# Patient Record
Sex: MALE | Race: WHITE | NOT HISPANIC OR LATINO
[De-identification: names, ages, dates, MRNs, and addresses within clinical notes are randomized per-mention and may not be internally consistent; named-entity substitution may affect disease eponyms.]

---

## 2017-05-17 ENCOUNTER — APPOINTMENT (OUTPATIENT)
Dept: TRANSPLANT | Facility: CLINIC | Age: 67
End: 2017-05-17

## 2017-06-15 ENCOUNTER — APPOINTMENT (OUTPATIENT)
Dept: TRANSPLANT | Facility: CLINIC | Age: 67
End: 2017-06-15

## 2017-06-15 VITALS
HEIGHT: 73 IN | SYSTOLIC BLOOD PRESSURE: 111 MMHG | RESPIRATION RATE: 15 BRPM | TEMPERATURE: 99.5 F | HEART RATE: 97 BPM | DIASTOLIC BLOOD PRESSURE: 47 MMHG

## 2017-06-15 RX ORDER — ALBUTEROL SULFATE 2.5 MG/3ML
(2.5 MG/3ML) SOLUTION RESPIRATORY (INHALATION)
Refills: 0 | Status: ACTIVE | COMMUNITY
Start: 2017-06-15

## 2017-06-15 RX ORDER — METOPROLOL SUCCINATE 25 MG/1
25 TABLET, EXTENDED RELEASE ORAL DAILY
Refills: 0 | Status: DISCONTINUED | COMMUNITY
Start: 2017-06-15 | End: 2017-06-15

## 2017-06-16 RX ORDER — PREDNISONE 1 MG/1
1 TABLET ORAL TWICE DAILY
Qty: 360 | Refills: 1 | Status: COMPLETED | COMMUNITY
Start: 2017-06-16

## 2017-06-19 ENCOUNTER — RX RENEWAL (OUTPATIENT)
Age: 67
End: 2017-06-19

## 2017-07-18 ENCOUNTER — NON-APPOINTMENT (OUTPATIENT)
Age: 67
End: 2017-07-18

## 2017-07-18 ENCOUNTER — APPOINTMENT (OUTPATIENT)
Dept: CARDIOLOGY | Facility: CLINIC | Age: 67
End: 2017-07-18

## 2017-07-18 VITALS
OXYGEN SATURATION: 90 % | SYSTOLIC BLOOD PRESSURE: 136 MMHG | DIASTOLIC BLOOD PRESSURE: 63 MMHG | WEIGHT: 250 LBS | BODY MASS INDEX: 32.98 KG/M2 | HEART RATE: 74 BPM

## 2017-07-18 DIAGNOSIS — Z85.51 PERSONAL HISTORY OF MALIGNANT NEOPLASM OF BLADDER: ICD-10-CM

## 2017-07-18 DIAGNOSIS — Z87.898 PERSONAL HISTORY OF OTHER SPECIFIED CONDITIONS: ICD-10-CM

## 2017-07-19 ENCOUNTER — TRANSCRIPTION ENCOUNTER (OUTPATIENT)
Age: 67
End: 2017-07-19

## 2017-07-25 ENCOUNTER — APPOINTMENT (OUTPATIENT)
Dept: TRANSPLANT | Facility: CLINIC | Age: 67
End: 2017-07-25

## 2017-07-25 VITALS
DIASTOLIC BLOOD PRESSURE: 64 MMHG | RESPIRATION RATE: 17 BRPM | TEMPERATURE: 98.8 F | HEART RATE: 102 BPM | SYSTOLIC BLOOD PRESSURE: 116 MMHG

## 2017-09-20 ENCOUNTER — RX RENEWAL (OUTPATIENT)
Age: 67
End: 2017-09-20

## 2017-10-24 ENCOUNTER — APPOINTMENT (OUTPATIENT)
Dept: TRANSPLANT | Facility: CLINIC | Age: 67
End: 2017-10-24

## 2017-10-26 ENCOUNTER — APPOINTMENT (OUTPATIENT)
Dept: TRANSPLANT | Facility: CLINIC | Age: 67
End: 2017-10-26
Payer: MEDICARE

## 2017-10-26 VITALS
HEIGHT: 73 IN | BODY MASS INDEX: 32.47 KG/M2 | WEIGHT: 245 LBS | DIASTOLIC BLOOD PRESSURE: 76 MMHG | HEART RATE: 89 BPM | TEMPERATURE: 98.2 F | RESPIRATION RATE: 26 BRPM | SYSTOLIC BLOOD PRESSURE: 140 MMHG

## 2017-10-26 PROCEDURE — 99215 OFFICE O/P EST HI 40 MIN: CPT

## 2017-10-26 RX ORDER — TAMSULOSIN HYDROCHLORIDE 0.4 MG/1
0.4 CAPSULE ORAL DAILY
Qty: 30 | Refills: 0 | Status: ACTIVE | COMMUNITY
Start: 2017-06-15

## 2017-10-26 RX ORDER — CALCIUM CARBONATE/VITAMIN D3 500-10/5ML
400 LIQUID (ML) ORAL
Refills: 0 | Status: ACTIVE | COMMUNITY
Start: 2017-06-15

## 2017-10-26 RX ORDER — BUPRENORPHINE HYDROCHLORIDE 8 MG/1
8 TABLET SUBLINGUAL
Refills: 0 | Status: ACTIVE | COMMUNITY
Start: 2017-06-15

## 2017-10-26 RX ORDER — INSULIN GLARGINE 100 [IU]/ML
100 INJECTION, SOLUTION SUBCUTANEOUS
Refills: 0 | Status: ACTIVE | COMMUNITY
Start: 2017-06-15

## 2017-10-26 RX ORDER — UMECLIDINIUM 62.5 UG/1
62.5 AEROSOL, POWDER ORAL DAILY
Refills: 0 | Status: ACTIVE | COMMUNITY
Start: 2017-06-15

## 2017-12-05 ENCOUNTER — OTHER (OUTPATIENT)
Age: 67
End: 2017-12-05

## 2017-12-05 ENCOUNTER — RX RENEWAL (OUTPATIENT)
Age: 67
End: 2017-12-05

## 2017-12-07 ENCOUNTER — LABORATORY RESULT (OUTPATIENT)
Age: 67
End: 2017-12-07

## 2017-12-07 ENCOUNTER — APPOINTMENT (OUTPATIENT)
Dept: TRANSPLANT | Facility: CLINIC | Age: 67
End: 2017-12-07
Payer: MEDICARE

## 2017-12-07 ENCOUNTER — APPOINTMENT (OUTPATIENT)
Age: 67
End: 2017-12-07
Payer: MEDICARE

## 2017-12-07 VITALS
HEIGHT: 73 IN | OXYGEN SATURATION: 83 % | SYSTOLIC BLOOD PRESSURE: 145 MMHG | DIASTOLIC BLOOD PRESSURE: 66 MMHG | HEART RATE: 87 BPM | WEIGHT: 250 LBS | RESPIRATION RATE: 20 BRPM | BODY MASS INDEX: 33.13 KG/M2 | TEMPERATURE: 98.6 F

## 2017-12-07 VITALS — HEART RATE: 89 BPM | DIASTOLIC BLOOD PRESSURE: 81 MMHG | SYSTOLIC BLOOD PRESSURE: 138 MMHG

## 2017-12-07 PROCEDURE — 99215 OFFICE O/P EST HI 40 MIN: CPT

## 2017-12-07 PROCEDURE — 91200 LIVER ELASTOGRAPHY: CPT

## 2017-12-07 RX ORDER — METOPROLOL SUCCINATE 25 MG/1
25 TABLET, EXTENDED RELEASE ORAL
Refills: 0 | Status: COMPLETED | COMMUNITY

## 2017-12-07 RX ORDER — ASPIRIN ENTERIC COATED TABLETS 81 MG 81 MG/1
81 TABLET, DELAYED RELEASE ORAL
Refills: 0 | Status: COMPLETED | COMMUNITY

## 2017-12-07 RX ORDER — CHLORHEXIDINE GLUCONATE 4 %
400 (240 MG) LIQUID (ML) TOPICAL
Refills: 0 | Status: COMPLETED | COMMUNITY

## 2017-12-07 RX ORDER — PREDNISONE 1 MG/1
1 TABLET ORAL
Refills: 0 | Status: COMPLETED | COMMUNITY

## 2017-12-07 RX ORDER — INSULIN GLARGINE 100 [IU]/ML
100 INJECTION, SOLUTION SUBCUTANEOUS
Refills: 0 | Status: COMPLETED | COMMUNITY

## 2017-12-07 RX ORDER — INSULIN LISPRO 100 [IU]/ML
(75-25) 100 INJECTION, SUSPENSION SUBCUTANEOUS
Refills: 0 | Status: COMPLETED | COMMUNITY

## 2017-12-07 RX ORDER — SULFAMETHOXAZOLE AND TRIMETHOPRIM 400; 80 MG/1; MG/1
400-80 TABLET ORAL
Refills: 0 | Status: COMPLETED | COMMUNITY

## 2017-12-07 RX ORDER — ALBUTEROL SULFATE 90 UG/1
108 (90 BASE) AEROSOL, METERED RESPIRATORY (INHALATION)
Refills: 0 | Status: COMPLETED | COMMUNITY

## 2017-12-07 RX ORDER — MYCOPHENOLATE MOFETIL 500 MG/1
500 TABLET ORAL
Refills: 0 | Status: COMPLETED | COMMUNITY

## 2017-12-07 RX ORDER — SERTRALINE HYDROCHLORIDE 25 MG/1
25 TABLET, FILM COATED ORAL
Refills: 0 | Status: COMPLETED | COMMUNITY

## 2017-12-07 RX ORDER — CHOLECALCIFEROL (VITAMIN D3) 50 MCG
2000 CAPSULE ORAL
Refills: 0 | Status: COMPLETED | COMMUNITY

## 2017-12-07 RX ORDER — OMEGA-3/DHA/EPA/FISH OIL 35-113.5MG
1000 TABLET,CHEWABLE ORAL
Refills: 0 | Status: COMPLETED | COMMUNITY

## 2017-12-07 RX ORDER — DOCUSATE SODIUM 100 MG/1
100 CAPSULE ORAL
Refills: 0 | Status: COMPLETED | COMMUNITY

## 2017-12-07 RX ORDER — BUPRENORPHINE HYDROCHLORIDE 8 MG/1
8 TABLET SUBLINGUAL
Refills: 0 | Status: COMPLETED | COMMUNITY

## 2017-12-07 RX ORDER — GLUCOSAMINE/CHONDR SU A SOD 750-600 MG
1000 TABLET ORAL
Refills: 0 | Status: COMPLETED | COMMUNITY

## 2017-12-07 RX ORDER — SERTRALINE HYDROCHLORIDE 50 MG/1
50 TABLET, FILM COATED ORAL
Refills: 0 | Status: COMPLETED | COMMUNITY

## 2017-12-07 RX ORDER — PHYTONADIONE (VIT K1) 100 MCG
100 TABLET ORAL
Refills: 0 | Status: COMPLETED | COMMUNITY

## 2017-12-07 RX ORDER — PANTOPRAZOLE SODIUM 40 MG/10ML
40 INJECTION, POWDER, FOR SOLUTION INTRAVENOUS
Refills: 0 | Status: COMPLETED | COMMUNITY

## 2017-12-07 RX ORDER — UMECLIDINIUM 62.5 UG/1
62.5 AEROSOL, POWDER ORAL
Refills: 0 | Status: COMPLETED | COMMUNITY

## 2017-12-07 RX ORDER — SPIRONOLACTONE 25 MG/1
25 TABLET ORAL
Refills: 0 | Status: COMPLETED | COMMUNITY

## 2017-12-07 RX ORDER — TACROLIMUS 0.5 MG/1
0.5 CAPSULE, GELATIN COATED ORAL
Refills: 0 | Status: COMPLETED | COMMUNITY

## 2017-12-07 RX ORDER — VITAMIN A 2400 MCG
10000 CAPSULE ORAL
Refills: 0 | Status: COMPLETED | COMMUNITY

## 2017-12-07 RX ORDER — FLUTICASONE FUROATE AND VILANTEROL TRIFENATATE 100; 25 UG/1; UG/1
100-25 POWDER RESPIRATORY (INHALATION)
Refills: 0 | Status: COMPLETED | COMMUNITY

## 2017-12-07 RX ORDER — CALCIUM CARBONATE/VITAMIN D3 500MG-5MCG
500-200 TABLET ORAL
Refills: 0 | Status: COMPLETED | COMMUNITY

## 2017-12-07 RX ORDER — FUROSEMIDE 40 MG/1
40 TABLET ORAL
Refills: 0 | Status: COMPLETED | COMMUNITY

## 2017-12-11 LAB
ALBUMIN SERPL ELPH-MCNC: 4.6 G/DL
ALP BLD-CCNC: 95 U/L
ALT SERPL-CCNC: 22 U/L
ANION GAP SERPL CALC-SCNC: 16 MMOL/L
AST SERPL-CCNC: 28 U/L
BASOPHILS # BLD AUTO: 0 K/UL
BASOPHILS NFR BLD AUTO: 0 %
BILIRUB DIRECT SERPL-MCNC: 0.2 MG/DL
BILIRUB INDIRECT SERPL-MCNC: 0.6 MG/DL
BILIRUB SERPL-MCNC: 0.8 MG/DL
BUN SERPL-MCNC: 32 MG/DL
CALCIUM SERPL-MCNC: 9.6 MG/DL
CHLORIDE SERPL-SCNC: 94 MMOL/L
CO2 SERPL-SCNC: 31 MMOL/L
CREAT SERPL-MCNC: 1.53 MG/DL
EOSINOPHIL # BLD AUTO: 0.07 K/UL
EOSINOPHIL NFR BLD AUTO: 1.2 %
GLUCOSE SERPL-MCNC: 135 MG/DL
HCT VFR BLD CALC: 36 %
HGB BLD-MCNC: 11 G/DL
IMM GRANULOCYTES NFR BLD AUTO: 0.5 %
LYMPHOCYTES # BLD AUTO: 1.12 K/UL
LYMPHOCYTES NFR BLD AUTO: 18.8 %
MAN DIFF?: NORMAL
MCHC RBC-ENTMCNC: 28.1 PG
MCHC RBC-ENTMCNC: 30.6 GM/DL
MCV RBC AUTO: 92.1 FL
MONOCYTES # BLD AUTO: 0.58 K/UL
MONOCYTES NFR BLD AUTO: 9.7 %
NEUTROPHILS # BLD AUTO: 4.16 K/UL
NEUTROPHILS NFR BLD AUTO: 69.8 %
PLATELET # BLD AUTO: 78 K/UL
POTASSIUM SERPL-SCNC: 4.2 MMOL/L
PROT SERPL-MCNC: 8.4 G/DL
RBC # BLD: 3.91 M/UL
RBC # FLD: 16.6 %
SODIUM SERPL-SCNC: 141 MMOL/L
TACROLIMUS SERPL-MCNC: <2 NG/ML
WBC # FLD AUTO: 5.96 K/UL

## 2018-02-06 ENCOUNTER — TRANSCRIPTION ENCOUNTER (OUTPATIENT)
Age: 68
End: 2018-02-06

## 2018-02-12 ENCOUNTER — OTHER (OUTPATIENT)
Age: 68
End: 2018-02-12

## 2018-02-13 ENCOUNTER — APPOINTMENT (OUTPATIENT)
Dept: TRANSPLANT | Facility: CLINIC | Age: 68
End: 2018-02-13
Payer: MEDICARE

## 2018-02-13 VITALS
BODY MASS INDEX: 32.6 KG/M2 | DIASTOLIC BLOOD PRESSURE: 99 MMHG | HEART RATE: 94 BPM | RESPIRATION RATE: 20 BRPM | HEIGHT: 73 IN | OXYGEN SATURATION: 83 % | TEMPERATURE: 98.1 F | SYSTOLIC BLOOD PRESSURE: 146 MMHG | WEIGHT: 246 LBS

## 2018-02-13 PROCEDURE — 99214 OFFICE O/P EST MOD 30 MIN: CPT

## 2018-02-13 RX ORDER — PANTOPRAZOLE 40 MG/1
40 TABLET, DELAYED RELEASE ORAL
Qty: 90 | Refills: 3 | Status: DISCONTINUED | COMMUNITY
Start: 2017-09-20 | End: 2018-02-13

## 2018-02-13 RX ORDER — FLUTICASONE FUROATE AND VILANTEROL TRIFENATATE 100; 25 UG/1; UG/1
100-25 POWDER RESPIRATORY (INHALATION) DAILY
Refills: 0 | Status: DISCONTINUED | COMMUNITY
Start: 2017-06-15 | End: 2018-02-13

## 2018-03-12 ENCOUNTER — RX RENEWAL (OUTPATIENT)
Age: 68
End: 2018-03-12

## 2018-03-15 ENCOUNTER — APPOINTMENT (OUTPATIENT)
Dept: TRANSPLANT | Facility: CLINIC | Age: 68
End: 2018-03-15
Payer: MEDICARE

## 2018-03-15 ENCOUNTER — LABORATORY RESULT (OUTPATIENT)
Age: 68
End: 2018-03-15

## 2018-03-15 VITALS
DIASTOLIC BLOOD PRESSURE: 66 MMHG | HEART RATE: 69 BPM | RESPIRATION RATE: 16 BRPM | HEIGHT: 73 IN | BODY MASS INDEX: 32.2 KG/M2 | WEIGHT: 243 LBS | TEMPERATURE: 97.8 F | OXYGEN SATURATION: 90 % | SYSTOLIC BLOOD PRESSURE: 128 MMHG

## 2018-03-15 PROCEDURE — 99215 OFFICE O/P EST HI 40 MIN: CPT

## 2018-03-16 LAB
ALBUMIN SERPL ELPH-MCNC: 4.1 G/DL
ALP BLD-CCNC: 84 U/L
ALT SERPL-CCNC: 16 U/L
ANION GAP SERPL CALC-SCNC: 13 MMOL/L
AST SERPL-CCNC: 31 U/L
BASOPHILS # BLD AUTO: 0.01 K/UL
BASOPHILS NFR BLD AUTO: 0.2 %
BILIRUB DIRECT SERPL-MCNC: 0.1 MG/DL
BILIRUB INDIRECT SERPL-MCNC: 0.4 MG/DL
BILIRUB SERPL-MCNC: 0.5 MG/DL
BUN SERPL-MCNC: 35 MG/DL
CALCIUM SERPL-MCNC: 9.8 MG/DL
CHLORIDE SERPL-SCNC: 97 MMOL/L
CHOLEST SERPL-MCNC: 209 MG/DL
CHOLEST/HDLC SERPL: 5.5 RATIO
CO2 SERPL-SCNC: 27 MMOL/L
CREAT SERPL-MCNC: 1.52 MG/DL
EOSINOPHIL # BLD AUTO: 0.06 K/UL
EOSINOPHIL NFR BLD AUTO: 1.2 %
GLUCOSE SERPL-MCNC: 141 MG/DL
HBA1C MFR BLD HPLC: 7 %
HCT VFR BLD CALC: 36.2 %
HDLC SERPL-MCNC: 38 MG/DL
HGB BLD-MCNC: 11.4 G/DL
IMM GRANULOCYTES NFR BLD AUTO: 0.4 %
LDLC SERPL CALC-MCNC: 141 MG/DL
LYMPHOCYTES # BLD AUTO: 0.98 K/UL
LYMPHOCYTES NFR BLD AUTO: 20.3 %
MAN DIFF?: NORMAL
MCHC RBC-ENTMCNC: 29.8 PG
MCHC RBC-ENTMCNC: 31.5 GM/DL
MCV RBC AUTO: 94.8 FL
MONOCYTES # BLD AUTO: 0.48 K/UL
MONOCYTES NFR BLD AUTO: 9.9 %
NEUTROPHILS # BLD AUTO: 3.28 K/UL
NEUTROPHILS NFR BLD AUTO: 68 %
PLATELET # BLD AUTO: 62 K/UL
POTASSIUM SERPL-SCNC: 4.3 MMOL/L
PROT SERPL-MCNC: 7.8 G/DL
RBC # BLD: 3.82 M/UL
RBC # FLD: 16.3 %
SODIUM SERPL-SCNC: 137 MMOL/L
TACROLIMUS SERPL-MCNC: <2 NG/ML
TRIGL SERPL-MCNC: 149 MG/DL
WBC # FLD AUTO: 4.83 K/UL

## 2018-05-14 ENCOUNTER — OTHER (OUTPATIENT)
Age: 68
End: 2018-05-14

## 2018-05-15 ENCOUNTER — APPOINTMENT (OUTPATIENT)
Dept: TRANSPLANT | Facility: CLINIC | Age: 68
End: 2018-05-15
Payer: MEDICARE

## 2018-05-15 VITALS
OXYGEN SATURATION: 87 % | RESPIRATION RATE: 16 BRPM | HEART RATE: 87 BPM | TEMPERATURE: 97.6 F | WEIGHT: 250 LBS | HEIGHT: 73 IN | DIASTOLIC BLOOD PRESSURE: 75 MMHG | SYSTOLIC BLOOD PRESSURE: 138 MMHG | BODY MASS INDEX: 33.13 KG/M2

## 2018-05-15 DIAGNOSIS — L08.9 LOCAL INFECTION OF THE SKIN AND SUBCUTANEOUS TISSUE, UNSPECIFIED: ICD-10-CM

## 2018-05-15 DIAGNOSIS — E10.9 TYPE 1 DIABETES MELLITUS W/OUT COMPLICATIONS: ICD-10-CM

## 2018-05-15 PROCEDURE — 99214 OFFICE O/P EST MOD 30 MIN: CPT

## 2018-05-15 RX ORDER — CEPHALEXIN 500 MG/1
500 CAPSULE ORAL
Qty: 26 | Refills: 0 | Status: DISCONTINUED | COMMUNITY
Start: 2018-05-15 | End: 2018-05-15

## 2018-05-15 RX ORDER — CEPHALEXIN 500 MG/1
500 TABLET ORAL
Qty: 14 | Refills: 0 | Status: ACTIVE | COMMUNITY
Start: 2018-05-15 | End: 1900-01-01

## 2018-06-20 ENCOUNTER — OTHER (OUTPATIENT)
Age: 68
End: 2018-06-20

## 2018-06-20 RX ORDER — PREDNISONE 1 MG/1
1 TABLET ORAL EVERY OTHER DAY
Qty: 135 | Refills: 1 | Status: COMPLETED | COMMUNITY
Start: 2017-06-19 | End: 2018-06-20

## 2018-09-18 ENCOUNTER — OTHER (OUTPATIENT)
Age: 68
End: 2018-09-18

## 2018-10-23 ENCOUNTER — APPOINTMENT (OUTPATIENT)
Dept: TRANSPLANT | Facility: CLINIC | Age: 68
End: 2018-10-23

## 2018-10-25 ENCOUNTER — APPOINTMENT (OUTPATIENT)
Dept: TRANSPLANT | Facility: CLINIC | Age: 68
End: 2018-10-25
Payer: MEDICARE

## 2018-10-25 ENCOUNTER — OTHER (OUTPATIENT)
Age: 68
End: 2018-10-25

## 2018-10-25 ENCOUNTER — LABORATORY RESULT (OUTPATIENT)
Age: 68
End: 2018-10-25

## 2018-10-25 VITALS
OXYGEN SATURATION: 87 % | HEIGHT: 72 IN | BODY MASS INDEX: 33.32 KG/M2 | WEIGHT: 246 LBS | RESPIRATION RATE: 12 BRPM | SYSTOLIC BLOOD PRESSURE: 166 MMHG | HEART RATE: 83 BPM | DIASTOLIC BLOOD PRESSURE: 75 MMHG | TEMPERATURE: 98.4 F

## 2018-10-25 VITALS — SYSTOLIC BLOOD PRESSURE: 136 MMHG | DIASTOLIC BLOOD PRESSURE: 81 MMHG

## 2018-10-25 PROCEDURE — 99214 OFFICE O/P EST MOD 30 MIN: CPT

## 2018-10-26 LAB
BASOPHILS # BLD AUTO: 0.01 K/UL
BASOPHILS NFR BLD AUTO: 0.2 %
EOSINOPHIL # BLD AUTO: 0.05 K/UL
EOSINOPHIL NFR BLD AUTO: 1.1 %
HCT VFR BLD CALC: 37.1 %
HGB BLD-MCNC: 11.9 G/DL
IMM GRANULOCYTES NFR BLD AUTO: 0.6 %
LYMPHOCYTES # BLD AUTO: 0.86 K/UL
LYMPHOCYTES NFR BLD AUTO: 18.4 %
MAN DIFF?: NORMAL
MCHC RBC-ENTMCNC: 32.1 GM/DL
MCHC RBC-ENTMCNC: 32.8 PG
MCV RBC AUTO: 102.2 FL
MONOCYTES # BLD AUTO: 0.4 K/UL
MONOCYTES NFR BLD AUTO: 8.5 %
NEUTROPHILS # BLD AUTO: 3.33 K/UL
NEUTROPHILS NFR BLD AUTO: 71.2 %
PLATELET # BLD AUTO: 62 K/UL
RBC # BLD: 3.63 M/UL
RBC # FLD: 14.7 %
WBC # FLD AUTO: 4.68 K/UL

## 2018-10-29 ENCOUNTER — OTHER (OUTPATIENT)
Age: 68
End: 2018-10-29

## 2018-10-29 LAB
ALBUMIN SERPL ELPH-MCNC: 4.2 G/DL
ALP BLD-CCNC: 88 U/L
ALT SERPL-CCNC: 20 U/L
ANION GAP SERPL CALC-SCNC: 11 MMOL/L
AST SERPL-CCNC: 26 U/L
BILIRUB SERPL-MCNC: 0.6 MG/DL
BUN SERPL-MCNC: 33 MG/DL
CALCIUM SERPL-MCNC: 9.5 MG/DL
CHLORIDE SERPL-SCNC: 96 MMOL/L
CHOLEST SERPL-MCNC: 202 MG/DL
CHOLEST/HDLC SERPL: 4.6 RATIO
CO2 SERPL-SCNC: 33 MMOL/L
CREAT SERPL-MCNC: 1.38 MG/DL
GLUCOSE SERPL-MCNC: 246 MG/DL
HBA1C MFR BLD HPLC: 6.4 %
HDLC SERPL-MCNC: 44 MG/DL
LDLC SERPL CALC-MCNC: 127 MG/DL
POTASSIUM SERPL-SCNC: 4.4 MMOL/L
PROT SERPL-MCNC: 7.3 G/DL
SODIUM SERPL-SCNC: 140 MMOL/L
TRIGL SERPL-MCNC: 154 MG/DL

## 2018-10-30 LAB — TACROLIMUS SERPL-MCNC: <2 NG/ML

## 2018-12-12 ENCOUNTER — INPATIENT (INPATIENT)
Facility: HOSPITAL | Age: 68
LOS: 2 days | Discharge: HOME | DRG: 603 | End: 2018-12-15
Attending: INTERNAL MEDICINE | Admitting: INTERNAL MEDICINE
Payer: MEDICARE

## 2018-12-12 VITALS
HEART RATE: 72 BPM | SYSTOLIC BLOOD PRESSURE: 145 MMHG | TEMPERATURE: 98 F | OXYGEN SATURATION: 90 % | RESPIRATION RATE: 18 BRPM | DIASTOLIC BLOOD PRESSURE: 71 MMHG

## 2018-12-12 DIAGNOSIS — L03.90 CELLULITIS, UNSPECIFIED: ICD-10-CM

## 2018-12-12 LAB
ALBUMIN SERPL ELPH-MCNC: 4.5 G/DL — SIGNIFICANT CHANGE UP (ref 3.3–5)
ALP SERPL-CCNC: 95 U/L — SIGNIFICANT CHANGE UP (ref 40–120)
ALT FLD-CCNC: 21 U/L — SIGNIFICANT CHANGE UP (ref 10–45)
ANION GAP SERPL CALC-SCNC: 13 MMOL/L — SIGNIFICANT CHANGE UP (ref 5–17)
ANISOCYTOSIS BLD QL: SLIGHT — SIGNIFICANT CHANGE UP
APTT BLD: 30.6 SEC — SIGNIFICANT CHANGE UP (ref 27.5–36.3)
AST SERPL-CCNC: 33 U/L — SIGNIFICANT CHANGE UP (ref 10–40)
BASE EXCESS BLDV CALC-SCNC: 11.2 MMOL/L — HIGH (ref -2–2)
BASOPHILS # BLD AUTO: 0 K/UL — SIGNIFICANT CHANGE UP (ref 0–0.2)
BASOPHILS NFR BLD AUTO: 0.3 % — SIGNIFICANT CHANGE UP (ref 0–2)
BILIRUB SERPL-MCNC: 0.7 MG/DL — SIGNIFICANT CHANGE UP (ref 0.2–1.2)
BUN SERPL-MCNC: 34 MG/DL — HIGH (ref 7–23)
CA-I SERPL-SCNC: 1.17 MMOL/L — SIGNIFICANT CHANGE UP (ref 1.12–1.3)
CALCIUM SERPL-MCNC: 9.7 MG/DL — SIGNIFICANT CHANGE UP (ref 8.4–10.5)
CHLORIDE BLDV-SCNC: 92 MMOL/L — LOW (ref 96–108)
CHLORIDE SERPL-SCNC: 90 MMOL/L — LOW (ref 96–108)
CO2 BLDV-SCNC: 41 MMOL/L — HIGH (ref 22–30)
CO2 SERPL-SCNC: 34 MMOL/L — HIGH (ref 22–31)
CREAT SERPL-MCNC: 1.31 MG/DL — HIGH (ref 0.5–1.3)
DACRYOCYTES BLD QL SMEAR: SLIGHT — SIGNIFICANT CHANGE UP
ELLIPTOCYTES BLD QL SMEAR: SLIGHT — SIGNIFICANT CHANGE UP
EOSINOPHIL # BLD AUTO: 0.2 K/UL — SIGNIFICANT CHANGE UP (ref 0–0.5)
EOSINOPHIL NFR BLD AUTO: 3 % — SIGNIFICANT CHANGE UP (ref 0–6)
GAS PNL BLDV: 136 MMOL/L — SIGNIFICANT CHANGE UP (ref 136–145)
GAS PNL BLDV: SIGNIFICANT CHANGE UP
GAS PNL BLDV: SIGNIFICANT CHANGE UP
GLUCOSE BLDV-MCNC: 170 MG/DL — HIGH (ref 70–99)
GLUCOSE SERPL-MCNC: 189 MG/DL — HIGH (ref 70–99)
HCO3 BLDV-SCNC: 39 MMOL/L — HIGH (ref 21–29)
HCT VFR BLD CALC: 35.4 % — LOW (ref 39–50)
HCT VFR BLDA CALC: 36 % — LOW (ref 39–50)
HGB BLD CALC-MCNC: 11.8 G/DL — LOW (ref 13–17)
HGB BLD-MCNC: 11.8 G/DL — LOW (ref 13–17)
INR BLD: 1 RATIO — SIGNIFICANT CHANGE UP (ref 0.88–1.16)
LACTATE BLDV-MCNC: 2 MMOL/L — SIGNIFICANT CHANGE UP (ref 0.7–2)
LYMPHOCYTES # BLD AUTO: 0.8 K/UL — LOW (ref 1–3.3)
LYMPHOCYTES # BLD AUTO: 14.5 % — SIGNIFICANT CHANGE UP (ref 13–44)
MCHC RBC-ENTMCNC: 32.5 PG — SIGNIFICANT CHANGE UP (ref 27–34)
MCHC RBC-ENTMCNC: 33.3 GM/DL — SIGNIFICANT CHANGE UP (ref 32–36)
MCV RBC AUTO: 97.5 FL — SIGNIFICANT CHANGE UP (ref 80–100)
MICROCYTES BLD QL: SLIGHT — SIGNIFICANT CHANGE UP
MONOCYTES # BLD AUTO: 0.5 K/UL — SIGNIFICANT CHANGE UP (ref 0–0.9)
MONOCYTES NFR BLD AUTO: 9.2 % — SIGNIFICANT CHANGE UP (ref 2–14)
NEUTROPHILS # BLD AUTO: 4 K/UL — SIGNIFICANT CHANGE UP (ref 1.8–7.4)
NEUTROPHILS NFR BLD AUTO: 73.1 % — SIGNIFICANT CHANGE UP (ref 43–77)
NT-PROBNP SERPL-SCNC: 297 PG/ML — SIGNIFICANT CHANGE UP (ref 0–300)
PCO2 BLDV: 68 MMHG — HIGH (ref 35–50)
PH BLDV: 7.37 — SIGNIFICANT CHANGE UP (ref 7.35–7.45)
PLAT MORPH BLD: NORMAL — SIGNIFICANT CHANGE UP
PLATELET # BLD AUTO: 75 K/UL — LOW (ref 150–400)
PO2 BLDV: 22 MMHG — LOW (ref 25–45)
POIKILOCYTOSIS BLD QL AUTO: SLIGHT — SIGNIFICANT CHANGE UP
POLYCHROMASIA BLD QL SMEAR: SLIGHT — SIGNIFICANT CHANGE UP
POTASSIUM BLDV-SCNC: 4 MMOL/L — SIGNIFICANT CHANGE UP (ref 3.5–5.3)
POTASSIUM SERPL-MCNC: 4.4 MMOL/L — SIGNIFICANT CHANGE UP (ref 3.5–5.3)
POTASSIUM SERPL-SCNC: 4.4 MMOL/L — SIGNIFICANT CHANGE UP (ref 3.5–5.3)
PROT SERPL-MCNC: 7.7 G/DL — SIGNIFICANT CHANGE UP (ref 6–8.3)
PROTHROM AB SERPL-ACNC: 11.5 SEC — SIGNIFICANT CHANGE UP (ref 10–12.9)
RBC # BLD: 3.63 M/UL — LOW (ref 4.2–5.8)
RBC # FLD: 14 % — SIGNIFICANT CHANGE UP (ref 10.3–14.5)
RBC BLD AUTO: ABNORMAL
SAO2 % BLDV: 27 % — LOW (ref 67–88)
SODIUM SERPL-SCNC: 137 MMOL/L — SIGNIFICANT CHANGE UP (ref 135–145)
STOMATOCYTES BLD QL SMEAR: PRESENT — SIGNIFICANT CHANGE UP
TACROLIMUS SERPL-MCNC: <2 NG/ML — SIGNIFICANT CHANGE UP
TROPONIN T, HIGH SENSITIVITY RESULT: 42 NG/L — SIGNIFICANT CHANGE UP (ref 0–51)
WBC # BLD: 5.5 K/UL — SIGNIFICANT CHANGE UP (ref 3.8–10.5)
WBC # FLD AUTO: 5.5 K/UL — SIGNIFICANT CHANGE UP (ref 3.8–10.5)

## 2018-12-12 PROCEDURE — 73590 X-RAY EXAM OF LOWER LEG: CPT | Mod: 26,LT

## 2018-12-12 PROCEDURE — 73502 X-RAY EXAM HIP UNI 2-3 VIEWS: CPT | Mod: 26,LT

## 2018-12-12 PROCEDURE — 73610 X-RAY EXAM OF ANKLE: CPT | Mod: 26,LT

## 2018-12-12 PROCEDURE — 71045 X-RAY EXAM CHEST 1 VIEW: CPT | Mod: 26

## 2018-12-12 PROCEDURE — 93971 EXTREMITY STUDY: CPT | Mod: 26

## 2018-12-12 PROCEDURE — 99222 1ST HOSP IP/OBS MODERATE 55: CPT | Mod: GC

## 2018-12-12 PROCEDURE — 99285 EMERGENCY DEPT VISIT HI MDM: CPT

## 2018-12-12 RX ORDER — ASPIRIN/CALCIUM CARB/MAGNESIUM 324 MG
81 TABLET ORAL DAILY
Qty: 0 | Refills: 0 | Status: DISCONTINUED | OUTPATIENT
Start: 2018-12-12 | End: 2018-12-15

## 2018-12-12 RX ORDER — HEPARIN SODIUM 5000 [USP'U]/ML
5000 INJECTION INTRAVENOUS; SUBCUTANEOUS EVERY 12 HOURS
Qty: 0 | Refills: 0 | Status: DISCONTINUED | OUTPATIENT
Start: 2018-12-12 | End: 2018-12-12

## 2018-12-12 RX ORDER — CALCIUM CARBONATE 500(1250)
1 TABLET ORAL DAILY
Qty: 0 | Refills: 0 | Status: DISCONTINUED | OUTPATIENT
Start: 2018-12-12 | End: 2018-12-15

## 2018-12-12 RX ORDER — DOCUSATE SODIUM 100 MG
100 CAPSULE ORAL THREE TIMES A DAY
Qty: 0 | Refills: 0 | Status: DISCONTINUED | OUTPATIENT
Start: 2018-12-12 | End: 2018-12-15

## 2018-12-12 RX ORDER — SERTRALINE 25 MG/1
100 TABLET, FILM COATED ORAL DAILY
Qty: 0 | Refills: 0 | Status: DISCONTINUED | OUTPATIENT
Start: 2018-12-12 | End: 2018-12-15

## 2018-12-12 RX ORDER — TAMSULOSIN HYDROCHLORIDE 0.4 MG/1
0.4 CAPSULE ORAL AT BEDTIME
Qty: 0 | Refills: 0 | Status: DISCONTINUED | OUTPATIENT
Start: 2018-12-12 | End: 2018-12-15

## 2018-12-12 RX ORDER — OXYCODONE HYDROCHLORIDE 5 MG/1
5 TABLET ORAL EVERY 6 HOURS
Qty: 0 | Refills: 0 | Status: DISCONTINUED | OUTPATIENT
Start: 2018-12-12 | End: 2018-12-15

## 2018-12-12 RX ORDER — VANCOMYCIN HCL 1 G
1000 VIAL (EA) INTRAVENOUS ONCE
Qty: 0 | Refills: 0 | Status: COMPLETED | OUTPATIENT
Start: 2018-12-12 | End: 2018-12-12

## 2018-12-12 RX ORDER — FUROSEMIDE 40 MG
80 TABLET ORAL
Qty: 0 | Refills: 0 | Status: DISCONTINUED | OUTPATIENT
Start: 2018-12-12 | End: 2018-12-15

## 2018-12-12 RX ORDER — CEFAZOLIN SODIUM 1 G
1000 VIAL (EA) INJECTION EVERY 8 HOURS
Qty: 0 | Refills: 0 | Status: DISCONTINUED | OUTPATIENT
Start: 2018-12-12 | End: 2018-12-15

## 2018-12-12 RX ORDER — DEXTROSE 50 % IN WATER 50 %
25 SYRINGE (ML) INTRAVENOUS ONCE
Qty: 0 | Refills: 0 | Status: DISCONTINUED | OUTPATIENT
Start: 2018-12-12 | End: 2018-12-15

## 2018-12-12 RX ORDER — PIPERACILLIN AND TAZOBACTAM 4; .5 G/20ML; G/20ML
3.38 INJECTION, POWDER, LYOPHILIZED, FOR SOLUTION INTRAVENOUS ONCE
Qty: 0 | Refills: 0 | Status: COMPLETED | OUTPATIENT
Start: 2018-12-12 | End: 2018-12-12

## 2018-12-12 RX ORDER — DEXTROSE 50 % IN WATER 50 %
12.5 SYRINGE (ML) INTRAVENOUS ONCE
Qty: 0 | Refills: 0 | Status: DISCONTINUED | OUTPATIENT
Start: 2018-12-12 | End: 2018-12-15

## 2018-12-12 RX ORDER — MYCOPHENOLATE MOFETIL 250 MG/1
500 CAPSULE ORAL DAILY
Qty: 0 | Refills: 0 | Status: DISCONTINUED | OUTPATIENT
Start: 2018-12-12 | End: 2018-12-15

## 2018-12-12 RX ORDER — SODIUM CHLORIDE 9 MG/ML
1000 INJECTION, SOLUTION INTRAVENOUS
Qty: 0 | Refills: 0 | Status: DISCONTINUED | OUTPATIENT
Start: 2018-12-12 | End: 2018-12-15

## 2018-12-12 RX ORDER — VANCOMYCIN HCL 1 G
VIAL (EA) INTRAVENOUS
Qty: 0 | Refills: 0 | Status: DISCONTINUED | OUTPATIENT
Start: 2018-12-12 | End: 2018-12-12

## 2018-12-12 RX ORDER — DEXTROSE 50 % IN WATER 50 %
15 SYRINGE (ML) INTRAVENOUS ONCE
Qty: 0 | Refills: 0 | Status: DISCONTINUED | OUTPATIENT
Start: 2018-12-12 | End: 2018-12-15

## 2018-12-12 RX ORDER — TACROLIMUS 5 MG/1
0.5 CAPSULE ORAL
Qty: 0 | Refills: 0 | Status: DISCONTINUED | OUTPATIENT
Start: 2018-12-12 | End: 2018-12-15

## 2018-12-12 RX ORDER — INSULIN GLARGINE 100 [IU]/ML
40 INJECTION, SOLUTION SUBCUTANEOUS AT BEDTIME
Qty: 0 | Refills: 0 | Status: DISCONTINUED | OUTPATIENT
Start: 2018-12-12 | End: 2018-12-15

## 2018-12-12 RX ORDER — MAGNESIUM OXIDE 400 MG ORAL TABLET 241.3 MG
400 TABLET ORAL DAILY
Qty: 0 | Refills: 0 | Status: DISCONTINUED | OUTPATIENT
Start: 2018-12-12 | End: 2018-12-15

## 2018-12-12 RX ORDER — BUPRENORPHINE 10 UG/H
2 PATCH, EXTENDED RELEASE TRANSDERMAL
Qty: 0 | Refills: 0 | Status: DISCONTINUED | OUTPATIENT
Start: 2018-12-12 | End: 2018-12-15

## 2018-12-12 RX ORDER — SPIRONOLACTONE 25 MG/1
25 TABLET, FILM COATED ORAL DAILY
Qty: 0 | Refills: 0 | Status: DISCONTINUED | OUTPATIENT
Start: 2018-12-12 | End: 2018-12-15

## 2018-12-12 RX ORDER — INSULIN GLARGINE 100 [IU]/ML
20 INJECTION, SOLUTION SUBCUTANEOUS EVERY MORNING
Qty: 0 | Refills: 0 | Status: DISCONTINUED | OUTPATIENT
Start: 2018-12-12 | End: 2018-12-15

## 2018-12-12 RX ORDER — METOPROLOL TARTRATE 50 MG
25 TABLET ORAL DAILY
Qty: 0 | Refills: 0 | Status: DISCONTINUED | OUTPATIENT
Start: 2018-12-12 | End: 2018-12-15

## 2018-12-12 RX ORDER — GLUCAGON INJECTION, SOLUTION 0.5 MG/.1ML
1 INJECTION, SOLUTION SUBCUTANEOUS ONCE
Qty: 0 | Refills: 0 | Status: DISCONTINUED | OUTPATIENT
Start: 2018-12-12 | End: 2018-12-15

## 2018-12-12 RX ORDER — SODIUM CHLORIDE 9 MG/ML
1000 INJECTION INTRAMUSCULAR; INTRAVENOUS; SUBCUTANEOUS
Qty: 0 | Refills: 0 | Status: DISCONTINUED | OUTPATIENT
Start: 2018-12-12 | End: 2018-12-15

## 2018-12-12 RX ORDER — INSULIN LISPRO 100/ML
VIAL (ML) SUBCUTANEOUS
Qty: 0 | Refills: 0 | Status: DISCONTINUED | OUTPATIENT
Start: 2018-12-12 | End: 2018-12-15

## 2018-12-12 RX ADMIN — SODIUM CHLORIDE 50 MILLILITER(S): 9 INJECTION INTRAMUSCULAR; INTRAVENOUS; SUBCUTANEOUS at 23:16

## 2018-12-12 RX ADMIN — Medication 250 MILLIGRAM(S): at 16:57

## 2018-12-12 RX ADMIN — INSULIN GLARGINE 40 UNIT(S): 100 INJECTION, SOLUTION SUBCUTANEOUS at 22:47

## 2018-12-12 RX ADMIN — Medication 2: at 19:20

## 2018-12-12 RX ADMIN — Medication 100 MILLIGRAM(S): at 23:16

## 2018-12-12 RX ADMIN — Medication 100 MILLIGRAM(S): at 22:47

## 2018-12-12 RX ADMIN — PIPERACILLIN AND TAZOBACTAM 3.38 GRAM(S): 4; .5 INJECTION, POWDER, LYOPHILIZED, FOR SOLUTION INTRAVENOUS at 16:30

## 2018-12-12 RX ADMIN — PIPERACILLIN AND TAZOBACTAM 200 GRAM(S): 4; .5 INJECTION, POWDER, LYOPHILIZED, FOR SOLUTION INTRAVENOUS at 16:30

## 2018-12-12 NOTE — CONSULT NOTE ADULT - ASSESSMENT
68M liver transplant (2/2 hep C) 7 years ago, htn, dm, hld, copd on home O2 presents post-fall. LLE is more swollen and erythematous, concern for cellulitis. Possible fracture on xray of left leg.    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 LLE is more swollen and erythematous, concern for cellulitis. Possible fracture on xray of left leg.  Probable strep infection.  No purulence to suggest staph aureus    - d/c vanco and zosyn  - start ancef 1 g IV q8h  - follow up on BCx  - ortho consult pending

## 2018-12-12 NOTE — ED PROVIDER NOTE - PHYSICAL EXAMINATION
CONSTITUTIONAL: Patient is awake, alert and oriented x 3. Patient is well appearing and in no acute distress.  HEAD: NCAT,   EYES: PERRL b/l, EOMI,   NECK: supple, FROM  LUNGS: No respiratory distress or increased work of breathing. Overall poor airflow B/L, no rhonchi, rales or wheezing   HEART: RRR.+S1S2 no murmurs,   ABDOMEN: Soft nd/nt+bs no rebound or guarding. Healing ecchymosis to left mid abdomen without ttp   EXTREMITY: significant swelling to lower extremities b/l left > right with warmth and ttp to left lower extremity. , FROM upper and lower ext b/l. The pelvis is stable and hips w/ot ttp b/l.   SKIN: Chronic skin changes to lower extremities b/l from below knee to toes with darkened pigmentation and mottling    NEURO: No focal deficits

## 2018-12-12 NOTE — H&P ADULT - HISTORY OF PRESENT ILLNESS
68y o male w/ hx  liver transplant (2/2 hep C) 7 years ago, htn, dm, hld, copd on home O2 presents post-fall.   Pt states he tripped over his nasal cannula and fell about 4-5 days ago.   He wasn't able to get to ED until today  . States since fall his left leg has been swollen, painful, red.   No recent abx.    Denies fevers, chills, cough, rhinorrhea, sore throat, cp, abd pain, n/v, diarrhea, dysuria. Denies hx skin infections. States no scratches.

## 2018-12-12 NOTE — ED PROVIDER NOTE - OBJECTIVE STATEMENT
68 year old male w/ PMHx, HTN, DM, HLD, COPD on 2-3L nasal canula at home (sats 88-90% at home), liver transplant presents to ED c/o left sided lower extremity swelling and sob. Per patient had a fall where he tripped over his o2 machine at home approx 1 week ago and since has noticed increased swelling to the left lower extremity from his baseline chronic  b/l lower extremity swelling. Swelling associated with increased sob from baseline most noticed with exertion. He states he has been ambulating with some difficulty. Denies recurrent falls, head injury, loc, fevers, chills, chest pain, sob, abdominal pain, n/v/d, urinary symptoms

## 2018-12-12 NOTE — CONSULT NOTE ADULT - SUBJECTIVE AND OBJECTIVE BOX
HPI: 68M liver transplant 7 years ago, htn, dm, hld, copd on home O2 presents post-fall. Pt states he tripped over his nasal cannula and fell about 4-5 days ago. He wasn't able to get to ED until today. States since fall his left leg has been swollen, painful, red. No recent abx. Didn't go to see a physician prior to ED today.     Denies fevers, chills, cough, rhinorrhea, sore throat, cp, abd pain, n/v, diarrhea, dysuria. Denies hx skin infections. States no skin scratches.       PAST MEDICAL & SURGICAL HISTORY:      Allergies  clindamycin (Unknown)        ANTIMICROBIALS:  vancomycin  IVPB        OTHER MEDS:      SOCIAL HISTORY: [ ] etoh [ ] tobacco [ ] former smoker [ ] IVDU    FAMILY HISTORY:      REVIEW OF SYSTEMS  [  ] ROS unobtainable because:    [  ] All other systems negative except as noted below:	    Constitutional:  [ ] fever [ ] chills  [ ] weight loss  [ ] weakness  Skin:  [ ] rash [ ] phlebitis	  Eyes: [ ] icterus [ ] pain  [ ] discharge	  ENMT: [ ] sore throat  [ ] thrush [ ] ulcers [ ] exudates  Respiratory: [ ] dyspnea [ ] hemoptysis [ ] cough [ ] sputum	  Cardiovascular:  [ ] chest pain [ ] palpitations [ ] edema	  Gastrointestinal:  [ ] nausea [ ] vomiting [ ] diarrhea [ ] constipation [ ] pain	  Genitourinary:  [ ] dysuria [ ] frequency [ ] hematuria [ ] discharge [ ] flank pain  [ ] incontinence  Musculoskeletal:  [ ] myalgias [ ] arthralgias [ ] arthritis  [ ] back pain  Neurological:  [ ] headache [ ] seizures  [ ] confusion/altered mental status  Psychiatric:  [ ] anxiety [ ] depression	  Hematology/Lymphatics:  [ ] lymphadenopathy  Endocrine:  [ ] adrenal [ ] thyroid  Allergic/Immunologic:	 [ ] transplant [ ] seasonal    Vital Signs Last 24 Hrs  T(F): 98.7 (12-12-18 @ 12:30), Max: 98.7 (12-12-18 @ 12:30)    Vital Signs Last 24 Hrs  HR: 75 (12-12-18 @ 12:30) (72 - 75)  BP: 115/64 (12-12-18 @ 12:30) (115/64 - 145/71)  RR: 18 (12-12-18 @ 12:30)  SpO2: 94% (12-12-18 @ 12:30) (90% - 94%)  Wt(kg): --    PHYSICAL EXAM:  General: [ ] non-toxic  HEAD/EYES: [ ] PERRL [ ] white sclera [ ] icterus  ENT:  [ ] normal [ ] supple [ ] thrush [ ] pharyngeal exudate  Cardiovascular:   [ ] murmur [ ] normal [ ] PPM/AICD  Respiratory:  [ ] clear to ausculation bilaterally  GI:  [ ] soft, non-tender, normal bowel sounds  :  [ ] palacios [ ] no CVA tenderness   Musculoskeletal:  [ ] no synovitis  Neurologic:  [ ] non-focal exam   Skin:  [ ] no rash  Lymph: [ ] no lymphadenopathy  Psychiatric:  [ ] appropriate affect [ ] alert & oriented  Lines:  [ ] no phlebitis [ ] central line                                11.8   5.5   )-----------( 75       ( 12 Dec 2018 12:18 )             35.4       12-12    137  |  90<L>  |  34<H>  ----------------------------<  189<H>  4.4   |  34<H>  |  1.31<H>    Ca    9.7      12 Dec 2018 12:18    TPro  7.7  /  Alb  4.5  /  TBili  0.7  /  DBili  x   /  AST  33  /  ALT  21  /  AlkPhos  95  12-12          MICROBIOLOGY:          v            RADIOLOGY: HPI: 68M liver transplant (2/2 hep C) 7 years ago, htn, dm, hld, copd on home O2 presents post-fall. Pt states he tripped over his nasal cannula and fell about 4-5 days ago. He wasn't able to get to ED until today. States since fall his left leg has been swollen, painful, red. No recent abx. Didn't go to see a physician prior to ED today.     Denies fevers, chills, cough, rhinorrhea, sore throat, cp, abd pain, n/v, diarrhea, dysuria. Denies hx skin infections. States no skin scratches.       PAST MEDICAL & SURGICAL HISTORY:      Allergies  clindamycin (Unknown)        ANTIMICROBIALS:  vancomycin  IVPB        OTHER MEDS:      SOCIAL HISTORY: [ ] etoh [ ] tobacco [ ] former smoker [ ] IVDU    FAMILY HISTORY:      REVIEW OF SYSTEMS    [ x ] All other systems negative except as noted below:	    Constitutional:  [ ] fever [ ] chills  [ ] weight loss  [ ] weakness  Skin:  redness of lle	  Eyes: [ ] icterus [ ] pain  [ ] discharge	  ENMT: [ ] sore throat  [ ] thrush [ ] ulcers [ ] exudates  Respiratory: [ ] dyspnea [ ] hemoptysis [ ] cough [ ] sputum	  Cardiovascular:  [ ] chest pain [ ] palpitations [ ] edema	  Gastrointestinal:  [ ] nausea [ ] vomiting [ ] diarrhea [ ] constipation [ ] pain	  Genitourinary:  [ ] dysuria [ ] frequency [ ] hematuria [ ] discharge [ ] flank pain  [ ] incontinence  Musculoskeletal:  [ ] myalgias [ ] arthralgias [ ] arthritis  [ ] back pain  Neurological:  [ ] headache [ ] seizures  [ ] confusion/altered mental status  Psychiatric:  [ ] anxiety [ ] depression	  Hematology/Lymphatics:  [ ] lymphadenopathy  Endocrine:  [ ] adrenal [ ] thyroid  Allergic/Immunologic:	 [ ] transplant [ ] seasonal    Vital Signs Last 24 Hrs  T(F): 98.7 (12-12-18 @ 12:30), Max: 98.7 (12-12-18 @ 12:30)    Vital Signs Last 24 Hrs  HR: 75 (12-12-18 @ 12:30) (72 - 75)  BP: 115/64 (12-12-18 @ 12:30) (115/64 - 145/71)  RR: 18 (12-12-18 @ 12:30)  SpO2: 94% (12-12-18 @ 12:30) (90% - 94%)  Wt(kg): --    PHYSICAL EXAM:  General: [x ] non-toxic  HEAD/EYES: [ x] PERRL [x ] white sclera [ ] icterus  ENT:  [ ] normal [x ] supple [ ] thrush [ ] pharyngeal exudate  Cardiovascular:   [ ] murmur [x ] normal [ ] PPM/AICD  Respiratory:  [x ] clear to ausculation bilaterally  GI:  [ x] soft, non-tender, normal bowel sounds  :  [ ] palacios [ ] no CVA tenderness   Musculoskeletal:  [ ] no synovitis  Neurologic:  [ x] non-focal exam   Skin: TTP LLE, LLE w erythema and swelling  Lymph: [ ] no lymphadenopathy  Psychiatric:  [x ] appropriate affect [x ] alert & oriented  Lines:  [x ] no phlebitis [ ] central line                                11.8   5.5   )-----------( 75       ( 12 Dec 2018 12:18 )             35.4       12-12    137  |  90<L>  |  34<H>  ----------------------------<  189<H>  4.4   |  34<H>  |  1.31<H>    Ca    9.7      12 Dec 2018 12:18    TPro  7.7  /  Alb  4.5  /  TBili  0.7  /  DBili  x   /  AST  33  /  ALT  21  /  AlkPhos  95  12-12          MICROBIOLOGY:    BCx x 2 ordered by primary team      RADIOLOGY:    LLE u/s- No evidence of left lower extremity deep venous thrombosis.    XR ankle/ tib/fib- Minimal step-off at the lateral cortex of the distal fibula on the   mortise view; it is unclear whether there is an acute fracture. Correlate   for point tenderness in this location.      XR Hip- No acute fracture or dislocation.    CXR- 1.  Bibasilar platelike like atelectasis. 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 Pt states he tripped over his nasal cannula and fell about 4-5 days ago. He wasn't able to get to ED until today. States since fall his left leg has been swollen, painful, red. No recent abx. Didn't go to see a physician prior to ED today.     Denies fevers, chills, cough, rhinorrhea, sore throat, cp, abd pain, n/v, diarrhea, dysuria. Denies hx skin infections. States no skin scratches.     PAST MEDICAL & SURGICAL HISTORY:    Allergies  clindamycin (Unknown)    ANTIMICROBIALS:    vancomycin  IVPB    zosyn x1    OTHER MEDS:      SOCIAL HISTORY: lives alone    FAMILY HISTORY:    REVIEW OF SYSTEMS    [ x ] All other systems negative except as noted below:	    Constitutional:  [ ] fever [ ] chills  [ ] weight loss  [ ] weakness  Skin:  redness of lle	  Eyes: [ ] icterus [ ] pain  [ ] discharge	  ENMT: [ ] sore throat  [ ] thrush [ ] ulcers [ ] exudates  Respiratory: [ ] dyspnea [ ] hemoptysis [ ] cough [ ] sputum	  Cardiovascular:  [ ] chest pain [ ] palpitations [ ] edema	  Gastrointestinal:  [ ] nausea [ ] vomiting [ ] diarrhea [ ] constipation [ ] pain	  Genitourinary:  [ ] dysuria [ ] frequency [ ] hematuria [ ] discharge [ ] flank pain  [ ] incontinence  Musculoskeletal:  [ ] myalgias [ ] arthralgias [ ] arthritis  [ ] back pain  Neurological:  [ ] headache [ ] seizures  [ ] confusion/altered mental status  Psychiatric:  [ ] anxiety [ ] depression	  Hematology/Lymphatics:  [ ] lymphadenopathy  Endocrine:  [ ] adrenal [ ] thyroid  Allergic/Immunologic:	 [ ] transplant [ ] seasonal    Vital Signs Last 24 Hrs  T(F): 98.7 (12-12-18 @ 12:30), Max: 98.7 (12-12-18 @ 12:30)    Vital Signs Last 24 Hrs  HR: 75 (12-12-18 @ 12:30) (72 - 75)  BP: 115/64 (12-12-18 @ 12:30) (115/64 - 145/71)  RR: 18 (12-12-18 @ 12:30)  SpO2: 94% (12-12-18 @ 12:30) (90% - 94%)  Wt(kg): --    PHYSICAL EXAM:  General: [x ] non-toxic  HEAD/EYES: [ x] PERRL [x ] white sclera [ ] icterus  ENT:  [ ] normal [x ] supple [ ] thrush [ ] pharyngeal exudate  Cardiovascular:   [ ] murmur [x ] normal [ ] PPM/AICD  Respiratory:  [x ] clear to ausculation bilaterally  GI:  [ x] soft, non-tender, normal bowel sounds  :  [ ] palacios [ ] no CVA tenderness   Musculoskeletal:  [ ] no synovitis  Neurologic:  [ x] non-focal exam   Skin: TTP LLE, LLE w erythema and swelling  Lymph: [ ] no lymphadenopathy  Psychiatric:  [x ] appropriate affect [x ] alert & oriented  Lines:  [x ] no phlebitis [ ] central line                        11.8   5.5   )-----------( 75       ( 12 Dec 2018 12:18 )             35.4     137  |  90<L>  |  34<H>  ----------------------------<  189<H>  4.4   |  34<H>  |  1.31<H>    Ca    9.7      12 Dec 2018 12:18    TPro  7.7  /  Alb  4.5  /  TBili  0.7  /  DBili  x   /  AST  33  /  ALT  21  /  AlkPhos  95  12-12    MICROBIOLOGY:  BCx x 2 ordered by primary team    RADIOLOGY:  LLE u/s- No evidence of left lower extremity deep venous thrombosis.    XR ankle/ tib/fib- Minimal step-off at the lateral cortex of the distal fibula on the mortise view; it is unclear whether there is an acute fracture. Correlate for point tenderness in this location.    XR Hip- No acute fracture or dislocation.    CXR- 1.  Bibasilar platelike like atelectasis.

## 2018-12-12 NOTE — H&P ADULT - ASSESSMENT
pt w/ above med hx presents with fall / llext cellulitis   iv abs as per id  cont outpt meds  id eval  heme eval for dec plts but likely liver related  ortho eval noted

## 2018-12-12 NOTE — ED PROVIDER NOTE - ATTENDING CONTRIBUTION TO CARE
liver transplant pt, on doxy (50 mg qday then 100) for nearly month for eye prob, w worse LLE redness / pain  warmth and erthema extend beyond the chronic purple skin changes.  xr, dvt study, basic labs, will admit for iv abx as failed doxy,

## 2018-12-13 ENCOUNTER — TRANSCRIPTION ENCOUNTER (OUTPATIENT)
Age: 68
End: 2018-12-13

## 2018-12-13 LAB
ALBUMIN SERPL ELPH-MCNC: 3.8 G/DL — SIGNIFICANT CHANGE UP (ref 3.3–5)
ALP SERPL-CCNC: 82 U/L — SIGNIFICANT CHANGE UP (ref 40–120)
ALT FLD-CCNC: 19 U/L — SIGNIFICANT CHANGE UP (ref 10–45)
ANION GAP SERPL CALC-SCNC: 8 MMOL/L — SIGNIFICANT CHANGE UP (ref 5–17)
AST SERPL-CCNC: 25 U/L — SIGNIFICANT CHANGE UP (ref 10–40)
BILIRUB SERPL-MCNC: 0.5 MG/DL — SIGNIFICANT CHANGE UP (ref 0.2–1.2)
BUN SERPL-MCNC: 29 MG/DL — HIGH (ref 7–23)
CALCIUM SERPL-MCNC: 8.9 MG/DL — SIGNIFICANT CHANGE UP (ref 8.4–10.5)
CHLORIDE SERPL-SCNC: 97 MMOL/L — SIGNIFICANT CHANGE UP (ref 96–108)
CO2 SERPL-SCNC: 33 MMOL/L — HIGH (ref 22–31)
CREAT SERPL-MCNC: 1.39 MG/DL — HIGH (ref 0.5–1.3)
GLUCOSE SERPL-MCNC: 181 MG/DL — HIGH (ref 70–99)
HBA1C BLD-MCNC: 6.3 % — HIGH (ref 4–5.6)
HCT VFR BLD CALC: 31.4 % — LOW (ref 39–50)
HGB BLD-MCNC: 9.8 G/DL — LOW (ref 13–17)
MCHC RBC-ENTMCNC: 31.2 GM/DL — LOW (ref 32–36)
MCHC RBC-ENTMCNC: 31.2 PG — SIGNIFICANT CHANGE UP (ref 27–34)
MCV RBC AUTO: 100 FL — SIGNIFICANT CHANGE UP (ref 80–100)
PLATELET # BLD AUTO: 62 K/UL — LOW (ref 150–400)
POTASSIUM SERPL-MCNC: 4.2 MMOL/L — SIGNIFICANT CHANGE UP (ref 3.5–5.3)
POTASSIUM SERPL-SCNC: 4.2 MMOL/L — SIGNIFICANT CHANGE UP (ref 3.5–5.3)
PROT SERPL-MCNC: 6.9 G/DL — SIGNIFICANT CHANGE UP (ref 6–8.3)
RBC # BLD: 3.14 M/UL — LOW (ref 4.2–5.8)
RBC # FLD: 14.8 % — HIGH (ref 10.3–14.5)
SODIUM SERPL-SCNC: 138 MMOL/L — SIGNIFICANT CHANGE UP (ref 135–145)
TACROLIMUS SERPL-MCNC: <2 NG/ML — SIGNIFICANT CHANGE UP
WBC # BLD: 3.33 K/UL — LOW (ref 3.8–10.5)
WBC # FLD AUTO: 3.33 K/UL — LOW (ref 3.8–10.5)

## 2018-12-13 PROCEDURE — 99232 SBSQ HOSP IP/OBS MODERATE 35: CPT

## 2018-12-13 RX ORDER — DIPHENHYDRAMINE HCL/ZINC ACET 2 %-0.1 %
1 CREAM (GRAM) TOPICAL
Qty: 0 | Refills: 0 | Status: DISCONTINUED | OUTPATIENT
Start: 2018-12-13 | End: 2018-12-15

## 2018-12-13 RX ADMIN — TAMSULOSIN HYDROCHLORIDE 0.4 MILLIGRAM(S): 0.4 CAPSULE ORAL at 08:35

## 2018-12-13 RX ADMIN — MYCOPHENOLATE MOFETIL 500 MILLIGRAM(S): 250 CAPSULE ORAL at 12:00

## 2018-12-13 RX ADMIN — Medication 100 MILLIGRAM(S): at 13:18

## 2018-12-13 RX ADMIN — Medication 2: at 12:50

## 2018-12-13 RX ADMIN — Medication 4: at 17:07

## 2018-12-13 RX ADMIN — BUPRENORPHINE 2 MILLIGRAM(S): 10 PATCH, EXTENDED RELEASE TRANSDERMAL at 13:18

## 2018-12-13 RX ADMIN — INSULIN GLARGINE 20 UNIT(S): 100 INJECTION, SOLUTION SUBCUTANEOUS at 09:10

## 2018-12-13 RX ADMIN — Medication 100 MILLIGRAM(S): at 21:48

## 2018-12-13 RX ADMIN — Medication 80 MILLIGRAM(S): at 08:35

## 2018-12-13 RX ADMIN — INSULIN GLARGINE 40 UNIT(S): 100 INJECTION, SOLUTION SUBCUTANEOUS at 21:48

## 2018-12-13 RX ADMIN — SPIRONOLACTONE 25 MILLIGRAM(S): 25 TABLET, FILM COATED ORAL at 09:07

## 2018-12-13 RX ADMIN — Medication 100 MILLIGRAM(S): at 13:19

## 2018-12-13 RX ADMIN — BUPRENORPHINE 2 MILLIGRAM(S): 10 PATCH, EXTENDED RELEASE TRANSDERMAL at 08:28

## 2018-12-13 RX ADMIN — SERTRALINE 100 MILLIGRAM(S): 25 TABLET, FILM COATED ORAL at 12:00

## 2018-12-13 RX ADMIN — Medication 1 APPLICATION(S): at 23:54

## 2018-12-13 RX ADMIN — Medication 100 MILLIGRAM(S): at 21:51

## 2018-12-13 RX ADMIN — Medication 81 MILLIGRAM(S): at 11:59

## 2018-12-13 RX ADMIN — MAGNESIUM OXIDE 400 MG ORAL TABLET 400 MILLIGRAM(S): 241.3 TABLET ORAL at 12:00

## 2018-12-13 RX ADMIN — TACROLIMUS 0.5 MILLIGRAM(S): 5 CAPSULE ORAL at 08:28

## 2018-12-13 RX ADMIN — SODIUM CHLORIDE 50 MILLILITER(S): 9 INJECTION INTRAMUSCULAR; INTRAVENOUS; SUBCUTANEOUS at 18:22

## 2018-12-13 RX ADMIN — Medication 100 MILLIGRAM(S): at 06:52

## 2018-12-13 RX ADMIN — BUPRENORPHINE 2 MILLIGRAM(S): 10 PATCH, EXTENDED RELEASE TRANSDERMAL at 20:16

## 2018-12-13 RX ADMIN — Medication 25 MILLIGRAM(S): at 08:28

## 2018-12-13 RX ADMIN — BUPRENORPHINE 2 MILLIGRAM(S): 10 PATCH, EXTENDED RELEASE TRANSDERMAL at 21:00

## 2018-12-13 RX ADMIN — Medication 100 MILLIGRAM(S): at 08:28

## 2018-12-13 RX ADMIN — Medication 2: at 08:11

## 2018-12-13 RX ADMIN — TACROLIMUS 0.5 MILLIGRAM(S): 5 CAPSULE ORAL at 20:10

## 2018-12-13 NOTE — DISCHARGE NOTE ADULT - ADDITIONAL INSTRUCTIONS
Follow-up with your primary care physician within 1 week. Call for appointment. Follow-up with your primary care physician within 1 week. Call for appointment.    Follow up with your transplant surgeon, Dr. Askew, upon discharge.    Follow up with your endocrinologist, Dr. Quezada, upon discharge - please call to make an appointment.    Follow up with your ophthalmologist regarding when to discontinue your doxycycline.

## 2018-12-13 NOTE — CONSULT NOTE ADULT - ASSESSMENT
68y o male w/ hx  liver transplant (2/2 hep C) 7 years ago, htn, dm, hld, copd on home O2 presents status post-fall.   Noted to have anemia and thrombocytopenia    # thrombocytopenia - reportedly baseline of around 50. Now between 60-75. no bleeding reported.  - likely secondary to liver disease / transplant / possible splenomegaly as well as immunosuppressant drugs  - monitor for now    # Anemia - due to chronic diseases, acute illness  - will check iron studies, B12, folate in AM    # Cellulitis - ID consulted  - on ancef  - LLE dopplers negative for DVT

## 2018-12-13 NOTE — PROGRESS NOTE ADULT - ASSESSMENT
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 LLE is more swollen and erythematous, concern for cellulitis. Possible fracture on xray of left leg.  Probable strep infection.  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 LLE is more swollen and erythematous, concern for cellulitis. Possible fracture on xray of left leg though per ortho, there is no fracture.  Probable strep infection.  No purulence to suggest staph aureus, however, area of fluctuance.    Suggest:  - c/w ancef 1 g IV q8h  - follow up on BCx  - follow leg u/s (rule out abscess)    above d/w NP on 4D

## 2018-12-13 NOTE — DISCHARGE NOTE ADULT - PROVIDER TOKENS
OBIE:'[id number]' TOKIHSAN:'[id number]',TOKEN:'[id number]' TOKEN:'[id number]'

## 2018-12-13 NOTE — DISCHARGE NOTE ADULT - SECONDARY DIAGNOSIS.
Thrombocytopenia Liver transplant status COPD (chronic obstructive pulmonary disease) HTN (hypertension) Type 2 diabetes mellitus Hyperlipidemia

## 2018-12-13 NOTE — DISCHARGE NOTE ADULT - HOSPITAL COURSE
67 yo male with PMH of  liver transplant (2/2 hep C) 7 years ago, HTN, T2DM, HLD, COPD on home O2 who presented  with swollen, painful, red, left leg several days  post mechanical fall . Pt seen by Orthopedic. Imaging: No acute fractures of left lower extremity. No DVT.  No interventions by Ortho. Pt received Iv Ancef per ID. Pt with induration over LLE for which US LLE was performed. US showed no drainable collection or abscess. Pt evaluated by PT and recommends :    Hospital course complicated by thrombocytopenia , seen by hematology. Pt  reportedly baseline of around 50. Now between 60-75. no bleeding reported. Thrombocytopenia likely secondary to liver disease / transplant / possible splenomegaly as well as immunosuppressant drugs. Anemia - due to chronic diseases, acute illness, no overt bleeding,Iron studies stable; B12/folate normal. Leukopenia- likely secondary to acute infection, on baseline immunosuppressant medications.      Pt evaluated by PT and recommends : 69 yo male with PMH of  liver transplant (2/2 hep C) 7 years ago, HTN, T2DM, HLD, COPD on home O2 who presented  with swollen, painful, red, left leg several days  post mechanical fall . Pt seen by Orthopedic. Imaging: No acute fractures of left lower extremity. No DVT.  No interventions by Ortho. Pt received Iv Ancef per ID. Pt with induration over LLE for which US LLE was performed. US showed no drainable collection or abscess. Pt evaluated by PT and recommends :    Hospital course complicated by thrombocytopenia , seen by hematology. Pt  reportedly baseline of around 50. Now between 60-75. no bleeding reported. Thrombocytopenia likely secondary to liver disease / transplant / possible splenomegaly as well as immunosuppressant drugs. Anemia - due to chronic diseases, acute illness, no overt bleeding,Iron studies stable; B12/folate normal. Leukopenia- likely secondary to acute infection, on baseline immunosuppressant medications.    Stable for discharge, on po antibiotics, with PMD, surgery, and ophthalmology followup.

## 2018-12-13 NOTE — DISCHARGE NOTE ADULT - PLAN OF CARE
Resolution of infection Take all of your antibiotics as ordered.  Call your Health Care Provider within two days of arriving home to make a follow up appointment within one week.  If the affected cellulitic area increases in redness, warmth, pain or swelling call your Health Care Provider.  If you develop fever, chills, and/or malaise, call your Health Care Provider. Avoid trauma, bleeding.  Follow-up with your primary care physician within 1 week. Call for appointment. Continue with medications as prescribed by your doctor.  Follow-up with your primary care physician within 1 week. Call for appointment. Call your Health Care provider upon arrival home to make a follow up appointment within one week.  Take all inhalers as prescribed by your Health Care Provider.  Take steroids as prescribed by your Health Care Provider.  If your cough increases infrequency and severity and/or you have shortness of breath or increased shortness of breath call your Health Care Provider.  If you develop fever, chills, night sweats, malaise, and/or change in mental status call your Health care Provider.  Nutrition is very important.  Eat small frequent meals.  Increase your activity as tolerated.  Do not stay in bed all day Low salt diet  Activity as tolerated.  Take all medication as prescribed.  Follow up with your medical doctor for routine blood pressure monitoring at your next visit.  Notify your doctor if you have any of the following symptoms:   Dizziness, Lightheadedness, Blurry vision, Headache, Chest pain, Shortness of breath HgA1C this admission 6.3  Make sure you get your HgA1c checked every three months.  If you take oral diabetes medications, check your blood glucose two times a day.  If you take insulin, check your blood glucose before meals and at bedtime.  It's important not to skip any meals.  Keep a log of your blood glucose results and always take it with you to your doctor appointments.  Keep a list of your current medications including injectables and over the counter medications and bring this medication list with you to all your doctor appointments.  If you have not seen your ophthalmologist this year call for appointment.  Check your feet daily for redness, sores, or openings. Do not self treat. If no improvement in two days call your primary care physician for an appointment.  Low blood sugar (hypoglycemia) is a blood sugar below 70mg/dl. Check your blood sugar if you feel signs/symptoms of hypoglycemia. If your blood sugar is below 70 take 15 grams of carbohydrates (ex 4 oz of apple juice, 3-4 glucose tablets, or 4-6 oz of regular soda) wait 15 minutes and repeat blood sugar to make sure it comes up above 70.  If your blood sugar is above 70 and you are due for a meal, have a meal.  If you are not due for a meal have a snack.  This snack helps keeps your blood sugar at a safe range. Continue with your cholesterol medications. Eat a heart healthy diet that is low in saturated fats and salt, and includes whole grains, fruits, vegetables and lean protein; exercise regularly (consult with your physician or cardiologist first); maintain a heart healthy weight; if you smoke - quit (A resource to help you stop smoking is the [facility] – phone number [phone].). Continue to follow with your primary physician or cardiologist. You will take your antibiotic for another week and then discontinue.  Call your Health Care Provider within two days of arriving home to make a follow up appointment within one week.  If the affected cellulitic area increases in redness, warmth, pain or swelling call your Health Care Provider.  If you develop fever, chills, and/or malaise, call your Health Care Provider. You must check your sugar before meals and at bedtime, and adjust your insulin accordingly.  Follow up with your endocrinologist, Dr. Quezada, upon discharge - please call to make an appointment.  HgA1C this admission 6.3  Make sure you get your HgA1c checked every three months.  If you take oral diabetes medications, check your blood glucose two times a day.  If you take insulin, check your blood glucose before meals and at bedtime.  It's important not to skip any meals.  Keep a log of your blood glucose results and always take it with you to your doctor appointments.  Keep a list of your current medications including injectables and over the counter medications and bring this medication list with you to all your doctor appointments.  If you have not seen your ophthalmologist this year call for appointment.  Check your feet daily for redness, sores, or openings. Do not self treat. If no improvement in two days call your primary care physician for an appointment.  Low blood sugar (hypoglycemia) is a blood sugar below 70mg/dl. Check your blood sugar if you feel signs/symptoms of hypoglycemia. If your blood sugar is below 70 take 15 grams of carbohydrates (ex 4 oz of apple juice, 3-4 glucose tablets, or 4-6 oz of regular soda) wait 15 minutes and repeat blood sugar to make sure it comes up above 70.  If your blood sugar is above 70 and you are due for a meal, have a meal.  If you are not due for a meal have a snack.  This snack helps keeps your blood sugar at a safe range.

## 2018-12-13 NOTE — DISCHARGE NOTE ADULT - PATIENT PORTAL LINK FT
You can access the [facility] Patient Portal, offered by [facility], by registering with the following website: [email or web address]

## 2018-12-13 NOTE — DISCHARGE NOTE ADULT - CARE PROVIDERS DIRECT ADDRESSES
[email or web address] [email or web address],[email or web address],DirectAddress_Unknown,[email or web address]

## 2018-12-13 NOTE — PROGRESS NOTE ADULT - SUBJECTIVE AND OBJECTIVE BOX
CHIEF COMPLAINT:Patient is a 68y old  Male who presents with a chief complaint of LLE cellulitis  	  Allergies:  clindamycin (Unknown)      PAST MEDICAL & SURGICAL HISTORY:      FAMILY HISTORY:  No pertinent family history in first degree relatives      REVIEW OF SYSTEMS:  CONSTITUTIONAL: No fever, weight loss, or fatigue  EYES: No eye pain, visual disturbances, or discharge  NECK: No pain or stiffness  RESPIRATORY: No cough or wheezing, no shortness of breath  CARDIOVASCULAR: No chest pain, palpitations, dizziness, or leg swelling  GASTROINTESTINAL: No abdominal or epigastric pain. No nausea, vomiting, diarrhea or constipation  GENITOURINARY: No dysuria, urinary frequency or urgency, no hematuria  NEUROLOGICAL: No headaches, memory loss, loss of strength, numbness, or tremors  SKIN: No itching, burning, rashes, or lesions   MUSCULOSKELETAL: LLE pain, swelling and redness    Medications:  MEDICATIONS  (STANDING):  aspirin enteric coated 81 milliGRAM(s) Oral daily  buprenorphine 2 milliGRAM(s) SubLingual <User Schedule>  calcium carbonate    500 mG (Tums) Chewable 1 Tablet(s) Chew daily  ceFAZolin   IVPB 1000 milliGRAM(s) IV Intermittent every 8 hours  dextrose 5%. 1000 milliLiter(s) (50 mL/Hr) IV Continuous <Continuous>  dextrose 50% Injectable 12.5 Gram(s) IV Push once  dextrose 50% Injectable 25 Gram(s) IV Push once  dextrose 50% Injectable 25 Gram(s) IV Push once  docusate sodium 100 milliGRAM(s) Oral three times a day  furosemide    Tablet 80 milliGRAM(s) Oral two times a day  insulin glargine Injectable (LANTUS) 40 Unit(s) SubCutaneous at bedtime  insulin glargine Injectable (LANTUS) 20 Unit(s) SubCutaneous every morning  insulin lispro (HumaLOG) corrective regimen sliding scale   SubCutaneous three times a day before meals  magnesium oxide 400 milliGRAM(s) Oral daily  metoprolol succinate ER 25 milliGRAM(s) Oral daily  mycophenolate mofetil 500 milliGRAM(s) Oral daily  predniSONE   Tablet 1 milliGRAM(s) Oral every other day  predniSONE   Tablet 2 milliGRAM(s) Oral every other day  sertraline 100 milliGRAM(s) Oral daily  sodium chloride 0.9%. 1000 milliLiter(s) (50 mL/Hr) IV Continuous <Continuous>  spironolactone 25 milliGRAM(s) Oral daily  tacrolimus 0.5 milliGRAM(s) Oral two times a day  tamsulosin 0.4 milliGRAM(s) Oral at bedtime    MEDICATIONS  (PRN):  dextrose 40% Gel 15 Gram(s) Oral once PRN Blood Glucose LESS THAN 70 milliGRAM(s)/deciliter  glucagon  Injectable 1 milliGRAM(s) IntraMuscular once PRN Glucose LESS THAN 70 milligrams/deciliter  oxyCODONE    IR 5 milliGRAM(s) Oral every 6 hours PRN Severe Pain (7 - 10)    	    PHYSICAL EXAM:  T(C): 36.5 (12-13-18 @ 11:48), Max: 36.9 (12-12-18 @ 16:59)  HR: 64 (12-13-18 @ 11:48) (63 - 83)  BP: 156/74 (12-13-18 @ 11:48) (131/72 - 156/74)  RR: 18 (12-13-18 @ 11:48) (17 - 18)  SpO2: 97% (12-13-18 @ 11:48) (94% - 98%)  Wt(kg): --  I&O's Summary    12 Dec 2018 07:01  -  13 Dec 2018 07:00  --------------------------------------------------------  IN: 890 mL / OUT: 0 mL / NET: 890 mL    13 Dec 2018 07:01  -  13 Dec 2018 13:22  --------------------------------------------------------  IN: 240 mL / OUT: 225 mL / NET: 15 mL        Appearance: Normal	  HEENT:   NCAT, PERRL, EOMI	  Lymphatic: No lymphadenopathy  Cardiovascular: Normal S1 S2, RRR  Respiratory: Lungs clear to auscultation BL  Psychiatry: A & O x 3, Mood & affect appropriate  Gastrointestinal:  Soft, Non-tender, + BS  Skin: No rashes, No ecchymoses, No cyanosis	  Neurologic: Non-focal  Extremities: LLE swelling and erythema    	  LABS:	 	    CARDIAC MARKERS:                                9.8    3.33  )-----------( 62       ( 13 Dec 2018 07:30 )             31.4     12-13    138  |  97  |  29<H>  ----------------------------<  181<H>  4.2   |  33<H>  |  1.39<H>    Ca    8.9      13 Dec 2018 06:43    TPro  6.9  /  Alb  3.8  /  TBili  0.5  /  DBili  x   /  AST  25  /  ALT  19  /  AlkPhos  82  12-13    proBNP:   Lipid Profile:   HgA1c: Hemoglobin A1C, Whole Blood: 6.3 % (12-13 @ 07:30)    TSH:

## 2018-12-13 NOTE — PHARMACOTHERAPY INTERVENTION NOTE - COMMENTS
Changed furosemide 80mg by mouth twice daily timing to 06:00 and 16:00.    Jose Richards, PharmD  [phone]

## 2018-12-13 NOTE — DISCHARGE NOTE ADULT - CARE PROVIDER_API CALL
Matty Askew (MD), Surgery  [address] [place] [address]  Phone: [phone]  Fax: [phone] Matty Askew), Surgery  [address] [place] [address]  Phone: [phone]  Fax: [phone]    Dimitrios Jasmine), Cardiovascular Disease; Critical Care Medicine; Internal Medicine; Nuclear Cardiology  [address]  [place] [address]  Phone: [phone]  Fax: [phone] Matty Askew), Surgery  [address] [place] [address]  Phone: [phone]  Fax: [phone]    Dimitrios Jasmine), Cardiovascular Disease; Critical Care Medicine; Internal Medicine; Nuclear Cardiology  [address]  [place] [address]  Phone: [phone]  Fax: [phone]    Marck Quezada), EndocrinologyMetabDiabetes; Internal Medicine  [address] [facility] [address]  [place], [place] [id number]  Phone: [phone]  Fax: [phone]

## 2018-12-13 NOTE — PROGRESS NOTE ADULT - ASSESSMENT
pt w/ above med hx presents with fall / llext cellulitis   iv abs as per id  cont outpt meds  id eval noted  heme eval for dec plts but likely liver related  ortho eval noted  no acute fx, no intervantion  US LLE to eval for collection  PT  DVT prophylaxis

## 2018-12-13 NOTE — CONSULT NOTE ADULT - SUBJECTIVE AND OBJECTIVE BOX
Chief Complaint:    HPI:  68y o male w/ hx  liver transplant (2/2 hep C) 7 years ago, htn, dm, hld, copd on home O2 presents post-fall. Pt states he tripped over his nasal cannula and fell about 4-5 days ago.   He wasn't able to get to ED until today. States since fall his left leg has been swollen, painful, red. No recent abx.  Denies fevers, chills, cough, rhinorrhea, sore throat, cp, abd pain, n/v, diarrhea, dysuria. Denies hx skin infections. States no scratches. (12 Dec 2018 18:49)     Today, patient reports feeling well overall. He has no new complaints. He was started on IV abx for cellulitis. He reports that he had liver transplant for cirrhosis 7 years ago and is followed by transplant team at [facility]. he has chronic thrombocytopenia usually in the 40s. he is also on immunosuppressants. he denies any abnormal bleeding.    ROS: denies F/C, wt loss, night sweats  Denies cough, SOB, chest pain, palpitations  Denies N/V/D/C, melena, hematochezia  Denies dysuria, hematuria    PAST MEDICAL & SURGICAL HISTORY:      SOCIAL HISTORY:  Smoking - Non smoker   Alcohol - Social  Drugs - No drug use  lives alone    FAMILY HISTORY:  No pertinent family history in first degree relatives      MEDICATIONS  (STANDING):  aspirin enteric coated 81 milliGRAM(s) Oral daily  buprenorphine 2 milliGRAM(s) SubLingual <User Schedule>  calcium carbonate    500 mG (Tums) Chewable 1 Tablet(s) Chew daily  ceFAZolin   IVPB 1000 milliGRAM(s) IV Intermittent every 8 hours  dextrose 5%. 1000 milliLiter(s) (50 mL/Hr) IV Continuous <Continuous>  dextrose 50% Injectable 12.5 Gram(s) IV Push once  dextrose 50% Injectable 25 Gram(s) IV Push once  dextrose 50% Injectable 25 Gram(s) IV Push once  docusate sodium 100 milliGRAM(s) Oral three times a day  furosemide    Tablet 80 milliGRAM(s) Oral two times a day  insulin glargine Injectable (LANTUS) 40 Unit(s) SubCutaneous at bedtime  insulin glargine Injectable (LANTUS) 20 Unit(s) SubCutaneous every morning  insulin lispro (HumaLOG) corrective regimen sliding scale   SubCutaneous three times a day before meals  magnesium oxide 400 milliGRAM(s) Oral daily  metoprolol succinate ER 25 milliGRAM(s) Oral daily  mycophenolate mofetil 500 milliGRAM(s) Oral daily  predniSONE   Tablet 1 milliGRAM(s) Oral every other day  predniSONE   Tablet 2 milliGRAM(s) Oral every other day  sertraline 100 milliGRAM(s) Oral daily  sodium chloride 0.9%. 1000 milliLiter(s) (50 mL/Hr) IV Continuous <Continuous>  spironolactone 25 milliGRAM(s) Oral daily  tacrolimus 0.5 milliGRAM(s) Oral two times a day  tamsulosin 0.4 milliGRAM(s) Oral at bedtime    MEDICATIONS  (PRN):  dextrose 40% Gel 15 Gram(s) Oral once PRN Blood Glucose LESS THAN 70 milliGRAM(s)/deciliter  glucagon  Injectable 1 milliGRAM(s) IntraMuscular once PRN Glucose LESS THAN 70 milligrams/deciliter  oxyCODONE    IR 5 milliGRAM(s) Oral every 6 hours PRN Severe Pain (7 - 10)      Allergies    clindamycin (Unknown)    Intolerances        Vital Signs Last 24 Hrs  T(C): 36.5 (13 Dec 2018 11:48), Max: 36.9 (12 Dec 2018 16:59)  T(F): 97.7 (13 Dec 2018 11:48), Max: 98.4 (12 Dec 2018 16:59)  HR: 64 (13 Dec 2018 11:48) (63 - 83)  BP: 156/74 (13 Dec 2018 11:48) (131/72 - 156/74)  BP(mean): --  RR: 18 (13 Dec 2018 11:48) (17 - 18)  SpO2: 97% (13 Dec 2018 11:48) (94% - 98%)    PHYSICAL EXAM  General: NAD, obese  HEENT: clear oropharynx, anicteric sclera, pink conjunctiva  Neck: supple  CV: normal S1/S2 with no murmur rubs or gallops  Lungs: clear to auscultation, no wheezes, no rales  Abdomen: soft non-tender non-distended, positive bowel sounds, ecchyomses  Ext: LE edema, left leg erythema, venous stasis skin changes  Lymph Nodes: No LAD in neck  Neuro: alert and oriented X 3, no focal deficits    LABS:                          9.8    3.33  )-----------( 62       ( 13 Dec 2018 07:30 )             31.4         Mean Cell Volume : 100.0 fl  Mean Cell Hemoglobin : 31.2 pg  Mean Cell Hemoglobin Concentration : 31.2 gm/dL  Auto Neutrophil # : x  Auto Lymphocyte # : x  Auto Monocyte # : x  Auto Eosinophil # : x  Auto Basophil # : x  Auto Neutrophil % : x  Auto Lymphocyte % : x  Auto Monocyte % : x  Auto Eosinophil % : x  Auto Basophil % : x      Serial CBC's  12-13 @ 07:30  Hct-31.4 / Hgb-9.8 / Plat-62 / RBC-3.14 / WBC-3.33  Serial CBC's  12-12 @ 12:18  Hct-35.4 / Hgb-11.8 / Plat-75 / RBC-3.63 / WBC-5.5      12-13    138  |  97  |  29<H>  ----------------------------<  181<H>  4.2   |  33<H>  |  1.39<H>    Ca    8.9      13 Dec 2018 06:43    TPro  6.9  /  Alb  3.8  /  TBili  0.5  /  DBili  x   /  AST  25  /  ALT  19  /  AlkPhos  82  12-13      PT/INR - ( 12 Dec 2018 13:27 )   PT: 11.5 sec;   INR: 1.00 ratio         PTT - ( 12 Dec 2018 13:27 )  PTT:30.6 sec

## 2018-12-13 NOTE — DISCHARGE NOTE ADULT - MEDICATION SUMMARY - MEDICATIONS TO TAKE
I will START or STAY ON the medications listed below when I get home from the hospital:    predniSONE 1 mg oral tablet  -- 1 tab(s) by mouth in the morning and 2 tablets in the evening, every other day (M, W, F, Sat)  -- Indication: For Liver transplant recipient    spironolactone 25 mg oral tablet  -- 1 tab(s) by mouth 2 times a day  -- Indication: For Diuretic    aspirin 81 mg oral delayed release tablet  -- 1 tab(s) by mouth once a day (in the morning)  -- Indication: For Need for preventative measure    buprenorphine 2 mg sublingual tablet  -- 1 tab(s) under tongue 3 times a day  -- Indication: For CNS agent    calcium (as carbonate) 500 mg oral tablet  -- 1 tab(s) by mouth once a day (in the morning)  -- Indication: For Supplement    tamsulosin 0.4 mg oral capsule  -- 1 cap(s) by mouth once a day (in the morning)  -- Indication: For BPH    sertraline 100 mg oral tablet  -- 1 tab(s) by mouth once a day  -- Indication: For Depression    HumaLOG KwikPen 100 units/mL injectable solution  -- 18 unit(s) injectable 3 times a day  -- Indication: For Type 2 diabetes mellitus    Lantus Solostar Pen 100 units/mL subcutaneous solution  -- 20 unit(s) subcutaneous in the morning and 42 units at bedtime  -- Indication: For Type 2 diabetes mellitus    doxycycline hyclate 50 mg oral tablet  -- 1 tab(s) by mouth 2 times a day for 1 week then 1 tablet once a day (duration as per your ophthalmologist)  -- Indication: For Need for prophylactic measure    metoprolol succinate 25 mg oral tablet, extended release  -- 1 tab(s) by mouth once a day (in the morning)  -- Indication: For HTN (hypertension)    cephalexin 500 mg oral capsule  -- 1 cap(s) by mouth 3 times a day  -- Indication: For Cellulitis    furosemide 40 mg oral tablet  -- 2 tab(s) by mouth 2 times a day  -- Indication: For Diuretic    mycophenolate mofetil 500 mg oral tablet  -- 1 tab(s) by mouth once a day  -- Indication: For Liver transplant recipient    tacrolimus 0.5 mg oral capsule  -- 1 cap(s) by mouth 2 times a day  -- Indication: For Liver transplant recipient    Colace 100 mg oral capsule  -- 1 cap(s) by mouth 3 times a day  -- Indication: For COnstipation    Mag-Ox 400 oral tablet  -- 1 tab(s) by mouth once a day (in the morning)  -- Indication: For Supplement    ABDEK  -- 1 tab(s) by mouth once a day  -- Indication: For Supplement

## 2018-12-13 NOTE — PROGRESS NOTE ADULT - SUBJECTIVE AND OBJECTIVE BOX
Follow Up:  cellulitis LLE    Inverval History/ROS: No fevers overnight. Overall pt feels LLE improving w less pain.    Allergies  clindamycin (Unknown)        ANTIMICROBIALS:  ceFAZolin   IVPB 1000 every 8 hours      OTHER MEDS:  aspirin enteric coated 81 milliGRAM(s) Oral daily  buprenorphine 2 milliGRAM(s) SubLingual <User Schedule>  calcium carbonate    500 mG (Tums) Chewable 1 Tablet(s) Chew daily  dextrose 40% Gel 15 Gram(s) Oral once PRN  dextrose 5%. 1000 milliLiter(s) IV Continuous <Continuous>  dextrose 50% Injectable 12.5 Gram(s) IV Push once  dextrose 50% Injectable 25 Gram(s) IV Push once  dextrose 50% Injectable 25 Gram(s) IV Push once  docusate sodium 100 milliGRAM(s) Oral three times a day  furosemide    Tablet 80 milliGRAM(s) Oral two times a day  glucagon  Injectable 1 milliGRAM(s) IntraMuscular once PRN  insulin glargine Injectable (LANTUS) 40 Unit(s) SubCutaneous at bedtime  insulin glargine Injectable (LANTUS) 20 Unit(s) SubCutaneous every morning  insulin lispro (HumaLOG) corrective regimen sliding scale   SubCutaneous three times a day before meals  magnesium oxide 400 milliGRAM(s) Oral daily  metoprolol succinate ER 25 milliGRAM(s) Oral daily  mycophenolate mofetil 500 milliGRAM(s) Oral daily  oxyCODONE    IR 5 milliGRAM(s) Oral every 6 hours PRN  predniSONE   Tablet 1 milliGRAM(s) Oral every other day  predniSONE   Tablet 2 milliGRAM(s) Oral every other day  sertraline 100 milliGRAM(s) Oral daily  sodium chloride 0.9%. 1000 milliLiter(s) IV Continuous <Continuous>  spironolactone 25 milliGRAM(s) Oral daily  tacrolimus 0.5 milliGRAM(s) Oral two times a day  tamsulosin 0.4 milliGRAM(s) Oral at bedtime      Vital Signs Last 24 Hrs  T(C): 36.5 (13 Dec 2018 11:48), Max: 37.1 (12 Dec 2018 12:30)  T(F): 97.7 (13 Dec 2018 11:48), Max: 98.7 (12 Dec 2018 12:30)  HR: 64 (13 Dec 2018 11:48) (63 - 83)  BP: 156/74 (13 Dec 2018 11:48) (115/64 - 156/74)  BP(mean): --  RR: 18 (13 Dec 2018 11:48) (17 - 18)  SpO2: 97% (13 Dec 2018 11:48) (94% - 98%)    PHYSICAL EXAM:  General: [x ] non-toxic  HEAD/EYES: [x ] PERRL [x ] white sclera [ ] icterus  ENT:  [x ] normal [x ] supple [ ] thrush [ ] pharyngeal exudate  Cardiovascular:   [ ] murmur [ x] normal [ ] PPM/AICD  Respiratory:  [x ] clear to ausculation bilaterally  GI:  [ x] soft, non-tender, normal bowel sounds  :  [ ] palacios [ ] no CVA tenderness   Musculoskeletal:  [x ] no synovitis  Neurologic:  [ x] non-focal exam   Skin: LLE still w some swelling n erythema  Lymph: [ ] no lymphadenopathy  Psychiatric:  [x ] appropriate affect [x ] alert & oriented  Lines:  [ ] no phlebitis [ ] central line                                9.8    3.33  )-----------( 62       ( 13 Dec 2018 07:30 )             31.4       12-13    138  |  97  |  29<H>  ----------------------------<  181<H>  4.2   |  33<H>  |  1.39<H>    Ca    8.9      13 Dec 2018 06:43    TPro  6.9  /  Alb  3.8  /  TBili  0.5  /  DBili  x   /  AST  25  /  ALT  19  /  AlkPhos  82  12-13          MICROBIOLOGY:  Bcx sent and pending          RADIOLOGY:  LLE ultrasound pending Follow Up:  cellulitis LLE    Inverval History/ROS: No fevers overnight. still with pain over one particular spot, but overall pt feels LLE improving w less pain.  no n/v/d.  no rash.  Rest of ROS otherwise negative.    Allergies  clindamycin (Unknown)    ANTIMICROBIALS:    ceFAZolin   IVPB 1000 every 8 hours (12/12-)    MEDICATIONS  (STANDING):  aspirin enteric coated 81 daily  buprenorphine 2 <User Schedule>  calcium carbonate    500 mG (Tums) Chewable 1 daily  docusate sodium 100 three times a day  furosemide    Tablet 80 two times a day  insulin glargine Injectable (LANTUS) 40 at bedtime  insulin glargine Injectable (LANTUS) 20 every morning  insulin lispro (HumaLOG) corrective regimen sliding scale  three times a day before meals  metoprolol succinate ER 25 daily  mycophenolate mofetil 500 daily  predniSONE   Tablet 1 every other day  predniSONE   Tablet 2 every other day  sertraline 100 daily  spironolactone 25 daily  tacrolimus 0.5 two times a day  tamsulosin 0.4 at bedtime    Vital Signs Last 24 Hrs  T(F): 97.7 (12-13-18 @ 11:48), Max: 98.4 (12-12-18 @ 16:59)  HR: 64 (12-13-18 @ 11:48)  BP: 156/74 (12-13-18 @ 11:48)  RR: 18 (12-13-18 @ 11:48)  SpO2: 97% (12-13-18 @ 11:48) (94% - 98%)    PHYSICAL EXAM:  General: non-toxic  HEAD/EYES: PERRL, no icterus  ENT:  No thrush, supple  Cardiovascular:  Nl S1, S2  Respiratory:  clear to ausculation bilaterally  GI:  soft, non-tender, normal bowel sounds  :  no  palacios   Musculoskeletal: no synovitis  Neurologic:  non-focal exam   Skin: LLE still w some swelling and erythema though erythema somewhat receeded; small area below knee and laterally with fluctuance  Lymph: no lymphadenopathy  Psychiatric:  appropriate affect, alert & oriented  Lines:  no phlebitis                        9.8    3.33  )-----------( 62       ( 13 Dec 2018 07:30 )             31.4 12-13    138  |  97  |  29  ----------------------------<  181  4.2   |  33  |  1.39  Ca    8.9      13 Dec 2018 06:43  TPro  6.9  /  Alb  3.8  /  TBili  0.5  /  DBili  x   /  AST  25  /  ALT  19  /  AlkPhos  82  12-13    MICROBIOLOGY:  BC pending    RADIOLOGY:  LLE ultrasound pending    LLE u/s- No evidence of left lower extremity deep venous thrombosis.    XR ankle/ tib/fib- Minimal step-off at the lateral cortex of the distal fibula on the mortise view; it is unclear whether there is an acute fracture. Correlate for point tenderness in this location.    XR Hip- No acute fracture or dislocation.    CXR- 1.  Bibasilar platelike like atelectasis.

## 2018-12-13 NOTE — DISCHARGE NOTE ADULT - CARE PLAN
Principal Discharge DX:	Cellulitis  Goal:	Resolution of infection  Assessment and plan of treatment:	Take all of your antibiotics as ordered.  Call your Health Care Provider within two days of arriving home to make a follow up appointment within one week.  If the affected cellulitic area increases in redness, warmth, pain or swelling call your Health Care Provider.  If you develop fever, chills, and/or malaise, call your Health Care Provider.  Secondary Diagnosis:	Thrombocytopenia  Assessment and plan of treatment:	Avoid trauma, bleeding.  Follow-up with your primary care physician within 1 week. Call for appointment.  Secondary Diagnosis:	Liver transplant status  Assessment and plan of treatment:	Continue with medications as prescribed by your doctor.  Follow-up with your primary care physician within 1 week. Call for appointment.  Secondary Diagnosis:	COPD (chronic obstructive pulmonary disease)  Assessment and plan of treatment:	Call your Health Care provider upon arrival home to make a follow up appointment within one week.  Take all inhalers as prescribed by your Health Care Provider.  Take steroids as prescribed by your Health Care Provider.  If your cough increases infrequency and severity and/or you have shortness of breath or increased shortness of breath call your Health Care Provider.  If you develop fever, chills, night sweats, malaise, and/or change in mental status call your Health care Provider.  Nutrition is very important.  Eat small frequent meals.  Increase your activity as tolerated.  Do not stay in bed all day  Secondary Diagnosis:	HTN (hypertension)  Assessment and plan of treatment:	Low salt diet  Activity as tolerated.  Take all medication as prescribed.  Follow up with your medical doctor for routine blood pressure monitoring at your next visit.  Notify your doctor if you have any of the following symptoms:   Dizziness, Lightheadedness, Blurry vision, Headache, Chest pain, Shortness of breath  Secondary Diagnosis:	Type 2 diabetes mellitus  Assessment and plan of treatment:	HgA1C this admission 6.3  Make sure you get your HgA1c checked every three months.  If you take oral diabetes medications, check your blood glucose two times a day.  If you take insulin, check your blood glucose before meals and at bedtime.  It's important not to skip any meals.  Keep a log of your blood glucose results and always take it with you to your doctor appointments.  Keep a list of your current medications including injectables and over the counter medications and bring this medication list with you to all your doctor appointments.  If you have not seen your ophthalmologist this year call for appointment.  Check your feet daily for redness, sores, or openings. Do not self treat. If no improvement in two days call your primary care physician for an appointment.  Low blood sugar (hypoglycemia) is a blood sugar below 70mg/dl. Check your blood sugar if you feel signs/symptoms of hypoglycemia. If your blood sugar is below 70 take 15 grams of carbohydrates (ex 4 oz of apple juice, 3-4 glucose tablets, or 4-6 oz of regular soda) wait 15 minutes and repeat blood sugar to make sure it comes up above 70.  If your blood sugar is above 70 and you are due for a meal, have a meal.  If you are not due for a meal have a snack.  This snack helps keeps your blood sugar at a safe range.  Secondary Diagnosis:	Hyperlipidemia  Assessment and plan of treatment:	Continue with your cholesterol medications. Eat a heart healthy diet that is low in saturated fats and salt, and includes whole grains, fruits, vegetables and lean protein; exercise regularly (consult with your physician or cardiologist first); maintain a heart healthy weight; if you smoke - quit (A resource to help you stop smoking is the [facility] – phone number [phone].). Continue to follow with your primary physician or cardiologist. Principal Discharge DX:	Cellulitis  Goal:	Resolution of infection  Assessment and plan of treatment:	You will take your antibiotic for another week and then discontinue.  Call your Health Care Provider within two days of arriving home to make a follow up appointment within one week.  If the affected cellulitic area increases in redness, warmth, pain or swelling call your Health Care Provider.  If you develop fever, chills, and/or malaise, call your Health Care Provider.  Secondary Diagnosis:	Thrombocytopenia  Assessment and plan of treatment:	Avoid trauma, bleeding.  Follow-up with your primary care physician within 1 week. Call for appointment.  Secondary Diagnosis:	Liver transplant status  Assessment and plan of treatment:	Continue with medications as prescribed by your doctor.  Follow-up with your primary care physician within 1 week. Call for appointment.  Secondary Diagnosis:	COPD (chronic obstructive pulmonary disease)  Assessment and plan of treatment:	Call your Health Care provider upon arrival home to make a follow up appointment within one week.  Take all inhalers as prescribed by your Health Care Provider.  Take steroids as prescribed by your Health Care Provider.  If your cough increases infrequency and severity and/or you have shortness of breath or increased shortness of breath call your Health Care Provider.  If you develop fever, chills, night sweats, malaise, and/or change in mental status call your Health care Provider.  Nutrition is very important.  Eat small frequent meals.  Increase your activity as tolerated.  Do not stay in bed all day  Secondary Diagnosis:	HTN (hypertension)  Assessment and plan of treatment:	Low salt diet  Activity as tolerated.  Take all medication as prescribed.  Follow up with your medical doctor for routine blood pressure monitoring at your next visit.  Notify your doctor if you have any of the following symptoms:   Dizziness, Lightheadedness, Blurry vision, Headache, Chest pain, Shortness of breath  Secondary Diagnosis:	Type 2 diabetes mellitus  Assessment and plan of treatment:	You must check your sugar before meals and at bedtime, and adjust your insulin accordingly.  Follow up with your endocrinologist, Dr. Quezada, upon discharge - please call to make an appointment.  HgA1C this admission 6.3  Make sure you get your HgA1c checked every three months.  If you take oral diabetes medications, check your blood glucose two times a day.  If you take insulin, check your blood glucose before meals and at bedtime.  It's important not to skip any meals.  Keep a log of your blood glucose results and always take it with you to your doctor appointments.  Keep a list of your current medications including injectables and over the counter medications and bring this medication list with you to all your doctor appointments.  If you have not seen your ophthalmologist this year call for appointment.  Check your feet daily for redness, sores, or openings. Do not self treat. If no improvement in two days call your primary care physician for an appointment.  Low blood sugar (hypoglycemia) is a blood sugar below 70mg/dl. Check your blood sugar if you feel signs/symptoms of hypoglycemia. If your blood sugar is below 70 take 15 grams of carbohydrates (ex 4 oz of apple juice, 3-4 glucose tablets, or 4-6 oz of regular soda) wait 15 minutes and repeat blood sugar to make sure it comes up above 70.  If your blood sugar is above 70 and you are due for a meal, have a meal.  If you are not due for a meal have a snack.  This snack helps keeps your blood sugar at a safe range.  Secondary Diagnosis:	Hyperlipidemia  Assessment and plan of treatment:	Continue with your cholesterol medications. Eat a heart healthy diet that is low in saturated fats and salt, and includes whole grains, fruits, vegetables and lean protein; exercise regularly (consult with your physician or cardiologist first); maintain a heart healthy weight; if you smoke - quit (A resource to help you stop smoking is the [facility] – phone number [phone].). Continue to follow with your primary physician or cardiologist.

## 2018-12-14 DIAGNOSIS — Z94.4 LIVER TRANSPLANT STATUS: ICD-10-CM

## 2018-12-14 LAB
ANION GAP SERPL CALC-SCNC: 12 MMOL/L — SIGNIFICANT CHANGE UP (ref 5–17)
BUN SERPL-MCNC: 22 MG/DL — SIGNIFICANT CHANGE UP (ref 7–23)
CALCIUM SERPL-MCNC: 9.1 MG/DL — SIGNIFICANT CHANGE UP (ref 8.4–10.5)
CHLORIDE SERPL-SCNC: 96 MMOL/L — SIGNIFICANT CHANGE UP (ref 96–108)
CO2 SERPL-SCNC: 31 MMOL/L — SIGNIFICANT CHANGE UP (ref 22–31)
CREAT SERPL-MCNC: 1.19 MG/DL — SIGNIFICANT CHANGE UP (ref 0.5–1.3)
FERRITIN SERPL-MCNC: 86 NG/ML — SIGNIFICANT CHANGE UP (ref 30–400)
FOLATE SERPL-MCNC: >20 NG/ML — SIGNIFICANT CHANGE UP
GLUCOSE SERPL-MCNC: 171 MG/DL — HIGH (ref 70–99)
HCT VFR BLD CALC: 37.4 % — LOW (ref 39–50)
HGB BLD-MCNC: 11.4 G/DL — LOW (ref 13–17)
IRON SATN MFR SERPL: 17 % — SIGNIFICANT CHANGE UP (ref 16–55)
IRON SATN MFR SERPL: 76 UG/DL — SIGNIFICANT CHANGE UP (ref 45–165)
MAGNESIUM SERPL-MCNC: 2.4 MG/DL — SIGNIFICANT CHANGE UP (ref 1.6–2.6)
MCHC RBC-ENTMCNC: 30.5 GM/DL — LOW (ref 32–36)
MCHC RBC-ENTMCNC: 31.5 PG — SIGNIFICANT CHANGE UP (ref 27–34)
MCV RBC AUTO: 103.3 FL — HIGH (ref 80–100)
PLATELET # BLD AUTO: 77 K/UL — LOW (ref 150–400)
POTASSIUM SERPL-MCNC: 4.8 MMOL/L — SIGNIFICANT CHANGE UP (ref 3.5–5.3)
POTASSIUM SERPL-SCNC: 4.8 MMOL/L — SIGNIFICANT CHANGE UP (ref 3.5–5.3)
RBC # BLD: 3.62 M/UL — LOW (ref 4.2–5.8)
RBC # FLD: 14.8 % — HIGH (ref 10.3–14.5)
SODIUM SERPL-SCNC: 139 MMOL/L — SIGNIFICANT CHANGE UP (ref 135–145)
TACROLIMUS SERPL-MCNC: <2 NG/ML — SIGNIFICANT CHANGE UP
TIBC SERPL-MCNC: 437 UG/DL — HIGH (ref 220–430)
UIBC SERPL-MCNC: 361 UG/DL — SIGNIFICANT CHANGE UP (ref 110–370)
VIT B12 SERPL-MCNC: 798 PG/ML — SIGNIFICANT CHANGE UP (ref 232–1245)
WBC # BLD: 5.08 K/UL — SIGNIFICANT CHANGE UP (ref 3.8–10.5)
WBC # FLD AUTO: 5.08 K/UL — SIGNIFICANT CHANGE UP (ref 3.8–10.5)

## 2018-12-14 PROCEDURE — 99232 SBSQ HOSP IP/OBS MODERATE 35: CPT

## 2018-12-14 PROCEDURE — 76882 US LMTD JT/FCL EVL NVASC XTR: CPT | Mod: 26,LT

## 2018-12-14 RX ADMIN — TACROLIMUS 0.5 MILLIGRAM(S): 5 CAPSULE ORAL at 20:25

## 2018-12-14 RX ADMIN — Medication 100 MILLIGRAM(S): at 13:59

## 2018-12-14 RX ADMIN — Medication 100 MILLIGRAM(S): at 21:59

## 2018-12-14 RX ADMIN — TACROLIMUS 0.5 MILLIGRAM(S): 5 CAPSULE ORAL at 08:27

## 2018-12-14 RX ADMIN — BUPRENORPHINE 2 MILLIGRAM(S): 10 PATCH, EXTENDED RELEASE TRANSDERMAL at 08:27

## 2018-12-14 RX ADMIN — TAMSULOSIN HYDROCHLORIDE 0.4 MILLIGRAM(S): 0.4 CAPSULE ORAL at 08:27

## 2018-12-14 RX ADMIN — INSULIN GLARGINE 40 UNIT(S): 100 INJECTION, SOLUTION SUBCUTANEOUS at 22:00

## 2018-12-14 RX ADMIN — Medication 100 MILLIGRAM(S): at 13:58

## 2018-12-14 RX ADMIN — BUPRENORPHINE 2 MILLIGRAM(S): 10 PATCH, EXTENDED RELEASE TRANSDERMAL at 21:38

## 2018-12-14 RX ADMIN — Medication 1 APPLICATION(S): at 14:41

## 2018-12-14 RX ADMIN — Medication 100 MILLIGRAM(S): at 06:14

## 2018-12-14 RX ADMIN — Medication 2: at 12:07

## 2018-12-14 RX ADMIN — BUPRENORPHINE 2 MILLIGRAM(S): 10 PATCH, EXTENDED RELEASE TRANSDERMAL at 20:27

## 2018-12-14 RX ADMIN — Medication 100 MILLIGRAM(S): at 08:27

## 2018-12-14 RX ADMIN — SODIUM CHLORIDE 50 MILLILITER(S): 9 INJECTION INTRAMUSCULAR; INTRAVENOUS; SUBCUTANEOUS at 14:42

## 2018-12-14 RX ADMIN — Medication 2 MILLIGRAM(S): at 17:01

## 2018-12-14 RX ADMIN — BUPRENORPHINE 2 MILLIGRAM(S): 10 PATCH, EXTENDED RELEASE TRANSDERMAL at 13:59

## 2018-12-14 RX ADMIN — MYCOPHENOLATE MOFETIL 500 MILLIGRAM(S): 250 CAPSULE ORAL at 12:04

## 2018-12-14 RX ADMIN — Medication 1 APPLICATION(S): at 06:21

## 2018-12-14 RX ADMIN — INSULIN GLARGINE 20 UNIT(S): 100 INJECTION, SOLUTION SUBCUTANEOUS at 07:55

## 2018-12-14 RX ADMIN — Medication 80 MILLIGRAM(S): at 06:16

## 2018-12-14 RX ADMIN — Medication 1 MILLIGRAM(S): at 06:13

## 2018-12-14 RX ADMIN — Medication 25 MILLIGRAM(S): at 08:27

## 2018-12-14 RX ADMIN — Medication 2: at 17:00

## 2018-12-14 RX ADMIN — MAGNESIUM OXIDE 400 MG ORAL TABLET 400 MILLIGRAM(S): 241.3 TABLET ORAL at 12:05

## 2018-12-14 RX ADMIN — SERTRALINE 100 MILLIGRAM(S): 25 TABLET, FILM COATED ORAL at 12:05

## 2018-12-14 RX ADMIN — SPIRONOLACTONE 25 MILLIGRAM(S): 25 TABLET, FILM COATED ORAL at 06:16

## 2018-12-14 RX ADMIN — Medication 2: at 07:54

## 2018-12-14 RX ADMIN — Medication 81 MILLIGRAM(S): at 12:04

## 2018-12-14 NOTE — PROGRESS NOTE ADULT - ASSESSMENT
68y o male w/ hx  liver transplant (2/2 hep C) 7 years ago, htn, dm, hld, copd on home O2 presents status post-fall, with cellulitis  Noted to have anemia and thrombocytopenia    # thrombocytopenia - reportedly baseline of around 50. Now between 60-75. no bleeding reported.  - likely secondary to liver disease / transplant / possible splenomegaly as well as immunosuppressant drugs  - monitor for now    # Anemia - due to chronic diseases, acute illness  - no overt bleeding  - iron studies stable; B12/folate normal    #leukopenia- likely secondary to acute infection, on baseline immunosuppressant medications  - monitor CBC with differential, ordered for AM    # Cellulitis -   - on ancef, ID following  - LLE dopplers negative for DVT

## 2018-12-14 NOTE — PROGRESS NOTE ADULT - ASSESSMENT
pt w/ above med hx presents with fall / llext cellulitis   iv abs as per id  cont outpt meds  id eval noted  heme eval for dec plts but likely liver related  ortho eval noted  no acute fx, no intervantion  US LLE to eval for collection  PT  DVT prophylaxis  GI eval for tacrolimus lvl/dose

## 2018-12-14 NOTE — PHYSICAL THERAPY INITIAL EVALUATION ADULT - CRITERIA FOR SKILLED THERAPEUTIC INTERVENTIONS
therapy frequency/anticipated equipment needs at discharge/impairments found/predicted duration of therapy intervention/functional limitations in following categories/risk reduction/prevention/rehab potential/anticipated discharge recommendation

## 2018-12-14 NOTE — PHYSICAL THERAPY INITIAL EVALUATION ADULT - ASR WT BEARING STATUS EVAL
12/12 XR tib/fib: Minimal step-off at the lateral cortex of the distal fibula on the mortise view; it is unclear whether there is an acute fracture. Correlate for point tenderness in this location.

## 2018-12-14 NOTE — PHYSICAL THERAPY INITIAL EVALUATION ADULT - PERTINENT HX OF CURRENT PROBLEM, REHAB EVAL
68M liver transplant 7 years ago, htn, dm, hld, copd on home O2 presents post-fall. Pt states he tripped over his nasal cannula and fell about 4-5 days ago. He wasn't able to get to ED until today. States since fall his left leg has been swollen, painful, red. No recent abx. Didn't go to see a physician prior to this admission 68M liver transplant 7 years ago, htn, dm, hld, copd on home O2 presents post-fall. Pt states he tripped over his nasal cannula and fell about 4-5 days ago. He wasn't able to get to ED unit now. States since fall his left leg has been swollen, painful, red. No recent abx. Didn't go to see a physician prior to this admission. Dx: Cellulitis

## 2018-12-14 NOTE — PROGRESS NOTE ADULT - SUBJECTIVE AND OBJECTIVE BOX
Follow Up:  cellulitis LLE    [place] History/ROS: feeling much better but still with localized pain of the left lower leg.  awaiting ultrasound.  no fever.  no rash.  no n/v/d.  Rest of ROS otherwise negative.    Allergies  clindamycin (Unknown)    ANTIMICROBIALS:    ceFAZolin   IVPB 1000 every 8 hours (12/12-)    ceFAZolin   IVPB 1000 every 8 hours  mycophenolate mofetil 500 daily  tacrolimus 0.5 two times a day      MEDICATIONS  (STANDING):  aspirin enteric coated 81 daily  buprenorphine 2 <User Schedule>  calcium carbonate    500 mG (Tums) Chewable 1 daily  docusate sodium 100 three times a day  furosemide    Tablet 80 two times a day  insulin glargine Injectable (LANTUS) 40 at bedtime  insulin glargine Injectable (LANTUS) 20 every morning  insulin lispro (HumaLOG) corrective regimen sliding scale  three times a day before meals  metoprolol succinate ER 25 daily  mycophenolate mofetil 500 daily  predniSONE   Tablet 1 every other day  predniSONE   Tablet 2 every other day  sertraline 100 daily  spironolactone 25 daily  tacrolimus 0.5 two times a day  tamsulosin 0.4 at bedtime    Vital Signs Last 24 Hrs  T(F): 98.1 (12-14-18 @ 11:43), Max: 98.1 (12-14-18 @ 04:22)  HR: 60 (12-14-18 @ 11:43)  BP: 149/78 (12-14-18 @ 11:43)  RR: 18 (12-14-18 @ 11:43)  SpO2: 98% (12-14-18 @ 11:43) (98% - 100%)    PHYSICAL EXAM:  General: non-toxic  HEAD/EYES: PERRL, no icterus  ENT:  No thrush, supple  Cardiovascular:  Nl S1, S2  Respiratory:  clear to ausculation bilaterally  GI:  soft, non-tender, normal bowel sounds  :  no  palacios   Musculoskeletal: no synovitis  Neurologic:  non-focal exam   Skin: LLE with resolved erythema, however, with point tenderness in area of fluctuance  Lymph: no lymphadenopathy  Psychiatric:  appropriate affect, alert & oriented  Lines:  no phlebitis                             9.8    3.33  )-----------( 62       ( 13 Dec 2018 07:30 )             31.4 12-14    139  |  96  |  22  ----------------------------<  171  4.8   |  31  |  1.19  Ca    9.1      14 Dec 2018 09:00Mg     2.4     12-14  TPro  6.9  /  Alb  3.8  /  TBili  0.5  /  DBili  x   /  AST  25  /  ALT  19  /  AlkPhos  82  12-13    MICROBIOLOGY:  Culture - Blood (12.13.18 @ 02:22)    Specimen Source: .Blood Blood-Venous    Culture Results:   No growth to date.    Culture - Blood (12.13.18 @ 02:21)    Specimen Source: .Blood Blood-Peripheral    Culture Results:   No growth to date.    RADIOLOGY:  LLE ultrasound pending    LLE u/s- No evidence of left lower extremity deep venous thrombosis.    XR ankle/ tib/fib- Minimal step-off at the lateral cortex of the distal fibula on the mortise view; it is unclear whether there is an acute fracture. Correlate for point tenderness in this location.    XR Hip- No acute fracture or dislocation.    CXR- 1.  Bibasilar platelike like atelectasis.

## 2018-12-14 NOTE — PHYSICAL THERAPY INITIAL EVALUATION ADULT - PRECAUTIONS/LIMITATIONS, REHAB EVAL
fall precautions/12/12 Doppler neg for DVT, Seen by Ortho, reviewed images, neg for fx, no ortho intervention

## 2018-12-14 NOTE — CONSULT NOTE ADULT - SUBJECTIVE AND OBJECTIVE BOX
68 year old gentleman s/p liver transplant (2/2 hep C) 7 years ago followed by  who presented post-fall with worsening LLE  swelling and erythema concerning for cellulitis.  Transplant surgery consulted for assistance with immunosuppression. Case was discussed and patient was seen by Dr. Varela.     MEDICATIONS  (STANDING):  aspirin enteric coated 81 milliGRAM(s) Oral daily  buprenorphine 2 milliGRAM(s) SubLingual <User Schedule>  calcium carbonate    500 mG (Tums) Chewable 1 Tablet(s) Chew daily  ceFAZolin   IVPB 1000 milliGRAM(s) IV Intermittent every 8 hours  dextrose 5%. 1000 milliLiter(s) (50 mL/Hr) IV Continuous <Continuous>  dextrose 50% Injectable 12.5 Gram(s) IV Push once  dextrose 50% Injectable 25 Gram(s) IV Push once  dextrose 50% Injectable 25 Gram(s) IV Push once  docusate sodium 100 milliGRAM(s) Oral three times a day  furosemide    Tablet 80 milliGRAM(s) Oral two times a day  insulin glargine Injectable (LANTUS) 40 Unit(s) SubCutaneous at bedtime  insulin glargine Injectable (LANTUS) 20 Unit(s) SubCutaneous every morning  insulin lispro (HumaLOG) corrective regimen sliding scale   SubCutaneous three times a day before meals  magnesium oxide 400 milliGRAM(s) Oral daily  metoprolol succinate ER 25 milliGRAM(s) Oral daily  mycophenolate mofetil 500 milliGRAM(s) Oral daily  predniSONE   Tablet 1 milliGRAM(s) Oral every other day  predniSONE   Tablet 2 milliGRAM(s) Oral every other day  sertraline 100 milliGRAM(s) Oral daily  sodium chloride 0.9%. 1000 milliLiter(s) (50 mL/Hr) IV Continuous <Continuous>  spironolactone 25 milliGRAM(s) Oral daily  tacrolimus 0.5 milliGRAM(s) Oral two times a day  tamsulosin 0.4 milliGRAM(s) Oral at bedtime    MEDICATIONS  (PRN):  dextrose 40% Gel 15 Gram(s) Oral once PRN Blood Glucose LESS THAN 70 milliGRAM(s)/deciliter  diphenhydrAMINE 2%/zinc acetate 0.1% Cream 1 Application(s) Topical two times a day PRN Itching  glucagon  Injectable 1 milliGRAM(s) IntraMuscular once PRN Glucose LESS THAN 70 milligrams/deciliter  oxyCODONE    IR 5 milliGRAM(s) Oral every 6 hours PRN Severe Pain (7 - 10)      Vital Signs Last 24 Hrs  T(C): 36.7 (14 Dec 2018 11:43), Max: 36.7 (14 Dec 2018 04:22)  T(F): 98.1 (14 Dec 2018 11:43), Max: 98.1 (14 Dec 2018 04:22)  HR: 60 (14 Dec 2018 11:43) (59 - 66)  BP: 149/78 (14 Dec 2018 11:43) (149/78 - 157/76)  BP(mean): --  RR: 18 (14 Dec 2018 11:43) (18 - 18)  SpO2: 98% (14 Dec 2018 11:43) (98% - 100%)    I&O's Summary    13 Dec 2018 07:01  -  14 Dec 2018 07:00  --------------------------------------------------------  IN: 2040 mL / OUT: 1725 mL / NET: 315 mL    14 Dec 2018 07:01  -  14 Dec 2018 19:34  --------------------------------------------------------  IN: 1830 mL / OUT: 350 mL / NET: 1480 mL                              11.4   5.08  )-----------( 77       ( 14 Dec 2018 10:56 )             37.4     12-14    139  |  96  |  22  ----------------------------<  171<H>  4.8   |  31  |  1.19    Ca    9.1      14 Dec 2018 09:00  Mg     2.4     12-14    TPro  6.9  /  Alb  3.8  /  TBili  0.5  /  DBili  x   /  AST  25  /  ALT  19  /  AlkPhos  82  12-13    Tacrolimus (), Serum: <2.0 ng/mL (12-14 @ 10:56)      PHYSICAL EXAM:  Constitutional: Well developed / well nourished  Neurological: A&O x3  Skin: TTP LLE, LLE w erythema and swelling  Musculoskeletal: Moving all extremities  Psychiatric: Responsive

## 2018-12-14 NOTE — PROGRESS NOTE ADULT - SUBJECTIVE AND OBJECTIVE BOX
Chief Complaint: fu    History of Present Illness: feels ok; some pain in leg; no f/c, no cp/dyspnea, no n/v/abd pain, +ambulating, no bleeding      MEDICATIONS  (STANDING):  aspirin enteric coated 81 milliGRAM(s) Oral daily  buprenorphine 2 milliGRAM(s) SubLingual <User Schedule>  calcium carbonate    500 mG (Tums) Chewable 1 Tablet(s) Chew daily  ceFAZolin   IVPB 1000 milliGRAM(s) IV Intermittent every 8 hours  dextrose 5%. 1000 milliLiter(s) (50 mL/Hr) IV Continuous <Continuous>  dextrose 50% Injectable 12.5 Gram(s) IV Push once  dextrose 50% Injectable 25 Gram(s) IV Push once  dextrose 50% Injectable 25 Gram(s) IV Push once  docusate sodium 100 milliGRAM(s) Oral three times a day  furosemide    Tablet 80 milliGRAM(s) Oral two times a day  insulin glargine Injectable (LANTUS) 40 Unit(s) SubCutaneous at bedtime  insulin glargine Injectable (LANTUS) 20 Unit(s) SubCutaneous every morning  insulin lispro (HumaLOG) corrective regimen sliding scale   SubCutaneous three times a day before meals  magnesium oxide 400 milliGRAM(s) Oral daily  metoprolol succinate ER 25 milliGRAM(s) Oral daily  mycophenolate mofetil 500 milliGRAM(s) Oral daily  predniSONE   Tablet 1 milliGRAM(s) Oral every other day  predniSONE   Tablet 2 milliGRAM(s) Oral every other day  sertraline 100 milliGRAM(s) Oral daily  sodium chloride 0.9%. 1000 milliLiter(s) (50 mL/Hr) IV Continuous <Continuous>  spironolactone 25 milliGRAM(s) Oral daily  tacrolimus 0.5 milliGRAM(s) Oral two times a day  tamsulosin 0.4 milliGRAM(s) Oral at bedtime    MEDICATIONS  (PRN):  dextrose 40% Gel 15 Gram(s) Oral once PRN Blood Glucose LESS THAN 70 milliGRAM(s)/deciliter  diphenhydrAMINE 2%/zinc acetate 0.1% Cream 1 Application(s) Topical two times a day PRN Itching  glucagon  Injectable 1 milliGRAM(s) IntraMuscular once PRN Glucose LESS THAN 70 milligrams/deciliter  oxyCODONE    IR 5 milliGRAM(s) Oral every 6 hours PRN Severe Pain (7 - 10)      Allergies    clindamycin (Unknown)    Intolerances        Vital Signs Last 24 Hrs  T(C): 36.7 (14 Dec 2018 11:43), Max: 36.7 (14 Dec 2018 04:22)  T(F): 98.1 (14 Dec 2018 11:43), Max: 98.1 (14 Dec 2018 04:22)  HR: 60 (14 Dec 2018 11:43) (59 - 66)  BP: 149/78 (14 Dec 2018 11:43) (149/78 - 157/76)  BP(mean): --  RR: 18 (14 Dec 2018 11:43) (18 - 18)  SpO2: 98% (14 Dec 2018 11:43) (98% - 100%)    PHYSICAL EXAM  General: adult in NAD  HEENT: clear oropharynx, anicteric sclera, pink conjunctiva  Neck: supple  CV: normal S1/S2  Lungs: clear to auscultation, no wheezes, no rales  Abdomen: soft non-tender non-distended, positive bowel sounds  Ext: +LE edema, chronic skin changes  Neuro: alert and oriented X 3, no focal deficits    LABS:                          9.8    3.33  )-----------( 62       ( 13 Dec 2018 07:30 )             31.4         Mean Cell Volume : 100.0 fl  Mean Cell Hemoglobin : 31.2 pg  Mean Cell Hemoglobin Concentration : 31.2 gm/dL  Auto Neutrophil # : x  Auto Lymphocyte # : x  Auto Monocyte # : x  Auto Eosinophil # : x  Auto Basophil # : x  Auto Neutrophil % : x  Auto Lymphocyte % : x  Auto Monocyte % : x  Auto Eosinophil % : x  Auto Basophil % : x      Serial CBC's  12-13 @ 07:30  Hct-31.4 / Hgb-9.8 / Plat-62 / RBC-3.14 / WBC-3.33  Serial CBC's  12-12 @ 12:18  Hct-35.4 / Hgb-11.8 / Plat-75 / RBC-3.63 / WBC-5.5      12-14    139  |  96  |  22  ----------------------------<  171<H>  4.8   |  31  |  1.19    Ca    9.1      14 Dec 2018 09:00  Mg     2.4     12-14    TPro  6.9  /  Alb  3.8  /  TBili  0.5  /  DBili  x   /  AST  25  /  ALT  19  /  AlkPhos  82  12-13      PT/INR - ( 12 Dec 2018 13:27 )   PT: 11.5 sec;   INR: 1.00 ratio         PTT - ( 12 Dec 2018 13:27 )  PTT:30.6 sec    Vitamin B12, Serum: 798 pg/mL (12-14 @ 10:56)  Ferritin, Serum: 86 ng/mL (12-14 @ 10:56)  Iron - Total Binding Capacity.: 437 ug/dL (12-14 @ 10:56)  Folate, Serum: >20.0 ng/mL (12-14 @ 10:56)              Radiology:

## 2018-12-14 NOTE — CONSULT NOTE ADULT - PROBLEM SELECTOR RECOMMENDATION 9
Continue current immunosuppression  Tacrolimus 0.5mg BID, Prednisone 3mg QOD, Cellcept 500mg daily  No need to check Tacrolimus levels  Patient may follow-up with Dr. Hart as outpatient  Please call Transplant surgery for any further question  Remainder of care as per primary team

## 2018-12-14 NOTE — PROGRESS NOTE ADULT - SUBJECTIVE AND OBJECTIVE BOX
CHIEF COMPLAINT:Patient is a 68y old  Male who presents with a chief complaint of LLE cellulitis  	  Allergies:  clindamycin (Unknown)      PAST MEDICAL & SURGICAL HISTORY:      FAMILY HISTORY:  No pertinent family history in first degree relatives      REVIEW OF SYSTEMS:  CONSTITUTIONAL: No fever, weight loss, or fatigue  EYES: No eye pain, visual disturbances, or discharge  NECK: No pain or stiffness  RESPIRATORY: No cough or wheezing, no shortness of breath  CARDIOVASCULAR: No chest pain, palpitations, dizziness, or leg swelling  GASTROINTESTINAL: No abdominal or epigastric pain. No nausea, vomiting, diarrhea or constipation  GENITOURINARY: No dysuria, urinary frequency or urgency, no hematuria  NEUROLOGICAL: No headaches, memory loss, loss of strength, numbness, or tremors  SKIN: No itching, burning, rashes, or lesions   MUSCULOSKELETAL: LLE pain, swelling and redness      Medications:  MEDICATIONS  (STANDING):  aspirin enteric coated 81 milliGRAM(s) Oral daily  buprenorphine 2 milliGRAM(s) SubLingual <User Schedule>  calcium carbonate    500 mG (Tums) Chewable 1 Tablet(s) Chew daily  ceFAZolin   IVPB 1000 milliGRAM(s) IV Intermittent every 8 hours  dextrose 5%. 1000 milliLiter(s) (50 mL/Hr) IV Continuous <Continuous>  dextrose 50% Injectable 12.5 Gram(s) IV Push once  dextrose 50% Injectable 25 Gram(s) IV Push once  dextrose 50% Injectable 25 Gram(s) IV Push once  docusate sodium 100 milliGRAM(s) Oral three times a day  furosemide    Tablet 80 milliGRAM(s) Oral two times a day  insulin glargine Injectable (LANTUS) 40 Unit(s) SubCutaneous at bedtime  insulin glargine Injectable (LANTUS) 20 Unit(s) SubCutaneous every morning  insulin lispro (HumaLOG) corrective regimen sliding scale   SubCutaneous three times a day before meals  magnesium oxide 400 milliGRAM(s) Oral daily  metoprolol succinate ER 25 milliGRAM(s) Oral daily  mycophenolate mofetil 500 milliGRAM(s) Oral daily  predniSONE   Tablet 1 milliGRAM(s) Oral every other day  predniSONE   Tablet 2 milliGRAM(s) Oral every other day  sertraline 100 milliGRAM(s) Oral daily  sodium chloride 0.9%. 1000 milliLiter(s) (50 mL/Hr) IV Continuous <Continuous>  spironolactone 25 milliGRAM(s) Oral daily  tacrolimus 0.5 milliGRAM(s) Oral two times a day  tamsulosin 0.4 milliGRAM(s) Oral at bedtime    MEDICATIONS  (PRN):  dextrose 40% Gel 15 Gram(s) Oral once PRN Blood Glucose LESS THAN 70 milliGRAM(s)/deciliter  diphenhydrAMINE 2%/zinc acetate 0.1% Cream 1 Application(s) Topical two times a day PRN Itching  glucagon  Injectable 1 milliGRAM(s) IntraMuscular once PRN Glucose LESS THAN 70 milligrams/deciliter  oxyCODONE    IR 5 milliGRAM(s) Oral every 6 hours PRN Severe Pain (7 - 10)    	    PHYSICAL EXAM:  T(C): 36.7 (12-14-18 @ 11:43), Max: 36.7 (12-14-18 @ 04:22)  HR: 60 (12-14-18 @ 11:43) (59 - 66)  BP: 149/78 (12-14-18 @ 11:43) (149/78 - 157/76)  RR: 18 (12-14-18 @ 11:43) (18 - 18)  SpO2: 98% (12-14-18 @ 11:43) (98% - 100%)  Wt(kg): --  I&O's Summary    13 Dec 2018 07:01  -  14 Dec 2018 07:00  --------------------------------------------------------  IN: 2040 mL / OUT: 1725 mL / NET: 315 mL    14 Dec 2018 07:01  -  14 Dec 2018 14:28  --------------------------------------------------------  IN: 480 mL / OUT: 350 mL / NET: 130 mL      Appearance: Normal	  HEENT:   NCAT, PERRL, EOMI	  Lymphatic: No lymphadenopathy  Cardiovascular: Normal S1 S2, RRR  Respiratory: Lungs clear to auscultation BL  Psychiatry: A & O x 3, Mood & affect appropriate  Gastrointestinal:  Soft, Non-tender, + BS  Skin: No rashes, No ecchymoses, No cyanosis	  Neurologic: Non-focal  Extremities: LLE swelling and erythema    LABS:	 	    CARDIAC MARKERS:                                9.8    3.33  )-----------( 62       ( 13 Dec 2018 07:30 )             31.4     12-14    139  |  96  |  22  ----------------------------<  171<H>  4.8   |  31  |  1.19    Ca    9.1      14 Dec 2018 09:00  Mg     2.4     12-14    TPro  6.9  /  Alb  3.8  /  TBili  0.5  /  DBili  x   /  AST  25  /  ALT  19  /  AlkPhos  82  12-13    proBNP:   Lipid Profile:   HgA1c:   TSH:

## 2018-12-14 NOTE — PHYSICAL THERAPY INITIAL EVALUATION ADULT - GAIT PATTERN USED, PT EVAL
3-point gait/steady; pt insisted to amb on roomair and desatted to 85% but recovered in seated rest and O2 applied

## 2018-12-14 NOTE — PHYSICAL THERAPY INITIAL EVALUATION ADULT - ADDITIONAL COMMENTS
as per pt: PTA pt was living in an apartment with elevator access and was independent in all functional mobility and ADL's. no AD for gait. as per pt: PTA pt was living in an apartment with elevator access and was independent in all functional mobility and ADL's. cane for gait at times

## 2018-12-14 NOTE — CONSULT NOTE ADULT - ASSESSMENT
68 year old gentleman s/p liver transplant (2/2 hep C) 7 years ago followed by  who presented post-fall with worsening LLE  swelling and erythema concerning for cellulitis.

## 2018-12-14 NOTE — PROGRESS NOTE ADULT - ASSESSMENT
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 LLE is more swollen and erythematous, concern for cellulitis. Possible fracture on xray of left leg though per ortho, there is no fracture.  Probable strep infection.  No purulence to suggest staph aureus, however, area of fluctuance.    Suggest:  - c/w ancef 1 g IV q8h  - follow up on BCx  - follow leg u/s (rule out abscess) - if negative, okay from ID standpoint to change to po keflex 500mg tid for a week    above d/w NP on 4D

## 2018-12-15 VITALS
DIASTOLIC BLOOD PRESSURE: 74 MMHG | RESPIRATION RATE: 18 BRPM | SYSTOLIC BLOOD PRESSURE: 157 MMHG | HEART RATE: 78 BPM | OXYGEN SATURATION: 93 % | TEMPERATURE: 99 F

## 2018-12-15 PROCEDURE — 84295 ASSAY OF SERUM SODIUM: CPT

## 2018-12-15 PROCEDURE — 83540 ASSAY OF IRON: CPT

## 2018-12-15 PROCEDURE — 73502 X-RAY EXAM HIP UNI 2-3 VIEWS: CPT

## 2018-12-15 PROCEDURE — 93971 EXTREMITY STUDY: CPT

## 2018-12-15 PROCEDURE — 82746 ASSAY OF FOLIC ACID SERUM: CPT

## 2018-12-15 PROCEDURE — 83880 ASSAY OF NATRIURETIC PEPTIDE: CPT

## 2018-12-15 PROCEDURE — 73590 X-RAY EXAM OF LOWER LEG: CPT

## 2018-12-15 PROCEDURE — 97161 PT EVAL LOW COMPLEX 20 MIN: CPT

## 2018-12-15 PROCEDURE — 76882 US LMTD JT/FCL EVL NVASC XTR: CPT

## 2018-12-15 PROCEDURE — 80048 BASIC METABOLIC PNL TOTAL CA: CPT

## 2018-12-15 PROCEDURE — 73610 X-RAY EXAM OF ANKLE: CPT

## 2018-12-15 PROCEDURE — 85730 THROMBOPLASTIN TIME PARTIAL: CPT

## 2018-12-15 PROCEDURE — 82728 ASSAY OF FERRITIN: CPT

## 2018-12-15 PROCEDURE — 85014 HEMATOCRIT: CPT

## 2018-12-15 PROCEDURE — 84484 ASSAY OF TROPONIN QUANT: CPT

## 2018-12-15 PROCEDURE — 85027 COMPLETE CBC AUTOMATED: CPT

## 2018-12-15 PROCEDURE — 82435 ASSAY OF BLOOD CHLORIDE: CPT

## 2018-12-15 PROCEDURE — 82962 GLUCOSE BLOOD TEST: CPT

## 2018-12-15 PROCEDURE — 80197 ASSAY OF TACROLIMUS: CPT

## 2018-12-15 PROCEDURE — 99285 EMERGENCY DEPT VISIT HI MDM: CPT | Mod: 25

## 2018-12-15 PROCEDURE — 82803 BLOOD GASES ANY COMBINATION: CPT

## 2018-12-15 PROCEDURE — 99232 SBSQ HOSP IP/OBS MODERATE 35: CPT

## 2018-12-15 PROCEDURE — 85610 PROTHROMBIN TIME: CPT

## 2018-12-15 PROCEDURE — 83735 ASSAY OF MAGNESIUM: CPT

## 2018-12-15 PROCEDURE — 83550 IRON BINDING TEST: CPT

## 2018-12-15 PROCEDURE — 83036 HEMOGLOBIN GLYCOSYLATED A1C: CPT

## 2018-12-15 PROCEDURE — 96374 THER/PROPH/DIAG INJ IV PUSH: CPT

## 2018-12-15 PROCEDURE — 93005 ELECTROCARDIOGRAM TRACING: CPT

## 2018-12-15 PROCEDURE — 82330 ASSAY OF CALCIUM: CPT

## 2018-12-15 PROCEDURE — 82607 VITAMIN B-12: CPT

## 2018-12-15 PROCEDURE — 87040 BLOOD CULTURE FOR BACTERIA: CPT

## 2018-12-15 PROCEDURE — 82947 ASSAY GLUCOSE BLOOD QUANT: CPT

## 2018-12-15 PROCEDURE — 83605 ASSAY OF LACTIC ACID: CPT

## 2018-12-15 PROCEDURE — 80053 COMPREHEN METABOLIC PANEL: CPT

## 2018-12-15 PROCEDURE — 84132 ASSAY OF SERUM POTASSIUM: CPT

## 2018-12-15 PROCEDURE — 71045 X-RAY EXAM CHEST 1 VIEW: CPT

## 2018-12-15 RX ORDER — CEPHALEXIN 500 MG
500 CAPSULE ORAL THREE TIMES A DAY
Qty: 0 | Refills: 0 | Status: DISCONTINUED | OUTPATIENT
Start: 2018-12-15 | End: 2018-12-15

## 2018-12-15 RX ORDER — CEPHALEXIN 500 MG
1 CAPSULE ORAL
Qty: 21 | Refills: 0
Start: 2018-12-15 | End: 2018-12-21

## 2018-12-15 RX ADMIN — Medication 80 MILLIGRAM(S): at 05:57

## 2018-12-15 RX ADMIN — SPIRONOLACTONE 25 MILLIGRAM(S): 25 TABLET, FILM COATED ORAL at 05:56

## 2018-12-15 RX ADMIN — Medication 4: at 12:47

## 2018-12-15 RX ADMIN — Medication 500 MILLIGRAM(S): at 12:48

## 2018-12-15 RX ADMIN — Medication 1 TABLET(S): at 12:48

## 2018-12-15 RX ADMIN — BUPRENORPHINE 2 MILLIGRAM(S): 10 PATCH, EXTENDED RELEASE TRANSDERMAL at 12:48

## 2018-12-15 RX ADMIN — MAGNESIUM OXIDE 400 MG ORAL TABLET 400 MILLIGRAM(S): 241.3 TABLET ORAL at 12:48

## 2018-12-15 RX ADMIN — MYCOPHENOLATE MOFETIL 500 MILLIGRAM(S): 250 CAPSULE ORAL at 12:48

## 2018-12-15 RX ADMIN — Medication 81 MILLIGRAM(S): at 12:48

## 2018-12-15 RX ADMIN — Medication 25 MILLIGRAM(S): at 08:32

## 2018-12-15 RX ADMIN — BUPRENORPHINE 2 MILLIGRAM(S): 10 PATCH, EXTENDED RELEASE TRANSDERMAL at 08:27

## 2018-12-15 RX ADMIN — TACROLIMUS 0.5 MILLIGRAM(S): 5 CAPSULE ORAL at 08:32

## 2018-12-15 RX ADMIN — INSULIN GLARGINE 20 UNIT(S): 100 INJECTION, SOLUTION SUBCUTANEOUS at 08:32

## 2018-12-15 RX ADMIN — BUPRENORPHINE 2 MILLIGRAM(S): 10 PATCH, EXTENDED RELEASE TRANSDERMAL at 08:26

## 2018-12-15 RX ADMIN — Medication 100 MILLIGRAM(S): at 05:57

## 2018-12-15 RX ADMIN — BUPRENORPHINE 2 MILLIGRAM(S): 10 PATCH, EXTENDED RELEASE TRANSDERMAL at 12:49

## 2018-12-15 RX ADMIN — SERTRALINE 100 MILLIGRAM(S): 25 TABLET, FILM COATED ORAL at 12:48

## 2018-12-15 NOTE — PROGRESS NOTE ADULT - ASSESSMENT
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 LLE is more swollen and erythematous, concern for cellulitis. Possible fracture on xray of left leg though per ortho, there is no fracture.  Probable strep infection.  No purulence to suggest staph aureus, however, area of fluctuance.    Suggest:  - okay from ID standpoint to change to po keflex for a week  - warm compresses several times daily      above d/w dr. base

## 2018-12-15 NOTE — PROGRESS NOTE ADULT - SUBJECTIVE AND OBJECTIVE BOX
CHIEF COMPLAINT:Patient is a 68y old  Male who presents with a chief complaint of cellulitis (15 Dec 2018 08:43)    	        PAST MEDICAL & SURGICAL HISTORY:          REVIEW OF SYSTEMS:  CONSTITUTIONAL: No fever, weight loss, or fatigue  EYES: No eye pain, visual disturbances, or discharge  NECK: No pain or stiffness  RESPIRATORY: No cough, wheezing, chills or hemoptysis; No Shortness of Breath  CARDIOVASCULAR: No chest pain, palpitations, passing out, dizziness, or leg swelling  GASTROINTESTINAL: No abdominal or epigastric pain. No nausea, vomiting, or hematemesis; No diarrhea or constipation. No melena or hematochezia.  GENITOURINARY: No dysuria, frequency, hematuria, or incontinence  NEUROLOGICAL: No headaches, memory loss, loss of strength, numbness, or tremors  MUSCULOSKELETAL: lleg feels better    Medications:  MEDICATIONS  (STANDING):  aspirin enteric coated 81 milliGRAM(s) Oral daily  buprenorphine 2 milliGRAM(s) SubLingual <User Schedule>  calcium carbonate    500 mG (Tums) Chewable 1 Tablet(s) Chew daily  ceFAZolin   IVPB 1000 milliGRAM(s) IV Intermittent every 8 hours  dextrose 5%. 1000 milliLiter(s) (50 mL/Hr) IV Continuous <Continuous>  dextrose 50% Injectable 12.5 Gram(s) IV Push once  dextrose 50% Injectable 25 Gram(s) IV Push once  dextrose 50% Injectable 25 Gram(s) IV Push once  docusate sodium 100 milliGRAM(s) Oral three times a day  furosemide    Tablet 80 milliGRAM(s) Oral two times a day  insulin glargine Injectable (LANTUS) 40 Unit(s) SubCutaneous at bedtime  insulin glargine Injectable (LANTUS) 20 Unit(s) SubCutaneous every morning  insulin lispro (HumaLOG) corrective regimen sliding scale   SubCutaneous three times a day before meals  magnesium oxide 400 milliGRAM(s) Oral daily  metoprolol succinate ER 25 milliGRAM(s) Oral daily  mycophenolate mofetil 500 milliGRAM(s) Oral daily  predniSONE   Tablet 1 milliGRAM(s) Oral every other day  predniSONE   Tablet 2 milliGRAM(s) Oral every other day  sertraline 100 milliGRAM(s) Oral daily  sodium chloride 0.9%. 1000 milliLiter(s) (50 mL/Hr) IV Continuous <Continuous>  spironolactone 25 milliGRAM(s) Oral daily  tacrolimus 0.5 milliGRAM(s) Oral two times a day  tamsulosin 0.4 milliGRAM(s) Oral at bedtime    MEDICATIONS  (PRN):  dextrose 40% Gel 15 Gram(s) Oral once PRN Blood Glucose LESS THAN 70 milliGRAM(s)/deciliter  diphenhydrAMINE 2%/zinc acetate 0.1% Cream 1 Application(s) Topical two times a day PRN Itching  glucagon  Injectable 1 milliGRAM(s) IntraMuscular once PRN Glucose LESS THAN 70 milligrams/deciliter  oxyCODONE    IR 5 milliGRAM(s) Oral every 6 hours PRN Severe Pain (7 - 10)    	    PHYSICAL EXAM:  T(C): 36.8 (12-15-18 @ 03:24), Max: 36.8 (12-15-18 @ 03:24)  HR: 60 (12-15-18 @ 05:55) (59 - 60)  BP: 152/70 (12-15-18 @ 05:55) (149/78 - 152/74)  RR: 16 (12-15-18 @ 05:55) (16 - 18)  SpO2: 96% (12-15-18 @ 03:24) (96% - 98%)  Wt(kg): --  I&O's Summary    14 Dec 2018 07:01  -  15 Dec 2018 07:00  --------------------------------------------------------  IN: 2530 mL / OUT: 350 mL / NET: 2180 mL        Appearance: Normal	  HEENT:   Normal oral mucosa, PERRL, EOMI	  Lymphatic: No lymphadenopathy  Cardiovascular: Normal S1 S2, No JVD, No murmurs, No edema  Respiratory: Lungs clear to auscultation	  Psychiatry: A & O x 3, Mood & affect appropriate  Gastrointestinal:  Soft, Non-tender, + BS	  Skin: No rashes, No ecchymoses, No cyanosis	  Neurologic: Non-focal  Extremities: Normal range of motion, dec swelling redness llext   chronic pvd   Vascular: Peripheral pulses palpable 2+ bilaterally    TELEMETRY: 	    ECG:  	  RADIOLOGY:  OTHER: 	  	  LABS:	 	    CARDIAC MARKERS:                                11.4   5.08  )-----------( 77       ( 14 Dec 2018 10:56 )             37.4     12-14    139  |  96  |  22  ----------------------------<  171<H>  4.8   |  31  |  1.19    Ca    9.1      14 Dec 2018 09:00  Mg     2.4     12-14      proBNP:   Lipid Profile:   HgA1c:   TSH:

## 2018-12-15 NOTE — PROGRESS NOTE ADULT - SUBJECTIVE AND OBJECTIVE BOX
Follow Up:  cellulitis LLE    Inverval History/ROS: no further pain of the left leg.  transplant evaluation noted.  Rest of ROS otherwise negative.    Allergies  clindamycin (Unknown)    ANTIMICROBIALS:    ceFAZolin   IVPB 1000 every 8 hours  mycophenolate mofetil 500 daily  tacrolimus 0.5 two times a day    MEDICATIONS  (STANDING):  aspirin enteric coated 81 daily  buprenorphine 2 <User Schedule>  calcium carbonate    500 mG (Tums) Chewable 1 daily  docusate sodium 100 three times a day  furosemide    Tablet 80 two times a day  insulin glargine Injectable (LANTUS) 40 at bedtime  insulin glargine Injectable (LANTUS) 20 every morning  insulin lispro (HumaLOG) corrective regimen sliding scale  three times a day before meals  metoprolol succinate ER 25 daily  mycophenolate mofetil 500 daily  predniSONE   Tablet 1 every other day  predniSONE   Tablet 2 every other day  sertraline 100 daily  spironolactone 25 daily  tacrolimus 0.5 two times a day  tamsulosin 0.4 at bedtime    Vital Signs Last 24 Hrs  T(F): 98.3 (12-15-18 @ 03:24), Max: 98.3 (12-15-18 @ 03:24)  HR: 60 (12-15-18 @ 05:55)  BP: 152/70 (12-15-18 @ 05:55)  RR: 16 (12-15-18 @ 05:55)  SpO2: 96% (12-15-18 @ 03:24) (96% - 98%)    PHYSICAL EXAM:  General: non-toxic  HEAD/EYES: PERRL, no icterus  ENT:  No thrush, supple  Cardiovascular:  Nl S1, S2  Respiratory:  clear to ausculation bilaterally  GI:  soft, non-tender, normal bowel sounds  :  no  palacios   Musculoskeletal: no synovitis  Neurologic:  non-focal exam   Skin: LLE with resolved erythema, no further tenderness; decreased fluctuance  Lymph: no lymphadenopathy  Psychiatric:  appropriate affect, alert & oriented  Lines:  no phlebitis                                   11.4   5.08  )-----------( 77       ( 14 Dec 2018 10:56 )             37.4 12-14    139  |  96  |  22  ----------------------------<  171  4.8   |  31  |  1.19  Ca    9.1      14 Dec 2018 09:00Mg     2.4     12-14    MICROBIOLOGY:  Culture - Blood (12.13.18 @ 02:22)    Specimen Source: .Blood Blood-Venous    Culture Results:   No growth to date.    Culture - Blood (12.13.18 @ 02:21)    Specimen Source: .Blood Blood-Peripheral    Culture Results:   No growth to date.    RADIOLOGY:  US Extremity Nonvasc Limited, Left (12.14.18 @ 14:29) >  IMPRESSION:  Left lower extremity edema below the knee, compatible with provided history of cellulitis. No drainable fluid collection or abscess.    LLE u/s- No evidence of left lower extremity deep venous thrombosis.    XR ankle/ tib/fib- Minimal step-off at the lateral cortex of the distal fibula on the mortise view; it is unclear whether there is an acute fracture. Correlate for point tenderness in this location.    XR Hip- No acute fracture or dislocation.    CXR- 1.  Bibasilar platelike like atelectasis.

## 2018-12-15 NOTE — PROGRESS NOTE ADULT - ASSESSMENT
pt w/ above med hx presents with fall / llext cellulitis   change to po abs   cont outpt meds  id f/u noted  ortho eval noted  no acute fx, no intervantion  US LLE neg  PT  DVT prophylaxis  d/c planning today

## 2018-12-15 NOTE — CHART NOTE - NSCHARTNOTEFT_GEN_A_CORE
Request from Dr. Ayala to facilitate patient discharge.  Medication reconciliation reviewed, revised, and resolved with Dr. Ayala, who has medically cleared patient for discharge with follow-up as advised.  Please refer to discharge note for detailed hospital course.

## 2018-12-18 LAB
CULTURE RESULTS: SIGNIFICANT CHANGE UP
CULTURE RESULTS: SIGNIFICANT CHANGE UP
SPECIMEN SOURCE: SIGNIFICANT CHANGE UP
SPECIMEN SOURCE: SIGNIFICANT CHANGE UP

## 2019-01-22 ENCOUNTER — APPOINTMENT (OUTPATIENT)
Dept: TRANSPLANT | Facility: CLINIC | Age: 69
End: 2019-01-22
Payer: MEDICARE

## 2019-01-22 ENCOUNTER — RX RENEWAL (OUTPATIENT)
Age: 69
End: 2019-01-22

## 2019-01-22 VITALS
SYSTOLIC BLOOD PRESSURE: 149 MMHG | RESPIRATION RATE: 16 BRPM | TEMPERATURE: 98.1 F | BODY MASS INDEX: 34 KG/M2 | HEART RATE: 110 BPM | WEIGHT: 251 LBS | HEIGHT: 72 IN | OXYGEN SATURATION: 84 % | DIASTOLIC BLOOD PRESSURE: 68 MMHG

## 2019-01-22 PROCEDURE — 99214 OFFICE O/P EST MOD 30 MIN: CPT

## 2019-01-22 RX ORDER — INSULIN LISPRO 100 [IU]/ML
(75-25) 100 INJECTION, SUSPENSION SUBCUTANEOUS
Refills: 0 | Status: ACTIVE | COMMUNITY
Start: 2017-06-15

## 2019-01-22 RX ORDER — SULFAMETHOXAZOLE AND TRIMETHOPRIM 400; 80 MG/1; MG/1
400-80 TABLET ORAL
Qty: 45 | Refills: 3 | Status: ACTIVE | COMMUNITY
Start: 2017-06-15

## 2019-01-22 NOTE — REASON FOR VISIT
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 cellulitis left leg\par Continues on anti-depressant but not seeing therapist, needs to schedule appointment with psychologist. Needs to lose weight. Still craving bread and potatoes.\par \par \par IM meds:\par FK 0.5 mg twice a day\par Pred 3 mg every other day\par  mg once a day.\par Lasix 80 mg twice a day\par Last Fibroscan 1 yr ago f4 and s3\par Lab blood test: today

## 2019-01-22 NOTE — ASSESSMENT
[FreeTextEntry1] : 67 year old with chronic lymphedema and now  injury to right knee resolved with mild umbness. Saw Dr. Del Real no further testing ordered. 4/24 cr 1.56, ast 33 alt21, wbc 4.9, plt 49. New labs pending\par \par Right leg edema improved.\par \par Wants admission for rehab for strength and breathing exercises and help. Unable to ambulate without cane.\par Past bladder ca for f/up.Dr. Thai Tomlin.\par \par Obesity. Needs significant weight reduction, breathing and hepar both injured by weight and fat, glucose elevated. Will see a nutritionist next week. \par \par Appt. for Leandro Santos pulmonary

## 2019-01-22 NOTE — PHYSICAL EXAM
[Apical Impulse] : the apical impulse was normal [Bowel Sounds] : normal bowel sounds [Abdomen Soft] : soft [Abdomen Tenderness] : non-tender [Pitting Edema] : pitting edema present [___ +] : bilateral pretibial [unfilled]U+ pitting edema [Oriented To Time, Place, And Person] : oriented to person, place, and time [General Appearance - Alert] : alert [PERRL With Normal Accommodation] : pupils were equal in size, round, and reactive to light [FreeTextEntry1] : no shake

## 2019-01-23 LAB
ALBUMIN SERPL ELPH-MCNC: 4.4 G/DL
ALP BLD-CCNC: 88 U/L
ALT SERPL-CCNC: 23 U/L
ANION GAP SERPL CALC-SCNC: 11 MMOL/L
AST SERPL-CCNC: 36 U/L
BASOPHILS # BLD AUTO: 0.01 K/UL
BASOPHILS NFR BLD AUTO: 0.2 %
BILIRUB DIRECT SERPL-MCNC: 0.2 MG/DL
BILIRUB INDIRECT SERPL-MCNC: 0.6 MG/DL
BILIRUB SERPL-MCNC: 0.8 MG/DL
BUN SERPL-MCNC: 26 MG/DL
CALCIUM SERPL-MCNC: 9.9 MG/DL
CHLORIDE SERPL-SCNC: 99 MMOL/L
CO2 SERPL-SCNC: 30 MMOL/L
CREAT SERPL-MCNC: 1.31 MG/DL
EOSINOPHIL # BLD AUTO: 0.07 K/UL
EOSINOPHIL NFR BLD AUTO: 1.1 %
GLUCOSE SERPL-MCNC: 132 MG/DL
HCT VFR BLD CALC: 39.5 %
HGB BLD-MCNC: 12.4 G/DL
IMM GRANULOCYTES NFR BLD AUTO: 0.6 %
LYMPHOCYTES # BLD AUTO: 1.03 K/UL
LYMPHOCYTES NFR BLD AUTO: 16.6 %
MAN DIFF?: NORMAL
MCHC RBC-ENTMCNC: 31 PG
MCHC RBC-ENTMCNC: 31.4 GM/DL
MCV RBC AUTO: 98.8 FL
MONOCYTES # BLD AUTO: 0.6 K/UL
MONOCYTES NFR BLD AUTO: 9.7 %
NEUTROPHILS # BLD AUTO: 4.45 K/UL
NEUTROPHILS NFR BLD AUTO: 71.8 %
PLATELET # BLD AUTO: 77 K/UL
POTASSIUM SERPL-SCNC: 4.7 MMOL/L
PROT SERPL-MCNC: 7.7 G/DL
RBC # BLD: 4 M/UL
RBC # FLD: 14.8 %
SODIUM SERPL-SCNC: 140 MMOL/L
TACROLIMUS SERPL-MCNC: <2 NG/ML
WBC # FLD AUTO: 6.2 K/UL

## 2019-03-06 ENCOUNTER — TRANSCRIPTION ENCOUNTER (OUTPATIENT)
Age: 69
End: 2019-03-06

## 2019-03-18 ENCOUNTER — APPOINTMENT (OUTPATIENT)
Dept: PULMONOLOGY | Facility: CLINIC | Age: 69
End: 2019-03-18
Payer: MEDICARE

## 2019-03-18 ENCOUNTER — MEDICATION RENEWAL (OUTPATIENT)
Age: 69
End: 2019-03-18

## 2019-03-18 VITALS
OXYGEN SATURATION: 88 % | RESPIRATION RATE: 17 BRPM | HEIGHT: 72 IN | WEIGHT: 251 LBS | HEART RATE: 99 BPM | DIASTOLIC BLOOD PRESSURE: 63 MMHG | SYSTOLIC BLOOD PRESSURE: 133 MMHG | BODY MASS INDEX: 34 KG/M2

## 2019-03-18 DIAGNOSIS — Z87.891 PERSONAL HISTORY OF NICOTINE DEPENDENCE: ICD-10-CM

## 2019-03-18 DIAGNOSIS — F17.290 NICOTINE DEPENDENCE, OTHER TOBACCO PRODUCT, UNCOMPLICATED: ICD-10-CM

## 2019-03-18 PROCEDURE — 71046 X-RAY EXAM CHEST 2 VIEWS: CPT

## 2019-03-18 PROCEDURE — ZZZZZ: CPT

## 2019-03-18 PROCEDURE — G0296 VISIT TO DETERM LDCT ELIG: CPT

## 2019-03-18 PROCEDURE — 94060 EVALUATION OF WHEEZING: CPT | Mod: 59

## 2019-03-18 PROCEDURE — 94729 DIFFUSING CAPACITY: CPT

## 2019-03-18 PROCEDURE — 99205 OFFICE O/P NEW HI 60 MIN: CPT | Mod: 25

## 2019-03-18 RX ORDER — UMECLIDINIUM BROMIDE AND VILANTEROL TRIFENATATE 62.5; 25 UG/1; UG/1
62.5-25 POWDER RESPIRATORY (INHALATION) DAILY
Qty: 3 | Refills: 1 | Status: ACTIVE | COMMUNITY
Start: 2019-03-18 | End: 1900-01-01

## 2019-03-18 RX ORDER — ALBUTEROL SULFATE 2.5 MG/3ML
(2.5 MG/3ML) SOLUTION RESPIRATORY (INHALATION)
Qty: 360 | Refills: 1 | Status: ACTIVE | COMMUNITY
Start: 2019-03-18 | End: 1900-01-01

## 2019-03-18 RX ORDER — OLOPATADINE HYDROCHLORIDE 665 UG/1
0.6 SPRAY, METERED NASAL
Qty: 3 | Refills: 1 | Status: ACTIVE | COMMUNITY
Start: 2019-03-18 | End: 1900-01-01

## 2019-04-04 NOTE — ADDENDUM
[FreeTextEntry1] : *** Amended by Jelani De Luna on 04/04/2019 ***\par \par  **amended by Yoselin Mendez on 4/2/2019**\par \par Documented by Jelani De Luna acting as a scribe for Dr. Leandro Santos on 03/18/2019 \par \par All medical record entries made by the Scribe were at my, Dr. Leandro Santos's, direction and personally dictated by me on 03/18/2019  . I have reviewed the chart and agree that the record accurately reflects my personal performance of the history, physical exam, procedure, assessment and plan. I have also personally directed, reviewed, and agree with the discharge instructions. \par \par

## 2019-04-04 NOTE — PHYSICAL EXAM
[General Appearance - Well Developed] : well developed [Normal Appearance] : normal appearance [Well Groomed] : well groomed [General Appearance - Well Nourished] : well nourished [No Deformities] : no deformities [General Appearance - In No Acute Distress] : no acute distress [Normal Conjunctiva] : the conjunctiva exhibited no abnormalities [Eyelids - No Xanthelasma] : the eyelids demonstrated no xanthelasmas [Normal Oropharynx] : normal oropharynx [Neck Appearance] : the appearance of the neck was normal [Neck Cervical Mass (___cm)] : no neck mass was observed [Jugular Venous Distention Increased] : there was no jugular-venous distention [Thyroid Diffuse Enlargement] : the thyroid was not enlarged [Thyroid Nodule] : there were no palpable thyroid nodules [Heart Rate And Rhythm] : heart rate and rhythm were normal [Heart Sounds] : normal S1 and S2 [Murmurs] : no murmurs present [Respiration, Rhythm And Depth] : normal respiratory rhythm and effort [Exaggerated Use Of Accessory Muscles For Inspiration] : no accessory muscle use [Auscultation Breath Sounds / Voice Sounds] : lungs were clear to auscultation bilaterally [Abdomen Soft] : soft [Abdomen Tenderness] : non-tender [Abdomen Mass (___ Cm)] : no abdominal mass palpated [Abnormal Walk] : normal gait [Gait - Sufficient For Exercise Testing] : the gait was sufficient for exercise testing [Nail Clubbing] : no clubbing of the fingernails [Cyanosis, Localized] : no localized cyanosis [Petechial Hemorrhages (___cm)] : no petechial hemorrhages [Skin Color & Pigmentation] : normal skin color and pigmentation [Skin Turgor] : normal skin turgor [] : no rash [Deep Tendon Reflexes (DTR)] : deep tendon reflexes were 2+ and symmetric [Sensation] : the sensory exam was normal to light touch and pinprick [No Focal Deficits] : no focal deficits [Oriented To Time, Place, And Person] : oriented to person, place, and time [Impaired Insight] : insight and judgment were intact [Affect] : the affect was normal [III] : III [FreeTextEntry1] : I:E ratio 1:3; clear  [FreeTextEntry2] : 1+-2+ LE edema b/l

## 2019-04-04 NOTE — PROCEDURE
[FreeTextEntry1] : CXR reveals a normal sized heart; atelectasis at right base, calcified aortic knob,  no evidence of infiltrate or effusion\par  \par The patient attempted an initial 6-minute walk/exercise study without oxygen in which the lowest Pulse Ox was 82%; there was no evidence of dyspnea. The pt then completed a second 6-minute walk/exercise study with 3L of oxygen, and the Lowest Pulse Ox was 86%. There was evidence of very severe dyspnea; the pt walked 5 laps or 81.5 meters. \par \par PFT- spi reveals moderate restrictive and severe obstructive dysfunction; FEV1 was 1.72L which is 48% of predicted- there was a 10% improvement following bronchodilator at mid ot low lung volumes; low normal diffusion at 21.0, which is 76% of predicted; normal flow volume loop \par \par He had a 2D transthoracic echo performed on 5/24/2016 that revealed that there was no right atrial dilatation. The right ventricle is normal in size. The right ventricle has normal wall motion. The right atrial pressure is normal. There is no PAH. There is no left atrial dilatation. There is septal knuckle but no left ventricular outflow obstruction. The left ventricle has normal end-diastolic diameter. Global LV wall motion is normal. LV ejection fracture is normal. The aortic valve is calcified. There is minimal aortic stenosis or aortic regurgitation. There is mitral annular calcification. There is no mitral regurgitation. There is no pericardial effusion. \par \par \par

## 2019-04-04 NOTE — HISTORY OF PRESENT ILLNESS
[FreeTextEntry1] : Mr. Nguyen is a 68 year old male presenting to the office today for an initial pulmonary evaluation. His chief complaint is his SOB\par -he reports he a liver transplant in July 2011\par -he notes he has been feeling SOB for at least 6 months now\par -he states he had PNA about 12 months ago\par -he reports he does wheeze throughout the day and night for 6 months as well\par -he notes he was never previously SOB\par -he states supplemental oxygen provides relief for his SOB\par -he reports he has been gaining weight and has gained 20 pounds in the last year\par -he notes his LE edema has worsened over the years \par -he states he has lower back and leg pain\par -he reports he does not know if he snores\par -he notes he could fall asleep in a car or while watching television\par -he states he has rhinorrhea\par -he reports he has no sense of smell\par -he notes his memory and concentration is good \par -he states his vision is stable\par -he states his kidney function is stable\par -he denies any chest pain, chest pressure, diarrhea, constipation, dysphagia, dizziness, sour taste in the mouth, heartburn, reflux

## 2019-04-04 NOTE — ASSESSMENT
[FreeTextEntry1] : Mr. WALTON is a 68 year old male with a history of hepatitis-C induced liver failure, s/p liver transplant, HTN, COPD, diabetes, venous insufficiency, overweight, chronic respiratory failure who comes into today for pulmonary evaluation \par \par His shortness of breath is multifactorial due to:\par - pulmonary disease (COPD and ?PAH)\par -poor breathing mechanics (due to pain and balance)\par -overweight/out of shape\par -?CAD \par \par problem 1: COPD\par -add Albuterol nebulizer QAM and Q6H (gargle and spit after use) \par -add Anoro one inhalation QD\par \par -Inhaler technique reviewed as well as oral hygiene techniques reviewed with patient. Avoidance of cold air, extremes of temperature, rescue inhaler should be used before exercise. Order of medication reviewed with patient. Recommended use of a cool mist humidifier in the bedroom. \par -COPD is a progressive disease and although it can’t be cured , appropriate management can slow its progression, reduce frequency and severity of exacerbations, and improve symptoms and the patient quality of life. Hospitalizations are the greatest contributor to the total COPD costs and account for up to 87% of total COPD related costs. Exacerbations are the main cause of admissions and subsequently account for the 40-75% of COPD costs. Inhaled maintenance therapy reduces the incidence of exacerbations in patients with stable COPD. Incorrect inhaler use and nonadherence are major obstacles to achieving COPD treatment goals. Many COPD patients have challenges (impaired inhalation, limited dexterity, reduced cognition: that limit their ability to correctly use their COPD treatment devices resulting in reduced symptom control. Of most importance is smoking cessation and early intervention with respiratory illnesses and contemplation for pulmonary rehab to enhance quality of life.\par \par problem 2: ?PAH\par -complete echocardiogram to r/o PAH\par \par Disorder of the pulmonary arteries, important to distinguish the difference between pulmonary arterial hypertension which is idiopathic versus secondary pulmonary hypertension which is related to heart disease being diastolic dysfunction or congestive heart failure or other forms of pulmonary hypertension. Diagnostics include an initial echocardiogram evaluating the pulmonary artery pressures, if this is abnormal, to proceed with a VQ scan as well as a CTPA and an eventual right heart catheterization to absolutely confirm the echocardiogram findings. (No medication can be prescribed until the right heart catheterization). If present, the evaluation will include rheumatological blood testing, HIV testing, and potential evaluation for cirrhosis. Drug classes include PED5 (Revatio, Adcirca) ETRA (Tracleer, Macitentan, Letairis), Soluble guanylate cyclase (Adempas) Prostacyclins (Uptravi, Tyavso, Ventavis, Remodulin, or Orenitram derivatives). \par \par problem 3: allergies / sinus\par -add Olopatadine 0.6%, 1 sniff each nostril BID \par -recommended Navage sinus rinse\par -complete blood work to include: IgE level, eosinophil level, vitamin D level, food IgE level, free and total testostrone levels, and asthma profile \par \par Environmental measures for allergies were encouraged including mattress and pillow cover, air purifier, and environmental controls.\par \par problem 4: chronic respiratory failure\par -patient is a candidate for supplemental oxygen \par - recommend Inogen Air Compressor G3 (contact Ms. Esparza @ [phone])\par \par problem 5: lung cancer screening chest CT\par -complete chest CT\par \par Lung cancer screening is recommended for people between the ages of 55 and 80 with prior 30+ pack year smoking histories. There is irrefutable evidence for realization of lung cancer screening based on two large randomized control trials demonstrating relative reduction in lung cancer mortality for patients undergoing low-dose CT scanning. Risks and benefits reviewed with the patient \par \par problem 6: r/o CLAUDE\par -complete home sleep study based on elevated MP class, fatigue \par  \par problem 7: poor balance\par - given prescription for gait training and balance therapy \par \par problem 8 : cardiac\par -referred patient to Dr. Carlton Pereira\par -complete echocardiogram to r/o PAH\par \par problem 9: overweight/out of shape\par Weight loss, exercise, and diet control were discussed and are highly encouraged. Treatment options were given such as, aqua therapy, and contacting a nutritionist. Recommended to use the elliptical, stationary bike, less use of treadmill. Mindful eating was explained to the patient Obesity is associated with worsening asthma, shortness of breath, and potential for cardiac disease, diabetes, and other underlying medical conditions. \par \par problem 10 : poor breathing mechanics\par -Proper breathing techniques were reviewed with an emphasis of exhalation. Patient instructed to breath in for 1 second and out for four seconds. Patient was encouraged to not talk while walking. \par \par Problem 11 : health maintenance\par -recommended strep pneumonia vaccines: Prevnar-13 vaccine, followed by Pneumo vaccine 23 one year following\par -recommended early intervention for URIs\par -recommended regular osteoporosis evaluations\par -recommended early dermatological evaluations\par -recommended after the age of 50 to the age of 70, colonoscopy every 5 years \par \par F/U in 6-8 weeks\par Patient is encouraged to call with any changes, concerns, or questions. \par \par ** ADDENDUM: Patient requires 3-4L NC O2 : patient has variable pule Ox at resting room air - 88% at intake, on walk dropped down to 82%, with O2 3L recovery went up to 95% **

## 2019-04-04 NOTE — REVIEW OF SYSTEMS
[Negative] : Sleep Disorder [Recent Wt Gain (___ Lbs)] : recent [unfilled] ~Ulb weight gain [Dyspnea] : dyspnea [Edema] : ~T edema was present [As Noted in HPI] : as noted in HPI [Back Pain] : ~T back pain [FreeTextEntry6] : leg pain

## 2019-04-08 ENCOUNTER — OTHER (OUTPATIENT)
Age: 69
End: 2019-04-08

## 2019-04-08 RX ORDER — TACROLIMUS 0.5 MG/1
0.5 CAPSULE ORAL
Qty: 60 | Refills: 11 | Status: ACTIVE | COMMUNITY
Start: 2019-04-08 | End: 1900-01-01

## 2019-04-09 ENCOUNTER — RX RENEWAL (OUTPATIENT)
Age: 69
End: 2019-04-09

## 2019-04-09 ENCOUNTER — OTHER (OUTPATIENT)
Age: 69
End: 2019-04-09

## 2019-04-09 RX ORDER — FUROSEMIDE 40 MG/1
40 TABLET ORAL TWICE DAILY
Qty: 240 | Refills: 0 | Status: ACTIVE | COMMUNITY
Start: 2018-06-20 | End: 1900-01-01

## 2019-04-24 ENCOUNTER — APPOINTMENT (OUTPATIENT)
Dept: PULMONOLOGY | Facility: CLINIC | Age: 69
End: 2019-04-24
Payer: MEDICARE

## 2019-04-24 VITALS
HEART RATE: 118 BPM | BODY MASS INDEX: 34.54 KG/M2 | RESPIRATION RATE: 19 BRPM | SYSTOLIC BLOOD PRESSURE: 150 MMHG | HEIGHT: 72 IN | OXYGEN SATURATION: 87 % | WEIGHT: 255 LBS | DIASTOLIC BLOOD PRESSURE: 70 MMHG

## 2019-04-24 PROCEDURE — 99214 OFFICE O/P EST MOD 30 MIN: CPT | Mod: 25

## 2019-04-24 PROCEDURE — 94010 BREATHING CAPACITY TEST: CPT

## 2019-04-24 RX ORDER — TIOTROPIUM BROMIDE 18 UG/1
18 CAPSULE ORAL; RESPIRATORY (INHALATION)
Qty: 3 | Refills: 1 | Status: ACTIVE | COMMUNITY
Start: 2019-04-24 | End: 1900-01-01

## 2019-04-24 NOTE — HISTORY OF PRESENT ILLNESS
[FreeTextEntry1] : Mr. Nguyen is a 68 year old male with a history of chronic respiratory failure with hypoxemia, chronic sinusitis, COPD, GOMEZ, obesity, poor balance, poor sleep, and SOB presenting to the office today for a follow up visit. His chief complaint is progressive SOB. \par - He comes in stating that he has been poor as he has been unable to leave his house due to SOB. \par - He state that he needs to lose weight\par - He has been progressively  SOB  \par - He has request to be placed in [facility] rehab because he feels that he needs to be under supervision and on a controlled diet.  \par - He firmly believes that he would be able to improve his condition by going to [facility]. He feels that by being admitted to rehab, he will be able to gain motivation he needs\par - He appears to be very agitated regarding his progressive SOB. \par - He states that it is difficult for him to go shopping \par - He does not like to order food via the computer. \par - His energy level has been poor \par - He states that his sleep has been terrible as he falls asleep around 4AM and wakes up at 6AM\par - He states that he gets at most 3 hours of sleep\par - He reports EDS \par - He is unsure if he snores as he does not sleep with anyone \par - He denies any headaches, nausea, vomiting, fever, chills, sweats, chest pain, chest pressure, palpitations, wheezing, diarrhea, constipation, dysphagia, dizziness, leg swelling, leg pain, itchy eyes, itchy ears, heartburn, reflux, or sour taste in the mouth.

## 2019-04-24 NOTE — ASSESSMENT
[FreeTextEntry1] : Mr. WALTON is a 68 year old male with a history of hepatitis-C induced liver failure, s/p liver transplant, HTN, COPD, diabetes, venous insufficiency, overweight, chronic respiratory failure who comes into today for a pulmonary re-evaluation. He currently feels that he is failing, and he has been noncompliant with his treatment regiment. \par \par His shortness of breath is multifactorial due to:\par - pulmonary disease (COPD and ?PAH)\par -poor breathing mechanics (due to pain and balance)\par -overweight/out of shape\par -?CAD \par \par problem 1: COPD\par - Add Albuterol nebulizer QAM and Q6H (gargle and spit after use) \par - Add Breo Ellipta 200 1 inhalation QD\par - Add Spiriva 1 inhalation QS\par \par -Inhaler technique reviewed as well as oral hygiene techniques reviewed with patient. Avoidance of cold air, extremes of temperature, rescue inhaler should be used before exercise. Order of medication reviewed with patient. Recommended use of a cool mist humidifier in the bedroom. \par -COPD is a progressive disease and although it can’t be cured , appropriate management can slow its progression, reduce frequency and severity of exacerbations, and improve symptoms and the patient quality of life. Hospitalizations are the greatest contributor to the total COPD costs and account for up to 87% of total COPD related costs. Exacerbations are the main cause of admissions and subsequently account for the 40-75% of COPD costs. Inhaled maintenance therapy reduces the incidence of exacerbations in patients with stable COPD. Incorrect inhaler use and nonadherence are major obstacles to achieving COPD treatment goals. Many COPD patients have challenges (impaired inhalation, limited dexterity, reduced cognition: that limit their ability to correctly use their COPD treatment devices resulting in reduced symptom control. Of most importance is smoking cessation and early intervention with respiratory illnesses and contemplation for pulmonary rehab to enhance quality of life.\par \par problem 2: ?PAH\par -complete echocardiogram to r/o PAH\par \par Disorder of the pulmonary arteries, important to distinguish the difference between pulmonary arterial hypertension which is idiopathic versus secondary pulmonary hypertension which is related to heart disease being diastolic dysfunction or congestive heart failure or other forms of pulmonary hypertension. Diagnostics include an initial echocardiogram evaluating the pulmonary artery pressures, if this is abnormal, to proceed with a VQ scan as well as a CTPA and an eventual right heart catheterization to absolutely confirm the echocardiogram findings. (No medication can be prescribed until the right heart catheterization). If present, the evaluation will include rheumatological blood testing, HIV testing, and potential evaluation for cirrhosis. Drug classes include PED5 (Revatio, Adcirca) ETRA (Tracleer, Macitentan, Letairis), Soluble guanylate cyclase (Adempas) Prostacyclins (Uptravi, Tyavso, Ventavis, Remodulin, or Orenitram derivatives). \par \par problem 3: allergies / sinus\par - Continue Olopatadine 0.6%, 1 sniff each nostril BID \par -recommended Navage sinus rinse\par -complete blood work to include: IgE level, eosinophil level, vitamin D level, food IgE level, free and total testostrone levels, and asthma profile \par \par Environmental measures for allergies were encouraged including mattress and pillow cover, air purifier, and environmental controls.\par \par problem 4: chronic respiratory failure\par - Patient is a candidate for supplemental oxygen \par - Continue supplemental oxygen therapy at 2L with Inogen Air Compressor G3 (contact Ms. Esparza @ [phone])\par \par problem 5: lung cancer screening chest CT\par -complete chest CT (NONCOMPLIANT)\par \par Lung cancer screening is recommended for people between the ages of 55 and 80 with prior 30+ pack year smoking histories. There is irrefutable evidence for realization of lung cancer screening based on two large randomized control trials demonstrating relative reduction in lung cancer mortality for patients undergoing low-dose CT scanning. Risks and benefits reviewed with the patient \par \par problem 6: r/o CLAUDE\par -complete home sleep study based on elevated MP class, fatigue \par  \par problem 7: poor balance\par - given prescription for gait training and balance therapy \par \par problem 8 : cardiac\par -referred patient to Dr. Carlton Pereira\par -complete echocardiogram to r/o PAH\par \par problem 9: overweight/out of shape\par Weight loss, exercise, and diet control were discussed and are highly encouraged. Treatment options were given such as, aqua therapy, and contacting a nutritionist. Recommended to use the elliptical, stationary bike, less use of treadmill. Mindful eating was explained to the patient Obesity is associated with worsening asthma, shortness of breath, and potential for cardiac disease, diabetes, and other underlying medical conditions. \par \par problem 10 : poor breathing mechanics\par -Proper breathing techniques were reviewed with an emphasis of exhalation. Patient instructed to breath in for 1 second and out for four seconds. Patient was encouraged to not talk while walking. \par \par Problem 11 : health maintenance\par -recommended strep pneumonia vaccines: Prevnar-13 vaccine, followed by Pneumo vaccine 23 one year following\par -recommended early intervention for URIs\par -recommended regular osteoporosis evaluations\par -recommended early dermatological evaluations\par -recommended after the age of 50 to the age of 70, colonoscopy every 5 years \par \par F/U in 6-8 weeks (PT will be admitting himself to [facility] and has stated that he wants to go into rehab) \par Patient is encouraged to call with any changes, concerns, or questions. \par \par ** ADDENDUM: Patient requires 3-4L NC O2 : patient has variable pule Ox at resting room air - 88% at intake, on walk dropped down to 82%, with O2 3L recovery went up to 95% **

## 2019-04-24 NOTE — PHYSICAL EXAM
[General Appearance - Well Developed] : well developed [Normal Appearance] : normal appearance [Well Groomed] : well groomed [General Appearance - Well Nourished] : well nourished [No Deformities] : no deformities [General Appearance - In No Acute Distress] : no acute distress [Normal Conjunctiva] : the conjunctiva exhibited no abnormalities [Eyelids - No Xanthelasma] : the eyelids demonstrated no xanthelasmas [Normal Oropharynx] : normal oropharynx [III] : III [Neck Appearance] : the appearance of the neck was normal [Neck Cervical Mass (___cm)] : no neck mass was observed [Thyroid Diffuse Enlargement] : the thyroid was not enlarged [Jugular Venous Distention Increased] : there was no jugular-venous distention [Heart Rate And Rhythm] : heart rate and rhythm were normal [Thyroid Nodule] : there were no palpable thyroid nodules [Heart Sounds] : normal S1 and S2 [Murmurs] : no murmurs present [Respiration, Rhythm And Depth] : normal respiratory rhythm and effort [Exaggerated Use Of Accessory Muscles For Inspiration] : no accessory muscle use [Auscultation Breath Sounds / Voice Sounds] : lungs were clear to auscultation bilaterally [Abdomen Soft] : soft [Abdomen Tenderness] : non-tender [Abdomen Mass (___ Cm)] : no abdominal mass palpated [Abnormal Walk] : normal gait [Gait - Sufficient For Exercise Testing] : the gait was sufficient for exercise testing [Petechial Hemorrhages (___cm)] : no petechial hemorrhages [Nail Clubbing] : no clubbing of the fingernails [Cyanosis, Localized] : no localized cyanosis [Deep Tendon Reflexes (DTR)] : deep tendon reflexes were 2+ and symmetric [Sensation] : the sensory exam was normal to light touch and pinprick [Oriented To Time, Place, And Person] : oriented to person, place, and time [No Focal Deficits] : no focal deficits [Impaired Insight] : insight and judgment were intact [Affect] : the affect was normal [Skin Color & Pigmentation] : normal skin color and pigmentation [Skin Turgor] : normal skin turgor [] : no rash [FreeTextEntry1] : I:E ratio 1:3; distant breath sounds, mild forced expiratrpy wheeze [FreeTextEntry2] : 2+ edema to the mid calf

## 2019-04-24 NOTE — PROCEDURE
[FreeTextEntry1] : PFT- spi reveals moderate restrictive and severe obstructive dysfunction; FEV1 was 1.55L which is 41% of predicted; normal flow volume loop

## 2019-04-24 NOTE — ADDENDUM
[FreeTextEntry1] : Documented by Danyel Fonseca acting as a scribe for Dr. Leandro Santos on 4/24/2019\par \par All medical record entries made by the Scribe were at my, Dr. Leandro Santos's, direction and personally dictated by me on 4/24/2019. I have reviewed the chart and agree that the record accurately reflects my personal performance of the history, physical exam, assessment and plan. I have also personally directed, reviewed, and agree with the discharge instructions. \par \par \par \par \par

## 2019-04-24 NOTE — REASON FOR VISIT
[Follow-Up] : a follow-up visit [FreeTextEntry1] : Chronic respiratory failure with hypoxemia, chronic sinusitis, COPD, GOMEZ, obesity, poor balanceC, poor sleep, and SOB

## 2019-05-07 ENCOUNTER — RX RENEWAL (OUTPATIENT)
Age: 69
End: 2019-05-07

## 2019-05-21 ENCOUNTER — APPOINTMENT (OUTPATIENT)
Dept: TRANSPLANT | Facility: CLINIC | Age: 69
End: 2019-05-21

## 2019-05-23 ENCOUNTER — APPOINTMENT (OUTPATIENT)
Dept: TRANSPLANT | Facility: CLINIC | Age: 69
End: 2019-05-23
Payer: MEDICARE

## 2019-05-23 DIAGNOSIS — R07.9 CHEST PAIN, UNSPECIFIED: ICD-10-CM

## 2019-05-23 PROCEDURE — 99214 OFFICE O/P EST MOD 30 MIN: CPT

## 2019-05-23 NOTE — PHYSICAL EXAM
[General Appearance - Alert] : alert [PERRL With Normal Accommodation] : pupils were equal in size, round, and reactive to light [Apical Impulse] : the apical impulse was normal [Bowel Sounds] : normal bowel sounds [Abdomen Soft] : soft [Abdomen Tenderness] : non-tender [Pitting Edema] : pitting edema present [___ +] : bilateral pretibial [unfilled]U+ pitting edema [Oriented To Time, Place, And Person] : oriented to person, place, and time [FreeTextEntry1] : depressed

## 2019-05-23 NOTE — REASON FOR VISIT
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 pulm HTN, allergies\par ordered echo but has not been done\par reports he cannot leave home, states he stays in bed\par right leg swelling, cellulitis\par past  hospitalization at [facility] through ER post fall, with bilateral leg edema, ? cellulitis left leg\par Taking  anti-depressant  seeing therapist,  Needs to lose weight. Dr Santos feels weight contrbutes to SOB\par \par \par FK 0.5 mg twice a day\par Pred 3 mg every other day\par  mg once a day.\par Breo inhalerand albuterol\par Lasix 80 mg twice a day\par Last Fibroscan 1 yr ago f4 and s3\par On 02 and new right leg injury superficial abrasion 2cm and posterior fluid collection 15x8 cm at knee.\par

## 2019-05-23 NOTE — ASSESSMENT
[FreeTextEntry1] : 67 year old with chronic lymphedema and now  injury to right knee resolved with mild umbness. Saw Dr. Del Real no further testing ordered. 4/24 cr 1.56, ast 33 alt21, wbc 4.9, plt 49. New labs pending\par \par Right leg edema and fluid collection..\par \par \par Obesity. Needs significant weight reduction, breathing and hepar both injured by weight and fat, glucose elevated. Will see a nutritionist next week. \par \par Followed by  Leandro salter, will need ER admission

## 2019-05-23 NOTE — ADDENDUM
[FreeTextEntry1] : Last ast 36 cr 1.31 bun 26 glucose 132 gfr 56, needs fluid s off and continued inhalers ? pulmonary BP needs Echo.

## 2019-05-24 LAB
ALBUMIN SERPL ELPH-MCNC: 4.4 G/DL
ALP BLD-CCNC: 94 U/L
ALT SERPL-CCNC: 21 U/L
ANION GAP SERPL CALC-SCNC: 12 MMOL/L
AST SERPL-CCNC: 29 U/L
BASOPHILS # BLD AUTO: 0 K/UL
BASOPHILS NFR BLD AUTO: 0 %
BILIRUB DIRECT SERPL-MCNC: 0.2 MG/DL
BILIRUB INDIRECT SERPL-MCNC: 0.5 MG/DL
BILIRUB SERPL-MCNC: 0.7 MG/DL
BUN SERPL-MCNC: 21 MG/DL
CALCIUM SERPL-MCNC: 9.9 MG/DL
CHLORIDE SERPL-SCNC: 99 MMOL/L
CO2 SERPL-SCNC: 31 MMOL/L
CREAT SERPL-MCNC: 1.23 MG/DL
EOSINOPHIL # BLD AUTO: 0.04 K/UL
EOSINOPHIL NFR BLD AUTO: 0.9 %
GLUCOSE SERPL-MCNC: 104 MG/DL
HCT VFR BLD CALC: 35.7 %
HGB BLD-MCNC: 10.8 G/DL
IMM GRANULOCYTES NFR BLD AUTO: 0.9 %
LYMPHOCYTES # BLD AUTO: 0.77 K/UL
LYMPHOCYTES NFR BLD AUTO: 16.5 %
MAN DIFF?: NORMAL
MCHC RBC-ENTMCNC: 29.8 PG
MCHC RBC-ENTMCNC: 30.3 GM/DL
MCV RBC AUTO: 98.3 FL
MONOCYTES # BLD AUTO: 0.48 K/UL
MONOCYTES NFR BLD AUTO: 10.3 %
NEUTROPHILS # BLD AUTO: 3.33 K/UL
NEUTROPHILS NFR BLD AUTO: 71.4 %
PLATELET # BLD AUTO: 69 K/UL
POTASSIUM SERPL-SCNC: 4.5 MMOL/L
PROT SERPL-MCNC: 7.3 G/DL
RBC # BLD: 3.63 M/UL
RBC # FLD: 14.3 %
SODIUM SERPL-SCNC: 142 MMOL/L
TACROLIMUS SERPL-MCNC: <2 NG/ML
WBC # FLD AUTO: 4.66 K/UL

## 2019-07-29 ENCOUNTER — APPOINTMENT (OUTPATIENT)
Dept: PULMONOLOGY | Facility: CLINIC | Age: 69
End: 2019-07-29

## 2019-08-08 ENCOUNTER — APPOINTMENT (OUTPATIENT)
Dept: PULMONOLOGY | Facility: CLINIC | Age: 69
End: 2019-08-08
Payer: MEDICARE

## 2019-08-08 ENCOUNTER — NON-APPOINTMENT (OUTPATIENT)
Age: 69
End: 2019-08-08

## 2019-08-08 VITALS
RESPIRATION RATE: 19 BRPM | HEIGHT: 72 IN | SYSTOLIC BLOOD PRESSURE: 136 MMHG | DIASTOLIC BLOOD PRESSURE: 62 MMHG | WEIGHT: 250 LBS | HEART RATE: 65 BPM | OXYGEN SATURATION: 92 % | BODY MASS INDEX: 33.86 KG/M2

## 2019-08-08 PROCEDURE — 94010 BREATHING CAPACITY TEST: CPT

## 2019-08-08 PROCEDURE — 99214 OFFICE O/P EST MOD 30 MIN: CPT | Mod: 25

## 2019-08-08 RX ORDER — FORMOTEROL FUMARATE DIHYDRATE 20 UG/2ML
20 SOLUTION RESPIRATORY (INHALATION)
Qty: 180 | Refills: 1 | Status: ACTIVE | COMMUNITY
Start: 2019-08-08 | End: 1900-01-01

## 2019-08-08 RX ORDER — BUDESONIDE 0.5 MG/2ML
0.5 INHALANT ORAL
Qty: 180 | Refills: 1 | Status: ACTIVE | COMMUNITY
Start: 2019-08-08 | End: 1900-01-01

## 2019-08-08 NOTE — REVIEW OF SYSTEMS
[Negative] : Sleep Disorder [Recent Wt Loss (___ Lbs)] : recent [unfilled] ~Ulb weight loss [Sinus Problems] : sinus problems [Dyspnea] : dyspnea [Edema] : ~T edema was present [Nasal Discharge] : nasal discharge [As Noted in HPI] : as noted in HPI [Depression] : depression [Wheezing] : no wheezing [Cough] : no cough [Dysphagia] : no dysphagia [Chest Discomfort] : no chest discomfort [Constipation] : no constipation [Diarrhea] : no diarrhea

## 2019-08-08 NOTE — PROCEDURE
[FreeTextEntry1] : PFT revealed moderate restrictive and moderate obstructive dysfunction, with a FEV1 of 1.92L, which is 53% of predicted, with a normal flow volume loop

## 2019-08-08 NOTE — ADDENDUM
[FreeTextEntry1] : Documented by Jordan Asher acting as a scribe for Dr. Leandro Santos on 08/08/2019.\par \par All medical record entries made by the Scribe were at my, Dr. Leandro Santos's, direction and personally dictated by me on 08/08/2019. I have reviewed the chart and agree that the record accurately reflects my personal performance of the history, physical exam, assessment and plan. I have also personally directed, reviewed, and agree with the discharge instructions. \par \par \par

## 2019-08-08 NOTE — HISTORY OF PRESENT ILLNESS
[FreeTextEntry1] : Mr. Nguyen is a 68 year old male with a history of chronic respiratory failure with hypoxemia, chronic sinusitis, COPD, GOMEZ, obesity, poor balance, poor sleep, and SOB presenting to the office today for a follow up visit. His chief complaint is his reclusiveness.\par -he reports he isnt feeling well, and believes it is partially psychological\par -he reports he doesn’t go out of his house very often\par -he reports getting tired often and his legs ache when he walks\par -he notes he is not exercising\par -he reports having lost 8 pounds by cutting out bread from his diet\par -he reports feeling SOB when he walks, and doesn’t usually walk up stairs or inclines \par -he notes having occasional leg and ankle swelling\par -he reports having rhinorrhea and sinus congestion, and is using a nasal spray\par -he reports going to bed between 3-4 AM and sleeps more during the day\par -he denies taking any new medications, vitamins, or supplements. \par -he reports he is only using his nebulizer\par -he notes his Breo didn't work for him, and he is reluctant to get another inhaler due to its expense\par -he reports he is depressed\par -he denies any SOB while supine or at night, coughing, wheezing, palpitations, chest pain, chest pressure, diarrhea, constipation, dysphagia, dizziness, sour taste in the mouth, itchy eyes, itchy ears, heartburn, reflux

## 2019-08-08 NOTE — PHYSICAL EXAM
[Normal Appearance] : normal appearance [General Appearance - Well Developed] : well developed [General Appearance - Well Nourished] : well nourished [Well Groomed] : well groomed [General Appearance - In No Acute Distress] : no acute distress [No Deformities] : no deformities [Eyelids - No Xanthelasma] : the eyelids demonstrated no xanthelasmas [Normal Conjunctiva] : the conjunctiva exhibited no abnormalities [III] : III [Normal Oropharynx] : normal oropharynx [Neck Appearance] : the appearance of the neck was normal [Neck Cervical Mass (___cm)] : no neck mass was observed [Thyroid Diffuse Enlargement] : the thyroid was not enlarged [Jugular Venous Distention Increased] : there was no jugular-venous distention [Thyroid Nodule] : there were no palpable thyroid nodules [Heart Rate And Rhythm] : heart rate and rhythm were normal [Heart Sounds] : normal S1 and S2 [Murmurs] : no murmurs present [Respiration, Rhythm And Depth] : normal respiratory rhythm and effort [Exaggerated Use Of Accessory Muscles For Inspiration] : no accessory muscle use [Abdomen Soft] : soft [Abdomen Tenderness] : non-tender [Abnormal Walk] : normal gait [Abdomen Mass (___ Cm)] : no abdominal mass palpated [Gait - Sufficient For Exercise Testing] : the gait was sufficient for exercise testing [Nail Clubbing] : no clubbing of the fingernails [Cyanosis, Localized] : no localized cyanosis [Petechial Hemorrhages (___cm)] : no petechial hemorrhages [Deep Tendon Reflexes (DTR)] : deep tendon reflexes were 2+ and symmetric [Sensation] : the sensory exam was normal to light touch and pinprick [No Focal Deficits] : no focal deficits [Oriented To Time, Place, And Person] : oriented to person, place, and time [Impaired Insight] : insight and judgment were intact [Affect] : the affect was normal [Skin Turgor] : normal skin turgor [Skin Color & Pigmentation] : normal skin color and pigmentation [] : no rash [No Venous Stasis] : no venous stasis [Skin Lesions] : no skin lesions [No Skin Ulcers] : no skin ulcer [No Xanthoma] : no  xanthoma was observed [FreeTextEntry1] : I:E ratio 1:3; mild expiratory wheeze [FreeTextEntry2] : 1+ LE edema

## 2019-08-08 NOTE — ASSESSMENT
[FreeTextEntry1] : Mr. WALTON is a 68 year old male with a history of hepatitis-C induced liver failure, s/p liver transplant, HTN, COPD, diabetes, venous insufficiency, overweight, chronic respiratory failure who comes into today for a pulmonary re-evaluation. He currently feels that he is failing, and he has been noncompliant with his treatment regiment (still).\par \par His shortness of breath is multifactorial due to:\par - pulmonary disease (COPD and ?PAH)\par -poor breathing mechanics (due to pain and balance)\par -overweight/out of shape\par -?CAD \par \par problem 1: COPD (noncompliant)\par - continue Albuterol nebulizer QAM and Q6H (gargle and spit after use) \par -add Perforomist BID by nebulizer, PRN  (gargle and rinse after use)\par -add Pulmicort (Budesonide) (0.5) BID by nebulizer, PRN  (gargle and rinse after use)\par - continue Breo Ellipta 200 1 inhalation QD\par - continue Spiriva 1 inhalation QS\par \par -Inhaler technique reviewed as well as oral hygiene techniques reviewed with patient. Avoidance of cold air, extremes of temperature, rescue inhaler should be used before exercise. Order of medication reviewed with patient. Recommended use of a cool mist humidifier in the bedroom. \par -COPD is a progressive disease and although it can’t be cured , appropriate management can slow its progression, reduce frequency and severity of exacerbations, and improve symptoms and the patient quality of life. Hospitalizations are the greatest contributor to the total COPD costs and account for up to 87% of total COPD related costs. Exacerbations are the main cause of admissions and subsequently account for the 40-75% of COPD costs. Inhaled maintenance therapy reduces the incidence of exacerbations in patients with stable COPD. Incorrect inhaler use and nonadherence are major obstacles to achieving COPD treatment goals. Many COPD patients have challenges (impaired inhalation, limited dexterity, reduced cognition: that limit their ability to correctly use their COPD treatment devices resulting in reduced symptom control. Of most importance is smoking cessation and early intervention with respiratory illnesses and contemplation for pulmonary rehab to enhance quality of life.\par \par problem 2: ?PAH\par -complete echocardiogram to r/o PAH\par \par Disorder of the pulmonary arteries, important to distinguish the difference between pulmonary arterial hypertension which is idiopathic versus secondary pulmonary hypertension which is related to heart disease being diastolic dysfunction or congestive heart failure or other forms of pulmonary hypertension. Diagnostics include an initial echocardiogram evaluating the pulmonary artery pressures, if this is abnormal, to proceed with a VQ scan as well as a CTPA and an eventual right heart catheterization to absolutely confirm the echocardiogram findings. (No medication can be prescribed until the right heart catheterization). If present, the evaluation will include rheumatological blood testing, HIV testing, and potential evaluation for cirrhosis. Drug classes include PED5 (Revatio, Adcirca) ETRA (Tracleer, Macitentan, Letairis), Soluble guanylate cyclase (Adempas) Prostacyclins (Uptravi, Tyavso, Ventavis, Remodulin, or Orenitram derivatives). \par \par problem 3: allergies / sinus\par - Continue Olopatadine 0.6%, 1 sniff each nostril BID \par -recommended Navage sinus rinse\par -(noncompliant) complete blood work to include: IgE level, eosinophil level, vitamin D level, food IgE level, free and total testosterone levels, and asthma profile \par \par Environmental measures for allergies were encouraged including mattress and pillow cover, air purifier, and environmental controls.\par \par problem 4: chronic respiratory failure\par - Patient is a candidate for supplemental oxygen \par - Continue supplemental oxygen therapy at 2L with Inogen Air Compressor G3 (contact Ms. Esparza @ [phone])\par \par problem 5: lung cancer screening chest CT\par -complete chest CT (NONCOMPLIANT)\par \par Lung cancer screening is recommended for people between the ages of 55 and 80 with prior 30+ pack year smoking histories. There is irrefutable evidence for realization of lung cancer screening based on two large randomized control trials demonstrating relative reduction in lung cancer mortality for patients undergoing low-dose CT scanning. Risks and benefits reviewed with the patient \par \par problem 6: r/o CLAUDE\par -complete home sleep study based on elevated MP class, fatigue \par  \par problem 7: poor balance\par - given prescription for gait training and balance therapy \par \par problem 8 : cardiac\par -referred patient to Dr. Carlton Barlow\par -complete echocardiogram to r/o PAH\par \par problem 9: overweight/out of shape\par Weight loss, exercise, and diet control were discussed and are highly encouraged. Treatment options were given such as, aqua therapy, and contacting a nutritionist. Recommended to use the elliptical, stationary bike, less use of treadmill. Mindful eating was explained to the patient Obesity is associated with worsening asthma, shortness of breath, and potential for cardiac disease, diabetes, and other underlying medical conditions. \par \par problem 10 : poor breathing mechanics\par -Proper breathing techniques were reviewed with an emphasis of exhalation. Patient instructed to breath in for 1 second and out for four seconds. Patient was encouraged to not talk while walking. \par \par Problem 11 : health maintenance\par -recommended strep pneumonia vaccines: Prevnar-13 vaccine, followed by Pneumo vaccine 23 one year following\par -recommended early intervention for URIs\par -recommended regular osteoporosis evaluations\par -recommended early dermatological evaluations\par -recommended after the age of 50 to the age of 70, colonoscopy every 5 years \par \par F/U in 6-8 weeks (PT will be admitting himself to [facility] and has stated that he wants to go into rehab) \par Patient is encouraged to call with any changes, concerns, or questions. \par \par ** ADDENDUM: Patient requires 3-4L NC O2 : patient has variable pule Ox at resting room air - 88% at intake, on walk dropped down to 82%, with O2 3L recovery went up to 95% **

## 2019-08-12 ENCOUNTER — MEDICATION RENEWAL (OUTPATIENT)
Age: 69
End: 2019-08-12

## 2019-09-24 ENCOUNTER — RX RENEWAL (OUTPATIENT)
Age: 69
End: 2019-09-24

## 2019-09-26 ENCOUNTER — APPOINTMENT (OUTPATIENT)
Dept: TRANSPLANT | Facility: CLINIC | Age: 69
End: 2019-09-26
Payer: MEDICARE

## 2019-09-26 VITALS
RESPIRATION RATE: 19 BRPM | DIASTOLIC BLOOD PRESSURE: 70 MMHG | OXYGEN SATURATION: 93 % | HEART RATE: 65 BPM | HEIGHT: 72 IN | TEMPERATURE: 98.4 F | SYSTOLIC BLOOD PRESSURE: 161 MMHG

## 2019-09-26 DIAGNOSIS — I87.8 OTHER SPECIFIED DISORDERS OF VEINS: ICD-10-CM

## 2019-09-26 DIAGNOSIS — I10 ESSENTIAL (PRIMARY) HYPERTENSION: ICD-10-CM

## 2019-09-26 DIAGNOSIS — Z85.51 PERSONAL HISTORY OF MALIGNANT NEOPLASM OF BLADDER: ICD-10-CM

## 2019-09-26 LAB
BASOPHILS # BLD AUTO: 0.02 K/UL
BASOPHILS NFR BLD AUTO: 0.3 %
EOSINOPHIL # BLD AUTO: 0.05 K/UL
EOSINOPHIL NFR BLD AUTO: 0.9 %
HCT VFR BLD CALC: 36.6 %
HGB BLD-MCNC: 11.1 G/DL
IMM GRANULOCYTES NFR BLD AUTO: 0.9 %
LYMPHOCYTES # BLD AUTO: 0.73 K/UL
LYMPHOCYTES NFR BLD AUTO: 12.8 %
MAN DIFF?: NORMAL
MCHC RBC-ENTMCNC: 29.6 PG
MCHC RBC-ENTMCNC: 30.3 GM/DL
MCV RBC AUTO: 97.6 FL
MONOCYTES # BLD AUTO: 0.6 K/UL
MONOCYTES NFR BLD AUTO: 10.5 %
NEUTROPHILS # BLD AUTO: 4.27 K/UL
NEUTROPHILS NFR BLD AUTO: 74.6 %
PLATELET # BLD AUTO: 72 K/UL
RBC # BLD: 3.75 M/UL
RBC # FLD: 15.5 %
TACROLIMUS SERPL-MCNC: <2 NG/ML
WBC # FLD AUTO: 5.72 K/UL

## 2019-09-26 PROCEDURE — 99214 OFFICE O/P EST MOD 30 MIN: CPT

## 2019-09-26 RX ORDER — ALBUTEROL SULFATE 2.5 MG/3ML
(2.5 MG/3ML) SOLUTION RESPIRATORY (INHALATION)
Qty: 120 | Refills: 3 | Status: DISCONTINUED | COMMUNITY
Start: 2019-03-18 | End: 2019-09-26

## 2019-09-26 RX ORDER — DOCUSATE SODIUM 100 MG/1
100 CAPSULE ORAL 3 TIMES DAILY
Qty: 30 | Refills: 0 | Status: ACTIVE | COMMUNITY
Start: 2017-06-15

## 2019-09-26 RX ORDER — FUROSEMIDE 40 MG/1
40 TABLET ORAL TWICE DAILY
Qty: 120 | Refills: 4 | Status: ACTIVE | COMMUNITY
Start: 2019-04-09 | End: 1900-01-01

## 2019-09-27 ENCOUNTER — RX RENEWAL (OUTPATIENT)
Age: 69
End: 2019-09-27

## 2019-09-27 RX ORDER — PREDNISONE 1 MG/1
1 TABLET ORAL
Qty: 135 | Refills: 3 | Status: ACTIVE | COMMUNITY
Start: 2018-06-20 | End: 1900-01-01

## 2019-09-27 RX ORDER — FUROSEMIDE 40 MG/1
40 TABLET ORAL
Qty: 450 | Refills: 3 | Status: ACTIVE | COMMUNITY
Start: 2018-02-13 | End: 1900-01-01

## 2019-09-27 NOTE — REASON FOR VISIT
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 pulm HTN, allergies\par ordered echo but has not been done\par reports he cannot leave home, states he stays in bed\par right leg swelling, no cellulitis\par walks with cane  1 block, gets sob weight 252 lbs not on O2 at visit

## 2019-09-27 NOTE — PLAN
[FreeTextEntry1] : 68 yr old 8 yr OLTX. Legs swollen. Will try total of 200mg lasix per day currently on 160mg.

## 2019-09-27 NOTE — PHYSICAL EXAM
[General Appearance - Alert] : alert [PERRL With Normal Accommodation] : pupils were equal in size, round, and reactive to light [Apical Impulse] : the apical impulse was normal [Bowel Sounds] : normal bowel sounds [Abdomen Soft] : soft [Abdomen Tenderness] : non-tender [Pitting Edema] : pitting edema present [___ +] : bilateral pretibial [unfilled]U+ pitting edema [Oriented To Time, Place, And Person] : oriented to person, place, and time [Alert] : alert [No Thyromegaly] : no thyromegaly [Clear to Auscultation] : lungs were clear to auscultation bilaterally [Normal Rate] : normal rate [Caput Medusae] : caput medusae [Splenomegaly] : splenomegaly [Normal Bowel Sounds] : normal bowel sounds [Previous Abdominal Surgery] : previous abdominal surgery [] : left femoral palpable [Palmar Erythema] : palmar erythema [Cranial Nerves] : cranial nerves 2-12 were intact [Scleral Icterus] : no scleral icterus [Hepatojugular Reflux] : no hepatojugular reflux [Abdominal Bruit] : no abdominal bruit [Ascites Tense] : no ascites tense [Ascites Fluid Wave] : no ascites fluid wave [Asterixis] : no asterixis [Hepatic Encephalopathy] : no hepatic encephalopathy [Depression] : no depression [de-identified] : distant bs moving air poorly [de-identified] : depressed [de-identified] : tense abomen but not fluid [TextBox_40] : not palpable [FreeTextEntry1] : depressed

## 2019-09-29 ENCOUNTER — OTHER (OUTPATIENT)
Age: 69
End: 2019-09-29

## 2019-09-29 LAB
ALBUMIN SERPL ELPH-MCNC: 4.5 G/DL
ALP BLD-CCNC: 95 U/L
ALT SERPL-CCNC: 19 U/L
ANION GAP SERPL CALC-SCNC: 18 MMOL/L
AST SERPL-CCNC: 28 U/L
BILIRUB DIRECT SERPL-MCNC: 0.2 MG/DL
BILIRUB INDIRECT SERPL-MCNC: 0.4 MG/DL
BILIRUB SERPL-MCNC: 0.6 MG/DL
BUN SERPL-MCNC: 35 MG/DL
CALCIUM SERPL-MCNC: 9.6 MG/DL
CHLORIDE SERPL-SCNC: 92 MMOL/L
CO2 SERPL-SCNC: 28 MMOL/L
CREAT SERPL-MCNC: 1.6 MG/DL
GLUCOSE SERPL-MCNC: 402 MG/DL
POTASSIUM SERPL-SCNC: 4.1 MMOL/L
PROT SERPL-MCNC: 7 G/DL
SODIUM SERPL-SCNC: 138 MMOL/L

## 2019-11-27 ENCOUNTER — APPOINTMENT (OUTPATIENT)
Dept: PULMONOLOGY | Facility: CLINIC | Age: 69
End: 2019-11-27

## 2020-01-01 ENCOUNTER — LABORATORY RESULT (OUTPATIENT)
Age: 70
End: 2020-01-01

## 2020-01-01 ENCOUNTER — APPOINTMENT (OUTPATIENT)
Dept: PULMONOLOGY | Facility: CLINIC | Age: 70
End: 2020-01-01
Payer: MEDICARE

## 2020-01-01 ENCOUNTER — RESULT REVIEW (OUTPATIENT)
Age: 70
End: 2020-01-01

## 2020-01-01 ENCOUNTER — APPOINTMENT (OUTPATIENT)
Dept: HEART FAILURE | Facility: CLINIC | Age: 70
End: 2020-01-01

## 2020-01-01 ENCOUNTER — TRANSCRIPTION ENCOUNTER (OUTPATIENT)
Age: 70
End: 2020-01-01

## 2020-01-01 ENCOUNTER — APPOINTMENT (OUTPATIENT)
Dept: PULMONOLOGY | Facility: CLINIC | Age: 70
End: 2020-01-01

## 2020-01-01 ENCOUNTER — APPOINTMENT (OUTPATIENT)
Dept: TRANSPLANT | Facility: CLINIC | Age: 70
End: 2020-01-01
Payer: MEDICARE

## 2020-01-01 ENCOUNTER — APPOINTMENT (OUTPATIENT)
Dept: CARDIOLOGY | Facility: CLINIC | Age: 70
End: 2020-01-01

## 2020-01-01 ENCOUNTER — APPOINTMENT (OUTPATIENT)
Dept: TRANSPLANT | Facility: CLINIC | Age: 70
End: 2020-01-01

## 2020-01-01 ENCOUNTER — NON-APPOINTMENT (OUTPATIENT)
Age: 70
End: 2020-01-01

## 2020-01-01 ENCOUNTER — INPATIENT (INPATIENT)
Facility: HOSPITAL | Age: 70
LOS: 5 days | Discharge: HOME | DRG: 292 | End: 2020-12-13
Attending: HOSPITALIST | Admitting: HOSPITALIST
Payer: MEDICARE

## 2020-01-01 ENCOUNTER — INPATIENT (INPATIENT)
Facility: HOSPITAL | Age: 70
LOS: 14 days | Discharge: HOME | DRG: 377 | End: 2020-05-28
Attending: INTERNAL MEDICINE | Admitting: HOSPITALIST
Payer: MEDICARE

## 2020-01-01 VITALS
HEART RATE: 74 BPM | OXYGEN SATURATION: 97 % | SYSTOLIC BLOOD PRESSURE: 109 MMHG | DIASTOLIC BLOOD PRESSURE: 67 MMHG | TEMPERATURE: 98 F | RESPIRATION RATE: 18 BRPM

## 2020-01-01 VITALS
SYSTOLIC BLOOD PRESSURE: 165 MMHG | WEIGHT: 235.01 LBS | HEIGHT: 73 IN | OXYGEN SATURATION: 98 % | RESPIRATION RATE: 22 BRPM | TEMPERATURE: 98 F | DIASTOLIC BLOOD PRESSURE: 70 MMHG | HEART RATE: 89 BPM

## 2020-01-01 VITALS
DIASTOLIC BLOOD PRESSURE: 61 MMHG | HEART RATE: 69 BPM | OXYGEN SATURATION: 98 % | TEMPERATURE: 99 F | SYSTOLIC BLOOD PRESSURE: 138 MMHG | RESPIRATION RATE: 18 BRPM

## 2020-01-01 VITALS
HEIGHT: 73 IN | OXYGEN SATURATION: 100 % | TEMPERATURE: 98 F | DIASTOLIC BLOOD PRESSURE: 80 MMHG | HEART RATE: 100 BPM | WEIGHT: 259.93 LBS | SYSTOLIC BLOOD PRESSURE: 134 MMHG | RESPIRATION RATE: 16 BRPM

## 2020-01-01 DIAGNOSIS — Z94.4 LIVER TRANSPLANT STATUS: ICD-10-CM

## 2020-01-01 DIAGNOSIS — I50.32 CHRONIC DIASTOLIC (CONGESTIVE) HEART FAILURE: ICD-10-CM

## 2020-01-01 DIAGNOSIS — F11.20 OPIOID DEPENDENCE, UNCOMPLICATED: ICD-10-CM

## 2020-01-01 DIAGNOSIS — R26.89 OTHER ABNORMALITIES OF GAIT AND MOBILITY: ICD-10-CM

## 2020-01-01 DIAGNOSIS — E11.65 TYPE 2 DIABETES MELLITUS WITH HYPERGLYCEMIA: ICD-10-CM

## 2020-01-01 DIAGNOSIS — R06.02 SHORTNESS OF BREATH: ICD-10-CM

## 2020-01-01 DIAGNOSIS — D61.818 OTHER PANCYTOPENIA: ICD-10-CM

## 2020-01-01 DIAGNOSIS — I10 ESSENTIAL (PRIMARY) HYPERTENSION: ICD-10-CM

## 2020-01-01 DIAGNOSIS — E66.9 OBESITY, UNSPECIFIED: ICD-10-CM

## 2020-01-01 DIAGNOSIS — D50.9 IRON DEFICIENCY ANEMIA, UNSPECIFIED: ICD-10-CM

## 2020-01-01 DIAGNOSIS — E78.5 HYPERLIPIDEMIA, UNSPECIFIED: ICD-10-CM

## 2020-01-01 DIAGNOSIS — R53.81 OTHER MALAISE: ICD-10-CM

## 2020-01-01 DIAGNOSIS — D50.0 IRON DEFICIENCY ANEMIA SECONDARY TO BLOOD LOSS (CHRONIC): ICD-10-CM

## 2020-01-01 DIAGNOSIS — Z29.9 ENCOUNTER FOR PROPHYLACTIC MEASURES, UNSPECIFIED: ICD-10-CM

## 2020-01-01 DIAGNOSIS — Z53.20 PROCEDURE AND TREATMENT NOT CARRIED OUT BECAUSE OF PATIENT'S DECISION FOR UNSPECIFIED REASONS: ICD-10-CM

## 2020-01-01 DIAGNOSIS — J32.9 CHRONIC SINUSITIS, UNSPECIFIED: ICD-10-CM

## 2020-01-01 DIAGNOSIS — Z02.9 ENCOUNTER FOR ADMINISTRATIVE EXAMINATIONS, UNSPECIFIED: ICD-10-CM

## 2020-01-01 DIAGNOSIS — I51.9 HEART DISEASE, UNSPECIFIED: ICD-10-CM

## 2020-01-01 DIAGNOSIS — D64.9 ANEMIA, UNSPECIFIED: ICD-10-CM

## 2020-01-01 DIAGNOSIS — N18.30 CHRONIC KIDNEY DISEASE, STAGE 3 UNSPECIFIED: ICD-10-CM

## 2020-01-01 DIAGNOSIS — I50.33 ACUTE ON CHRONIC DIASTOLIC (CONGESTIVE) HEART FAILURE: ICD-10-CM

## 2020-01-01 DIAGNOSIS — I50.813 ACUTE ON CHRONIC RIGHT HEART FAILURE: ICD-10-CM

## 2020-01-01 DIAGNOSIS — E11.9 TYPE 2 DIABETES MELLITUS WITHOUT COMPLICATIONS: ICD-10-CM

## 2020-01-01 DIAGNOSIS — Z72.820 SLEEP DEPRIVATION: ICD-10-CM

## 2020-01-01 DIAGNOSIS — B18.2 CHRONIC VIRAL HEPATITIS C: ICD-10-CM

## 2020-01-01 DIAGNOSIS — J96.11 CHRONIC RESPIRATORY FAILURE WITH HYPOXIA: ICD-10-CM

## 2020-01-01 DIAGNOSIS — D62 ACUTE POSTHEMORRHAGIC ANEMIA: ICD-10-CM

## 2020-01-01 DIAGNOSIS — J44.9 CHRONIC OBSTRUCTIVE PULMONARY DISEASE, UNSPECIFIED: ICD-10-CM

## 2020-01-01 DIAGNOSIS — F32.9 MAJOR DEPRESSIVE DISORDER, SINGLE EPISODE, UNSPECIFIED: ICD-10-CM

## 2020-01-01 DIAGNOSIS — Z94.4 LIVER TRANSPLANT STATUS: Chronic | ICD-10-CM

## 2020-01-01 DIAGNOSIS — D69.6 THROMBOCYTOPENIA, UNSPECIFIED: ICD-10-CM

## 2020-01-01 DIAGNOSIS — K92.2 GASTROINTESTINAL HEMORRHAGE, UNSPECIFIED: ICD-10-CM

## 2020-01-01 DIAGNOSIS — L03.115 CELLULITIS OF RIGHT LOWER LIMB: ICD-10-CM

## 2020-01-01 DIAGNOSIS — Z01.812 ENCOUNTER FOR PREPROCEDURAL LABORATORY EXAMINATION: ICD-10-CM

## 2020-01-01 DIAGNOSIS — D84.9 IMMUNODEFICIENCY, UNSPECIFIED: ICD-10-CM

## 2020-01-01 DIAGNOSIS — R60.0 LOCALIZED EDEMA: ICD-10-CM

## 2020-01-01 LAB
A1C WITH ESTIMATED AVERAGE GLUCOSE RESULT: 6.3 % — HIGH (ref 4–5.6)
A1C WITH ESTIMATED AVERAGE GLUCOSE RESULT: 6.6 % — HIGH (ref 4–5.6)
ALBUMIN SERPL ELPH-MCNC: 2.9 G/DL — LOW (ref 3.3–5)
ALBUMIN SERPL ELPH-MCNC: 3.2 G/DL — LOW (ref 3.3–5)
ALBUMIN SERPL ELPH-MCNC: 3.2 G/DL — LOW (ref 3.3–5)
ALBUMIN SERPL ELPH-MCNC: 3.3 G/DL — SIGNIFICANT CHANGE UP (ref 3.3–5)
ALBUMIN SERPL ELPH-MCNC: 3.4 G/DL — SIGNIFICANT CHANGE UP (ref 3.3–5)
ALBUMIN SERPL ELPH-MCNC: 3.5 G/DL — SIGNIFICANT CHANGE UP (ref 3.3–5)
ALBUMIN SERPL ELPH-MCNC: 4.1 G/DL — SIGNIFICANT CHANGE UP (ref 3.3–5)
ALBUMIN SERPL ELPH-MCNC: 4.1 G/DL — SIGNIFICANT CHANGE UP (ref 3.3–5)
ALBUMIN SERPL ELPH-MCNC: 4.2 G/DL — SIGNIFICANT CHANGE UP (ref 3.3–5)
ALBUMIN SERPL ELPH-MCNC: 4.4 G/DL
ALBUMIN SERPL ELPH-MCNC: 4.4 G/DL — SIGNIFICANT CHANGE UP (ref 3.3–5)
ALP BLD-CCNC: 107 U/L
ALP SERPL-CCNC: 104 U/L — SIGNIFICANT CHANGE UP (ref 40–120)
ALP SERPL-CCNC: 106 U/L — SIGNIFICANT CHANGE UP (ref 40–120)
ALP SERPL-CCNC: 107 U/L — SIGNIFICANT CHANGE UP (ref 40–120)
ALP SERPL-CCNC: 111 U/L — SIGNIFICANT CHANGE UP (ref 40–120)
ALP SERPL-CCNC: 112 U/L — SIGNIFICANT CHANGE UP (ref 40–120)
ALP SERPL-CCNC: 128 U/L — HIGH (ref 40–120)
ALP SERPL-CCNC: 130 U/L — HIGH (ref 40–120)
ALP SERPL-CCNC: 149 U/L — HIGH (ref 40–120)
ALP SERPL-CCNC: 151 U/L — HIGH (ref 40–120)
ALP SERPL-CCNC: 76 U/L — SIGNIFICANT CHANGE UP (ref 40–120)
ALP SERPL-CCNC: 79 U/L — SIGNIFICANT CHANGE UP (ref 40–120)
ALP SERPL-CCNC: 83 U/L — SIGNIFICANT CHANGE UP (ref 40–120)
ALP SERPL-CCNC: 89 U/L — SIGNIFICANT CHANGE UP (ref 40–120)
ALT FLD-CCNC: 10 U/L — SIGNIFICANT CHANGE UP (ref 10–45)
ALT FLD-CCNC: 14 U/L — SIGNIFICANT CHANGE UP (ref 10–45)
ALT FLD-CCNC: 14 U/L — SIGNIFICANT CHANGE UP (ref 10–45)
ALT FLD-CCNC: 15 U/L — SIGNIFICANT CHANGE UP (ref 10–45)
ALT FLD-CCNC: 17 U/L — SIGNIFICANT CHANGE UP (ref 10–45)
ALT FLD-CCNC: 21 U/L — SIGNIFICANT CHANGE UP (ref 10–45)
ALT FLD-CCNC: 21 U/L — SIGNIFICANT CHANGE UP (ref 10–45)
ALT FLD-CCNC: 5 U/L — LOW (ref 10–45)
ALT FLD-CCNC: 6 U/L — LOW (ref 10–45)
ALT FLD-CCNC: 8 U/L — LOW (ref 10–45)
ALT FLD-CCNC: <5 U/L — LOW (ref 10–45)
ALT SERPL-CCNC: 11 U/L
ANION GAP SERPL CALC-SCNC: 10 MMOL/L — SIGNIFICANT CHANGE UP (ref 5–17)
ANION GAP SERPL CALC-SCNC: 11 MMOL/L — SIGNIFICANT CHANGE UP (ref 5–17)
ANION GAP SERPL CALC-SCNC: 12 MMOL/L — SIGNIFICANT CHANGE UP (ref 5–17)
ANION GAP SERPL CALC-SCNC: 12 MMOL/L — SIGNIFICANT CHANGE UP (ref 5–17)
ANION GAP SERPL CALC-SCNC: 13 MMOL/L
ANION GAP SERPL CALC-SCNC: 13 MMOL/L — SIGNIFICANT CHANGE UP (ref 5–17)
ANION GAP SERPL CALC-SCNC: 15 MMOL/L — SIGNIFICANT CHANGE UP (ref 5–17)
ANION GAP SERPL CALC-SCNC: 7 MMOL/L — SIGNIFICANT CHANGE UP (ref 5–17)
ANION GAP SERPL CALC-SCNC: 9 MMOL/L — SIGNIFICANT CHANGE UP (ref 5–17)
ANION GAP SERPL CALC-SCNC: 9 MMOL/L — SIGNIFICANT CHANGE UP (ref 5–17)
ANISOCYTOSIS BLD QL: SIGNIFICANT CHANGE UP
APPEARANCE UR: CLEAR — SIGNIFICANT CHANGE UP
APTT BLD: 21.1 SEC — LOW (ref 27.5–35.5)
APTT BLD: 25.3 SEC — LOW (ref 27.5–36.3)
APTT BLD: 28.6 SEC — SIGNIFICANT CHANGE UP (ref 27.5–36.3)
APTT BLD: 31.7 SEC — SIGNIFICANT CHANGE UP (ref 27.5–36.3)
AST SERPL-CCNC: 20 U/L — SIGNIFICANT CHANGE UP (ref 10–40)
AST SERPL-CCNC: 21 U/L — SIGNIFICANT CHANGE UP (ref 10–40)
AST SERPL-CCNC: 22 U/L — SIGNIFICANT CHANGE UP (ref 10–40)
AST SERPL-CCNC: 23 U/L
AST SERPL-CCNC: 25 U/L — SIGNIFICANT CHANGE UP (ref 10–40)
AST SERPL-CCNC: 25 U/L — SIGNIFICANT CHANGE UP (ref 10–40)
AST SERPL-CCNC: 26 U/L — SIGNIFICANT CHANGE UP (ref 10–40)
AST SERPL-CCNC: 29 U/L — SIGNIFICANT CHANGE UP (ref 10–40)
AST SERPL-CCNC: 29 U/L — SIGNIFICANT CHANGE UP (ref 10–40)
AST SERPL-CCNC: 35 U/L — SIGNIFICANT CHANGE UP (ref 10–40)
AST SERPL-CCNC: 43 U/L — HIGH (ref 10–40)
AST SERPL-CCNC: 47 U/L — HIGH (ref 10–40)
BASE EXCESS BLDV CALC-SCNC: 10.3 MMOL/L — HIGH (ref -2–2)
BASE EXCESS BLDV CALC-SCNC: 11.9 MMOL/L — HIGH (ref -2–2)
BASE EXCESS BLDV CALC-SCNC: 12.7 MMOL/L — HIGH (ref -2–2)
BASE EXCESS BLDV CALC-SCNC: 16 MMOL/L — HIGH (ref -2–2)
BASE EXCESS BLDV CALC-SCNC: 8 MMOL/L — HIGH (ref -2–2)
BASOPHILS # BLD AUTO: 0 K/UL — SIGNIFICANT CHANGE UP (ref 0–0.2)
BASOPHILS # BLD AUTO: 0 K/UL — SIGNIFICANT CHANGE UP (ref 0–0.2)
BASOPHILS # BLD AUTO: 0.01 K/UL — SIGNIFICANT CHANGE UP (ref 0–0.2)
BASOPHILS # BLD AUTO: 0.01 K/UL — SIGNIFICANT CHANGE UP (ref 0–0.2)
BASOPHILS # BLD AUTO: 0.02 K/UL
BASOPHILS # BLD AUTO: 0.02 K/UL
BASOPHILS # BLD AUTO: 0.02 K/UL — SIGNIFICANT CHANGE UP (ref 0–0.2)
BASOPHILS NFR BLD AUTO: 0 % — SIGNIFICANT CHANGE UP (ref 0–2)
BASOPHILS NFR BLD AUTO: 0 % — SIGNIFICANT CHANGE UP (ref 0–2)
BASOPHILS NFR BLD AUTO: 0.3 % — SIGNIFICANT CHANGE UP (ref 0–2)
BASOPHILS NFR BLD AUTO: 0.3 % — SIGNIFICANT CHANGE UP (ref 0–2)
BASOPHILS NFR BLD AUTO: 0.5 % — SIGNIFICANT CHANGE UP (ref 0–2)
BASOPHILS NFR BLD AUTO: 0.6 %
BASOPHILS NFR BLD AUTO: 0.6 %
BILIRUB SERPL-MCNC: 0.4 MG/DL — SIGNIFICANT CHANGE UP (ref 0.2–1.2)
BILIRUB SERPL-MCNC: 0.4 MG/DL — SIGNIFICANT CHANGE UP (ref 0.2–1.2)
BILIRUB SERPL-MCNC: 0.5 MG/DL
BILIRUB SERPL-MCNC: 0.5 MG/DL — SIGNIFICANT CHANGE UP (ref 0.2–1.2)
BILIRUB SERPL-MCNC: 0.6 MG/DL — SIGNIFICANT CHANGE UP (ref 0.2–1.2)
BILIRUB SERPL-MCNC: 1.2 MG/DL — SIGNIFICANT CHANGE UP (ref 0.2–1.2)
BILIRUB SERPL-MCNC: 1.5 MG/DL — HIGH (ref 0.2–1.2)
BILIRUB UR-MCNC: NEGATIVE — SIGNIFICANT CHANGE UP
BLD GP AB SCN SERPL QL: NEGATIVE — SIGNIFICANT CHANGE UP
BLUEBERRY IGG RAST: (no result)
BROCCOLI IGG RAST: SIGNIFICANT CHANGE UP
BUN SERPL-MCNC: 27 MG/DL — HIGH (ref 7–23)
BUN SERPL-MCNC: 29 MG/DL — HIGH (ref 7–23)
BUN SERPL-MCNC: 30 MG/DL
BUN SERPL-MCNC: 30 MG/DL — HIGH (ref 7–23)
BUN SERPL-MCNC: 30 MG/DL — HIGH (ref 7–23)
BUN SERPL-MCNC: 31 MG/DL — HIGH (ref 7–23)
BUN SERPL-MCNC: 32 MG/DL — HIGH (ref 7–23)
BUN SERPL-MCNC: 33 MG/DL — HIGH (ref 7–23)
BUN SERPL-MCNC: 33 MG/DL — HIGH (ref 7–23)
BUN SERPL-MCNC: 34 MG/DL — HIGH (ref 7–23)
BUN SERPL-MCNC: 36 MG/DL — HIGH (ref 7–23)
BUN SERPL-MCNC: 36 MG/DL — HIGH (ref 7–23)
BUN SERPL-MCNC: 39 MG/DL — HIGH (ref 7–23)
BUN SERPL-MCNC: 39 MG/DL — HIGH (ref 7–23)
BUN SERPL-MCNC: 40 MG/DL — HIGH (ref 7–23)
BUN SERPL-MCNC: 40 MG/DL — HIGH (ref 7–23)
BUN SERPL-MCNC: 42 MG/DL — HIGH (ref 7–23)
C PEPTIDE SERPL-MCNC: 5 NG/ML — HIGH (ref 1.1–4.4)
CA-I SERPL-SCNC: 1.14 MMOL/L — SIGNIFICANT CHANGE UP (ref 1.12–1.3)
CA-I SERPL-SCNC: 1.17 MMOL/L — SIGNIFICANT CHANGE UP (ref 1.12–1.3)
CA-I SERPL-SCNC: 1.23 MMOL/L — SIGNIFICANT CHANGE UP (ref 1.12–1.3)
CA-I SERPL-SCNC: 1.23 MMOL/L — SIGNIFICANT CHANGE UP (ref 1.12–1.3)
CA-I SERPL-SCNC: 1.25 MMOL/L — SIGNIFICANT CHANGE UP (ref 1.12–1.3)
CABBAGE IGG RAST: SIGNIFICANT CHANGE UP
CALCIUM SERPL-MCNC: 10 MG/DL
CALCIUM SERPL-MCNC: 8.3 MG/DL — LOW (ref 8.4–10.5)
CALCIUM SERPL-MCNC: 8.5 MG/DL — SIGNIFICANT CHANGE UP (ref 8.4–10.5)
CALCIUM SERPL-MCNC: 8.6 MG/DL — SIGNIFICANT CHANGE UP (ref 8.4–10.5)
CALCIUM SERPL-MCNC: 8.6 MG/DL — SIGNIFICANT CHANGE UP (ref 8.4–10.5)
CALCIUM SERPL-MCNC: 8.8 MG/DL — SIGNIFICANT CHANGE UP (ref 8.4–10.5)
CALCIUM SERPL-MCNC: 8.9 MG/DL — SIGNIFICANT CHANGE UP (ref 8.4–10.5)
CALCIUM SERPL-MCNC: 9 MG/DL — SIGNIFICANT CHANGE UP (ref 8.4–10.5)
CALCIUM SERPL-MCNC: 9.1 MG/DL — SIGNIFICANT CHANGE UP (ref 8.4–10.5)
CALCIUM SERPL-MCNC: 9.1 MG/DL — SIGNIFICANT CHANGE UP (ref 8.4–10.5)
CALCIUM SERPL-MCNC: 9.3 MG/DL — SIGNIFICANT CHANGE UP (ref 8.4–10.5)
CALCIUM SERPL-MCNC: 9.3 MG/DL — SIGNIFICANT CHANGE UP (ref 8.4–10.5)
CALCIUM SERPL-MCNC: 9.4 MG/DL — SIGNIFICANT CHANGE UP (ref 8.4–10.5)
CALCIUM SERPL-MCNC: 9.5 MG/DL — SIGNIFICANT CHANGE UP (ref 8.4–10.5)
CALCIUM SERPL-MCNC: 9.6 MG/DL — SIGNIFICANT CHANGE UP (ref 8.4–10.5)
CARDAMON IGE RAST: SIGNIFICANT CHANGE UP
CARP IGE RAST: SIGNIFICANT CHANGE UP
CARROT IGG RAST: SIGNIFICANT CHANGE UP
CASEIN IGG RAST: SIGNIFICANT CHANGE UP
CAULIFLOWER IGG RAST: SIGNIFICANT CHANGE UP
CHLORIDE BLDV-SCNC: 89 MMOL/L — LOW (ref 96–108)
CHLORIDE BLDV-SCNC: 91 MMOL/L — LOW (ref 96–108)
CHLORIDE BLDV-SCNC: 92 MMOL/L — LOW (ref 96–108)
CHLORIDE BLDV-SCNC: 95 MMOL/L — LOW (ref 96–108)
CHLORIDE BLDV-SCNC: 95 MMOL/L — LOW (ref 96–108)
CHLORIDE SERPL-SCNC: 90 MMOL/L — LOW (ref 96–108)
CHLORIDE SERPL-SCNC: 91 MMOL/L — LOW (ref 96–108)
CHLORIDE SERPL-SCNC: 92 MMOL/L — LOW (ref 96–108)
CHLORIDE SERPL-SCNC: 93 MMOL/L — LOW (ref 96–108)
CHLORIDE SERPL-SCNC: 93 MMOL/L — LOW (ref 96–108)
CHLORIDE SERPL-SCNC: 94 MMOL/L
CHLORIDE SERPL-SCNC: 94 MMOL/L — LOW (ref 96–108)
CHLORIDE SERPL-SCNC: 94 MMOL/L — LOW (ref 96–108)
CHLORIDE SERPL-SCNC: 95 MMOL/L — LOW (ref 96–108)
CHLORIDE SERPL-SCNC: 96 MMOL/L — SIGNIFICANT CHANGE UP (ref 96–108)
CHLORIDE SERPL-SCNC: 96 MMOL/L — SIGNIFICANT CHANGE UP (ref 96–108)
CHLORIDE SERPL-SCNC: 97 MMOL/L — SIGNIFICANT CHANGE UP (ref 96–108)
CHLORIDE SERPL-SCNC: 97 MMOL/L — SIGNIFICANT CHANGE UP (ref 96–108)
CHOLEST SERPL-MCNC: 176 MG/DL
CHOLEST/HDLC SERPL: 5.1 RATIO
CHROM ANALY INTERPHASE BLD FISH-IMP: SIGNIFICANT CHANGE UP
CHROM ANALY OVERALL INTERP SPEC-IMP: SIGNIFICANT CHANGE UP
CK MB BLD-MCNC: 1.1 % — SIGNIFICANT CHANGE UP (ref 0–3.5)
CK MB CFR SERPL CALC: 3.9 NG/ML — SIGNIFICANT CHANGE UP (ref 0–6.7)
CK SERPL-CCNC: 356 U/L — HIGH (ref 30–200)
CO2 BLDV-SCNC: 36 MMOL/L — HIGH (ref 22–30)
CO2 BLDV-SCNC: 38 MMOL/L — HIGH (ref 22–30)
CO2 BLDV-SCNC: 41 MMOL/L — HIGH (ref 22–30)
CO2 BLDV-SCNC: 44 MMOL/L — HIGH (ref 22–30)
CO2 BLDV-SCNC: 48 MMOL/L — HIGH (ref 22–30)
CO2 SERPL-SCNC: 28 MMOL/L — SIGNIFICANT CHANGE UP (ref 22–31)
CO2 SERPL-SCNC: 30 MMOL/L — SIGNIFICANT CHANGE UP (ref 22–31)
CO2 SERPL-SCNC: 30 MMOL/L — SIGNIFICANT CHANGE UP (ref 22–31)
CO2 SERPL-SCNC: 31 MMOL/L — SIGNIFICANT CHANGE UP (ref 22–31)
CO2 SERPL-SCNC: 33 MMOL/L
CO2 SERPL-SCNC: 34 MMOL/L — HIGH (ref 22–31)
CO2 SERPL-SCNC: 35 MMOL/L — HIGH (ref 22–31)
CO2 SERPL-SCNC: 35 MMOL/L — HIGH (ref 22–31)
CO2 SERPL-SCNC: 37 MMOL/L — HIGH (ref 22–31)
CO2 SERPL-SCNC: 37 MMOL/L — HIGH (ref 22–31)
CO2 SERPL-SCNC: 38 MMOL/L — HIGH (ref 22–31)
CO2 SERPL-SCNC: 39 MMOL/L — HIGH (ref 22–31)
CO2 SERPL-SCNC: 40 MMOL/L — HIGH (ref 22–31)
CO2 SERPL-SCNC: 41 MMOL/L — HIGH (ref 22–31)
COLOR SPEC: SIGNIFICANT CHANGE UP
CREAT ?TM UR-MCNC: 20 MG/DL — SIGNIFICANT CHANGE UP
CREAT SERPL-MCNC: 1.27 MG/DL — SIGNIFICANT CHANGE UP (ref 0.5–1.3)
CREAT SERPL-MCNC: 1.28 MG/DL — SIGNIFICANT CHANGE UP (ref 0.5–1.3)
CREAT SERPL-MCNC: 1.33 MG/DL
CREAT SERPL-MCNC: 1.33 MG/DL — HIGH (ref 0.5–1.3)
CREAT SERPL-MCNC: 1.33 MG/DL — HIGH (ref 0.5–1.3)
CREAT SERPL-MCNC: 1.38 MG/DL — HIGH (ref 0.5–1.3)
CREAT SERPL-MCNC: 1.42 MG/DL — HIGH (ref 0.5–1.3)
CREAT SERPL-MCNC: 1.45 MG/DL — HIGH (ref 0.5–1.3)
CREAT SERPL-MCNC: 1.46 MG/DL — HIGH (ref 0.5–1.3)
CREAT SERPL-MCNC: 1.46 MG/DL — HIGH (ref 0.5–1.3)
CREAT SERPL-MCNC: 1.49 MG/DL — HIGH (ref 0.5–1.3)
CREAT SERPL-MCNC: 1.52 MG/DL — HIGH (ref 0.5–1.3)
CREAT SERPL-MCNC: 1.52 MG/DL — HIGH (ref 0.5–1.3)
CREAT SERPL-MCNC: 1.56 MG/DL — HIGH (ref 0.5–1.3)
CREAT SERPL-MCNC: 1.59 MG/DL — HIGH (ref 0.5–1.3)
CREAT SERPL-MCNC: 1.6 MG/DL — HIGH (ref 0.5–1.3)
CREAT SERPL-MCNC: 1.63 MG/DL — HIGH (ref 0.5–1.3)
CREAT SERPL-MCNC: 1.64 MG/DL — HIGH (ref 0.5–1.3)
CREAT SERPL-MCNC: 1.68 MG/DL — HIGH (ref 0.5–1.3)
CRP SERPL-MCNC: 31.36 MG/DL — HIGH (ref 0–0.4)
CULTURE RESULTS: SIGNIFICANT CHANGE UP
CULTURE RESULTS: SIGNIFICANT CHANGE UP
DIFF PNL FLD: NEGATIVE — SIGNIFICANT CHANGE UP
ELLIPTOCYTES BLD QL SMEAR: SLIGHT — SIGNIFICANT CHANGE UP
EOSINOPHIL # BLD AUTO: 0 K/UL — SIGNIFICANT CHANGE UP (ref 0–0.5)
EOSINOPHIL # BLD AUTO: 0 K/UL — SIGNIFICANT CHANGE UP (ref 0–0.5)
EOSINOPHIL # BLD AUTO: 0.05 K/UL — SIGNIFICANT CHANGE UP (ref 0–0.5)
EOSINOPHIL # BLD AUTO: 0.06 K/UL
EOSINOPHIL # BLD AUTO: 0.06 K/UL — SIGNIFICANT CHANGE UP (ref 0–0.5)
EOSINOPHIL # BLD AUTO: 0.07 K/UL
EOSINOPHIL # BLD AUTO: 0.08 K/UL — SIGNIFICANT CHANGE UP (ref 0–0.5)
EOSINOPHIL NFR BLD AUTO: 0 % — SIGNIFICANT CHANGE UP (ref 0–6)
EOSINOPHIL NFR BLD AUTO: 0 % — SIGNIFICANT CHANGE UP (ref 0–6)
EOSINOPHIL NFR BLD AUTO: 1.6 % — SIGNIFICANT CHANGE UP (ref 0–6)
EOSINOPHIL NFR BLD AUTO: 1.6 % — SIGNIFICANT CHANGE UP (ref 0–6)
EOSINOPHIL NFR BLD AUTO: 1.7 %
EOSINOPHIL NFR BLD AUTO: 2 %
EOSINOPHIL NFR BLD AUTO: 2.2 % — SIGNIFICANT CHANGE UP (ref 0–6)
ERYTHROCYTE [SEDIMENTATION RATE] IN BLOOD: 106 MM/HR — HIGH (ref 0–20)
ESTIMATED AVERAGE GLUCOSE: 134 MG/DL — HIGH (ref 68–114)
ESTIMATED AVERAGE GLUCOSE: 143 MG/DL
ESTIMATED AVERAGE GLUCOSE: 143 MG/DL — HIGH (ref 68–114)
FERRITIN SERPL-MCNC: 119 NG/ML — SIGNIFICANT CHANGE UP (ref 30–400)
FERRITIN SERPL-MCNC: 147 NG/ML — SIGNIFICANT CHANGE UP (ref 30–400)
FOLATE SERPL-MCNC: 16.7 NG/ML — SIGNIFICANT CHANGE UP
FOLATE SERPL-MCNC: 16.7 NG/ML — SIGNIFICANT CHANGE UP
GAD65 AB SER-MCNC: 0 NMOL/L — SIGNIFICANT CHANGE UP
GAS PNL BLDV: 136 MMOL/L — SIGNIFICANT CHANGE UP (ref 135–145)
GAS PNL BLDV: 137 MMOL/L — SIGNIFICANT CHANGE UP (ref 135–145)
GAS PNL BLDV: 139 MMOL/L — SIGNIFICANT CHANGE UP (ref 135–145)
GAS PNL BLDV: 139 MMOL/L — SIGNIFICANT CHANGE UP (ref 135–145)
GAS PNL BLDV: 141 MMOL/L — SIGNIFICANT CHANGE UP (ref 135–145)
GAS PNL BLDV: SIGNIFICANT CHANGE UP
GLUCOSE BLDC GLUCOMTR-MCNC: 148 MG/DL — HIGH (ref 70–99)
GLUCOSE BLDC GLUCOMTR-MCNC: 154 MG/DL — HIGH (ref 70–99)
GLUCOSE BLDC GLUCOMTR-MCNC: 170 MG/DL — HIGH (ref 70–99)
GLUCOSE BLDC GLUCOMTR-MCNC: 171 MG/DL — HIGH (ref 70–99)
GLUCOSE BLDC GLUCOMTR-MCNC: 171 MG/DL — HIGH (ref 70–99)
GLUCOSE BLDC GLUCOMTR-MCNC: 177 MG/DL — HIGH (ref 70–99)
GLUCOSE BLDC GLUCOMTR-MCNC: 180 MG/DL — HIGH (ref 70–99)
GLUCOSE BLDC GLUCOMTR-MCNC: 183 MG/DL — HIGH (ref 70–99)
GLUCOSE BLDC GLUCOMTR-MCNC: 183 MG/DL — HIGH (ref 70–99)
GLUCOSE BLDC GLUCOMTR-MCNC: 191 MG/DL — HIGH (ref 70–99)
GLUCOSE BLDC GLUCOMTR-MCNC: 194 MG/DL — HIGH (ref 70–99)
GLUCOSE BLDC GLUCOMTR-MCNC: 196 MG/DL — HIGH (ref 70–99)
GLUCOSE BLDC GLUCOMTR-MCNC: 199 MG/DL — HIGH (ref 70–99)
GLUCOSE BLDC GLUCOMTR-MCNC: 199 MG/DL — HIGH (ref 70–99)
GLUCOSE BLDC GLUCOMTR-MCNC: 205 MG/DL — HIGH (ref 70–99)
GLUCOSE BLDC GLUCOMTR-MCNC: 208 MG/DL — HIGH (ref 70–99)
GLUCOSE BLDC GLUCOMTR-MCNC: 212 MG/DL — HIGH (ref 70–99)
GLUCOSE BLDC GLUCOMTR-MCNC: 217 MG/DL — HIGH (ref 70–99)
GLUCOSE BLDC GLUCOMTR-MCNC: 219 MG/DL — HIGH (ref 70–99)
GLUCOSE BLDC GLUCOMTR-MCNC: 220 MG/DL — HIGH (ref 70–99)
GLUCOSE BLDC GLUCOMTR-MCNC: 226 MG/DL — HIGH (ref 70–99)
GLUCOSE BLDC GLUCOMTR-MCNC: 228 MG/DL — HIGH (ref 70–99)
GLUCOSE BLDC GLUCOMTR-MCNC: 229 MG/DL — HIGH (ref 70–99)
GLUCOSE BLDC GLUCOMTR-MCNC: 229 MG/DL — HIGH (ref 70–99)
GLUCOSE BLDC GLUCOMTR-MCNC: 231 MG/DL — HIGH (ref 70–99)
GLUCOSE BLDC GLUCOMTR-MCNC: 234 MG/DL — HIGH (ref 70–99)
GLUCOSE BLDC GLUCOMTR-MCNC: 235 MG/DL — HIGH (ref 70–99)
GLUCOSE BLDC GLUCOMTR-MCNC: 237 MG/DL — HIGH (ref 70–99)
GLUCOSE BLDC GLUCOMTR-MCNC: 237 MG/DL — HIGH (ref 70–99)
GLUCOSE BLDC GLUCOMTR-MCNC: 244 MG/DL — HIGH (ref 70–99)
GLUCOSE BLDC GLUCOMTR-MCNC: 251 MG/DL — HIGH (ref 70–99)
GLUCOSE BLDC GLUCOMTR-MCNC: 254 MG/DL — HIGH (ref 70–99)
GLUCOSE BLDC GLUCOMTR-MCNC: 254 MG/DL — HIGH (ref 70–99)
GLUCOSE BLDC GLUCOMTR-MCNC: 255 MG/DL — HIGH (ref 70–99)
GLUCOSE BLDC GLUCOMTR-MCNC: 263 MG/DL — HIGH (ref 70–99)
GLUCOSE BLDC GLUCOMTR-MCNC: 263 MG/DL — HIGH (ref 70–99)
GLUCOSE BLDC GLUCOMTR-MCNC: 265 MG/DL — HIGH (ref 70–99)
GLUCOSE BLDC GLUCOMTR-MCNC: 266 MG/DL — HIGH (ref 70–99)
GLUCOSE BLDC GLUCOMTR-MCNC: 275 MG/DL — HIGH (ref 70–99)
GLUCOSE BLDC GLUCOMTR-MCNC: 275 MG/DL — HIGH (ref 70–99)
GLUCOSE BLDC GLUCOMTR-MCNC: 277 MG/DL — HIGH (ref 70–99)
GLUCOSE BLDC GLUCOMTR-MCNC: 278 MG/DL — HIGH (ref 70–99)
GLUCOSE BLDC GLUCOMTR-MCNC: 283 MG/DL — HIGH (ref 70–99)
GLUCOSE BLDC GLUCOMTR-MCNC: 285 MG/DL — HIGH (ref 70–99)
GLUCOSE BLDC GLUCOMTR-MCNC: 289 MG/DL — HIGH (ref 70–99)
GLUCOSE BLDC GLUCOMTR-MCNC: 289 MG/DL — HIGH (ref 70–99)
GLUCOSE BLDC GLUCOMTR-MCNC: 305 MG/DL — HIGH (ref 70–99)
GLUCOSE BLDC GLUCOMTR-MCNC: 307 MG/DL — HIGH (ref 70–99)
GLUCOSE BLDC GLUCOMTR-MCNC: 310 MG/DL — HIGH (ref 70–99)
GLUCOSE BLDV-MCNC: 135 MG/DL — HIGH (ref 70–99)
GLUCOSE BLDV-MCNC: 137 MG/DL — HIGH (ref 70–99)
GLUCOSE BLDV-MCNC: 181 MG/DL — HIGH (ref 70–99)
GLUCOSE BLDV-MCNC: 191 MG/DL — HIGH (ref 70–99)
GLUCOSE BLDV-MCNC: 221 MG/DL — HIGH (ref 70–99)
GLUCOSE SERPL-MCNC: 121 MG/DL — HIGH (ref 70–99)
GLUCOSE SERPL-MCNC: 125 MG/DL — HIGH (ref 70–99)
GLUCOSE SERPL-MCNC: 133 MG/DL — HIGH (ref 70–99)
GLUCOSE SERPL-MCNC: 140 MG/DL — HIGH (ref 70–99)
GLUCOSE SERPL-MCNC: 142 MG/DL — HIGH (ref 70–99)
GLUCOSE SERPL-MCNC: 146 MG/DL — HIGH (ref 70–99)
GLUCOSE SERPL-MCNC: 153 MG/DL — HIGH (ref 70–99)
GLUCOSE SERPL-MCNC: 166 MG/DL — HIGH (ref 70–99)
GLUCOSE SERPL-MCNC: 169 MG/DL — HIGH (ref 70–99)
GLUCOSE SERPL-MCNC: 179 MG/DL — HIGH (ref 70–99)
GLUCOSE SERPL-MCNC: 185 MG/DL — HIGH (ref 70–99)
GLUCOSE SERPL-MCNC: 192 MG/DL — HIGH (ref 70–99)
GLUCOSE SERPL-MCNC: 197 MG/DL — HIGH (ref 70–99)
GLUCOSE SERPL-MCNC: 201 MG/DL — HIGH (ref 70–99)
GLUCOSE SERPL-MCNC: 217 MG/DL — HIGH (ref 70–99)
GLUCOSE SERPL-MCNC: 224 MG/DL — HIGH (ref 70–99)
GLUCOSE SERPL-MCNC: 228 MG/DL — HIGH (ref 70–99)
GLUCOSE SERPL-MCNC: 280 MG/DL — HIGH (ref 70–99)
GLUCOSE SERPL-MCNC: 92 MG/DL
GLUCOSE UR QL: NEGATIVE — SIGNIFICANT CHANGE UP
HAPTOGLOB SERPL-MCNC: 120 MG/DL — SIGNIFICANT CHANGE UP (ref 34–200)
HBA1C MFR BLD HPLC: 6.6 %
HCO3 BLDV-SCNC: 34 MMOL/L — HIGH (ref 21–29)
HCO3 BLDV-SCNC: 36 MMOL/L — HIGH (ref 21–29)
HCO3 BLDV-SCNC: 39 MMOL/L — HIGH (ref 21–29)
HCO3 BLDV-SCNC: 41 MMOL/L — HIGH (ref 21–29)
HCO3 BLDV-SCNC: 45 MMOL/L — HIGH (ref 21–29)
HCT VFR BLD CALC: 24.7 % — LOW (ref 39–50)
HCT VFR BLD CALC: 26.2 % — LOW (ref 39–50)
HCT VFR BLD CALC: 26.3 % — LOW (ref 39–50)
HCT VFR BLD CALC: 26.5 % — LOW (ref 39–50)
HCT VFR BLD CALC: 26.8 % — LOW (ref 39–50)
HCT VFR BLD CALC: 27.1 % — LOW (ref 39–50)
HCT VFR BLD CALC: 27.1 % — LOW (ref 39–50)
HCT VFR BLD CALC: 27.4 % — LOW (ref 39–50)
HCT VFR BLD CALC: 28.1 % — LOW (ref 39–50)
HCT VFR BLD CALC: 28.2 % — LOW (ref 39–50)
HCT VFR BLD CALC: 28.4 % — LOW (ref 39–50)
HCT VFR BLD CALC: 28.9 % — LOW (ref 39–50)
HCT VFR BLD CALC: 29.3 % — LOW (ref 39–50)
HCT VFR BLD CALC: 29.5 % — LOW (ref 39–50)
HCT VFR BLD CALC: 29.6 % — LOW (ref 39–50)
HCT VFR BLD CALC: 29.6 % — LOW (ref 39–50)
HCT VFR BLD CALC: 30 % — LOW (ref 39–50)
HCT VFR BLD CALC: 31.4 %
HCT VFR BLD CALC: 33.3 %
HCT VFR BLD CALC: SIGNIFICANT CHANGE UP (ref 39–50)
HCT VFR BLDA CALC: 22 % — CRITICAL LOW (ref 39–50)
HCT VFR BLDA CALC: 25 % — LOW (ref 39–50)
HCT VFR BLDA CALC: 27 % — LOW (ref 39–50)
HCV AB S/CO SERPL IA: 13.02 S/CO — HIGH (ref 0–0.99)
HCV AB SERPL-IMP: REACTIVE
HDLC SERPL-MCNC: 35 MG/DL
HGB BLD CALC-MCNC: 6.9 G/DL — CRITICAL LOW (ref 13–17)
HGB BLD CALC-MCNC: 8.1 G/DL — LOW (ref 13–17)
HGB BLD CALC-MCNC: 8.6 G/DL — LOW (ref 13–17)
HGB BLD-MCNC: 6.4 G/DL — CRITICAL LOW (ref 13–17)
HGB BLD-MCNC: 6.8 G/DL — CRITICAL LOW (ref 13–17)
HGB BLD-MCNC: 7 G/DL — CRITICAL LOW (ref 13–17)
HGB BLD-MCNC: 7 G/DL — CRITICAL LOW (ref 13–17)
HGB BLD-MCNC: 7.2 G/DL — LOW (ref 13–17)
HGB BLD-MCNC: 7.2 G/DL — LOW (ref 13–17)
HGB BLD-MCNC: 7.3 G/DL — LOW (ref 13–17)
HGB BLD-MCNC: 7.4 G/DL — LOW (ref 13–17)
HGB BLD-MCNC: 7.4 G/DL — LOW (ref 13–17)
HGB BLD-MCNC: 7.5 G/DL — LOW (ref 13–17)
HGB BLD-MCNC: 7.5 G/DL — LOW (ref 13–17)
HGB BLD-MCNC: 7.7 G/DL — LOW (ref 13–17)
HGB BLD-MCNC: 7.7 G/DL — LOW (ref 13–17)
HGB BLD-MCNC: 7.8 G/DL — LOW (ref 13–17)
HGB BLD-MCNC: 7.8 G/DL — LOW (ref 13–17)
HGB BLD-MCNC: 7.9 G/DL — LOW (ref 13–17)
HGB BLD-MCNC: 8 G/DL — LOW (ref 13–17)
HGB BLD-MCNC: 8.1 G/DL — LOW (ref 13–17)
HGB BLD-MCNC: 8.2 G/DL — LOW (ref 13–17)
HGB BLD-MCNC: 8.7 G/DL
HGB BLD-MCNC: 9 G/DL
HOWELL-JOLLY BOD BLD QL SMEAR: PRESENT — SIGNIFICANT CHANGE UP
HYPOCHROMIA BLD QL: SIGNIFICANT CHANGE UP
HYPOCHROMIA BLD QL: SLIGHT — SIGNIFICANT CHANGE UP
IMM GRANULOCYTES NFR BLD AUTO: 0.6 %
IMM GRANULOCYTES NFR BLD AUTO: 0.6 %
IMM GRANULOCYTES NFR BLD AUTO: 0.8 % — SIGNIFICANT CHANGE UP (ref 0–1.5)
IMM GRANULOCYTES NFR BLD AUTO: 1.6 % — HIGH (ref 0–1.5)
IMM GRANULOCYTES NFR BLD AUTO: 6.7 % — HIGH (ref 0–1.5)
INR BLD: 1.03 RATIO — SIGNIFICANT CHANGE UP (ref 0.88–1.16)
INR BLD: 1.16 RATIO — SIGNIFICANT CHANGE UP (ref 0.88–1.16)
INR BLD: 1.51 RATIO — HIGH (ref 0.88–1.16)
INR BLD: 1.61 RATIO — HIGH (ref 0.88–1.16)
INSULIN SERPL-MCNC: 8.8 UU/ML — SIGNIFICANT CHANGE UP (ref 2.6–24.9)
IRON SATN MFR SERPL: 15 UG/DL — LOW (ref 45–165)
IRON SATN MFR SERPL: 26 UG/DL — LOW (ref 45–165)
IRON SATN MFR SERPL: 5 % — LOW (ref 16–55)
IRON SATN MFR SERPL: 6 % — LOW (ref 16–55)
KETONES UR-MCNC: NEGATIVE — SIGNIFICANT CHANGE UP
LACTATE BLDV-MCNC: 1.1 MMOL/L — SIGNIFICANT CHANGE UP (ref 0.7–2)
LACTATE BLDV-MCNC: 1.1 MMOL/L — SIGNIFICANT CHANGE UP (ref 0.7–2)
LACTATE BLDV-MCNC: 1.5 MMOL/L — SIGNIFICANT CHANGE UP (ref 0.7–2)
LACTATE BLDV-MCNC: 1.9 MMOL/L — SIGNIFICANT CHANGE UP (ref 0.7–2)
LACTATE BLDV-MCNC: 2.5 MMOL/L — HIGH (ref 0.7–2)
LDH SERPL L TO P-CCNC: 230 U/L — SIGNIFICANT CHANGE UP (ref 50–242)
LDLC SERPL CALC-MCNC: 121 MG/DL
LEUKOCYTE ESTERASE UR-ACNC: NEGATIVE — SIGNIFICANT CHANGE UP
LYMPHOCYTES # BLD AUTO: 0.73 K/UL — LOW (ref 1–3.3)
LYMPHOCYTES # BLD AUTO: 0.75 K/UL — LOW (ref 1–3.3)
LYMPHOCYTES # BLD AUTO: 0.77 K/UL — LOW (ref 1–3.3)
LYMPHOCYTES # BLD AUTO: 0.77 K/UL — LOW (ref 1–3.3)
LYMPHOCYTES # BLD AUTO: 0.93 K/UL — LOW (ref 1–3.3)
LYMPHOCYTES # BLD AUTO: 1.04 K/UL
LYMPHOCYTES # BLD AUTO: 1.09 K/UL
LYMPHOCYTES # BLD AUTO: 19.6 % — SIGNIFICANT CHANGE UP (ref 13–44)
LYMPHOCYTES # BLD AUTO: 20.4 % — SIGNIFICANT CHANGE UP (ref 13–44)
LYMPHOCYTES # BLD AUTO: 24.3 % — SIGNIFICANT CHANGE UP (ref 13–44)
LYMPHOCYTES # BLD AUTO: 24.8 % — SIGNIFICANT CHANGE UP (ref 13–44)
LYMPHOCYTES # BLD AUTO: 7 % — LOW (ref 13–44)
LYMPHOCYTES NFR BLD AUTO: 29.7 %
LYMPHOCYTES NFR BLD AUTO: 31 %
MAGNESIUM SERPL-MCNC: 2.1 MG/DL — SIGNIFICANT CHANGE UP (ref 1.6–2.6)
MAGNESIUM SERPL-MCNC: 2.2 MG/DL — SIGNIFICANT CHANGE UP (ref 1.6–2.6)
MAGNESIUM SERPL-MCNC: 2.3 MG/DL — SIGNIFICANT CHANGE UP (ref 1.6–2.6)
MAGNESIUM SERPL-MCNC: 2.4 MG/DL — SIGNIFICANT CHANGE UP (ref 1.6–2.6)
MAGNESIUM SERPL-MCNC: 2.4 MG/DL — SIGNIFICANT CHANGE UP (ref 1.6–2.6)
MAGNESIUM SERPL-MCNC: 2.5 MG/DL — SIGNIFICANT CHANGE UP (ref 1.6–2.6)
MAGNESIUM SERPL-MCNC: 2.6 MG/DL — SIGNIFICANT CHANGE UP (ref 1.6–2.6)
MAN DIFF?: NORMAL
MAN DIFF?: NORMAL
MANUAL SMEAR VERIFICATION: SIGNIFICANT CHANGE UP
MANUAL SMEAR VERIFICATION: SIGNIFICANT CHANGE UP
MCHC RBC-ENTMCNC: 21.8 PG — LOW (ref 27–34)
MCHC RBC-ENTMCNC: 22.2 PG — LOW (ref 27–34)
MCHC RBC-ENTMCNC: 22.5 PG — LOW (ref 27–34)
MCHC RBC-ENTMCNC: 22.6 PG — LOW (ref 27–34)
MCHC RBC-ENTMCNC: 22.7 PG — LOW (ref 27–34)
MCHC RBC-ENTMCNC: 23.2 PG — LOW (ref 27–34)
MCHC RBC-ENTMCNC: 24.1 PG — LOW (ref 27–34)
MCHC RBC-ENTMCNC: 24.3 PG — LOW (ref 27–34)
MCHC RBC-ENTMCNC: 24.5 PG — LOW (ref 27–34)
MCHC RBC-ENTMCNC: 24.8 PG — LOW (ref 27–34)
MCHC RBC-ENTMCNC: 24.8 PG — LOW (ref 27–34)
MCHC RBC-ENTMCNC: 25 PG — LOW (ref 27–34)
MCHC RBC-ENTMCNC: 25 PG — LOW (ref 27–34)
MCHC RBC-ENTMCNC: 25.1 PG
MCHC RBC-ENTMCNC: 25.1 PG — LOW (ref 27–34)
MCHC RBC-ENTMCNC: 25.2 PG — LOW (ref 27–34)
MCHC RBC-ENTMCNC: 25.2 PG — LOW (ref 27–34)
MCHC RBC-ENTMCNC: 25.4 PG — LOW (ref 27–34)
MCHC RBC-ENTMCNC: 25.9 GM/DL — LOW (ref 32–36)
MCHC RBC-ENTMCNC: 26.4 GM/DL — LOW (ref 32–36)
MCHC RBC-ENTMCNC: 26.6 GM/DL — LOW (ref 32–36)
MCHC RBC-ENTMCNC: 26.6 GM/DL — LOW (ref 32–36)
MCHC RBC-ENTMCNC: 26.7 GM/DL — LOW (ref 32–36)
MCHC RBC-ENTMCNC: 26.7 GM/DL — LOW (ref 32–36)
MCHC RBC-ENTMCNC: 27 GM/DL
MCHC RBC-ENTMCNC: 27.3 GM/DL — LOW (ref 32–36)
MCHC RBC-ENTMCNC: 27.4 GM/DL — LOW (ref 32–36)
MCHC RBC-ENTMCNC: 27.5 PG
MCHC RBC-ENTMCNC: 27.7 GM/DL
MCHC RBC-ENTMCNC: 28 GM/DL — LOW (ref 32–36)
MCHC RBC-ENTMCNC: 28.1 GM/DL — LOW (ref 32–36)
MCHC RBC-ENTMCNC: 28.2 GM/DL — LOW (ref 32–36)
MCHC RBC-ENTMCNC: SIGNIFICANT CHANGE UP (ref 27–34)
MCHC RBC-ENTMCNC: SIGNIFICANT CHANGE UP (ref 32–36)
MCV RBC AUTO: 101.8 FL
MCV RBC AUTO: 82.4 FL — SIGNIFICANT CHANGE UP (ref 80–100)
MCV RBC AUTO: 83.1 FL — SIGNIFICANT CHANGE UP (ref 80–100)
MCV RBC AUTO: 83.6 FL — SIGNIFICANT CHANGE UP (ref 80–100)
MCV RBC AUTO: 84.3 FL — SIGNIFICANT CHANGE UP (ref 80–100)
MCV RBC AUTO: 85.2 FL — SIGNIFICANT CHANGE UP (ref 80–100)
MCV RBC AUTO: 86.8 FL — SIGNIFICANT CHANGE UP (ref 80–100)
MCV RBC AUTO: 89.8 FL — SIGNIFICANT CHANGE UP (ref 80–100)
MCV RBC AUTO: 89.9 FL — SIGNIFICANT CHANGE UP (ref 80–100)
MCV RBC AUTO: 90 FL — SIGNIFICANT CHANGE UP (ref 80–100)
MCV RBC AUTO: 90.8 FL
MCV RBC AUTO: 90.9 FL — SIGNIFICANT CHANGE UP (ref 80–100)
MCV RBC AUTO: 91.3 FL — SIGNIFICANT CHANGE UP (ref 80–100)
MCV RBC AUTO: 91.6 FL — SIGNIFICANT CHANGE UP (ref 80–100)
MCV RBC AUTO: 91.6 FL — SIGNIFICANT CHANGE UP (ref 80–100)
MCV RBC AUTO: 91.7 FL — SIGNIFICANT CHANGE UP (ref 80–100)
MCV RBC AUTO: 91.9 FL — SIGNIFICANT CHANGE UP (ref 80–100)
MCV RBC AUTO: 91.9 FL — SIGNIFICANT CHANGE UP (ref 80–100)
MCV RBC AUTO: 93.1 FL — SIGNIFICANT CHANGE UP (ref 80–100)
MCV RBC AUTO: SIGNIFICANT CHANGE UP (ref 80–100)
MICROCYTES BLD QL: SLIGHT — SIGNIFICANT CHANGE UP
MONOCYTES # BLD AUTO: 0.13 K/UL — SIGNIFICANT CHANGE UP (ref 0–0.9)
MONOCYTES # BLD AUTO: 0.22 K/UL — SIGNIFICANT CHANGE UP (ref 0–0.9)
MONOCYTES # BLD AUTO: 0.36 K/UL
MONOCYTES # BLD AUTO: 0.39 K/UL
MONOCYTES # BLD AUTO: 0.39 K/UL — SIGNIFICANT CHANGE UP (ref 0–0.9)
MONOCYTES # BLD AUTO: 0.41 K/UL — SIGNIFICANT CHANGE UP (ref 0–0.9)
MONOCYTES # BLD AUTO: 0.45 K/UL — SIGNIFICANT CHANGE UP (ref 0–0.9)
MONOCYTES NFR BLD AUTO: 10.2 %
MONOCYTES NFR BLD AUTO: 10.6 % — SIGNIFICANT CHANGE UP (ref 2–14)
MONOCYTES NFR BLD AUTO: 11 % — SIGNIFICANT CHANGE UP (ref 2–14)
MONOCYTES NFR BLD AUTO: 11.1 %
MONOCYTES NFR BLD AUTO: 14.5 % — HIGH (ref 2–14)
MONOCYTES NFR BLD AUTO: 2 % — SIGNIFICANT CHANGE UP (ref 2–14)
MONOCYTES NFR BLD AUTO: 3.5 % — SIGNIFICANT CHANGE UP (ref 2–14)
MYCOPHENOLATE SERPL-MCNC: ABNORMAL
MYE REFERENCES 2: SIGNIFICANT CHANGE UP
MYELOCYTES NFR BLD: 0.9 % — HIGH (ref 0–0)
NEUTROPHILS # BLD AUTO: 1.77 K/UL — LOW (ref 1.8–7.4)
NEUTROPHILS # BLD AUTO: 1.96 K/UL
NEUTROPHILS # BLD AUTO: 1.97 K/UL
NEUTROPHILS # BLD AUTO: 10.02 K/UL — HIGH (ref 1.8–7.4)
NEUTROPHILS # BLD AUTO: 2.23 K/UL — SIGNIFICANT CHANGE UP (ref 1.8–7.4)
NEUTROPHILS # BLD AUTO: 2.43 K/UL — SIGNIFICANT CHANGE UP (ref 1.8–7.4)
NEUTROPHILS # BLD AUTO: 2.72 K/UL — SIGNIFICANT CHANGE UP (ref 1.8–7.4)
NEUTROPHILS NFR BLD AUTO: 55.6 %
NEUTROPHILS NFR BLD AUTO: 56.3 %
NEUTROPHILS NFR BLD AUTO: 57.2 % — SIGNIFICANT CHANGE UP (ref 43–77)
NEUTROPHILS NFR BLD AUTO: 60 % — SIGNIFICANT CHANGE UP (ref 43–77)
NEUTROPHILS NFR BLD AUTO: 66.3 % — SIGNIFICANT CHANGE UP (ref 43–77)
NEUTROPHILS NFR BLD AUTO: 71.3 % — SIGNIFICANT CHANGE UP (ref 43–77)
NEUTROPHILS NFR BLD AUTO: 83 % — HIGH (ref 43–77)
NEUTS BAND # BLD: 8 % — SIGNIFICANT CHANGE UP (ref 0–8)
NITRITE UR-MCNC: NEGATIVE — SIGNIFICANT CHANGE UP
NRBC # BLD: 0 /100 WBCS — SIGNIFICANT CHANGE UP (ref 0–0)
NRBC # BLD: 0 /100 — SIGNIFICANT CHANGE UP (ref 0–0)
NRBC # BLD: 1 /100 — HIGH (ref 0–0)
NT-PROBNP SERPL-SCNC: 2674 PG/ML — HIGH (ref 0–300)
NT-PROBNP SERPL-SCNC: 505 PG/ML — HIGH (ref 0–300)
NT-PROBNP SERPL-SCNC: 849 PG/ML — HIGH (ref 0–300)
OB PNL STL: NEGATIVE — SIGNIFICANT CHANGE UP
OB PNL STL: POSITIVE
ONKOSIGHT MYELOID SEQUENCE: (no result)
OSMOLALITY UR: 297 MOSM/KG — SIGNIFICANT CHANGE UP (ref 50–1200)
OTHER CELLS CSF MANUAL: 11 ML/DL — LOW (ref 18–22)
OTHER CELLS CSF MANUAL: 3 ML/DL — LOW (ref 18–22)
OTHER CELLS CSF MANUAL: 3 ML/DL — LOW (ref 18–22)
OTHER CELLS CSF MANUAL: 7 ML/DL — LOW (ref 18–22)
PCO2 BLDV: 60 MMHG — HIGH (ref 35–50)
PCO2 BLDV: 63 MMHG — HIGH (ref 35–50)
PCO2 BLDV: 74 MMHG — HIGH (ref 35–50)
PCO2 BLDV: 90 MMHG — HIGH (ref 35–50)
PCO2 BLDV: 93 MMHG — HIGH (ref 35–50)
PH BLDV: 7.28 — LOW (ref 7.35–7.45)
PH BLDV: 7.3 — LOW (ref 7.35–7.45)
PH BLDV: 7.34 — LOW (ref 7.35–7.45)
PH BLDV: 7.37 — SIGNIFICANT CHANGE UP (ref 7.35–7.45)
PH BLDV: 7.38 — SIGNIFICANT CHANGE UP (ref 7.35–7.45)
PH UR: 6 — SIGNIFICANT CHANGE UP (ref 5–8)
PHOSPHATE SERPL-MCNC: 2.5 MG/DL — SIGNIFICANT CHANGE UP (ref 2.5–4.5)
PHOSPHATE SERPL-MCNC: 2.7 MG/DL — SIGNIFICANT CHANGE UP (ref 2.5–4.5)
PHOSPHATE SERPL-MCNC: 2.8 MG/DL — SIGNIFICANT CHANGE UP (ref 2.5–4.5)
PHOSPHATE SERPL-MCNC: 2.9 MG/DL — SIGNIFICANT CHANGE UP (ref 2.5–4.5)
PHOSPHATE SERPL-MCNC: 3.1 MG/DL — SIGNIFICANT CHANGE UP (ref 2.5–4.5)
PHOSPHATE SERPL-MCNC: 3.1 MG/DL — SIGNIFICANT CHANGE UP (ref 2.5–4.5)
PHOSPHATE SERPL-MCNC: 3.2 MG/DL — SIGNIFICANT CHANGE UP (ref 2.5–4.5)
PHOSPHATE SERPL-MCNC: 3.2 MG/DL — SIGNIFICANT CHANGE UP (ref 2.5–4.5)
PHOSPHATE SERPL-MCNC: 3.6 MG/DL — SIGNIFICANT CHANGE UP (ref 2.5–4.5)
PHOSPHATE SERPL-MCNC: 3.7 MG/DL — SIGNIFICANT CHANGE UP (ref 2.5–4.5)
PHOSPHATE SERPL-MCNC: 3.8 MG/DL — SIGNIFICANT CHANGE UP (ref 2.5–4.5)
PHOSPHATE SERPL-MCNC: 4 MG/DL — SIGNIFICANT CHANGE UP (ref 2.5–4.5)
PHOSPHATE SERPL-MCNC: 4.2 MG/DL — SIGNIFICANT CHANGE UP (ref 2.5–4.5)
PHOSPHATE SERPL-MCNC: 4.2 MG/DL — SIGNIFICANT CHANGE UP (ref 2.5–4.5)
PLAT MORPH BLD: NORMAL — SIGNIFICANT CHANGE UP
PLAT MORPH BLD: NORMAL — SIGNIFICANT CHANGE UP
PLATELET # BLD AUTO: 103 K/UL — LOW (ref 150–400)
PLATELET # BLD AUTO: 105 K/UL — LOW (ref 150–400)
PLATELET # BLD AUTO: 106 K/UL — LOW (ref 150–400)
PLATELET # BLD AUTO: 108 K/UL — LOW (ref 150–400)
PLATELET # BLD AUTO: 110 K/UL — LOW (ref 150–400)
PLATELET # BLD AUTO: 112 K/UL — LOW (ref 150–400)
PLATELET # BLD AUTO: 114 K/UL — LOW (ref 150–400)
PLATELET # BLD AUTO: 120 K/UL — LOW (ref 150–400)
PLATELET # BLD AUTO: 76 K/UL — LOW (ref 150–400)
PLATELET # BLD AUTO: 78 K/UL — LOW (ref 150–400)
PLATELET # BLD AUTO: 78 K/UL — LOW (ref 150–400)
PLATELET # BLD AUTO: 79 K/UL — LOW (ref 150–400)
PLATELET # BLD AUTO: 83 K/UL — LOW (ref 150–400)
PLATELET # BLD AUTO: 84 K/UL — LOW (ref 150–400)
PLATELET # BLD AUTO: 84 K/UL — LOW (ref 150–400)
PLATELET # BLD AUTO: 86 K/UL — LOW (ref 150–400)
PLATELET # BLD AUTO: 89 K/UL — LOW (ref 150–400)
PLATELET # BLD AUTO: 91 K/UL — LOW (ref 150–400)
PLATELET # BLD AUTO: 92 K/UL
PLATELET # BLD AUTO: 92 K/UL — LOW (ref 150–400)
PLATELET # BLD AUTO: 97 K/UL
PLATELET # BLD AUTO: 97 K/UL — LOW (ref 150–400)
PLATELET # BLD AUTO: 98 K/UL — LOW (ref 150–400)
PLATELET # BLD AUTO: 99 K/UL — LOW (ref 150–400)
PO2 BLDV: 21 MMHG — LOW (ref 25–45)
PO2 BLDV: 23 MMHG — LOW (ref 25–45)
PO2 BLDV: 30 MMHG — SIGNIFICANT CHANGE UP (ref 25–45)
PO2 BLDV: 35 MMHG — SIGNIFICANT CHANGE UP (ref 25–45)
PO2 BLDV: 84 MMHG — HIGH (ref 25–45)
POIKILOCYTOSIS BLD QL AUTO: SLIGHT — SIGNIFICANT CHANGE UP
POLYCHROMASIA BLD QL SMEAR: SLIGHT — SIGNIFICANT CHANGE UP
POTASSIUM BLDV-SCNC: 4.2 MMOL/L — SIGNIFICANT CHANGE UP (ref 3.5–5.3)
POTASSIUM BLDV-SCNC: 4.3 MMOL/L — SIGNIFICANT CHANGE UP (ref 3.5–5.3)
POTASSIUM BLDV-SCNC: 4.4 MMOL/L — SIGNIFICANT CHANGE UP (ref 3.5–5.3)
POTASSIUM BLDV-SCNC: 4.6 MMOL/L — SIGNIFICANT CHANGE UP (ref 3.5–5.3)
POTASSIUM BLDV-SCNC: 4.8 MMOL/L — SIGNIFICANT CHANGE UP (ref 3.5–5.3)
POTASSIUM SERPL-MCNC: 4.1 MMOL/L — SIGNIFICANT CHANGE UP (ref 3.5–5.3)
POTASSIUM SERPL-MCNC: 4.1 MMOL/L — SIGNIFICANT CHANGE UP (ref 3.5–5.3)
POTASSIUM SERPL-MCNC: 4.2 MMOL/L — SIGNIFICANT CHANGE UP (ref 3.5–5.3)
POTASSIUM SERPL-MCNC: 4.3 MMOL/L — SIGNIFICANT CHANGE UP (ref 3.5–5.3)
POTASSIUM SERPL-MCNC: 4.3 MMOL/L — SIGNIFICANT CHANGE UP (ref 3.5–5.3)
POTASSIUM SERPL-MCNC: 4.4 MMOL/L — SIGNIFICANT CHANGE UP (ref 3.5–5.3)
POTASSIUM SERPL-MCNC: 4.5 MMOL/L — SIGNIFICANT CHANGE UP (ref 3.5–5.3)
POTASSIUM SERPL-MCNC: 4.6 MMOL/L — SIGNIFICANT CHANGE UP (ref 3.5–5.3)
POTASSIUM SERPL-MCNC: 4.7 MMOL/L — SIGNIFICANT CHANGE UP (ref 3.5–5.3)
POTASSIUM SERPL-MCNC: 4.7 MMOL/L — SIGNIFICANT CHANGE UP (ref 3.5–5.3)
POTASSIUM SERPL-MCNC: 4.8 MMOL/L — SIGNIFICANT CHANGE UP (ref 3.5–5.3)
POTASSIUM SERPL-SCNC: 4.1 MMOL/L — SIGNIFICANT CHANGE UP (ref 3.5–5.3)
POTASSIUM SERPL-SCNC: 4.1 MMOL/L — SIGNIFICANT CHANGE UP (ref 3.5–5.3)
POTASSIUM SERPL-SCNC: 4.2 MMOL/L
POTASSIUM SERPL-SCNC: 4.2 MMOL/L — SIGNIFICANT CHANGE UP (ref 3.5–5.3)
POTASSIUM SERPL-SCNC: 4.3 MMOL/L — SIGNIFICANT CHANGE UP (ref 3.5–5.3)
POTASSIUM SERPL-SCNC: 4.3 MMOL/L — SIGNIFICANT CHANGE UP (ref 3.5–5.3)
POTASSIUM SERPL-SCNC: 4.4 MMOL/L — SIGNIFICANT CHANGE UP (ref 3.5–5.3)
POTASSIUM SERPL-SCNC: 4.5 MMOL/L — SIGNIFICANT CHANGE UP (ref 3.5–5.3)
POTASSIUM SERPL-SCNC: 4.6 MMOL/L — SIGNIFICANT CHANGE UP (ref 3.5–5.3)
POTASSIUM SERPL-SCNC: 4.7 MMOL/L — SIGNIFICANT CHANGE UP (ref 3.5–5.3)
POTASSIUM SERPL-SCNC: 4.7 MMOL/L — SIGNIFICANT CHANGE UP (ref 3.5–5.3)
POTASSIUM SERPL-SCNC: 4.8 MMOL/L — SIGNIFICANT CHANGE UP (ref 3.5–5.3)
POTASSIUM UR-SCNC: 15 MMOL/L — SIGNIFICANT CHANGE UP
PROCALCITONIN SERPL-MCNC: 3.24 NG/ML — HIGH (ref 0.02–0.1)
PROT SERPL-MCNC: 6.7 G/DL — SIGNIFICANT CHANGE UP (ref 6–8.3)
PROT SERPL-MCNC: 7 G/DL — SIGNIFICANT CHANGE UP (ref 6–8.3)
PROT SERPL-MCNC: 7 G/DL — SIGNIFICANT CHANGE UP (ref 6–8.3)
PROT SERPL-MCNC: 7.1 G/DL — SIGNIFICANT CHANGE UP (ref 6–8.3)
PROT SERPL-MCNC: 7.2 G/DL — SIGNIFICANT CHANGE UP (ref 6–8.3)
PROT SERPL-MCNC: 7.3 G/DL — SIGNIFICANT CHANGE UP (ref 6–8.3)
PROT SERPL-MCNC: 7.3 G/DL — SIGNIFICANT CHANGE UP (ref 6–8.3)
PROT SERPL-MCNC: 7.4 G/DL
PROT SERPL-MCNC: 7.4 G/DL — SIGNIFICANT CHANGE UP (ref 6–8.3)
PROT SERPL-MCNC: 7.6 G/DL — SIGNIFICANT CHANGE UP (ref 6–8.3)
PROT SERPL-MCNC: 7.7 G/DL — SIGNIFICANT CHANGE UP (ref 6–8.3)
PROT SERPL-MCNC: 7.7 G/DL — SIGNIFICANT CHANGE UP (ref 6–8.3)
PROT UR-MCNC: NEGATIVE — SIGNIFICANT CHANGE UP
PROTHROM AB SERPL-ACNC: 12.3 SEC — SIGNIFICANT CHANGE UP (ref 10.6–13.6)
PROTHROM AB SERPL-ACNC: 13.4 SEC — HIGH (ref 10–13.1)
PROTHROM AB SERPL-ACNC: 17.4 SEC — HIGH (ref 10–13.1)
PROTHROM AB SERPL-ACNC: 18.6 SEC — HIGH (ref 10–12.9)
RBC # BLD: 2.76 M/UL — LOW (ref 4.2–5.8)
RBC # BLD: 2.84 M/UL — LOW (ref 4.2–5.8)
RBC # BLD: 2.91 M/UL — LOW (ref 4.2–5.8)
RBC # BLD: 2.93 M/UL — LOW (ref 4.2–5.8)
RBC # BLD: 2.98 M/UL — LOW (ref 4.2–5.8)
RBC # BLD: 2.98 M/UL — LOW (ref 4.2–5.8)
RBC # BLD: 3.08 M/UL — LOW (ref 4.2–5.8)
RBC # BLD: 3.11 M/UL — LOW (ref 4.2–5.8)
RBC # BLD: 3.11 M/UL — LOW (ref 4.2–5.8)
RBC # BLD: 3.13 M/UL — LOW (ref 4.2–5.8)
RBC # BLD: 3.15 M/UL — LOW (ref 4.2–5.8)
RBC # BLD: 3.15 M/UL — LOW (ref 4.2–5.8)
RBC # BLD: 3.18 M/UL — LOW (ref 4.2–5.8)
RBC # BLD: 3.19 M/UL — LOW (ref 4.2–5.8)
RBC # BLD: 3.2 M/UL — LOW (ref 4.2–5.8)
RBC # BLD: 3.21 M/UL — LOW (ref 4.2–5.8)
RBC # BLD: 3.24 M/UL — LOW (ref 4.2–5.8)
RBC # BLD: 3.26 M/UL — LOW (ref 4.2–5.8)
RBC # BLD: 3.27 M/UL
RBC # BLD: 3.29 M/UL — LOW (ref 4.2–5.8)
RBC # BLD: 3.3 M/UL — LOW (ref 4.2–5.8)
RBC # BLD: 3.41 M/UL — LOW (ref 4.2–5.8)
RBC # BLD: 3.46 M/UL
RBC # FLD: 16.6 %
RBC # FLD: 16.8 % — HIGH (ref 10.3–14.5)
RBC # FLD: 17.1 % — HIGH (ref 10.3–14.5)
RBC # FLD: 17.2 % — HIGH (ref 10.3–14.5)
RBC # FLD: 17.3 % — HIGH (ref 10.3–14.5)
RBC # FLD: 17.4 % — HIGH (ref 10.3–14.5)
RBC # FLD: 17.5 % — HIGH (ref 10.3–14.5)
RBC # FLD: 17.5 % — HIGH (ref 10.3–14.5)
RBC # FLD: 17.7 % — HIGH (ref 10.3–14.5)
RBC # FLD: 17.8 % — HIGH (ref 10.3–14.5)
RBC # FLD: 17.9 % — HIGH (ref 10.3–14.5)
RBC # FLD: 18 % — HIGH (ref 10.3–14.5)
RBC # FLD: 18.2 % — HIGH (ref 10.3–14.5)
RBC # FLD: 18.2 % — HIGH (ref 10.3–14.5)
RBC # FLD: 18.6 % — HIGH (ref 10.3–14.5)
RBC # FLD: 25.2 %
RBC # FLD: SIGNIFICANT CHANGE UP (ref 10.3–14.5)
RBC BLD AUTO: ABNORMAL
RBC BLD AUTO: SIGNIFICANT CHANGE UP
RETICS #: 128.4 K/UL — HIGH (ref 25–125)
RETICS/RBC NFR: 4 % — HIGH (ref 0.5–2.5)
RH IG SCN BLD-IMP: POSITIVE — SIGNIFICANT CHANGE UP
SAO2 % BLDV: 22 % — LOW (ref 67–88)
SAO2 % BLDV: 27 % — LOW (ref 67–88)
SAO2 % BLDV: 44 % — LOW (ref 67–88)
SAO2 % BLDV: 61 % — LOW (ref 67–88)
SAO2 % BLDV: 96 % — HIGH (ref 67–88)
SARS-COV-2 RNA SPEC QL NAA+PROBE: SIGNIFICANT CHANGE UP
SODIUM SERPL-SCNC: 134 MMOL/L — LOW (ref 135–145)
SODIUM SERPL-SCNC: 134 MMOL/L — LOW (ref 135–145)
SODIUM SERPL-SCNC: 135 MMOL/L — SIGNIFICANT CHANGE UP (ref 135–145)
SODIUM SERPL-SCNC: 136 MMOL/L — SIGNIFICANT CHANGE UP (ref 135–145)
SODIUM SERPL-SCNC: 137 MMOL/L — SIGNIFICANT CHANGE UP (ref 135–145)
SODIUM SERPL-SCNC: 138 MMOL/L — SIGNIFICANT CHANGE UP (ref 135–145)
SODIUM SERPL-SCNC: 139 MMOL/L — SIGNIFICANT CHANGE UP (ref 135–145)
SODIUM SERPL-SCNC: 140 MMOL/L — SIGNIFICANT CHANGE UP (ref 135–145)
SODIUM SERPL-SCNC: 141 MMOL/L
SODIUM SERPL-SCNC: 141 MMOL/L — SIGNIFICANT CHANGE UP (ref 135–145)
SODIUM UR-SCNC: 90 MMOL/L — SIGNIFICANT CHANGE UP
SP GR SPEC: 1.01 — LOW (ref 1.01–1.02)
SPECIMEN SOURCE: SIGNIFICANT CHANGE UP
SPECIMEN SOURCE: SIGNIFICANT CHANGE UP
TACROLIMUS SERPL-MCNC: <2 NG/ML
TACROLIMUS SERPL-MCNC: <2 NG/ML — SIGNIFICANT CHANGE UP
TARGETS BLD QL SMEAR: SLIGHT — SIGNIFICANT CHANGE UP
TIBC SERPL-MCNC: 305 UG/DL — SIGNIFICANT CHANGE UP (ref 220–430)
TIBC SERPL-MCNC: 450 UG/DL — HIGH (ref 220–430)
TRIGL SERPL-MCNC: 100 MG/DL
TROPONIN T, HIGH SENSITIVITY RESULT: 43 NG/L — SIGNIFICANT CHANGE UP (ref 0–51)
TROPONIN T, HIGH SENSITIVITY RESULT: 47 NG/L — SIGNIFICANT CHANGE UP (ref 0–51)
UIBC SERPL-MCNC: 290 UG/DL — SIGNIFICANT CHANGE UP (ref 110–370)
UIBC SERPL-MCNC: 423 UG/DL — HIGH (ref 110–370)
UROBILINOGEN FLD QL: SIGNIFICANT CHANGE UP
UUN UR-MCNC: 221 MG/DL — SIGNIFICANT CHANGE UP
VIT B12 SERPL-MCNC: 785 PG/ML — SIGNIFICANT CHANGE UP (ref 232–1245)
VIT B12 SERPL-MCNC: 845 PG/ML — SIGNIFICANT CHANGE UP (ref 232–1245)
WBC # BLD: 11.01 K/UL — HIGH (ref 3.8–10.5)
WBC # BLD: 2.88 K/UL — LOW (ref 3.8–10.5)
WBC # BLD: 2.88 K/UL — LOW (ref 3.8–10.5)
WBC # BLD: 2.9 K/UL — LOW (ref 3.8–10.5)
WBC # BLD: 3.1 K/UL — LOW (ref 3.8–10.5)
WBC # BLD: 3.1 K/UL — LOW (ref 3.8–10.5)
WBC # BLD: 3.18 K/UL — LOW (ref 3.8–10.5)
WBC # BLD: 3.23 K/UL — LOW (ref 3.8–10.5)
WBC # BLD: 3.27 K/UL — LOW (ref 3.8–10.5)
WBC # BLD: 3.27 K/UL — LOW (ref 3.8–10.5)
WBC # BLD: 3.34 K/UL — LOW (ref 3.8–10.5)
WBC # BLD: 3.46 K/UL — LOW (ref 3.8–10.5)
WBC # BLD: 3.67 K/UL — LOW (ref 3.8–10.5)
WBC # BLD: 3.72 K/UL — LOW (ref 3.8–10.5)
WBC # BLD: 3.81 K/UL — SIGNIFICANT CHANGE UP (ref 3.8–10.5)
WBC # BLD: 3.89 K/UL — SIGNIFICANT CHANGE UP (ref 3.8–10.5)
WBC # BLD: 3.99 K/UL — SIGNIFICANT CHANGE UP (ref 3.8–10.5)
WBC # BLD: 4.18 K/UL — SIGNIFICANT CHANGE UP (ref 3.8–10.5)
WBC # BLD: 4.78 K/UL — SIGNIFICANT CHANGE UP (ref 3.8–10.5)
WBC # BLD: 5.32 K/UL — SIGNIFICANT CHANGE UP (ref 3.8–10.5)
WBC # BLD: 6.73 K/UL — SIGNIFICANT CHANGE UP (ref 3.8–10.5)
WBC # BLD: 9.28 K/UL — SIGNIFICANT CHANGE UP (ref 3.8–10.5)
WBC # FLD AUTO: 11.01 K/UL — HIGH (ref 3.8–10.5)
WBC # FLD AUTO: 2.88 K/UL — LOW (ref 3.8–10.5)
WBC # FLD AUTO: 2.88 K/UL — LOW (ref 3.8–10.5)
WBC # FLD AUTO: 2.9 K/UL — LOW (ref 3.8–10.5)
WBC # FLD AUTO: 3.1 K/UL — LOW (ref 3.8–10.5)
WBC # FLD AUTO: 3.1 K/UL — LOW (ref 3.8–10.5)
WBC # FLD AUTO: 3.18 K/UL — LOW (ref 3.8–10.5)
WBC # FLD AUTO: 3.23 K/UL — LOW (ref 3.8–10.5)
WBC # FLD AUTO: 3.27 K/UL — LOW (ref 3.8–10.5)
WBC # FLD AUTO: 3.27 K/UL — LOW (ref 3.8–10.5)
WBC # FLD AUTO: 3.34 K/UL — LOW (ref 3.8–10.5)
WBC # FLD AUTO: 3.46 K/UL — LOW (ref 3.8–10.5)
WBC # FLD AUTO: 3.5 K/UL
WBC # FLD AUTO: 3.52 K/UL
WBC # FLD AUTO: 3.67 K/UL — LOW (ref 3.8–10.5)
WBC # FLD AUTO: 3.72 K/UL — LOW (ref 3.8–10.5)
WBC # FLD AUTO: 3.81 K/UL — SIGNIFICANT CHANGE UP (ref 3.8–10.5)
WBC # FLD AUTO: 3.89 K/UL — SIGNIFICANT CHANGE UP (ref 3.8–10.5)
WBC # FLD AUTO: 3.99 K/UL — SIGNIFICANT CHANGE UP (ref 3.8–10.5)
WBC # FLD AUTO: 4.18 K/UL — SIGNIFICANT CHANGE UP (ref 3.8–10.5)
WBC # FLD AUTO: 4.78 K/UL — SIGNIFICANT CHANGE UP (ref 3.8–10.5)
WBC # FLD AUTO: 5.32 K/UL — SIGNIFICANT CHANGE UP (ref 3.8–10.5)
WBC # FLD AUTO: 6.73 K/UL — SIGNIFICANT CHANGE UP (ref 3.8–10.5)
WBC # FLD AUTO: 9.28 K/UL — SIGNIFICANT CHANGE UP (ref 3.8–10.5)

## 2020-01-01 PROCEDURE — 84132 ASSAY OF SERUM POTASSIUM: CPT

## 2020-01-01 PROCEDURE — 86923 COMPATIBILITY TEST ELECTRIC: CPT

## 2020-01-01 PROCEDURE — 82550 ASSAY OF CK (CPK): CPT

## 2020-01-01 PROCEDURE — 86900 BLOOD TYPING SEROLOGIC ABO: CPT

## 2020-01-01 PROCEDURE — 99232 SBSQ HOSP IP/OBS MODERATE 35: CPT

## 2020-01-01 PROCEDURE — 74177 CT ABD & PELVIS W/CONTRAST: CPT | Mod: 26

## 2020-01-01 PROCEDURE — 99233 SBSQ HOSP IP/OBS HIGH 50: CPT | Mod: GC

## 2020-01-01 PROCEDURE — 99214 OFFICE O/P EST MOD 30 MIN: CPT | Mod: 95

## 2020-01-01 PROCEDURE — 85730 THROMBOPLASTIN TIME PARTIAL: CPT

## 2020-01-01 PROCEDURE — 88184 FLOWCYTOMETRY/ TC 1 MARKER: CPT

## 2020-01-01 PROCEDURE — 82728 ASSAY OF FERRITIN: CPT

## 2020-01-01 PROCEDURE — 99233 SBSQ HOSP IP/OBS HIGH 50: CPT

## 2020-01-01 PROCEDURE — 82746 ASSAY OF FOLIC ACID SERUM: CPT

## 2020-01-01 PROCEDURE — 71250 CT THORAX DX C-: CPT | Mod: 26

## 2020-01-01 PROCEDURE — 82435 ASSAY OF BLOOD CHLORIDE: CPT

## 2020-01-01 PROCEDURE — 83880 ASSAY OF NATRIURETIC PEPTIDE: CPT

## 2020-01-01 PROCEDURE — 99285 EMERGENCY DEPT VISIT HI MDM: CPT

## 2020-01-01 PROCEDURE — 88313 SPECIAL STAINS GROUP 2: CPT

## 2020-01-01 PROCEDURE — 93971 EXTREMITY STUDY: CPT

## 2020-01-01 PROCEDURE — 88189 FLOWCYTOMETRY/READ 16 & >: CPT

## 2020-01-01 PROCEDURE — 99239 HOSP IP/OBS DSCHRG MGMT >30: CPT

## 2020-01-01 PROCEDURE — 93306 TTE W/DOPPLER COMPLETE: CPT | Mod: 26

## 2020-01-01 PROCEDURE — 88365 INSITU HYBRIDIZATION (FISH): CPT

## 2020-01-01 PROCEDURE — 86803 HEPATITIS C AB TEST: CPT

## 2020-01-01 PROCEDURE — 76705 ECHO EXAM OF ABDOMEN: CPT | Mod: 26,59,RT

## 2020-01-01 PROCEDURE — 97161 PT EVAL LOW COMPLEX 20 MIN: CPT

## 2020-01-01 PROCEDURE — 38221 DX BONE MARROW BIOPSIES: CPT | Mod: GC

## 2020-01-01 PROCEDURE — 36430 TRANSFUSION BLD/BLD COMPNT: CPT

## 2020-01-01 PROCEDURE — 99285 EMERGENCY DEPT VISIT HI MDM: CPT | Mod: 25

## 2020-01-01 PROCEDURE — 83935 ASSAY OF URINE OSMOLALITY: CPT

## 2020-01-01 PROCEDURE — 80053 COMPREHEN METABOLIC PANEL: CPT

## 2020-01-01 PROCEDURE — 83036 HEMOGLOBIN GLYCOSYLATED A1C: CPT

## 2020-01-01 PROCEDURE — 81450 HL NEO GSAP 5-50DNA/DNA&RNA: CPT

## 2020-01-01 PROCEDURE — 71045 X-RAY EXAM CHEST 1 VIEW: CPT | Mod: 26

## 2020-01-01 PROCEDURE — 86341 ISLET CELL ANTIBODY: CPT

## 2020-01-01 PROCEDURE — 83615 LACTATE (LD) (LDH) ENZYME: CPT

## 2020-01-01 PROCEDURE — 99232 SBSQ HOSP IP/OBS MODERATE 35: CPT | Mod: GC

## 2020-01-01 PROCEDURE — 83525 ASSAY OF INSULIN: CPT

## 2020-01-01 PROCEDURE — 93005 ELECTROCARDIOGRAM TRACING: CPT

## 2020-01-01 PROCEDURE — 93971 EXTREMITY STUDY: CPT | Mod: 26,RT

## 2020-01-01 PROCEDURE — 93010 ELECTROCARDIOGRAM REPORT: CPT

## 2020-01-01 PROCEDURE — 85014 HEMATOCRIT: CPT

## 2020-01-01 PROCEDURE — 83735 ASSAY OF MAGNESIUM: CPT

## 2020-01-01 PROCEDURE — 97530 THERAPEUTIC ACTIVITIES: CPT

## 2020-01-01 PROCEDURE — 76705 ECHO EXAM OF ABDOMEN: CPT

## 2020-01-01 PROCEDURE — 83550 IRON BINDING TEST: CPT

## 2020-01-01 PROCEDURE — 88341 IMHCHEM/IMCYTCHM EA ADD ANTB: CPT

## 2020-01-01 PROCEDURE — 82272 OCCULT BLD FECES 1-3 TESTS: CPT

## 2020-01-01 PROCEDURE — U0003: CPT

## 2020-01-01 PROCEDURE — 74177 CT ABD & PELVIS W/CONTRAST: CPT

## 2020-01-01 PROCEDURE — 82553 CREATINE MB FRACTION: CPT

## 2020-01-01 PROCEDURE — 84295 ASSAY OF SERUM SODIUM: CPT

## 2020-01-01 PROCEDURE — 80197 ASSAY OF TACROLIMUS: CPT

## 2020-01-01 PROCEDURE — P9047: CPT

## 2020-01-01 PROCEDURE — 88342 IMHCHEM/IMCYTCHM 1ST ANTB: CPT | Mod: 26,59

## 2020-01-01 PROCEDURE — 88280 CHROMOSOME KARYOTYPE STUDY: CPT

## 2020-01-01 PROCEDURE — 80048 BASIC METABOLIC PNL TOTAL CA: CPT

## 2020-01-01 PROCEDURE — 85027 COMPLETE CBC AUTOMATED: CPT

## 2020-01-01 PROCEDURE — 88360 TUMOR IMMUNOHISTOCHEM/MANUAL: CPT

## 2020-01-01 PROCEDURE — 82570 ASSAY OF URINE CREATININE: CPT

## 2020-01-01 PROCEDURE — 82803 BLOOD GASES ANY COMBINATION: CPT

## 2020-01-01 PROCEDURE — 88341 IMHCHEM/IMCYTCHM EA ADD ANTB: CPT | Mod: 26,59

## 2020-01-01 PROCEDURE — 88305 TISSUE EXAM BY PATHOLOGIST: CPT | Mod: 26

## 2020-01-01 PROCEDURE — 94640 AIRWAY INHALATION TREATMENT: CPT

## 2020-01-01 PROCEDURE — 76981 USE PARENCHYMA: CPT | Mod: 26

## 2020-01-01 PROCEDURE — 87521 HEPATITIS C PROBE&RVRS TRNSC: CPT

## 2020-01-01 PROCEDURE — 71045 X-RAY EXAM CHEST 1 VIEW: CPT

## 2020-01-01 PROCEDURE — 99213 OFFICE O/P EST LOW 20 MIN: CPT | Mod: 95

## 2020-01-01 PROCEDURE — 83605 ASSAY OF LACTIC ACID: CPT

## 2020-01-01 PROCEDURE — 88364 INSITU HYBRIDIZATION (FISH): CPT

## 2020-01-01 PROCEDURE — 88305 TISSUE EXAM BY PATHOLOGIST: CPT

## 2020-01-01 PROCEDURE — 82247 BILIRUBIN TOTAL: CPT

## 2020-01-01 PROCEDURE — 85045 AUTOMATED RETICULOCYTE COUNT: CPT

## 2020-01-01 PROCEDURE — 84484 ASSAY OF TROPONIN QUANT: CPT

## 2020-01-01 PROCEDURE — 88237 TISSUE CULTURE BONE MARROW: CPT

## 2020-01-01 PROCEDURE — 80180 DRUG SCRN QUAN MYCOPHENOLATE: CPT

## 2020-01-01 PROCEDURE — 99223 1ST HOSP IP/OBS HIGH 75: CPT | Mod: GC

## 2020-01-01 PROCEDURE — 76981 USE PARENCHYMA: CPT

## 2020-01-01 PROCEDURE — 93975 VASCULAR STUDY: CPT

## 2020-01-01 PROCEDURE — 84145 PROCALCITONIN (PCT): CPT

## 2020-01-01 PROCEDURE — 82330 ASSAY OF CALCIUM: CPT

## 2020-01-01 PROCEDURE — 87205 SMEAR GRAM STAIN: CPT

## 2020-01-01 PROCEDURE — 99223 1ST HOSP IP/OBS HIGH 75: CPT

## 2020-01-01 PROCEDURE — 88365 INSITU HYBRIDIZATION (FISH): CPT | Mod: 26,59

## 2020-01-01 PROCEDURE — 88271 CYTOGENETICS DNA PROBE: CPT

## 2020-01-01 PROCEDURE — 85652 RBC SED RATE AUTOMATED: CPT

## 2020-01-01 PROCEDURE — 85610 PROTHROMBIN TIME: CPT

## 2020-01-01 PROCEDURE — C8929: CPT

## 2020-01-01 PROCEDURE — 93975 VASCULAR STUDY: CPT | Mod: 26

## 2020-01-01 PROCEDURE — P9016: CPT

## 2020-01-01 PROCEDURE — 85025 COMPLETE CBC W/AUTO DIFF WBC: CPT

## 2020-01-01 PROCEDURE — 94660 CPAP INITIATION&MGMT: CPT

## 2020-01-01 PROCEDURE — 82962 GLUCOSE BLOOD TEST: CPT

## 2020-01-01 PROCEDURE — 99222 1ST HOSP IP/OBS MODERATE 55: CPT

## 2020-01-01 PROCEDURE — 99291 CRITICAL CARE FIRST HOUR: CPT | Mod: 25

## 2020-01-01 PROCEDURE — 88360 TUMOR IMMUNOHISTOCHEM/MANUAL: CPT | Mod: 26

## 2020-01-01 PROCEDURE — 84300 ASSAY OF URINE SODIUM: CPT

## 2020-01-01 PROCEDURE — 84540 ASSAY OF URINE/UREA-N: CPT

## 2020-01-01 PROCEDURE — 83540 ASSAY OF IRON: CPT

## 2020-01-01 PROCEDURE — 82607 VITAMIN B-12: CPT

## 2020-01-01 PROCEDURE — 83010 ASSAY OF HAPTOGLOBIN QUANT: CPT

## 2020-01-01 PROCEDURE — 97110 THERAPEUTIC EXERCISES: CPT

## 2020-01-01 PROCEDURE — 88264 CHROMOSOME ANALYSIS 20-25: CPT

## 2020-01-01 PROCEDURE — 86901 BLOOD TYPING SEROLOGIC RH(D): CPT

## 2020-01-01 PROCEDURE — 87040 BLOOD CULTURE FOR BACTERIA: CPT

## 2020-01-01 PROCEDURE — 84100 ASSAY OF PHOSPHORUS: CPT

## 2020-01-01 PROCEDURE — 86850 RBC ANTIBODY SCREEN: CPT

## 2020-01-01 PROCEDURE — 93306 TTE W/DOPPLER COMPLETE: CPT

## 2020-01-01 PROCEDURE — 88313 SPECIAL STAINS GROUP 2: CPT | Mod: 26

## 2020-01-01 PROCEDURE — 86140 C-REACTIVE PROTEIN: CPT

## 2020-01-01 PROCEDURE — 82947 ASSAY GLUCOSE BLOOD QUANT: CPT

## 2020-01-01 PROCEDURE — 81003 URINALYSIS AUTO W/O SCOPE: CPT

## 2020-01-01 PROCEDURE — 76705 ECHO EXAM OF ABDOMEN: CPT | Mod: 26

## 2020-01-01 PROCEDURE — 88275 CYTOGENETICS 100-300: CPT

## 2020-01-01 PROCEDURE — 99291 CRITICAL CARE FIRST HOUR: CPT | Mod: CS,GC

## 2020-01-01 PROCEDURE — 88185 FLOWCYTOMETRY/TC ADD-ON: CPT

## 2020-01-01 PROCEDURE — 84681 ASSAY OF C-PEPTIDE: CPT

## 2020-01-01 PROCEDURE — 71250 CT THORAX DX C-: CPT

## 2020-01-01 PROCEDURE — 84133 ASSAY OF URINE POTASSIUM: CPT

## 2020-01-01 PROCEDURE — 82565 ASSAY OF CREATININE: CPT

## 2020-01-01 PROCEDURE — 97116 GAIT TRAINING THERAPY: CPT

## 2020-01-01 PROCEDURE — 88364 INSITU HYBRIDIZATION (FISH): CPT | Mod: 26

## 2020-01-01 PROCEDURE — 96374 THER/PROPH/DIAG INJ IV PUSH: CPT

## 2020-01-01 PROCEDURE — 96375 TX/PRO/DX INJ NEW DRUG ADDON: CPT

## 2020-01-01 RX ORDER — INSULIN LISPRO 100/ML
VIAL (ML) SUBCUTANEOUS AT BEDTIME
Refills: 0 | Status: DISCONTINUED | OUTPATIENT
Start: 2020-01-01 | End: 2020-01-01

## 2020-01-01 RX ORDER — IRON SUCROSE 20 MG/ML
200 INJECTION, SOLUTION INTRAVENOUS EVERY 24 HOURS
Refills: 0 | Status: COMPLETED | OUTPATIENT
Start: 2020-01-01 | End: 2020-01-01

## 2020-01-01 RX ORDER — SODIUM CHLORIDE 9 MG/ML
1000 INJECTION, SOLUTION INTRAVENOUS
Refills: 0 | Status: DISCONTINUED | OUTPATIENT
Start: 2020-01-01 | End: 2020-01-01

## 2020-01-01 RX ORDER — PANTOPRAZOLE SODIUM 20 MG/1
80 TABLET, DELAYED RELEASE ORAL ONCE
Refills: 0 | Status: COMPLETED | OUTPATIENT
Start: 2020-01-01 | End: 2020-01-01

## 2020-01-01 RX ORDER — SODIUM CHLORIDE 0.65 %
1 AEROSOL, SPRAY (ML) NASAL DAILY
Refills: 0 | Status: DISCONTINUED | OUTPATIENT
Start: 2020-01-01 | End: 2020-01-01

## 2020-01-01 RX ORDER — INSULIN GLARGINE 100 [IU]/ML
34 INJECTION, SOLUTION SUBCUTANEOUS AT BEDTIME
Refills: 0 | Status: DISCONTINUED | OUTPATIENT
Start: 2020-01-01 | End: 2020-01-01

## 2020-01-01 RX ORDER — FUROSEMIDE 40 MG
80 TABLET ORAL DAILY
Refills: 0 | Status: DISCONTINUED | OUTPATIENT
Start: 2020-01-01 | End: 2020-01-01

## 2020-01-01 RX ORDER — INSULIN GLARGINE 100 [IU]/ML
41 INJECTION, SOLUTION SUBCUTANEOUS AT BEDTIME
Refills: 0 | Status: DISCONTINUED | OUTPATIENT
Start: 2020-01-01 | End: 2020-01-01

## 2020-01-01 RX ORDER — SPIRONOLACTONE 25 MG/1
1 TABLET, FILM COATED ORAL
Qty: 0 | Refills: 0 | DISCHARGE

## 2020-01-01 RX ORDER — HEPARIN SODIUM 5000 [USP'U]/ML
5000 INJECTION INTRAVENOUS; SUBCUTANEOUS EVERY 8 HOURS
Refills: 0 | Status: DISCONTINUED | OUTPATIENT
Start: 2020-01-01 | End: 2020-01-01

## 2020-01-01 RX ORDER — DEXTROSE 50 % IN WATER 50 %
15 SYRINGE (ML) INTRAVENOUS ONCE
Refills: 0 | Status: DISCONTINUED | OUTPATIENT
Start: 2020-01-01 | End: 2020-01-01

## 2020-01-01 RX ORDER — TIOTROPIUM BROMIDE 18 UG/1
1 CAPSULE ORAL; RESPIRATORY (INHALATION) DAILY
Refills: 0 | Status: DISCONTINUED | OUTPATIENT
Start: 2020-01-01 | End: 2020-01-01

## 2020-01-01 RX ORDER — MULTIVIT-MIN/FERROUS GLUCONATE 9 MG/15 ML
1 LIQUID (ML) ORAL DAILY
Refills: 0 | Status: DISCONTINUED | OUTPATIENT
Start: 2020-01-01 | End: 2020-01-01

## 2020-01-01 RX ORDER — NYSTATIN CREAM 100000 [USP'U]/G
1 CREAM TOPICAL
Refills: 0 | Status: DISCONTINUED | OUTPATIENT
Start: 2020-01-01 | End: 2020-01-01

## 2020-01-01 RX ORDER — INSULIN LISPRO 100/ML
VIAL (ML) SUBCUTANEOUS
Refills: 0 | Status: DISCONTINUED | OUTPATIENT
Start: 2020-01-01 | End: 2020-01-01

## 2020-01-01 RX ORDER — FUROSEMIDE 40 MG
1 TABLET ORAL
Qty: 0 | Refills: 0 | DISCHARGE

## 2020-01-01 RX ORDER — INSULIN GLARGINE 100 [IU]/ML
45 INJECTION, SOLUTION SUBCUTANEOUS
Qty: 0 | Refills: 0 | DISCHARGE
Start: 2020-01-01

## 2020-01-01 RX ORDER — SERTRALINE 25 MG/1
100 TABLET, FILM COATED ORAL DAILY
Refills: 0 | Status: DISCONTINUED | OUTPATIENT
Start: 2020-01-01 | End: 2020-01-01

## 2020-01-01 RX ORDER — TACROLIMUS 5 MG/1
1 CAPSULE ORAL
Qty: 0 | Refills: 0 | DISCHARGE
Start: 2020-01-01

## 2020-01-01 RX ORDER — INSULIN GLARGINE 100 [IU]/ML
16 INJECTION, SOLUTION SUBCUTANEOUS EVERY MORNING
Refills: 0 | Status: DISCONTINUED | OUTPATIENT
Start: 2020-01-01 | End: 2020-01-01

## 2020-01-01 RX ORDER — ASPIRIN/CALCIUM CARB/MAGNESIUM 324 MG
81 TABLET ORAL DAILY
Refills: 0 | Status: DISCONTINUED | OUTPATIENT
Start: 2020-01-01 | End: 2020-01-01

## 2020-01-01 RX ORDER — INSULIN GLARGINE 100 [IU]/ML
26 INJECTION, SOLUTION SUBCUTANEOUS
Qty: 0 | Refills: 0 | DISCHARGE
Start: 2020-01-01

## 2020-01-01 RX ORDER — HEPARIN SODIUM 5000 [USP'U]/ML
5000 INJECTION INTRAVENOUS; SUBCUTANEOUS ONCE
Refills: 0 | Status: COMPLETED | OUTPATIENT
Start: 2020-01-01 | End: 2020-01-01

## 2020-01-01 RX ORDER — FUROSEMIDE 40 MG
80 TABLET ORAL ONCE
Refills: 0 | Status: COMPLETED | OUTPATIENT
Start: 2020-01-01 | End: 2020-01-01

## 2020-01-01 RX ORDER — TACROLIMUS 5 MG/1
1 CAPSULE ORAL
Qty: 0 | Refills: 0 | DISCHARGE

## 2020-01-01 RX ORDER — DEXTROSE 50 % IN WATER 50 %
25 SYRINGE (ML) INTRAVENOUS ONCE
Refills: 0 | Status: DISCONTINUED | OUTPATIENT
Start: 2020-01-01 | End: 2020-01-01

## 2020-01-01 RX ORDER — FERROUS SULFATE 325(65) MG
325 TABLET ORAL
Refills: 0 | Status: DISCONTINUED | OUTPATIENT
Start: 2020-01-01 | End: 2020-01-01

## 2020-01-01 RX ORDER — FUROSEMIDE 40 MG
1 TABLET ORAL
Qty: 0 | Refills: 0 | DISCHARGE
Start: 2020-01-01

## 2020-01-01 RX ORDER — DEXTROSE 50 % IN WATER 50 %
12.5 SYRINGE (ML) INTRAVENOUS ONCE
Refills: 0 | Status: DISCONTINUED | OUTPATIENT
Start: 2020-01-01 | End: 2020-01-01

## 2020-01-01 RX ORDER — FUROSEMIDE 40 MG
80 TABLET ORAL EVERY 12 HOURS
Refills: 0 | Status: DISCONTINUED | OUTPATIENT
Start: 2020-01-01 | End: 2020-01-01

## 2020-01-01 RX ORDER — GLUCAGON INJECTION, SOLUTION 0.5 MG/.1ML
1 INJECTION, SOLUTION SUBCUTANEOUS ONCE
Refills: 0 | Status: DISCONTINUED | OUTPATIENT
Start: 2020-01-01 | End: 2020-01-01

## 2020-01-01 RX ORDER — ENOXAPARIN SODIUM 100 MG/ML
20 INJECTION SUBCUTANEOUS
Qty: 0 | Refills: 0 | DISCHARGE

## 2020-01-01 RX ORDER — IPRATROPIUM/ALBUTEROL SULFATE 18-103MCG
3 AEROSOL WITH ADAPTER (GRAM) INHALATION
Qty: 0 | Refills: 0 | DISCHARGE
Start: 2020-01-01

## 2020-01-01 RX ORDER — INSULIN GLARGINE 100 [IU]/ML
22 INJECTION, SOLUTION SUBCUTANEOUS EVERY MORNING
Refills: 0 | Status: DISCONTINUED | OUTPATIENT
Start: 2020-01-01 | End: 2020-01-01

## 2020-01-01 RX ORDER — SERTRALINE 25 MG/1
1 TABLET, FILM COATED ORAL
Qty: 0 | Refills: 0 | DISCHARGE
Start: 2020-01-01

## 2020-01-01 RX ORDER — TAMSULOSIN HYDROCHLORIDE 0.4 MG/1
1 CAPSULE ORAL
Qty: 0 | Refills: 0 | DISCHARGE

## 2020-01-01 RX ORDER — NYSTATIN CREAM 100000 [USP'U]/G
1 CREAM TOPICAL
Qty: 0 | Refills: 0 | DISCHARGE
Start: 2020-01-01

## 2020-01-01 RX ORDER — ALBUTEROL 90 UG/1
3 AEROSOL, METERED ORAL
Qty: 0 | Refills: 0 | DISCHARGE

## 2020-01-01 RX ORDER — SPIRONOLACTONE 25 MG/1
25 TABLET, FILM COATED ORAL
Refills: 0 | Status: DISCONTINUED | OUTPATIENT
Start: 2020-01-01 | End: 2020-01-01

## 2020-01-01 RX ORDER — INSULIN GLARGINE 100 [IU]/ML
26 INJECTION, SOLUTION SUBCUTANEOUS EVERY MORNING
Refills: 0 | Status: DISCONTINUED | OUTPATIENT
Start: 2020-01-01 | End: 2020-01-01

## 2020-01-01 RX ORDER — PANTOPRAZOLE SODIUM 20 MG/1
40 TABLET, DELAYED RELEASE ORAL
Refills: 0 | Status: DISCONTINUED | OUTPATIENT
Start: 2020-01-01 | End: 2020-01-01

## 2020-01-01 RX ORDER — DOCUSATE SODIUM 100 MG
1 CAPSULE ORAL
Qty: 0 | Refills: 0 | DISCHARGE

## 2020-01-01 RX ORDER — TAMSULOSIN HYDROCHLORIDE 0.4 MG/1
0.4 CAPSULE ORAL AT BEDTIME
Refills: 0 | Status: DISCONTINUED | OUTPATIENT
Start: 2020-01-01 | End: 2020-01-01

## 2020-01-01 RX ORDER — INSULIN GLARGINE 100 [IU]/ML
38 INJECTION, SOLUTION SUBCUTANEOUS AT BEDTIME
Refills: 0 | Status: DISCONTINUED | OUTPATIENT
Start: 2020-01-01 | End: 2020-01-01

## 2020-01-01 RX ORDER — BUPRENORPHINE 10 UG/H
2 PATCH, EXTENDED RELEASE TRANSDERMAL
Refills: 0 | Status: DISCONTINUED | OUTPATIENT
Start: 2020-01-01 | End: 2020-01-01

## 2020-01-01 RX ORDER — SENNA PLUS 8.6 MG/1
2 TABLET ORAL
Qty: 0 | Refills: 0 | DISCHARGE
Start: 2020-01-01

## 2020-01-01 RX ORDER — SENNA PLUS 8.6 MG/1
2 TABLET ORAL AT BEDTIME
Refills: 0 | Status: DISCONTINUED | OUTPATIENT
Start: 2020-01-01 | End: 2020-01-01

## 2020-01-01 RX ORDER — BUDESONIDE AND FORMOTEROL FUMARATE DIHYDRATE 160; 4.5 UG/1; UG/1
2 AEROSOL RESPIRATORY (INHALATION)
Refills: 0 | Status: DISCONTINUED | OUTPATIENT
Start: 2020-01-01 | End: 2020-01-01

## 2020-01-01 RX ORDER — BUPRENORPHINE AND NALOXONE 2; .5 MG/1; MG/1
1 TABLET SUBLINGUAL DAILY
Refills: 0 | Status: DISCONTINUED | OUTPATIENT
Start: 2020-01-01 | End: 2020-01-01

## 2020-01-01 RX ORDER — MYCOPHENOLATE MOFETIL 500 MG/1
500 TABLET ORAL
Qty: 90 | Refills: 3 | Status: ACTIVE | COMMUNITY
Start: 2018-09-18 | End: 1900-01-01

## 2020-01-01 RX ORDER — FERROUS SULFATE 325(65) MG
325 TABLET ORAL DAILY
Refills: 0 | Status: DISCONTINUED | OUTPATIENT
Start: 2020-01-01 | End: 2020-01-01

## 2020-01-01 RX ORDER — FUROSEMIDE 40 MG
2 TABLET ORAL
Qty: 0 | Refills: 0 | DISCHARGE

## 2020-01-01 RX ORDER — TACROLIMUS 5 MG/1
0.5 CAPSULE ORAL
Refills: 0 | Status: DISCONTINUED | OUTPATIENT
Start: 2020-01-01 | End: 2020-01-01

## 2020-01-01 RX ORDER — FERROUS SULFATE 325(65) MG
1 TABLET ORAL
Qty: 60 | Refills: 0
Start: 2020-01-01

## 2020-01-01 RX ORDER — INSULIN GLARGINE 100 [IU]/ML
26 INJECTION, SOLUTION SUBCUTANEOUS
Refills: 0 | Status: DISCONTINUED | OUTPATIENT
Start: 2020-01-01 | End: 2020-01-01

## 2020-01-01 RX ORDER — BUPRENORPHINE AND NALOXONE 2; .5 MG/1; MG/1
1 TABLET SUBLINGUAL
Refills: 0 | Status: DISCONTINUED | OUTPATIENT
Start: 2020-01-01 | End: 2020-01-01

## 2020-01-01 RX ORDER — MYCOPHENOLATE MOFETIL 250 MG/1
500 CAPSULE ORAL DAILY
Refills: 0 | Status: DISCONTINUED | OUTPATIENT
Start: 2020-01-01 | End: 2020-01-01

## 2020-01-01 RX ORDER — METOPROLOL TARTRATE 50 MG
25 TABLET ORAL DAILY
Refills: 0 | Status: DISCONTINUED | OUTPATIENT
Start: 2020-01-01 | End: 2020-01-01

## 2020-01-01 RX ORDER — INSULIN GLARGINE 100 [IU]/ML
19 INJECTION, SOLUTION SUBCUTANEOUS EVERY MORNING
Refills: 0 | Status: DISCONTINUED | OUTPATIENT
Start: 2020-01-01 | End: 2020-01-01

## 2020-01-01 RX ORDER — ENOXAPARIN SODIUM 100 MG/ML
26 INJECTION SUBCUTANEOUS
Qty: 0 | Refills: 0 | DISCHARGE

## 2020-01-01 RX ORDER — TAMSULOSIN HYDROCHLORIDE 0.4 MG/1
1 CAPSULE ORAL
Qty: 0 | Refills: 0 | DISCHARGE
Start: 2020-01-01

## 2020-01-01 RX ORDER — LIDOCAINE HCL 20 MG/ML
20 VIAL (ML) INJECTION ONCE
Refills: 0 | Status: COMPLETED | OUTPATIENT
Start: 2020-01-01 | End: 2020-01-01

## 2020-01-01 RX ORDER — ENOXAPARIN SODIUM 100 MG/ML
70 INJECTION SUBCUTANEOUS
Qty: 0 | Refills: 0 | DISCHARGE
Start: 2020-01-01

## 2020-01-01 RX ORDER — BUPRENORPHINE AND NALOXONE 2; .5 MG/1; MG/1
0 TABLET SUBLINGUAL
Qty: 0 | Refills: 0 | DISCHARGE
Start: 2020-01-01

## 2020-01-01 RX ORDER — CALCIUM CARBONATE 500(1250)
1 TABLET ORAL
Qty: 0 | Refills: 0 | DISCHARGE

## 2020-01-01 RX ORDER — INSULIN LISPRO 100/ML
18 VIAL (ML) SUBCUTANEOUS
Qty: 0 | Refills: 0 | DISCHARGE

## 2020-01-01 RX ORDER — ALBUTEROL 90 UG/1
2 AEROSOL, METERED ORAL EVERY 6 HOURS
Refills: 0 | Status: DISCONTINUED | OUTPATIENT
Start: 2020-01-01 | End: 2020-01-01

## 2020-01-01 RX ORDER — BUPRENORPHINE 10 UG/H
1 PATCH, EXTENDED RELEASE TRANSDERMAL
Qty: 0 | Refills: 0 | DISCHARGE

## 2020-01-01 RX ORDER — ALBUMIN HUMAN 25 %
50 VIAL (ML) INTRAVENOUS EVERY 8 HOURS
Refills: 0 | Status: COMPLETED | OUTPATIENT
Start: 2020-01-01 | End: 2020-01-01

## 2020-01-01 RX ORDER — IPRATROPIUM/ALBUTEROL SULFATE 18-103MCG
3 AEROSOL WITH ADAPTER (GRAM) INHALATION EVERY 6 HOURS
Refills: 0 | Status: DISCONTINUED | OUTPATIENT
Start: 2020-01-01 | End: 2020-01-01

## 2020-01-01 RX ORDER — ACETAZOLAMIDE 250 MG/1
500 TABLET ORAL ONCE
Refills: 0 | Status: COMPLETED | OUTPATIENT
Start: 2020-01-01 | End: 2020-01-01

## 2020-01-01 RX ORDER — BUDESONIDE, MICRONIZED 100 %
0.5 POWDER (GRAM) MISCELLANEOUS EVERY 12 HOURS
Refills: 0 | Status: DISCONTINUED | OUTPATIENT
Start: 2020-01-01 | End: 2020-01-01

## 2020-01-01 RX ORDER — INSULIN GLARGINE 100 [IU]/ML
45 INJECTION, SOLUTION SUBCUTANEOUS
Refills: 0 | Status: DISCONTINUED | OUTPATIENT
Start: 2020-01-01 | End: 2020-01-01

## 2020-01-01 RX ORDER — SERTRALINE 25 MG/1
1 TABLET, FILM COATED ORAL
Qty: 0 | Refills: 0 | DISCHARGE

## 2020-01-01 RX ORDER — INSULIN GLARGINE 100 [IU]/ML
17 INJECTION, SOLUTION SUBCUTANEOUS AT BEDTIME
Refills: 0 | Status: DISCONTINUED | OUTPATIENT
Start: 2020-01-01 | End: 2020-01-01

## 2020-01-01 RX ORDER — MYCOPHENOLATE MOFETIL 250 MG/1
500 CAPSULE ORAL
Refills: 0 | Status: DISCONTINUED | OUTPATIENT
Start: 2020-01-01 | End: 2020-01-01

## 2020-01-01 RX ORDER — LIDOCAINE 4 G/100G
1 CREAM TOPICAL ONCE
Refills: 0 | Status: COMPLETED | OUTPATIENT
Start: 2020-01-01 | End: 2020-01-01

## 2020-01-01 RX ORDER — CEFAZOLIN SODIUM 1 G
2000 VIAL (EA) INJECTION EVERY 8 HOURS
Refills: 0 | Status: COMPLETED | OUTPATIENT
Start: 2020-01-01 | End: 2020-01-01

## 2020-01-01 RX ORDER — FUROSEMIDE 40 MG
80 TABLET ORAL
Refills: 0 | Status: DISCONTINUED | OUTPATIENT
Start: 2020-01-01 | End: 2020-01-01

## 2020-01-01 RX ORDER — ACETAZOLAMIDE 250 MG/1
500 TABLET ORAL ONCE
Refills: 0 | Status: DISCONTINUED | OUTPATIENT
Start: 2020-01-01 | End: 2020-01-01

## 2020-01-01 RX ORDER — MAGNESIUM OXIDE 400 MG ORAL TABLET 241.3 MG
1 TABLET ORAL
Qty: 0 | Refills: 0 | DISCHARGE

## 2020-01-01 RX ORDER — MULTIVIT-MIN/FERROUS GLUCONATE 9 MG/15 ML
1 LIQUID (ML) ORAL
Qty: 0 | Refills: 0 | DISCHARGE
Start: 2020-01-01

## 2020-01-01 RX ORDER — MULTIVIT-MIN/FERROUS GLUCONATE 9 MG/15 ML
1 LIQUID (ML) ORAL
Qty: 0 | Refills: 0 | DISCHARGE

## 2020-01-01 RX ORDER — INSULIN GLARGINE 100 [IU]/ML
45 INJECTION, SOLUTION SUBCUTANEOUS AT BEDTIME
Refills: 0 | Status: DISCONTINUED | OUTPATIENT
Start: 2020-01-01 | End: 2020-01-01

## 2020-01-01 RX ORDER — PANTOPRAZOLE SODIUM 20 MG/1
1 TABLET, DELAYED RELEASE ORAL
Qty: 0 | Refills: 0 | DISCHARGE
Start: 2020-01-01

## 2020-01-01 RX ORDER — FUROSEMIDE 40 MG
40 TABLET ORAL
Refills: 0 | Status: DISCONTINUED | OUTPATIENT
Start: 2020-01-01 | End: 2020-01-01

## 2020-01-01 RX ORDER — SODIUM CHLORIDE 0.65 %
0 AEROSOL, SPRAY (ML) NASAL
Qty: 0 | Refills: 0 | DISCHARGE
Start: 2020-01-01

## 2020-01-01 RX ADMIN — Medication 1 SPRAY(S): at 12:53

## 2020-01-01 RX ADMIN — NYSTATIN CREAM 1 APPLICATION(S): 100000 CREAM TOPICAL at 11:43

## 2020-01-01 RX ADMIN — Medication 100 MILLIGRAM(S): at 05:16

## 2020-01-01 RX ADMIN — Medication 2: at 12:39

## 2020-01-01 RX ADMIN — Medication 25 MILLIGRAM(S): at 06:18

## 2020-01-01 RX ADMIN — Medication 25 MILLIGRAM(S): at 05:03

## 2020-01-01 RX ADMIN — ALBUTEROL 2 PUFF(S): 90 AEROSOL, METERED ORAL at 17:41

## 2020-01-01 RX ADMIN — ALBUTEROL 2 PUFF(S): 90 AEROSOL, METERED ORAL at 17:22

## 2020-01-01 RX ADMIN — Medication 1 TABLET(S): at 12:57

## 2020-01-01 RX ADMIN — INSULIN GLARGINE 26 UNIT(S): 100 INJECTION, SOLUTION SUBCUTANEOUS at 09:24

## 2020-01-01 RX ADMIN — Medication 80 MILLIGRAM(S): at 17:14

## 2020-01-01 RX ADMIN — Medication 1 SPRAY(S): at 12:16

## 2020-01-01 RX ADMIN — BUPRENORPHINE AND NALOXONE 1 TABLET(S): 2; .5 TABLET SUBLINGUAL at 12:01

## 2020-01-01 RX ADMIN — Medication 25 MILLIGRAM(S): at 06:21

## 2020-01-01 RX ADMIN — Medication 1: at 08:33

## 2020-01-01 RX ADMIN — ALBUTEROL 2 PUFF(S): 90 AEROSOL, METERED ORAL at 21:28

## 2020-01-01 RX ADMIN — Medication 25 MILLIGRAM(S): at 05:57

## 2020-01-01 RX ADMIN — LIDOCAINE 1 PATCH: 4 CREAM TOPICAL at 19:08

## 2020-01-01 RX ADMIN — TACROLIMUS 0.5 MILLIGRAM(S): 5 CAPSULE ORAL at 05:39

## 2020-01-01 RX ADMIN — TAMSULOSIN HYDROCHLORIDE 0.4 MILLIGRAM(S): 0.4 CAPSULE ORAL at 21:49

## 2020-01-01 RX ADMIN — Medication 80 MILLIGRAM(S): at 05:57

## 2020-01-01 RX ADMIN — HEPARIN SODIUM 5000 UNIT(S): 5000 INJECTION INTRAVENOUS; SUBCUTANEOUS at 05:51

## 2020-01-01 RX ADMIN — TACROLIMUS 0.5 MILLIGRAM(S): 5 CAPSULE ORAL at 05:07

## 2020-01-01 RX ADMIN — Medication 1: at 08:24

## 2020-01-01 RX ADMIN — Medication 100 MILLIGRAM(S): at 14:34

## 2020-01-01 RX ADMIN — INSULIN GLARGINE 19 UNIT(S): 100 INJECTION, SOLUTION SUBCUTANEOUS at 08:42

## 2020-01-01 RX ADMIN — Medication 100 MILLIGRAM(S): at 06:18

## 2020-01-01 RX ADMIN — NYSTATIN CREAM 1 APPLICATION(S): 100000 CREAM TOPICAL at 05:01

## 2020-01-01 RX ADMIN — TACROLIMUS 0.5 MILLIGRAM(S): 5 CAPSULE ORAL at 17:34

## 2020-01-01 RX ADMIN — TIOTROPIUM BROMIDE 1 CAPSULE(S): 18 CAPSULE ORAL; RESPIRATORY (INHALATION) at 12:20

## 2020-01-01 RX ADMIN — Medication 2: at 08:38

## 2020-01-01 RX ADMIN — HEPARIN SODIUM 5000 UNIT(S): 5000 INJECTION INTRAVENOUS; SUBCUTANEOUS at 13:02

## 2020-01-01 RX ADMIN — PANTOPRAZOLE SODIUM 40 MILLIGRAM(S): 20 TABLET, DELAYED RELEASE ORAL at 17:34

## 2020-01-01 RX ADMIN — TACROLIMUS 0.5 MILLIGRAM(S): 5 CAPSULE ORAL at 17:27

## 2020-01-01 RX ADMIN — Medication 4: at 12:38

## 2020-01-01 RX ADMIN — NYSTATIN CREAM 1 APPLICATION(S): 100000 CREAM TOPICAL at 06:19

## 2020-01-01 RX ADMIN — TIOTROPIUM BROMIDE 1 CAPSULE(S): 18 CAPSULE ORAL; RESPIRATORY (INHALATION) at 13:02

## 2020-01-01 RX ADMIN — TAMSULOSIN HYDROCHLORIDE 0.4 MILLIGRAM(S): 0.4 CAPSULE ORAL at 22:21

## 2020-01-01 RX ADMIN — HEPARIN SODIUM 5000 UNIT(S): 5000 INJECTION INTRAVENOUS; SUBCUTANEOUS at 22:29

## 2020-01-01 RX ADMIN — Medication 1 SPRAY(S): at 11:43

## 2020-01-01 RX ADMIN — Medication 25 MILLIGRAM(S): at 06:13

## 2020-01-01 RX ADMIN — SERTRALINE 100 MILLIGRAM(S): 25 TABLET, FILM COATED ORAL at 14:48

## 2020-01-01 RX ADMIN — Medication 3 MILLIGRAM(S): at 12:01

## 2020-01-01 RX ADMIN — HEPARIN SODIUM 5000 UNIT(S): 5000 INJECTION INTRAVENOUS; SUBCUTANEOUS at 06:31

## 2020-01-01 RX ADMIN — TACROLIMUS 0.5 MILLIGRAM(S): 5 CAPSULE ORAL at 06:39

## 2020-01-01 RX ADMIN — BUDESONIDE AND FORMOTEROL FUMARATE DIHYDRATE 2 PUFF(S): 160; 4.5 AEROSOL RESPIRATORY (INHALATION) at 05:47

## 2020-01-01 RX ADMIN — Medication 4: at 08:58

## 2020-01-01 RX ADMIN — HEPARIN SODIUM 5000 UNIT(S): 5000 INJECTION INTRAVENOUS; SUBCUTANEOUS at 05:09

## 2020-01-01 RX ADMIN — Medication 3 MILLIGRAM(S): at 13:01

## 2020-01-01 RX ADMIN — INSULIN GLARGINE 26 UNIT(S): 100 INJECTION, SOLUTION SUBCUTANEOUS at 09:10

## 2020-01-01 RX ADMIN — INSULIN GLARGINE 38 UNIT(S): 100 INJECTION, SOLUTION SUBCUTANEOUS at 22:09

## 2020-01-01 RX ADMIN — BUPRENORPHINE AND NALOXONE 1 TABLET(S): 2; .5 TABLET SUBLINGUAL at 12:38

## 2020-01-01 RX ADMIN — MYCOPHENOLATE MOFETIL 500 MILLIGRAM(S): 250 CAPSULE ORAL at 13:02

## 2020-01-01 RX ADMIN — BUPRENORPHINE AND NALOXONE 1 TABLET(S): 2; .5 TABLET SUBLINGUAL at 08:38

## 2020-01-01 RX ADMIN — MYCOPHENOLATE MOFETIL 500 MILLIGRAM(S): 250 CAPSULE ORAL at 06:19

## 2020-01-01 RX ADMIN — Medication 1 SPRAY(S): at 12:27

## 2020-01-01 RX ADMIN — Medication 4: at 08:40

## 2020-01-01 RX ADMIN — NYSTATIN CREAM 1 APPLICATION(S): 100000 CREAM TOPICAL at 17:23

## 2020-01-01 RX ADMIN — Medication 40 MILLIGRAM(S): at 18:09

## 2020-01-01 RX ADMIN — INSULIN GLARGINE 19 UNIT(S): 100 INJECTION, SOLUTION SUBCUTANEOUS at 08:40

## 2020-01-01 RX ADMIN — TACROLIMUS 0.5 MILLIGRAM(S): 5 CAPSULE ORAL at 06:13

## 2020-01-01 RX ADMIN — Medication 325 MILLIGRAM(S): at 12:17

## 2020-01-01 RX ADMIN — MYCOPHENOLATE MOFETIL 500 MILLIGRAM(S): 250 CAPSULE ORAL at 12:38

## 2020-01-01 RX ADMIN — Medication 3: at 12:12

## 2020-01-01 RX ADMIN — Medication 50 MILLILITER(S): at 22:34

## 2020-01-01 RX ADMIN — SERTRALINE 100 MILLIGRAM(S): 25 TABLET, FILM COATED ORAL at 13:04

## 2020-01-01 RX ADMIN — Medication 100 MILLIGRAM(S): at 14:46

## 2020-01-01 RX ADMIN — SERTRALINE 100 MILLIGRAM(S): 25 TABLET, FILM COATED ORAL at 12:16

## 2020-01-01 RX ADMIN — TACROLIMUS 0.5 MILLIGRAM(S): 5 CAPSULE ORAL at 05:03

## 2020-01-01 RX ADMIN — Medication 1: at 22:04

## 2020-01-01 RX ADMIN — PANTOPRAZOLE SODIUM 40 MILLIGRAM(S): 20 TABLET, DELAYED RELEASE ORAL at 05:01

## 2020-01-01 RX ADMIN — NYSTATIN CREAM 1 APPLICATION(S): 100000 CREAM TOPICAL at 06:15

## 2020-01-01 RX ADMIN — HEPARIN SODIUM 5000 UNIT(S): 5000 INJECTION INTRAVENOUS; SUBCUTANEOUS at 13:05

## 2020-01-01 RX ADMIN — SPIRONOLACTONE 25 MILLIGRAM(S): 25 TABLET, FILM COATED ORAL at 16:43

## 2020-01-01 RX ADMIN — BUPRENORPHINE AND NALOXONE 1 TABLET(S): 2; .5 TABLET SUBLINGUAL at 09:01

## 2020-01-01 RX ADMIN — BUPRENORPHINE AND NALOXONE 1 TABLET(S): 2; .5 TABLET SUBLINGUAL at 20:09

## 2020-01-01 RX ADMIN — BUPRENORPHINE AND NALOXONE 1 TABLET(S): 2; .5 TABLET SUBLINGUAL at 08:57

## 2020-01-01 RX ADMIN — TAMSULOSIN HYDROCHLORIDE 0.4 MILLIGRAM(S): 0.4 CAPSULE ORAL at 21:54

## 2020-01-01 RX ADMIN — BUPRENORPHINE AND NALOXONE 1 TABLET(S): 2; .5 TABLET SUBLINGUAL at 20:57

## 2020-01-01 RX ADMIN — Medication 1 TABLET(S): at 12:55

## 2020-01-01 RX ADMIN — SERTRALINE 100 MILLIGRAM(S): 25 TABLET, FILM COATED ORAL at 12:26

## 2020-01-01 RX ADMIN — MYCOPHENOLATE MOFETIL 500 MILLIGRAM(S): 250 CAPSULE ORAL at 12:21

## 2020-01-01 RX ADMIN — NYSTATIN CREAM 1 APPLICATION(S): 100000 CREAM TOPICAL at 17:27

## 2020-01-01 RX ADMIN — HEPARIN SODIUM 5000 UNIT(S): 5000 INJECTION INTRAVENOUS; SUBCUTANEOUS at 16:04

## 2020-01-01 RX ADMIN — BUPRENORPHINE AND NALOXONE 1 TABLET(S): 2; .5 TABLET SUBLINGUAL at 06:14

## 2020-01-01 RX ADMIN — Medication 80 MILLIGRAM(S): at 06:03

## 2020-01-01 RX ADMIN — Medication 1 SPRAY(S): at 12:36

## 2020-01-01 RX ADMIN — BUPRENORPHINE AND NALOXONE 1 TABLET(S): 2; .5 TABLET SUBLINGUAL at 17:50

## 2020-01-01 RX ADMIN — Medication 1 TABLET(S): at 10:16

## 2020-01-01 RX ADMIN — TACROLIMUS 0.5 MILLIGRAM(S): 5 CAPSULE ORAL at 06:15

## 2020-01-01 RX ADMIN — TIOTROPIUM BROMIDE 1 CAPSULE(S): 18 CAPSULE ORAL; RESPIRATORY (INHALATION) at 12:13

## 2020-01-01 RX ADMIN — HEPARIN SODIUM 5000 UNIT(S): 5000 INJECTION INTRAVENOUS; SUBCUTANEOUS at 21:54

## 2020-01-01 RX ADMIN — TACROLIMUS 0.5 MILLIGRAM(S): 5 CAPSULE ORAL at 17:54

## 2020-01-01 RX ADMIN — INSULIN GLARGINE 26 UNIT(S): 100 INJECTION, SOLUTION SUBCUTANEOUS at 09:12

## 2020-01-01 RX ADMIN — Medication 2: at 12:16

## 2020-01-01 RX ADMIN — HEPARIN SODIUM 5000 UNIT(S): 5000 INJECTION INTRAVENOUS; SUBCUTANEOUS at 21:50

## 2020-01-01 RX ADMIN — TAMSULOSIN HYDROCHLORIDE 0.4 MILLIGRAM(S): 0.4 CAPSULE ORAL at 21:00

## 2020-01-01 RX ADMIN — PANTOPRAZOLE SODIUM 40 MILLIGRAM(S): 20 TABLET, DELAYED RELEASE ORAL at 05:57

## 2020-01-01 RX ADMIN — TAMSULOSIN HYDROCHLORIDE 0.4 MILLIGRAM(S): 0.4 CAPSULE ORAL at 22:43

## 2020-01-01 RX ADMIN — Medication 25 MILLIGRAM(S): at 05:39

## 2020-01-01 RX ADMIN — Medication 2: at 08:47

## 2020-01-01 RX ADMIN — Medication 100 MILLIGRAM(S): at 22:09

## 2020-01-01 RX ADMIN — Medication 1 TABLET(S): at 12:14

## 2020-01-01 RX ADMIN — PANTOPRAZOLE SODIUM 40 MILLIGRAM(S): 20 TABLET, DELAYED RELEASE ORAL at 17:55

## 2020-01-01 RX ADMIN — PANTOPRAZOLE SODIUM 40 MILLIGRAM(S): 20 TABLET, DELAYED RELEASE ORAL at 18:07

## 2020-01-01 RX ADMIN — BUPRENORPHINE AND NALOXONE 1 TABLET(S): 2; .5 TABLET SUBLINGUAL at 08:42

## 2020-01-01 RX ADMIN — BUPRENORPHINE AND NALOXONE 1 TABLET(S): 2; .5 TABLET SUBLINGUAL at 09:16

## 2020-01-01 RX ADMIN — SENNA PLUS 2 TABLET(S): 8.6 TABLET ORAL at 21:33

## 2020-01-01 RX ADMIN — BUPRENORPHINE AND NALOXONE 1 TABLET(S): 2; .5 TABLET SUBLINGUAL at 19:59

## 2020-01-01 RX ADMIN — Medication 80 MILLIGRAM(S): at 05:58

## 2020-01-01 RX ADMIN — Medication 1 TABLET(S): at 11:38

## 2020-01-01 RX ADMIN — TACROLIMUS 0.5 MILLIGRAM(S): 5 CAPSULE ORAL at 05:57

## 2020-01-01 RX ADMIN — SERTRALINE 100 MILLIGRAM(S): 25 TABLET, FILM COATED ORAL at 12:19

## 2020-01-01 RX ADMIN — SPIRONOLACTONE 25 MILLIGRAM(S): 25 TABLET, FILM COATED ORAL at 05:57

## 2020-01-01 RX ADMIN — Medication 81 MILLIGRAM(S): at 12:02

## 2020-01-01 RX ADMIN — Medication 80 MILLIGRAM(S): at 17:23

## 2020-01-01 RX ADMIN — Medication 1 SPRAY(S): at 17:23

## 2020-01-01 RX ADMIN — TACROLIMUS 0.5 MILLIGRAM(S): 5 CAPSULE ORAL at 06:11

## 2020-01-01 RX ADMIN — INSULIN GLARGINE 17 UNIT(S): 100 INJECTION, SOLUTION SUBCUTANEOUS at 22:17

## 2020-01-01 RX ADMIN — Medication 2: at 08:53

## 2020-01-01 RX ADMIN — SERTRALINE 100 MILLIGRAM(S): 25 TABLET, FILM COATED ORAL at 11:38

## 2020-01-01 RX ADMIN — Medication 100 MILLIGRAM(S): at 22:20

## 2020-01-01 RX ADMIN — Medication 2: at 22:44

## 2020-01-01 RX ADMIN — SERTRALINE 100 MILLIGRAM(S): 25 TABLET, FILM COATED ORAL at 12:01

## 2020-01-01 RX ADMIN — Medication 3: at 12:53

## 2020-01-01 RX ADMIN — BUPRENORPHINE AND NALOXONE 1 TABLET(S): 2; .5 TABLET SUBLINGUAL at 12:36

## 2020-01-01 RX ADMIN — Medication 25 MILLIGRAM(S): at 05:00

## 2020-01-01 RX ADMIN — ALBUTEROL 2 PUFF(S): 90 AEROSOL, METERED ORAL at 06:08

## 2020-01-01 RX ADMIN — Medication 1 TABLET(S): at 12:52

## 2020-01-01 RX ADMIN — TACROLIMUS 0.5 MILLIGRAM(S): 5 CAPSULE ORAL at 17:00

## 2020-01-01 RX ADMIN — INSULIN GLARGINE 38 UNIT(S): 100 INJECTION, SOLUTION SUBCUTANEOUS at 21:50

## 2020-01-01 RX ADMIN — Medication 4: at 10:20

## 2020-01-01 RX ADMIN — PANTOPRAZOLE SODIUM 40 MILLIGRAM(S): 20 TABLET, DELAYED RELEASE ORAL at 06:15

## 2020-01-01 RX ADMIN — Medication 2: at 21:51

## 2020-01-01 RX ADMIN — IRON SUCROSE 110 MILLIGRAM(S): 20 INJECTION, SOLUTION INTRAVENOUS at 13:05

## 2020-01-01 RX ADMIN — PANTOPRAZOLE SODIUM 40 MILLIGRAM(S): 20 TABLET, DELAYED RELEASE ORAL at 06:13

## 2020-01-01 RX ADMIN — Medication 3 MILLIGRAM(S): at 13:02

## 2020-01-01 RX ADMIN — PANTOPRAZOLE SODIUM 40 MILLIGRAM(S): 20 TABLET, DELAYED RELEASE ORAL at 05:16

## 2020-01-01 RX ADMIN — NYSTATIN CREAM 1 APPLICATION(S): 100000 CREAM TOPICAL at 06:09

## 2020-01-01 RX ADMIN — PANTOPRAZOLE SODIUM 40 MILLIGRAM(S): 20 TABLET, DELAYED RELEASE ORAL at 05:07

## 2020-01-01 RX ADMIN — Medication 1 TABLET(S): at 12:13

## 2020-01-01 RX ADMIN — Medication 3: at 09:06

## 2020-01-01 RX ADMIN — Medication 80 MILLIGRAM(S): at 05:00

## 2020-01-01 RX ADMIN — Medication 3 MILLIGRAM(S): at 12:21

## 2020-01-01 RX ADMIN — Medication 2: at 08:42

## 2020-01-01 RX ADMIN — Medication 1 TABLET(S): at 13:04

## 2020-01-01 RX ADMIN — Medication 40 MILLIGRAM(S): at 06:19

## 2020-01-01 RX ADMIN — Medication 80 MILLIGRAM(S): at 06:18

## 2020-01-01 RX ADMIN — SERTRALINE 100 MILLIGRAM(S): 25 TABLET, FILM COATED ORAL at 12:38

## 2020-01-01 RX ADMIN — INSULIN GLARGINE 45 UNIT(S): 100 INJECTION, SOLUTION SUBCUTANEOUS at 22:28

## 2020-01-01 RX ADMIN — Medication 4: at 12:51

## 2020-01-01 RX ADMIN — INSULIN GLARGINE 22 UNIT(S): 100 INJECTION, SOLUTION SUBCUTANEOUS at 10:17

## 2020-01-01 RX ADMIN — HEPARIN SODIUM 5000 UNIT(S): 5000 INJECTION INTRAVENOUS; SUBCUTANEOUS at 21:57

## 2020-01-01 RX ADMIN — HEPARIN SODIUM 5000 UNIT(S): 5000 INJECTION INTRAVENOUS; SUBCUTANEOUS at 14:34

## 2020-01-01 RX ADMIN — Medication 25 MILLIGRAM(S): at 05:46

## 2020-01-01 RX ADMIN — PANTOPRAZOLE SODIUM 40 MILLIGRAM(S): 20 TABLET, DELAYED RELEASE ORAL at 17:33

## 2020-01-01 RX ADMIN — BUPRENORPHINE AND NALOXONE 1 TABLET(S): 2; .5 TABLET SUBLINGUAL at 08:54

## 2020-01-01 RX ADMIN — Medication 4: at 17:27

## 2020-01-01 RX ADMIN — Medication 2: at 21:52

## 2020-01-01 RX ADMIN — Medication 100 MILLIGRAM(S): at 14:33

## 2020-01-01 RX ADMIN — BUPRENORPHINE AND NALOXONE 1 TABLET(S): 2; .5 TABLET SUBLINGUAL at 23:21

## 2020-01-01 RX ADMIN — Medication 2: at 17:51

## 2020-01-01 RX ADMIN — Medication 1 TABLET(S): at 12:22

## 2020-01-01 RX ADMIN — BUPRENORPHINE AND NALOXONE 1 TABLET(S): 2; .5 TABLET SUBLINGUAL at 13:00

## 2020-01-01 RX ADMIN — BUDESONIDE AND FORMOTEROL FUMARATE DIHYDRATE 2 PUFF(S): 160; 4.5 AEROSOL RESPIRATORY (INHALATION) at 06:03

## 2020-01-01 RX ADMIN — HEPARIN SODIUM 5000 UNIT(S): 5000 INJECTION INTRAVENOUS; SUBCUTANEOUS at 22:18

## 2020-01-01 RX ADMIN — ALBUTEROL 2 PUFF(S): 90 AEROSOL, METERED ORAL at 05:07

## 2020-01-01 RX ADMIN — Medication 80 MILLIGRAM(S): at 05:22

## 2020-01-01 RX ADMIN — BUPRENORPHINE 2 MILLIGRAM(S): 10 PATCH, EXTENDED RELEASE TRANSDERMAL at 10:44

## 2020-01-01 RX ADMIN — TAMSULOSIN HYDROCHLORIDE 0.4 MILLIGRAM(S): 0.4 CAPSULE ORAL at 22:18

## 2020-01-01 RX ADMIN — Medication 80 MILLIGRAM(S): at 16:43

## 2020-01-01 RX ADMIN — BUPRENORPHINE AND NALOXONE 1 TABLET(S): 2; .5 TABLET SUBLINGUAL at 20:10

## 2020-01-01 RX ADMIN — INSULIN GLARGINE 22 UNIT(S): 100 INJECTION, SOLUTION SUBCUTANEOUS at 10:52

## 2020-01-01 RX ADMIN — Medication 1 TABLET(S): at 12:19

## 2020-01-01 RX ADMIN — Medication 25 MILLIGRAM(S): at 06:09

## 2020-01-01 RX ADMIN — HEPARIN SODIUM 5000 UNIT(S): 5000 INJECTION INTRAVENOUS; SUBCUTANEOUS at 05:57

## 2020-01-01 RX ADMIN — Medication 80 MILLIGRAM(S): at 06:08

## 2020-01-01 RX ADMIN — BUPRENORPHINE AND NALOXONE 1 TABLET(S): 2; .5 TABLET SUBLINGUAL at 21:00

## 2020-01-01 RX ADMIN — Medication 1: at 17:16

## 2020-01-01 RX ADMIN — PANTOPRAZOLE SODIUM 40 MILLIGRAM(S): 20 TABLET, DELAYED RELEASE ORAL at 05:43

## 2020-01-01 RX ADMIN — Medication 6: at 17:24

## 2020-01-01 RX ADMIN — Medication 81 MILLIGRAM(S): at 12:52

## 2020-01-01 RX ADMIN — TIOTROPIUM BROMIDE 1 CAPSULE(S): 18 CAPSULE ORAL; RESPIRATORY (INHALATION) at 12:22

## 2020-01-01 RX ADMIN — PANTOPRAZOLE SODIUM 40 MILLIGRAM(S): 20 TABLET, DELAYED RELEASE ORAL at 17:54

## 2020-01-01 RX ADMIN — Medication 4: at 17:22

## 2020-01-01 RX ADMIN — SERTRALINE 100 MILLIGRAM(S): 25 TABLET, FILM COATED ORAL at 11:58

## 2020-01-01 RX ADMIN — BUPRENORPHINE AND NALOXONE 1 TABLET(S): 2; .5 TABLET SUBLINGUAL at 20:14

## 2020-01-01 RX ADMIN — SENNA PLUS 2 TABLET(S): 8.6 TABLET ORAL at 21:23

## 2020-01-01 RX ADMIN — PANTOPRAZOLE SODIUM 40 MILLIGRAM(S): 20 TABLET, DELAYED RELEASE ORAL at 17:27

## 2020-01-01 RX ADMIN — ALBUTEROL 2 PUFF(S): 90 AEROSOL, METERED ORAL at 17:01

## 2020-01-01 RX ADMIN — Medication 1 TABLET(S): at 14:48

## 2020-01-01 RX ADMIN — TACROLIMUS 0.5 MILLIGRAM(S): 5 CAPSULE ORAL at 18:10

## 2020-01-01 RX ADMIN — SPIRONOLACTONE 25 MILLIGRAM(S): 25 TABLET, FILM COATED ORAL at 06:19

## 2020-01-01 RX ADMIN — Medication 1 TABLET(S): at 08:42

## 2020-01-01 RX ADMIN — Medication 80 MILLIGRAM(S): at 05:39

## 2020-01-01 RX ADMIN — SERTRALINE 100 MILLIGRAM(S): 25 TABLET, FILM COATED ORAL at 12:14

## 2020-01-01 RX ADMIN — TIOTROPIUM BROMIDE 1 CAPSULE(S): 18 CAPSULE ORAL; RESPIRATORY (INHALATION) at 12:16

## 2020-01-01 RX ADMIN — Medication 25 MILLIGRAM(S): at 06:24

## 2020-01-01 RX ADMIN — HEPARIN SODIUM 5000 UNIT(S): 5000 INJECTION INTRAVENOUS; SUBCUTANEOUS at 12:21

## 2020-01-01 RX ADMIN — INSULIN GLARGINE 38 UNIT(S): 100 INJECTION, SOLUTION SUBCUTANEOUS at 22:07

## 2020-01-01 RX ADMIN — Medication 6: at 17:27

## 2020-01-01 RX ADMIN — SERTRALINE 100 MILLIGRAM(S): 25 TABLET, FILM COATED ORAL at 12:23

## 2020-01-01 RX ADMIN — Medication 1 TABLET(S): at 12:01

## 2020-01-01 RX ADMIN — INSULIN GLARGINE 16 UNIT(S): 100 INJECTION, SOLUTION SUBCUTANEOUS at 15:19

## 2020-01-01 RX ADMIN — Medication 4: at 08:47

## 2020-01-01 RX ADMIN — TIOTROPIUM BROMIDE 1 CAPSULE(S): 18 CAPSULE ORAL; RESPIRATORY (INHALATION) at 11:42

## 2020-01-01 RX ADMIN — Medication 4: at 17:32

## 2020-01-01 RX ADMIN — TACROLIMUS 0.5 MILLIGRAM(S): 5 CAPSULE ORAL at 17:07

## 2020-01-01 RX ADMIN — Medication 1: at 17:42

## 2020-01-01 RX ADMIN — Medication 4: at 11:42

## 2020-01-01 RX ADMIN — SERTRALINE 100 MILLIGRAM(S): 25 TABLET, FILM COATED ORAL at 12:57

## 2020-01-01 RX ADMIN — TIOTROPIUM BROMIDE 1 CAPSULE(S): 18 CAPSULE ORAL; RESPIRATORY (INHALATION) at 13:04

## 2020-01-01 RX ADMIN — TACROLIMUS 0.5 MILLIGRAM(S): 5 CAPSULE ORAL at 06:09

## 2020-01-01 RX ADMIN — Medication 25 MILLIGRAM(S): at 05:07

## 2020-01-01 RX ADMIN — ALBUTEROL 2 PUFF(S): 90 AEROSOL, METERED ORAL at 05:47

## 2020-01-01 RX ADMIN — BUPRENORPHINE AND NALOXONE 1 TABLET(S): 2; .5 TABLET SUBLINGUAL at 13:01

## 2020-01-01 RX ADMIN — MYCOPHENOLATE MOFETIL 500 MILLIGRAM(S): 250 CAPSULE ORAL at 12:29

## 2020-01-01 RX ADMIN — Medication 50 MILLILITER(S): at 05:16

## 2020-01-01 RX ADMIN — TACROLIMUS 0.5 MILLIGRAM(S): 5 CAPSULE ORAL at 05:22

## 2020-01-01 RX ADMIN — NYSTATIN CREAM 1 APPLICATION(S): 100000 CREAM TOPICAL at 05:51

## 2020-01-01 RX ADMIN — Medication 80 MILLIGRAM(S): at 21:54

## 2020-01-01 RX ADMIN — Medication 1 TABLET(S): at 12:28

## 2020-01-01 RX ADMIN — Medication 1: at 17:38

## 2020-01-01 RX ADMIN — BUPRENORPHINE AND NALOXONE 1 TABLET(S): 2; .5 TABLET SUBLINGUAL at 20:03

## 2020-01-01 RX ADMIN — Medication 100 MILLIGRAM(S): at 22:06

## 2020-01-01 RX ADMIN — TACROLIMUS 0.5 MILLIGRAM(S): 5 CAPSULE ORAL at 06:04

## 2020-01-01 RX ADMIN — HEPARIN SODIUM 5000 UNIT(S): 5000 INJECTION INTRAVENOUS; SUBCUTANEOUS at 05:00

## 2020-01-01 RX ADMIN — Medication 325 MILLIGRAM(S): at 06:07

## 2020-01-01 RX ADMIN — INSULIN GLARGINE 16 UNIT(S): 100 INJECTION, SOLUTION SUBCUTANEOUS at 08:24

## 2020-01-01 RX ADMIN — INSULIN GLARGINE 41 UNIT(S): 100 INJECTION, SOLUTION SUBCUTANEOUS at 22:22

## 2020-01-01 RX ADMIN — ALBUTEROL 2 PUFF(S): 90 AEROSOL, METERED ORAL at 05:23

## 2020-01-01 RX ADMIN — Medication 25 MILLIGRAM(S): at 05:22

## 2020-01-01 RX ADMIN — Medication 80 MILLIGRAM(S): at 06:25

## 2020-01-01 RX ADMIN — SPIRONOLACTONE 25 MILLIGRAM(S): 25 TABLET, FILM COATED ORAL at 17:23

## 2020-01-01 RX ADMIN — BUPRENORPHINE AND NALOXONE 1 TABLET(S): 2; .5 TABLET SUBLINGUAL at 14:03

## 2020-01-01 RX ADMIN — Medication 100 MILLIGRAM(S): at 14:39

## 2020-01-01 RX ADMIN — Medication 1 TABLET(S): at 11:58

## 2020-01-01 RX ADMIN — Medication 1 TABLET(S): at 12:38

## 2020-01-01 RX ADMIN — BUPRENORPHINE AND NALOXONE 1 TABLET(S): 2; .5 TABLET SUBLINGUAL at 08:43

## 2020-01-01 RX ADMIN — INSULIN GLARGINE 26 UNIT(S): 100 INJECTION, SOLUTION SUBCUTANEOUS at 08:57

## 2020-01-01 RX ADMIN — INSULIN GLARGINE 45 UNIT(S): 100 INJECTION, SOLUTION SUBCUTANEOUS at 21:32

## 2020-01-01 RX ADMIN — TACROLIMUS 0.5 MILLIGRAM(S): 5 CAPSULE ORAL at 18:07

## 2020-01-01 RX ADMIN — Medication 80 MILLIGRAM(S): at 06:38

## 2020-01-01 RX ADMIN — TACROLIMUS 0.5 MILLIGRAM(S): 5 CAPSULE ORAL at 05:08

## 2020-01-01 RX ADMIN — INSULIN GLARGINE 26 UNIT(S): 100 INJECTION, SOLUTION SUBCUTANEOUS at 08:35

## 2020-01-01 RX ADMIN — Medication 1 TABLET(S): at 09:19

## 2020-01-01 RX ADMIN — Medication 1 TABLET(S): at 11:11

## 2020-01-01 RX ADMIN — NYSTATIN CREAM 1 APPLICATION(S): 100000 CREAM TOPICAL at 05:46

## 2020-01-01 RX ADMIN — SERTRALINE 100 MILLIGRAM(S): 25 TABLET, FILM COATED ORAL at 12:52

## 2020-01-01 RX ADMIN — Medication 100 MILLIGRAM(S): at 21:00

## 2020-01-01 RX ADMIN — BUPRENORPHINE AND NALOXONE 1 TABLET(S): 2; .5 TABLET SUBLINGUAL at 22:28

## 2020-01-01 RX ADMIN — ALBUTEROL 2 PUFF(S): 90 AEROSOL, METERED ORAL at 06:03

## 2020-01-01 RX ADMIN — TACROLIMUS 0.5 MILLIGRAM(S): 5 CAPSULE ORAL at 06:58

## 2020-01-01 RX ADMIN — LIDOCAINE 1 PATCH: 4 CREAM TOPICAL at 20:00

## 2020-01-01 RX ADMIN — MYCOPHENOLATE MOFETIL 500 MILLIGRAM(S): 250 CAPSULE ORAL at 13:04

## 2020-01-01 RX ADMIN — INSULIN GLARGINE 16 UNIT(S): 100 INJECTION, SOLUTION SUBCUTANEOUS at 10:22

## 2020-01-01 RX ADMIN — TACROLIMUS 0.5 MILLIGRAM(S): 5 CAPSULE ORAL at 17:23

## 2020-01-01 RX ADMIN — HEPARIN SODIUM 5000 UNIT(S): 5000 INJECTION INTRAVENOUS; SUBCUTANEOUS at 05:46

## 2020-01-01 RX ADMIN — BUDESONIDE AND FORMOTEROL FUMARATE DIHYDRATE 2 PUFF(S): 160; 4.5 AEROSOL RESPIRATORY (INHALATION) at 17:22

## 2020-01-01 RX ADMIN — Medication 25 MILLIGRAM(S): at 06:04

## 2020-01-01 RX ADMIN — PANTOPRAZOLE SODIUM 40 MILLIGRAM(S): 20 TABLET, DELAYED RELEASE ORAL at 17:43

## 2020-01-01 RX ADMIN — TAMSULOSIN HYDROCHLORIDE 0.4 MILLIGRAM(S): 0.4 CAPSULE ORAL at 21:53

## 2020-01-01 RX ADMIN — Medication 3 MILLIGRAM(S): at 12:52

## 2020-01-01 RX ADMIN — Medication 80 MILLIGRAM(S): at 06:31

## 2020-01-01 RX ADMIN — PANTOPRAZOLE SODIUM 40 MILLIGRAM(S): 20 TABLET, DELAYED RELEASE ORAL at 17:39

## 2020-01-01 RX ADMIN — Medication 1: at 08:34

## 2020-01-01 RX ADMIN — SPIRONOLACTONE 25 MILLIGRAM(S): 25 TABLET, FILM COATED ORAL at 06:24

## 2020-01-01 RX ADMIN — TIOTROPIUM BROMIDE 1 CAPSULE(S): 18 CAPSULE ORAL; RESPIRATORY (INHALATION) at 12:58

## 2020-01-01 RX ADMIN — BUPRENORPHINE AND NALOXONE 1 TABLET(S): 2; .5 TABLET SUBLINGUAL at 09:24

## 2020-01-01 RX ADMIN — Medication 80 MILLIGRAM(S): at 17:24

## 2020-01-01 RX ADMIN — TACROLIMUS 0.5 MILLIGRAM(S): 5 CAPSULE ORAL at 17:35

## 2020-01-01 RX ADMIN — Medication 0: at 22:28

## 2020-01-01 RX ADMIN — Medication 3 MILLIGRAM(S): at 12:16

## 2020-01-01 RX ADMIN — BUPRENORPHINE AND NALOXONE 1 TABLET(S): 2; .5 TABLET SUBLINGUAL at 20:08

## 2020-01-01 RX ADMIN — Medication 25 MILLIGRAM(S): at 06:08

## 2020-01-01 RX ADMIN — INSULIN GLARGINE 45 UNIT(S): 100 INJECTION, SOLUTION SUBCUTANEOUS at 22:12

## 2020-01-01 RX ADMIN — Medication 1 SPRAY(S): at 11:59

## 2020-01-01 RX ADMIN — Medication 81 MILLIGRAM(S): at 12:22

## 2020-01-01 RX ADMIN — SERTRALINE 100 MILLIGRAM(S): 25 TABLET, FILM COATED ORAL at 12:29

## 2020-01-01 RX ADMIN — Medication 81 MILLIGRAM(S): at 17:08

## 2020-01-01 RX ADMIN — Medication 8: at 17:32

## 2020-01-01 RX ADMIN — TACROLIMUS 0.5 MILLIGRAM(S): 5 CAPSULE ORAL at 05:46

## 2020-01-01 RX ADMIN — NYSTATIN CREAM 1 APPLICATION(S): 100000 CREAM TOPICAL at 17:36

## 2020-01-01 RX ADMIN — HEPARIN SODIUM 5000 UNIT(S): 5000 INJECTION INTRAVENOUS; SUBCUTANEOUS at 22:44

## 2020-01-01 RX ADMIN — TAMSULOSIN HYDROCHLORIDE 0.4 MILLIGRAM(S): 0.4 CAPSULE ORAL at 21:23

## 2020-01-01 RX ADMIN — Medication 25 MILLIGRAM(S): at 05:08

## 2020-01-01 RX ADMIN — INSULIN GLARGINE 41 UNIT(S): 100 INJECTION, SOLUTION SUBCUTANEOUS at 21:57

## 2020-01-01 RX ADMIN — ALBUTEROL 2 PUFF(S): 90 AEROSOL, METERED ORAL at 11:38

## 2020-01-01 RX ADMIN — TAMSULOSIN HYDROCHLORIDE 0.4 MILLIGRAM(S): 0.4 CAPSULE ORAL at 22:10

## 2020-01-01 RX ADMIN — Medication 100 MILLIGRAM(S): at 06:14

## 2020-01-01 RX ADMIN — PANTOPRAZOLE SODIUM 40 MILLIGRAM(S): 20 TABLET, DELAYED RELEASE ORAL at 06:25

## 2020-01-01 RX ADMIN — SPIRONOLACTONE 25 MILLIGRAM(S): 25 TABLET, FILM COATED ORAL at 17:35

## 2020-01-01 RX ADMIN — HEPARIN SODIUM 5000 UNIT(S): 5000 INJECTION INTRAVENOUS; SUBCUTANEOUS at 14:48

## 2020-01-01 RX ADMIN — Medication 1: at 12:21

## 2020-01-01 RX ADMIN — BUPRENORPHINE AND NALOXONE 1 TABLET(S): 2; .5 TABLET SUBLINGUAL at 19:39

## 2020-01-01 RX ADMIN — BUDESONIDE AND FORMOTEROL FUMARATE DIHYDRATE 2 PUFF(S): 160; 4.5 AEROSOL RESPIRATORY (INHALATION) at 17:38

## 2020-01-01 RX ADMIN — SERTRALINE 100 MILLIGRAM(S): 25 TABLET, FILM COATED ORAL at 13:02

## 2020-01-01 RX ADMIN — SERTRALINE 100 MILLIGRAM(S): 25 TABLET, FILM COATED ORAL at 11:11

## 2020-01-01 RX ADMIN — Medication 4: at 17:52

## 2020-01-01 RX ADMIN — Medication 2: at 09:06

## 2020-01-01 RX ADMIN — TAMSULOSIN HYDROCHLORIDE 0.4 MILLIGRAM(S): 0.4 CAPSULE ORAL at 22:05

## 2020-01-01 RX ADMIN — Medication 1 SPRAY(S): at 13:20

## 2020-01-01 RX ADMIN — BUPRENORPHINE AND NALOXONE 1 TABLET(S): 2; .5 TABLET SUBLINGUAL at 13:05

## 2020-01-01 RX ADMIN — Medication 325 MILLIGRAM(S): at 17:38

## 2020-01-01 RX ADMIN — BUDESONIDE AND FORMOTEROL FUMARATE DIHYDRATE 2 PUFF(S): 160; 4.5 AEROSOL RESPIRATORY (INHALATION) at 06:09

## 2020-01-01 RX ADMIN — BUDESONIDE AND FORMOTEROL FUMARATE DIHYDRATE 2 PUFF(S): 160; 4.5 AEROSOL RESPIRATORY (INHALATION) at 05:07

## 2020-01-01 RX ADMIN — Medication 1 SPRAY(S): at 12:38

## 2020-01-01 RX ADMIN — Medication 1: at 21:33

## 2020-01-01 RX ADMIN — INSULIN GLARGINE 38 UNIT(S): 100 INJECTION, SOLUTION SUBCUTANEOUS at 21:54

## 2020-01-01 RX ADMIN — SPIRONOLACTONE 25 MILLIGRAM(S): 25 TABLET, FILM COATED ORAL at 06:08

## 2020-01-01 RX ADMIN — ALBUTEROL 2 PUFF(S): 90 AEROSOL, METERED ORAL at 23:23

## 2020-01-01 RX ADMIN — BUPRENORPHINE AND NALOXONE 1 TABLET(S): 2; .5 TABLET SUBLINGUAL at 21:54

## 2020-01-01 RX ADMIN — Medication 25 MILLIGRAM(S): at 05:16

## 2020-01-01 RX ADMIN — TIOTROPIUM BROMIDE 1 CAPSULE(S): 18 CAPSULE ORAL; RESPIRATORY (INHALATION) at 12:52

## 2020-01-01 RX ADMIN — INSULIN GLARGINE 22 UNIT(S): 100 INJECTION, SOLUTION SUBCUTANEOUS at 08:54

## 2020-01-01 RX ADMIN — Medication 1 TABLET(S): at 10:21

## 2020-01-01 RX ADMIN — Medication 2: at 08:57

## 2020-01-01 RX ADMIN — PANTOPRAZOLE SODIUM 40 MILLIGRAM(S): 20 TABLET, DELAYED RELEASE ORAL at 05:11

## 2020-01-01 RX ADMIN — TAMSULOSIN HYDROCHLORIDE 0.4 MILLIGRAM(S): 0.4 CAPSULE ORAL at 22:29

## 2020-01-01 RX ADMIN — Medication 100 MILLIGRAM(S): at 06:21

## 2020-01-01 RX ADMIN — Medication 6: at 12:28

## 2020-01-01 RX ADMIN — Medication 1 TABLET(S): at 12:24

## 2020-01-01 RX ADMIN — PANTOPRAZOLE SODIUM 40 MILLIGRAM(S): 20 TABLET, DELAYED RELEASE ORAL at 06:04

## 2020-01-01 RX ADMIN — Medication 4: at 17:43

## 2020-01-01 RX ADMIN — TAMSULOSIN HYDROCHLORIDE 0.4 MILLIGRAM(S): 0.4 CAPSULE ORAL at 21:33

## 2020-01-01 RX ADMIN — LIDOCAINE 1 PATCH: 4 CREAM TOPICAL at 16:17

## 2020-01-01 RX ADMIN — Medication 4: at 17:55

## 2020-01-01 RX ADMIN — HEPARIN SODIUM 5000 UNIT(S): 5000 INJECTION INTRAVENOUS; SUBCUTANEOUS at 16:54

## 2020-01-01 RX ADMIN — TIOTROPIUM BROMIDE 1 CAPSULE(S): 18 CAPSULE ORAL; RESPIRATORY (INHALATION) at 12:29

## 2020-01-01 RX ADMIN — HEPARIN SODIUM 5000 UNIT(S): 5000 INJECTION INTRAVENOUS; SUBCUTANEOUS at 06:08

## 2020-01-01 RX ADMIN — ALBUTEROL 2 PUFF(S): 90 AEROSOL, METERED ORAL at 23:17

## 2020-01-01 RX ADMIN — HEPARIN SODIUM 5000 UNIT(S): 5000 INJECTION INTRAVENOUS; SUBCUTANEOUS at 11:32

## 2020-01-01 RX ADMIN — Medication 4: at 09:19

## 2020-01-01 RX ADMIN — TACROLIMUS 0.5 MILLIGRAM(S): 5 CAPSULE ORAL at 07:23

## 2020-01-01 RX ADMIN — Medication 4: at 12:20

## 2020-01-01 RX ADMIN — Medication 4: at 12:31

## 2020-01-01 RX ADMIN — INSULIN GLARGINE 26 UNIT(S): 100 INJECTION, SOLUTION SUBCUTANEOUS at 22:32

## 2020-01-01 RX ADMIN — Medication 2: at 22:29

## 2020-01-01 RX ADMIN — HEPARIN SODIUM 5000 UNIT(S): 5000 INJECTION INTRAVENOUS; SUBCUTANEOUS at 12:29

## 2020-01-01 RX ADMIN — BUPRENORPHINE AND NALOXONE 1 TABLET(S): 2; .5 TABLET SUBLINGUAL at 20:17

## 2020-01-01 RX ADMIN — INSULIN GLARGINE 26 UNIT(S): 100 INJECTION, SOLUTION SUBCUTANEOUS at 10:28

## 2020-01-01 RX ADMIN — BUPRENORPHINE AND NALOXONE 1 TABLET(S): 2; .5 TABLET SUBLINGUAL at 12:20

## 2020-01-01 RX ADMIN — PANTOPRAZOLE SODIUM 40 MILLIGRAM(S): 20 TABLET, DELAYED RELEASE ORAL at 17:40

## 2020-01-01 RX ADMIN — BUDESONIDE AND FORMOTEROL FUMARATE DIHYDRATE 2 PUFF(S): 160; 4.5 AEROSOL RESPIRATORY (INHALATION) at 17:35

## 2020-01-01 RX ADMIN — TACROLIMUS 0.5 MILLIGRAM(S): 5 CAPSULE ORAL at 16:43

## 2020-01-01 RX ADMIN — BUPRENORPHINE AND NALOXONE 1 TABLET(S): 2; .5 TABLET SUBLINGUAL at 08:47

## 2020-01-01 RX ADMIN — INSULIN GLARGINE 26 UNIT(S): 100 INJECTION, SOLUTION SUBCUTANEOUS at 09:00

## 2020-01-01 RX ADMIN — BUPRENORPHINE AND NALOXONE 1 TABLET(S): 2; .5 TABLET SUBLINGUAL at 12:19

## 2020-01-01 RX ADMIN — Medication 100 MILLIGRAM(S): at 21:54

## 2020-01-01 RX ADMIN — NYSTATIN CREAM 1 APPLICATION(S): 100000 CREAM TOPICAL at 05:03

## 2020-01-01 RX ADMIN — INSULIN GLARGINE 26 UNIT(S): 100 INJECTION, SOLUTION SUBCUTANEOUS at 09:01

## 2020-01-01 RX ADMIN — TACROLIMUS 0.5 MILLIGRAM(S): 5 CAPSULE ORAL at 06:31

## 2020-01-01 RX ADMIN — TAMSULOSIN HYDROCHLORIDE 0.4 MILLIGRAM(S): 0.4 CAPSULE ORAL at 22:22

## 2020-01-01 RX ADMIN — BUPRENORPHINE AND NALOXONE 1 TABLET(S): 2; .5 TABLET SUBLINGUAL at 11:42

## 2020-01-01 RX ADMIN — Medication 4: at 09:15

## 2020-01-01 RX ADMIN — SPIRONOLACTONE 25 MILLIGRAM(S): 25 TABLET, FILM COATED ORAL at 05:16

## 2020-01-01 RX ADMIN — TACROLIMUS 0.5 MILLIGRAM(S): 5 CAPSULE ORAL at 05:00

## 2020-01-01 RX ADMIN — BUPRENORPHINE AND NALOXONE 1 TABLET(S): 2; .5 TABLET SUBLINGUAL at 13:04

## 2020-01-01 RX ADMIN — PANTOPRAZOLE SODIUM 40 MILLIGRAM(S): 20 TABLET, DELAYED RELEASE ORAL at 17:22

## 2020-01-01 RX ADMIN — INSULIN GLARGINE 45 UNIT(S): 100 INJECTION, SOLUTION SUBCUTANEOUS at 22:29

## 2020-01-01 RX ADMIN — BUPRENORPHINE AND NALOXONE 1 TABLET(S): 2; .5 TABLET SUBLINGUAL at 09:04

## 2020-01-01 RX ADMIN — TIOTROPIUM BROMIDE 1 CAPSULE(S): 18 CAPSULE ORAL; RESPIRATORY (INHALATION) at 12:25

## 2020-01-01 RX ADMIN — TAMSULOSIN HYDROCHLORIDE 0.4 MILLIGRAM(S): 0.4 CAPSULE ORAL at 21:57

## 2020-01-01 RX ADMIN — INSULIN GLARGINE 41 UNIT(S): 100 INJECTION, SOLUTION SUBCUTANEOUS at 22:43

## 2020-01-01 RX ADMIN — MYCOPHENOLATE MOFETIL 500 MILLIGRAM(S): 250 CAPSULE ORAL at 12:26

## 2020-01-01 RX ADMIN — TIOTROPIUM BROMIDE 1 CAPSULE(S): 18 CAPSULE ORAL; RESPIRATORY (INHALATION) at 12:54

## 2020-01-01 RX ADMIN — ALBUTEROL 2 PUFF(S): 90 AEROSOL, METERED ORAL at 05:40

## 2020-01-01 RX ADMIN — HEPARIN SODIUM 5000 UNIT(S): 5000 INJECTION INTRAVENOUS; SUBCUTANEOUS at 05:01

## 2020-01-01 RX ADMIN — PANTOPRAZOLE SODIUM 40 MILLIGRAM(S): 20 TABLET, DELAYED RELEASE ORAL at 05:22

## 2020-01-01 RX ADMIN — TIOTROPIUM BROMIDE 1 CAPSULE(S): 18 CAPSULE ORAL; RESPIRATORY (INHALATION) at 11:11

## 2020-01-01 RX ADMIN — NYSTATIN CREAM 1 APPLICATION(S): 100000 CREAM TOPICAL at 05:58

## 2020-01-01 RX ADMIN — Medication 2: at 17:34

## 2020-01-01 RX ADMIN — Medication 81 MILLIGRAM(S): at 12:29

## 2020-01-01 RX ADMIN — INSULIN GLARGINE 16 UNIT(S): 100 INJECTION, SOLUTION SUBCUTANEOUS at 10:17

## 2020-01-01 RX ADMIN — BUPRENORPHINE AND NALOXONE 1 TABLET(S): 2; .5 TABLET SUBLINGUAL at 06:56

## 2020-01-01 RX ADMIN — HEPARIN SODIUM 5000 UNIT(S): 5000 INJECTION INTRAVENOUS; SUBCUTANEOUS at 05:58

## 2020-01-01 RX ADMIN — Medication 4: at 14:54

## 2020-01-01 RX ADMIN — Medication 3 MILLIGRAM(S): at 12:25

## 2020-01-01 RX ADMIN — Medication 80 MILLIGRAM(S): at 17:45

## 2020-01-01 RX ADMIN — ALBUTEROL 2 PUFF(S): 90 AEROSOL, METERED ORAL at 17:35

## 2020-01-01 RX ADMIN — INSULIN GLARGINE 45 UNIT(S): 100 INJECTION, SOLUTION SUBCUTANEOUS at 21:53

## 2020-01-01 RX ADMIN — Medication 1 TABLET(S): at 12:21

## 2020-01-01 RX ADMIN — HEPARIN SODIUM 5000 UNIT(S): 5000 INJECTION INTRAVENOUS; SUBCUTANEOUS at 22:09

## 2020-01-01 RX ADMIN — Medication 25 MILLIGRAM(S): at 05:47

## 2020-01-01 RX ADMIN — Medication 4: at 12:57

## 2020-01-01 RX ADMIN — TACROLIMUS 0.5 MILLIGRAM(S): 5 CAPSULE ORAL at 17:56

## 2020-01-01 RX ADMIN — INSULIN GLARGINE 26 UNIT(S): 100 INJECTION, SOLUTION SUBCUTANEOUS at 10:00

## 2020-01-01 RX ADMIN — BUPRENORPHINE AND NALOXONE 1 TABLET(S): 2; .5 TABLET SUBLINGUAL at 12:15

## 2020-01-01 RX ADMIN — PANTOPRAZOLE SODIUM 40 MILLIGRAM(S): 20 TABLET, DELAYED RELEASE ORAL at 16:43

## 2020-01-01 RX ADMIN — BUPRENORPHINE 2 MILLIGRAM(S): 10 PATCH, EXTENDED RELEASE TRANSDERMAL at 20:13

## 2020-01-01 RX ADMIN — Medication 25 MILLIGRAM(S): at 05:51

## 2020-01-01 RX ADMIN — Medication 1 SPRAY(S): at 13:03

## 2020-01-01 RX ADMIN — Medication 6: at 12:23

## 2020-01-01 RX ADMIN — BUPRENORPHINE AND NALOXONE 1 TABLET(S): 2; .5 TABLET SUBLINGUAL at 08:41

## 2020-01-01 RX ADMIN — BUDESONIDE AND FORMOTEROL FUMARATE DIHYDRATE 2 PUFF(S): 160; 4.5 AEROSOL RESPIRATORY (INHALATION) at 05:21

## 2020-01-01 RX ADMIN — SERTRALINE 100 MILLIGRAM(S): 25 TABLET, FILM COATED ORAL at 12:22

## 2020-01-01 RX ADMIN — INSULIN GLARGINE 34 UNIT(S): 100 INJECTION, SOLUTION SUBCUTANEOUS at 22:09

## 2020-01-01 RX ADMIN — Medication 2: at 22:09

## 2020-01-01 RX ADMIN — NYSTATIN CREAM 1 APPLICATION(S): 100000 CREAM TOPICAL at 06:31

## 2020-01-01 RX ADMIN — ALBUTEROL 2 PUFF(S): 90 AEROSOL, METERED ORAL at 11:11

## 2020-01-01 RX ADMIN — Medication 3 MILLIGRAM(S): at 12:57

## 2020-01-01 RX ADMIN — ALBUTEROL 2 PUFF(S): 90 AEROSOL, METERED ORAL at 12:59

## 2020-01-01 RX ADMIN — Medication 80 MILLIGRAM(S): at 17:41

## 2020-01-01 RX ADMIN — Medication 1 TABLET(S): at 12:20

## 2020-01-01 RX ADMIN — INSULIN GLARGINE 38 UNIT(S): 100 INJECTION, SOLUTION SUBCUTANEOUS at 22:20

## 2020-01-01 RX ADMIN — Medication 100 MILLIGRAM(S): at 05:57

## 2020-01-01 RX ADMIN — NYSTATIN CREAM 1 APPLICATION(S): 100000 CREAM TOPICAL at 18:07

## 2020-01-01 RX ADMIN — ALBUTEROL 2 PUFF(S): 90 AEROSOL, METERED ORAL at 21:37

## 2020-01-01 RX ADMIN — HEPARIN SODIUM 5000 UNIT(S): 5000 INJECTION INTRAVENOUS; SUBCUTANEOUS at 22:43

## 2020-01-01 RX ADMIN — BUPRENORPHINE AND NALOXONE 1 TABLET(S): 2; .5 TABLET SUBLINGUAL at 10:22

## 2020-01-01 RX ADMIN — IRON SUCROSE 110 MILLIGRAM(S): 20 INJECTION, SOLUTION INTRAVENOUS at 11:12

## 2020-01-01 RX ADMIN — TAMSULOSIN HYDROCHLORIDE 0.4 MILLIGRAM(S): 0.4 CAPSULE ORAL at 22:08

## 2020-01-01 RX ADMIN — Medication 25 MILLIGRAM(S): at 06:31

## 2020-01-01 RX ADMIN — BUPRENORPHINE AND NALOXONE 1 TABLET(S): 2; .5 TABLET SUBLINGUAL at 13:38

## 2020-01-01 RX ADMIN — Medication 6: at 12:36

## 2020-01-01 RX ADMIN — PANTOPRAZOLE SODIUM 80 MILLIGRAM(S): 20 TABLET, DELAYED RELEASE ORAL at 17:14

## 2020-01-01 RX ADMIN — TIOTROPIUM BROMIDE 1 CAPSULE(S): 18 CAPSULE ORAL; RESPIRATORY (INHALATION) at 11:59

## 2020-01-01 RX ADMIN — Medication 1 TABLET(S): at 13:01

## 2020-01-01 RX ADMIN — HEPARIN SODIUM 5000 UNIT(S): 5000 INJECTION INTRAVENOUS; SUBCUTANEOUS at 21:00

## 2020-01-01 RX ADMIN — Medication 1 TABLET(S): at 12:23

## 2020-01-01 RX ADMIN — TAMSULOSIN HYDROCHLORIDE 0.4 MILLIGRAM(S): 0.4 CAPSULE ORAL at 21:29

## 2020-01-01 RX ADMIN — Medication 1 TABLET(S): at 08:47

## 2020-01-01 RX ADMIN — Medication 1 SPRAY(S): at 13:05

## 2020-01-01 RX ADMIN — ACETAZOLAMIDE 500 MILLIGRAM(S): 250 TABLET ORAL at 18:12

## 2020-01-01 RX ADMIN — TACROLIMUS 0.5 MILLIGRAM(S): 5 CAPSULE ORAL at 06:25

## 2020-01-01 RX ADMIN — INSULIN GLARGINE 34 UNIT(S): 100 INJECTION, SOLUTION SUBCUTANEOUS at 01:26

## 2020-01-01 RX ADMIN — BUPRENORPHINE AND NALOXONE 1 TABLET(S): 2; .5 TABLET SUBLINGUAL at 08:50

## 2020-01-01 RX ADMIN — Medication 80 MILLIGRAM(S): at 05:07

## 2020-01-01 RX ADMIN — PANTOPRAZOLE SODIUM 40 MILLIGRAM(S): 20 TABLET, DELAYED RELEASE ORAL at 17:24

## 2020-01-01 RX ADMIN — ALBUTEROL 2 PUFF(S): 90 AEROSOL, METERED ORAL at 23:21

## 2020-01-01 RX ADMIN — Medication 4: at 13:03

## 2020-01-01 RX ADMIN — PANTOPRAZOLE SODIUM 40 MILLIGRAM(S): 20 TABLET, DELAYED RELEASE ORAL at 18:10

## 2020-01-01 RX ADMIN — MYCOPHENOLATE MOFETIL 500 MILLIGRAM(S): 250 CAPSULE ORAL at 12:53

## 2020-01-01 RX ADMIN — TACROLIMUS 0.5 MILLIGRAM(S): 5 CAPSULE ORAL at 05:47

## 2020-01-01 RX ADMIN — SPIRONOLACTONE 25 MILLIGRAM(S): 25 TABLET, FILM COATED ORAL at 06:15

## 2020-01-01 RX ADMIN — ALBUTEROL 2 PUFF(S): 90 AEROSOL, METERED ORAL at 17:45

## 2020-01-01 RX ADMIN — Medication 100 MILLIGRAM(S): at 06:24

## 2020-01-01 RX ADMIN — Medication 4: at 17:15

## 2020-01-01 RX ADMIN — BUPRENORPHINE AND NALOXONE 1 TABLET(S): 2; .5 TABLET SUBLINGUAL at 20:40

## 2020-01-01 RX ADMIN — TIOTROPIUM BROMIDE 1 CAPSULE(S): 18 CAPSULE ORAL; RESPIRATORY (INHALATION) at 12:02

## 2020-01-01 RX ADMIN — PANTOPRAZOLE SODIUM 40 MILLIGRAM(S): 20 TABLET, DELAYED RELEASE ORAL at 17:00

## 2020-01-01 RX ADMIN — Medication 50 MILLILITER(S): at 17:42

## 2020-01-01 RX ADMIN — Medication 4: at 13:01

## 2020-01-01 RX ADMIN — INSULIN GLARGINE 38 UNIT(S): 100 INJECTION, SOLUTION SUBCUTANEOUS at 21:51

## 2020-01-01 RX ADMIN — TIOTROPIUM BROMIDE 1 CAPSULE(S): 18 CAPSULE ORAL; RESPIRATORY (INHALATION) at 14:57

## 2020-01-01 RX ADMIN — Medication 2: at 21:54

## 2020-01-01 RX ADMIN — BUPRENORPHINE AND NALOXONE 1 TABLET(S): 2; .5 TABLET SUBLINGUAL at 08:24

## 2020-01-01 RX ADMIN — SENNA PLUS 2 TABLET(S): 8.6 TABLET ORAL at 21:54

## 2020-01-01 RX ADMIN — Medication 1 TABLET(S): at 12:54

## 2020-01-01 RX ADMIN — BUPRENORPHINE AND NALOXONE 1 TABLET(S): 2; .5 TABLET SUBLINGUAL at 18:50

## 2020-01-01 RX ADMIN — PANTOPRAZOLE SODIUM 40 MILLIGRAM(S): 20 TABLET, DELAYED RELEASE ORAL at 06:19

## 2020-01-01 RX ADMIN — LIDOCAINE 1 PATCH: 4 CREAM TOPICAL at 08:40

## 2020-01-01 RX ADMIN — BUPRENORPHINE AND NALOXONE 1 TABLET(S): 2; .5 TABLET SUBLINGUAL at 14:45

## 2020-01-01 RX ADMIN — INSULIN GLARGINE 45 UNIT(S): 100 INJECTION, SOLUTION SUBCUTANEOUS at 22:05

## 2020-01-01 RX ADMIN — Medication 1 TABLET(S): at 12:15

## 2020-01-01 RX ADMIN — Medication 20 MILLILITER(S): at 11:32

## 2020-01-01 RX ADMIN — TACROLIMUS 0.5 MILLIGRAM(S): 5 CAPSULE ORAL at 17:24

## 2020-01-01 RX ADMIN — Medication 80 MILLIGRAM(S): at 05:51

## 2020-01-01 RX ADMIN — NYSTATIN CREAM 1 APPLICATION(S): 100000 CREAM TOPICAL at 17:28

## 2020-01-01 RX ADMIN — Medication 3 MILLIGRAM(S): at 14:50

## 2020-01-01 RX ADMIN — Medication 6: at 18:07

## 2020-01-01 RX ADMIN — INSULIN GLARGINE 45 UNIT(S): 100 INJECTION, SOLUTION SUBCUTANEOUS at 21:23

## 2020-01-01 RX ADMIN — PANTOPRAZOLE SODIUM 40 MILLIGRAM(S): 20 TABLET, DELAYED RELEASE ORAL at 06:31

## 2020-01-01 RX ADMIN — ALBUTEROL 2 PUFF(S): 90 AEROSOL, METERED ORAL at 12:20

## 2020-01-01 RX ADMIN — INSULIN GLARGINE 16 UNIT(S): 100 INJECTION, SOLUTION SUBCUTANEOUS at 08:56

## 2020-01-01 RX ADMIN — LIDOCAINE 1 PATCH: 4 CREAM TOPICAL at 19:16

## 2020-01-01 RX ADMIN — Medication 100 MILLIGRAM(S): at 21:50

## 2020-01-01 RX ADMIN — INSULIN GLARGINE 16 UNIT(S): 100 INJECTION, SOLUTION SUBCUTANEOUS at 08:38

## 2020-01-01 RX ADMIN — TAMSULOSIN HYDROCHLORIDE 0.4 MILLIGRAM(S): 0.4 CAPSULE ORAL at 22:11

## 2020-01-01 RX ADMIN — TACROLIMUS 0.5 MILLIGRAM(S): 5 CAPSULE ORAL at 17:41

## 2020-01-01 RX ADMIN — Medication 2: at 22:12

## 2020-01-01 RX ADMIN — Medication 1 SPRAY(S): at 12:22

## 2020-01-01 RX ADMIN — BUPRENORPHINE AND NALOXONE 1 TABLET(S): 2; .5 TABLET SUBLINGUAL at 20:58

## 2020-01-01 RX ADMIN — BUDESONIDE AND FORMOTEROL FUMARATE DIHYDRATE 2 PUFF(S): 160; 4.5 AEROSOL RESPIRATORY (INHALATION) at 17:42

## 2020-01-01 RX ADMIN — Medication 3 MILLIGRAM(S): at 12:14

## 2020-01-01 RX ADMIN — BUPRENORPHINE AND NALOXONE 1 TABLET(S): 2; .5 TABLET SUBLINGUAL at 12:22

## 2020-01-01 RX ADMIN — PANTOPRAZOLE SODIUM 40 MILLIGRAM(S): 20 TABLET, DELAYED RELEASE ORAL at 05:46

## 2020-01-01 RX ADMIN — Medication 2: at 09:13

## 2020-01-01 RX ADMIN — Medication 3: at 12:58

## 2020-01-01 RX ADMIN — BUPRENORPHINE AND NALOXONE 1 TABLET(S): 2; .5 TABLET SUBLINGUAL at 13:07

## 2020-01-01 RX ADMIN — SERTRALINE 100 MILLIGRAM(S): 25 TABLET, FILM COATED ORAL at 13:01

## 2020-01-01 RX ADMIN — BUPRENORPHINE AND NALOXONE 1 TABLET(S): 2; .5 TABLET SUBLINGUAL at 15:00

## 2020-01-01 RX ADMIN — SPIRONOLACTONE 25 MILLIGRAM(S): 25 TABLET, FILM COATED ORAL at 18:10

## 2020-01-01 RX ADMIN — INSULIN GLARGINE 45 UNIT(S): 100 INJECTION, SOLUTION SUBCUTANEOUS at 22:42

## 2020-01-01 RX ADMIN — TIOTROPIUM BROMIDE 1 CAPSULE(S): 18 CAPSULE ORAL; RESPIRATORY (INHALATION) at 12:21

## 2020-01-01 RX ADMIN — BUDESONIDE AND FORMOTEROL FUMARATE DIHYDRATE 2 PUFF(S): 160; 4.5 AEROSOL RESPIRATORY (INHALATION) at 06:20

## 2020-01-01 RX ADMIN — INSULIN GLARGINE 16 UNIT(S): 100 INJECTION, SOLUTION SUBCUTANEOUS at 08:49

## 2020-01-01 RX ADMIN — BUPRENORPHINE AND NALOXONE 1 TABLET(S): 2; .5 TABLET SUBLINGUAL at 22:20

## 2020-01-01 RX ADMIN — Medication 1 SPRAY(S): at 12:21

## 2020-01-01 RX ADMIN — ALBUTEROL 2 PUFF(S): 90 AEROSOL, METERED ORAL at 12:23

## 2020-01-01 RX ADMIN — Medication 2: at 22:17

## 2020-01-01 RX ADMIN — LIDOCAINE 1 PATCH: 4 CREAM TOPICAL at 04:03

## 2020-01-01 RX ADMIN — Medication 1 TABLET(S): at 11:43

## 2020-01-01 RX ADMIN — HEPARIN SODIUM 5000 UNIT(S): 5000 INJECTION INTRAVENOUS; SUBCUTANEOUS at 06:14

## 2020-01-01 RX ADMIN — Medication 100 MILLIGRAM(S): at 21:29

## 2020-01-01 RX ADMIN — TACROLIMUS 0.5 MILLIGRAM(S): 5 CAPSULE ORAL at 17:47

## 2020-01-01 RX ADMIN — Medication 4: at 12:03

## 2020-01-01 RX ADMIN — Medication 2: at 08:24

## 2020-01-01 RX ADMIN — BUPRENORPHINE AND NALOXONE 1 TABLET(S): 2; .5 TABLET SUBLINGUAL at 22:08

## 2020-01-01 RX ADMIN — TACROLIMUS 0.5 MILLIGRAM(S): 5 CAPSULE ORAL at 17:22

## 2020-01-01 RX ADMIN — Medication 40 MILLIGRAM(S): at 05:16

## 2020-01-01 RX ADMIN — BUDESONIDE AND FORMOTEROL FUMARATE DIHYDRATE 2 PUFF(S): 160; 4.5 AEROSOL RESPIRATORY (INHALATION) at 05:40

## 2020-01-01 RX ADMIN — NYSTATIN CREAM 1 APPLICATION(S): 100000 CREAM TOPICAL at 16:43

## 2020-01-01 RX ADMIN — HEPARIN SODIUM 5000 UNIT(S): 5000 INJECTION INTRAVENOUS; SUBCUTANEOUS at 12:38

## 2020-01-01 RX ADMIN — PANTOPRAZOLE SODIUM 40 MILLIGRAM(S): 20 TABLET, DELAYED RELEASE ORAL at 06:08

## 2020-01-01 RX ADMIN — Medication 100 MILLIGRAM(S): at 13:00

## 2020-01-01 RX ADMIN — NYSTATIN CREAM 1 APPLICATION(S): 100000 CREAM TOPICAL at 05:09

## 2020-01-01 RX ADMIN — PANTOPRAZOLE SODIUM 40 MILLIGRAM(S): 20 TABLET, DELAYED RELEASE ORAL at 06:10

## 2020-01-01 RX ADMIN — NYSTATIN CREAM 1 APPLICATION(S): 100000 CREAM TOPICAL at 17:54

## 2020-01-01 RX ADMIN — Medication 100 MILLIGRAM(S): at 12:15

## 2020-01-01 RX ADMIN — Medication 81 MILLIGRAM(S): at 13:01

## 2020-01-01 RX ADMIN — PANTOPRAZOLE SODIUM 40 MILLIGRAM(S): 20 TABLET, DELAYED RELEASE ORAL at 16:55

## 2020-01-01 RX ADMIN — TACROLIMUS 0.5 MILLIGRAM(S): 5 CAPSULE ORAL at 17:43

## 2020-01-01 RX ADMIN — Medication 81 MILLIGRAM(S): at 13:04

## 2020-01-01 RX ADMIN — BUPRENORPHINE AND NALOXONE 1 TABLET(S): 2; .5 TABLET SUBLINGUAL at 11:59

## 2020-01-01 RX ADMIN — Medication 80 MILLIGRAM(S): at 05:02

## 2020-01-01 RX ADMIN — SERTRALINE 100 MILLIGRAM(S): 25 TABLET, FILM COATED ORAL at 12:54

## 2020-01-01 RX ADMIN — Medication 80 MILLIGRAM(S): at 05:46

## 2020-01-01 RX ADMIN — MYCOPHENOLATE MOFETIL 500 MILLIGRAM(S): 250 CAPSULE ORAL at 12:20

## 2020-01-01 RX ADMIN — PANTOPRAZOLE SODIUM 40 MILLIGRAM(S): 20 TABLET, DELAYED RELEASE ORAL at 05:52

## 2020-01-01 RX ADMIN — Medication 100 MILLIGRAM(S): at 13:04

## 2020-01-01 RX ADMIN — NYSTATIN CREAM 1 APPLICATION(S): 100000 CREAM TOPICAL at 17:56

## 2020-01-01 RX ADMIN — BUDESONIDE AND FORMOTEROL FUMARATE DIHYDRATE 2 PUFF(S): 160; 4.5 AEROSOL RESPIRATORY (INHALATION) at 17:01

## 2020-01-01 RX ADMIN — TIOTROPIUM BROMIDE 1 CAPSULE(S): 18 CAPSULE ORAL; RESPIRATORY (INHALATION) at 11:38

## 2020-01-01 RX ADMIN — SERTRALINE 100 MILLIGRAM(S): 25 TABLET, FILM COATED ORAL at 12:20

## 2020-01-01 RX ADMIN — PANTOPRAZOLE SODIUM 40 MILLIGRAM(S): 20 TABLET, DELAYED RELEASE ORAL at 05:00

## 2020-01-01 RX ADMIN — Medication 1 TABLET(S): at 08:38

## 2020-01-01 RX ADMIN — ALBUTEROL 2 PUFF(S): 90 AEROSOL, METERED ORAL at 12:55

## 2020-01-01 RX ADMIN — SERTRALINE 100 MILLIGRAM(S): 25 TABLET, FILM COATED ORAL at 11:42

## 2020-01-01 RX ADMIN — Medication 80 MILLIGRAM(S): at 05:08

## 2020-01-01 RX ADMIN — Medication 2: at 22:08

## 2020-01-01 RX ADMIN — TIOTROPIUM BROMIDE 1 CAPSULE(S): 18 CAPSULE ORAL; RESPIRATORY (INHALATION) at 12:38

## 2020-01-01 RX ADMIN — BUPRENORPHINE AND NALOXONE 1 TABLET(S): 2; .5 TABLET SUBLINGUAL at 09:06

## 2020-01-01 RX ADMIN — Medication 3 MILLIGRAM(S): at 11:38

## 2020-01-15 ENCOUNTER — NON-APPOINTMENT (OUTPATIENT)
Age: 70
End: 2020-01-15

## 2020-01-15 ENCOUNTER — APPOINTMENT (OUTPATIENT)
Dept: CARDIOLOGY | Facility: CLINIC | Age: 70
End: 2020-01-15
Payer: MEDICARE

## 2020-01-15 VITALS
BODY MASS INDEX: 33.91 KG/M2 | WEIGHT: 250 LBS | HEART RATE: 79 BPM | SYSTOLIC BLOOD PRESSURE: 140 MMHG | DIASTOLIC BLOOD PRESSURE: 70 MMHG | OXYGEN SATURATION: 91 %

## 2020-01-15 DIAGNOSIS — R06.09 OTHER FORMS OF DYSPNEA: ICD-10-CM

## 2020-01-15 DIAGNOSIS — E78.9 DISORDER OF LIPOPROTEIN METABOLISM, UNSPECIFIED: ICD-10-CM

## 2020-01-15 DIAGNOSIS — M60.9 MYOSITIS, UNSPECIFIED: ICD-10-CM

## 2020-01-15 DIAGNOSIS — I44.0 ATRIOVENTRICULAR BLOCK, FIRST DEGREE: ICD-10-CM

## 2020-01-15 PROCEDURE — 36415 COLL VENOUS BLD VENIPUNCTURE: CPT

## 2020-01-15 PROCEDURE — 93000 ELECTROCARDIOGRAM COMPLETE: CPT

## 2020-01-15 PROCEDURE — 99215 OFFICE O/P EST HI 40 MIN: CPT

## 2020-01-15 NOTE — PHYSICAL EXAM
[Normal Conjunctiva] : the conjunctiva exhibited no abnormalities [Eyelids - No Xanthelasma] : the eyelids demonstrated no xanthelasmas [Normal Oral Mucosa] : normal oral mucosa [No Oral Pallor] : no oral pallor [No Oral Cyanosis] : no oral cyanosis [Normal Jugular Venous V Waves Present] : normal jugular venous V waves present [Normal Jugular Venous A Waves Present] : normal jugular venous A waves present [Respiration, Rhythm And Depth] : normal respiratory rhythm and effort [No Jugular Venous Blair A Waves] : no jugular venous blair A waves [Heart Rate And Rhythm] : heart rate and rhythm were normal [Auscultation Breath Sounds / Voice Sounds] : lungs were clear to auscultation bilaterally [Exaggerated Use Of Accessory Muscles For Inspiration] : no accessory muscle use [Arterial Pulses Normal] : the arterial pulses were normal [Abdomen Soft] : soft [Abdomen Tenderness] : non-tender [Abdomen Mass (___ Cm)] : no abdominal mass palpated [Nail Clubbing] : no clubbing of the fingernails [Cyanosis, Localized] : no localized cyanosis [Petechial Hemorrhages (___cm)] : no petechial hemorrhages [Skin Lesions] : no skin lesions [] : no rash [No Xanthoma] : no  xanthoma was observed [No Skin Ulcers] : no skin ulcer [Mood] : the mood was normal [Oriented To Time, Place, And Person] : oriented to person, place, and time [Affect] : the affect was normal [Heart Sounds] : normal S1 and S2 [FreeTextEntry1] : Fairly anxious

## 2020-01-15 NOTE — REASON FOR VISIT
[FreeTextEntry1] : The patient is a 69-year-old male with a history of COPD, type 1 diabetes, and a liver transplant complaining of generalized muscle weakness progressive over 6 months.

## 2020-01-15 NOTE — HISTORY OF PRESENT ILLNESS
[FreeTextEntry1] : July 18, 2017.  Patient seen by Dr. Hawkins.  "The patient has noted extreme exertional dyspnea for the last year and a half. He can walk less than a block before he has to stop. He gets occasional ankle swelling. He has no PND. He has also had mild exertional anterior chest pressure when he walks about a block. Relieved by rest. There no associated symptoms. He's had no  increase in the frequency with ease with which his chest pressure brought on. He had a chest x-ray in February 2017 which showed central pulmonary vascular congestion. According to the patient he had an echocardiogram within the last year which showed a weak heart muscle. He also had pharmacologic nuclear stress test within the last year which did not show evidence of reversible ischemia.  The patient has a history COPD. He sleeps with oxygen.  He has chronic ankle swelling. He states that's to do varicose veins."\par His electronic medical records here only have an echo from 2013 and has a normal ejection fraction and a number of pharmacologic nuclear studies dating up until 2011 that have no ischemia and normal systolic function.\par January 15, 2020.  Patient comes to see me for the first time.  Has continued to be followed by Dr. Thomas alonso for his liver transplant and Dr. Santos for his COPD.  Also sees Dr. Marck Quezada for his diabetes.  Has been seeing an internist and a cardiologist in the city but will not be going back to them.  His main issue seems to be no strength and inability to walk over the last 6 months which has been progressive and he has put on a lot of weight.  He has been on prednisone all this time.  Most recent labs in the electronic records go back to September.  No evidence of any CK measured to rule out a steroid myopathy.  No BNP.  Glucose was 400 back then but he has seen Marck Quezada since then.  He denies chest pain but thinks he needs another echo and another pharmacologic nuclear study.  He also is complaining that since he has been on the substitute for methadone that he has much of the symptoms.  His current regimen includes prednisone 3 mg twice daily every other day.  Spironolactone 25 mg twice a day.  Lasix 80 mg twice a day.  Aspirin 81 mg, metoprolol ER 25 mg daily along with Zoloft 100 mg, you plan often 2 mg 3 times daily Humalog 75/25 18 units 3 times daily along with Lantus 20 units in the morning and 42 units at bedtime, tamsulosin 0.4 daily mycophenolate 500 mg daily tacrolimus 0.5 twice daily Colace magnesium and multivitamin with ABDEK.

## 2020-01-15 NOTE — REVIEW OF SYSTEMS
[Joint Pain] : joint pain [Recent Weight Gain (___ Lbs)] : no recent weight gain [Shortness Of Breath] : shortness of breath [Recent Weight Loss (___ Lbs)] : no recent weight loss [Feeling Fatigued] : feeling fatigued [Dyspnea on exertion] : dyspnea during exertion [Chest  Pressure] : no chest pressure [Lower Ext Edema] : lower extremity edema [Chest Pain] : no chest pain [Leg Claudication] : intermittent leg claudication [Palpitations] : no palpitations [Cough] : no cough [Wheezing] : no wheezing [see HPI] : see HPI [Muscle Cramps] : muscle cramps [Skin Lesions] : skin lesion(s): [Dizziness] : no dizziness [Confusion] : no confusion was observed [Anxiety] : anxiety [Depression] : no depression [Suicidal] : not suicidal [Under Stress] : under stress [Negative] : Respiratory

## 2020-01-16 LAB
ALBUMIN SERPL ELPH-MCNC: 4.3 G/DL
ALP BLD-CCNC: 87 U/L
ALT SERPL-CCNC: 14 U/L
ANION GAP SERPL CALC-SCNC: 13 MMOL/L
AST SERPL-CCNC: 29 U/L
BASOPHILS # BLD AUTO: 0.01 K/UL
BASOPHILS NFR BLD AUTO: 0.2 %
BILIRUB SERPL-MCNC: 0.5 MG/DL
BUN SERPL-MCNC: 21 MG/DL
CALCIUM SERPL-MCNC: 9.8 MG/DL
CHLORIDE SERPL-SCNC: 94 MMOL/L
CHOLEST SERPL-MCNC: 198 MG/DL
CHOLEST/HDLC SERPL: 4.9 RATIO
CK SERPL-CCNC: 126 U/L
CO2 SERPL-SCNC: 34 MMOL/L
CREAT SERPL-MCNC: 1.26 MG/DL
CRP SERPL-MCNC: 0.67 MG/DL
EOSINOPHIL # BLD AUTO: 0.04 K/UL
EOSINOPHIL NFR BLD AUTO: 0.8 %
ERYTHROCYTE [SEDIMENTATION RATE] IN BLOOD BY WESTERGREN METHOD: 90 MM/HR
ESTIMATED AVERAGE GLUCOSE: 160 MG/DL
GLUCOSE SERPL-MCNC: 151 MG/DL
HBA1C MFR BLD HPLC: 7.2 %
HCT VFR BLD CALC: 35.4 %
HDLC SERPL-MCNC: 40 MG/DL
HGB BLD-MCNC: 10 G/DL
IMM GRANULOCYTES NFR BLD AUTO: 0.6 %
LDLC SERPL CALC-MCNC: 135 MG/DL
LYMPHOCYTES # BLD AUTO: 0.83 K/UL
LYMPHOCYTES NFR BLD AUTO: 16.4 %
MAN DIFF?: NORMAL
MCHC RBC-ENTMCNC: 28.2 GM/DL
MCHC RBC-ENTMCNC: 29.4 PG
MCV RBC AUTO: 104.1 FL
MONOCYTES # BLD AUTO: 0.55 K/UL
MONOCYTES NFR BLD AUTO: 10.9 %
NEUTROPHILS # BLD AUTO: 3.59 K/UL
NEUTROPHILS NFR BLD AUTO: 71.1 %
NT-PROBNP SERPL-MCNC: 171 PG/ML
PLATELET # BLD AUTO: 64 K/UL
POTASSIUM SERPL-SCNC: 4 MMOL/L
PROT SERPL-MCNC: 6.8 G/DL
RBC # BLD: 3.4 M/UL
RBC # FLD: 14.8 %
SODIUM SERPL-SCNC: 141 MMOL/L
TRIGL SERPL-MCNC: 115 MG/DL
TSH SERPL-ACNC: 2.78 UIU/ML
WBC # FLD AUTO: 5.05 K/UL

## 2020-01-22 ENCOUNTER — APPOINTMENT (OUTPATIENT)
Dept: PULMONOLOGY | Facility: CLINIC | Age: 70
End: 2020-01-22
Payer: MEDICARE

## 2020-01-22 VITALS
HEART RATE: 107 BPM | HEIGHT: 72 IN | WEIGHT: 250 LBS | OXYGEN SATURATION: 84 % | RESPIRATION RATE: 17 BRPM | DIASTOLIC BLOOD PRESSURE: 60 MMHG | SYSTOLIC BLOOD PRESSURE: 145 MMHG | BODY MASS INDEX: 33.86 KG/M2

## 2020-01-22 PROCEDURE — G0008: CPT

## 2020-01-22 PROCEDURE — 94060 EVALUATION OF WHEEZING: CPT

## 2020-01-22 PROCEDURE — 94729 DIFFUSING CAPACITY: CPT

## 2020-01-22 PROCEDURE — 90662 IIV NO PRSV INCREASED AG IM: CPT

## 2020-01-22 PROCEDURE — ZZZZZ: CPT

## 2020-01-22 PROCEDURE — 99214 OFFICE O/P EST MOD 30 MIN: CPT | Mod: 25

## 2020-01-22 NOTE — ADDENDUM
[FreeTextEntry1] : Documented by Charlene Rob acting as a scribe for Dr. Leandro Santos on 01/22/2020.\par \par All medical record entries made by the Scribe were at my, Dr. Lenadro Santos's, direction and personally dictated by me on 01/22/2020. I have reviewed the chart and agree that the record accurately reflects my personal performance of the history, physical exam, assessment and plan. I have also personally directed, reviewed, and agree with the discharge instructions.\par \par

## 2020-01-22 NOTE — PROCEDURE
[FreeTextEntry1] : Full PFT revealed moderate restrictive, severe obstructive flows, with a FEV1 of 1.35L,which is 36% of predicted,  and a severe restrictive diffusion of 11.1 , which is 41% of predicted with a normal flow volume looP\par

## 2020-01-22 NOTE — PHYSICAL EXAM
[Normal Appearance] : normal appearance [General Appearance - Well Developed] : well developed [Well Groomed] : well groomed [No Deformities] : no deformities [General Appearance - Well Nourished] : well nourished [General Appearance - In No Acute Distress] : no acute distress [Normal Conjunctiva] : the conjunctiva exhibited no abnormalities [Normal Oropharynx] : normal oropharynx [Eyelids - No Xanthelasma] : the eyelids demonstrated no xanthelasmas [III] : III [Neck Appearance] : the appearance of the neck was normal [Neck Cervical Mass (___cm)] : no neck mass was observed [Jugular Venous Distention Increased] : there was no jugular-venous distention [Thyroid Diffuse Enlargement] : the thyroid was not enlarged [Thyroid Nodule] : there were no palpable thyroid nodules [Heart Rate And Rhythm] : heart rate and rhythm were normal [Heart Sounds] : normal S1 and S2 [Murmurs] : no murmurs present [Respiration, Rhythm And Depth] : normal respiratory rhythm and effort [Exaggerated Use Of Accessory Muscles For Inspiration] : no accessory muscle use [Abdomen Soft] : soft [Abdomen Tenderness] : non-tender [Abdomen Mass (___ Cm)] : no abdominal mass palpated [Abnormal Walk] : normal gait [Gait - Sufficient For Exercise Testing] : the gait was sufficient for exercise testing [Nail Clubbing] : no clubbing of the fingernails [Cyanosis, Localized] : no localized cyanosis [Petechial Hemorrhages (___cm)] : no petechial hemorrhages [No Venous Stasis] : no venous stasis [Skin Lesions] : no skin lesions [No Skin Ulcers] : no skin ulcer [No Xanthoma] : no  xanthoma was observed [Deep Tendon Reflexes (DTR)] : deep tendon reflexes were 2+ and symmetric [Sensation] : the sensory exam was normal to light touch and pinprick [No Focal Deficits] : no focal deficits [Oriented To Time, Place, And Person] : oriented to person, place, and time [Impaired Insight] : insight and judgment were intact [Affect] : the affect was normal [Skin Turgor] : normal skin turgor [Skin Color & Pigmentation] : normal skin color and pigmentation [] : no rash [FreeTextEntry1] : on supplemental oxygen [FreeTextEntry2] : 1-2+ LE edema

## 2020-01-22 NOTE — HISTORY OF PRESENT ILLNESS
[FreeTextEntry1] : Mr. Nguyen is a 69 year old male with a history of chronic respiratory failure with hypoxemia, chronic sinusitis, COPD, GOMEZ, obesity, poor balance, poor sleep, and SOB presenting to the office today for a follow up visit. His chief complaint is wanting to get off the bupropion because he feels its adding to his issues. \par \par - He cant walk too far \par - He's feeling very down and depressed\par - Goes to sleep very late \par - He wakes up exhausted \par - Once in awhile he gets chest pressure\par - He has gained weight, 20lbs \par - He has been coughing and wheezing in the last week \par - He wants to get off buprenorphine \par -he denies any headaches, nausea, vomiting, fever, chills, sweats, chest pain, diarrhea, constipation, dysphagia, dizziness, sour taste in the mouth, leg swelling, leg pain, itchy eyes, itchy ears, heartburn, reflux, myalgias or arthralgias.\par

## 2020-01-23 RX ORDER — TACROLIMUS 0.5 MG/1
0.5 CAPSULE ORAL
Qty: 180 | Refills: 3 | Status: ACTIVE | COMMUNITY
Start: 2018-09-18 | End: 1900-01-01

## 2020-02-01 ENCOUNTER — RX RENEWAL (OUTPATIENT)
Age: 70
End: 2020-02-01

## 2020-02-01 RX ORDER — BUDESONIDE 0.5 MG/2ML
0.5 INHALANT ORAL
Qty: 60 | Refills: 11 | Status: ACTIVE | COMMUNITY
Start: 2019-08-08 | End: 1900-01-01

## 2020-02-01 RX ORDER — FORMOTEROL FUMARATE DIHYDRATE 20 UG/2ML
20 SOLUTION RESPIRATORY (INHALATION)
Qty: 60 | Refills: 11 | Status: ACTIVE | COMMUNITY
Start: 2019-08-08 | End: 1900-01-01

## 2020-02-04 ENCOUNTER — APPOINTMENT (OUTPATIENT)
Dept: CARDIOLOGY | Facility: CLINIC | Age: 70
End: 2020-02-04

## 2020-02-11 ENCOUNTER — APPOINTMENT (OUTPATIENT)
Dept: CARDIOLOGY | Facility: CLINIC | Age: 70
End: 2020-02-11

## 2020-02-18 ENCOUNTER — APPOINTMENT (OUTPATIENT)
Dept: TRANSPLANT | Facility: CLINIC | Age: 70
End: 2020-02-18
Payer: MEDICARE

## 2020-02-18 VITALS
HEIGHT: 72 IN | RESPIRATION RATE: 17 BRPM | HEART RATE: 102 BPM | OXYGEN SATURATION: 89 % | DIASTOLIC BLOOD PRESSURE: 62 MMHG | SYSTOLIC BLOOD PRESSURE: 148 MMHG | WEIGHT: 251 LBS | BODY MASS INDEX: 34 KG/M2 | TEMPERATURE: 98 F

## 2020-02-18 DIAGNOSIS — R60.0 LOCALIZED EDEMA: ICD-10-CM

## 2020-02-18 DIAGNOSIS — Z85.05 PERSONAL HISTORY OF MALIGNANT NEOPLASM OF LIVER: ICD-10-CM

## 2020-02-18 PROCEDURE — 99214 OFFICE O/P EST MOD 30 MIN: CPT

## 2020-02-18 RX ORDER — MYCOPHENOLATE MOFETIL 500 MG/1
500 TABLET ORAL
Qty: 90 | Refills: 3 | Status: ACTIVE | COMMUNITY
Start: 2017-06-15

## 2020-02-18 NOTE — REASON FOR VISIT
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 Bandar Garcia, cardiology- ordered stress and echo- but patient cancelled appointments\par requesting referral to a different cardiologist\par ordered echo but has not been done\par reports he cannot leave home, states he stays in bed\par right leg swelling, no cellulitis\par walks with cane  1 block, gets sob weight 252 lbs not on O2 at visit

## 2020-02-18 NOTE — PHYSICAL EXAM
[Alert] : alert [No Thyromegaly] : no thyromegaly [Normal Rate] : normal rate [Clear to Auscultation] : lungs were clear to auscultation bilaterally [Splenomegaly] : splenomegaly [Caput Medusae] : caput medusae [Normal Bowel Sounds] : normal bowel sounds [Previous Abdominal Surgery] : previous abdominal surgery [] : right femoral palpable [Palmar Erythema] : palmar erythema [General Appearance - Alert] : alert [PERRL With Normal Accommodation] : pupils were equal in size, round, and reactive to light [Apical Impulse] : the apical impulse was normal [Bowel Sounds] : normal bowel sounds [Abdomen Soft] : soft [Abdomen Tenderness] : non-tender [Pitting Edema] : pitting edema present [___ +] : bilateral pretibial [unfilled]U+ pitting edema [Cranial Nerves] : cranial nerves 2-12 were intact [Oriented To Time, Place, And Person] : oriented to person, place, and time [Scleral Icterus] : no scleral icterus [Hepatojugular Reflux] : no hepatojugular reflux [Abdominal Bruit] : no abdominal bruit [Ascites Fluid Wave] : no ascites fluid wave [Ascites Tense] : no ascites tense [Hepatic Encephalopathy] : no hepatic encephalopathy [Depression] : no depression [Asterixis] : no asterixis [de-identified] : distant bs moving air poorly [de-identified] : depressed, ulcers chest and skin, on )2 [TextBox_40] : not palpable [de-identified] : obese abomen but not fluid [FreeTextEntry1] : depressed

## 2020-02-19 LAB
ALBUMIN SERPL ELPH-MCNC: 4.5 G/DL
ALP BLD-CCNC: 103 U/L
ALT SERPL-CCNC: 19 U/L
ANION GAP SERPL CALC-SCNC: 14 MMOL/L
AST SERPL-CCNC: 30 U/L
BASOPHILS # BLD AUTO: 0.01 K/UL
BASOPHILS NFR BLD AUTO: 0.2 %
BILIRUB DIRECT SERPL-MCNC: 0.1 MG/DL
BILIRUB INDIRECT SERPL-MCNC: 0.3 MG/DL
BILIRUB SERPL-MCNC: 0.4 MG/DL
BUN SERPL-MCNC: 30 MG/DL
CALCIUM SERPL-MCNC: 9.8 MG/DL
CHLORIDE SERPL-SCNC: 94 MMOL/L
CO2 SERPL-SCNC: 32 MMOL/L
CREAT SERPL-MCNC: 1.4 MG/DL
EOSINOPHIL # BLD AUTO: 0.05 K/UL
EOSINOPHIL NFR BLD AUTO: 0.8 %
GLUCOSE SERPL-MCNC: 196 MG/DL
HCT VFR BLD CALC: 36.1 %
HGB BLD-MCNC: 10.2 G/DL
IMM GRANULOCYTES NFR BLD AUTO: 1.2 %
LYMPHOCYTES # BLD AUTO: 0.92 K/UL
LYMPHOCYTES NFR BLD AUTO: 15.5 %
MAN DIFF?: NORMAL
MCHC RBC-ENTMCNC: 28.2 PG
MCHC RBC-ENTMCNC: 28.3 GM/DL
MCV RBC AUTO: 99.7 FL
MONOCYTES # BLD AUTO: 0.5 K/UL
MONOCYTES NFR BLD AUTO: 8.4 %
NEUTROPHILS # BLD AUTO: 4.39 K/UL
NEUTROPHILS NFR BLD AUTO: 73.9 %
PLATELET # BLD AUTO: 94 K/UL
POTASSIUM SERPL-SCNC: 4.1 MMOL/L
PROT SERPL-MCNC: 7.2 G/DL
RBC # BLD: 3.62 M/UL
RBC # FLD: 15 %
SODIUM SERPL-SCNC: 140 MMOL/L
TACROLIMUS SERPL-MCNC: <2 NG/ML
WBC # FLD AUTO: 5.94 K/UL

## 2020-02-27 ENCOUNTER — APPOINTMENT (OUTPATIENT)
Dept: PULMONOLOGY | Facility: CLINIC | Age: 70
End: 2020-02-27

## 2020-02-28 ENCOUNTER — APPOINTMENT (OUTPATIENT)
Dept: PULMONOLOGY | Facility: CLINIC | Age: 70
End: 2020-02-28

## 2020-05-13 NOTE — ED PROVIDER NOTE - PHYSICAL EXAMINATION
Attn - alert, nad, no pallor or jaundice, very dry mm, PERRL 2 mm, lungs - fine rales bases bilaterally, no wheezing, good air entry bilat.  Cor - rr, no M, ABdo - soft, NT, ND, no CVAT, Back - bruising Right scapula, NT.  FROM upper extrem.  Lower extrem - chronic venous statis changes bilaterally with pitting edema ankles.  Neuro - no focal deficits.

## 2020-05-13 NOTE — H&P ADULT - PROBLEM SELECTOR PLAN 5
pt on plt 89 on admission, has been 60-90 on [employer] labs   - likely chronic and in setting of liver dz  - ctm CBC, monitor for signs of bleeding

## 2020-05-13 NOTE — H&P ADULT - PROBLEM SELECTOR PLAN 7
- c/w spiriva  - c/w supplemental O2 3L (home O2 lvl)  - duonebs PRN - c/w spiriva, symbicort 160 2 bid  - will place on acapella, incentive spirometry   - c/w supplemental O2 3L (home O2 lvl)  - guille PRN q6

## 2020-05-13 NOTE — H&P ADULT - NSHPSOCIALHISTORY_GEN_ALL_CORE
Lives alone. Ambulates with walker. Former smoker (quit ~25 years ago), quit cigars (~10 yrs ago). Sober for ~30 years. No rec drugs for ~30 years as well.

## 2020-05-13 NOTE — H&P ADULT - ASSESSMENT
70 yo M with PMH of HTN, T2DM (on insulin), COPD (on home O2 3L), s/p liver txp (2011) on cellcept and tacrolimus, HLD, chronic lymphedema, presents after mechanical fall earlier today 2/2 weakness, found to be anemic with positive FOBT and non-adherent to diuretics, admitted for anemia likely 2/2 GIB and fluid overload 2/2 medication non-adherence. 68 yo M with PMH of HTN, T2DM (on insulin), COPD (on home O2 3L), s/p liver txp (2011) on cellcept and tacrolimus, HLD, chronic lymphedema, presents after mechanical fall earlier today 2/2 weakness, found to be anemic with positive FOBT and non-adherent to diuretics, admitted for anemia likely 2/2 GIB and LE edema 2/2 fluid overload in setting of medication non-adherence. 70 yo M with PMH of HTN, T2DM (on insulin), COPD (on home O2 3L), HCV/EtOH cirrhosis s/p liver txp (2011) on cellcept and tacrolimus, HLD, chronic lymphedema, presents after mechanical fall earlier today 2/2 weakness, found to be anemic with positive FOBT and non-adherent to diuretics, admitted for anemia likely 2/2 GIB and LE edema 2/2 fluid overload in setting of medication non-adherence.

## 2020-05-13 NOTE — ED ADULT NURSE REASSESSMENT NOTE - NS ED NURSE REASSESS COMMENT FT1
Pt tolerated blood transfusion well.  No reaction noted.  VSS. Will continue to monitor pt progress.

## 2020-05-13 NOTE — H&P ADULT - PROBLEM SELECTOR PLAN 10
Transitions of Care Status:  1.  Name of PCP:  2.  PCP Contacted on Admission: [ ] Y    [ ] N    3.  PCP contacted at Discharge: [ ] Y    [ ] N    [ ] N/A  4.  Post-Discharge Appointment Date and Location:  5.  Summary of Handoff given to PCP: Transitions of Care Status:  1.  Name of PCP: Dr. Leandro Santos ([facility])   2.  PCP Contacted on Admission: [ ] Y    [ ] N    3.  PCP contacted at Discharge: [ ] Y    [ ] N    [ ] N/A  4.  Post-Discharge Appointment Date and Location:  5.  Summary of Handoff given to PCP:

## 2020-05-13 NOTE — ED PROVIDER NOTE - CARE PLAN
Principal Discharge DX:	Gastrointestinal hemorrhage, unspecified gastrointestinal hemorrhage type  Secondary Diagnosis:	Anemia, unspecified type  Secondary Diagnosis:	Hypervolemia, unspecified hypervolemia type

## 2020-05-13 NOTE — H&P ADULT - NSHPPHYSICALEXAM_GEN_ALL_CORE
T(C): 37.2 (13 May 2020 18:15), Max: 37.3 (13 May 2020 17:55)  T(F): 98.9 (13 May 2020 18:15), Max: 99.2 (13 May 2020 17:55)  HR: 96 (13 May 2020 18:15) (93 - 100)  BP: 136/72 (13 May 2020 18:15) (126/60 - 141/88)  RR: 20 (13 May 2020 18:15) (16 - 22)  SpO2: 96% (13 May 2020 18:15) (95% - 100%)    PHYSICAL EXAM:  GENERAL: No acute distress, well-developed  HEAD:  Atraumatic, Normocephalic  EYES: EOMI, PERRLA, conjunctiva and sclera clear  NECK: Supple, no lymphadenopathy, no JVD  CHEST/LUNG: CTAB; No wheezes, rales, or rhonchi  HEART: Regular rate and rhythm; No murmurs, rubs, or gallops  ABDOMEN: Soft, non-tender, non-distended; normal bowel sounds, no organomegaly  EXTREMITIES:    NEUROLOGY: A&O x 3, no focal deficits  SKIN: No rashes or lesions T(C): 37.2 (13 May 2020 18:15), Max: 37.3 (13 May 2020 17:55)  T(F): 98.9 (13 May 2020 18:15), Max: 99.2 (13 May 2020 17:55)  HR: 96 (13 May 2020 18:15) (93 - 100)  BP: 136/72 (13 May 2020 18:15) (126/60 - 141/88)  RR: 20 (13 May 2020 18:15) (16 - 22)  SpO2: 96% (13 May 2020 18:15) (95% - 100%)    PHYSICAL EXAM:  GENERAL: No acute distress, obese male  HEAD:  Atraumatic, Normocephalic  EYES: EOMI, pupils minimally reactive, conjunctiva and sclera clear  NECK: Supple  CHEST/LUNG: CTAB anteriorly; No wheezes, rales, or rhonchi  HEART: Regular rate and rhythm; No murmurs, rubs, or gallops  ABDOMEN: Soft, non-tender, non-distended; normal bowel sounds, no organomegaly  EXTREMITIES: LE lymphedema, erythema over R leg, some weeping. Mild diffuse tenderness to palpation of LE. some small areas of ecchymosis along the UE b/l.     NEUROLOGY: A&O x 3, no focal deficits  SKIN: as above T(C): 37.2 (13 May 2020 18:15), Max: 37.3 (13 May 2020 17:55)  T(F): 98.9 (13 May 2020 18:15), Max: 99.2 (13 May 2020 17:55)  HR: 96 (13 May 2020 18:15) (93 - 100)  BP: 136/72 (13 May 2020 18:15) (126/60 - 141/88)  RR: 20 (13 May 2020 18:15) (16 - 22)  SpO2: 96% (13 May 2020 18:15) (95% - 100%)    PHYSICAL EXAM:  GENERAL: No acute distress, obese male  HEAD:  Atraumatic, Normocephalic  EYES: EOMI, pupils minimally reactive, conjunctiva and sclera clear  NECK: Supple  CHEST/LUNG: CTAB anteriorly; No wheezes, rales, or rhonchi  HEART: Regular rate and rhythm; No murmurs, rubs, or gallops  ABDOMEN: Soft, obese, non-tender, non-distended; normal bowel sounds, no organomegaly  EXTREMITIES: LE lymphedema, erythema over R leg, some weeping. Mild diffuse tenderness to palpation of LE. some small areas of ecchymosis along the UE b/l.     NEUROLOGY: A&O x 3, no focal deficits  SKIN: as above T(C): 37.2 (13 May 2020 18:15), Max: 37.3 (13 May 2020 17:55)  T(F): 98.9 (13 May 2020 18:15), Max: 99.2 (13 May 2020 17:55)  HR: 96 (13 May 2020 18:15) (93 - 100)  BP: 136/72 (13 May 2020 18:15) (126/60 - 141/88)  RR: 20 (13 May 2020 18:15) (16 - 22)  SpO2: 96% (13 May 2020 18:15) (95% - 100%)    PHYSICAL EXAM:  GENERAL: No acute distress, obese male  HEAD:  Atraumatic, Normocephalic  EYES: EOMI, pupils minimally reactive, conjunctiva and sclera clear  NECK: Supple  CHEST/LUNG: CTAB anteriorly; No wheezes, rales, or rhonchi  HEART: Regular rate and rhythm; No murmurs, rubs, or gallops  ABDOMEN: Soft, obese, non-tender, non-distended; normal bowel sounds, no organomegaly  EXTREMITIES: LE lymphedema, erythema over R leg extending upwards to midthigh, some weeping. Mild diffuse tenderness to palpation of LE. some small areas of ecchymosis along the UE b/l.     NEUROLOGY: A&O x 3, no focal deficits  SKIN: as above

## 2020-05-13 NOTE — ED ADULT NURSE NOTE - OBJECTIVE STATEMENT
69 y.o M A&Ox3 with PMH of lymphedema, COPD (on 3L O2 at home), diabetes, neuropathy, presents to the ED via EMS c.o multiple mechanical falls. Pt. reports he ambulates with walker at home. Fell two weeks ago due to "carrying heavy object." Pt. denies any pain or injuries at this time. Denies LOC and states he did not hit his head. Takes daily 81 mg aspirin. Denies fevers, chills, abd pain, n/v/d, urinary symptoms. Pt. states recently started taking diuretic every other day because  he was "urinating too frequently." FS - 150. Safety and comfort provided. 69 y.o M A&Ox3 with PMH of liver transplant, lymphedema, COPD (on 3L O2 at home), diabetes, neuropathy, presents to the ED via EMS c.o multiple mechanical falls. Pt. reports he ambulates with walker at home. Fell two weeks ago while carrying heavy object. Pt. denies any pain or injuries at this time. Denies LOC and states he did not hit his head. Takes daily 81 mg aspirin. Denies fevers, chills, abd pain, n/v/d, urinary symptoms. Pt. states recently started taking diuretic every other day instead of daily because  he was "urinating too frequently." FS - 150. Endorses generalized weakness. Pt. appears disheveled upon arrival with poor hygiene. Pt. lives alone and states it is difficulty for him to care for himself. Safety and comfort provided.

## 2020-05-13 NOTE — CONSULT NOTE ADULT - ATTENDING COMMENTS
as above-  multifactorial dyspnea--copd, CAD, PAH, debility--O2 sat above 90%  copd-duoneb q 6, symbicort 160 2 bid, spiriva 1 q day, acapella, incentive spirometry  cards-formal cards evaln--echo to R/O PAH-RHF  GI-s/p liver transplant-Hailey et al.  psych-depression--formal psych consult          Lung Ca screening--CT chest now  DVT/GI prophylaxis        Care coordinator consult--[facility] placement  Leandro Santos MD-Pulmonary   [phone] as above-  multifactorial dyspnea--copd, CAD, PAH (portopulmonary), debility, anemia--O2 sat above 90%  copd-duoneb q 6, symbicort 160 2 bid, spiriva 1 q day, acapella, incentive spirometry  cards-formal cards evaln--echo to R/O PAH-RHF---CHF evaln  GI-s/p liver transplant-Hailey et al.         Heme-evaln and GI evaln for anemia.  psych-depression--formal psych consult          Lung Ca screening--CT chest now  DVT/GI prophylaxis        Care coordinator consult--[facility] placement  Leandro Santos MD-Pulmonary   [phone]

## 2020-05-13 NOTE — H&P ADULT - HISTORY OF PRESENT ILLNESS
70 yo M with PMH of HTN, T2DM (on insulin), COPD (on home O2 3L), s/p liver txp (2011) on cellcept and tacrolimus, HLD, chronic lymphedema, presents after mechanical fall earlier today. Patient states that he felt weak and his legs gave out, and it was difficult for him to get back up. He denies any LOC, lightheadedness, dizziness. Denies hitting his head. He states that he has not taken his lasix or spironolactone for several days because it was making him urinate too much; his legs have become more swollen as a result. States he is adherent with the rest of his medications. Denies orthopnea, uses 2 pillows at baseline. Denies dysuria or hematuria. Also endorses blood with stools when wiping, BMs have been "small pellets", sometimes looks dark for the last month. Last colonoscopy and endoscopy about 8-10 years ago, states it was normal to his knowledge. Denies F/C/CP/SOB/cough/N/V/abd pain. Has not required increased O2. Uses walker to ambulate. Patient states he tripped and fell about 2 weeks ago. Patient lives alone and cares for himself, does not have any HHA. Pt admits that he needs help/aid. Per ED documentation --  EMS on arrival reporting the place was disheveled, covered in trash and urine.      In ED vitals: T 98.2, , /80, RR 16 -> 22, 100% on 3L (85% on RA).   Labs notable for anemia (hgb 7), FOBT+. CXR done.   Given: lasix 80 IVP x1, PPI 80 IVPx1, 1U PRBC.

## 2020-05-13 NOTE — CONSULT NOTE ADULT - ASSESSMENT
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 Found w/ signif edema and Hgb-7--  multifactorial issues.

## 2020-05-13 NOTE — H&P ADULT - PROBLEM SELECTOR PLAN 3
- f/u tacro and cellcept lvls  - c/w tacro 0.5 BID and cellcept 500 daily - f/u tacro and cellcept lvls  - c/w tacro 0.5 BID and cellcept 500 daily  - c/w prednisone 3mg QOD

## 2020-05-13 NOTE — CONSULT NOTE ADULT - PROBLEM SELECTOR RECOMMENDATION 9
multifactorial--copd, CAD, PAH, debility--O2 sat above 90% DVT prophylaxis, GI prophylaxis, OOB multifactorial--copd, CAD, PAH, anemia, debility--O2 sat above 90%

## 2020-05-13 NOTE — ED PROVIDER NOTE - OBJECTIVE STATEMENT
69 y.o. male history of liver transplant 8 years ago on mycophenolate and tacrolimus (only compliant with every other day), COPD on home O2 3 liters, HTN, HLD, fluid over load (non complaint with Lasix because it was making him urinate) coming in after a mechanical fall earlier today.  Pt says he felt generally weak and fell to the floor.   Did not get lightheaded, no dizziness, no chest pain.  No sob or increased need for O2.  NO cough no fevers, no chills.  No abd pain , NVD.  Chronic lymphedema which has worsened with pain in b/l legs and associated weight gain.  EMS on arrival reporting the place was disheveled, covered in trash and urine.  Pt reporting he is not able to care for himself at home at this point, lives alone without help. 69 y.o. male history of liver transplant 8 years ago on mycophenolate and tacrolimus (only compliant with every other day), COPD on home O2 3 liters, HTN, HLD, fluid over load (non complaint with Lasix because it was making him urinate) coming in after a mechanical fall earlier today.  Pt says he felt generally weak and fell to the floor.   Did not get lightheaded, no dizziness, no chest pain.  No sob or increased need for O2.  NO cough no fevers, no chills.  No abd pain , NVD.  Chronic lymphedema which has worsened with pain in b/l legs and associated weight gain.  EMS on arrival reporting the place was disheveled, covered in trash and urine.  Pt reporting he is not able to care for himself at home at this point, lives alone without help.      Attn- pt seen in OR6 - pt BIB EMS from home with c/o weakness and fell today.  EMS reports home is unsanitary with smell of urine and feces.  Pt stopped taking meds every day because voiding "all the time".  Started to take them every other day.  Pt has DM and liver transplant and not taking those meds daily.  Pt denies: fever, chills, cp, sob, gaytan, cough, abdo pain, dysuria, hematuria, lightheadedness.  Pt states has chronic leg swelling which had gotten worse.  Pt fell 2 weeks ago after tripping, but not seen medically.

## 2020-05-13 NOTE — CONSULT NOTE ADULT - SUBJECTIVE AND OBJECTIVE BOX
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    PAST MEDICAL & SURGICAL HISTORY:  Hyperlipidemia  Hypertension  S/P liver transplant  Type 2 diabetes mellitus  Chronic obstructive pulmonary disease, unspecified COPD type: on home O2  S/P liver transplant      FAMILY HISTORY:  No pertinent family history in first degree mhmpyosut-X-eebolhepgjlz, arthritis; F-CAD, Htn      SOCIAL HISTORY:  Smoking: _1 packs x 40___ years  EtOH Use: neg  Marital Status: s  Occupation: disabled  Exposures: neg  Recent Travel: neg    Allergies    clindamycin (Unknown)    Intolerances        HOME MEDICATIONS:    REVIEW OF SYSTEMS:  Constitutional: No fevers or chills. No weight loss. No fatigue or generalized malaise.  Eyes: No itching or discharge from the eyes  ENT: No ear pain. No ear discharge. No nasal congestion. No post nasal drip. No epistaxis. No throat pain. No sore throat. No difficulty swallowing.   CV: No chest pain. No palpitations. No lightheadedness or dizziness.   Resp: No dyspnea at rest. No dyspnea on exertion. No orthopnea. No wheezing. No cough. No stridor. No sputum production. No chest pain with respiration.  GI: No nausea. No vomiting. No diarrhea.  MSK: No joint pain or pain in any extremities  Integumentary: No skin lesions. No pedal edema.  Neurological: No gross motor weakness. No sensory changes.    [ ] All other systems negative  [ ] Unable to assess ROS because ________    OBJECTIVE:  ICU Vital Signs Last 24 Hrs  T(C): 37.2 (13 May 2020 18:15), Max: 37.3 (13 May 2020 17:55)  T(F): 98.9 (13 May 2020 18:15), Max: 99.2 (13 May 2020 17:55)  HR: 96 (13 May 2020 18:15) (93 - 100)  BP: 136/72 (13 May 2020 18:15) (126/60 - 141/88)  BP(mean): --  ABP: --  ABP(mean): --  RR: 20 (13 May 2020 18:15) (16 - 22)  SpO2: 96% (13 May 2020 18:15) (95% - 100%)        CAPILLARY BLOOD GLUCOSE      POCT Blood Glucose.: 150 mg/dL (13 May 2020 15:26)      PHYSICAL EXAM:  General: Awake, alert, oriented X 3.   HEENT: Atraumatic, normocephalic.                 Mallampatti Grade                 No nasal congestion.                No tonsillar or pharyngeal exudates.  Lymph Nodes: No palpable lymphadenopathy  Neck: No JVD. No carotid bruit.   Respiratory: Normal chest expansion                         Normal percussion                         Normal and equal air entry                         No wheeze, rhonchi or rales.  Cardiovascular: S1 S2 normal. No murmurs, rubs or gallops.   Abdomen: Soft, non-tender, non-distended. No organomegaly. Normoactive bowel sounds.  Extremities: Warm to touch. Peripheral pulse palpable. No pedal edema.   Skin: No rashes or skin lesions  Neurological: Motor and sensory examination equal and normal in all four extremities.  Psychiatry: Appropriate mood and affect.    HOSPITAL MEDICATIONS:  MEDICATIONS  (STANDING):  buprenorphine 2 milliGRAM(s) SubLingual <User Schedule>    MEDICATIONS  (PRN):      LABS:                        7.0    11.01 )-----------( 89       ( 13 May 2020 16:13 )             See note    05-13    136  |  94<L>  |  30<H>  ----------------------------<  142<H>  4.8   |  31  |  1.64<H>    Ca    8.6      13 May 2020 16:13    TPro  7.3  /  Alb  3.5  /  TBili  1.5<H>  /  DBili  x   /  AST  47<H>  /  ALT  21  /  AlkPhos  130<H>  05-13    PT/INR - ( 13 May 2020 16:13 )   PT: 18.6 sec;   INR: 1.61 ratio         PTT - ( 13 May 2020 16:13 )  PTT:25.3 sec      Venous Blood Gas:  05-13 @ 16:13  7.37/60/30/34/44  VBG Lactate: 1.9      MICROBIOLOGY:     RADIOLOGY:   < from: Xray Chest 1 View- PORTABLE-Urgent (05.13.20 @ 16:47) >  CLINICAL INFORMATION: Fall.    A frontal view of the chest was obtained.     Comparison: 12/12/2018.     IMPRESSION:    The mediastinal cardiac silhouette is unremarkable.    Low lung volumes. Grossly clear lungs. No pneumothorax.    No acute osseous finding.       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  Patient states that he felt weak and his legs gave out, and it was difficult for him to get back up. He denies any LOC, lightheadedness, dizziness. Denies hitting his head. He states that he has not taken his lasix or spironolactone for several days because it was making him urinate too much; his legs have become more swollen as a result. States he is adherent with the rest of his medications. Denies orthopnea, uses 2 pillows at baseline. Denies dysuria or hematuria. Also endorses blood with stools when wiping, BMs have been "small pellets", sometimes looks dark for the last month. Last colonoscopy and endoscopy about 8-10 years ago, states it was normal to his knowledge. Denies F/C/CP/SOB/cough/N/V/abd pain. Has not required increased O2. Uses walker to ambulate. Patient states he tripped and fell about 2 weeks ago. Patient lives alone and cares for himself, does not have any HHA. Pt admits that he needs help/aid. Per ED documentation --  EMS on arrival reporting the place was disheveled, covered in trash and urine.      In ED vitals: T 98.2, , /80, RR 16 -> 22, 100% on 3L (85% on RA).   Labs notable for anemia (hgb 7), FOBT+. CXR done.   Given: lasix 80 IVP x1, PPI 80 IVPx1, 1U PRBC.     Better now over all-no cough or wheezing or HB or reflux    PAST MEDICAL & SURGICAL HISTORY:  Hyperlipidemia  Hypertension  S/P liver transplant  Type 2 diabetes mellitus  Chronic obstructive pulmonary disease, unspecified COPD type: on home O2  S/P liver transplant      FAMILY HISTORY:  No pertinent family history in first degree mgzeosykd-T-pxpnisufxrvh, arthritis; F-CAD, Htn      SOCIAL HISTORY:  Smoking: _1 packs x 40___ years  EtOH Use: neg  Marital Status: s  Occupation: disabled  Exposures: neg  Recent Travel: neg    Allergies    clindamycin (Unknown)    Intolerances        HOME MEDICATIONS:    REVIEW OF SYSTEMS:  Constitutional: No fevers or chills. No weight loss. + fatigue or generalized malaise.  Eyes: No itching or discharge from the eyes  ENT: No ear pain. No ear discharge. No nasal congestion. No post nasal drip. No epistaxis. No throat pain. No sore throat. No difficulty swallowing.   CV: No chest pain. No palpitations. No lightheadedness or dizziness.   Resp: No dyspnea at rest. + dyspnea on exertion. No orthopnea. No wheezing. No cough. No stridor. No sputum production. No chest pain with respiration.  GI: No nausea. No vomiting. No diarrhea.  MSK: No joint pain or pain in any extremities  Integumentary: No skin lesions. + pedal edema.  Neurological: + gross motor weakness. No sensory changes.    [+ ] All other systems negative  [ ] Unable to assess ROS because ________    OBJECTIVE:  ICU Vital Signs Last 24 Hrs  T(C): 37.2 (13 May 2020 18:15), Max: 37.3 (13 May 2020 17:55)  T(F): 98.9 (13 May 2020 18:15), Max: 99.2 (13 May 2020 17:55)  HR: 96 (13 May 2020 18:15) (93 - 100)  BP: 136/72 (13 May 2020 18:15) (126/60 - 141/88)  BP(mean): --  ABP: --  ABP(mean): --  RR: 20 (13 May 2020 18:15) (16 - 22)  SpO2: 96% (13 May 2020 18:15) (95% - 100%)        CAPILLARY BLOOD GLUCOSE      POCT Blood Glucose.: 150 mg/dL (13 May 2020 15:26)      PHYSICAL EXAM: NAD in bed on NC O2  General: Awake, alert, oriented X 3.   HEENT: Atraumatic, normocephalic.                 Mallampatti Grade 3                No nasal congestion.                No tonsillar or pharyngeal exudates.  Lymph Nodes: No palpable lymphadenopathy  Neck: No JVD. No carotid bruit.   Respiratory: Normal chest expansion                         Normal percussion                         Normal and equal air entry                         No wheeze, rhonchi or rales.  Cardiovascular: S1 S2 normal. No murmurs, rubs or gallops.   Abdomen: Soft, non-tender, non-distended. No organomegaly. Normoactive bowel sounds.  Extremities: Warm to touch. Peripheral pulse palpable. +++ pedal edema. Venous stasis changes   Skin: No rashes or skin lesions  Neurological: Motor and sensory examination equal and normal in all four extremities.  Psychiatry: Appropriate mood and affect.    HOSPITAL MEDICATIONS:  MEDICATIONS  (STANDING):  buprenorphine 2 milliGRAM(s) SubLingual <User Schedule>    MEDICATIONS  (PRN):      LABS:                        7.0    11.01 )-----------( 89       ( 13 May 2020 16:13 )             See note    05-13    136  |  94<L>  |  30<H>  ----------------------------<  142<H>  4.8   |  31  |  1.64<H>    Ca    8.6      13 May 2020 16:13    TPro  7.3  /  Alb  3.5  /  TBili  1.5<H>  /  DBili  x   /  AST  47<H>  /  ALT  21  /  AlkPhos  130<H>  05-13    PT/INR - ( 13 May 2020 16:13 )   PT: 18.6 sec;   INR: 1.61 ratio         PTT - ( 13 May 2020 16:13 )  PTT:25.3 sec      Venous Blood Gas:  05-13 @ 16:13  7.37/60/30/34/44  VBG Lactate: 1.9      MICROBIOLOGY:     RADIOLOGY:   < from: Xray Chest 1 View- PORTABLE-Urgent (05.13.20 @ 16:47) >  CLINICAL INFORMATION: Fall.    A frontal view of the chest was obtained.     Comparison: 12/12/2018.     IMPRESSION:    The mediastinal cardiac silhouette is unremarkable.    Low lung volumes. Grossly clear lungs. No pneumothorax.    No acute osseous finding.       < end of copied text >  [ ] Reviewed and interpreted by me    Point of Care Ultrasound Findings;    PFT:    EKG:

## 2020-05-13 NOTE — H&P ADULT - NSHPREVIEWOFSYSTEMS_GEN_ALL_CORE
General: +weakness  Eyes: Denies blurry vision  ENMT: Denies rhinorrhea  Respiratory: Denies cough, SOB  Cardiovascular: Denies palpitations, CP  Gastrointestinal: Denies abd pain, N/V/D/C, hematochezia, melena  : Denies dysuria, increased freq  Musculoskeletal: +LE Edema  Endocrine: Denies increased thirst, increased frequency  Allergic/Immunologic: Denies rashes or hives

## 2020-05-13 NOTE — H&P ADULT - NSHPLABSRESULTS_GEN_ALL_CORE
EKG and imaging personally reviewed.     EKG: EKG and imaging personally reviewed.     EKG: Rate 100, , QRS 80, QTc 415. 1st degree AVB. TWI in aVR and V1.

## 2020-05-13 NOTE — H&P ADULT - NSHPOUTPATIENTPROVIDERS_GEN_ALL_CORE
Dr. Leandro Santos ([facility]) Dr. Leandro Santos (Pulm)  Dr. Hart (txp surgeon)  Dr. Pulido (PCP; [facility])

## 2020-05-13 NOTE — ED PROVIDER NOTE - PROGRESS NOTE DETAILS
H and H was low, spoke with pt, does endorse occasional dark stools.  Rectal with light brown stool will send for occult blood.  Bill

## 2020-05-13 NOTE — H&P ADULT - PROBLEM SELECTOR PLAN 9
Transitions of Care Status:  1.  Name of PCP:  2.  PCP Contacted on Admission: [ ] Y    [ ] N    3.  PCP contacted at Discharge: [ ] Y    [ ] N    [ ] N/A  4.  Post-Discharge Appointment Date and Location:  5.  Summary of Handoff given to PCP: DVT ppx: holding med ppx  Diet: DASH, CC  Dispo: tbd; PT and SW c/s

## 2020-05-13 NOTE — H&P ADULT - ATTENDING COMMENTS
Pt seen and examined. 69M with PMH as above p/w mechanical fall, found to be anemic to 7.0 (baseline ~10 as per HIE) with hx of intermittent black stools; also with worsening b/l LE edema in setting of non compliance with diuretics. On exam, extensive LE lymphedema, but RLE thigh with significant erythema and warmth, c/f cellulitis, especially in setting of bandemia ~8%.  1) Acute blood loss - s/p 1u PRBC with appropriate response, f/u GI, c/w protonix 40 IV BID   2) b/l LE edema - likely worsening lymphedema in setting of diuretic non compliance, c/w lasix 40 IV for now, consider repeat TTE to assess for HF, wound care consult   3) RLE cellulitis - will start unasyn, monitor RLE, consider ID consult as hard to differentiate btw cellulitis vs chronic changes    Patient assigned to me by night hospitalist in charge for management and care for patient for this evening only. Care to be resumed by day hospitalist in the morning and thereafter. Pt seen and examined. 69M with PMH as above p/w mechanical fall, found to be anemic to 7.0 (baseline ~10 as per HIE) with hx of intermittent black stools; also with worsening b/l LE edema in setting of non compliance with diuretics. On exam, extensive LE lymphedema, but RLE thigh with significant erythema and warmth, c/f cellulitis, especially in setting of bandemia ~8%.  1) Acute blood loss - s/p 1u PRBC with appropriate response, f/u GI, c/w protonix 40 IV BID   2) b/l LE edema - likely worsening lymphedema in setting of diuretic non compliance, c/w lasix 40 IV for now, consider repeat TTE to assess for HF, wound care consult   3) RLE cellulitis - will start ancef monitor RLE, consider ID consult as hard to differentiate btw cellulitis vs chronic changes    Patient assigned to me by night hospitalist in charge for management and care for patient for this evening only. Care to be resumed by day hospitalist in the morning and thereafter.

## 2020-05-13 NOTE — H&P ADULT - PROBLEM SELECTOR PLAN 2
appears to be chronic lymphedema; pt endorses increased swelling and has been non-adherent to diuretics. last TTE in [facility] on 5/2016 shows EF 60% and nl LVSF/RVSF.  - s/p lasix 80 IVP x1 in ED  - will restart spironolactone 25BID and place on lasix 40 IV BID for now (home dose is PO lasix 40 BID)  - wound care c/s for lymphedema  - monitor Is/Os, daily weights appears to be chronic lymphedema; pt endorses increased swelling and has been non-adherent to diuretics. last TTE in [facility] on 5/2016 shows EF 60% and nl LVSF/RVSF.  - s/p lasix 80 IVP x1 in ED  - will restart spironolactone 25BID and place on lasix 40 IV BID for now (home dose is PO lasix 40 BID)  - wound care c/s for lymphedema  - monitor Is/Os, daily weights  - ?RLE cellulitis - although pt states erythema is not worse than usual - however, with bandemia, will start unasyn for possible cellulitis appears to be chronic lymphedema; pt endorses increased swelling and has been non-adherent to diuretics. last TTE in [facility] on 5/2016 shows EF 60% and nl LVSF/RVSF.  - s/p lasix 80 IVP x1 in ED  - will restart spironolactone 25BID and place on lasix 40 IV BID for now (home dose is PO lasix 40 BID)  - wound care c/s for lymphedema  - monitor Is/Os, daily weights  - ?RLE cellulitis - although pt states erythema is not worse than usual - however, with bandemia, will start ancef for possible cellulitis

## 2020-05-13 NOTE — ED ADULT TRIAGE NOTE - CHIEF COMPLAINT QUOTE
fell today, 2nd fall in last week, [place] lower ext edema, has hx of neuropathy   denies any covid like symptoms  on 02 for COPD

## 2020-05-13 NOTE — ED ADULT NURSE REASSESSMENT NOTE - NS ED NURSE REASSESS COMMENT FT1
PRBCs initiated at this time as per MD order with 2 RNs at bedside to confirm. Consent in pt. paper chart. Pre transfusion vitals obtained. Pt. educated on signs and symptoms of transfusion reaction and advised to notify RN immediately if any symptoms occur. Verbalized understanding. Call bell within reach. Safety and comfort maintained.

## 2020-05-13 NOTE — H&P ADULT - PROBLEM SELECTOR PLAN 1
hgb 7.0 hgb 7.0 on admission, was 10.2 on 2/2020 (in [employer]). pt endorsing dark stools, blood with wiping, overall weakness, positive FOBT. Last endoscopy/colonoscopy about 8-10 yrs ago per patient.   - s/p 1U PRBC in ED; f/u AM cbc   - maintain active T&S  - GI consult hgb 7.0 on admission, was 10.2 on 2/2020 (in [employer]). pt endorsing dark stools, blood with wiping, overall weakness, positive FOBT. Last endoscopy/colonoscopy about 8-10 yrs ago per patient.   - s/p 1U PRBC in ED; f/u AM cbc   - maintain active T&S  - GI consult  - c/w PPI 40 IV BID

## 2020-05-13 NOTE — H&P ADULT - NSICDXPASTMEDICALHX_GEN_ALL_CORE_FT
PAST MEDICAL HISTORY:  Chronic obstructive pulmonary disease, unspecified COPD type on home O2    Hyperlipidemia     Hypertension     S/P liver transplant     Type 2 diabetes mellitus

## 2020-05-13 NOTE — ED PROVIDER NOTE - CLINICAL SUMMARY MEDICAL DECISION MAKING FREE TEXT BOX
Attn - pt with generalized weakness and increased leg swelling and generalized edema.  Pt with DM and liver transplant and not taking meds as rx.  Labs, diuretics and admission for FTT.

## 2020-05-13 NOTE — H&P ADULT - PROBLEM SELECTOR PLAN 8
DVT ppx:  Diet:  Dispo - c/w buprenorphine 2mg TID - c/w buprenorphine 2mg TID  ISTOP checked, no opioids noted (ref #[id number])

## 2020-05-13 NOTE — H&P ADULT - PROBLEM SELECTOR PLAN 6
pt on lantus 26U qAM and 52U qPM, humalog 18U qAM and 24U qPM. A1c 7.2 in 1/2020.   - will restart lantus at 16U qAM and 34U qPM  - will place on mod ISS and FS qid  - will hold off on restarting standing premeal insulin pt on lantus 26U qAM and 52U qPM, humalog 18U qAM and 24U qPM. A1c 7.2 in 1/2020.   - will restart lantus at 16U qAM and 34U qPM  - will place on mod ISS and FS qid  - will hold off on restarting standing premeal insulin  - can consider endo c/s if persistently hyperglycemic

## 2020-05-13 NOTE — ED PROVIDER NOTE - CPE EDP ENMT NORM
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  Pt was recently admitted to the hospital on 10/6/19 due to gross hematuria after his catheter was tugged.  Pt was managed with irrigation-aspiration of his Poole catheter and short-term CBI. Gross hematuria cleared and patient restarted on his anticoagulation. Cystoscopy performed on 12/14/18 revealed 1 small diverticula at the dome, prostatic urethra of 1-2 cm in length, minimal prostatic obstruction, and an otherwise unremarkable exam.  Urodynamics revealed detrusor hypo-contractility.  Patient was recently diagnosis of unstable angina status post PCI with Drug Eluting Stent to both the SVG on 10/28/18.  On Plavix and Coumadin. Pt here for follow up and reassessment of symptoms. Patient doing well. No complaints this time. Patient denies any gross hematuria.    ALLERGIES:   Allergen Reactions   • Amoxicillin Other (See Comments)   • Bee Sting Other (See Comments)     Sweat bees cause swelling     Past Medical History:   Diagnosis Date   • Ankle fracture 01/01/1971    right   • Atrial fibrillation with RVR (CMS/HCC)    • BPH (benign prostatic hyperplasia)    • CAD (coronary artery disease)     s/p CABG in 1982   • Closed rib fracture 2004    fell off ladder; also with kidney contusion   • Concussion 1949, 1953   • Essential hypertension, benign    • GERD (gastroesophageal reflux disease)    • Kidney stone     stones   • Long term (current) use of anticoagulants    • Lumbar degenerative disc disease    • Lumbar spinal stenosis    • Moderate mitral regurgitation by prior echocardiogram 04/01/2006   • Osteoarthritis    • Other and unspecified hyperlipidemia    • Pneumonia 1936   • Pulmonary emboli (CMS/HCC) 01/01/1971    developed after ankle fx   • Sciatica    • Shoulder dislocation 1956    left shoulder     Past Surgical History:   Procedure Laterality Date    • Cardiac catherization     • Cardiac surgery     • Cardioversion  04/02/2009   • Colonoscopy w/ polypectomy     • Coronary angioplasty  10/28/2018   • Coronary artery bypass graft  07/12/1982   • Echocardiogram  08/10/2018   • Eye surgery      cataract   • Hemorrhoid surgery     • Joint replacement Right 2015    hip   • Joint replacement Left 2003    shoulder   • Knee surgery  2005    left meniscus   • Left heart cath,percutaneous  07/06/1982    Cardiac Cath   • Open anterior shoulder reconstruction Left 2000   • Skin cancer excision      basal cell cancer   • Tonsillectomy     • Vascular surgery       Current Medications    APIXABAN (ELIQUIS) 2.5 MG TAB    Take one tablet (2.5mg) by mouth twice daily    ASCORBIC ACID (VITAMIN C) 1000 MG TABLET    Take 1,000 mg by mouth 2 times daily.    ATORVASTATIN (LIPITOR) 80 MG TABLET    Take 1 tablet by mouth nightly.    CARVEDILOL (COREG) 6.25 MG TABLET    Take 1 tablet by mouth every 12 hours.    CHOLECALCIFEROL (VITAMIN D3) 2000 UNITS CAPSULE    Take 2,000 Units by mouth daily.    CYANOCOBALAMIN 2500 MCG TAB    Take 2,500 mcg by mouth 1 day a week. Take on Thursday    DIPHENHYDRAMINE-ACETAMINOPHEN (TYLENOL PM EXTRA STRENGTH)  MG TAB    Take 2 tablets by mouth nightly.    DOCUSATE SODIUM-SENNOSIDES (SENOKOT S) 50-8.6 MG PER TABLET    Take 2 tablets by mouth daily. Do not start before September 11, 2019.    FOLIC ACID (FOLVITE) 800 MCG TABLET    Take 800 mcg by mouth 1 day a week. Take on Tuesday    FUROSEMIDE (LASIX) 40 MG TABLET    Take 1 tablet by mouth daily. Do not start before September 11, 2019.    LOSARTAN (COZAAR) 50 MG TABLET    Take 1 tablet by mouth daily.    NEOMYCIN-BACITRACIN-POLYMYXIN-PRAMOXINE (TRIPLE ANTIBIOTIC PLUS) 1 % OINTMENT    Apply 1 application topically as needed. Indications: skin abrasions    NITROGLYCERIN (NITROSTAT) 0.4 MG SUBLINGUAL TABLET    PLACE 1 TABLET UNDER THE TONGUE EVERY 5 MINUTES AS NEEDED FOR CHEST PAIN. IF NO RELIEF AFTER 3  TABLETS, SEEK MEDICAL ATTENTION.    NYSTATIN (MYCOSTATIN) 215473 UNIT/GM CREAM    Apply topically every 12 hours.    OMEGA-3 FATTY ACIDS (FISH OIL PO)    Take 1 capsule by mouth daily.    PANTOPRAZOLE (PROTONIX) 40 MG TABLET    Take 1 tablet by mouth daily.    PYRIDOXINE (B-6) 100 MG TABLET    Take 100 mg by mouth 1 day a week. Take on Wednesday    TAMSULOSIN (FLOMAX) 0.4 MG CAP    Take 1 capsule by mouth 2 times daily.    TICAGRELOR (BRILINTA) 90 MG TAB    Take 1 tablet by mouth every 12 hours.     Social History     Tobacco Use   • Smoking status: Former Smoker     Packs/day: 0.00     Last attempt to quit:      Years since quittin.8   • Smokeless tobacco: Never Used   Substance Use Topics   • Alcohol use: Not Currently     Frequency: 4 or more times a week     Drinks per session: 1 or 2     Binge frequency: Never   • Drug use: No     Family History   Problem Relation Age of Onset   • Myocardial Infarction Mother 48        fatal   • Tuberculosis Mother         1939   • Myocardial Infarction Father    • Cancer Father    • Myocardial Infarction Other 51        fatal-uncle   • Coronary Artery Disease Brother 56        CABG   • Cancer Sister    • Cancer Brother    • Cancer Brother        ROS:  Constitutional: Denies fevers or weight changes  Cardiovascular: Denies chest pain or SOB  GI: Denies nausea, vomiting, diarrhea or constipation  All remaining ROS were negative in a 10-point survey except for those listed in the HPI.    OBJECTIVE:   Physical Exam:   Visit Vitals  Temp 97.1 °F (36.2 °C) (Temporal)   Ht 6' (1.829 m)   Wt 78.7 kg   BMI 23.53 kg/m²     General: Well developed, well nourished male  Eyes: Normal conjunctivea and lids, no erythema or edema noted  Ears: Normal Pinna and lobe of the ear.  External ear canal unremarkable  Skin: Normal Color/tone. Without obvious lesions. Warm and dry  Lungs: No laboring or audible wheezes  Abdomen: Soft, non-tender, non-distended. No Heptosplenomegaly. No  CVAT  Genitourinary:  Villegas indwelling. Urine clear  Digital Rectal Exam: deferred    Laboratory:  Lab Results   Component Value Date    PSA 2.58 04/28/2016       Most recent UA:  Lab Results   Component Value Date    COL RED (A) 10/06/2019    UAPP CLOUDY 10/06/2019    USPG 1.024 10/06/2019    UPH 8.0 (H) 10/06/2019    UPROT 100 (A) 10/06/2019    UGLU 150 (A) 10/06/2019    UKET TRACE (A) 10/06/2019    UBILI NEGATIVE 10/06/2019    URBC MODERATE (A) 10/06/2019    UNITR NEGATIVE 10/06/2019    UROB 0.2 10/06/2019    UWBC NEGATIVE 10/06/2019       No components found for: CREAT    Lab Results   Component Value Date    HGB 8.2 (L) 10/09/2019    HCT 25.2 (L) 10/09/2019    MCV 92.1 10/07/2019    WBC 9.0 10/07/2019     10/07/2019        ASSESSMENT:    · 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  -Unfortunately patient is not a candidate for TURP or procedures to relieve prostatic obstruction as patient's retention is secondary to detrusor hypocontractility.  Patient will need to be managed with chronic indwelling Villegas catheter, CIC, or suprapubic tube.  -Patient elects for indwelling Villegas catheter this time  -Patient due for Villegas exchanged today  -Villegas catheter was replaced today by nursing staff using normal sterile techniques without difficulty.  Catheter care was provided to the patient.  -Pt will need to have his villegas catheter exchanged every 4 weeks at a nursing visit or home health  -Schedule Cystoscopy and CTU.  -All questions were answered and patient verbalized understanding.  -Patient verbalized understanding.     Tim Mcneal, DO      normal...

## 2020-05-14 NOTE — PROGRESS NOTE ADULT - PROBLEM SELECTOR PLAN 5
plt 89 on admission, has been 60-90 on [facility] labs   - likely chronic and in setting of liver dz  - ctm CBC, monitor for signs of bleeding plt 89 on admission, has been 60-90 on [facility] labs   - likely chronic and in setting of splenomegaly  - ctm CBC, monitor for signs of bleeding

## 2020-05-14 NOTE — PHYSICAL THERAPY INITIAL EVALUATION ADULT - GAIT DEVIATIONS NOTED, PT EVAL
decreased velocity of limb motion/decreased weight-shifting ability/decreased step length/decreased garth

## 2020-05-14 NOTE — CONSULT NOTE ADULT - SUBJECTIVE AND OBJECTIVE BOX
Chief Complaint:  Patient is a 69y old  Male who presents with a chief complaint of s/p [facility] fall (14 May 2020 11:29)    HPI:  69 year old man with hx of Hypertension, DM2, COPD, HCV/EtOH Cirrhosis s/p liver transplant (2011, on cellcept/tacrolimus), hyperlipidemia and chronic lymph edema presents with fall from home. Patient reports he was walking and his legs gave out. He denies fevers, chills, chest pain, shortness of breath, dizziness, palpitations, cough, melena, hematemesis and recent travel. He reports intermittent dark stools [light brown stools on rectal done in ED] and blood when he wipes after a bowel movement. His last EGD/Colonoscopy were 8-10 years ago. GI/Hepatology consulted for anemia and history of transplant.     Allergies:  clindamycin (Unknown)    Home Medications:  Reviewed    Hospital Medications:  albuterol/ipratropium for Nebulization 3 milliLiter(s) Nebulizer every 6 hours PRN  aspirin enteric coated 81 milliGRAM(s) Oral daily  buprenorphine 2 mG/naloxone 0.5 mG SL  Tablet 1 Tablet(s) SubLingual <User Schedule>  ceFAZolin   IVPB 2000 milliGRAM(s) IV Intermittent every 8 hours  dextrose 40% Gel 15 Gram(s) Oral once PRN  dextrose 5%. 1000 milliLiter(s) IV Continuous <Continuous>  dextrose 50% Injectable 12.5 Gram(s) IV Push once  dextrose 50% Injectable 25 Gram(s) IV Push once  dextrose 50% Injectable 25 Gram(s) IV Push once  furosemide   Injectable 40 milliGRAM(s) IV Push two times a day  glucagon  Injectable 1 milliGRAM(s) IntraMuscular once PRN  insulin glargine Injectable (LANTUS) 16 Unit(s) SubCutaneous every morning  insulin glargine Injectable (LANTUS) 34 Unit(s) SubCutaneous at bedtime  insulin lispro (HumaLOG) corrective regimen sliding scale   SubCutaneous three times a day before meals  insulin lispro (HumaLOG) corrective regimen sliding scale   SubCutaneous at bedtime  metoprolol succinate ER 25 milliGRAM(s) Oral daily  multivitamin/minerals 1 Tablet(s) Oral daily  mycophenolate mofetil 500 milliGRAM(s) Oral daily  pantoprazole  Injectable 40 milliGRAM(s) IV Push two times a day  predniSONE   Tablet 3 milliGRAM(s) Oral every other day  sertraline 100 milliGRAM(s) Oral daily  spironolactone 25 milliGRAM(s) Oral two times a day  tacrolimus 0.5 milliGRAM(s) Oral two times a day  tamsulosin 0.4 milliGRAM(s) Oral at bedtime  tiotropium 18 MICROgram(s) Capsule 1 Capsule(s) Inhalation daily    PMHX/PSHX:  Hyperlipidemia  Hypertension  S/P liver transplant  Type 2 diabetes mellitus  Chronic obstructive pulmonary disease, unspecified COPD type  S/P liver transplant    Family history:  No pertinent family history in first degree relatives    Social History:   Former EtoH abuse  Former smoker  No drug use    ROS:   General:  No fevers, chills   ENT:  No sore throat or dysphagia  CV:  No pain or palpitations  Resp:  No dyspnea, cough, wheezing  GI:  No pain, No nausea, No vomiting,  No pruritis, No rectal bleeding, No tarry stools  Skin:  No rash or edema    PHYSICAL EXAM:   GENERAL:  NAD, Appears stated age  HEENT:  NC/AT,  conjunctivae clear and pink, sclera -anicteric  CHEST:  CTA B/L, Normal effort  HEART:  RRR S1/S2, No murmurs  ABDOMEN:  Soft, non-tender, non-distended, BS+  EXTEREMITIES:  No cyanosis + Edema  SKIN:  Warm & Dry.   NEURO:  Alert, oriented    Vital Signs:  Vital Signs Last 24 Hrs  T(C): 36.8 (14 May 2020 05:07), Max: 37.3 (13 May 2020 17:55)  T(F): 98.3 (14 May 2020 05:07), Max: 99.2 (13 May 2020 17:55)  HR: 99 (14 May 2020 05:07) (87 - 100)  BP: 142/78 (14 May 2020 05:07) (121/65 - 142/78)  BP(mean): --  RR: 19 (14 May 2020 05:07) (16 - 22)  SpO2: 95% (14 May 2020 05:07) (95% - 100%)  Daily Height in cm: 185.42 (13 May 2020 23:16)    Daily     LABS:                        7.5    6.73  )-----------( 84       ( 14 May 2020 07:09 )             28.4     Mean Cell Volume: 91.3 fl (05-14-20 @ 07:09)    05-14    135  |  93<L>  |  34<H>  ----------------------------<  125<H>  4.6   |  31  |  1.68<H>    Ca    8.8      14 May 2020 07:08  Phos  4.2     05-14  Mg     2.2     05-14    TPro  7.0  /  Alb  3.3  /  TBili  1.2  /  DBili  x   /  AST  43<H>  /  ALT  21  /  AlkPhos  112  05-14    LIVER FUNCTIONS - ( 14 May 2020 07:08 )  Alb: 3.3 g/dL / Pro: 7.0 g/dL / ALK PHOS: 112 U/L / ALT: 21 U/L / AST: 43 U/L / GGT: x           PT/INR - ( 14 May 2020 08:08 )   PT: 17.4 sec;   INR: 1.51 ratio       PTT - ( 14 May 2020 08:08 )  PTT:31.7 sec                        7.5    6.73  )-----------( 84       ( 14 May 2020 07:09 )             28.4                         7.7    9.28  )-----------( 76       ( 14 May 2020 00:46 )             See note                        7.0    11.01 )-----------( 89       ( 13 May 2020 16:13 )             See note    Imaging: Chief Complaint:  Patient is a 69y old  Male who presents with a chief complaint of s/p [facility] fall (14 May 2020 11:29)    HPI:  69 year old man with hx of Hypertension, DM2, COPD, HCV/EtOH Cirrhosis s/p liver transplant (2011, on cellcept/tacrolimus), hyperlipidemia and chronic lymph edema presents with fall from home. Patient reports he was walking and his legs gave out. He denies fevers, chills, chest pain, shortness of breath, dizziness, palpitations, cough, melena, hematemesis and recent travel. He reports intermittent dark stools [light brown stools on rectal done in ED] and blood when he wipes after a bowel movement. His last EGD/Colonoscopy were 8-10 years ago. GI/Hepatology consulted for anemia and history of transplant.     Allergies:  clindamycin (Unknown)    Home Medications:  Reviewed    Hospital Medications:  albuterol/ipratropium for Nebulization 3 milliLiter(s) Nebulizer every 6 hours PRN  aspirin enteric coated 81 milliGRAM(s) Oral daily  buprenorphine 2 mG/naloxone 0.5 mG SL  Tablet 1 Tablet(s) SubLingual <User Schedule>  ceFAZolin   IVPB 2000 milliGRAM(s) IV Intermittent every 8 hours  dextrose 40% Gel 15 Gram(s) Oral once PRN  dextrose 5%. 1000 milliLiter(s) IV Continuous <Continuous>  dextrose 50% Injectable 12.5 Gram(s) IV Push once  dextrose 50% Injectable 25 Gram(s) IV Push once  dextrose 50% Injectable 25 Gram(s) IV Push once  furosemide   Injectable 40 milliGRAM(s) IV Push two times a day  glucagon  Injectable 1 milliGRAM(s) IntraMuscular once PRN  insulin glargine Injectable (LANTUS) 16 Unit(s) SubCutaneous every morning  insulin glargine Injectable (LANTUS) 34 Unit(s) SubCutaneous at bedtime  insulin lispro (HumaLOG) corrective regimen sliding scale   SubCutaneous three times a day before meals  insulin lispro (HumaLOG) corrective regimen sliding scale   SubCutaneous at bedtime  metoprolol succinate ER 25 milliGRAM(s) Oral daily  multivitamin/minerals 1 Tablet(s) Oral daily  mycophenolate mofetil 500 milliGRAM(s) Oral daily  pantoprazole  Injectable 40 milliGRAM(s) IV Push two times a day  predniSONE   Tablet 3 milliGRAM(s) Oral every other day  sertraline 100 milliGRAM(s) Oral daily  spironolactone 25 milliGRAM(s) Oral two times a day  tacrolimus 0.5 milliGRAM(s) Oral two times a day  tamsulosin 0.4 milliGRAM(s) Oral at bedtime  tiotropium 18 MICROgram(s) Capsule 1 Capsule(s) Inhalation daily    PMHX/PSHX:  Hyperlipidemia  Hypertension  S/P liver transplant  Type 2 diabetes mellitus  Chronic obstructive pulmonary disease, unspecified COPD type  S/P liver transplant    Family history:  No pertinent family history in first degree relatives    Social History:   Former EtoH abuse  Former smoker  No drug use    ROS:   General:  No fevers, chills   ENT:  No sore throat or dysphagia  CV:  No pain or palpitations  Resp:  No dyspnea, cough, wheezing  GI:  No pain, No nausea, No vomiting,  No pruritis, No rectal bleeding, No tarry stools  Skin:  No rash or edema    PHYSICAL EXAM:   GENERAL:  NAD, Appears stated age  HEENT:  NC/AT,  conjunctivae clear and pink, sclera -anicteric  CHEST:  CTA B/L, Normal effort  HEART:  RRR S1/S2, No murmurs  ABDOMEN:  Soft, non-tender, non-distended, BS+, healed surgical scars  EXTEREMITIES:  No cyanosis + Edema, RLE mildly erythematous, chronic skin changes of venous stasis  SKIN:  Warm & Dry, no jaundice  NEURO:  Alert, oriented x4, no asterixis    Vital Signs:  Vital Signs Last 24 Hrs  T(C): 36.8 (14 May 2020 05:07), Max: 37.3 (13 May 2020 17:55)  T(F): 98.3 (14 May 2020 05:07), Max: 99.2 (13 May 2020 17:55)  HR: 99 (14 May 2020 05:07) (87 - 100)  BP: 142/78 (14 May 2020 05:07) (121/65 - 142/78)  BP(mean): --  RR: 19 (14 May 2020 05:07) (16 - 22)  SpO2: 95% (14 May 2020 05:07) (95% - 100%)  Daily Height in cm: 185.42 (13 May 2020 23:16)    Daily     LABS:                        7.5    6.73  )-----------( 84       ( 14 May 2020 07:09 )             28.4     Mean Cell Volume: 91.3 fl (05-14-20 @ 07:09)    05-14    135  |  93<L>  |  34<H>  ----------------------------<  125<H>  4.6   |  31  |  1.68<H>    Ca    8.8      14 May 2020 07:08  Phos  4.2     05-14  Mg     2.2     05-14    TPro  7.0  /  Alb  3.3  /  TBili  1.2  /  DBili  x   /  AST  43<H>  /  ALT  21  /  AlkPhos  112  05-14    LIVER FUNCTIONS - ( 14 May 2020 07:08 )  Alb: 3.3 g/dL / Pro: 7.0 g/dL / ALK PHOS: 112 U/L / ALT: 21 U/L / AST: 43 U/L / GGT: x           PT/INR - ( 14 May 2020 08:08 )   PT: 17.4 sec;   INR: 1.51 ratio       PTT - ( 14 May 2020 08:08 )  PTT:31.7 sec                        7.5    6.73  )-----------( 84       ( 14 May 2020 07:09 )             28.4                         7.7    9.28  )-----------( 76       ( 14 May 2020 00:46 )             See note                        7.0    11.01 )-----------( 89       ( 13 May 2020 16:13 )             See note    Imaging:

## 2020-05-14 NOTE — PROGRESS NOTE ADULT - PROBLEM SELECTOR PLAN 3
- f/u tacro and cellcept lvls  - c/w tacro 0.5 BID and cellcept 500 daily  - c/w prednisone 3mg EOD - Tacro level <2   - F/u cellcept level  - c/w tacro 0.5 BID and cellcept 500 daily  - c/w prednisone 3mg EOD  - Transplant surgery consult

## 2020-05-14 NOTE — PROGRESS NOTE ADULT - ATTENDING COMMENTS
RLE upper thigh concern for cellulitis, I agree that both b/l LE erythema is consistent with stasis dermatitis, however the R upper thigh is significantly more erythematous and warm as compared to left. would also r/o DVT but given the bandemia would treat for a R upper thigh cellulitis. If does not improve in the next 48hrs will consider additional imaging. It seems that due to his chronic lymphedema he has been prone to recurrent cellulitis. the right upper thigh erythema did not change when I lifted the leg up.  as for DM patient stating type 2 but documented as type 1, would send off PRAKASH ab and check insulin and c peptide etc  check 2d echo, BNP, c/w diuresis. monitor ins and outs  CT chest w/o pulmonary nodule, follow up pulm recs  CBC q8hr, IV PPI bid, monitor blood loss, GI c/s pending RLE upper thigh concern for cellulitis, I agree that both b/l LE erythema is consistent with stasis dermatitis, however the R upper thigh is significantly more erythematous and warm as compared to left. would also r/o DVT but given the bandemia would treat for a R upper thigh cellulitis. If does not improve in the next 48hrs will consider additional imaging. It seems that due to his chronic lymphedema he has been prone to recurrent cellulitis. the right upper thigh erythema did not change when I lifted the leg up.  as for DM patient stating type 2 but documented as type 1, would send off PRAKASH ab and check insulin and c peptide etc  check 2d echo, BNP, c/w diuresis. monitor ins and outs  CT chest w/o pulmonary nodule, follow up pulm recs  CBC q8hr, IV PPI bid, monitor blood loss, GI c/s pending  hold asa

## 2020-05-14 NOTE — PHYSICAL THERAPY INITIAL EVALUATION ADULT - PRECAUTIONS/LIMITATIONS, REHAB EVAL
He states that he has not taken his lasix or spironolactone for several days because it was making him urinate too much; his legs have become more swollen as a result. States he is adherent with the rest of his medications. Denies orthopnea, uses 2 pillows at baseline. Denies dysuria or hematuria. Also endorses blood with stools when wiping, BMs have been "small pellets", sometimes looks dark for the last month. Last colonoscopy and endoscopy about 8-10 years ago, states it was normal to his knowledge.  Patient states he tripped and fell about 2 weeks ago. Patient lives alone and cares for himself, does not have any HHA.  Per ED documentation --  EMS on arrival reporting the place was disheveled, covered in trash and urine. CT chest:  No pulmonary nodule is noted. Aortic valvular calcification.

## 2020-05-14 NOTE — CONSULT NOTE ADULT - ASSESSMENT
69 year old man with hx of Hypertension, DM2, COPD, EtOH/HCV Cirrhosis s/p liver transplant (2011, on cellcept/tacrolimus), hyperlipidemia and chronic lymph edema presents with fall from home in the setting of anemia and worsening edema due to diuretic nonadherence.        Recommendation:  # HCV cirrhosis s/p Liver transplant 2011  On Tacrolimus, prednisone  Hold cellcept in setting of cellulitis   Please check tacrolimus level daily  30 minutes before am dose  cbc/ cmp mg/ phos daily   Abx as per primary team   Care as per primary team  Will continue to follow please page #[phone] for any questions/ concerns

## 2020-05-14 NOTE — PROGRESS NOTE ADULT - PROBLEM SELECTOR PLAN 2
appears to be chronic lymphedema; pt endorses increased swelling and has been non-adherent to diuretics. [facility] and nl LVSF/RVSF.  - s/p lasix 80 IVP x1 in ED  - C/w spironolactone 25BID and lasix 40 IV BID for now (home dose is PO lasix 40 BID)  - wound care c/s for lymphedema  - monitor Is/Os, daily weights  - ?RLE cellulitis - although pt states erythema is not worse than usual - however, with bandemia ancef started for possible cellulitis Appears to be chronic lymphedema; pt endorses increased swelling and has been non-adherent to diuretics. [facility] and nl LVSF/RVSF.  - s/p lasix 80 IVP x1 in ED  - C/w spironolactone 25BID and lasix 40 IV BID for now (home dose is PO lasix 40 BID)  - wound care c/s for lymphedema  - monitor Is/Os, daily weights  - ?RLE cellulitis - although pt states erythema is not worse than usual - however, with bandemia ancef started for possible cellulitis  - LE doppler to r/o DVT

## 2020-05-14 NOTE — CONSULT NOTE ADULT - SUBJECTIVE AND OBJECTIVE BOX
Wound Surgery Consult Note:    HPI:  70 yo M with PMH of HTN, T2DM (on insulin), COPD (on home O2 3L), s/p liver txp (2011) on cellcept and tacrolimus, HLD, chronic lymphedema, presents after mechanical fall earlier today. Patient states that he felt weak and his legs gave out, and it was difficult for him to get back up. He denies any LOC, lightheadedness, dizziness. Denies hitting his head. He states that he has not taken his lasix or spironolactone for several days because it was making him urinate too much; his legs have become more swollen as a result. States he is adherent with the rest of his medications. Denies orthopnea, uses 2 pillows at baseline. Denies dysuria or hematuria. Also endorses blood with stools when wiping, BMs have been "small pellets", sometimes looks dark for the last month. Last colonoscopy and endoscopy about 8-10 years ago, states it was normal to his knowledge. Denies F/C/CP/SOB/cough/N/V/abd pain. Has not required increased O2. Uses walker to ambulate. Patient states he tripped and fell about 2 weeks ago. Patient lives alone and cares for himself, does not have any HHA. Pt admits that he needs help/aid. Per ED documentation --  EMS on arrival reporting the place was disheveled, covered in trash and urine.      In ED vitals: T 98.2, , /80, RR 16 -> 22, 100% on 3L (85% on RA).   Labs notable for anemia (hgb 7), FOBT+. CXR done.   Given: lasix 80 IVP x1, PPI 80 IVPx1, 1U PRBC. (13 May 2020 19:41)    Request for wound care consult for sacral wound received. Mr. Nguyen was encountered sitting at the edge of an alternating air with low air loss surface bed. He was able to get himself back into bed with his legs elevated for assessment. He was seen with Dr. Magdaleno.    PAST MEDICAL & SURGICAL HISTORY:  Hyperlipidemia  Hypertension  S/P liver transplant  Type 2 diabetes mellitus  Chronic obstructive pulmonary disease, unspecified COPD type: on home O2  S/P liver transplant    REVIEW OF SYSTEMS  General:	general increasing weakness  Respiratory and Thorax: no SOB or cough  Cardiovascular:	no CP  Gastrointestinal:	 h/o liver transplany  Genitourinary: increased urination with diuretics which he stopped taking recently	  Musculoskeletal:	 recent falls  Neurological:	no LOC  Endocrine:	DM  Vascular: frequent leg swelling increased recently  skin: no chronic wounds.    MEDICATIONS  (STANDING):  buprenorphine 2 mG/naloxone 0.5 mG SL  Tablet 1 Tablet(s) SubLingual <User Schedule>  ceFAZolin   IVPB 2000 milliGRAM(s) IV Intermittent every 8 hours  dextrose 5%. 1000 milliLiter(s) (50 mL/Hr) IV Continuous <Continuous>  dextrose 50% Injectable 12.5 Gram(s) IV Push once  dextrose 50% Injectable 25 Gram(s) IV Push once  dextrose 50% Injectable 25 Gram(s) IV Push once  furosemide   Injectable 40 milliGRAM(s) IV Push two times a day  insulin glargine Injectable (LANTUS) 16 Unit(s) SubCutaneous every morning  insulin glargine Injectable (LANTUS) 34 Unit(s) SubCutaneous at bedtime  insulin lispro (HumaLOG) corrective regimen sliding scale   SubCutaneous three times a day before meals  insulin lispro (HumaLOG) corrective regimen sliding scale   SubCutaneous at bedtime  metoprolol succinate ER 25 milliGRAM(s) Oral daily  multivitamin/minerals 1 Tablet(s) Oral daily  pantoprazole  Injectable 40 milliGRAM(s) IV Push two times a day  predniSONE   Tablet 3 milliGRAM(s) Oral every other day  sertraline 100 milliGRAM(s) Oral daily  spironolactone 25 milliGRAM(s) Oral two times a day  tacrolimus 0.5 milliGRAM(s) Oral two times a day  tamsulosin 0.4 milliGRAM(s) Oral at bedtime  tiotropium 18 MICROgram(s) Capsule 1 Capsule(s) Inhalation daily    MEDICATIONS  (PRN):  albuterol/ipratropium for Nebulization 3 milliLiter(s) Nebulizer every 6 hours PRN Shortness of Breath and/or Wheezing  dextrose 40% Gel 15 Gram(s) Oral once PRN Blood Glucose LESS THAN 70 milliGRAM(s)/deciliter  glucagon  Injectable 1 milliGRAM(s) IntraMuscular once PRN Glucose LESS THAN 70 milligrams/deciliter    Allergies    clindamycin (Unknown)    Intolerances    SOCIAL HISTORY:  lives alone, no HHA, former drug abuse, current suboxone use    FAMILY HISTORY:  No pertinent family history in first degree relatives    Vital Signs Last 24 Hrs  T(C): 36.8 (14 May 2020 05:07), Max: 37.3 (13 May 2020 17:55)  T(F): 98.3 (14 May 2020 05:07), Max: 99.2 (13 May 2020 17:55)  HR: 99 (14 May 2020 05:07) (87 - 100)  BP: 142/78 (14 May 2020 05:07) (121/65 - 142/78)  BP(mean): --  RR: 19 (14 May 2020 05:07) (16 - 22)  SpO2: 95% (14 May 2020 05:07) (95% - 100%)    Physical Exam:  General: A&O x3, obese, unkempt  Respiratory: no SOB on room air  Gastrointestinal: soft NT/ND   Neurology: sensation grossly intact  Musculoskeletal: no contractures or deformities  Vascular: RLE>LLE edema, DP/PT pulses palpable, BLE equally warm, no acute ischemia noted, BLE with hyperpigmentation and lipodermatosclerosis. RIght leg dull erythema to thigh  Skin:  Sacral wound - L 3cm x W3cm x D unknown - deep maroon discolored intact skin, with no drainage, no erythema  plantar foot wound - L 3cm x W 3cm x D unknown - 100% covered with firm, dry, fixed, black eschar, no drainage, no erythema  No odor, increased warmth, tenderness, induration, fluctuance associated with any of the above wounds    LABS:  05-14    135  |  93<L>  |  34<H>  ----------------------------<  125<H>  4.6   |  31  |  1.68<H>    Ca    8.8      14 May 2020 07:08  Phos  4.2     05-14  Mg     2.2     05-14    TPro  7.0  /  Alb  3.3  /  TBili  1.2  /  DBili  x   /  AST  43<H>  /  ALT  21  /  AlkPhos  112  05-14                          7.5    6.73  )-----------( 84       ( 14 May 2020 07:09 )             28.4     PT/INR - ( 14 May 2020 08:08 )   PT: 17.4 sec;   INR: 1.51 ratio         PTT - ( 14 May 2020 08:08 )  PTT:31.7 sec      RADIOLOGY & ADDITIONAL STUDIES:    EXAM:  DUPLEX EXT VEINS LOWER RT                        PROCEDURE DATE:  05/14/2020    INTERPRETATION:  CLINICAL INFORMATION: Right leg pain and swelling for one week. Status post liver transplant.    COMPARISON: Correlation is made with left lower extremity Doppler dated 12/12/2018.    TECHNIQUE: Duplex sonography of the RIGHT LOWER extremity veins with color and spectral Doppler, with and without compression. Study is technically limited.    FINDINGS:    There is normal compressibility of the right common femoral, femoral and popliteal veins.     The contralateral common femoral vein is patent.    Doppler examination shows normal spontaneous and phasic flow.    No calf vein thrombosis is detected.    IMPRESSION:     No definite evidence of right lower extremity deep venous thrombosis. Study is somewhat technically limited.

## 2020-05-14 NOTE — PROGRESS NOTE ADULT - PROBLEM SELECTOR PLAN 6
pt on lantus 26U qAM and 52U qPM, humalog 18U qAM and 24U qPM. A1c 7.2 in 1/2020.   - will restart lantus at 16U qAM and 34U qPM  - will place on mod ISS and FS qid  - will hold off on restarting standing premeal insulin  - can consider endo c/s if persistently hyperglycemic Pt previously documented as having T1DM, however, pt says he has T2DM.   - At home, pt is on lantus 26U qAM and 52U qPM, humalog 18U qAM and 24U qPM. A1c 7.2 in 1/2020.  - A1C 6.6   - C/w lantus at 16U qAM and 34U qPM  - mod ISS and FS qid  - will hold off on restarting standing premeal insulin for now   - can consider endo c/s if persistently hyperglycemic

## 2020-05-14 NOTE — CONSULT NOTE ADULT - ATTENDING COMMENTS
70 yo diabetic male , s/p liver TX, 10+ yrs ago for HepC, a/w h/o diuretic non compliance , and bilateral leg edema   On Suboxone   Exam confirms bilateral significant non pitting edema of  lower legs and thighs, with changes compatible with stasis dermatitis of both lower legs  No overt ischemia  No lymphorrhea  Agree with requested venous duplex , but changes are likely related to Hypervolemia related to non compliance with diuretics

## 2020-05-14 NOTE — PROGRESS NOTE ADULT - PROBLEM SELECTOR PLAN 7
- c/w spiriva, symbicort 160 2 bid  - will place on acapella, incentive spirometry   - c/w supplemental O2 3L (home O2 lvl)  - guille PRN q6  - F/u TTE   - F/u CT chest for cancer screening  - Pulm following, recs appreciated - c/w spiriva, symbicort 160 2 bid  - acapella, incentive spirometry   - c/w supplemental O2 3L (home O2 lvl)  - guille PRN q6  - F/u TTE   - CT chest for cancer screening without pulmonary nodules   - Pulm following, recs appreciated -chronic hypoxemic respiratory failure   c/w spiriva, symbicort 160 2 bid  - acapella, incentive spirometry   - c/w supplemental O2 3L (home O2 lvl)  - guille PRN q6  - F/u TTE   - CT chest for cancer screening without pulmonary nodules   - Pulm following, recs appreciated

## 2020-05-14 NOTE — PROGRESS NOTE ADULT - ASSESSMENT
68 yo M with PMH of HTN, T2DM (on insulin), COPD (on home O2 3L), HCV/EtOH cirrhosis s/p liver txp (2011) on cellcept and tacrolimus, HLD, chronic lymphedema, presents after mechanical fall earlier today 2/2 weakness, found to be anemic with positive FOBT and non-adherent to diuretics, admitted for anemia likely 2/2 GIB and LE edema 2/2 fluid overload in setting of medication non-adherence.

## 2020-05-14 NOTE — CONSULT NOTE ADULT - SUBJECTIVE AND OBJECTIVE BOX
Transplant Surgery Consult Note      Chief Complaint:  Patient is a 69y old  Male who presents with a chief complaint of s/p [facility] fall (14 May 2020 12:11)    Hyperlipidemia  Hypertension  S/P liver transplant  Type 2 diabetes mellitus  Chronic obstructive pulmonary disease, unspecified COPD type  S/P liver transplant     HPI:  70 yo M with PMH of HTN, T2DM (on insulin), COPD (on home O2 3L), s/p liver txp 2/2 cirrhosis/ HCV @ [facility] (2011) on cellcept and tacrolimus, HLD, chronic lymphedema, presents after mechanical fall earlier today. Patient states that he felt weak and his legs gave out, and it was difficult for him to get back up. He denies any LOC, lightheadedness, dizziness. Denies hitting his head. He states that he has not taken his lasix or spironolactone for several days because it was making him urinate too much; his legs have become more swollen as a result. States he is adherent with the rest of his medications. Denies orthopnea, uses 2 pillows at baseline. Denies dysuria or hematuria. Also endorses blood with stools when wiping, BMs have been "small pellets", sometimes looks dark for the last month. Last colonoscopy and endoscopy about 8-10 years ago, states it was normal to his knowledge. Denies F/C/CP/SOB/cough/N/V/abd pain. Has not required increased O2. Uses walker to ambulate. Patient states he tripped and fell about 2 weeks ago. Patient lives alone and cares for himself, does not have any HHA.  Pt admits that he needs help/aid. Per ED documentation --  EMS on arrival reporting the place was disheveled, covered in trash and urine.    Transplant Surgery consulted for transplant immunosuppression management.                 Meds:   albuterol/ipratropium for Nebulization 3 milliLiter(s) Nebulizer every 6 hours PRN  buprenorphine 2 mG/naloxone 0.5 mG SL  Tablet 1 Tablet(s) SubLingual <User Schedule>  ceFAZolin   IVPB 2000 milliGRAM(s) IV Intermittent every 8 hours  dextrose 40% Gel 15 Gram(s) Oral once PRN  dextrose 5%. 1000 milliLiter(s) IV Continuous <Continuous>  dextrose 50% Injectable 12.5 Gram(s) IV Push once  dextrose 50% Injectable 25 Gram(s) IV Push once  dextrose 50% Injectable 25 Gram(s) IV Push once  furosemide   Injectable 40 milliGRAM(s) IV Push two times a day  glucagon  Injectable 1 milliGRAM(s) IntraMuscular once PRN  insulin glargine Injectable (LANTUS) 16 Unit(s) SubCutaneous every morning  insulin glargine Injectable (LANTUS) 34 Unit(s) SubCutaneous at bedtime  insulin lispro (HumaLOG) corrective regimen sliding scale   SubCutaneous three times a day before meals  insulin lispro (HumaLOG) corrective regimen sliding scale   SubCutaneous at bedtime  metoprolol succinate ER 25 milliGRAM(s) Oral daily  multivitamin/minerals 1 Tablet(s) Oral daily  pantoprazole  Injectable 40 milliGRAM(s) IV Push two times a day  predniSONE   Tablet 3 milliGRAM(s) Oral every other day  sertraline 100 milliGRAM(s) Oral daily  spironolactone 25 milliGRAM(s) Oral two times a day  tacrolimus 0.5 milliGRAM(s) Oral two times a day  tamsulosin 0.4 milliGRAM(s) Oral at bedtime  tiotropium 18 MICROgram(s) Capsule 1 Capsule(s) Inhalation daily        FAMILY HISTORY:  No pertinent family history in first degree relatives    ROS:   General:  No fevers, chills   ENT:  No sore throat or dysphagia  CV:  No pain or palpitations  Resp:  No dyspnea, cough, wheezing  GI:  No pain, No nausea, No vomiting,  No pruritis, No rectal bleeding, No tarry stools  Skin:  No rash or edema    PHYSICAL EXAM:   GENERAL:  NAD, Appears stated age  HEENT:  NC/AT,  conjunctivae clear and pink, sclera -anicteric  CHEST:  CTA B/L, Normal effort  HEART:  RRR S1/S2, No murmurs  ABDOMEN:  Soft, non-tender, non-distended, BS+ no hepatosplenomegaly   EXTEREMITIES:  No cyanosis + Edema  SKIN:  Warm & Dry.   NEURO:  Alert, oriented    Relevant Social History: n/c        Vital Signs:  Vital Signs Last 24 Hrs  T(C): 36.8 (14 May 2020 05:07), Max: 37.3 (13 May 2020 21:32)  T(F): 98.3 (14 May 2020 05:07), Max: 99.2 (13 May 2020 21:32)  HR: 99 (14 May 2020 05:07) (87 - 99)  BP: 130/71 (14 May 2020 11:40) (121/65 - 142/78)  BP(mean): --  RR: 19 (14 May 2020 05:07) (18 - 20)  SpO2: 98% (14 May 2020 11:40) (95% - 100%)  Daily Height in cm: 185.42 (13 May 2020 23:16)    Daily       Laboratory:                            7.5    6.73  )-----------( 84       ( 14 May 2020 07:09 )             28.4     05-14    135  |  93<L>  |  34<H>  ----------------------------<  125<H>  4.6   |  31  |  1.68<H>    Ca    8.8      14 May 2020 07:08  Phos  4.2     05-14  Mg     2.2     05-14    TPro  7.0  /  Alb  3.3  /  TBili  1.2  /  DBili  x   /  AST  43<H>  /  ALT  21  /  AlkPhos  112  05-14    LIVER FUNCTIONS - ( 14 May 2020 07:08 )  Alb: 3.3 g/dL / Pro: 7.0 g/dL / ALK PHOS: 112 U/L / ALT: 21 U/L / AST: 43 U/L / GGT: x           PT/INR - ( 14 May 2020 08:08 )   PT: 17.4 sec;   INR: 1.51 ratio         PTT - ( 14 May 2020 08:08 )  PTT:31.7 sec      Imaging:

## 2020-05-14 NOTE — CONSULT NOTE ADULT - ASSESSMENT
69 year old man with hx of Hypertension, DM2, COPD, EtOH/HCV Cirrhosis s/p liver transplant (2011, on cellcept/tacrolimus), hyperlipidemia and chronic lymph edema presents with fall from home in the setting of anemia and worsening edema due to diuretic nonadherence.    Impression:   # Normocytic Anemia: Unclear if clinically significant GI bleeding; patient endorses blood with wiping & 'dark stools' - however light brown on rectal. Differential includes bleeding from PUD, Colorectal cancer, angiectasias, etc.  # EtOH/HCV Cirrhosis s/p Liver transplant: On Tacrolimus/Cellcept  # Chronic Lower Extremity Edema  # RLE Cellulitis  # COPD    Recommendation:  - Check abdominal sonogram to evaluate transplanted liver  - Hold Cellcept given cellulitis, continue Tacrolimus and Prednisone  - Abx for primary team  - Supportive care per primary team    Candace Tejeda MD  Gastroenterology Fellow  [phone]  Please page on call fellow on weekends and after 5pm on weekdays 69 year old man with hx of Hypertension, DM2, COPD, EtOH/HCV Cirrhosis s/p liver transplant (2011, on cellcept/tacrolimus), hyperlipidemia and chronic lymph edema presents with fall from home in the setting of anemia and worsening edema due to diuretic nonadherence.    Impression:   # Normocytic Anemia: Unclear if clinically significant GI bleeding; patient endorses blood with wiping & 'dark stools' - however light brown on rectal. Differential includes bleeding from PUD, Colorectal cancer, angiectasias, etc.  # EtOH/HCV Cirrhosis s/p Liver transplant: On Tacrolimus/Cellcept  # Chronic Lower Extremity Edema  # RLE Cellulitis  # COPD    Recommendation:  - Check abdominal sonogram with Dopplers to evaluate transplanted liver  - Hold Cellcept given cellulitis, continue Tacrolimus and Prednisone  - Abx for primary team  - Supportive care per primary team    Candace Tejeda MD  Gastroenterology Fellow  [phone]  Please page on call fellow on weekends and after 5pm on weekdays

## 2020-05-14 NOTE — PROGRESS NOTE ADULT - PROBLEM SELECTOR PLAN 10
Transitions of Care Status:  1.  Name of PCP: Dr. Leandro Santos ([facility])   2.  PCP Contacted on Admission: [ ] Y    [ ] N    3.  PCP contacted at Discharge: [ ] Y    [ ] N    [ ] N/A  4.  Post-Discharge Appointment Date and Location:  5.  Summary of Handoff given to PCP:

## 2020-05-14 NOTE — CONSULT NOTE ADULT - ASSESSMENT
Impression:    Sacral deep tissue injury present on admission  Bilateral lower extremity venous dermatitis  plantar surface foot ulcer    Recommend:  1.) Topical therapy: sacral wound - cleanse with incontinence cleanser, pat dry, apply cavilon daily  Bilateral lower legs - cleanse with soap and water, pat dry thoroughly, apply sween moisturizer daily  2.) Foot ulcer - wound care team podiatrist, Dr. Jorge to see  3.) Maintain on an alternating air with low air loss surface  4.) turn and reposition Q 2 hours  5.) nutrition optimization  6.) Incontinence management - incontinence cleanser, pads, pericare BID, offer frequent toileting opportunities  7.) Elevate BLE    Care as per medicine. will not follow. will remain available. please recall for new issues  Upon discharge [facility] [address] [place] [address] [phone]  Seen with Dr. Magdaleno and discussed with clinical nurse  Thank you for this consult  Estrella Ron, NP-C, CWOCN [id number]

## 2020-05-14 NOTE — CONSULT NOTE ADULT - ATTENDING COMMENTS
68 yo M with HTN, HLD, DM, CKD (with baseline Cr 1.2-1.4), HFpEF, COPD, chronic lymphedema, and history of ALD/HCV cirrhosis s/p liver transplant (7/28/11) and with chronic post-transplant thrombocytopenia (with baseline Plt 60s-90s), admitted after mechanical fall and with concern for lower extremity cellulitis, also with laboratory evidence of acute on chronic anemia and EDUAR on CKD.    # Post-liver transplant: Had normal liver tests as recently as 2/2020, currently with mild liver enzyme elevations with low suspicion for rejection. Recommend abdominal US with Doppler. Hold MMF for now given cellulitis. Continue prednisone 3 mg po every other day, tacrolimus 0.5 mg po q12h, and Bactrim SS on M/W/F for prophylaxis. Continue to trend CMP, direct bilirubin, GGT, and tacrolimus trough level daily.    # EDUAR on CKD: Baseline Cr 1.2-1.4, currently with mild EDUAR with Cr 1.68 in setting of infection. Recommend PVR, urinalysis, and urine electrolytes.    # Acute on chronic anemia: No overt bleeding. FOBT positive. Last EGD and colonoscopy 8-10 years ago. Needs non-emergent EGD and colonoscopy, likely as outpatient unless he develops overt bleeding and/or Hb continues to downtrend.    Please don't hesitate to call with any questions or concerns. Plan of care also discussed with his outpatient transplant surgeon Dr. Davon Askew.    Triston Garcia M.D., Ph.D.  Transplant Hepatology  Cell: [phone]

## 2020-05-14 NOTE — PROGRESS NOTE ADULT - SUBJECTIVE AND OBJECTIVE BOX
PROGRESS NOTE:   Authored by Zach Stokes MD, PGY 1, Pager [phone] [facility], [id number] [facility]     Patient is a 69y old  Male who presents with a chief [facility] of [facility] (13 May 2020 20:21)      SUBJECTIVE / OVERNIGHT EVENTS:      MEDICATIONS  (STANDING):  aspirin enteric coated 81 milliGRAM(s) Oral daily  budesonide 160 MICROgram(s)/formoterol 4.5 MICROgram(s) Inhaler 2 Puff(s) Inhalation two times a day  buprenorphine 2 milliGRAM(s) SubLingual <User Schedule>  ceFAZolin   IVPB 2000 milliGRAM(s) IV Intermittent every 8 hours  dextrose 5%. 1000 milliLiter(s) (50 mL/Hr) IV Continuous <Continuous>  dextrose 50% Injectable 12.5 Gram(s) IV Push once  dextrose 50% Injectable 25 Gram(s) IV Push once  dextrose 50% Injectable 25 Gram(s) IV Push once  furosemide   Injectable 40 milliGRAM(s) IV Push two times a day  insulin glargine Injectable (LANTUS) 16 Unit(s) SubCutaneous every morning  insulin glargine Injectable (LANTUS) 34 Unit(s) SubCutaneous at bedtime  insulin lispro (HumaLOG) corrective regimen sliding scale   SubCutaneous three times a day before meals  insulin lispro (HumaLOG) corrective regimen sliding scale   SubCutaneous at bedtime  metoprolol succinate ER 25 milliGRAM(s) Oral daily  multivitamin/minerals 1 Tablet(s) Oral daily  mycophenolate mofetil 500 milliGRAM(s) Oral two times a day  pantoprazole  Injectable 40 milliGRAM(s) IV Push two times a day  predniSONE   Tablet 3 milliGRAM(s) Oral every other day  sertraline 100 milliGRAM(s) Oral daily  spironolactone 25 milliGRAM(s) Oral two times a day  tacrolimus 0.5 milliGRAM(s) Oral two times a day  tamsulosin 0.4 milliGRAM(s) Oral at bedtime  tiotropium 18 MICROgram(s) Capsule 1 Capsule(s) Inhalation daily    MEDICATIONS  (PRN):  albuterol/ipratropium for Nebulization 3 milliLiter(s) Nebulizer every 6 hours PRN Shortness of Breath and/or Wheezing  dextrose 40% Gel 15 Gram(s) Oral once PRN Blood Glucose LESS THAN 70 milliGRAM(s)/deciliter  glucagon  Injectable 1 milliGRAM(s) IntraMuscular once PRN Glucose LESS THAN 70 milligrams/deciliter      CAPILLARY BLOOD GLUCOSE      POCT Blood Glucose.: 143 mg/dL (13 May 2020 21:49)  POCT Blood Glucose.: 150 mg/dL (13 May 2020 15:26)    I&O's Summary    13 May 2020 07:01  -  14 May 2020 07:00  --------------------------------------------------------  IN: 400 mL / OUT: 0 mL / NET: 400 mL        PHYSICAL EXAM:  Vital Signs Last 24 Hrs  T(C): 36.8 (14 May 2020 05:07), Max: 37.3 (13 May 2020 17:55)  T(F): 98.3 (14 May 2020 05:07), Max: 99.2 (13 May 2020 17:55)  HR: 99 (14 May 2020 05:07) (87 - 100)  BP: 142/78 (14 May 2020 05:07) (121/65 - 142/78)  BP(mean): --  RR: 19 (14 May 2020 05:07) (16 - 22)  SpO2: 95% (14 May 2020 05:07) (95% - 100%)    CONSTITUTIONAL: NAD  RESPIRATORY: Normal respiratory effort; lungs are clear to auscultation bilaterally  CARDIOVASCULAR: Regular rate and rhythm, normal S1 and S2, no murmur/rub/gallop  ABDOMEN: Nontender to palpation, Soft, Non-distended, normoactive bowel sounds,   MUSCLOSKELETAL: No LE edema   NEURO: Alert, good concentration     LABS:                        7.7    9.28  )-----------( 76       ( 14 May 2020 00:46 )             See note    05-13    136  |  94<L>  |  30<H>  ----------------------------<  142<H>  4.8   |  31  |  1.64<H>    Ca    8.6      13 May 2020 16:13    TPro  7.3  /  Alb  3.5  /  TBili  1.5<H>  /  DBili  x   /  AST  47<H>  /  ALT  21  /  AlkPhos  130<H>  05-13    PT/INR - ( 13 May 2020 16:13 )   PT: 18.6 sec;   INR: 1.61 ratio         PTT - ( 13 May 2020 16:13 )  PTT:25.3 sec  CARDIAC MARKERS ( 13 May 2020 16:13 )  x     / x     / 356 U/L / x     / 3.9 ng/mL            RADIOLOGY & ADDITIONAL TESTS:  Results Reviewed:   < from: Xray Chest 1 View- PORTABLE-Urgent (05.13.20 @ 16:47) >  IMPRESSION:    The mediastinal cardiac silhouette is unremarkable.    Low lung volumes. Grossly clear lungs. No pneumothorax.    No acute osseous finding.     < end of copied text > PROGRESS NOTE:   Authored by Zach Stokes MD, PGY 1, Pager [phone] [facility], [id number] [facility]     Patient is a 69y old  Male who presents with a chief complaint of s/p [facility] fall (13 May 2020 20:21)      SUBJECTIVE / OVERNIGHT EVENTS: Pt seen and examined at bedside this AM. Pt denies pain, nausea, vomiting, dizziness or any other new symptoms.       MEDICATIONS  (STANDING):  aspirin enteric coated 81 milliGRAM(s) Oral daily  budesonide 160 MICROgram(s)/formoterol 4.5 MICROgram(s) Inhaler 2 Puff(s) Inhalation two times a day  buprenorphine 2 milliGRAM(s) SubLingual <User Schedule>  ceFAZolin   IVPB 2000 milliGRAM(s) IV Intermittent every 8 hours  dextrose 5%. 1000 milliLiter(s) (50 mL/Hr) IV Continuous <Continuous>  dextrose 50% Injectable 12.5 Gram(s) IV Push once  dextrose 50% Injectable 25 Gram(s) IV Push once  dextrose 50% Injectable 25 Gram(s) IV Push once  furosemide   Injectable 40 milliGRAM(s) IV Push two times a day  insulin glargine Injectable (LANTUS) 16 Unit(s) SubCutaneous every morning  insulin glargine Injectable (LANTUS) 34 Unit(s) SubCutaneous at bedtime  insulin lispro (HumaLOG) corrective regimen sliding scale   SubCutaneous three times a day before meals  insulin lispro (HumaLOG) corrective regimen sliding scale   SubCutaneous at bedtime  metoprolol succinate ER 25 milliGRAM(s) Oral daily  multivitamin/minerals 1 Tablet(s) Oral daily  mycophenolate mofetil 500 milliGRAM(s) Oral two times a day  pantoprazole  Injectable 40 milliGRAM(s) IV Push two times a day  predniSONE   Tablet 3 milliGRAM(s) Oral every other day  sertraline 100 milliGRAM(s) Oral daily  spironolactone 25 milliGRAM(s) Oral two times a day  tacrolimus 0.5 milliGRAM(s) Oral two times a day  tamsulosin 0.4 milliGRAM(s) Oral at bedtime  tiotropium 18 MICROgram(s) Capsule 1 Capsule(s) Inhalation daily    MEDICATIONS  (PRN):  albuterol/ipratropium for Nebulization 3 milliLiter(s) Nebulizer every 6 hours PRN Shortness of Breath and/or Wheezing  dextrose 40% Gel 15 Gram(s) Oral once PRN Blood Glucose LESS THAN 70 milliGRAM(s)/deciliter  glucagon  Injectable 1 milliGRAM(s) IntraMuscular once PRN Glucose LESS THAN 70 milligrams/deciliter      CAPILLARY BLOOD GLUCOSE      POCT Blood Glucose.: 143 mg/dL (13 May 2020 21:49)  POCT Blood Glucose.: 150 mg/dL (13 May 2020 15:26)    I&O's Summary    13 May 2020 07:01  -  14 May 2020 07:00  --------------------------------------------------------  IN: 400 mL / OUT: 0 mL / NET: 400 mL        PHYSICAL EXAM:  Vital Signs Last 24 Hrs  T(C): 36.8 (14 May 2020 05:07), Max: 37.3 (13 May 2020 17:55)  T(F): 98.3 (14 May 2020 05:07), Max: 99.2 (13 May 2020 17:55)  HR: 99 (14 May 2020 05:07) (87 - 100)  BP: 142/78 (14 May 2020 05:07) (121/65 - 142/78)  BP(mean): --  RR: 19 (14 May 2020 05:07) (16 - 22)  SpO2: 95% (14 May 2020 05:07) (95% - 100%)    CONSTITUTIONAL: NAD  RESPIRATORY: Normal respiratory effort; lungs are clear to auscultation bilaterally  CARDIOVASCULAR: Regular rate and rhythm, normal S1 and S2, no murmur/rub/gallop  ABDOMEN: Nontender to palpation, Soft, Non-distended, normoactive bowel sounds   MUSCLOSKELETAL: Significant b/l LE edema with venous stasis changes and erythema (R> L). Erythema extends up to the thigh on the RLE. Warm to touch. Non-tender to palpation.   NEURO: Alert, good concentration, moving extremities     LABS:                        7.7    9.28  )-----------( 76       ( 14 May 2020 00:46 )             See note    05-13    136  |  94<L>  |  30<H>  ----------------------------<  142<H>  4.8   |  31  |  1.64<H>    Ca    8.6      13 May 2020 16:13    TPro  7.3  /  Alb  3.5  /  TBili  1.5<H>  /  DBili  x   /  AST  47<H>  /  ALT  21  /  AlkPhos  130<H>  05-13    PT/INR - ( 13 May 2020 16:13 )   PT: 18.6 sec;   INR: 1.61 ratio         PTT - ( 13 May 2020 16:13 )  PTT:25.3 sec  CARDIAC MARKERS ( 13 May 2020 16:13 )  x     / x     / 356 U/L / x     / 3.9 ng/mL            RADIOLOGY & ADDITIONAL TESTS:  Results Reviewed:   < from: Xray Chest 1 View- PORTABLE-Urgent (05.13.20 @ 16:47) >  IMPRESSION:    The mediastinal cardiac silhouette is unremarkable.    Low lung volumes. Grossly clear lungs. No pneumothorax.    No acute osseous finding.     < end of copied text >

## 2020-05-14 NOTE — PHYSICAL THERAPY INITIAL EVALUATION ADULT - ADDITIONAL COMMENTS
Pt live alone in a private house with no steps to negotiate. Pt was Ind with all ADLs and amb with RW Pt live alone in an apt with elevator access.  Pt was Ind with all ADLs and amb with RW

## 2020-05-15 NOTE — PROGRESS NOTE ADULT - ATTENDING COMMENTS
2d echo moderate aortic stenosis/ borderline PAH/grade I diastolic dysfunction/ reduced right ventricular systolic dysfunction  looks like combined heart failure  HF c/s  increase diuretics to lasix 80mg IV bid, net neg 1.5L in 24hrs  c/w ancef IV, if no improvement in 24hrs consider switching to IV vanc and possible US soft tissue to exclude deeper infection  appreciate pulm recs, on chronic 3L for copd  GI follow up, cbc daily

## 2020-05-15 NOTE — PROGRESS NOTE ADULT - SUBJECTIVE AND OBJECTIVE BOX
CHIEF COMPLAINT: f/up sob, copd, RHF, obesity-    Interval Events:    REVIEW OF SYSTEMS:  Constitutional: No fevers or chills. No weight loss. No fatigue or generalized malaise.  Eyes: No itching or discharge from the eyes  ENT: No ear pain. No ear discharge. No nasal congestion. No post nasal drip. No epistaxis. No throat pain. No sore throat. No difficulty swallowing.   CV: No chest pain. No palpitations. No lightheadedness or dizziness.   Resp: No dyspnea at rest. No dyspnea on exertion. No orthopnea. No wheezing. No cough. No stridor. No sputum production. No chest pain with respiration.  GI: No nausea. No vomiting. No diarrhea.  MSK: No joint pain or pain in any extremities  Integumentary: No skin lesions. No pedal edema.  Neurological: No gross motor weakness. No sensory changes.  [ ] All other systems negative  [ ] Unable to assess ROS because ________    OBJECTIVE:  ICU Vital Signs Last 24 Hrs  T(C): 36.8 (15 May 2020 04:57), Max: 36.8 (14 May 2020 21:17)  T(F): 98.3 (15 May 2020 04:57), Max: 98.3 (15 May 2020 04:57)  HR: 88 (15 May 2020 04:57) (88 - 88)  BP: 145/75 (15 May 2020 04:57) (130/71 - 145/75)  BP(mean): --  ABP: --  ABP(mean): --  RR: 18 (15 May 2020 04:57) (18 - 18)  SpO2: 97% (15 May 2020 04:57) (97% - 98%)        05-13 @ 07:01  -  05-14 @ 07:00  --------------------------------------------------------  IN: 400 mL / OUT: 0 mL / NET: 400 mL    05-14 @ 07:01  -  05-15 @ 05:20  --------------------------------------------------------  IN: 630 mL / OUT: 1400 mL / NET: -770 mL      CAPILLARY BLOOD GLUCOSE      POCT Blood Glucose.: 161 mg/dL (14 May 2020 22:13)      PHYSICAL EXAM:  General: Awake, alert, oriented X 3.   HEENT: Atraumatic, normocephalic.                 Mallampatti Grade                 No nasal congestion.                No tonsillar or pharyngeal exudates.  Lymph Nodes: No palpable lymphadenopathy  Neck: No JVD. No carotid bruit.   Respiratory: Normal chest expansion                         Normal percussion                         Normal and equal air entry                         No wheeze, rhonchi or rales.  Cardiovascular: S1 S2 normal. No murmurs, rubs or gallops.   Abdomen: Soft, non-tender, non-distended. No organomegaly. Normoactive bowel sounds.  Extremities: Warm to touch. Peripheral pulse palpable. No pedal edema.   Skin: No rashes or skin lesions  Neurological: Motor and sensory examination equal and normal in all four extremities.  Psychiatry: Appropriate mood and affect.    HOSPITAL MEDICATIONS:  MEDICATIONS  (STANDING):  buprenorphine 2 mG/naloxone 0.5 mG SL  Tablet 1 Tablet(s) SubLingual <User Schedule>  ceFAZolin   IVPB 2000 milliGRAM(s) IV Intermittent every 8 hours  dextrose 5%. 1000 milliLiter(s) (50 mL/Hr) IV Continuous <Continuous>  dextrose 50% Injectable 12.5 Gram(s) IV Push once  dextrose 50% Injectable 25 Gram(s) IV Push once  dextrose 50% Injectable 25 Gram(s) IV Push once  furosemide   Injectable 40 milliGRAM(s) IV Push two times a day  insulin glargine Injectable (LANTUS) 17 Unit(s) SubCutaneous at bedtime  insulin lispro (HumaLOG) corrective regimen sliding scale   SubCutaneous three times a day before meals  insulin lispro (HumaLOG) corrective regimen sliding scale   SubCutaneous at bedtime  metoprolol succinate ER 25 milliGRAM(s) Oral daily  multivitamin/minerals 1 Tablet(s) Oral daily  pantoprazole  Injectable 40 milliGRAM(s) IV Push two times a day  predniSONE   Tablet 3 milliGRAM(s) Oral every other day  sertraline 100 milliGRAM(s) Oral daily  spironolactone 25 milliGRAM(s) Oral two times a day  tacrolimus 0.5 milliGRAM(s) Oral two times a day  tamsulosin 0.4 milliGRAM(s) Oral at bedtime  tiotropium 18 MICROgram(s) Capsule 1 Capsule(s) Inhalation daily    MEDICATIONS  (PRN):  albuterol/ipratropium for Nebulization 3 milliLiter(s) Nebulizer every 6 hours PRN Shortness of Breath and/or Wheezing  dextrose 40% Gel 15 Gram(s) Oral once PRN Blood Glucose LESS THAN 70 milliGRAM(s)/deciliter  glucagon  Injectable 1 milliGRAM(s) IntraMuscular once PRN Glucose LESS THAN 70 milligrams/deciliter      LABS:                        8.2    5.32  )-----------( 78       ( 14 May 2020 19:46 )             30.0     05-14    135  |  93<L>  |  34<H>  ----------------------------<  125<H>  4.6   |  31  |  1.68<H>    Ca    8.8      14 May 2020 07:08  Phos  4.2     05-14  Mg     2.2     05-14    TPro  7.0  /  Alb  3.3  /  TBili  1.2  /  DBili  x   /  AST  43<H>  /  ALT  21  /  AlkPhos  112  05-14    PT/INR - ( 14 May 2020 08:08 )   PT: 17.4 sec;   INR: 1.51 ratio         PTT - ( 14 May 2020 08:08 )  PTT:31.7 sec      Venous Blood Gas:  05-13 @ 16:13  7.37/60/30/34/44  VBG Lactate: 1.9      MICROBIOLOGY:     RADIOLOGY:  [ ] Reviewed and interpreted by me    Point of Care Ultrasound Findings:    PFT:    EKG: CHIEF COMPLAINT: f/up sob, copd, RHF, obesity-better but still feeling poorly, no new sob    Interval Events: transplant/wound care    REVIEW OF SYSTEMS:  Constitutional: No fevers or chills. No weight loss. + fatigue or generalized malaise.  Eyes: No itching or discharge from the eyes  ENT: No ear pain. No ear discharge. No nasal congestion. No post nasal drip. No epistaxis. No throat pain. No sore throat. No difficulty swallowing.   CV: No chest pain. No palpitations. No lightheadedness or dizziness.   Resp: No dyspnea at rest. + dyspnea on exertion. No orthopnea. No wheezing. No cough. No stridor. No sputum production. No chest pain with respiration.  GI: No nausea. No vomiting. No diarrhea.  MSK: No joint pain or pain in any extremities  Integumentary: No skin lesions. + pedal edema.  Neurological: + gross motor weakness. No sensory changes.  [+ ] All other systems negative  [ ] Unable to assess ROS because ________    OBJECTIVE:  ICU Vital Signs Last 24 Hrs  T(C): 36.8 (15 May 2020 04:57), Max: 36.8 (14 May 2020 21:17)  T(F): 98.3 (15 May 2020 04:57), Max: 98.3 (15 May 2020 04:57)  HR: 88 (15 May 2020 04:57) (88 - 88)  BP: 145/75 (15 May 2020 04:57) (130/71 - 145/75)  BP(mean): --  ABP: --  ABP(mean): --  RR: 18 (15 May 2020 04:57) (18 - 18)  SpO2: 97% (15 May 2020 04:57) (97% - 98%)        05-13 @ 07:01  -  05-14 @ 07:00  --------------------------------------------------------  IN: 400 mL / OUT: 0 mL / NET: 400 mL    05-14 @ 07:01  -  05-15 @ 05:20  --------------------------------------------------------  IN: 630 mL / OUT: 1400 mL / NET: -770 mL      CAPILLARY BLOOD GLUCOSE      POCT Blood Glucose.: 161 mg/dL (14 May 2020 22:13)      PHYSICAL EXAM: NAD in bed  General: Awake, alert, oriented X 3.   HEENT: Atraumatic, normocephalic.                 Mallampatti Grade 3                No nasal congestion.                No tonsillar or pharyngeal exudates.  Lymph Nodes: No palpable lymphadenopathy  Neck: No JVD. No carotid bruit.   Respiratory: Normal chest expansion                         Normal percussion                         Normal and equal air entry                         No wheeze, rhonchi or rales.  Cardiovascular: S1 S2 normal. No murmurs, rubs or gallops.   Abdomen: Soft, non-tender, non-distended. No organomegaly. Normoactive bowel sounds.  Extremities: Warm to touch. Peripheral pulse palpable. +++ pedal edema.   Skin: No rashes or skin lesions  Neurological: Motor and sensory examination equal and normal in all four extremities.  Psychiatry: Appropriate mood and affect.    HOSPITAL MEDICATIONS:  MEDICATIONS  (STANDING):  buprenorphine 2 mG/naloxone 0.5 mG SL  Tablet 1 Tablet(s) SubLingual <User Schedule>  ceFAZolin   IVPB 2000 milliGRAM(s) IV Intermittent every 8 hours  dextrose 5%. 1000 milliLiter(s) (50 mL/Hr) IV Continuous <Continuous>  dextrose 50% Injectable 12.5 Gram(s) IV Push once  dextrose 50% Injectable 25 Gram(s) IV Push once  dextrose 50% Injectable 25 Gram(s) IV Push once  furosemide   Injectable 40 milliGRAM(s) IV Push two times a day  insulin glargine Injectable (LANTUS) 17 Unit(s) SubCutaneous at bedtime  insulin lispro (HumaLOG) corrective regimen sliding scale   SubCutaneous three times a day before meals  insulin lispro (HumaLOG) corrective regimen sliding scale   SubCutaneous at bedtime  metoprolol succinate ER 25 milliGRAM(s) Oral daily  multivitamin/minerals 1 Tablet(s) Oral daily  pantoprazole  Injectable 40 milliGRAM(s) IV Push two times a day  predniSONE   Tablet 3 milliGRAM(s) Oral every other day  sertraline 100 milliGRAM(s) Oral daily  spironolactone 25 milliGRAM(s) Oral two times a day  tacrolimus 0.5 milliGRAM(s) Oral two times a day  tamsulosin 0.4 milliGRAM(s) Oral at bedtime  tiotropium 18 MICROgram(s) Capsule 1 Capsule(s) Inhalation daily    MEDICATIONS  (PRN):  albuterol/ipratropium for Nebulization 3 milliLiter(s) Nebulizer every 6 hours PRN Shortness of Breath and/or Wheezing  dextrose 40% Gel 15 Gram(s) Oral once PRN Blood Glucose LESS THAN 70 milliGRAM(s)/deciliter  glucagon  Injectable 1 milliGRAM(s) IntraMuscular once PRN Glucose LESS THAN 70 milligrams/deciliter      LABS:                        8.2    5.32  )-----------( 78       ( 14 May 2020 19:46 )             30.0     05-14    135  |  93<L>  |  34<H>  ----------------------------<  125<H>  4.6   |  31  |  1.68<H>    Ca    8.8      14 May 2020 07:08  Phos  4.2     05-14  Mg     2.2     05-14    TPro  7.0  /  Alb  3.3  /  TBili  1.2  /  DBili  x   /  AST  43<H>  /  ALT  21  /  AlkPhos  112  05-14    PT/INR - ( 14 May 2020 08:08 )   PT: 17.4 sec;   INR: 1.51 ratio         PTT - ( 14 May 2020 08:08 )  PTT:31.7 sec      Venous Blood Gas:  05-13 @ 16:13  7.37/60/30/34/44  VBG Lactate: 1.9      MICROBIOLOGY:     RADIOLOGY: < from: TTE with Doppler (w/Cont) (05.14.20 @ 11:51) >  Left Atrium: Mildly dilated left atrium.  LA volume index =  37 cc/m2.  Left Ventricle: Endocardial visualization enhanced with  intravenous injection of Ultrasonic Enhancing Agent  (Definity). Overall preserved left ventricular ejection  fraction.  Right Heart: Normal right atrium. The right ventricle is  not well visualized; grossly reduced   right ventricular  systolic function. Limited visualization.  Normal tricuspid  valve. Minimal tricuspid regurgitation. Pulmonic valve not  well visualized.  Pericardium/Pleura: Normal pericardium with no pericardial  effusion.  Hemodynamic: Estimated right ventricular systolic pressure  equals 36 mm Hg, assuming right atrial pressure equals 3 mm  Hg, consistent with borderline pulmonary hypertension.  ------------------------------------------------------------------------  Conclusions:  1. Calcified trileaflet aortic valve with decreased  opening. Peak transaortic valve gradient equals 19 mm Hg,  mean transaortic valve gradient equals 10 mm Hg, estimated  aortic valve area equals 1.4 sqcm (by continuity equation),  aortic valve velocity time integral equals 44cm,  consistent with moderate aortic stenosis.  2. Endocardial visualization enhanced with intravenous  injection of Ultrasonic Enhancing Agent (Definity). Overall  preserved left ventricular ejection fraction.  3. The right ventricle is not well visualized; grossly  reduced   right ventricular systolic function. Limited  visualization.    < end of copied text >    [ ] Reviewed and interpreted by me    Point of Care Ultrasound Findings:    PFT:    EKG:

## 2020-05-15 NOTE — PROGRESS NOTE ADULT - PROBLEM SELECTOR PLAN 1
Hgb 7.0 on admission, was 10.2 on 2/2020 (in [employer]). pt endorsing dark stools, blood with wiping, overall weakness, positive FOBT. Last endoscopy/colonoscopy about 8-10 yrs ago per patient.   - s/p 1U PRBC in ED  - maintain active T&S  - Hepatology following, likely plan for outpatient endoscopy  - c/w PPI 40mg IV BID  - Monitor CBC daily Hgb 7.0 on admission, was 10.2 on 2/2020 (in [employer]). pt endorsing dark stools, blood with wiping, overall weakness, positive FOBT. Last endoscopy/colonoscopy about 8-10 yrs ago per patient.   - s/p 1U PRBC in ED  - maintain active T&S  - Hepatology following, likely plan for outpatient endoscopy  - c/w PPI 40mg IV BID  - Monitor CBC daily  - GI follow up

## 2020-05-15 NOTE — CONSULT NOTE ADULT - PROBLEM SELECTOR RECOMMENDATION 9
- Increase Lasix to 80mg IV BID  - Strict I&Os, fluid restriction 1.5L daily  - Daily standing weights

## 2020-05-15 NOTE — PROGRESS NOTE ADULT - ASSESSMENT
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 Found w/ signif edema and Hgb-7--  multifactorial issues. 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 Found w/ signif edema and Hgb-7--  multifactorial issues.  ******************  5/15-s/p echo-RV dysfunction, mod AS

## 2020-05-15 NOTE — PROGRESS NOTE ADULT - ASSESSMENT
70 yo M with PMH of HTN, T2DM (on insulin), COPD (on home O2 3L), HCV/EtOH cirrhosis s/p liver txp (2011) on cellcept and tacrolimus, HLD, chronic lymphedema, presents after mechanical fall earlier today 2/2 weakness, found to be anemic with positive FOBT and non-adherent to diuretics, admitted for anemia likely 2/2 GIB and LE edema 2/2 fluid overload in setting of medication non-adherence.

## 2020-05-15 NOTE — CONSULT NOTE ADULT - ATTENDING COMMENTS
Heart failure with preserved LV systolic function with acute right heart failure and volume overload. Responding to IV diuretics. Please call me with questions at [phone].

## 2020-05-15 NOTE — PROGRESS NOTE ADULT - PROBLEM SELECTOR PLAN 3
- Tacro level <2   - F/u cellcept level  - c/w tacro 0.5 BID and cellcept 500 daily  - c/w prednisone 3mg EOD  - Transplant surgery consult - Tacro level <2   - c/w tacro 0.5 BID. hold cellcept  - c/w prednisone 3mg every other day  - Transplant surgery consult

## 2020-05-15 NOTE — CONSULT NOTE ADULT - CONSULT REASON
Legswelling
s/p fall-sob
Anemia Hx of Liver Transplant
Fluid overload in the setting of right ventricular systolic dysfunction
Transplant Immunosuppression management

## 2020-05-15 NOTE — PROVIDER CONTACT NOTE (CRITICAL VALUE NOTIFICATION) - PERSON GIVING RESULT:
Date of Service: 04/03/2017    REFERRING PHYSICIAN:  Dr. Brennan Rouse.    PROCEDURE:  Left L4-L5 and left L5-S1 transforaminal epidural steroid injection performed under fluoroscopic guidance.    PREPROCEDURE DIAGNOSES:  1.  Degenerative disease lumbar spine.  2.  Lumbar spinal stenosis.  3.  Left lumbar radicular symptoms.    HISTORY OF PRESENT ILLNESS:  The patient is a pleasant 67-year-old gentleman seen for evaluation and treatment of low back pain with primarily left lumbar radicular symptoms.  He has had a significant response to our initial injection.  He is greater than 50% improved.  The majority of his radicular pain has resolved.  He has persistent mild radicular pain along with lower back pain.  We will proceed with a second injection in hopes of alleviating his symptoms.  The procedure was discussed and reviewed.    The patient has been on Coumadin in the past.  This was discontinued approximately 5 days ago.  He has been bridged with Lovenox.  This has been held today and, therefore, has not been taken for 24 hours.  We will proceed with our injection today.  He will once again restart his Lovenox.  He will then restart his Coumadin as directed and bridge with Lovenox as directed.  He will return in 3 weeks at which time we will again discontinue his Coumadin and revert to Lovenox for the days prior to his appointment.  The patient understands and agrees.    DESCRIPTION OF PROCEDURE:  The patient was brought to our fluoroscopy suite and placed in the prone position.  A sterile prep and drape was performed.  A 22-gauge needle was placed in the dorsal and cephalad aspect of each foramen.  Fluoroscopy revealed excellent needle placement.  Epidurogram revealed spread centrally and peripherally.  This was followed by injection of 2 mL of 1% Lidocaine along with 4 mg of Celestone at each foramen.  This was performed on the left side only at both the L4-L5 and L5-S1 foramen.  The patient tolerated this  Elva/ genna extremely well.  He was observed and subsequently discharged home in excellent condition.  He will be seen again in 3 weeks for followup evaluation and possible repeat injection.    Thank you very much for allowing me to participate in this pleasant patient's care.  I am quite pleased with his response.  Will keep you informed of his progress.      Dictated By: Angel Singh MD  Signing Provider: MD MARCELL Ruggiero/rafiq ([id number])  DD: 04/03/2017 09:14:02 TD: 04/03/2017 17:42:48    Copy Sent To:

## 2020-05-15 NOTE — PROGRESS NOTE ADULT - ASSESSMENT
69 year old man with hx of Hypertension, DM2, COPD, EtOH/HCV Cirrhosis s/p liver transplant (2011, on cellcept/tacrolimus), hyperlipidemia and chronic lymph edema presents with fall from home in the setting of anemia and worsening edema due to diuretic nonadherence.    Impression:   # Normocytic Anemia: Unclear if clinically significant GI bleeding; patient endorses blood with wiping & 'dark stools' - however light brown on rectal. Differential includes bleeding from PUD, Colorectal cancer, angiectasias, etc.  # EtOH/HCV Cirrhosis s/p Liver transplant: On Tacrolimus/Cellcept  # Chronic Lower Extremity Edema  # RLE Cellulitis  # COPD    Recommendation:  - Nonemergent EGD/Colonoscopy +/- capsule... Currently planned for outpatient, but pending clinical course [dropping hb, overt bleeding] can consider inpatient scope  - Hold Cellcept given cellulitis, continue Tacrolimus and Prednisone  - Check Tacro level daily  - Abx for primary team  - Supportive care per primary team    Candace Tejeda MD  Gastroenterology Fellow  [phone]  Please page on call fellow on weekends and after 5pm on weekdays 69 year old man with hx of Hypertension, DM2, COPD, EtOH/HCV Cirrhosis s/p liver transplant (2011, on cellcept/tacrolimus), hyperlipidemia and chronic lymph edema presents with fall from home in the setting of anemia and worsening edema due to diuretic nonadherence.    Impression:   # Normocytic Anemia: Unclear if clinically significant GI bleeding; patient endorses blood with wiping & 'dark stools' - however light brown on rectal. Differential includes bleeding from PUD, Colorectal cancer, angiectasias, etc.  # EtOH/HCV Cirrhosis s/p Liver transplant: On Tacrolimus/Cellcept  # Chronic Lower Extremity Edema  # RLE Cellulitis  # COPD    Recommendation:  - Check blood cultures given immunosuppression  - Nonemergent EGD/Colonoscopy +/- capsule... Currently planned for outpatient, but pending clinical course [dropping hb, overt bleeding] can consider inpatient scope  - Hold Cellcept given cellulitis, continue Tacrolimus and Prednisone  - Check Tacro level daily  - Abx for primary team  - Supportive care per primary team    Candace Tejeda MD  Gastroenterology Fellow  [phone]  Please page on call fellow on weekends and after 5pm on weekdays

## 2020-05-15 NOTE — CONSULT NOTE ADULT - PROBLEM SELECTOR RECOMMENDATION 3
- FOBT positive, on Protonix 40mg IVP BID  - Transfused 1U PRBC 5/13 with appropriate response  - Per GI, no indication for emergent scope so long as he remains stable

## 2020-05-15 NOTE — CONSULT NOTE ADULT - PROBLEM SELECTOR RECOMMENDATION 2
duoneb q 6, symbicort 160 2 bid, spiriva 1 q day, acapella, incentive spirometry
- Management per liver transplant team  - Remains on prednisone and Tacrolimus   - Cellcept discontinued for active infection

## 2020-05-15 NOTE — CONSULT NOTE ADULT - SUBJECTIVE AND OBJECTIVE BOX
HPI:  Mr. Nguyen is a 69 year old M with a history of HCV/ETOH cirrhosis s/p liver transplant  (on tacrolimus/cellcept/prednisone), HTN, CKD stage 3 (b/l Scr 1.3-1.5), DM2 on insulin, COPD w/ home 02, HLD, chronic lymphedema and remote history of substance abuse (quit 85') on Suboxone. He was admitted after a mechanical fall and found to be volume overloaded with acute on chronic decompensated RV systolic dysfunction in the setting of having inconsistently taking his lasix and spironolactone in the past few days PTA. He reports following Dr. Juan Garcia from [facility] for the past 5 years for the management of his cardiomyopathy. He was first told of having impaired cardiac function 4 years ago and has been managed with diuretics, low dose BB and MRA.    He reports progressively worsening exertional tolerance due to dyspnea for the past year that has acutely worsened in this past month prior to his arrival. Activity tolerance was limited to half a block. He reduced the frequency of his diuretics to every other day as he felt he was urinating too often and subsequently developed worsening LE swelling. Of note, he has been having repeated falls due to gait instability and LE weakness. He denies striking his head, LH/dizziness, CP, palpitations, orthopnea, PND, cough, abdominal distention and changes in appetite.    Upon arrival to the ED, he was found to be anemic, Hb of 7 for and FOBT positive for which he was transfused 1U PRBC with appropriate response. Thrombocytosis appears to be chronic (baseline level 60-70s). He reports scant amounts of hematochezia when wiping after BMs and intermittent episodes of melena for the past few months. Additionally, suspicious for possible cellulitis of his RLE and was started on IV cefazolin although he reports the erythema of BLE and RUE is chronic.     He has been started on IV diuresis with some improvement in his dyspnea but appears to have markely elevated central filling pressures on exam with significant BLE edema through his thighs. TTE with grossly reduced RVSF and mod AS. His marker of inflammation are high but may be related to his cellulitis. COVID-19 negative by PCR.       PAST MEDICAL & SURGICAL HISTORY:  Hyperlipidemia  Hypertension  S/P liver transplant  Type 2 diabetes mellitus  Chronic obstructive pulmonary disease, unspecified COPD type: on home O2  S/P liver transplant    REVIEW OF SYSTEMS  The remaining 14 point ROS is otherwise negative or noncontributory except as noted in the HPI    MEDICATIONS  (STANDING):  aspirin  chewable 81 milliGRAM(s) Oral daily  buprenorphine 2 mG/naloxone 0.5 mG SL  Tablet 1 Tablet(s) SubLingual <User Schedule>  ceFAZolin   IVPB 2000 milliGRAM(s) IV Intermittent every 8 hours  dextrose 5%. 1000 milliLiter(s) (50 mL/Hr) IV Continuous <Continuous>  dextrose 50% Injectable 12.5 Gram(s) IV Push once  dextrose 50% Injectable 25 Gram(s) IV Push once  dextrose 50% Injectable 25 Gram(s) IV Push once  furosemide   Injectable 80 milliGRAM(s) IV Push every 12 hours  insulin glargine Injectable (LANTUS) 16 Unit(s) SubCutaneous every morning  insulin glargine Injectable (LANTUS) 34 Unit(s) SubCutaneous at bedtime  insulin lispro (HumaLOG) corrective regimen sliding scale   SubCutaneous three times a day before meals  insulin lispro (HumaLOG) corrective regimen sliding scale   SubCutaneous at bedtime  metoprolol succinate ER 25 milliGRAM(s) Oral daily  multivitamin/minerals 1 Tablet(s) Oral daily  pantoprazole  Injectable 40 milliGRAM(s) IV Push two times a day  predniSONE   Tablet 3 milliGRAM(s) Oral every other day  sertraline 100 milliGRAM(s) Oral daily  spironolactone 25 milliGRAM(s) Oral two times a day  tacrolimus 0.5 milliGRAM(s) Oral two times a day  tamsulosin 0.4 milliGRAM(s) Oral at bedtime  tiotropium 18 MICROgram(s) Capsule 1 Capsule(s) Inhalation daily  trimethoprim   80 mG/sulfamethoxazole 400 mG 1 Tablet(s) Oral <User Schedule>    MEDICATIONS  (PRN):  albuterol/ipratropium for Nebulization 3 milliLiter(s) Nebulizer every 6 hours PRN Shortness of Breath and/or Wheezing  dextrose 40% Gel 15 Gram(s) Oral once PRN Blood Glucose LESS THAN 70 milliGRAM(s)/deciliter  glucagon  Injectable 1 milliGRAM(s) IntraMuscular once PRN Glucose LESS THAN 70 milligrams/deciliter      Allergies  clindamycin (Unknown)    SOCIAL HISTORY:  Lives alone  Retired salesman  Former smoker, quit   Remote hx ETOH/substance abuse, last used       FAMILY HISTORY:  Father  from MI at age 59  Brother suffered from MI at age 30     Vital Signs Last 24 Hrs  T(C): 37.2 (15 May 2020 12:48), Max: 37.2 (15 May 2020 12:48)  T(F): 98.9 (15 May 2020 12:48), Max: 98.9 (15 May 2020 12:48)  HR: 90 (15 May 2020 12:48) (88 - 90)  BP: 131/77 (15 May 2020 12:48) (131/77 - 145/75)  BP(mean): --  RR: 18 (15 May 2020 12:48) (18 - 18)  SpO2: 98% (15 May 2020 12:48) (97% - 98%)    PHYSICAL EXAM:        LABS:                        8.2    5.32  )-----------( 78       ( 14 May 2020 19:46 )             30.0     05-15    137  |  92<L>  |  36<H>  ----------------------------<  121<H>  4.1   |  30  |  1.63<H>    Ca    8.5      15 May 2020 06:52  Phos  3.6     05-15  Mg     2.2     05-15    TPro  7.4  /  Alb  3.4  /  TBili  0.6  /  DBili  x   /  AST  35  /  ALT  17  /  AlkPhos  106  05-15    PT/INR - ( 15 May 2020 08:13 )   PT: 13.4 sec;   INR: 1.16 ratio         PTT - ( 15 May 2020 08:13 )  PTT:28.6 sec  Urinalysis Basic - ( 15 May 2020 10:06 )    Color: Light Yellow / Appearance: Clear / S.008 / pH: x  Gluc: x / Ketone: Negative  / Bili: Negative / Urobili: <2 mg/dL   Blood: x / Protein: Negative / Nitrite: Negative   Leuk Esterase: Negative / RBC: x / WBC x   Sq Epi: x / Non Sq Epi: x / Bacteria: x      RADIOLOGY & ADDITIONAL STUDIES:    < from: TTE with Doppler (w/Cont) (20 @ 11:51) >  Dimensions:    Normal Values:  LA:     4.0    2.0 - 4.0 cm  Ao:     3.7    2.0 - 3.8 cm  SEPTUM: 0.8    0.6 - 1.2 cm  PWT:    0.8    0.6 - 1.1 cm  LVIDd:  4.2 3.0 - 5.6 cm  LVIDs:  2.7    1.8 - 4.0 cm  Derived variables:  LVMI: 42 g/m2  RWT: 0.38  Fractional short: 36 %  EF (Visual Estimate): 55 %  Doppler Peak Velocity (m/sec): AoV=2.2  ------------------------------------------------------------------------  Observations:  Mitral Valve: Mitral annular calcification and calcified  mitral leaflets with decreased diastolic opening. Minimal  mitral regurgitation. Mean transmitral valve gradient  equals 4 mm Hg, consistent with mild mitral stenosis.  Aortic Valve/Aorta: Calcified trileaflet aortic valve with  decreased opening. Peak transaortic valve gradient equals  19 mm Hg, mean transaortic valve gradient equals 10 mm Hg,  estimated aortic valve area equals 1.4 sqcm (by continuity  equation), aortic valve velocity time integral equals 44  cm, consistent with moderate aortic stenosis.  Aortic Root: 3.7 cm.  LVOT diameter: 2.1 cm.  Left Atrium: Mildly dilated left atrium.  LA volume index =  37 cc/m2.  Left Ventricle: Endocardial visualization enhanced with  intravenous injection of Ultrasonic Enhancing Agent  (Definity). Overall preserved left ventricular ejection  fraction.  Right Heart: Normal right atrium. The right ventricle is  not well visualized; grossly reduced   right ventricular  systolic function. Limited visualization.  Normal tricuspid  valve. Minimal tricuspid regurgitation. Pulmonic valve not  well visualized.  Pericardium/Pleura: Normal pericardium with no pericardial  effusion.  Hemodynamic: Estimated right ventricular systolic pressure  equals 36 mm Hg, assuming right atrial pressure equals 3 mm  Hg, consistent with borderline pulmonary hypertension.    < end of copied text >    < from: CT Chest No Cont (20 @ 09:01) >  No hilarand/or mediastinal adenopathy is noted.     Heart is normal in size. Calcification of the aortic valve and the coronary arteries is noted. No pericardial effusion is noted.     No endobronchial lesions are noted. Centrilobular emphysema is noted bilaterally. Thick linear opacity representing atelectasis is noted in the right lower lobe. Trace right pleural effusion is noted. Elevation of the right hemidiaphragm is noted.    Below the diaphragm, visualized portions of the abdomen demonstrate splenomegaly.     Degenerative changes of the spine are noted.    < end of copied text > HPI:  Mr. Nguyen is a 69 year old M with a history of HCV/ETOH cirrhosis s/p liver transplant  (on tacrolimus/cellcept/prednisone), HTN, CKD stage 3 (b/l Scr 1.3-1.5), DM2 on insulin, COPD w/ home 02, HLD, chronic lymphedema and remote history of substance abuse (quit 85') on Suboxone. He was admitted after a mechanical fall and found to be volume overloaded with acute on chronic decompensated RV systolic dysfunction in the setting of having inconsistently taking his lasix and spironolactone in the past few days PTA. He reports following Dr. Juan Garcia from [facility] for the past 5 years for the management of his cardiomyopathy. He was first told of having impaired cardiac function 4 years ago and has been managed with diuretics, low dose BB and MRA.    He reports progressively worsening exertional tolerance due to dyspnea for the past year that has acutely worsened in this past month prior to his arrival. Activity tolerance was limited to half a block. He reduced the frequency of his diuretics to every other day as he felt he was urinating too often and subsequently developed worsening LE swelling. Of note, he has been having repeated falls due to gait instability and LE weakness. He denies striking his head, LH/dizziness, CP, palpitations, orthopnea, PND, cough, abdominal distention and changes in appetite.    Additionally, Upon arrival to the ED, he was found to be anemic, Hb of 7 for and FOBT positive for which he was transfused 1U PRBC with appropriate response. Thrombocytosis appears to be chronic (baseline level 60-70s). He reports scant amounts of hematochezia when wiping after BMs and intermittent episodes of melena for the past few months. Additionally, suspicious for possible cellulitis of his RLE and was started on IV cefazolin although he reports the erythema of BLE and RUE is chronic.     He has been started on IV diuresis with some improvement in his dyspnea but appears to have markely elevated central filling pressures on exam with significant BLE edema through his thighs. TTE with grossly reduced RVSF and mod AS. His marker of inflammation are high but may be related to his cellulitis. COVID-19 negative by PCR.     HOME MEDICATIONS:  Lasix 40mg BID  Spironolactone 25mg BID  Toprol XL 25mg QD  Prednisone 3mg QOD  Tacrolimus 0.5mg BID  Cellcept 500mg QD      PAST MEDICAL & SURGICAL HISTORY:  Hyperlipidemia  Hypertension  S/P liver transplant  Type 2 diabetes mellitus  Chronic obstructive pulmonary disease, unspecified COPD type: on home O2  S/P liver transplant    REVIEW OF SYSTEMS  The remaining 14 point ROS is otherwise negative or noncontributory except as noted in the HPI    MEDICATIONS  (STANDING):  aspirin  chewable 81 milliGRAM(s) Oral daily  buprenorphine 2 mG/naloxone 0.5 mG SL  Tablet 1 Tablet(s) SubLingual <User Schedule>  ceFAZolin   IVPB 2000 milliGRAM(s) IV Intermittent every 8 hours  dextrose 5%. 1000 milliLiter(s) (50 mL/Hr) IV Continuous <Continuous>  dextrose 50% Injectable 12.5 Gram(s) IV Push once  dextrose 50% Injectable 25 Gram(s) IV Push once  dextrose 50% Injectable 25 Gram(s) IV Push once  furosemide   Injectable 80 milliGRAM(s) IV Push every 12 hours  insulin glargine Injectable (LANTUS) 16 Unit(s) SubCutaneous every morning  insulin glargine Injectable (LANTUS) 34 Unit(s) SubCutaneous at bedtime  insulin lispro (HumaLOG) corrective regimen sliding scale   SubCutaneous three times a day before meals  insulin lispro (HumaLOG) corrective regimen sliding scale   SubCutaneous at bedtime  metoprolol succinate ER 25 milliGRAM(s) Oral daily  multivitamin/minerals 1 Tablet(s) Oral daily  pantoprazole  Injectable 40 milliGRAM(s) IV Push two times a day  predniSONE   Tablet 3 milliGRAM(s) Oral every other day  sertraline 100 milliGRAM(s) Oral daily  spironolactone 25 milliGRAM(s) Oral two times a day  tacrolimus 0.5 milliGRAM(s) Oral two times a day  tamsulosin 0.4 milliGRAM(s) Oral at bedtime  tiotropium 18 MICROgram(s) Capsule 1 Capsule(s) Inhalation daily  trimethoprim   80 mG/sulfamethoxazole 400 mG 1 Tablet(s) Oral <User Schedule>    MEDICATIONS  (PRN):  albuterol/ipratropium for Nebulization 3 milliLiter(s) Nebulizer every 6 hours PRN Shortness of Breath and/or Wheezing  dextrose 40% Gel 15 Gram(s) Oral once PRN Blood Glucose LESS THAN 70 milliGRAM(s)/deciliter  glucagon  Injectable 1 milliGRAM(s) IntraMuscular once PRN Glucose LESS THAN 70 milligrams/deciliter      Allergies  clindamycin (Unknown)    SOCIAL HISTORY:  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 NAD  HEENT: EOMI  NECK: JVP mod-severely elevated. Distended EJ  RESPIRATORY: Normal respiratory effort; lungs are clear to auscultation bilaterally  CARDIOVASCULAR: Regular rate and rhythm, normal S1 and S2, no murmur/rub/gallop  ABDOMEN: Obese. Nontender to palpation, Soft, Non-distended, normoactive bowel sounds   Extremities: +4 BLE pitting edema through upper thighs. Erythematous BLE and R upper thigh  NEURO: Alert and oriented. Normal affect and appropriate mood      LABS:                        8.2    5.32  )-----------( 78       ( 14 May 2020 19:46 )             30.0     05-15    137  |  92<L>  |  36<H>  ----------------------------<  121<H>  4.1   |  30  |  1.63<H>    Ca    8.5      15 May 2020 06:52  Phos  3.6     05-15  Mg     2.2     05-15    TPro  7.4  /  Alb  3.4  /  TBili  0.6  /  DBili  x   /  AST  35  /  ALT  17  /  AlkPhos  106  05-15    PT/INR - ( 15 May 2020 08:13 )   PT: 13.4 sec;   INR: 1.16 ratio         PTT - ( 15 May 2020 08:13 )  PTT:28.6 sec  Urinalysis Basic - ( 15 May 2020 10:06 )    Color: Light Yellow / Appearance: Clear / S.008 / pH: x  Gluc: x / Ketone: Negative  / Bili: Negative / Urobili: <2 mg/dL   Blood: x / Protein: Negative / Nitrite: Negative   Leuk Esterase: Negative / RBC: x / WBC x   Sq Epi: x / Non Sq Epi: x / Bacteria: x      RADIOLOGY & ADDITIONAL STUDIES:    < from: TTE with Doppler (w/Cont) (20 @ 11:51) >  Dimensions:    Normal Values:  LA:     4.0    2.0 - 4.0 cm  Ao:     3.7    2.0 - 3.8 cm  SEPTUM: 0.8    0.6 - 1.2 cm  PWT:    0.8    0.6 - 1.1 cm  LVIDd:  4.2 3.0 - 5.6 cm  LVIDs:  2.7    1.8 - 4.0 cm  Derived variables:  LVMI: 42 g/m2  RWT: 0.38  Fractional short: 36 %  EF (Visual Estimate): 55 %  Doppler Peak Velocity (m/sec): AoV=2.2  ------------------------------------------------------------------------  Observations:  Mitral Valve: Mitral annular calcification and calcified  mitral leaflets with decreased diastolic opening. Minimal  mitral regurgitation. Mean transmitral valve gradient  equals 4 mm Hg, consistent with mild mitral stenosis.  Aortic Valve/Aorta: Calcified trileaflet aortic valve with  decreased opening. Peak transaortic valve gradient equals  19 mm Hg, mean transaortic valve gradient equals 10 mm Hg,  estimated aortic valve area equals 1.4 sqcm (by continuity  equation), aortic valve velocity time integral equals 44  cm, consistent with moderate aortic stenosis.  Aortic Root: 3.7 cm.  LVOT diameter: 2.1 cm.  Left Atrium: Mildly dilated left atrium.  LA volume index =  37 cc/m2.  Left Ventricle: Endocardial visualization enhanced with  intravenous injection of Ultrasonic Enhancing Agent  (Definity). Overall preserved left ventricular ejection  fraction.  Right Heart: Normal right atrium. The right ventricle is  not well visualized; grossly reduced   right ventricular  systolic function. Limited visualization.  Normal tricuspid  valve. Minimal tricuspid regurgitation. Pulmonic valve not  well visualized.  Pericardium/Pleura: Normal pericardium with no pericardial  effusion.  Hemodynamic: Estimated right ventricular systolic pressure  equals 36 mm Hg, assuming right atrial pressure equals 3 mm  Hg, consistent with borderline pulmonary hypertension.    < end of copied text >    < from: CT Chest No Cont (20 @ 09:01) >  No hilarand/or mediastinal adenopathy is noted.     Heart is normal in size. Calcification of the aortic valve and the coronary arteries is noted. No pericardial effusion is noted.     No endobronchial lesions are noted. Centrilobular emphysema is noted bilaterally. Thick linear opacity representing atelectasis is noted in the right lower lobe. Trace right pleural effusion is noted. Elevation of the right hemidiaphragm is noted.    Below the diaphragm, visualized portions of the abdomen demonstrate splenomegaly.     Degenerative changes of the spine are noted.    < end of copied text >      < from: VA Duplex Lower Ext Vein Scan, Right (20 @ 12:42) >  FINDINGS:    There is normal compressibility of the right common femoral, femoral and popliteal veins.     The contralateral common femoral vein is patent.    Doppler examination shows normal spontaneous and phasic flow.    No calf vein thrombosis is detected.    < end of copied text >

## 2020-05-15 NOTE — PROGRESS NOTE ADULT - SUBJECTIVE AND OBJECTIVE BOX
Chief Complaint:  Patient is a 69y old  Male who presents with a chief complaint of s/p [facility] fall (15 May 2020 08:47)    Interval Events:   No acute overnight events    Allergies:  clindamycin (Unknown)    Hospital Medications:  albuterol/ipratropium for Nebulization 3 milliLiter(s) Nebulizer every 6 hours PRN  aspirin  chewable 81 milliGRAM(s) Oral daily  buprenorphine 2 mG/naloxone 0.5 mG SL  Tablet 1 Tablet(s) SubLingual <User Schedule>  ceFAZolin   IVPB 2000 milliGRAM(s) IV Intermittent every 8 hours  dextrose 40% Gel 15 Gram(s) Oral once PRN  dextrose 5%. 1000 milliLiter(s) IV Continuous <Continuous>  dextrose 50% Injectable 12.5 Gram(s) IV Push once  dextrose 50% Injectable 25 Gram(s) IV Push once  dextrose 50% Injectable 25 Gram(s) IV Push once  furosemide   Injectable 80 milliGRAM(s) IV Push every 12 hours  glucagon  Injectable 1 milliGRAM(s) IntraMuscular once PRN  insulin glargine Injectable (LANTUS) 16 Unit(s) SubCutaneous every morning  insulin glargine Injectable (LANTUS) 34 Unit(s) SubCutaneous at bedtime  insulin lispro (HumaLOG) corrective regimen sliding scale   SubCutaneous three times a day before meals  insulin lispro (HumaLOG) corrective regimen sliding scale   SubCutaneous at bedtime  metoprolol succinate ER 25 milliGRAM(s) Oral daily  multivitamin/minerals 1 Tablet(s) Oral daily  pantoprazole  Injectable 40 milliGRAM(s) IV Push two times a day  predniSONE   Tablet 3 milliGRAM(s) Oral every other day  sertraline 100 milliGRAM(s) Oral daily  spironolactone 25 milliGRAM(s) Oral two times a day  tacrolimus 0.5 milliGRAM(s) Oral two times a day  tamsulosin 0.4 milliGRAM(s) Oral at bedtime  tiotropium 18 MICROgram(s) Capsule 1 Capsule(s) Inhalation daily  trimethoprim   80 mG/sulfamethoxazole 400 mG 1 Tablet(s) Oral <User Schedule>      PMHX/PSHX:  Hyperlipidemia  Hypertension  S/P liver transplant  Type 2 diabetes mellitus  Chronic obstructive pulmonary disease, unspecified COPD type  S/P liver transplant      ROS:   10 Point review of systems negative unless otherwise stated    PHYSICAL EXAM:   Vital Signs:  Vital Signs Last 24 Hrs  T(C): 36.8 (15 May 2020 04:57), Max: 36.8 (14 May 2020 21:17)  T(F): 98.3 (15 May 2020 04:57), Max: 98.3 (15 May 2020 04:57)  HR: 88 (15 May 2020 04:57) (88 - 88)  BP: 145/75 (15 May 2020 04:57) (131/78 - 145/75)  BP(mean): --  RR: 18 (15 May 2020 04:57) (18 - 18)  SpO2: 97% (15 May 2020 04:57) (97% - 97%)  Daily     Daily     GENERAL:  NAD, Appears stated age  HEENT:  NC/AT,  conjunctivae clear and pink, sclera -anicteric  CHEST:  CTA B/L, Normal effort  HEART:  RRR S1/S2, No murmurs  ABDOMEN:  Soft, non-tender, non-distended, BS+, healed surgical scars  EXTEREMITIES:  No cyanosis + Edema, RLE mildly erythematous, chronic skin changes of venous stasis  SKIN:  Warm & Dry, no jaundice  NEURO:  Alert, oriented x4, no asterixis  LABS:                        8.2    5.32  )-----------( 78       ( 14 May 2020 19:46 )             30.0     Mean Cell Volume: 90.9 fl (20 @ 19:46)    05-15    137  |  92<L>  |  36<H>  ----------------------------<  121<H>  4.1   |  30  |  1.63<H>    Ca    8.5      15 May 2020 06:52  Phos  3.6     05-15  Mg     2.2     15    TPro  7.4  /  Alb  3.4  /  TBili  0.6  /  DBili  x   /  AST  35  /  ALT  17  /  AlkPhos  106  05-15    LIVER FUNCTIONS - ( 15 May 2020 06:52 )  Alb: 3.4 g/dL / Pro: 7.4 g/dL / ALK PHOS: 106 U/L / ALT: 17 U/L / AST: 35 U/L / GGT: x           PT/INR - ( 15 May 2020 08:13 )   PT: 13.4 sec;   INR: 1.16 ratio         PTT - ( 15 May 2020 08:13 )  PTT:28.6 sec  Urinalysis Basic - ( 15 May 2020 10:06 )    Color: Light Yellow / Appearance: Clear / S.008 / pH: x  Gluc: x / Ketone: Negative  / Bili: Negative / Urobili: <2 mg/dL   Blood: x / Protein: Negative / Nitrite: Negative   Leuk Esterase: Negative / RBC: x / WBC x   Sq Epi: x / Non Sq Epi: x / Bacteria: x                              8.2    5.32  )-----------( 78       ( 14 May 2020 19:46 )             30.0                         7.5    6.73  )-----------( 84       ( 14 May 2020 07:09 )             28.4                         7.7    9.28  )-----------( 76       ( 14 May 2020 00:46 )             See note                        7.0    11.01 )-----------( 89       ( 13 May 2020 16:13 )             See note      Imaging:

## 2020-05-15 NOTE — PROGRESS NOTE ADULT - SUBJECTIVE AND OBJECTIVE BOX
Podiatry pager #: [phone]/ [phone]    Patient is a 69y old  Male who [facility] a [facility] ([facility] May [facility] 08:47[facility] Podiatry consulted for evaluation of left foot wound, patient relates he saw outpatient DPM @ 1.5 week ago.       HPI:  70 yo M with PMH of HTN, T2DM (on insulin), COPD (on home O2 3L), s/p liver txp (2011) on cellcept and tacrolimus, HLD, chronic lymphedema, presents after mechanical fall earlier today. Patient states that he felt weak and his legs gave out, and it was difficult for him to get back up. He denies any LOC, lightheadedness, dizziness. Denies hitting his head. He states that he has not taken his lasix or spironolactone for several days because it was making him urinate too much; his legs have become more swollen as a result. States he is adherent with the rest of his medications. Denies orthopnea, uses 2 pillows at baseline. Denies dysuria or hematuria. Also endorses blood with stools when wiping, BMs have been "small pellets", sometimes looks dark for the last month. Last colonoscopy and endoscopy about 8-10 years ago, states it was normal to his knowledge. Denies F/C/CP/SOB/cough/N/V/abd pain. Has not required increased O2. Uses walker to ambulate. Patient states he tripped and fell about 2 weeks ago. Patient lives alone and cares for himself, does not have any HHA. Pt admits that he needs help/aid. Per ED documentation --  EMS on arrival reporting the place was disheveled, covered in trash and urine.      In ED vitals: T 98.2, , /80, RR 16 -> 22, 100% on 3L (85% on RA).   Labs notable for anemia (hgb 7), FOBT+. CXR done.   Given: lasix 80 IVP x1, PPI 80 IVPx1, 1U PRBC. (13 May 2020 19:41)      PAST MEDICAL & SURGICAL HISTORY:  Hyperlipidemia  Hypertension  S/P liver transplant  Type 2 diabetes mellitus  Chronic obstructive pulmonary disease, unspecified COPD type: on home O2  S/P liver transplant      MEDICATIONS  (STANDING):  aspirin  chewable 81 milliGRAM(s) Oral daily  buprenorphine 2 mG/naloxone 0.5 mG SL  Tablet 1 Tablet(s) SubLingual <User Schedule>  ceFAZolin   IVPB 2000 milliGRAM(s) IV Intermittent every 8 hours  dextrose 5%. 1000 milliLiter(s) (50 mL/Hr) IV Continuous <Continuous>  dextrose 50% Injectable 12.5 Gram(s) IV Push once  dextrose 50% Injectable 25 Gram(s) IV Push once  dextrose 50% Injectable 25 Gram(s) IV Push once  furosemide   Injectable 80 milliGRAM(s) IV Push every 12 hours  insulin glargine Injectable (LANTUS) 16 Unit(s) SubCutaneous every morning  insulin glargine Injectable (LANTUS) 34 Unit(s) SubCutaneous at bedtime  insulin lispro (HumaLOG) corrective regimen sliding scale   SubCutaneous three times a day before meals  insulin lispro (HumaLOG) corrective regimen sliding scale   SubCutaneous at bedtime  metoprolol succinate ER 25 milliGRAM(s) Oral daily  multivitamin/minerals 1 Tablet(s) Oral daily  pantoprazole  Injectable 40 milliGRAM(s) IV Push two times a day  predniSONE   Tablet 3 milliGRAM(s) Oral every other day  sertraline 100 milliGRAM(s) Oral daily  spironolactone 25 milliGRAM(s) Oral two times a day  tacrolimus 0.5 milliGRAM(s) Oral two times a day  tamsulosin 0.4 milliGRAM(s) Oral at bedtime  tiotropium 18 MICROgram(s) Capsule 1 Capsule(s) Inhalation daily  trimethoprim   80 mG/sulfamethoxazole 400 mG 1 Tablet(s) Oral <User Schedule>    MEDICATIONS  (PRN):  albuterol/ipratropium for Nebulization 3 milliLiter(s) Nebulizer every 6 hours PRN Shortness of Breath and/or Wheezing  dextrose 40% Gel 15 Gram(s) Oral once PRN Blood Glucose LESS THAN 70 milliGRAM(s)/deciliter  glucagon  Injectable 1 milliGRAM(s) IntraMuscular once PRN Glucose LESS THAN 70 milligrams/deciliter      Allergies    clindamycin (Unknown)    Intolerances        VITALS:    Vital Signs Last 24 Hrs  T(C): 37.2 (15 May 2020 12:48), Max: 37.2 (15 May 2020 12:48)  T(F): 98.9 (15 May 2020 12:48), Max: 98.9 (15 May 2020 12:48)  HR: 90 (15 May 2020 12:48) (88 - 90)  BP: 131/77 (15 May 2020 12:48) (131/77 - 145/75)  BP(mean): --  RR: 18 (15 May 2020 12:48) (18 - 18)  SpO2: 98% (15 May 2020 12:48) (97% - 98%)    LABS:                          8.2    5.32  )-----------( 78       ( 14 May 2020 19:46 )             30.0       05-15    137  |  92<L>  |  36<H>  ----------------------------<  121<H>  4.1   |  30  |  1.63<H>    Ca    8.5      15 May 2020 06:52  Phos  3.6     05-15  Mg     2.2     05-15    TPro  7.4  /  Alb  3.4  /  TBili  0.6  /  DBili  x   /  AST  35  /  ALT  17  /  AlkPhos  106  05-15      CAPILLARY BLOOD GLUCOSE      POCT Blood Glucose.: 227 mg/dL (15 May 2020 14:53)  POCT Blood Glucose.: 199 mg/dL (15 May 2020 12:12)  POCT Blood Glucose.: 204 mg/dL (15 May 2020 12:11)  POCT Blood Glucose.: 127 mg/dL (15 May 2020 08:31)  POCT Blood Glucose.: 161 mg/dL (14 May 2020 22:13)  POCT Blood Glucose.: 129 mg/dL (14 May 2020 17:25)      PT/INR - ( 15 May 2020 08:13 )   PT: 13.4 sec;   INR: 1.16 ratio         PTT - ( 15 May 2020 08:13 )  PTT:28.6 sec    LOWER EXTREMITY PHYSICAL EXAM:    Vasular: DP/PT 1_/4, B/L, CFT <_3 seconds B/L, Temperature gradient wnl_, B/L.  chronic pitting edema and venous stasis dermatitis  Neuro: Epicritic sensation diminished to the level of toes, B/L.  Skin:  Wound #1: left foot  Location: plantar sub  met 1  Size: 1cm diameter  Depth: superficial with mild subdermal hemmorraghes/hyperkeratosis  Wound bed: purple discoloration due to gentian violet applied by DPM  Drainage: none  Odor: none  Periwound: no clinical signs of infection  Etiology: dm

## 2020-05-15 NOTE — PROGRESS NOTE ADULT - SUBJECTIVE AND OBJECTIVE BOX
PROGRESS NOTE:   Ramana Nieto MD, PGY-2  [facility] Pager [phone]  [facility] Pager [phone]    Patient is a 69y old  Male who presents with a chief complaint of s/p [facility] fall (15 May 2020 05:20)      SUBJECTIVE / OVERNIGHT EVENTS:    ADDITIONAL REVIEW OF SYSTEMS:    MEDICATIONS  (STANDING):  buprenorphine 2 mG/naloxone 0.5 mG SL  Tablet 1 Tablet(s) SubLingual <User Schedule>  ceFAZolin   IVPB 2000 milliGRAM(s) IV Intermittent every 8 hours  dextrose 5%. 1000 milliLiter(s) (50 mL/Hr) IV Continuous <Continuous>  dextrose 50% Injectable 12.5 Gram(s) IV Push once  dextrose 50% Injectable 25 Gram(s) IV Push once  dextrose 50% Injectable 25 Gram(s) IV Push once  furosemide   Injectable 40 milliGRAM(s) IV Push two times a day  insulin glargine Injectable (LANTUS) 16 Unit(s) SubCutaneous every morning  insulin glargine Injectable (LANTUS) 34 Unit(s) SubCutaneous at bedtime  insulin lispro (HumaLOG) corrective regimen sliding scale   SubCutaneous three times a day before meals  insulin lispro (HumaLOG) corrective regimen sliding scale   SubCutaneous at bedtime  metoprolol succinate ER 25 milliGRAM(s) Oral daily  multivitamin/minerals 1 Tablet(s) Oral daily  pantoprazole  Injectable 40 milliGRAM(s) IV Push two times a day  predniSONE   Tablet 3 milliGRAM(s) Oral every other day  sertraline 100 milliGRAM(s) Oral daily  spironolactone 25 milliGRAM(s) Oral two times a day  tacrolimus 0.5 milliGRAM(s) Oral two times a day  tamsulosin 0.4 milliGRAM(s) Oral at bedtime  tiotropium 18 MICROgram(s) Capsule 1 Capsule(s) Inhalation daily    MEDICATIONS  (PRN):  albuterol/ipratropium for Nebulization 3 milliLiter(s) Nebulizer every 6 hours PRN Shortness of Breath and/or Wheezing  dextrose 40% Gel 15 Gram(s) Oral once PRN Blood Glucose LESS THAN 70 milliGRAM(s)/deciliter  glucagon  Injectable 1 milliGRAM(s) IntraMuscular once PRN Glucose LESS THAN 70 milligrams/deciliter      CAPILLARY BLOOD GLUCOSE      POCT Blood Glucose.: 161 mg/dL (14 May 2020 22:13)  POCT Blood Glucose.: 129 mg/dL (14 May 2020 17:25)  POCT Blood Glucose.: 128 mg/dL (14 May 2020 15:09)  POCT Blood Glucose.: 151 mg/dL (14 May 2020 08:34)    I&O's Summary    14 May 2020 07:01  -  15 May 2020 07:00  --------------------------------------------------------  IN: 755 mL / OUT: 1400 mL / NET: -645 mL        PHYSICAL EXAM:  Vital Signs Last 24 Hrs  T(C): 36.8 (15 May 2020 04:57), Max: 36.8 (14 May 2020 21:17)  T(F): 98.3 (15 May 2020 04:57), Max: 98.3 (15 May 2020 04:57)  HR: 88 (15 May 2020 04:57) (88 - 88)  BP: 145/75 (15 May 2020 04:57) (130/71 - 145/75)  BP(mean): --  RR: 18 (15 May 2020 04:57) (18 - 18)  SpO2: 97% (15 May 2020 04:57) (97% - 98%)    CONSTITUTIONAL: NAD, obese  RESPIRATORY: Normal respiratory effort; lungs are clear to auscultation bilaterally  CARDIOVASCULAR: Regular rate and rhythm, normal S1 and S2, no murmur/rub/gallop  ABDOMEN: Nontender to palpation, Soft, Non-distended, normoactive bowel sounds   MUSCLOSKELETAL: b/l LE edema with venous stasis changes and erythema (R> L). Erythema extends up to the thigh on the RLE and warm to touch  NEURO: Alert, good concentration, MCNULTY      LABS:                        8.2    5.32  )-----------( 78       ( 14 May 2020 19:46 )             30.0     05-14    135  |  93<L>  |  34<H>  ----------------------------<  125<H>  4.6   |  31  |  1.68<H>    Ca    8.8      14 May 2020 07:08  Phos  4.2     05-14  Mg     2.2     05-14    TPro  7.0  /  Alb  3.3  /  TBili  1.2  /  DBili  x   /  AST  43<H>  /  ALT  21  /  AlkPhos  112  05-14    PT/INR - ( 14 May 2020 08:08 )   PT: 17.4 sec;   INR: 1.51 ratio         PTT - ( 14 May 2020 08:08 )  PTT:31.7 sec  CARDIAC MARKERS ( 13 May 2020 16:13 )  x     / x     / 356 U/L / x     / 3.9 ng/mL            RADIOLOGY & ADDITIONAL TESTS:  Results Reviewed: Yes  Imaging Personally Reviewed: Yes  Electrocardiogram Personally Reviewed: Yes    COORDINATION OF CARE:  Care Discussed with Consultants/Other Providers [Y/N]:  Y  Prior or Outpatient Records Reviewed [Y/N]: Y

## 2020-05-15 NOTE — CONSULT NOTE ADULT - ASSESSMENT
69 year old M with HFpEF, RV systolic dysfunction of unknown chronicity (patient reports known for 4 years), followed by Dr. Garcia at [facility]. His history is notable for HCV/ETOH cirrhosis s/p liver transplant 2011 (on tacrolimus/cellcept/prednisone), HTN, CKD stage 3 (b/l Scr 1.3-1.5), DM2 on insulin, COPD w/ home 02, HLD, chronic lymphedema and remote history of substance abuse (quit 85') on Suboxone. He was admitted after a mechanical fall and found to be volume overloaded with acute on chronic decompensated RV systolic dysfunction in the setting of inconsistently use of his diuretics. Additionally, on arrival found to have anemia consistent with iron deficiency Hb of 7 and FOBT +, transfused with 1U PRBC with appropriate response. Thrombocytopenia appears chronic in nature (b/l count 60-70s). GI plans for outpatient scope. He was also started on IV cefazolin for suspicion of cellulitis of his RLE.     Some symptomatic improvement with initiation of IV diuresis but he remains markedly volume overloaded on exam. Elevated central filling pressures and significant BLE edema through his upper thighs. TTE with findings of grossly reduced RVSF and mod AS. His markers of inflammation are high but may be related to his cellulitis. COVID-19 negative by PCR. He is hemodynamically stable.     Pertinient Cardiac Studies:  TTE 5/14/20: LVIDd 4.2cm, LVEF 55%, grossly reduced RVSF, mod AS, borderline PH (RVSP 36 mmHg)  TTE: 5/24/16: LVEDd 4.7cm, LVEF 60%, mild hypertrophy of interventricular septum without LVOT obstruction, nl RVSF, minimal AS, no PH (no mention of RVSP/PASP)

## 2020-05-15 NOTE — PROGRESS NOTE ADULT - ATTENDING COMMENTS
as above-  multifactorial dyspnea--copd, CAD, PAH (portopulmonary), debility, anemia--O2 sat above 90%  copd-duoneb q 6, symbicort 160 2 bid, spiriva 1 q day, acapella, incentive spirometry  cards-formal cards evaln--echo to R/O PAH-RHF---CHF evaln  GI-s/p liver transplant-Hailey et al.         Heme-evaln and GI evaln for anemia.  psych-depression--formal psych consult          Lung Ca screening--CT chest now  DVT/GI prophylaxis        Care coordinator consult--NH placement  Leandro Santos MD-Pulmonary   [phone] as above-slightly better  multifactorial dyspnea--copd, CAD, PAH (portopulmonary), debility, anemia--O2 sat above 90%  copd-duoneb q 6, symbicort 160 2 bid, spiriva 1 q day, acapella, incentive spirometry  cards-formal cards evaln--s/p echo to R/O PAH-RHF---CHF evaln (RV dysfunction/mod AS)  GI-s/p liver transplant-Hailey et al.         Heme-evaln and GI evaln for anemia.  psych-depression--formal psych consult          Lung Ca screening--CT chest now  DVT/GI prophylaxis        Care coordinator consult--NH placement  Leandro Santos MD-Pulmonary   [phone]

## 2020-05-15 NOTE — PROGRESS NOTE ADULT - PROBLEM SELECTOR PLAN 5
plt 89 on admission, has been 60-90 on [employer] labs   - likely chronic and in setting of splenomegaly  - ctm CBC, monitor for signs of bleeding

## 2020-05-15 NOTE — PROGRESS NOTE ADULT - PROBLEM SELECTOR PLAN 7
-chronic hypoxemic respiratory failure   c/w spiriva, symbicort 160 2 bid  - acapella, incentive spirometry   - c/w supplemental O2 3L (home O2 lvl)  - Nazia PRN q6  - TTE showing RV dysfunction  - CT chest for cancer screening without pulmonary nodules   - Pulm following, recs appreciated

## 2020-05-15 NOTE — PROGRESS NOTE ADULT - ASSESSMENT
Assessment/Plan;    --DM with pretrophic chronic recurrent wound left foot, non-infected    --no need for further wound care at this time, previously debrided by DPM one week ago.  --recommend continued DM foot care f/u  --recommend continued use of dm shoes and custom molded inserts  --will f/u prn

## 2020-05-15 NOTE — PROGRESS NOTE ADULT - PROBLEM SELECTOR PLAN 10
Transitions of Care Status:  1.  Name of PCP: Dr. Leandro Santos ([facility])   2.  PCP Contacted on Admission: [ ] Y    [ ] N    3.  PCP contacted at Discharge: [ ] Y    [ ] N    [ ] N/A  4.  Post-Discharge Appointment Date and Location:  5.  Summary of Handoff given to PCP:

## 2020-05-15 NOTE — PROGRESS NOTE ADULT - PROBLEM SELECTOR PLAN 2
Appears to be chronic lymphedema; pt endorses increased swelling and has been non-adherent to diuretics. [facility] [place] [facility] shows EF 60% and nl LVSF/RVSF.  - s/p lasix 80 IVP x1 in ED  - C/w spironolactone 25BID and lasix 40 IV BID for now (home dose is PO lasix 40 BID)  - wound care following  - monitor Is/Os, daily weights  - c/w cefazolin for ?RLE cellulitis - although pt states erythema is not worse than usual - given bandemia will c/w abx  - LE doppler neg for RLE DVT

## 2020-05-16 NOTE — PROGRESS NOTE ADULT - PROBLEM SELECTOR PLAN 6
Pt previously documented as having T1DM, however, pt says he has T2DM.   - At home, pt is on lantus 26U qAM and 52U qPM, humalog 18U qAM and 24U qPM. A1c 7.2 in 1/2020.  - A1C 6.6   - C/w lantus at 16U qAM and 34U qPM  - mod ISS and FS qid  - will hold off on restarting standing premeal insulin for now

## 2020-05-16 NOTE — PROGRESS NOTE ADULT - ASSESSMENT
69 year old M with HFpEF, RV systolic dysfunction of unknown chronicity (patient reports known for 4 years), followed by Dr. Garcia at [facility]. His history is notable for HCV/ETOH cirrhosis s/p liver transplant 2011 (on tacrolimus/cellcept/prednisone), HTN, CKD stage 3 (b/l Scr 1.3-1.5), DM2 on insulin, COPD w/ home 02, HLD, chronic lymphedema and remote history of substance abuse (quit 85') on Suboxone. He was admitted after a mechanical fall and found to be volume overloaded with acute on chronic decompensated RV systolic dysfunction in the setting of inconsistently use of his diuretics. Additionally, on arrival found to have anemia consistent with iron deficiency Hb of 7 and FOBT +, transfused with 1U PRBC with appropriate response. Thrombocytopenia appears chronic in nature (b/l count 60-70s). GI plans for outpatient scope. He was also started on IV cefazolin for suspicion of cellulitis of his RLE.     Some symptomatic improvement with initiation of IV diuresis but he remains markedly volume overloaded on exam. Elevated central filling pressures and significant BLE edema through his upper thighs. TTE with findings of grossly reduced RVSF and mod AS. His markers of inflammation are high but may be related to his cellulitis. COVID-19 negative by PCR. He is hemodynamically stable.     Pertinient Cardiac Studies:  TTE 5/14/20: LVIDd 4.2cm, LVEF 55%, grossly reduced RVSF, mod AS, borderline PH (RVSP 36 mmHg)  TTE: 5/24/16: LVEDd 4.7cm, LVEF 60%, mild hypertrophy of interventricular septum without LVOT obstruction, nl RVSF, minimal AS, no PH (no mention of RVSP/PASP)

## 2020-05-16 NOTE — PROGRESS NOTE ADULT - ASSESSMENT
68 yo M with PMH of HTN, T2DM (on insulin), COPD (on home O2 3L), HCV/EtOH cirrhosis s/p liver txp (2011) on cellcept and tacrolimus, HLD, chronic lymphedema, presents after mechanical fall earlier today 2/2 weakness, found to be anemic with positive FOBT and non-adherent to diuretics, admitted for anemia likely 2/2 GIB and LE edema 2/2 fluid overload in setting of medication non-adherence. 68 yo M with PMH of HTN, T2DM (on insulin), COPD (on home O2 3L), HCV/EtOH cirrhosis s/p liver txp (2011) on cellcept and tacrolimus, HLD, chronic lymphedema, presents after mechanical fall earlier today 2/2 weakness, found to be anemic with positive FOBT and non-adherent to diuretics, admitted for anemia likely 2/2 GIB and LE edema 2/2 fluid overload in setting of medication non-adherence. Pt also with likely RLE cellulitis.

## 2020-05-16 NOTE — PROGRESS NOTE ADULT - PROBLEM SELECTOR PLAN 7
-chronic hypoxemic respiratory failure   c/w spiriva, symbicort 160 2 bid  - acapella, incentive spirometry   - c/w supplemental O2 3L (home O2 lvl)  - Nazia PRN q6  - TTE showing RV dysfunction  - CT chest for cancer screening without pulmonary nodules   - Pulm following, recs appreciated -chronic hypoxemic respiratory failure   c/w spiriva and symbicort  - acapella, incentive spirometry   - c/w supplemental O2 3L (home O2 lvl)  - Nazia PRN q6  - TTE showing RV dysfunction  - CT chest for cancer screening without pulmonary nodules   - Pulm following, recs appreciated

## 2020-05-16 NOTE — PROGRESS NOTE ADULT - PROBLEM SELECTOR PLAN 2
Appears to be chronic lymphedema; pt endorses increased swelling and has been non-adherent to diuretics. Last TTE in [facility] [place] [facility]"" on 5/2016 shows EF 60% and nl LVSF/RVSF.  - TTE 5/14/20: LVIDd 4.2cm, LVEF 55%, grossly reduced RVSF, mod AS, borderline PH (RVSP 36 mmHg)  - s/p lasix 80 IVP x1 in ED  - C/w spironolactone 25BID and lasix 80 IV BID for now (home dose is PO lasix 40 BID)  - wound care following  - monitor Is/Os, daily weights, fluid restrict to 1.5L /day   - c/w cefazolin for ?RLE cellulitis - although pt states erythema is not worse than usual - given bandemia will c/w abx. F/u blood cx   - LE doppler neg for RLE DVT  - Heart failure consulted, recs appreciated Appears to be chronic lymphedema; pt endorses increased swelling and has been non-adherent to diuretics. [facility]% and nl LVSF/RVSF.  - TTE 5/14/20: LVIDd 4.2cm, LVEF 55%, grossly reduced RVSF, mod AS, borderline PH (RVSP 36 mmHg)  - C/w spironolactone 25BID and lasix 80 IV BID for now (home dose is PO lasix 40 BID)  - wound care following  - monitor Is/Os, daily weights, fluid restrict to 1.5L /day   - LE doppler neg for RLE DVT  - Heart failure consulted, recs appreciated    # RLE Cellulitis   - c/w cefazolin (5/14 - ). Appears to be improving. F/u blood cx

## 2020-05-16 NOTE — PROGRESS NOTE ADULT - PROBLEM SELECTOR PLAN 3
- Tacro level <2   - c/w tacro 0.5 BID. hold cellcept  - c/w prednisone 3mg every other day - c/w tacro 0.5 BID. hold cellcept  - c/w prednisone 3mg every other day  - Monitor tacro level 30min prior to AM dose

## 2020-05-16 NOTE — PROGRESS NOTE ADULT - ATTENDING COMMENTS
chronically ill male  multi-organ disease  RLE cellulitis mild improvement with antibiotics  monitor intake and output on diuretics  PT eval    patient with multiple co-morbid conditions; high risk for future complications despite optimal medical therapy

## 2020-05-16 NOTE — PROGRESS NOTE ADULT - PROBLEM SELECTOR PLAN 10
Transitions of Care Status:  1.  Name of PCP: Dr. Leandro Santos ([facility])   2.  PCP Contacted on Admission: [ ] Y    [ ] N    3.  PCP contacted at Discharge: [ ] Y    [ ] N    [ ] N/A  4.  Post-Discharge Appointment Date and Location:  5.  Summary of Handoff given to PCP:

## 2020-05-16 NOTE — PROGRESS NOTE ADULT - PROBLEM SELECTOR PLAN 1
Hide [employer] Products: No Action 3: Continue Continue Regimen: Clindamycin lotion apply to face every morning\\n\\nTretinoin cream 0.1% apply to face every night Detail Level: Zone Hgb 7.0 on admission, was 10.2 on 2/2020 (in [employer]). pt endorsing dark stools, blood with wiping, overall weakness, positive FOBT. Last endoscopy/colonoscopy about 8-10 yrs ago per patient.   - s/p 1U PRBC in ED and 1U on 5/15  - maintain active T&S  - Hepatology following, likely plan for outpatient endoscopy  - c/w PPI 40mg IV BID  - Monitor CBC daily  - GI follow up Hgb 7.0 on admission, was 10.2 on 2/2020 (in [employer]). Pt endorsing dark stools, blood with wiping, overall weakness, positive FOBT. Last endoscopy/colonoscopy about 8-10 yrs ago per patient.   - s/p 1U PRBC in ED and 1U on 5/15  - maintain active T&S  - Hepatology following, recs appreciated  - c/w PPI 40mg IV BID  - Monitor CBC Q12hrs Hgb 7.0 on admission, was 10.2 on 2/2020 (in [employer]). Pt endorsing dark stools, blood with wiping, overall weakness, positive FOBT. Last endoscopy/colonoscopy about 8-10 yrs ago per patient.   - s/p 1U PRBC in ED and 1U on 5/15  - maintain active T&S  - Hepatology following, recs appreciated  - c/w PPI 40mg IV BID  - Monitor CBC Q12hrs  - Hold ASA

## 2020-05-16 NOTE — PROGRESS NOTE ADULT - SUBJECTIVE AND OBJECTIVE BOX
CHIEF COMPLAINT: f/up sob, copd, RHF, obesity-better over all-less fluid and willing to work w/all    Interval Events: CHF evaln    REVIEW OF SYSTEMS:  Constitutional: No fevers or chills. No weight loss. + fatigue or generalized malaise.  Eyes: No itching or discharge from the eyes  ENT: No ear pain. No ear discharge. No nasal congestion. No post nasal drip. No epistaxis. No throat pain. No sore throat. No difficulty swallowing.   CV: No chest pain. No palpitations. No lightheadedness or dizziness.   Resp: No dyspnea at rest. + dyspnea on exertion. No orthopnea. No wheezing. No cough. No stridor. No sputum production. No chest pain with respiration.  GI: No nausea. No vomiting. No diarrhea.  MSK: No joint pain or pain in any extremities  Integumentary: No skin lesions. + pedal edema.  Neurological: + gross motor weakness. No sensory changes.  [+ ] All other systems negative  [ ] Unable to assess ROS because ________    OBJECTIVE:  ICU Vital Signs Last 24 Hrs  T(C): 37.1 (16 May 2020 04:25), Max: 37.2 (15 May 2020 12:48)  T(F): 98.8 (16 May 2020 04:25), Max: 98.9 (15 May 2020 12:48)  HR: 82 (16 May 2020 04:25) (82 - 90)  BP: 126/73 (16 May 2020 04:25) (126/73 - 148/73)  BP(mean): --  ABP: --  ABP(mean): --  RR: 18 (16 May 2020 04:25) (18 - 20)  SpO2: 95% (16 May 2020 04:25) (93% - 98%)        05-14 @ 07:01  -  05-15 @ 07:00  --------------------------------------------------------  IN: 755 mL / OUT: 1400 mL / NET: -645 mL    05-15 @ 07:01  -  05-16 @ 05:52  --------------------------------------------------------  IN: 480 mL / OUT: 3200 mL / NET: -2720 mL      CAPILLARY BLOOD GLUCOSE      POCT Blood Glucose.: 148 mg/dL (16 May 2020 01:14)      PHYSICAL EXAM: NAD on O2 NC  General: Awake, alert, oriented X 3.   HEENT: Atraumatic, normocephalic.                 Mallampatti Grade 3                No nasal congestion.                No tonsillar or pharyngeal exudates.  Lymph Nodes: No palpable lymphadenopathy  Neck: No JVD. No carotid bruit.   Respiratory: Normal chest expansion                         Normal percussion                         Normal and equal air entry                         No wheeze, rhonchi or rales.  Cardiovascular: S1 S2 normal. No murmurs, rubs or gallops.   Abdomen: Soft, non-tender, non-distended. No organomegaly. Normoactive bowel sounds.  Extremities: Warm to touch. Peripheral pulse palpable. +++ pedal edema.   Skin: No rashes or skin lesions  Neurological: Motor and sensory examination equal and normal in all four extremities.  Psychiatry: Appropriate mood and affect.    HOSPITAL MEDICATIONS:  MEDICATIONS  (STANDING):  aspirin  chewable 81 milliGRAM(s) Oral daily  buprenorphine 2 mG/naloxone 0.5 mG SL  Tablet 1 Tablet(s) SubLingual <User Schedule>  ceFAZolin   IVPB 2000 milliGRAM(s) IV Intermittent every 8 hours  dextrose 5%. 1000 milliLiter(s) (50 mL/Hr) IV Continuous <Continuous>  dextrose 50% Injectable 12.5 Gram(s) IV Push once  dextrose 50% Injectable 25 Gram(s) IV Push once  dextrose 50% Injectable 25 Gram(s) IV Push once  furosemide   Injectable 80 milliGRAM(s) IV Push every 12 hours  insulin glargine Injectable (LANTUS) 16 Unit(s) SubCutaneous every morning  insulin glargine Injectable (LANTUS) 34 Unit(s) SubCutaneous at bedtime  insulin lispro (HumaLOG) corrective regimen sliding scale   SubCutaneous three times a day before meals  insulin lispro (HumaLOG) corrective regimen sliding scale   SubCutaneous at bedtime  metoprolol succinate ER 25 milliGRAM(s) Oral daily  multivitamin/minerals 1 Tablet(s) Oral daily  pantoprazole  Injectable 40 milliGRAM(s) IV Push two times a day  predniSONE   Tablet 3 milliGRAM(s) Oral every other day  sertraline 100 milliGRAM(s) Oral daily  spironolactone 25 milliGRAM(s) Oral two times a day  tacrolimus 0.5 milliGRAM(s) Oral two times a day  tamsulosin 0.4 milliGRAM(s) Oral at bedtime  tiotropium 18 MICROgram(s) Capsule 1 Capsule(s) Inhalation daily  trimethoprim   80 mG/sulfamethoxazole 400 mG 1 Tablet(s) Oral <User Schedule>    MEDICATIONS  (PRN):  albuterol/ipratropium for Nebulization 3 milliLiter(s) Nebulizer every 6 hours PRN Shortness of Breath and/or Wheezing  dextrose 40% Gel 15 Gram(s) Oral once PRN Blood Glucose LESS THAN 70 milliGRAM(s)/deciliter  glucagon  Injectable 1 milliGRAM(s) IntraMuscular once PRN Glucose LESS THAN 70 milligrams/deciliter      LABS:                        7.4    3.46  )-----------( 84       ( 16 May 2020 01:42 )             26.2     05-15    137  |  92<L>  |  36<H>  ----------------------------<  121<H>  4.1   |  30  |  1.63<H>    Ca    8.5      15 May 2020 06:52  Phos  3.6     05-15  Mg     2.2     -15    TPro  7.4  /  Alb  3.4  /  TBili  0.6  /  DBili  x   /  AST  35  /  ALT  17  /  AlkPhos  106  05-15    PT/INR - ( 15 May 2020 08:13 )   PT: 13.4 sec;   INR: 1.16 ratio         PTT - ( 15 May 2020 08:13 )  PTT:28.6 sec  Urinalysis Basic - ( 15 May 2020 10:06 )    Color: Light Yellow / Appearance: Clear / S.008 / pH: x  Gluc: x / Ketone: Negative  / Bili: Negative / Urobili: <2 mg/dL   Blood: x / Protein: Negative / Nitrite: Negative   Leuk Esterase: Negative / RBC: x / WBC x   Sq Epi: x / Non Sq Epi: x / Bacteria: x            MICROBIOLOGY:     RADIOLOGY: < from: CT Chest No Cont (20 @ 09:01) >  INTERPRETATION:  Clinical information: Evaluate for pulmonary nodule.    CT scan of the chest was obtained without administration of intravenous contrast.    No hilarand/or mediastinal adenopathy is noted.     Heart is normal in size. Calcification of the aortic valve and the coronary arteries is noted. No pericardial effusion is noted.     No endobronchial lesions are noted. Centrilobular emphysema is noted bilaterally. Thick linear opacity representing atelectasis is noted in the right lower lobe. Trace right pleural effusion is noted. Elevation of the right hemidiaphragm is noted.    Below the diaphragm, visualized portions of the abdomen demonstrate splenomegaly.     Degenerative changes of the spine are noted.    Impression: No pulmonary nodule is noted.    Aortic valvular calcification.        < end of copied text >    [ ] Reviewed and interpreted by me    Point of Care Ultrasound Findings:    PFT:    EKG:

## 2020-05-16 NOTE — PROGRESS NOTE ADULT - SUBJECTIVE AND OBJECTIVE BOX
PROGRESS NOTE:   Authored by Zach Stokes MD, PGY 1, Pager [phone] [facility], [id number] [facility]     Patient is a 69y old  Male who presents with a chief complaint of s/p [facility] fall (16 May 2020 05:52)      SUBJECTIVE / OVERNIGHT EVENTS:      MEDICATIONS  (STANDING):  aspirin  chewable 81 milliGRAM(s) Oral daily  buprenorphine 2 mG/naloxone 0.5 mG SL  Tablet 1 Tablet(s) SubLingual <User Schedule>  ceFAZolin   IVPB 2000 milliGRAM(s) IV Intermittent every 8 hours  dextrose 5%. 1000 milliLiter(s) (50 mL/Hr) IV Continuous <Continuous>  dextrose 50% Injectable 12.5 Gram(s) IV Push once  dextrose 50% Injectable 25 Gram(s) IV Push once  dextrose 50% Injectable 25 Gram(s) IV Push once  furosemide   Injectable 80 milliGRAM(s) IV Push every 12 hours  insulin glargine Injectable (LANTUS) 16 Unit(s) SubCutaneous every morning  insulin glargine Injectable (LANTUS) 34 Unit(s) SubCutaneous at bedtime  insulin lispro (HumaLOG) corrective regimen sliding scale   SubCutaneous three times a day before meals  insulin lispro (HumaLOG) corrective regimen sliding scale   SubCutaneous at bedtime  metoprolol succinate ER 25 milliGRAM(s) Oral daily  multivitamin/minerals 1 Tablet(s) Oral daily  pantoprazole  Injectable 40 milliGRAM(s) IV Push two times a day  predniSONE   Tablet 3 milliGRAM(s) Oral every other day  sertraline 100 milliGRAM(s) Oral daily  spironolactone 25 milliGRAM(s) Oral two times a day  tacrolimus 0.5 milliGRAM(s) Oral two times a day  tamsulosin 0.4 milliGRAM(s) Oral at bedtime  tiotropium 18 MICROgram(s) Capsule 1 Capsule(s) Inhalation daily  trimethoprim   80 mG/sulfamethoxazole 400 mG 1 Tablet(s) Oral <User Schedule>    MEDICATIONS  (PRN):  albuterol/ipratropium for Nebulization 3 milliLiter(s) Nebulizer every 6 hours PRN Shortness of Breath and/or Wheezing  dextrose 40% Gel 15 Gram(s) Oral once PRN Blood Glucose LESS THAN 70 milliGRAM(s)/deciliter  glucagon  Injectable 1 milliGRAM(s) IntraMuscular once PRN Glucose LESS THAN 70 milligrams/deciliter      CAPILLARY BLOOD GLUCOSE      POCT Blood Glucose.: 148 mg/dL (16 May 2020 01:14)  POCT Blood Glucose.: 191 mg/dL (15 May 2020 21:56)  POCT Blood Glucose.: 203 mg/dL (15 May 2020 17:23)  POCT Blood Glucose.: 227 mg/dL (15 May 2020 14:53)  POCT Blood Glucose.: 199 mg/dL (15 May 2020 12:12)  POCT Blood Glucose.: 204 mg/dL (15 May 2020 12:11)  POCT Blood Glucose.: 127 mg/dL (15 May 2020 08:31)    I&O's Summary    15 May 2020 07:01  -  16 May 2020 07:00  --------------------------------------------------------  IN: 480 mL / OUT: 3200 mL / NET: -2720 mL        PHYSICAL EXAM:  Vital Signs Last 24 Hrs  T(C): 37.1 (16 May 2020 04:25), Max: 37.2 (15 May 2020 12:48)  T(F): 98.8 (16 May 2020 04:25), Max: 98.9 (15 May 2020 12:48)  HR: 82 (16 May 2020 04:25) (82 - 90)  BP: 126/73 (16 May 2020 04:25) (126/73 - 148/73)  BP(mean): --  RR: 18 (16 May 2020 04:25) (18 - 20)  SpO2: 95% (16 May 2020 04:25) (93% - 98%)    CONSTITUTIONAL: NAD  RESPIRATORY: Normal respiratory effort; lungs are clear to auscultation bilaterally  CARDIOVASCULAR: Regular rate and rhythm, normal S1 and S2, no murmur/rub/gallop  ABDOMEN: Nontender to palpation, Soft, Non-distended, normoactive bowel sounds,   MUSCLOSKELETAL: No LE edema   NEURO: Alert, good concentration     LABS:                        7.4    3.46  )-----------( 84       ( 16 May 2020 01:42 )             26.2     05-15    137  |  92<L>  |  36<H>  ----------------------------<  121<H>  4.1   |  30  |  1.63<H>    Ca    8.5      15 May 2020 06:52  Phos  3.6     -15  Mg     2.2     05-15    TPro  7.4  /  Alb  3.4  /  TBili  0.6  /  DBili  x   /  AST  35  /  ALT  17  /  AlkPhos  106  05-15    PT/INR - ( 15 May 2020 08:13 )   PT: 13.4 sec;   INR: 1.16 ratio         PTT - ( 15 May 2020 08:13 )  PTT:28.6 sec      Urinalysis Basic - ( 15 May 2020 10:06 )    Color: Light Yellow / Appearance: Clear / S.008 / pH: x  Gluc: x / Ketone: Negative  / Bili: Negative / Urobili: <2 mg/dL   Blood: x / Protein: Negative / Nitrite: Negative   Leuk Esterase: Negative / RBC: x / WBC x   Sq Epi: x / Non Sq Epi: x / Bacteria: x          RADIOLOGY & ADDITIONAL TESTS:  Results Reviewed:   < from: US Abdomen Doppler (20 @ 14:21) >  IMPRESSION:     The liver is unremarkable in appearance.   The hepatic veins, portal veins and hepatic artery demonstrate patency and normal direction of flow.  Splenomegaly ispresent.    < end of copied text >    < from: [facility] Duplex Lower Ext Vein Scan, Right (20 @ 12:42) >  IMPRESSION:     No definite evidence of right lower extremity deep venous thrombosis. Study is somewhat technically limited.    < end of copied text > PROGRESS NOTE:   Authored by Zach Stokes MD, PGY 1, Pager [phone] [facility], [id number] [facility]     Patient is a 69y old  Male who presents with a chief complaint of s/p [facility] fall (16 May 2020 05:52)      SUBJECTIVE / OVERNIGHT EVENTS: Pt seen and examined at bedside this AM. Pt notes his shortness of breath is at baseline. Denies pain. Pt also reports a R nasal lesion that has been present for approximately 1 month and causes discomfort. Reports his last bowel movement was yesterday and was dark without gross blood.       MEDICATIONS  (STANDING):  aspirin  chewable 81 milliGRAM(s) Oral daily  buprenorphine 2 mG/naloxone 0.5 mG SL  Tablet 1 Tablet(s) SubLingual <User Schedule>  ceFAZolin   IVPB 2000 milliGRAM(s) IV Intermittent every 8 hours  dextrose 5%. 1000 milliLiter(s) (50 mL/Hr) IV Continuous <Continuous>  dextrose 50% Injectable 12.5 Gram(s) IV Push once  dextrose 50% Injectable 25 Gram(s) IV Push once  dextrose 50% Injectable 25 Gram(s) IV Push once  furosemide   Injectable 80 milliGRAM(s) IV Push every 12 hours  insulin glargine Injectable (LANTUS) 16 Unit(s) SubCutaneous every morning  insulin glargine Injectable (LANTUS) 34 Unit(s) SubCutaneous at bedtime  insulin lispro (HumaLOG) corrective regimen sliding scale   SubCutaneous three times a day before meals  insulin lispro (HumaLOG) corrective regimen sliding scale   SubCutaneous at bedtime  metoprolol succinate ER 25 milliGRAM(s) Oral daily  multivitamin/minerals 1 Tablet(s) Oral daily  pantoprazole  Injectable 40 milliGRAM(s) IV Push two times a day  predniSONE   Tablet 3 milliGRAM(s) Oral every other day  sertraline 100 milliGRAM(s) Oral daily  spironolactone 25 milliGRAM(s) Oral two times a day  tacrolimus 0.5 milliGRAM(s) Oral two times a day  tamsulosin 0.4 milliGRAM(s) Oral at bedtime  tiotropium 18 MICROgram(s) Capsule 1 Capsule(s) Inhalation daily  trimethoprim   80 mG/sulfamethoxazole 400 mG 1 Tablet(s) Oral <User Schedule>    MEDICATIONS  (PRN):  albuterol/ipratropium for Nebulization 3 milliLiter(s) Nebulizer every 6 hours PRN Shortness of Breath and/or Wheezing  dextrose 40% Gel 15 Gram(s) Oral once PRN Blood Glucose LESS THAN 70 milliGRAM(s)/deciliter  glucagon  Injectable 1 milliGRAM(s) IntraMuscular once PRN Glucose LESS THAN 70 milligrams/deciliter      CAPILLARY BLOOD GLUCOSE      POCT Blood Glucose.: 148 mg/dL (16 May 2020 01:14)  POCT Blood Glucose.: 191 mg/dL (15 May 2020 21:56)  POCT Blood Glucose.: 203 mg/dL (15 May 2020 17:23)  POCT Blood Glucose.: 227 mg/dL (15 May 2020 14:53)  POCT Blood Glucose.: 199 mg/dL (15 May 2020 12:12)  POCT Blood Glucose.: 204 mg/dL (15 May 2020 12:11)  POCT Blood Glucose.: 127 mg/dL (15 May 2020 08:31)    I&O's Summary    15 May 2020 07:01  -  16 May 2020 07:00  --------------------------------------------------------  IN: 480 mL / OUT: 3200 mL / NET: -2720 mL        PHYSICAL EXAM:  Vital Signs Last 24 Hrs  T(C): 37.1 (16 May 2020 04:25), Max: 37.2 (15 May 2020 12:48)  T(F): 98.8 (16 May 2020 04:25), Max: 98.9 (15 May 2020 12:48)  HR: 82 (16 May 2020 04:25) (82 - 90)  BP: 126/73 (16 May 2020 04:25) (126/73 - 148/73)  BP(mean): --  RR: 18 (16 May 2020 04:25) (18 - 20)  SpO2: 95% (16 May 2020 04:25) (93% - 98%)    CONSTITUTIONAL: NAD  HEENT: Small erosion on R nasal septum, sclera clear, tracking   RESPIRATORY: Normal respiratory effort; lungs are clear to auscultation bilaterally  CARDIOVASCULAR: Regular rate and rhythm, normal S1 and S2, no murmur/rub/gallop  ABDOMEN: Nontender to palpation, Soft, Non-distended, normoactive bowel sounds   MUSCLOSKELETAL: Significant b/l LE edema with venous stasis changes and erythema (R> L). Erythema extends up to the thigh on the RLE. Warm to touch. Non-tender to palpation. Overall RLE improving.    NEURO: Alert, good concentration, moving extremities     LABS:                        7.4    3.46  )-----------( 84       ( 16 May 2020 01:42 )             26.2     05-15    137  |  92<L>  |  36<H>  ----------------------------<  121<H>  4.1   |  30  |  1.63<H>    Ca    8.5      15 May 2020 06:52  Phos  3.6     05-15  Mg     2.2     -15    TPro  7.4  /  Alb  3.4  /  TBili  0.6  /  DBili  x   /  AST  35  /  ALT  17  /  AlkPhos  106  05-15    PT/INR - ( 15 May 2020 08:13 )   PT: 13.4 sec;   INR: 1.16 ratio         PTT - ( 15 May 2020 08:13 )  PTT:28.6 sec      Urinalysis Basic - ( 15 May 2020 10:06 )    Color: Light Yellow / Appearance: Clear / S.008 / pH: x  Gluc: x / Ketone: Negative  / Bili: Negative / Urobili: <2 mg/dL   Blood: x / Protein: Negative / Nitrite: Negative   Leuk Esterase: Negative / RBC: x / WBC x   Sq Epi: x / Non Sq Epi: x / Bacteria: x          RADIOLOGY & ADDITIONAL TESTS:  Results Reviewed:   < from: US Abdomen Doppler (20 @ 14:21) >  IMPRESSION:     The liver is unremarkable in appearance.   The hepatic veins, portal veins and hepatic artery demonstrate patency and normal direction of flow.  Splenomegaly ispresent.    < end of copied text >    < from: VA Duplex Lower Ext Vein Scan, Right (20 @ 12:42) >  IMPRESSION:     No definite evidence of right lower extremity deep venous thrombosis. Study is somewhat technically limited.    < end of copied text >

## 2020-05-16 NOTE — PROGRESS NOTE ADULT - SUBJECTIVE AND OBJECTIVE BOX
NOTE: All current Cardiology Service and Contact Information can be found at [email or web address], password: [email or web address]  Please feel free to contact me directly via text or call at [phone] with any questions or concerns.     Subjective:    Medications:  albuterol/ipratropium for Nebulization 3 milliLiter(s) Nebulizer every 6 hours PRN  aspirin  chewable 81 milliGRAM(s) Oral daily  buprenorphine 2 mG/naloxone 0.5 mG SL  Tablet 1 Tablet(s) SubLingual <User Schedule>  ceFAZolin   IVPB 2000 milliGRAM(s) IV Intermittent every 8 hours  dextrose 40% Gel 15 Gram(s) Oral once PRN  dextrose 5%. 1000 milliLiter(s) IV Continuous <Continuous>  dextrose 50% Injectable 12.5 Gram(s) IV Push once  dextrose 50% Injectable 25 Gram(s) IV Push once  dextrose 50% Injectable 25 Gram(s) IV Push once  furosemide   Injectable 80 milliGRAM(s) IV Push every 12 hours  glucagon  Injectable 1 milliGRAM(s) IntraMuscular once PRN  insulin glargine Injectable (LANTUS) 16 Unit(s) SubCutaneous every morning  insulin glargine Injectable (LANTUS) 34 Unit(s) SubCutaneous at bedtime  insulin lispro (HumaLOG) corrective regimen sliding scale   SubCutaneous three times a day before meals  insulin lispro (HumaLOG) corrective regimen sliding scale   SubCutaneous at bedtime  metoprolol succinate ER 25 milliGRAM(s) Oral daily  multivitamin/minerals 1 Tablet(s) Oral daily  pantoprazole  Injectable 40 milliGRAM(s) IV Push two times a day  predniSONE   Tablet 3 milliGRAM(s) Oral every other day  sertraline 100 milliGRAM(s) Oral daily  spironolactone 25 milliGRAM(s) Oral two times a day  tacrolimus 0.5 milliGRAM(s) Oral two times a day  tamsulosin 0.4 milliGRAM(s) Oral at bedtime  tiotropium 18 MICROgram(s) Capsule 1 Capsule(s) Inhalation daily  trimethoprim   80 mG/sulfamethoxazole 400 mG 1 Tablet(s) Oral <User Schedule>    Vitals:  T(C): 37.1 (05-16-20 @ 04:25), Max: 37.2 (05-15-20 @ 12:48)  HR: 82 (05-16-20 @ 04:25) (82 - 90)  BP: 126/73 (05-16-20 @ 04:25) (126/73 - 148/73)  RR: 18 (05-16-20 @ 04:25) (18 - 20)  SpO2: 95% (05-16-20 @ 04:25) (93% - 98%)          I&O's Summary    15 May 2020 07:01  -  16 May 2020 07:00  --------------------------------------------------------  IN: 480 mL / OUT: 3200 mL / NET: -2720 mL        Physical Exam:  Appearance: No Acute Distress  HEENT: JVP ___ cm H2O, no HJR  Cardiovascular: RRR, Normal S1 S2, No murmurs/rubs/gallops  Respiratory: Clear to auscultation bilaterally  Gastrointestinal: Soft, Non-tender, non-distended	  Skin: no skin lesions  Neurologic: Non-focal  Extremities: No LE edema, warm and well perfused  Psychiatry: A & O x 3, Mood & affect appropriate      Labs:                        7.5    3.27  )-----------( 83       ( 16 May 2020 08:14 )             26.8     05-15    137  |  92<L>  |  36<H>  ----------------------------<  121<H>  4.1   |  30  |  1.63<H>    Ca    8.5      15 May 2020 06:52  Phos  3.6     05-15  Mg     2.2     05-15    TPro  7.4  /  Alb  3.4  /  TBili  0.6  /  DBili  x   /  AST  35  /  ALT  17  /  AlkPhos  106  05-15      PT/INR - ( 15 May 2020 08:13 )   PT: 13.4 sec;   INR: 1.16 ratio         PTT - ( 15 May 2020 08:13 )  PTT:28.6 sec      Serum Pro-Brain Natriuretic Peptide: 849 pg/mL (05-15 @ 06:52)  Serum Pro-Brain Natriuretic Peptide: 2674 pg/mL (05-13 @ 16:13) NOTE: All current Cardiology Service and Contact Information can be found at [email or web address], password: [email or web address]  Please feel free to contact me directly via text or call at [phone] with any questions or concerns.     Subjective: No acute events overnight. No acute complaints.     Medications:  albuterol/ipratropium for Nebulization 3 milliLiter(s) Nebulizer every 6 hours PRN  aspirin  chewable 81 milliGRAM(s) Oral daily  buprenorphine 2 mG/naloxone 0.5 mG SL  Tablet 1 Tablet(s) SubLingual <User Schedule>  ceFAZolin   IVPB 2000 milliGRAM(s) IV Intermittent every 8 hours  dextrose 40% Gel 15 Gram(s) Oral once PRN  dextrose 5%. 1000 milliLiter(s) IV Continuous <Continuous>  dextrose 50% Injectable 12.5 Gram(s) IV Push once  dextrose 50% Injectable 25 Gram(s) IV Push once  dextrose 50% Injectable 25 Gram(s) IV Push once  furosemide   Injectable 80 milliGRAM(s) IV Push every 12 hours  glucagon  Injectable 1 milliGRAM(s) IntraMuscular once PRN  insulin glargine Injectable (LANTUS) 16 Unit(s) SubCutaneous every morning  insulin glargine Injectable (LANTUS) 34 Unit(s) SubCutaneous at bedtime  insulin lispro (HumaLOG) corrective regimen sliding scale   SubCutaneous three times a day before meals  insulin lispro (HumaLOG) corrective regimen sliding scale   SubCutaneous at bedtime  metoprolol succinate ER 25 milliGRAM(s) Oral daily  multivitamin/minerals 1 Tablet(s) Oral daily  pantoprazole  Injectable 40 milliGRAM(s) IV Push two times a day  predniSONE   Tablet 3 milliGRAM(s) Oral every other day  sertraline 100 milliGRAM(s) Oral daily  spironolactone 25 milliGRAM(s) Oral two times a day  tacrolimus 0.5 milliGRAM(s) Oral two times a day  tamsulosin 0.4 milliGRAM(s) Oral at bedtime  tiotropium 18 MICROgram(s) Capsule 1 Capsule(s) Inhalation daily  trimethoprim   80 mG/sulfamethoxazole 400 mG 1 Tablet(s) Oral <User Schedule>    Vitals:  T(C): 37.1 (05-16-20 @ 04:25), Max: 37.2 (05-15-20 @ 12:48)  HR: 82 (05-16-20 @ 04:25) (82 - 90)  BP: 126/73 (05-16-20 @ 04:25) (126/73 - 148/73)  RR: 18 (05-16-20 @ 04:25) (18 - 20)  SpO2: 95% (05-16-20 @ 04:25) (93% - 98%)          I&O's Summary    15 May 2020 07:01  -  16 May 2020 07:00  --------------------------------------------------------  IN: 480 mL / OUT: 3200 mL / NET: -2720 mL        Physical Exam:  Appearance: No Acute Distress  HEENT: JVP at least moderately elevated   Cardiovascular: RRR  Respiratory: Clear to auscultation bilaterally  Gastrointestinal: Soft, Non-tender, non-distended	  Skin: no skin lesions  Neurologic: Non-focal  Extremities: 3+ LE edema, warm and well perfused  Psychiatry: A & O x 3, Mood & affect appropriate      Labs:                        7.5    3.27  )-----------( 83       ( 16 May 2020 08:14 )             26.8     05-15    137  |  92<L>  |  36<H>  ----------------------------<  121<H>  4.1   |  30  |  1.63<H>    Ca    8.5      15 May 2020 06:52  Phos  3.6     05-15  Mg     2.2     05-15    TPro  7.4  /  Alb  3.4  /  TBili  0.6  /  DBili  x   /  AST  35  /  ALT  17  /  AlkPhos  106  05-15      PT/INR - ( 15 May 2020 08:13 )   PT: 13.4 sec;   INR: 1.16 ratio         PTT - ( 15 May 2020 08:13 )  PTT:28.6 sec      Serum Pro-Brain Natriuretic Peptide: 849 pg/mL (05-15 @ 06:52)  Serum Pro-Brain Natriuretic Peptide: 2674 pg/mL (05-13 @ 16:13) NOTE: All current Cardiology Service and Contact Information can be found at [email or web address], password: [email or web address]  Please feel free to contact me directly via text or call at [phone] with any questions or concerns.     Subjective: No acute events overnight. No acute complaints.     Medications:  albuterol/ipratropium for Nebulization 3 milliLiter(s) Nebulizer every 6 hours PRN  aspirin  chewable 81 milliGRAM(s) Oral daily  buprenorphine 2 mG/naloxone 0.5 mG SL  Tablet 1 Tablet(s) SubLingual <User Schedule>  ceFAZolin   IVPB 2000 milliGRAM(s) IV Intermittent every 8 hours  dextrose 40% Gel 15 Gram(s) Oral once PRN  dextrose 5%. 1000 milliLiter(s) IV Continuous <Continuous>  dextrose 50% Injectable 12.5 Gram(s) IV Push once  dextrose 50% Injectable 25 Gram(s) IV Push once  dextrose 50% Injectable 25 Gram(s) IV Push once  furosemide   Injectable 80 milliGRAM(s) IV Push every 12 hours  glucagon  Injectable 1 milliGRAM(s) IntraMuscular once PRN  insulin glargine Injectable (LANTUS) 16 Unit(s) SubCutaneous every morning  insulin glargine Injectable (LANTUS) 34 Unit(s) SubCutaneous at bedtime  insulin lispro (HumaLOG) corrective regimen sliding scale   SubCutaneous three times a day before meals  insulin lispro (HumaLOG) corrective regimen sliding scale   SubCutaneous at bedtime  metoprolol succinate ER 25 milliGRAM(s) Oral daily  multivitamin/minerals 1 Tablet(s) Oral daily  pantoprazole  Injectable 40 milliGRAM(s) IV Push two times a day  predniSONE   Tablet 3 milliGRAM(s) Oral every other day  sertraline 100 milliGRAM(s) Oral daily  spironolactone 25 milliGRAM(s) Oral two times a day  tacrolimus 0.5 milliGRAM(s) Oral two times a day  tamsulosin 0.4 milliGRAM(s) Oral at bedtime  tiotropium 18 MICROgram(s) Capsule 1 Capsule(s) Inhalation daily  trimethoprim   80 mG/sulfamethoxazole 400 mG 1 Tablet(s) Oral <User Schedule>    Vitals:  T(C): 37.1 (05-16-20 @ 04:25), Max: 37.2 (05-15-20 @ 12:48)  HR: 82 (05-16-20 @ 04:25) (82 - 90)  BP: 126/73 (05-16-20 @ 04:25) (126/73 - 148/73)  RR: 18 (05-16-20 @ 04:25) (18 - 20)  SpO2: 95% (05-16-20 @ 04:25) (93% - 98%)          I&O's Summary    15 May 2020 07:01  -  16 May 2020 07:00  --------------------------------------------------------  IN: 480 mL / OUT: 3200 mL / NET: -2720 mL        Physical Exam:  Appearance: No Acute Distress  HEENT: JVP at least moderately elevated   Cardiovascular: RRR  Respiratory: Clear to auscultation bilaterally  Gastrointestinal: Soft, Non-tender, non-distended	  Skin: no skin lesions  Neurologic: Non-focal  Extremities: 3+ LE edema, warm and well perfused  Psychiatry: A & O x 3, Mood & affect appropriate      Labs:                        7.5    3.27  )-----------( 83       ( 16 May 2020 08:14 )             26.8     05-15    137  |  92<L>  |  36<H>  ----------------------------<  121<H>  4.1   |  30  |  1.63<H>    Ca    8.5      15 May 2020 06:52  Phos  3.6     05-15  Mg     2.2     05-15    TPro  7.4  /  Alb  3.4  /  TBili  0.6  /  DBili  x   /  AST  35  /  ALT  17  /  AlkPhos  106  05-15      PT/INR - ( 15 May 2020 08:13 )   PT: 13.4 sec;   INR: 1.16 ratio         PTT - ( 15 May 2020 08:13 )  PTT:28.6 sec    Serum Pro-Brain Natriuretic Peptide: 849 pg/mL (05-15 @ 06:52)  Serum Pro-Brain Natriuretic Peptide: 2674 pg/mL (05-13 @ 16:13)

## 2020-05-16 NOTE — PROGRESS NOTE ADULT - ATTENDING COMMENTS
Remains overloaded but diuresing well and renal function improving. Will continue current dose of lasix.

## 2020-05-16 NOTE — PROGRESS NOTE ADULT - ATTENDING COMMENTS
as above-improving, s/p CHF evaln  multifactorial dyspnea--copd, CAD, PAH (portopulmonary), debility, anemia--O2 sat above 90%  copd-duoneb q 6, symbicort 160 2 bid, spiriva 1 q day, acapella, incentive spirometry  cards-s/p formal cards evaln--s/p echo to R/O PAH-RHF---CHF evaln (RV dysfunction/mod AS)-continue diuresis  GI-s/p liver transplant-Hailey et al.         Heme-evaln and GI evaln for anemia.  psych-depression--formal psych consult          Lung Ca screening--CT chest is No evidence of Dz  DVT/GI prophylaxis        Care coordinator consult--NH placement  Leandro Santos MD-Pulmonary   [phone]

## 2020-05-16 NOTE — PROGRESS NOTE ADULT - PROBLEM SELECTOR PLAN 3
Recommendation: - FOBT positive, on Protonix 40mg IVP BID  - Transfused 1U PRBC 5/13 with appropriate response  - Per GI, no indication for emergent scope so long as he remains stable.

## 2020-05-16 NOTE — PROGRESS NOTE ADULT - PROBLEM SELECTOR PLAN 2
- Management per liver transplant team  - Remains on prednisone and Tacrolimus   - Cellcept discontinued for active infection.

## 2020-05-16 NOTE — PROGRESS NOTE ADULT - PROBLEM SELECTOR PLAN 1
- Lasix 80mg IV BID  - Strict I&Os, fluid restriction 1.5L daily  - Daily standing weights. - continue Furosemide 80 mg IV BID  - Strict I&Os, fluid restriction 1.5L daily  - Daily standing weights.

## 2020-05-16 NOTE — PROGRESS NOTE ADULT - ASSESSMENT
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 Found w/ signif edema and Hgb-7--  multifactorial issues.  ******************  5/15-s/p echo-RV dysfunction, mod AS  5/16-better over all--CHF evaln-continue diuresis

## 2020-05-17 NOTE — PROGRESS NOTE ADULT - PROBLEM SELECTOR PLAN 1
- continue Furosemide 80 mg IV BID  - Strict I&Os, fluid restriction 1.5L daily  - Daily standing weights. - continue Furosemide 80 mg IV BID  - recommend compression stockings/elevation for LEs   - Strict I&Os, fluid restriction 1.5L daily  - Daily standing weights.

## 2020-05-17 NOTE — DISCHARGE NOTE PROVIDER - PROVIDER TOKENS
PROVIDER:[TOKEN:[[id number]],FOLLOWUP:[1 week]],PROVIDER:[TOKEN:[[id number]],FOLLOWUP:[1 week]] PROVIDER:[TOKEN:[[id number]],FOLLOWUP:[1 week]],PROVIDER:[TOKEN:[[id number]],FOLLOWUP:[1 week]],PROVIDER:[TOKEN:[[id number]],FOLLOWUP:[1 month],ESTABLISHEDPATIENT:[T]]

## 2020-05-17 NOTE — DISCHARGE NOTE PROVIDER - NSDCMRMEDTOKEN_GEN_ALL_CORE_FT
albuterol 2.5 mg/3 mL (0.083%) inhalation solution: 3 milliliter(s) inhaled every 6 hours, As Needed  aspirin 81 mg oral delayed release tablet: 1 tab(s) orally once a day (in the morning)  Breo Ellipta 200 mcg-25 mcg/inh inhalation powder: 1 puff(s) inhaled once a day  budesonide 0.5 mg/2 mL inhalation suspension: 2 milliliter(s) inhaled 2 times a day, As Needed  buprenorphine 2 mg sublingual tablet: 1 tab(s) sublingual 3 times a day  furosemide 40 mg oral tablet: 1 tab(s) orally 2 times a day  HumaLOG KwikPen 100 units/mL injectable solution: 18 unit(s) injectable qAM    24 units qPM  Lantus Solostar Pen 100 units/mL subcutaneous solution: 26 unit(s) subcutaneous qAM    52 units qHS  metoprolol succinate 25 mg oral tablet, extended release: 1 tab(s) orally once a day (in the morning)  mupirocin 2% topical ointment: Apply topically to affected area 3 times a day, As Needed  mycophenolate mofetil 500 mg oral tablet: 1 tab(s) orally once a day  Perforomist 20 mcg/2 mL inhalation solution: 2 milliliter(s) inhaled 2 times a day, As Needed  predniSONE 1 mg oral tablet: 3 tab(s) orally every other day    MWFSa  sertraline 100 mg oral tablet: 1 tab(s) orally once a day  Spiriva 18 mcg inhalation capsule: 1 cap(s) inhaled once a day  spironolactone 25 mg oral tablet: 1 tab(s) orally 2 times a day  tacrolimus 0.5 mg oral capsule: 1 cap(s) orally 2 times a day  tamsulosin 0.4 mg oral capsule: 1 cap(s) orally once a day (in the morning) aspirin 81 mg oral delayed release tablet: 1 tab(s) orally once a day (in the morning)  Breo Ellipta 200 mcg-25 mcg/inh inhalation powder: 1 puff(s) inhaled once a day  budesonide 0.5 mg/2 mL inhalation suspension: 2 milliliter(s) inhaled 2 times a day, As Needed  buprenorphine-naloxone 2 mg-0.5 mg sublingual tablet:  sublingual   furosemide 80 mg oral tablet: 1 tab(s) orally once a day  insulin glargine: 26 unit(s) subcutaneous once (in morning, in AM)  insulin glargine: 45 unit(s) subcutaneous once (at bedtime, in PM)  ipratropium-albuterol 0.5 mg-2.5 mg/3 mLinhalation solution: 3 milliliter(s) inhaled every 6 hours, As needed, Shortness of Breath and/or Wheezing  metoprolol succinate 25 mg oral tablet, extended release: 1 tab(s) orally once a day (in the morning)  Multiple Vitamins with Minerals oral tablet: 1 tab(s) orally once a day  mupirocin 2% topical ointment: Apply topically to affected area 3 times a day, As Needed  mycophenolate mofetil 500 mg oral tablet: 1 tab(s) orally once a day  nystatin 100,000 units/g topical powder: 1 application topically 2 times a day  pantoprazole 40 mg oral delayed release tablet: 1 tab(s) orally 2 times a day  Perforomist 20 mcg/2 mL inhalation solution: 2 milliliter(s) inhaled 2 times a day, As Needed  predniSONE 1 mg oral tablet: 3 tab(s) orally every other day  senna oral tablet: 2 tab(s) orally once a day (at bedtime)  sodium chloride 0.65% nasal spray:  nasal   Spiriva 18 mcg inhalation capsule: 1 cap(s) inhaled once a day  spironolactone 25 mg oral tablet: 1 tab(s) orally 2 times a day  sulfamethoxazole-trimethoprim 400 mg-80 mg oral tablet: 1 tab(s) orally   tacrolimus 0.5 mg oral capsule: 1 cap(s) orally 2 times a day  tamsulosin 0.4 mg oral capsule: 1 cap(s) orally once a day (at bedtime)  Zoloft 100 mg oral tablet: 1 tab(s) orally once a day aspirin 81 mg oral delayed release tablet: 1 tab(s) orally once a day (in the morning)  Breo Ellipta 200 mcg-25 mcg/inh inhalation powder: 1 puff(s) inhaled once a day  budesonide 0.5 mg/2 mL inhalation suspension: 2 milliliter(s) inhaled 2 times a day, As Needed  buprenorphine-naloxone 2 mg-0.5 mg sublingual tablet:  sublingual   furosemide 80 mg oral tablet: 1 tab(s) orally once a day  insulin glargine: 26 unit(s) subcutaneous once (in morning, in AM)  insulin glargine: 45 unit(s) subcutaneous once (at bedtime, in PM)  ipratropium-albuterol 0.5 mg-2.5 mg/3 mLinhalation solution: 3 milliliter(s) inhaled every 6 hours, As needed, Shortness of Breath and/or Wheezing  metoprolol succinate 25 mg oral tablet, extended release: 1 tab(s) orally once a day (in the morning)  Multiple Vitamins with Minerals oral tablet: 1 tab(s) orally once a day  mupirocin 2% topical ointment: Apply topically to affected area 3 times a day, As Needed  mycophenolate mofetil 500 mg oral tablet: 1 tab(s) orally once a day  nystatin 100,000 units/g topical powder: 1 application topically 2 times a day  pantoprazole 40 mg oral delayed release tablet: 1 tab(s) orally 2 times a day  Perforomist 20 mcg/2 mL inhalation solution: 2 milliliter(s) inhaled 2 times a day, As Needed  predniSONE 1 mg oral tablet: 3 tab(s) orally every other day  senna oral tablet: 2 tab(s) orally once a day (at bedtime)  sodium chloride 0.65% nasal spray:  nasal   Spiriva 18 mcg inhalation capsule: 1 cap(s) inhaled once a day  sulfamethoxazole-trimethoprim 400 mg-80 mg oral tablet: 1 tab(s) orally   tacrolimus 0.5 mg oral capsule: 1 cap(s) orally 2 times a day  tamsulosin 0.4 mg oral capsule: 1 cap(s) orally once a day (at bedtime)  Zoloft 100 mg oral tablet: 1 tab(s) orally once a day

## 2020-05-17 NOTE — PROGRESS NOTE ADULT - ATTENDING COMMENTS
cellulitis RLE gradually improving - less warmth/erythema    chronic leg edema gradually improving  monitor intake and output on diuretic  PT eval   continue home meds  Hepatology f/up    patient with multiple co-morbid conditions; high risk for future complications despite optimal medical therapy

## 2020-05-17 NOTE — PROGRESS NOTE ADULT - PROBLEM SELECTOR PLAN 6
Pt previously documented as having T1DM, however, pt says he has T2DM.   - At home, pt is on lantus 26U qAM and 52U qPM, humalog 18U qAM and 24U qPM. A1c 7.2 in 1/2020.  - A1C 6.6   - C/w lantus at 16U qAM and 38U qPM  - mod ISS and FS qid  - will hold off on restarting standing premeal insulin for now

## 2020-05-17 NOTE — PROGRESS NOTE ADULT - PROBLEM SELECTOR PLAN 1
Hgb 7.0 on admission, was 10.2 on 2/2020 (in [employer]). Pt endorsing dark stools, blood with wiping, overall weakness, positive FOBT. Last endoscopy/colonoscopy about 8-10 yrs ago per patient.   - s/p 1U PRBC in ED and 1U on 5/15  - maintain active T&S  - Hepatology following, recs appreciated  - c/w PPI 40mg IV BID  - Monitor CBC Q12hrs  - Hold ASA Hgb 7.0 on admission, was 10.2 on 2/2020 (in [employer]). Pt endorsing dark stools, blood with wiping, overall weakness, positive FOBT. Last endoscopy/colonoscopy about 8-10 yrs ago per patient.   - s/p 1U PRBC in ED and 1U on 5/15  - maintain active T&S  - Hepatology following, recs appreciated  - c/w PPI 40mg IV BID  - Monitor CBC  - Hold ASA

## 2020-05-17 NOTE — DISCHARGE NOTE PROVIDER - CARE PROVIDERS DIRECT ADDRESSES
,DirectAddress_Unknown,[email or web address] ,DirectAddress_Unknown,[email or web address]

## 2020-05-17 NOTE — DISCHARGE NOTE PROVIDER - NSDCCAREPROVSEEN_GEN_ALL_CORE_FT
[facility] Medicine, Team 3  [facility] Hepatology, Team  [facility] Cardiology, Heart Failure Leandro Nunez

## 2020-05-17 NOTE — PROGRESS NOTE ADULT - SUBJECTIVE AND OBJECTIVE BOX
PROGRESS NOTE:   Authored by Zach Stokes MD, PGY 1, Pager [phone] [facility], [address] [facility]     Patient is a 69y old  Male who presents with a chief complaint of s/p [facility] fall (17 May 2020 06:38)      SUBJECTIVE / OVERNIGHT EVENTS:      MEDICATIONS  (STANDING):  buprenorphine 2 mG/naloxone 0.5 mG SL  Tablet 1 Tablet(s) SubLingual <User Schedule>  ceFAZolin   IVPB 2000 milliGRAM(s) IV Intermittent every 8 hours  dextrose 5%. 1000 milliLiter(s) (50 mL/Hr) IV Continuous <Continuous>  dextrose 50% Injectable 12.5 Gram(s) IV Push once  dextrose 50% Injectable 25 Gram(s) IV Push once  dextrose 50% Injectable 25 Gram(s) IV Push once  furosemide   Injectable 80 milliGRAM(s) IV Push every 12 hours  insulin glargine Injectable (LANTUS) 16 Unit(s) SubCutaneous every morning  insulin glargine Injectable (LANTUS) 38 Unit(s) SubCutaneous at bedtime  insulin lispro (HumaLOG) corrective regimen sliding scale   SubCutaneous three times a day before meals  insulin lispro (HumaLOG) corrective regimen sliding scale   SubCutaneous at bedtime  metoprolol succinate ER 25 milliGRAM(s) Oral daily  multivitamin/minerals 1 Tablet(s) Oral daily  nystatin Powder 1 Application(s) Topical two times a day  pantoprazole  Injectable 40 milliGRAM(s) IV Push two times a day  predniSONE   Tablet 3 milliGRAM(s) Oral every other day  sertraline 100 milliGRAM(s) Oral daily  sodium chloride 0.65% Nasal 1 Spray(s) Both Nostrils daily  spironolactone 25 milliGRAM(s) Oral two times a day  tacrolimus 0.5 milliGRAM(s) Oral two times a day  tamsulosin 0.4 milliGRAM(s) Oral at bedtime  tiotropium 18 MICROgram(s) Capsule 1 Capsule(s) Inhalation daily  trimethoprim   80 mG/sulfamethoxazole 400 mG 1 Tablet(s) Oral <User Schedule>    MEDICATIONS  (PRN):  albuterol/ipratropium for Nebulization 3 milliLiter(s) Nebulizer every 6 hours PRN Shortness of Breath and/or Wheezing  dextrose 40% Gel 15 Gram(s) Oral once PRN Blood Glucose LESS THAN 70 milliGRAM(s)/deciliter  glucagon  Injectable 1 milliGRAM(s) IntraMuscular once PRN Glucose LESS THAN 70 milligrams/deciliter      CAPILLARY BLOOD GLUCOSE      POCT Blood Glucose.: 254 mg/dL (16 May 2020 22:03)  POCT Blood Glucose.: 229 mg/dL (16 May 2020 16:55)  POCT Blood Glucose.: 204 mg/dL (16 May 2020 12:00)  POCT Blood Glucose.: 233 mg/dL (16 May 2020 08:47)    I&O's Summary    16 May 2020 07:01  -  17 May 2020 07:00  --------------------------------------------------------  IN: 0 mL / OUT: 400 mL / NET: -400 mL        PHYSICAL EXAM:  Vital Signs Last 24 Hrs  T(C): 36.6 (17 May 2020 04:55), Max: 37.2 (16 May 2020 13:48)  T(F): 97.8 (17 May 2020 04:55), Max: 98.9 (16 May 2020 13:48)  HR: 81 (17 May 2020 04:55) (78 - 90)  BP: 147/72 (17 May 2020 04:55) (144/75 - 150/77)  BP(mean): --  RR: 18 (17 May 2020 04:55) (18 - 18)  SpO2: 96% (17 May 2020 04:55) (96% - 98%)    CONSTITUTIONAL: NAD  RESPIRATORY: Normal respiratory effort; lungs are clear to auscultation bilaterally  CARDIOVASCULAR: Regular rate and rhythm, normal S1 and S2, no murmur/rub/gallop  ABDOMEN: Nontender to palpation, Soft, Non-distended, normoactive bowel sounds,   MUSCLOSKELETAL: No LE edema   NEURO: Alert, good concentration     LABS:                        7.3    2.88  )-----------( 79       ( 17 May 2020 06:08 )             27.4     05-17    141  |  92<L>  |  29<H>  ----------------------------<  153<H>  4.2   |  39<H>  |  1.33<H>    Ca    8.6      17 May 2020 06:08  Phos  2.7     05-  Mg     2.1     -17    TPro  7.1  /  Alb  2.9<L>  /  TBili  0.5  /  DBili  x   /  AST  22  /  ALT  6<L>  /  AlkPhos  104  05-17    PT/INR - ( 15 May 2020 08:13 )   PT: 13.4 sec;   INR: 1.16 ratio         PTT - ( 15 May 2020 08:13 )  PTT:28.6 sec      Urinalysis Basic - ( 15 May 2020 10:06 )    Color: Light Yellow / Appearance: Clear / S.008 / pH: x  Gluc: x / Ketone: Negative  / Bili: Negative / Urobili: <2 mg/dL   Blood: x / Protein: Negative / Nitrite: Negative   Leuk Esterase: Negative / RBC: x / WBC x   Sq Epi: x / Non Sq Epi: x / Bacteria: x        Culture - Blood (collected 15 May 2020 15:24)  Source: .Blood Blood-Venous  Preliminary Report (16 May 2020 16:01):    No growth to date.    Culture - Blood (collected 15 May 2020 15:24)  Source: .Blood Blood-Peripheral  Preliminary Report (16 May 2020 16:01):    No growth to date.        RADIOLOGY & ADDITIONAL TESTS:  Results Reviewed. PROGRESS NOTE:   Authored by Zach Stokes MD, PGY 1, Pager [phone] [facility], [address] [facility]     Patient is a 69y old  Male who presents with a chief complaint of s/p [facility] fall (17 May 2020 06:38)      SUBJECTIVE / OVERNIGHT EVENTS: Pt seen and examined at bedside this AM. Pt notes shortness of breath is at baseline. Denies any pain. Last bowel movement was yesterday and pt reports it was less dark than previously. No gross blood.       MEDICATIONS  (STANDING):  buprenorphine 2 mG/naloxone 0.5 mG SL  Tablet 1 Tablet(s) SubLingual <User Schedule>  ceFAZolin   IVPB 2000 milliGRAM(s) IV Intermittent every 8 hours  dextrose 5%. 1000 milliLiter(s) (50 mL/Hr) IV Continuous <Continuous>  dextrose 50% Injectable 12.5 Gram(s) IV Push once  dextrose 50% Injectable 25 Gram(s) IV Push once  dextrose 50% Injectable 25 Gram(s) IV Push once  furosemide   Injectable 80 milliGRAM(s) IV Push every 12 hours  insulin glargine Injectable (LANTUS) 16 Unit(s) SubCutaneous every morning  insulin glargine Injectable (LANTUS) 38 Unit(s) SubCutaneous at bedtime  insulin lispro (HumaLOG) corrective regimen sliding scale   SubCutaneous three times a day before meals  insulin lispro (HumaLOG) corrective regimen sliding scale   SubCutaneous at bedtime  metoprolol succinate ER 25 milliGRAM(s) Oral daily  multivitamin/minerals 1 Tablet(s) Oral daily  nystatin Powder 1 Application(s) Topical two times a day  pantoprazole  Injectable 40 milliGRAM(s) IV Push two times a day  predniSONE   Tablet 3 milliGRAM(s) Oral every other day  sertraline 100 milliGRAM(s) Oral daily  sodium chloride 0.65% Nasal 1 Spray(s) Both Nostrils daily  spironolactone 25 milliGRAM(s) Oral two times a day  tacrolimus 0.5 milliGRAM(s) Oral two times a day  tamsulosin 0.4 milliGRAM(s) Oral at bedtime  tiotropium 18 MICROgram(s) Capsule 1 Capsule(s) Inhalation daily  trimethoprim   80 mG/sulfamethoxazole 400 mG 1 Tablet(s) Oral <User Schedule>    MEDICATIONS  (PRN):  albuterol/ipratropium for Nebulization 3 milliLiter(s) Nebulizer every 6 hours PRN Shortness of Breath and/or Wheezing  dextrose 40% Gel 15 Gram(s) Oral once PRN Blood Glucose LESS THAN 70 milliGRAM(s)/deciliter  glucagon  Injectable 1 milliGRAM(s) IntraMuscular once PRN Glucose LESS THAN 70 milligrams/deciliter      CAPILLARY BLOOD GLUCOSE      POCT Blood Glucose.: 254 mg/dL (16 May 2020 22:03)  POCT Blood Glucose.: 229 mg/dL (16 May 2020 16:55)  POCT Blood Glucose.: 204 mg/dL (16 May 2020 12:00)  POCT Blood Glucose.: 233 mg/dL (16 May 2020 08:47)    I&O's Summary    16 May 2020 07:01  -  17 May 2020 07:00  --------------------------------------------------------  IN: 0 mL / OUT: 400 mL / NET: -400 mL        PHYSICAL EXAM:  Vital Signs Last 24 Hrs  T(C): 36.6 (17 May 2020 04:55), Max: 37.2 (16 May 2020 13:48)  T(F): 97.8 (17 May 2020 04:55), Max: 98.9 (16 May 2020 13:48)  HR: 81 (17 May 2020 04:55) (78 - 90)  BP: 147/72 (17 May 2020 04:55) (144/75 - 150/77)  BP(mean): --  RR: 18 (17 May 2020 04:55) (18 - 18)  SpO2: 96% (17 May 2020 04:55) (96% - 98%)    CONSTITUTIONAL: NAD   RESPIRATORY: Normal respiratory effort; lungs are clear to auscultation bilaterally, on NC   CARDIOVASCULAR: Regular rate and rhythm, normal S1 and S2, no murmur/rub/gallop  ABDOMEN: Nontender to palpation, Soft, Non-distended, normoactive bowel sounds   MUSCLOSKELETAL: Significant b/l LE edema with venous stasis changes and erythema (R> L). Erythema extends up to the thigh on the RLE. Warm to touch. Non-tender to palpation. Overall RLE improving.    NEURO: Alert, good concentration, moving extremities     LABS:                        7.3    2.88  )-----------( 79       ( 17 May 2020 06:08 )             27.4     05-    141  |  92<L>  |  29<H>  ----------------------------<  153<H>  4.2   |  39<H>  |  1.33<H>    Ca    8.6      17 May 2020 06:08  Phos  2.7       Mg     2.1         TPro  7.1  /  Alb  2.9<L>  /  TBili  0.5  /  DBili  x   /  AST  22  /  ALT  6<L>  /  AlkPhos  104  17    PT/INR - ( 15 May 2020 08:13 )   PT: 13.4 sec;   INR: 1.16 ratio         PTT - ( 15 May 2020 08:13 )  PTT:28.6 sec      Urinalysis Basic - ( 15 May 2020 10:06 )    Color: Light Yellow / Appearance: Clear / S.008 / pH: x  Gluc: x / Ketone: Negative  / Bili: Negative / Urobili: <2 mg/dL   Blood: x / Protein: Negative / Nitrite: Negative   Leuk Esterase: Negative / RBC: x / WBC x   Sq Epi: x / Non Sq Epi: x / Bacteria: x        Culture - Blood (collected 15 May 2020 15:24)  Source: .Blood Blood-Venous  Preliminary Report (16 May 2020 16:01):    No growth to date.    Culture - Blood (collected 15 May 2020 15:24)  Source: .Blood Blood-Peripheral  Preliminary Report (16 May 2020 16:01):    No growth to date.        RADIOLOGY & ADDITIONAL TESTS:  Results Reviewed.

## 2020-05-17 NOTE — DISCHARGE NOTE PROVIDER - NSFOLLOWUPCLINICS_GEN_ALL_ED_FT
[facility]  Gastroenterology  [address] [place] [id number]  Phone: [phone]  Fax:   Follow Up Time: 2 weeks

## 2020-05-17 NOTE — PROGRESS NOTE ADULT - ATTENDING COMMENTS
Remains overloaded but diuresing well and renal function improving. Will continue current dose of lasix.  Leukopenia noted.

## 2020-05-17 NOTE — PROGRESS NOTE ADULT - PROBLEM SELECTOR PLAN 3
- c/w tacro 0.5 BID. hold cellcept  - c/w prednisone 3mg every other day  - Monitor tacro level 30min prior to AM dose

## 2020-05-17 NOTE — PROGRESS NOTE ADULT - PROBLEM SELECTOR PLAN 2
Appears to be chronic lymphedema; pt endorses increased swelling and has been non-adherent to diuretics. [facility] and nl LVSF/RVSF.  - TTE 5/14/20: LVIDd 4.2cm, LVEF 55%, grossly reduced RVSF, mod AS, borderline PH (RVSP 36 mmHg)  - C/w spironolactone 25BID and lasix 80 IV BID for now (home dose is PO lasix 40 BID)  - wound care following  - monitor Is/Os, daily weights, fluid restrict to 1.5L /day   - LE doppler neg for RLE DVT  - Heart failure consulted, recs appreciated    # RLE Cellulitis   - c/w cefazolin (5/14 - ). Appears to be improving  - Blood cx NTD Appears to be chronic lymphedema; pt endorses increased swelling and has been non-adherent to diuretics. Last TTE in [facility] on 5/2016 shows EF 60% and nl LVSF/RVSF.  - TTE 5/14/20: LVIDd 4.2cm, LVEF 55%, grossly reduced RVSF, mod AS, borderline PH (RVSP 36 mmHg)  - C/w spironolactone 25BID and lasix 80 IV BID (home dose is PO lasix 40 BID)  - wound care following  - monitor Is/Os, daily weights, fluid restrict to 1.5L /day   - LE doppler neg for RLE DVT  - Heart failure consulted, recs appreciated    # RLE Cellulitis   - c/w cefazolin (5/14 - ). Appears to be improving  - Blood cx NTD

## 2020-05-17 NOTE — PROGRESS NOTE ADULT - SUBJECTIVE AND OBJECTIVE BOX
NOTE: All current Cardiology Service and Contact Information can be found at [email or web address], password: [email or web address]  Please feel free to contact me directly via text or call at [phone] with any questions or concerns.     Subjective:    Medications:  albuterol/ipratropium for Nebulization 3 milliLiter(s) Nebulizer every 6 hours PRN  buprenorphine 2 mG/naloxone 0.5 mG SL  Tablet 1 Tablet(s) SubLingual <User Schedule>  ceFAZolin   IVPB 2000 milliGRAM(s) IV Intermittent every 8 hours  dextrose 40% Gel 15 Gram(s) Oral once PRN  dextrose 5%. 1000 milliLiter(s) IV Continuous <Continuous>  dextrose 50% Injectable 12.5 Gram(s) IV Push once  dextrose 50% Injectable 25 Gram(s) IV Push once  dextrose 50% Injectable 25 Gram(s) IV Push once  furosemide   Injectable 80 milliGRAM(s) IV Push every 12 hours  glucagon  Injectable 1 milliGRAM(s) IntraMuscular once PRN  insulin glargine Injectable (LANTUS) 16 Unit(s) SubCutaneous every morning  insulin glargine Injectable (LANTUS) 38 Unit(s) SubCutaneous at bedtime  insulin lispro (HumaLOG) corrective regimen sliding scale   SubCutaneous three times a day before meals  insulin lispro (HumaLOG) corrective regimen sliding scale   SubCutaneous at bedtime  metoprolol succinate ER 25 milliGRAM(s) Oral daily  multivitamin/minerals 1 Tablet(s) Oral daily  nystatin Powder 1 Application(s) Topical two times a day  pantoprazole  Injectable 40 milliGRAM(s) IV Push two times a day  predniSONE   Tablet 3 milliGRAM(s) Oral every other day  sertraline 100 milliGRAM(s) Oral daily  sodium chloride 0.65% Nasal 1 Spray(s) Both Nostrils daily  spironolactone 25 milliGRAM(s) Oral two times a day  tacrolimus 0.5 milliGRAM(s) Oral two times a day  tamsulosin 0.4 milliGRAM(s) Oral at bedtime  tiotropium 18 MICROgram(s) Capsule 1 Capsule(s) Inhalation daily  trimethoprim   80 mG/sulfamethoxazole 400 mG 1 Tablet(s) Oral <User Schedule>    Vitals:  T(C): 36.6 (05-17-20 @ 04:55), Max: 37.2 (05-16-20 @ 13:48)  HR: 81 (05-17-20 @ 04:55) (78 - 90)  BP: 147/72 (05-17-20 @ 04:55) (144/75 - 150/77)  RR: 18 (05-17-20 @ 04:55) (18 - 18)  SpO2: 96% (05-17-20 @ 04:55) (96% - 98%)          I&O's Summary    16 May 2020 07:01  -  17 May 2020 07:00  --------------------------------------------------------  IN: 0 mL / OUT: 400 mL / NET: -400 mL        Physical Exam:  Appearance: No Acute Distress  HEENT: JVP at least moderately elevated   Cardiovascular: RRR, Normal S1 S2, No murmurs/rubs/gallops  Respiratory: Clear to auscultation bilaterally  Gastrointestinal: Soft, Non-tender, non-distended	  Skin: no skin lesions  Neurologic: Non-focal  Extremities: No LE edema, warm and well perfused  Psychiatry: A & O x 3, Mood & affect appropriate      Labs:                        7.3    2.88  )-----------( 79       ( 17 May 2020 06:08 )             27.4     05-17    141  |  92<L>  |  29<H>  ----------------------------<  153<H>  4.2   |  39<H>  |  1.33<H>    Ca    8.6      17 May 2020 06:08  Phos  2.7     05-17  Mg     2.1     05-17    TPro  7.1  /  Alb  2.9<L>  /  TBili  0.5  /  DBili  x   /  AST  22  /  ALT  6<L>  /  AlkPhos  104  05-17            Serum Pro-Brain Natriuretic Peptide: 849 pg/mL (05-15 @ 06:52)  Serum Pro-Brain Natriuretic Peptide: 2674 pg/mL (05-13 @ 16:13) NOTE: All current Cardiology Service and Contact Information can be found at [email or web address], password: [email or web address]  Please feel free to contact me directly via text or call at [phone] with any questions or concerns.     Subjective: No acute events overnight.     Medications:  albuterol/ipratropium for Nebulization 3 milliLiter(s) Nebulizer every 6 hours PRN  buprenorphine 2 mG/naloxone 0.5 mG SL  Tablet 1 Tablet(s) SubLingual <User Schedule>  ceFAZolin   IVPB 2000 milliGRAM(s) IV Intermittent every 8 hours  dextrose 40% Gel 15 Gram(s) Oral once PRN  dextrose 5%. 1000 milliLiter(s) IV Continuous <Continuous>  dextrose 50% Injectable 12.5 Gram(s) IV Push once  dextrose 50% Injectable 25 Gram(s) IV Push once  dextrose 50% Injectable 25 Gram(s) IV Push once  furosemide   Injectable 80 milliGRAM(s) IV Push every 12 hours  glucagon  Injectable 1 milliGRAM(s) IntraMuscular once PRN  insulin glargine Injectable (LANTUS) 16 Unit(s) SubCutaneous every morning  insulin glargine Injectable (LANTUS) 38 Unit(s) SubCutaneous at bedtime  insulin lispro (HumaLOG) corrective regimen sliding scale   SubCutaneous three times a day before meals  insulin lispro (HumaLOG) corrective regimen sliding scale   SubCutaneous at bedtime  metoprolol succinate ER 25 milliGRAM(s) Oral daily  multivitamin/minerals 1 Tablet(s) Oral daily  nystatin Powder 1 Application(s) Topical two times a day  pantoprazole  Injectable 40 milliGRAM(s) IV Push two times a day  predniSONE   Tablet 3 milliGRAM(s) Oral every other day  sertraline 100 milliGRAM(s) Oral daily  sodium chloride 0.65% Nasal 1 Spray(s) Both Nostrils daily  spironolactone 25 milliGRAM(s) Oral two times a day  tacrolimus 0.5 milliGRAM(s) Oral two times a day  tamsulosin 0.4 milliGRAM(s) Oral at bedtime  tiotropium 18 MICROgram(s) Capsule 1 Capsule(s) Inhalation daily  trimethoprim   80 mG/sulfamethoxazole 400 mG 1 Tablet(s) Oral <User Schedule>    Vitals:  T(C): 36.6 (05-17-20 @ 04:55), Max: 37.2 (05-16-20 @ 13:48)  HR: 81 (05-17-20 @ 04:55) (78 - 90)  BP: 147/72 (05-17-20 @ 04:55) (144/75 - 150/77)  RR: 18 (05-17-20 @ 04:55) (18 - 18)  SpO2: 96% (05-17-20 @ 04:55) (96% - 98%)          I&O's Summary    16 May 2020 07:01  -  17 May 2020 07:00  --------------------------------------------------------  IN: 0 mL / OUT: 400 mL / NET: -400 mL        Physical Exam:  Appearance: No Acute Distress  HEENT: JVP at least moderately elevated   Cardiovascular: RRR, Normal S1 S2, No murmurs/rubs/gallops  Respiratory: Clear to auscultation bilaterally  Gastrointestinal: Soft, Non-tender, non-distended	  Skin: no skin lesions  Neurologic: Non-focal  Extremities: No LE edema, warm and well perfused  Psychiatry: A & O x 3, Mood & affect appropriate      Labs:                        7.3    2.88  )-----------( 79       ( 17 May 2020 06:08 )             27.4     05-17    141  |  92<L>  |  29<H>  ----------------------------<  153<H>  4.2   |  39<H>  |  1.33<H>    Ca    8.6      17 May 2020 06:08  Phos  2.7     05-17  Mg     2.1     05-17    TPro  7.1  /  Alb  2.9<L>  /  TBili  0.5  /  DBili  x   /  AST  22  /  ALT  6<L>  /  AlkPhos  104  05-17            Serum Pro-Brain Natriuretic Peptide: 849 pg/mL (05-15 @ 06:52)  Serum Pro-Brain Natriuretic Peptide: 2674 pg/mL (05-13 @ 16:13)

## 2020-05-17 NOTE — PROGRESS NOTE ADULT - PROBLEM SELECTOR PLAN 7
-chronic hypoxemic respiratory failure   c/w spiriva and symbicort  - acapella, incentive spirometry   - c/w supplemental O2 3L (home O2 lvl)  - Nazia PRN q6  - TTE showing RV dysfunction  - CT chest for cancer screening without pulmonary nodules   - Pulm following, recs appreciated

## 2020-05-17 NOTE — DISCHARGE NOTE PROVIDER - NSDCFUADDAPPT_GEN_ALL_CORE_FT
Upon discharge please follow up as outpatient at [facility] [address] [facility] [phone]    Please follow up with your podiatrist Upon discharge please follow up as outpatient at [address] by calling [phone] to confirm/make an appointment. You should also follow up with your podiatrist.    Please follow up outpatient with Heart Failure Cardiologist, Dr. Daley, at the Heart Failure clinic by calling [phone] to make/confirm an appointment within 1-2 weeks after discharge.    Please follow up outpatient with the Gastroenterology/Hepatology at the their clinic by calling [phone] to make/confirm an appointment within 2-3 weeks after discharge to have further testing (endoscopy/colonoscopy) to figure out why you may have had a low blood count requiring blood transfusions.

## 2020-05-17 NOTE — DISCHARGE NOTE PROVIDER - HOSPITAL COURSE
HPI:    70 yo M with PMH of HTN, T2DM (on insulin), COPD (on home O2 3L), s/p liver txp (2011) on cellcept and tacrolimus, HLD, chronic lymphedema, presents after mechanical fall earlier today. Patient states that he felt weak and his legs gave out, and it was difficult for him to get back up. He denies any LOC, lightheadedness, dizziness. Denies hitting his head. He states that he has not taken his lasix or spironolactone for several days because it was making him urinate too much; his legs have become more swollen as a result. States he is adherent with the rest of his medications. Denies orthopnea, uses 2 pillows at baseline. Denies dysuria or hematuria. Also endorses blood with stools when wiping, BMs have been "small pellets", sometimes looks dark for the last month. Last colonoscopy and endoscopy about 8-10 years ago, states it was normal to his knowledge. Denies F/C/CP/SOB/cough/N/V/abd pain. Has not required increased O2. Uses walker to ambulate. Patient states he tripped and fell about 2 weeks ago. Patient lives alone and cares for himself, does not have any HHA. Pt admits that he needs help/aid. Per ED documentation --  EMS on arrival reporting the place was disheveled, covered in trash and urine.          In ED vitals: T 98.2, , /80, RR 16 -> 22, 100% on 3L (85% on RA).     Labs notable for anemia (hgb 7), FOBT+. CXR done.     Given: lasix 80 IVP x1, PPI 80 IVPx1, 1U PRBC. (13 May 2020 19:41)        Hospital Course: Pt was noted to have R > L LE edema and erythema. Ancef  was started for presumed cellulitis. LE duplex was without DVT. Wound care was consulted and pt should follow up as an out patient. Hepatology and transplant surgery were consulted. Pt's Cellcept was held in the setting of cellulitis. In pt EGD and colonoscopy were deferred and pt will need to undergo these tests as an outpatient. Pt's hgb dropped again during his hospitalization and was transfused 1U PRBC. His ASA was held. Pt underwent a TTE (LVIDd 4.2cm, LVEF 55%, grossly reduced RVSF, mod AS, borderline PH (RVSP 36 mmHg)). Heart Failure was consulted and pt was diuresed with IV Lasix. CT chest for cancer screening was without pulmonary nodules. HPI:    68 yo M with PMH of HTN, T2DM (on insulin), COPD (on home O2 3L), s/p liver txp (2011) on cellcept and tacrolimus, HLD, chronic lymphedema, presents after mechanical fall earlier today. Patient states that he felt weak and his legs gave out, and it was difficult for him to get back up. He denies any LOC, lightheadedness, dizziness. Denies hitting his head. He states that he has not taken his lasix or spironolactone for several days because it was making him urinate too much; his legs have become more swollen as a result. States he is adherent with the rest of his medications. Denies orthopnea, uses 2 pillows at baseline. Denies dysuria or hematuria. Also endorses blood with stools when wiping, BMs have been "small pellets", sometimes looks dark for the last month. Last colonoscopy and endoscopy about 8-10 years ago, states it was normal to his knowledge. Denies F/C/CP/SOB/cough/N/V/abd pain. Has not required increased O2. Uses walker to ambulate. Patient states he tripped and fell about 2 weeks ago. Patient lives alone and cares for himself, does not have any HHA. Pt admits that he needs help/aid. Per ED documentation --  EMS on arrival reporting the place was disheveled, covered in trash and urine.          In ED vitals: T 98.2, , /80, RR 16 -> 22, 100% on 3L (85% on RA).     Labs notable for anemia (hgb 7), FOBT+. CXR done.     Given: lasix 80 IVP x1, PPI 80 IVPx1, 1U PRBC. (13 May 2020 19:41)        Hospital Course: Pt was noted to have R > L LE edema and erythema. Ancef  was started for presumed cellulitis. LE duplex was without DVT. Wound care was consulted and pt should follow up as an out patient. Hepatology and transplant surgery were consulted. Pt's Cellcept was held in the setting of cellulitis. In pt EGD and colonoscopy were deferred and pt will need to undergo these tests as an outpatient. Pt's hgb dropped again during his hospitalization and was transfused 1U PRBC. His ASA was held. He was continued on IV PPI BID. Pt underwent a TTE (LVIDd 4.2cm, LVEF 55%, grossly reduced RVSF, mod AS, borderline PH (RVSP 36 mmHg)). Heart Failure was consulted and pt was diuresed with IV Lasix. CT chest for cancer screening was without pulmonary nodules. HPI:    68 yo M with PMH of HTN, T2DM (on insulin), COPD (on home O2 3L), s/p liver txp (2011) on cellcept and tacrolimus, HLD, chronic lymphedema, presents after mechanical fall earlier today. Patient states that he felt weak and his legs gave out, and it was difficult for him to get back up. He denies any LOC, lightheadedness, dizziness. Denies hitting his head. He states that he has not taken his lasix or spironolactone for several days because it was making him urinate too much; his legs have become more swollen as a result. States he is adherent with the rest of his medications. Denies orthopnea, uses 2 pillows at baseline. Denies dysuria or hematuria. Also endorses blood with stools when wiping, BMs have been "small pellets", sometimes looks dark for the last month. Last colonoscopy and endoscopy about 8-10 years ago, states it was normal to his knowledge. Denies F/C/CP/SOB/cough/N/V/abd pain. Has not required increased O2. Uses walker to ambulate. Patient states he tripped and fell about 2 weeks ago. Patient lives alone and cares for himself, does not have any HHA. Pt admits that he needs help/aid. Per ED documentation --  EMS on arrival reporting the place was disheveled, covered in trash and urine.          In ED vitals: T 98.2, , /80, RR 16 -> 22, 100% on 3L (85% on RA).     Labs notable for anemia (hgb 7), FOBT+. CXR done.     Given: lasix 80 IVP x1, PPI 80 IVPx1, 1U PRBC. (13 May 2020 19:41)        Hospital Course:     Pt was noted to have LE edema and erythema (R>L). 7-day course of Cefazolin was given for cellulitis. Pt's Cellcept was held in the setting of active infection, but otherwise continued on his home tacrolimus and prednisone. LE duplex was without DVT. Wound care was consulted and pt should follow up as an out patient. Hepatology and transplant surgery were consulted. After an additional 1u pRBC transfusion for Hgb drop while on the floors, Hgb remained stable and as per Hepatology, non-emergent EGD/colonoscopy +/- capsule study deferred for outpatient. His ASA was held and was started on PPI. Pt underwent a TTE (LVIDd 4.2cm, LVEF 55%, grossly reduced RVSF, mod AS, borderline PH (RVSP 36 mmHg). Heart Failure was consulted and pt was diuresed initially with IV Lasix. Bicarb trended up to 40s, raised concern for contraction alkalosis vs compensatory for respiratory acidosis in setting of likely CLAUDE/OHS (pCOs up to 90s and pH down to 7.28 on VBG). Lasix and Spironolactone held for two days, given 1 dose of Diamox, and given 3 doses of albumin. BiPAP also attempted, but pt repeatedly refused due to discomfort with mask. Bicarb, pH and pCO2 showed improvement. Lasix then resumed as PO and spironolactone remained held. CT chest for cancer screening was without pulmonary nodules. PT recommended [facility]. Pt is stable and ready to be transferred to [facility]. HPI:    68 yo M with PMH of HTN, T2DM (on insulin), COPD (on home O2 3L), s/p liver txp (2011) on cellcept and tacrolimus, HLD, chronic lymphedema, presents after mechanical fall earlier today. Patient states that he felt weak and his legs gave out, and it was difficult for him to get back up. He denies any LOC, lightheadedness, dizziness. Denies hitting his head. He states that he has not taken his lasix or spironolactone for several days because it was making him urinate too much; his legs have become more swollen as a result. States he is adherent with the rest of his medications. Denies orthopnea, uses 2 pillows at baseline. Denies dysuria or hematuria. Also endorses blood with stools when wiping, BMs have been "small pellets", sometimes looks dark for the last month. Last colonoscopy and endoscopy about 8-10 years ago, states it was normal to his knowledge. Denies F/C/CP/SOB/cough/N/V/abd pain. Has not required increased O2. Uses walker to ambulate. Patient states he tripped and fell about 2 weeks ago. Patient lives alone and cares for himself, does not have any HHA. Pt admits that he needs help/aid. Per ED documentation --  EMS on arrival reporting the place was disheveled, covered in trash and urine.          In ED vitals: T 98.2, , /80, RR 16 -> 22, 100% on 3L (85% on RA).     Labs notable for anemia (hgb 7), FOBT+. CXR done.     Given: lasix 80 IVP x1, PPI 80 IVPx1, 1U PRBC. (13 May 2020 19:41)        Hospital Course:     Pt was noted to have LE edema and erythema (R>L). 7-day course of Cefazolin was given for cellulitis. Pt's Cellcept was held in the setting of active infection, but otherwise continued on his home tacrolimus and prednisone. LE duplex was without DVT. Wound care was consulted and pt should follow up as an out patient. Hepatology and transplant surgery were consulted. After an additional 1u pRBC transfusion for Hgb drop while on the floors, Hgb remained stable and as per Hepatology, non-emergent EGD/colonoscopy +/- capsule study deferred for outpatient. His ASA was held and was started on PPI. Pt underwent a TTE (LVIDd 4.2cm, LVEF 55%, grossly reduced RVSF, mod AS, borderline PH (RVSP 36 mmHg). Heart Failure was consulted and pt was diuresed initially with IV Lasix. Bicarb trended up to 40s, raised concern for contraction alkalosis vs compensatory for respiratory acidosis in setting of likely CLAUDE/OHS (pCOs up to 90s and pH down to 7.28 on VBG). Lasix and Spironolactone held for two days, given 1 dose of Diamox, and given 3 doses of albumin. BiPAP also attempted, but pt repeatedly refused due to discomfort with mask. Bicarb, pH and pCO2 showed improvement. Lasix then resumed as PO and spironolactone remained held. CT chest for cancer screening was without pulmonary nodules. PT recommended [facility]. Difficulty with PETROS placement due to pt's home med of buprenorphine. Spoke to pt's pain management specialist (Dr. Kapil Veloz) who preferred pt to be kept on buprenorphine. Accepted by [facility]. Pt is stable and ready to be transferred to [facility].

## 2020-05-17 NOTE — DISCHARGE NOTE PROVIDER - NSDCCPCAREPLAN_GEN_ALL_CORE_FT
PRINCIPAL DISCHARGE DIAGNOSIS  Diagnosis: Acute on chronic right-sided congestive heart failure  Assessment and Plan of Treatment: You came to the hospital with increased swelling in your legs after stopping your water pill. An ultrasound of your heart was performed and found the right-side of your heart wasn't pumping as effectively as it should, which likely led to fluid buildup in your legs and the subsequent fall that brought you to the hospital. We changed your water pill ("Lasix") dose to 80mg daily and we stopped your "Spironolactone" medication for now. Please follow up with the Heart Failure doctors at their clinic in 2-4 weeks after discharge to continue monitoring and treating your heart failure.      SECONDARY DISCHARGE DIAGNOSES  Diagnosis: Cellulitis of both lower extremities  Assessment and Plan of Treatment: You came to the hospital with what appeared to be chronic skin changes in your legs, as well as an infection of the skin and underlying soft tissues, possibly due to the prolonged fluid buildup in your legs. You were treated with a 7-day course of intravenous antibiotics with improvement. Please continue to take your water pill as directed to reduce the risk of such infections from happening again. Please see your Primary Care doctor within 1-2 weeks after discharge for follow up and to review your medication regimen. If you experience worsening symptoms, have new symptoms like fevers or lethargy,, or have any other concerns, please speak to a doctor as soon as possible or go the Emergency Room.      Diagnosis: Gastrointestinal hemorrhage, unspecified gastrointestinal hemorrhage type  Assessment and Plan of Treatment: You were found to have a low blood count. You reported having dark stools and blood when wiping. Blood was also detected in your stool. You were given a total of 2 units of blood. Since you blood count remained relatively stable afterwards, the Gastroenterology/Hepatology doctors saw you and recommended having an outpatient endoscopy/colonoscopy to check for reason of the bleed. Please be sure to follow-up with the Gastroenterology/Hepatology doctors within 2-4 weeks to figure out what may have caused the bleed and for standard colon cancer screening for your age.    Diagnosis: Chronic obstructive pulmonary disease, unspecified COPD type  Assessment and Plan of Treatment: You have a history of COPD and you are on 3 liters of supplemental oxygen at home.  We suspect that you may also have a component of Obstructive Sleep Apnea and/or Obesity Hypoventilation Syndrome, which can cause additional buildup on carbon dioxide in your blood and cause symptoms such as confusion and sleepiness. The optimal treatment for this would be consistent use of the "BiPAP" machine, which we attempted while you were hospitalized, but you refused due to the discomfort of the mask. Please follow up with your Pulmonologist, Dr. Santos, to continue managing your lung diseases.    Diagnosis: Diabetes mellitus with insulin therapy  Assessment and Plan of Treatment: Please continue to take your insulin as directed. Please continue to check your blood sugars at home and follow up with your Primary Care Doctor or your Diabetes doctor to adjust your insulin dose as needed. PRINCIPAL DISCHARGE DIAGNOSIS  Diagnosis: Acute on chronic right-sided congestive heart failure  Assessment and Plan of Treatment: You came to the hospital with increased swelling in your legs after stopping your water pill. An ultrasound of your heart was performed and found the right-side of your heart wasn't pumping as effectively as it should, which likely led to fluid buildup in your legs and the subsequent fall that brought you to the hospital. We changed your water pill ("Lasix") dose to 80mg daily and we stopped your "Spironolactone" medication for now. Please follow up with the Heart Failure doctors at their clinic in 2-4 weeks after discharge to continue monitoring and treating your heart failure.      SECONDARY DISCHARGE DIAGNOSES  Diagnosis: Cellulitis of both lower extremities  Assessment and Plan of Treatment: You came to the hospital with what appeared to be chronic skin changes in your legs, as well as an infection of the skin and underlying soft tissues, possibly due to the prolonged fluid buildup in your legs. You were treated with a 7-day course of intravenous antibiotics with improvement. Please continue to take your water pill as directed to reduce the risk of such infections from happening again. Please see your Primary Care doctor within 1-2 weeks after discharge for follow up and to review your medication regimen. If you experience worsening symptoms, have new symptoms like fevers or lethargy, or have any other concerns, please speak to a doctor as soon as possible or go the Emergency Room.      Diagnosis: Gastrointestinal hemorrhage, unspecified gastrointestinal hemorrhage type  Assessment and Plan of Treatment: You were found to have a low blood count. You reported having dark stools and blood when wiping. Blood was also detected in your stool. You were given a total of 2 units of blood. Since you blood count remained relatively stable afterwards, the Gastroenterology/Hepatology doctors saw you and recommended having an outpatient endoscopy/colonoscopy to check for reason of the bleed. Please be sure to follow-up with the Gastroenterology/Hepatology doctors within 2-4 weeks to figure out what may have caused the bleed and for standard colon cancer screening for your age.    Diagnosis: Chronic obstructive pulmonary disease, unspecified COPD type  Assessment and Plan of Treatment: You have a history of COPD and you are on 3 liters of supplemental oxygen at home.  We suspect that you may also have a component of Obstructive Sleep Apnea and/or Obesity Hypoventilation Syndrome, which can cause additional buildup on carbon dioxide in your blood and cause symptoms such as confusion and sleepiness. The optimal treatment for this would be consistent use of the "BiPAP" machine, which we attempted while you were hospitalized, but you refused due to the discomfort of the mask. Please follow up with your Pulmonologist, Dr. Santos, to continue managing your lung diseases.    Diagnosis: Diabetes mellitus with insulin therapy  Assessment and Plan of Treatment: Please continue to take your insulin as directed. Please continue to check your blood sugars at home and follow up with your Primary Care Doctor or your Diabetes doctor to adjust your insulin dose as needed. PRINCIPAL DISCHARGE DIAGNOSIS  Diagnosis: Acute on chronic right-sided congestive heart failure  Assessment and Plan of Treatment: You came to the hospital with increased swelling in your legs after stopping your water pill. An ultrasound of your heart was performed and found the right-side of your heart wasn't pumping as effectively as it should, which likely led to fluid buildup in your legs and the subsequent fall that brought you to the hospital. We changed your water pill ("Lasix") dose to 80mg daily and we stopped your "Spironolactone" medication for now. Please follow up with the Heart Failure doctors at their clinic in 2-4 weeks after discharge to continue monitoring and treating your heart failure.      SECONDARY DISCHARGE DIAGNOSES  Diagnosis: Chronic obstructive pulmonary disease, unspecified COPD type  Assessment and Plan of Treatment: You have a history of COPD and you are on 3 liters of supplemental oxygen at home.  We suspect that you may also have a component of Obstructive Sleep Apnea and/or Obesity Hypoventilation Syndrome, which can cause additional buildup on carbon dioxide in your blood and cause symptoms such as confusion and sleepiness. The optimal treatment for this would be consistent use of the "BiPAP" machine, which we attempted while you were hospitalized, but you refused due to the discomfort of the mask. Please follow up with your Pulmonologist, Dr. Santos, to continue managing your lung diseases.    Diagnosis: Diabetes mellitus with insulin therapy  Assessment and Plan of Treatment: Please continue to take your insulin as directed. Please continue to check your blood sugars at home and follow up with your Primary Care Doctor or your Diabetes doctor to adjust your insulin dose as needed.    Diagnosis: Gastrointestinal hemorrhage, unspecified gastrointestinal hemorrhage type  Assessment and Plan of Treatment: You were found to have a low blood count. You reported having dark stools and blood when wiping. Blood was also detected in your stool. You were given a total of 2 units of blood. Since you blood count remained relatively stable afterwards, the Gastroenterology/Hepatology doctors saw you and recommended having an outpatient endoscopy/colonoscopy to check for reason of the bleed. Please be sure to follow-up with the Gastroenterology/Hepatology doctors within 2-4 weeks to figure out what may have caused the bleed and for standard colon cancer screening for your age.    Diagnosis: Cellulitis of both lower extremities  Assessment and Plan of Treatment: You came to the hospital with what appeared to be chronic skin changes in your legs, as well as an infection of the skin and underlying soft tissues, possibly due to the prolonged fluid buildup in your legs. You were treated with a 7-day course of intravenous antibiotics with improvement. Please continue to take your water pill as directed to reduce the risk of such infections from happening again. Please see your Primary Care doctor within 1-2 weeks after discharge for follow up and to review your medication regimen. If you experience worsening symptoms, have new symptoms like fevers or lethargy, or have any other concerns, please speak to a doctor as soon as possible or go the Emergency Room.      Diagnosis: Liver transplant recipient  Assessment and Plan of Treatment: Continue to take your tacrolimus and mycophenolate.. Your tacrolimus ws noted to be <2, however your liver transplant team endorses your current dose is acceptable given that your liver function remains normal

## 2020-05-17 NOTE — PROGRESS NOTE ADULT - SUBJECTIVE AND OBJECTIVE BOX
CHIEF COMPLAINT: f/up sob, copd, RHF, obesity-better -less sob-no oob as much    Interval Events: chf f/up    REVIEW OF SYSTEMS:  Constitutional: No fevers or chills. No weight loss. No fatigue or generalized malaise.  Eyes: No itching or discharge from the eyes  ENT: No ear pain. No ear discharge. No nasal congestion. No post nasal drip. No epistaxis. No throat pain. No sore throat. No difficulty swallowing.   CV: No chest pain. No palpitations. No lightheadedness or dizziness.   Resp: No dyspnea at rest. No dyspnea on exertion. No orthopnea. No wheezing. + cough. No stridor. No sputum production. No chest pain with respiration.  GI: No nausea. No vomiting. No diarrhea.  MSK: No joint pain or pain in any extremities  Integumentary: No skin lesions. + pedal edema.  Neurological: No gross motor weakness. No sensory changes.  [+ ] All other systems negative  [ ] Unable to assess ROS because ________    OBJECTIVE:  ICU Vital Signs Last 24 Hrs  T(C): 36.6 (17 May 2020 04:55), Max: 37.2 (16 May 2020 13:48)  T(F): 97.8 (17 May 2020 04:55), Max: 98.9 (16 May 2020 13:48)  HR: 81 (17 May 2020 04:55) (78 - 90)  BP: 147/72 (17 May 2020 04:55) (144/75 - 150/77)  BP(mean): --  ABP: --  ABP(mean): --  RR: 18 (17 May 2020 04:55) (18 - 18)  SpO2: 96% (17 May 2020 04:55) (96% - 98%)        05-15 @ 07:01  -  05-16 @ 07:00  --------------------------------------------------------  IN: 480 mL / OUT: 3200 mL / NET: -2720 mL    16 @ 07:01  -  05-17 @ 06:39  --------------------------------------------------------  IN: 0 mL / OUT: 400 mL / NET: -400 mL      CAPILLARY BLOOD GLUCOSE      POCT Blood Glucose.: 254 mg/dL (16 May 2020 22:03)      PHYSICAL EXAM: NAD in bed on NC O2  General: Awake, alert, oriented X 3.   HEENT: Atraumatic, normocephalic.                 Mallampatti Grade 3                No nasal congestion.                No tonsillar or pharyngeal exudates.  Lymph Nodes: No palpable lymphadenopathy  Neck: No JVD. No carotid bruit.   Respiratory: Normal chest expansion                         Normal percussion                         Normal and equal air entry                         No wheeze, rhonchi or rales.  Cardiovascular: S1 S2 normal. No murmurs, rubs or gallops.   Abdomen: Soft, non-tender, non-distended. No organomegaly. Normoactive bowel sounds.  Extremities: Warm to touch. Peripheral pulse palpable. ++ pedal edema.   Skin: No rashes or skin lesions  Neurological: Motor and sensory examination equal and normal in all four extremities.  Psychiatry: Appropriate mood and affect.    HOSPITAL MEDICATIONS:  MEDICATIONS  (STANDING):  buprenorphine 2 mG/naloxone 0.5 mG SL  Tablet 1 Tablet(s) SubLingual <User Schedule>  ceFAZolin   IVPB 2000 milliGRAM(s) IV Intermittent every 8 hours  dextrose 5%. 1000 milliLiter(s) (50 mL/Hr) IV Continuous <Continuous>  dextrose 50% Injectable 12.5 Gram(s) IV Push once  dextrose 50% Injectable 25 Gram(s) IV Push once  dextrose 50% Injectable 25 Gram(s) IV Push once  furosemide   Injectable 80 milliGRAM(s) IV Push every 12 hours  insulin glargine Injectable (LANTUS) 16 Unit(s) SubCutaneous every morning  insulin glargine Injectable (LANTUS) 38 Unit(s) SubCutaneous at bedtime  insulin lispro (HumaLOG) corrective regimen sliding scale   SubCutaneous three times a day before meals  insulin lispro (HumaLOG) corrective regimen sliding scale   SubCutaneous at bedtime  metoprolol succinate ER 25 milliGRAM(s) Oral daily  multivitamin/minerals 1 Tablet(s) Oral daily  nystatin Powder 1 Application(s) Topical two times a day  pantoprazole  Injectable 40 milliGRAM(s) IV Push two times a day  predniSONE   Tablet 3 milliGRAM(s) Oral every other day  sertraline 100 milliGRAM(s) Oral daily  sodium chloride 0.65% Nasal 1 Spray(s) Both Nostrils daily  spironolactone 25 milliGRAM(s) Oral two times a day  tacrolimus 0.5 milliGRAM(s) Oral two times a day  tamsulosin 0.4 milliGRAM(s) Oral at bedtime  tiotropium 18 MICROgram(s) Capsule 1 Capsule(s) Inhalation daily  trimethoprim   80 mG/sulfamethoxazole 400 mG 1 Tablet(s) Oral <User Schedule>    MEDICATIONS  (PRN):  albuterol/ipratropium for Nebulization 3 milliLiter(s) Nebulizer every 6 hours PRN Shortness of Breath and/or Wheezing  dextrose 40% Gel 15 Gram(s) Oral once PRN Blood Glucose LESS THAN 70 milliGRAM(s)/deciliter  glucagon  Injectable 1 milliGRAM(s) IntraMuscular once PRN Glucose LESS THAN 70 milligrams/deciliter      LABS:                        7.3    2.88  )-----------( 79       ( 17 May 2020 06:08 )             27.4     05-17    141  |  92<L>  |  29<H>  ----------------------------<  153<H>  4.2   |  39<H>  |  1.33<H>    Ca    8.6      17 May 2020 06:08  Phos  2.7     05-17  Mg     2.1     -    TPro  7.1  /  Alb  2.9<L>  /  TBili  0.5  /  DBili  x   /  AST  22  /  ALT  6<L>  /  AlkPhos  104  05-17    PT/INR - ( 15 May 2020 08:13 )   PT: 13.4 sec;   INR: 1.16 ratio         PTT - ( 15 May 2020 08:13 )  PTT:28.6 sec  Urinalysis Basic - ( 15 May 2020 10:06 )    Color: Light Yellow / Appearance: Clear / S.008 / pH: x  Gluc: x / Ketone: Negative  / Bili: Negative / Urobili: <2 mg/dL   Blood: x / Protein: Negative / Nitrite: Negative   Leuk Esterase: Negative / RBC: x / WBC x   Sq Epi: x / Non Sq Epi: x / Bacteria: x            MICROBIOLOGY:     RADIOLOGY:  [ ] Reviewed and interpreted by me    Point of Care Ultrasound Findings:    PFT:    EKG:

## 2020-05-17 NOTE — PROGRESS NOTE ADULT - ASSESSMENT
69 year old M with HFpEF, RV systolic dysfunction of unknown chronicity (patient reports known for 4 years), followed by Dr. Garcia at [facility]. His history is notable for HCV/ETOH cirrhosis s/p liver transplant 2011 (on tacrolimus/cellcept/prednisone), HTN, CKD stage 3 (b/l Scr 1.3-1.5), DM2 on insulin, COPD w/ home 02, HLD, chronic lymphedema and remote history of substance abuse (quit 85') on Suboxone. He was admitted after a mechanical fall and found to be volume overloaded with acute on chronic decompensated RV systolic dysfunction in the setting of inconsistently use of his diuretics. Additionally, on arrival found to have anemia consistent with iron deficiency Hb of 7 and FOBT +, transfused with 1U PRBC with appropriate response. Thrombocytopenia appears chronic in nature (b/l count 60-70s). GI plans for outpatient scope. He was also started on IV cefazolin for suspicion of cellulitis of his RLE.     Some symptomatic improvement with initiation of IV diuresis but he remains markedly volume overloaded on exam. Elevated central filling pressures and significant BLE edema through his upper thighs. TTE with findings of grossly reduced RVSF and mod AS. His markers of inflammation are high but may be related to his cellulitis. COVID-19 negative by PCR. He is hemodynamically stable.     Pertinient Cardiac Studies:  TTE 5/14/20: LVIDd 4.2cm, LVEF 55%, grossly reduced RVSF, mod AS, borderline PH (RVSP 36 mmHg)  TTE: 5/24/16: LVEDd 4.7cm, LVEF 60%, mild hypertrophy of interventricular septum without LVOT obstruction, nl RVSF, minimal AS, no PH (no mention of RVSP/PASP)

## 2020-05-17 NOTE — PROGRESS NOTE ADULT - PROBLEM SELECTOR PLAN 10
Transitions of Care Status:  1.  Name of PCP: Dr. Leandro Santos ([facility])   2.  PCP Contacted on Admission: [ ] Y    [ ] N    3.  PCP contacted at Discharge: [ ] Y    [ ] N    [ ] N/A  4.  Post-Discharge Appointment Date and Location:  5.  Summary of Handoff given to PCP:

## 2020-05-17 NOTE — PROGRESS NOTE ADULT - ATTENDING COMMENTS
as above-improving, s/p CHF evaln (IV diuretics)  multifactorial dyspnea--copd, CAD, PAH (portopulmonary), debility, anemia--O2 sat above 90%  copd-duoneb q 6, symbicort 160 2 bid, spiriva 1 q day, acapella, incentive spirometry  cards-s/p formal cards evaln--s/p echo to R/O PAH-RHF---CHF evaln (RV dysfunction/mod AS)-continue diuresis (IV)  GI-s/p liver transplant-Hailey et al.         Heme-evaln and GI evaln for anemia.  psych-depression--formal psych consult          Lung Ca screening--CT chest is No evidence of Dz  DVT/GI prophylaxis        Care coordinator consult--NH placement  Leandro Santos MD-Pulmonary   [phone]

## 2020-05-17 NOTE — PROGRESS NOTE ADULT - ASSESSMENT
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 Found w/ signif edema and Hgb-7--  multifactorial issues.  ******************  5/15-s/p echo-RV dysfunction, mod AS  5/16-better over all--CHF evaln-continue diuresis  5/17-better over all-CHF team f/up-80 bid IV

## 2020-05-17 NOTE — DISCHARGE NOTE PROVIDER - CARE PROVIDER_API CALL
Leandro Santos  INTERNAL MEDICINE  [address] [facility]   [address]  [place] [address]  Phone: [phone]  Fax: [phone]  Follow Up Time: 1 week    Elvira Daley)  Cardiology; Internal Medicine  [address], [address] [place] [address]  Phone: [phone]  Fax: [phone]  Follow Up Time: 1 week Leandro Santos  INTERNAL MEDICINE  [address] [facility]   [address]  [place] [address]  Phone: [phone]  Fax: [phone]  Follow Up Time: 1 week    Elvira Daley)  Cardiology; Internal Medicine  [address], [address] [place] [id number]  Phone: [phone]  Fax: [phone]  Follow Up Time: 1 week    Matty Askew  SURGERY  [address] [place] [address]  Phone: [phone]  Fax: [phone]  Established Patient  Follow Up Time: 1 month

## 2020-05-17 NOTE — PROGRESS NOTE ADULT - ASSESSMENT
68 yo M with PMH of HTN, T2DM (on insulin), COPD (on home O2 3L), HCV/EtOH cirrhosis s/p liver txp (2011) on cellcept and tacrolimus, HLD, chronic lymphedema, presents after mechanical fall earlier today 2/2 weakness, found to be anemic with positive FOBT and non-adherent to diuretics, admitted for anemia likely 2/2 GIB and LE edema 2/2 fluid overload in setting of medication non-adherence. Pt also with likely RLE cellulitis.

## 2020-05-18 NOTE — PROGRESS NOTE ADULT - SUBJECTIVE AND OBJECTIVE BOX
Chief Complaint:  Patient is a 69y old  Male who presents with a chief complaint of s/p [facility] fall (18 May 2020 07:46)      Interval Events: Pt's hgb remains stable in the 7s, no overt bleeding.   ROS: All 12 point system except listed above were otherwise negative.    Allergies:  clindamycin (Unknown)        Hospital Medications:  albuterol/ipratropium for Nebulization 3 milliLiter(s) Nebulizer every 6 hours PRN  buprenorphine 2 mG/naloxone 0.5 mG SL  Tablet 1 Tablet(s) SubLingual <User Schedule>  ceFAZolin   IVPB 2000 milliGRAM(s) IV Intermittent every 8 hours  dextrose 40% Gel 15 Gram(s) Oral once PRN  dextrose 5%. 1000 milliLiter(s) IV Continuous <Continuous>  dextrose 50% Injectable 12.5 Gram(s) IV Push once  dextrose 50% Injectable 25 Gram(s) IV Push once  dextrose 50% Injectable 25 Gram(s) IV Push once  furosemide   Injectable 80 milliGRAM(s) IV Push every 12 hours  glucagon  Injectable 1 milliGRAM(s) IntraMuscular once PRN  insulin glargine Injectable (LANTUS) 16 Unit(s) SubCutaneous every morning  insulin glargine Injectable (LANTUS) 38 Unit(s) SubCutaneous at bedtime  insulin lispro (HumaLOG) corrective regimen sliding scale   SubCutaneous three times a day before meals  insulin lispro (HumaLOG) corrective regimen sliding scale   SubCutaneous at bedtime  metoprolol succinate ER 25 milliGRAM(s) Oral daily  multivitamin/minerals 1 Tablet(s) Oral daily  nystatin Powder 1 Application(s) Topical two times a day  pantoprazole  Injectable 40 milliGRAM(s) IV Push two times a day  predniSONE   Tablet 3 milliGRAM(s) Oral every other day  sertraline 100 milliGRAM(s) Oral daily  sodium chloride 0.65% Nasal 1 Spray(s) Both Nostrils daily  spironolactone 25 milliGRAM(s) Oral two times a day  tacrolimus 0.5 milliGRAM(s) Oral two times a day  tamsulosin 0.4 milliGRAM(s) Oral at bedtime  tiotropium 18 MICROgram(s) Capsule 1 Capsule(s) Inhalation daily  trimethoprim   80 mG/sulfamethoxazole 400 mG 1 Tablet(s) Oral <User Schedule>      PMHX/PSHX:  Hyperlipidemia  Hypertension  S/P liver transplant  Type 2 diabetes mellitus  Chronic obstructive pulmonary disease, unspecified COPD type  S/P liver transplant      Family history:  No pertinent family history in first degree relatives    There is no family history of peptic ulcer disease, gastric cancer, colon polyps, colon cancer, celiac disease, biliary, hepatic, or pancreatic disease.  None of the female relatives have breast, uterine, or ovarian cancer.     PHYSICAL EXAM:   Vital Signs:  Vital Signs Last 24 Hrs  T(C): 36.6 (18 May 2020 05:17), Max: 37.2 (17 May 2020 12:29)  T(F): 97.8 (18 May 2020 05:17), Max: 98.9 (17 May 2020 12:29)  HR: 80 (18 May 2020 05:17) (80 - 85)  BP: 154/77 (18 May 2020 05:17) (128/70 - 154/77)  BP(mean): --  RR: 18 (18 May 2020 05:17) (18 - 18)  SpO2: 97% (18 May 2020 05:17) (97% - 100%)  Daily     Daily     GENERAL:  NAD, Appears stated age  HEENT:  NC/AT,  conjunctivae clear and pink, sclera -anicteric  CHEST:  CTA B/L, Normal effort  HEART:  RRR S1/S2, No murmurs  ABDOMEN:  Soft, non-tender, non-distended, BS+, healed surgical scars  EXTEREMITIES:  No cyanosis + Edema, RLE mildly erythematous, chronic skin changes of venous stasis  SKIN:  Warm & Dry, no jaundice  NEURO:  Alert, oriented x4, no asterixis    LABS:                        7.8    3.10  )-----------( 97       ( 18 May 2020 06:38 )             29.5     Mean Cell Volume: 91.9 fl (05-18-20 @ 06:38)    05-18    140  |  91<L>  |  31<H>  ----------------------------<  140<H>  4.2   |  40<H>  |  1.28    Ca    8.9      18 May 2020 06:38  Phos  2.5     05-18  Mg     2.1     05-18    TPro  7.2  /  Alb  3.2<L>  /  TBili  0.5  /  DBili  x   /  AST  21  /  ALT  5<L>  /  AlkPhos  111  05-18    LIVER FUNCTIONS - ( 18 May 2020 06:38 )  Alb: 3.2 g/dL / Pro: 7.2 g/dL / ALK PHOS: 111 U/L / ALT: 5 U/L / AST: 21 U/L / GGT: x                                       7.8    3.10  )-----------( 97       ( 18 May 2020 06:38 )             29.5                         7.3    2.88  )-----------( 79       ( 17 May 2020 06:08 )             27.4                         7.9    3.27  )-----------( 86       ( 16 May 2020 15:31 )             28.1                         7.5    3.27  )-----------( 83       ( 16 May 2020 08:14 )             26.8                         7.4    3.46  )-----------( 84       ( 16 May 2020 01:42 )             26.2     Imaging:

## 2020-05-18 NOTE — PROGRESS NOTE ADULT - ASSESSMENT
69 year old man with hx of Hypertension, DM2, COPD, EtOH/HCV Cirrhosis s/p liver transplant (2011, on cellcept/tacrolimus), hyperlipidemia and chronic lymph edema presents with fall from home in the setting of anemia and worsening edema due to diuretic nonadherence.    Impression:   # Normocytic Anemia: Unclear if clinically significant GI bleeding; patient endorses blood with wiping & 'dark stools' - however light brown on rectal. Differential includes bleeding from PUD, Colorectal cancer, angiectasias, etc.  # EtOH/HCV Cirrhosis s/p Liver transplant: On Tacrolimus/Cellcept  # Chronic Lower Extremity Edema  # RLE Cellulitis  # COPD  # HFrEF- on Lasix IV per cardiology    Recommendation:  - Nonemergent EGD/Colonoscopy +/- capsule as patient currently without overt bleeding  - Hold Cellcept given cellulitis, continue Tacrolimus and Prednisone  - Check Tacro level daily  - Abx for primary team  - Supportive care per primary team

## 2020-05-18 NOTE — PROGRESS NOTE ADULT - ATTENDING COMMENTS
Hepatology Staff: Ana Abdalla MD    I saw and examined the patient along with  Dr. Thrasher 05-18-20 @ 11:33.    Patient Medical Record, hosptial course was reviewed and summarized as below:    Vitals: Vital Signs Last 24 Hrs  T(C): 36.6 (18 May 2020 05:17), Max: 37.2 (17 May 2020 12:29)  T(F): 97.8 (18 May 2020 05:17), Max: 98.9 (17 May 2020 12:29)  HR: 80 (18 May 2020 05:17) (80 - 85)  BP: 154/77 (18 May 2020 05:17) (128/70 - 154/77)    RR: 18 (18 May 2020 05:17) (18 - 18)  SpO2: 97% (18 May 2020 05:17) (97% - 100%)    Labs:Creatinine, Serum: 1.28 mg/dL (05-18-20 @ 06:38)  Bilirubin Total, Serum: 0.5 mg/dL (05-18-20 @ 06:38)      I/O: I&O's Summary    17 May 2020 07:01  -  18 May 2020 07:00  --------------------------------------------------------  IN: 240 mL / OUT: 2700 mL / NET: -2460 mL      Nutritional Status:   Albumin, Serum: 3.2 g/dL (05-18-20 @ 06:38)    Last 24 hour events: Stable from liver perspective    Recommendations: Keep on Tacro 0.5 mg PO bid and Prednisone 3mg. Cellcept on hold. Tacro level < 2. Patient with remote transplant ( from 2011- stable LFTs ( normal range). Heart failure management per cardiology.      Plan discussed with Primary team. Hepatology Staff: Ana Abdalla MD    I saw and examined the patient along with  Dr. Thrasher 05-18-20 @ 11:33.    Patient Medical Record, hosptial course was reviewed and summarized as below:    Vitals: Vital Signs Last 24 Hrs  T(C): 36.6 (18 May 2020 05:17), Max: 37.2 (17 May 2020 12:29)  T(F): 97.8 (18 May 2020 05:17), Max: 98.9 (17 May 2020 12:29)  HR: 80 (18 May 2020 05:17) (80 - 85)  BP: 154/77 (18 May 2020 05:17) (128/70 - 154/77)    RR: 18 (18 May 2020 05:17) (18 - 18)  SpO2: 97% (18 May 2020 05:17) (97% - 100%)    Labs:Creatinine, Serum: 1.28 mg/dL (05-18-20 @ 06:38)  Bilirubin Total, Serum: 0.5 mg/dL (05-18-20 @ 06:38)      I/O: I&O's Summary    17 May 2020 07:01  -  18 May 2020 07:00  --------------------------------------------------------  IN: 240 mL / OUT: 2700 mL / NET: -2460 mL      Nutritional Status:   Albumin, Serum: 3.2 g/dL (05-18-20 @ 06:38)    Last 24 hour events: Stable from liver perspective    Recommendations: Keep on Tacro 0.5 mg PO bid and Prednisone 3mg. Cellcept on hold. Tacro level < 2. Patient with remote transplant ( from 2011- stable LFTs ( normal range). Heart failure management per cardiology.  Stable H/H. brown stool. EGD as outpatient.      Plan discussed with Primary team.

## 2020-05-18 NOTE — PROGRESS NOTE ADULT - SUBJECTIVE AND OBJECTIVE BOX
PROGRESS NOTE:   Authored by Dr. Von Hobbs MD  Pager [phone] [facility], [id number] [facility]     Patient is a 69y old  Male who presents with a chief complaint of s/p mech fall (18 May 2020 05:43)      SUBJECTIVE / OVERNIGHT EVENTS:  - Overnight, no acute events.  - Today, pt seen and examined at bedside in AM. Pt reports having SOB at baseline, denies any pain.     ADDITIONAL REVIEW OF SYSTEMS:    MEDICATIONS  (STANDING):  buprenorphine 2 mG/naloxone 0.5 mG SL  Tablet 1 Tablet(s) SubLingual <User Schedule>  ceFAZolin   IVPB 2000 milliGRAM(s) IV Intermittent every 8 hours  dextrose 5%. 1000 milliLiter(s) (50 mL/Hr) IV Continuous <Continuous>  dextrose 50% Injectable 12.5 Gram(s) IV Push once  dextrose 50% Injectable 25 Gram(s) IV Push once  dextrose 50% Injectable 25 Gram(s) IV Push once  furosemide   Injectable 80 milliGRAM(s) IV Push every 12 hours  insulin glargine Injectable (LANTUS) 16 Unit(s) SubCutaneous every morning  insulin glargine Injectable (LANTUS) 38 Unit(s) SubCutaneous at bedtime  insulin lispro (HumaLOG) corrective regimen sliding scale   SubCutaneous three times a day before meals  insulin lispro (HumaLOG) corrective regimen sliding scale   SubCutaneous at bedtime  metoprolol succinate ER 25 milliGRAM(s) Oral daily  multivitamin/minerals 1 Tablet(s) Oral daily  nystatin Powder 1 Application(s) Topical two times a day  pantoprazole  Injectable 40 milliGRAM(s) IV Push two times a day  predniSONE   Tablet 3 milliGRAM(s) Oral every other day  sertraline 100 milliGRAM(s) Oral daily  sodium chloride 0.65% Nasal 1 Spray(s) Both Nostrils daily  spironolactone 25 milliGRAM(s) Oral two times a day  tacrolimus 0.5 milliGRAM(s) Oral two times a day  tamsulosin 0.4 milliGRAM(s) Oral at bedtime  tiotropium 18 MICROgram(s) Capsule 1 Capsule(s) Inhalation daily  trimethoprim   80 mG/sulfamethoxazole 400 mG 1 Tablet(s) Oral <User Schedule>    MEDICATIONS  (PRN):  albuterol/ipratropium for Nebulization 3 milliLiter(s) Nebulizer every 6 hours PRN Shortness of Breath and/or Wheezing  dextrose 40% Gel 15 Gram(s) Oral once PRN Blood Glucose LESS THAN 70 milliGRAM(s)/deciliter  glucagon  Injectable 1 milliGRAM(s) IntraMuscular once PRN Glucose LESS THAN 70 milligrams/deciliter      CAPILLARY BLOOD GLUCOSE      POCT Blood Glucose.: 219 mg/dL (17 May 2020 21:24)  POCT Blood Glucose.: 219 mg/dL (17 May 2020 17:12)  POCT Blood Glucose.: 226 mg/dL (17 May 2020 11:40)  POCT Blood Glucose.: 164 mg/dL (17 May 2020 08:19)    I&O's Summary    17 May 2020 07:01  -  18 May 2020 07:00  --------------------------------------------------------  IN: 240 mL / OUT: 2700 mL / NET: -2460 mL        PHYSICAL EXAM:  Vital Signs Last 24 Hrs  T(C): 36.6 (18 May 2020 05:17), Max: 37.2 (17 May 2020 12:29)  T(F): 97.8 (18 May 2020 05:17), Max: 98.9 (17 May 2020 12:29)  HR: 80 (18 May 2020 05:17) (80 - 85)  BP: 154/77 (18 May 2020 05:17) (128/70 - 154/77)  BP(mean): --  RR: 18 (18 May 2020 05:17) (18 - 18)  SpO2: 97% (18 May 2020 05:17) (97% - 100%)    GENERAL: NAD   RESPIRATORY: Normal respiratory effort; lungs are clear to auscultation bilaterally, on NC   CARDIOVASCULAR: Regular rate and rhythm, normal S1 and S2, no murmur/rub/gallop  ABDOMEN: +BS, Soft, Non-distended, Nontender to palpation  EXT: Significant b/l LE edema with venous stasis changes and erythema (R> L). Erythema extends up to the thigh on the RLE. Warm to touch. Non-tender to palpation. Overall RLE improving.    NEURO: Alert, good concentration, moving extremities     LABS:                        7.8    3.10  )-----------( 97       ( 18 May 2020 06:38 )             29.5     05-18    140  |  91<L>  |  31<H>  ----------------------------<  140<H>  4.2   |  40<H>  |  1.28    Ca    8.9      18 May 2020 06:38  Phos  2.5     05-18  Mg     2.1     05-18    TPro  7.2  /  Alb  3.2<L>  /  TBili  0.5  /  DBili  x   /  AST  21  /  ALT  5<L>  /  AlkPhos  111  05-18              Culture - Blood (collected 15 May 2020 15:24)  Source: .Blood Blood-Venous  Preliminary Report (16 May 2020 16:01):    No growth to date.    Culture - Blood (collected 15 May 2020 15:24)  Source: .Blood Blood-Peripheral  Preliminary Report (16 May 2020 16:01):    No growth to date.        RADIOLOGY & ADDITIONAL TESTS:  Results Reviewed:   Imaging Personally Reviewed:  Electrocardiogram Personally Reviewed:    COORDINATION OF CARE:  Care Discussed with Consultants/Other Providers [Y/N]:  Prior or Outpatient Records Reviewed [Y/N]: PROGRESS NOTE:   Authored by Dr. Von Hobbs MD  Pager [phone] [facility], [id number] [facility]     Patient is a 69y old  Male who presents with a chief complaint of s/p mech fall (18 May 2020 05:43)      SUBJECTIVE / OVERNIGHT EVENTS:  - Overnight, no acute events.  - Today, pt seen and examined at bedside in AM. Pt reports he feels okay, states he believes his legs seem to be improving. Pt was irritated that he did not receive pillows to elevate his legs, informed nurse.  Reports having dyspnea at baseline and is on 3L O2NC at home. Currently on 4LNC and states he is breathing better on the additional O2. Denies f/c/n/v, CP, SOB otherwise, abdominal pain. Last BM day before yesterday, denies hematochezia and melena.    ADDITIONAL REVIEW OF SYSTEMS:    MEDICATIONS  (STANDING):  buprenorphine 2 mG/naloxone 0.5 mG SL  Tablet 1 Tablet(s) SubLingual <User Schedule>  ceFAZolin   IVPB 2000 milliGRAM(s) IV Intermittent every 8 hours  dextrose 5%. 1000 milliLiter(s) (50 mL/Hr) IV Continuous <Continuous>  dextrose 50% Injectable 12.5 Gram(s) IV Push once  dextrose 50% Injectable 25 Gram(s) IV Push once  dextrose 50% Injectable 25 Gram(s) IV Push once  furosemide   Injectable 80 milliGRAM(s) IV Push every 12 hours  insulin glargine Injectable (LANTUS) 16 Unit(s) SubCutaneous every morning  insulin glargine Injectable (LANTUS) 38 Unit(s) SubCutaneous at bedtime  insulin lispro (HumaLOG) corrective regimen sliding scale   SubCutaneous three times a day before meals  insulin lispro (HumaLOG) corrective regimen sliding scale   SubCutaneous at bedtime  metoprolol succinate ER 25 milliGRAM(s) Oral daily  multivitamin/minerals 1 Tablet(s) Oral daily  nystatin Powder 1 Application(s) Topical two times a day  pantoprazole  Injectable 40 milliGRAM(s) IV Push two times a day  predniSONE   Tablet 3 milliGRAM(s) Oral every other day  sertraline 100 milliGRAM(s) Oral daily  sodium chloride 0.65% Nasal 1 Spray(s) Both Nostrils daily  spironolactone 25 milliGRAM(s) Oral two times a day  tacrolimus 0.5 milliGRAM(s) Oral two times a day  tamsulosin 0.4 milliGRAM(s) Oral at bedtime  tiotropium 18 MICROgram(s) Capsule 1 Capsule(s) Inhalation daily  trimethoprim   80 mG/sulfamethoxazole 400 mG 1 Tablet(s) Oral <User Schedule>    MEDICATIONS  (PRN):  albuterol/ipratropium for Nebulization 3 milliLiter(s) Nebulizer every 6 hours PRN Shortness of Breath and/or Wheezing  dextrose 40% Gel 15 Gram(s) Oral once PRN Blood Glucose LESS THAN 70 milliGRAM(s)/deciliter  glucagon  Injectable 1 milliGRAM(s) IntraMuscular once PRN Glucose LESS THAN 70 milligrams/deciliter      CAPILLARY BLOOD GLUCOSE      POCT Blood Glucose.: 219 mg/dL (17 May 2020 21:24)  POCT Blood Glucose.: 219 mg/dL (17 May 2020 17:12)  POCT Blood Glucose.: 226 mg/dL (17 May 2020 11:40)  POCT Blood Glucose.: 164 mg/dL (17 May 2020 08:19)    I&O's Summary    17 May 2020 07:01  -  18 May 2020 07:00  --------------------------------------------------------  IN: 240 mL / OUT: 2700 mL / NET: -2460 mL        PHYSICAL EXAM:  Vital Signs Last 24 Hrs  T(C): 36.6 (18 May 2020 05:17), Max: 37.2 (17 May 2020 12:29)  T(F): 97.8 (18 May 2020 05:17), Max: 98.9 (17 May 2020 12:29)  HR: 80 (18 May 2020 05:17) (80 - 85)  BP: 154/77 (18 May 2020 05:17) (128/70 - 154/77)  BP(mean): --  RR: 18 (18 May 2020 05:17) (18 - 18)  SpO2: 97% (18 May 2020 05:17) (97% - 100%)    GENERAL: NAD   RESPIRATORY: Normal respiratory effort; lungs are clear to auscultation bilaterally, on 4LNC   CARDIOVASCULAR: Regular rate and rhythm, normal S1 and S2, no murmur/rub/gallop  ABDOMEN: +BS, Soft, Non-distended, Nontender to palpation  EXT: Significant b/l LE edema with venous stasis changes and erythema (R> L). Erythema extends up to the thigh on the RLE. Warm to touch. Non-tender to palpation. Overall RLE improving.    NEURO: Alert, good concentration, moving extremities     LABS:                        7.8    3.10  )-----------( 97       ( 18 May 2020 06:38 )             29.5     05-18    140  |  91<L>  |  31<H>  ----------------------------<  140<H>  4.2   |  40<H>  |  1.28    Ca    8.9      18 May 2020 06:38  Phos  2.5     05-18  Mg     2.1     05-18    TPro  7.2  /  Alb  3.2<L>  /  TBili  0.5  /  DBili  x   /  AST  21  /  ALT  5<L>  /  AlkPhos  111  05-18              Culture - Blood (collected 15 May 2020 15:24)  Source: .Blood Blood-Venous  Preliminary Report (16 May 2020 16:01):    No growth to date.    Culture - Blood (collected 15 May 2020 15:24)  Source: .Blood Blood-Peripheral  Preliminary Report (16 May 2020 16:01):    No growth to date.        RADIOLOGY & ADDITIONAL TESTS:  Results Reviewed:   Imaging Personally Reviewed:  Electrocardiogram Personally Reviewed:    COORDINATION OF CARE:  Care Discussed with Consultants/Other Providers [Y/N]:  Prior or Outpatient Records Reviewed [Y/N]:

## 2020-05-18 NOTE — PROGRESS NOTE ADULT - ASSESSMENT
70 yo M w/ hx of HTN, T2DM (on insulin), COPD (on home O2 3L), HCV/EtOH cirrhosis s/p liver txp (2011) on cellcept and tacrolimus, HLD, chronic lymphedema, presents after mechanical fall 2/2 weakness, found to be anemic with positive FOBT and non-adherence to diuretics, admitted for anemia likely 2/2 GIB and LE edema 2/2 fluid overload in setting of medication non-adherence, likely c/b RLE cellulitis.

## 2020-05-18 NOTE — PROGRESS NOTE ADULT - PROBLEM SELECTOR PLAN 1
Hgb 7.0 on admission, was 10.2 on 2/2020 (in [facility]). Pt endorsing dark stools, blood with wiping, overall weakness, positive FOBT. Last endoscopy/colonoscopy about 8-10 yrs ago per patient.   - s/p 1U PRBC in ED and 1U on 5/15  - maintain active T&S  - As per Hepatology, non-emergent EGD/Colonoscopu +/- capsule currently planned for outpatient, but can consider inpatient scope if overt bleeding/dropping Hgb  - c/w PPI 40mg IV BID  - Monitor CBC  - Hold ASA Hgb 7.0 on admission, was 10.2 on 2/2020 (in [facility]). Pt endorsing dark stools, blood with wiping, overall weakness, positive FOBT. Last endoscopy/colonoscopy about 8-10 yrs ago per patient.   - s/p 1U PRBC in ED and 1U on 5/15  - maintain active T&S  - As per Hepatology, non-emergent EGD/Colonoscopy +/- capsule currently planned for outpatient, but can consider inpatient scope if overt bleeding/dropping Hgb  - PPI po bid  - Monitor CBC  - Hold ASA Hgb 7.0 on admission, was 10.2 on 2/2020 (in [facility]). Pt endorsing dark stools, blood with wiping, overall weakness, positive FOBT. Last endoscopy/colonoscopy about 8-10 yrs ago per patient.   - s/p 1U PRBC in ED and 1U on 5/15  - Hgb stable ~7-8, continue to monitor CBC  - maintain active T&S  - c/w Protonix 40mg PO BID  - Hold home ASA for now  - As per Hepatology, non-emergent EGD/Colonoscopy +/- capsule currently planned for outpatient, but can consider inpatient scope if overt bleeding/dropping Hgb

## 2020-05-18 NOTE — PROGRESS NOTE ADULT - ATTENDING COMMENTS
restart ASA 81mg  dvt ppt  can switch IV Protonix to po  R leg cellulitis greatly improved, would aim for a 7 day course total of abx can switch to po on DC  patient's volume status also much improved, down 8 kilo from admission, also today with contraction alkalosis, would favor to switch to oral lasix since more euvolemic, if HF agrees ?diamox  PT rec PETROS, patient wants to go home, will ask PT to re eval  tacro level <2, hepatology follow up if no adjustment maybe we can stop checking

## 2020-05-18 NOTE — PROGRESS NOTE ADULT - ASSESSMENT
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 Found w/ signif edema and Hgb-7--  multifactorial issues.  ******************  5/15-s/p echo-RV dysfunction, mod AS  5/16-better over all--CHF evaln-continue diuresis  5/17-better over all-CHF team f/up-80 bid IV  5/1improving but still no ambulation-CHF f/up

## 2020-05-18 NOTE — PROGRESS NOTE ADULT - PROBLEM SELECTOR PLAN 9
DVT ppx: holding med ppx  Diet: DASH, CC  Dispo: PETROS DVT ppx: HSQ 5000u q8h  Diet: DASH, CC  Dispo: PETROS, pending PMR consult (requested by Pulm/PCP Dr. Santos) DVT ppx: HSQ 5000u q8h  Diet: DASH, CC  Dispo: PETROS as per PT

## 2020-05-18 NOTE — PROGRESS NOTE ADULT - PROBLEM SELECTOR PLAN 7
- chronic hypoxemic respiratory failure  - c/w spiriva and symbicort  - acapella, incentive spirometry   - c/w supplemental O2 3L (home O2 lvl)  - Nazia PRN q6  - TTE showing RV dysfunction  - CT chest for cancer screening without pulmonary nodules   - Pulm following, recs appreciated

## 2020-05-18 NOTE — PROGRESS NOTE ADULT - PROBLEM SELECTOR PLAN 2
Appears to be chronic lymphedema; pt endorses increased swelling and has been non-adherent to diuretics. Last TTE in [facility] on 5/2016 shows EF 60% and nl LVSF/RVSF.  - TTE 5/14/20: LVIDd 4.2cm, LVEF 55%, grossly reduced RVSF, mod AS, borderline PH (RVSP 36 mmHg)  - C/w spironolactone 25BID and lasix 80 IV BID (home dose is PO lasix 40 BID)  - wound care following  - monitor Is/Os, daily weights, fluid restrict to 1.5L /day   - LE doppler neg for RLE DVT  - Heart failure consulted, recs appreciated    # RLE Cellulitis   - c/w cefazolin (5/14- ). Appears to be improving  - Blood Cx (5/15): NGTD Appears to be chronic lymphedema; pt endorses increased swelling and has been non-adherent to diuretics. [facility]% and nl LVSF/RVSF.  - TTE 5/14/20: LVIDd 4.2cm, LVEF 55%, grossly reduced RVSF, mod AS, borderline PH (RVSP 36 mmHg)  - c/w spironolactone 25BID and lasix 80 IV BID (home dose is PO lasix 40 BID); however, concern for contraction alkalosis but bicarb up to 40 today, will speak to HF regarding possible decrease in Lasix vs Diamox  - wound care following  - monitor Is/Os, daily weights, fluid restrict to 1.5L /day   - LE doppler neg for RLE DVT  - Appreciate HF recs    # RLE Cellulitis   - c/w cefazolin (5/14- ). Appears to be improving  - Blood Cx (5/15): NGTD

## 2020-05-18 NOTE — PROGRESS NOTE ADULT - PROBLEM SELECTOR PLAN 10
Transitions of Care Status:  1.  Name of PCP: Dr. Leandro Santos ([facility])   2.  PCP Contacted on Admission: [ ] Y    [ ] N    3.  PCP contacted at Discharge: [ ] Y    [ ] N    [ ] N/A  4.  Post-Discharge Appointment Date and Location:  5.  Summary of Handoff given to PCP:

## 2020-05-18 NOTE — PROGRESS NOTE ADULT - PROBLEM SELECTOR PLAN 3
- c/w tacro 0.5 BID. hold cellcept for active infection  - c/w prednisone 3mg every other day  - Monitor tacro level 30min prior to AM dose - hold cellcept for active infection  - c/w tacro 0.5 BID  - c/w prednisone 3mg every other day  - Monitor tacro level 30min prior to AM dose every other day, as per Hepatology

## 2020-05-18 NOTE — PROGRESS NOTE ADULT - PROBLEM SELECTOR PLAN 6
Pt previously documented as having T1DM, however, pt says he has T2DM.   - At home, pt is on lantus 26U qAM and 52U qPM, humalog 18U qAM and 24U qPM. A1c 7.2 in 1/2020.  - HbA1C (5/14): 6.6  - c/w lantus at 16U qAM and 38U qPM  - mod ISS and FS qid  - will hold off on restarting standing premeal insulin for now

## 2020-05-18 NOTE — PROGRESS NOTE ADULT - PROBLEM SELECTOR PLAN 5
plt 89 on admission, has been 60-90 on [facility] labs   - likely chronic and in setting of splenomegaly  - continue to monitor CBC, monitor for signs of bleeding

## 2020-05-18 NOTE — PROGRESS NOTE ADULT - SUBJECTIVE AND OBJECTIVE BOX
CHIEF COMPLAINT:  f/up sob, copd, RHF, obesity-better over all-less sob but has not ambulated    Interval Events: awaits PT    REVIEW OF SYSTEMS:  Constitutional: No fevers or chills. No weight loss. + fatigue or generalized malaise.  Eyes: No itching or discharge from the eyes  ENT: No ear pain. No ear discharge. No nasal congestion. No post nasal drip. No epistaxis. No throat pain. No sore throat. No difficulty swallowing.   CV: No chest pain. No palpitations. No lightheadedness or dizziness.   Resp: No dyspnea at rest. No dyspnea on exertion. No orthopnea. No wheezing. No cough. No stridor. No sputum production. No chest pain with respiration.  GI: No nausea. No vomiting. No diarrhea.  MSK: No joint pain or pain in any extremities  Integumentary: No skin lesions. + pedal edema.  Neurological: +gross motor weakness. No sensory changes.  [+ ] All other systems negative  [ ] Unable to assess ROS because ________    OBJECTIVE:  ICU Vital Signs Last 24 Hrs  T(C): 36.6 (18 May 2020 05:17), Max: 37.2 (17 May 2020 12:29)  T(F): 97.8 (18 May 2020 05:17), Max: 98.9 (17 May 2020 12:29)  HR: 80 (18 May 2020 05:17) (80 - 85)  BP: 154/77 (18 May 2020 05:17) (128/70 - 154/77)  BP(mean): --  ABP: --  ABP(mean): --  RR: 18 (18 May 2020 05:17) (18 - 18)  SpO2: 97% (18 May 2020 05:17) (97% - 100%)        05-16 @ 07:01  -  05-17 @ 07:00  --------------------------------------------------------  IN: 0 mL / OUT: 400 mL / NET: -400 mL    05-17 @ 07:01  -  05-18 @ 05:43  --------------------------------------------------------  IN: 240 mL / OUT: 2000 mL / NET: -1760 mL      CAPILLARY BLOOD GLUCOSE      POCT Blood Glucose.: 219 mg/dL (17 May 2020 21:24)      PHYSICAL EXAM: NAD in bed on NC O2  General: Awake, alert, oriented X 3.   HEENT: Atraumatic, normocephalic.                 Mallampatti Grade 3                No nasal congestion.                No tonsillar or pharyngeal exudates.  Lymph Nodes: No palpable lymphadenopathy  Neck: No JVD. No carotid bruit.   Respiratory: Normal chest expansion                         Normal percussion                         Normal and equal air entry                         No wheeze, rhonchi or rales.  Cardiovascular: S1 S2 normal. No murmurs, rubs or gallops.   Abdomen: Soft, non-tender, non-distended. No organomegaly. Normoactive bowel sounds.  Extremities: Warm to touch. Peripheral pulse palpable. ++pedal edema.   Skin: No rashes or skin lesions  Neurological: Motor and sensory examination equal and normal in all four extremities.  Psychiatry: Appropriate mood and affect.    HOSPITAL MEDICATIONS:  MEDICATIONS  (STANDING):  buprenorphine 2 mG/naloxone 0.5 mG SL  Tablet 1 Tablet(s) SubLingual <User Schedule>  ceFAZolin   IVPB 2000 milliGRAM(s) IV Intermittent every 8 hours  dextrose 5%. 1000 milliLiter(s) (50 mL/Hr) IV Continuous <Continuous>  dextrose 50% Injectable 12.5 Gram(s) IV Push once  dextrose 50% Injectable 25 Gram(s) IV Push once  dextrose 50% Injectable 25 Gram(s) IV Push once  furosemide   Injectable 80 milliGRAM(s) IV Push every 12 hours  insulin glargine Injectable (LANTUS) 16 Unit(s) SubCutaneous every morning  insulin glargine Injectable (LANTUS) 38 Unit(s) SubCutaneous at bedtime  insulin lispro (HumaLOG) corrective regimen sliding scale   SubCutaneous three times a day before meals  insulin lispro (HumaLOG) corrective regimen sliding scale   SubCutaneous at bedtime  metoprolol succinate ER 25 milliGRAM(s) Oral daily  multivitamin/minerals 1 Tablet(s) Oral daily  nystatin Powder 1 Application(s) Topical two times a day  pantoprazole  Injectable 40 milliGRAM(s) IV Push two times a day  predniSONE   Tablet 3 milliGRAM(s) Oral every other day  sertraline 100 milliGRAM(s) Oral daily  sodium chloride 0.65% Nasal 1 Spray(s) Both Nostrils daily  spironolactone 25 milliGRAM(s) Oral two times a day  tacrolimus 0.5 milliGRAM(s) Oral two times a day  tamsulosin 0.4 milliGRAM(s) Oral at bedtime  tiotropium 18 MICROgram(s) Capsule 1 Capsule(s) Inhalation daily  trimethoprim   80 mG/sulfamethoxazole 400 mG 1 Tablet(s) Oral <User Schedule>    MEDICATIONS  (PRN):  albuterol/ipratropium for Nebulization 3 milliLiter(s) Nebulizer every 6 hours PRN Shortness of Breath and/or Wheezing  dextrose 40% Gel 15 Gram(s) Oral once PRN Blood Glucose LESS THAN 70 milliGRAM(s)/deciliter  glucagon  Injectable 1 milliGRAM(s) IntraMuscular once PRN Glucose LESS THAN 70 milligrams/deciliter      LABS:                        7.3    2.88  )-----------( 79       ( 17 May 2020 06:08 )             27.4     05-17    141  |  92<L>  |  29<H>  ----------------------------<  153<H>  4.2   |  39<H>  |  1.33<H>    Ca    8.6      17 May 2020 06:08  Phos  2.7     05-17  Mg     2.1     05-17    TPro  7.1  /  Alb  2.9<L>  /  TBili  0.5  /  DBili  x   /  AST  22  /  ALT  6<L>  /  AlkPhos  104  05-17              MICROBIOLOGY:     RADIOLOGY:  [ ] Reviewed and interpreted by me    Point of Care Ultrasound Findings:    PFT:    EKG:

## 2020-05-18 NOTE — PROGRESS NOTE ADULT - ATTENDING COMMENTS
as above-improving, s/p CHF evaln (IV diuretics)--PT needed  multifactorial dyspnea--copd, CAD, PAH (portopulmonary), debility, anemia--O2 sat above 90%  copd-duoneb q 6, symbicort 160 2 bid, spiriva 1 q day, acapella, incentive spirometry  cards-s/p formal cards evaln--s/p echo to R/O PAH-RHF---CHF evaln (RV dysfunction/mod AS)-continue diuresis (IV)  GI-s/p liver transplant-[facility]         Heme-evaln and GI evaln for anemia.  psych-depression--formal psych consult          Lung Ca screening--CT chest is No evidence of Dz  DVT/GI prophylaxis        Care coordinator consult--NH placement  Leandro Santos MD-Pulmonary   [phone]

## 2020-05-19 NOTE — PROGRESS NOTE ADULT - PROBLEM SELECTOR PLAN 1
- continue Furosemide 80 mg IV BID  - recommend compression stockings/elevation for LEs   - Strict I&Os, fluid restriction 1.5L daily  - Daily standing weights. - Would start lasix 80mg PO QD starting morning of 5/20  - recommend compression stockings/elevation for LEs   - Strict I&Os, fluid restriction 1.5L daily  - Daily standing weights.

## 2020-05-19 NOTE — PROGRESS NOTE ADULT - PROBLEM SELECTOR PLAN 2
Appears to be chronic lymphedema; pt endorses increased swelling and has been non-adherent to diuretics. [facility]% and nl LVSF/RVSF.  - TTE 5/14/20: LVIDd 4.2cm, LVEF 55%, grossly reduced RVSF, mod AS, borderline PH (RVSP 36 mmHg)  - c/w spironolactone 25BID and lasix 80 IV BID (home dose is PO lasix 40 BID); however, concern for contraction alkalosis given bicarb up to 41 on BMP. As per HF, keep diuresis regimen and Diamox 500mg IV x1 given on 5/18. VBG (5/19) showed pH 7.3.  - wound care following  - monitor Is/Os, daily weights, fluid restrict to 1.5L /day   - LE doppler neg for RLE DVT  - Appreciate HF recs    # RLE Cellulitis   - c/w cefazolin (5/14- ). Appears to be improving  - Blood Cx (5/15): NGTD

## 2020-05-19 NOTE — DIETITIAN INITIAL EVALUATION ADULT. - OTHER INFO
Pt reports good appetite and PO intake at home, denies any changes in PO intake. Confirms NKFA. Reports taking Multivitamin PTA, denies drinking any nutritional supplement. Reports following a low salt, low carbs, and low sugar diet at home; unwilling to provide diet recall. Reports monitoring BG 2-3xday with ranges between 200 - 300 mg/dl and states taking Lantus and Humalog at home; HbA1c (05/14) 6.6% - indicates good BG control. Denies obtaining daily weights at home.     Pt reports good appetite and PO intake. Denies difficulty chewing/swallowing. Pt denies nausea, vomiting, diarrhea, or constipation, reports last BM 2 days ago (05/17). Pt with hx of COPD - denies any difficulty breathing and eating at the same time.     Pt denies having questions/concerns about diet and nutrition at this time- refused education/recommendations; made aware RD remains available.

## 2020-05-19 NOTE — DIETITIAN INITIAL EVALUATION ADULT. - DIET TYPE
Recommend Consistent Carbohydrate with snack + DASH/TLC diet + 1500 ml fluid restriction (defer fluids to team). Will continue to monitor and adjust as needed.

## 2020-05-19 NOTE — PROGRESS NOTE ADULT - SUBJECTIVE AND OBJECTIVE BOX
PROGRESS NOTE:   Authored by Dr. Von Hobbs MD  Pager [phone] [facility], [id number] [place]     Patient is a 69y old  Male who presents with a chief complaint of s/p mech fall (19 May 2020 05:10)      SUBJECTIVE / OVERNIGHT EVENTS:  - Overnight, no acute events.  - Today, pt seen and examined at bedside in AM. Pt reports he feels okay, states he believes his legs seem to be improving. Has dyspnea at baseline and is on 3L O2NC at home, states he is breathing better on the 4LNC. Denies f/c/n/v, CP, SOB otherwise, abdominal pain, hematochezia and melena.    ADDITIONAL REVIEW OF SYSTEMS:    MEDICATIONS  (STANDING):  buprenorphine 2 mG/naloxone 0.5 mG SL  Tablet 1 Tablet(s) SubLingual <User Schedule>  ceFAZolin   IVPB 2000 milliGRAM(s) IV Intermittent every 8 hours  dextrose 5%. 1000 milliLiter(s) (50 mL/Hr) IV Continuous <Continuous>  dextrose 50% Injectable 12.5 Gram(s) IV Push once  dextrose 50% Injectable 25 Gram(s) IV Push once  dextrose 50% Injectable 25 Gram(s) IV Push once  furosemide   Injectable 80 milliGRAM(s) IV Push every 12 hours  heparin   Injectable 5000 Unit(s) SubCutaneous every 8 hours  insulin glargine Injectable (LANTUS) 38 Unit(s) SubCutaneous at bedtime  insulin glargine Injectable (LANTUS) 16 Unit(s) SubCutaneous every morning  insulin lispro (HumaLOG) corrective regimen sliding scale   SubCutaneous three times a day before meals  insulin lispro (HumaLOG) corrective regimen sliding scale   SubCutaneous at bedtime  metoprolol succinate ER 25 milliGRAM(s) Oral daily  multivitamin/minerals 1 Tablet(s) Oral daily  nystatin Powder 1 Application(s) Topical two times a day  pantoprazole    Tablet 40 milliGRAM(s) Oral two times a day  predniSONE   Tablet 3 milliGRAM(s) Oral every other day  sertraline 100 milliGRAM(s) Oral daily  sodium chloride 0.65% Nasal 1 Spray(s) Both Nostrils daily  spironolactone 25 milliGRAM(s) Oral two times a day  tacrolimus 0.5 milliGRAM(s) Oral two times a day  tamsulosin 0.4 milliGRAM(s) Oral at bedtime  tiotropium 18 MICROgram(s) Capsule 1 Capsule(s) Inhalation daily  trimethoprim   80 mG/sulfamethoxazole 400 mG 1 Tablet(s) Oral <User Schedule>    MEDICATIONS  (PRN):  albuterol/ipratropium for Nebulization 3 milliLiter(s) Nebulizer every 6 hours PRN Shortness of Breath and/or Wheezing  dextrose 40% Gel 15 Gram(s) Oral once PRN Blood Glucose LESS THAN 70 milliGRAM(s)/deciliter  glucagon  Injectable 1 milliGRAM(s) IntraMuscular once PRN Glucose LESS THAN 70 milligrams/deciliter      CAPILLARY BLOOD GLUCOSE      POCT Blood Glucose.: 283 mg/dL (18 May 2020 21:48)  POCT Blood Glucose.: 261 mg/dL (18 May 2020 17:12)  POCT Blood Glucose.: 176 mg/dL (18 May 2020 12:02)  POCT Blood Glucose.: 162 mg/dL (18 May 2020 08:20)    I&O's Summary    18 May 2020 07:01  -  19 May 2020 07:00  --------------------------------------------------------  IN: 580 mL / OUT: 3000 mL / NET: -2420 mL        PHYSICAL EXAM:  Vital Signs Last 24 Hrs  T(C): 36.9 (19 May 2020 04:16), Max: 36.9 (18 May 2020 14:18)  T(F): 98.5 (19 May 2020 04:16), Max: 98.5 (18 May 2020 14:18)  HR: 78 (19 May 2020 04:16) (78 - 83)  BP: 147/84 (19 May 2020 04:16) (124/71 - 147/84)  BP(mean): --  RR: 18 (19 May 2020 04:16) (18 - 18)  SpO2: 98% (19 May 2020 04:16) (94% - 98%)    GENERAL: NAD   RESPIRATORY: Normal respiratory effort; lungs are clear to auscultation bilaterally, on 4LNC   CARDIOVASCULAR: Regular rate and rhythm, normal S1 and S2, no murmur/rub/gallop  ABDOMEN: +BS, Soft, Non-distended, Nontender to palpation  EXT: Significant b/l LE edema with venous stasis changes and erythema (R> L). Erythema extends up to the thigh on the RLE. Warm to touch. Non-tender to palpation. Overall RLE improving.    NEURO: Alert, good concentration, moving extremities     LABS:                        8.1    3.72  )-----------( 112      ( 19 May 2020 06:27 )             See note    05-19    139  |  91<L>  |  33<H>  ----------------------------<  133<H>  4.2   |  41<H>  |  1.42<H>    Ca    9.1      19 May 2020 06:27  Phos  3.1     05-19  Mg     2.2     05-19    TPro  7.6  /  Alb  3.5  /  TBili  0.6  /  DBili  x   /  AST  22  /  ALT  <5<L>  /  AlkPhos  128<H>  05-19                RADIOLOGY & ADDITIONAL TESTS:  Results Reviewed:   Imaging Personally Reviewed:  Electrocardiogram Personally Reviewed:    COORDINATION OF CARE:  Care Discussed with Consultants/Other Providers [Y/N]:  Prior or Outpatient Records Reviewed [Y/N]:

## 2020-05-19 NOTE — PROGRESS NOTE ADULT - PROBLEM SELECTOR PLAN 3
- FOBT positive, on Protonix 40mg po BID  - Transfused total of 2U PRBC  - Per GI, no indication for emergent scope so long as he remains stable.

## 2020-05-19 NOTE — DIETITIAN INITIAL EVALUATION ADULT. - PROBLEM SELECTOR PLAN 2
appears to be chronic lymphedema; pt endorses increased swelling and has been non-adherent to diuretics. last TTE in [facility] on 5/2016 shows EF 60% and nl LVSF/RVSF.  - s/p lasix 80 IVP x1 in ED  - will restart spironolactone 25BID and place on lasix 40 IV BID for now (home dose is PO lasix 40 BID)  - wound care c/s for lymphedema  - monitor Is/Os, daily weights  - ?RLE cellulitis - although pt states erythema is not worse than usual - however, with bandemia, will start ancef for possible cellulitis

## 2020-05-19 NOTE — CHART NOTE - NSCHARTNOTEFT_GEN_A_CORE
Dr. Sana Bradley MD  Internal Medicine, PGY-1  Pager # [phone] ([facility]) / [phone] ([facility])    Patient refused BiPAP.     Explained the results of VBG, the purpose of BiPAP and potential consequences of refusal. Patient refused BiPAP 2/2 discomfort with mask and sensation of difficulty breathing. Stated that "I don't need it". Reported to be asymptomatic, no difficulty breathing on O2 via NC or change in mental status.   A+O x 3, answering questions appropriately. No respiratory distress on NC O2.     Advised patient to notify RN if experiencing SOB or change in mentation. Patient will accept BiPAP "if I need it".     RN aware to continue monitor.     PCO2 74 (previsouly 93) on 9:50 PM VBG.

## 2020-05-19 NOTE — DIETITIAN INITIAL EVALUATION ADULT. - PROBLEM SELECTOR PLAN 5
plt 89 on admission, has been 60-90 on [employer] labs   - likely chronic and in setting of liver dz  - ctm CBC, monitor for signs of bleeding

## 2020-05-19 NOTE — PROGRESS NOTE ADULT - PROBLEM SELECTOR PLAN 1
Hgb 7.0 on admission, was 10.2 on 2/2020 (in [employer]). Pt endorsing dark stools, blood with wiping, overall weakness, positive FOBT. Last endoscopy/colonoscopy about 8-10 yrs ago per patient.   - s/p 1U PRBC in ED and 1U on 5/15  - Hgb stable ~7-8, continue to monitor CBC  - maintain active T&S  - c/w Protonix 40mg PO BID  - Hold home ASA for now  - As per Hepatology, non-emergent EGD/Colonoscopy +/- capsule currently planned for outpatient, but can consider inpatient scope if overt bleeding/dropping Hgb

## 2020-05-19 NOTE — DIETITIAN INITIAL EVALUATION ADULT. - ENERGY NEEDS
Pertinent information as per chart: Pt 70 y/o M with PMH: HTN, T2DM, COPD, HCV/EtOH cirrhosis S/P liver transplant (2011), HLD, chronic lymphedema, presents after mechanical fall 2/2 weakness, found to be anemic with positive FOBT and non-adherence to diuretics, admitted for anemia likely 2/2 GIB and LE edema 2/2 fluid overload in setting of medication non-adherence, likely c/b RLE cellulitis, acute on chronic diastolic congestive heart failure.

## 2020-05-19 NOTE — PROGRESS NOTE ADULT - ASSESSMENT
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 Found w/ signif edema and Hgb-7--  multifactorial issues.  ******************  5/15-s/p echo-RV dysfunction, mod AS  5/16-better over all--CHF evaln-continue diuresis  5/17-better over all-CHF team f/up-80 bid IV  5/18-improving but still no ambulation-CHF f/up  5/19-slow improvements-less edema, remains on NC 3-4 liters

## 2020-05-19 NOTE — PROGRESS NOTE ADULT - SUBJECTIVE AND OBJECTIVE BOX
CHIEF COMPLAINT: f/up sob, copd, RHF, obesity-less leg swelling-over all improved    Interval Events: hepatology f/up    REVIEW OF SYSTEMS:  Constitutional: No fevers or chills. No weight loss. + fatigue or generalized malaise.  Eyes: No itching or discharge from the eyes  ENT: No ear pain. No ear discharge. No nasal congestion. No post nasal drip. No epistaxis. No throat pain. No sore throat. No difficulty swallowing.   CV: No chest pain. No palpitations. No lightheadedness or dizziness.   Resp: No dyspnea at rest. + dyspnea on exertion. No orthopnea. No wheezing. No cough. No stridor. No sputum production. No chest pain with respiration.  GI: No nausea. No vomiting. No diarrhea.  MSK: No joint pain or pain in any extremities  Integumentary: No skin lesions. + pedal edema.  Neurological: + gross motor weakness. No sensory changes.  [ +] All other systems negative  [ ] Unable to assess ROS because ________    OBJECTIVE:  ICU Vital Signs Last 24 Hrs  T(C): 36.9 (19 May 2020 04:16), Max: 36.9 (18 May 2020 14:18)  T(F): 98.5 (19 May 2020 04:16), Max: 98.5 (18 May 2020 14:18)  HR: 78 (19 May 2020 04:16) (78 - 83)  BP: 147/84 (19 May 2020 04:16) (124/71 - 154/77)  BP(mean): --  ABP: --  ABP(mean): --  RR: 18 (19 May 2020 04:16) (18 - 18)  SpO2: 98% (19 May 2020 04:16) (94% - 98%)        05-17 @ 07:01  -  05-18 @ 07:00  --------------------------------------------------------  IN: 240 mL / OUT: 2700 mL / NET: -2460 mL    05-18 @ 07:01  -  05-19 @ 05:10  --------------------------------------------------------  IN: 290 mL / OUT: 1900 mL / NET: -1610 mL      CAPILLARY BLOOD GLUCOSE      POCT Blood Glucose.: 283 mg/dL (18 May 2020 21:48)      PHYSICAL EXAM: NAD in bed on NC O2  General: Awake, alert, oriented X 3.   HEENT: Atraumatic, normocephalic.                 Mallampatti Grade 3                No nasal congestion.                No tonsillar or pharyngeal exudates.  Lymph Nodes: No palpable lymphadenopathy  Neck: No JVD. No carotid bruit.   Respiratory: Normal chest expansion                         Normal percussion                         Normal and equal air entry                         No wheeze, rhonchi or rales.  Cardiovascular: S1 S2 normal. No murmurs, rubs or gallops.   Abdomen: Soft, non-tender, non-distended. No organomegaly. Normoactive bowel sounds.  Extremities: Warm to touch. Peripheral pulse palpable. ++ pedal edema.   Skin: No rashes or skin lesions  Neurological: Motor and sensory examination equal and normal in all four extremities.  Psychiatry: Appropriate mood and affect.    HOSPITAL MEDICATIONS:  MEDICATIONS  (STANDING):  buprenorphine 2 mG/naloxone 0.5 mG SL  Tablet 1 Tablet(s) SubLingual <User Schedule>  ceFAZolin   IVPB 2000 milliGRAM(s) IV Intermittent every 8 hours  dextrose 5%. 1000 milliLiter(s) (50 mL/Hr) IV Continuous <Continuous>  dextrose 50% Injectable 12.5 Gram(s) IV Push once  dextrose 50% Injectable 25 Gram(s) IV Push once  dextrose 50% Injectable 25 Gram(s) IV Push once  furosemide   Injectable 80 milliGRAM(s) IV Push every 12 hours  heparin   Injectable 5000 Unit(s) SubCutaneous every 8 hours  insulin glargine Injectable (LANTUS) 38 Unit(s) SubCutaneous at bedtime  insulin glargine Injectable (LANTUS) 16 Unit(s) SubCutaneous every morning  insulin lispro (HumaLOG) corrective regimen sliding scale   SubCutaneous three times a day before meals  insulin lispro (HumaLOG) corrective regimen sliding scale   SubCutaneous at bedtime  metoprolol succinate ER 25 milliGRAM(s) Oral daily  multivitamin/minerals 1 Tablet(s) Oral daily  nystatin Powder 1 Application(s) Topical two times a day  pantoprazole    Tablet 40 milliGRAM(s) Oral two times a day  predniSONE   Tablet 3 milliGRAM(s) Oral every other day  sertraline 100 milliGRAM(s) Oral daily  sodium chloride 0.65% Nasal 1 Spray(s) Both Nostrils daily  spironolactone 25 milliGRAM(s) Oral two times a day  tacrolimus 0.5 milliGRAM(s) Oral two times a day  tamsulosin 0.4 milliGRAM(s) Oral at bedtime  tiotropium 18 MICROgram(s) Capsule 1 Capsule(s) Inhalation daily  trimethoprim   80 mG/sulfamethoxazole 400 mG 1 Tablet(s) Oral <User Schedule>    MEDICATIONS  (PRN):  albuterol/ipratropium for Nebulization 3 milliLiter(s) Nebulizer every 6 hours PRN Shortness of Breath and/or Wheezing  dextrose 40% Gel 15 Gram(s) Oral once PRN Blood Glucose LESS THAN 70 milliGRAM(s)/deciliter  glucagon  Injectable 1 milliGRAM(s) IntraMuscular once PRN Glucose LESS THAN 70 milligrams/deciliter      LABS:                        7.8    3.10  )-----------( 97       ( 18 May 2020 06:38 )             29.5     05-18    140  |  91<L>  |  31<H>  ----------------------------<  140<H>  4.2   |  40<H>  |  1.28    Ca    8.9      18 May 2020 06:38  Phos  2.5     05-18  Mg     2.1     05-18    TPro  7.2  /  Alb  3.2<L>  /  TBili  0.5  /  DBili  x   /  AST  21  /  ALT  5<L>  /  AlkPhos  111  05-18              MICROBIOLOGY:     RADIOLOGY:  [ ] Reviewed and interpreted by me    Point of Care Ultrasound Findings:    PFT:    EKG:

## 2020-05-19 NOTE — PROGRESS NOTE ADULT - ASSESSMENT
69 year old man with hx of Hypertension, DM2, COPD, EtOH/HCV Cirrhosis s/p liver transplant (2011, on cellcept/tacrolimus), hyperlipidemia and chronic lymph edema presents with fall from home in the setting of anemia and worsening edema due to diuretic nonadherence.    Impression:   # Normocytic Anemia: Unclear if clinically significant GI bleeding; patient endorses blood with wiping & 'dark stools' - however light brown on rectal. Differential includes bleeding from PUD, Colorectal cancer, angiectasias, etc.  # EtOH/HCV Cirrhosis s/p Liver transplant: On Tacrolimus/Cellcept  # Chronic Lower Extremity Edema  # RLE Cellulitis  # COPD  # HFrEF- on Lasix IV per cardiology    Recommendation:  - Nonemergent EGD/Colonoscopy +/- capsule as patient currently without overt bleeding with stable Hb  - Hold Cellcept given cellulitis, continue Tacrolimus and Prednisone  - Check Tacro level daily  - Abx for primary team  - Supportive care per primary team    Candace Tejeda MD  Gastroenterology Fellow  [phone]  Please page on call fellow on weekends and after 5pm on weekdays 69 year old man with hx of Hypertension, DM2, COPD, EtOH/HCV Cirrhosis s/p liver transplant (2011, on cellcept/tacrolimus), hyperlipidemia and chronic lymph edema presents with fall from home in the setting of anemia and worsening edema due to diuretic nonadherence.    Impression:   # Normocytic Anemia: Unclear if clinically significant GI bleeding; patient endorses blood with wiping & 'dark stools' - however light brown on rectal. Differential includes bleeding from PUD, Colorectal cancer, angiectasias, etc.  # EtOH/HCV Cirrhosis s/p Liver transplant: On Tacrolimus/Cellcept  # EDUAR: Likely pre-renal in the setting of diuresis  # Chronic Lower Extremity Edema  # RLE Cellulitis  # COPD  # HFrEF    Recommendation:  - Nonemergent EGD/Colonoscopy +/- capsule as patient currently without overt bleeding with stable Hb  - Hold Cellcept given cellulitis, continue Tacrolimus and Prednisone  - Check Tacro level daily  - Abx for primary team  - Supportive care per primary team    Candace Tejeda MD  Gastroenterology Fellow  [phone]  Please page on call fellow on weekends and after 5pm on weekdays

## 2020-05-19 NOTE — PROGRESS NOTE ADULT - ATTENDING COMMENTS
as above-improving, s/p CHF evaln (IV diuretics)--PT needed; completing ABX for cellulitis  multifactorial dyspnea--copd, CAD, PAH (portopulmonary), debility, anemia--O2 sat above 90%  copd-duoneb q 6, symbicort 160 2 bid, spiriva 1 q day, acapella, incentive spirometry  cards-s/p formal cards evaln--s/p echo to R/O PAH-RHF---CHF evaln (RV dysfunction/mod AS)-continue diuresis (IV)  GI-s/p liver transplant-Hailey et al.         Heme-evaln and GI evaln for anemia.  psych-depression--formal psych consult          Lung Ca screening--CT chest is No evidence of Dz  DVT/GI prophylaxis        Care coordinator consult--NH placement  Leandro Santos MD-Pulmonary   [phone]

## 2020-05-19 NOTE — PROGRESS NOTE ADULT - ATTENDING COMMENTS
Hepatology Staff: Ana Abdalla MD    I saw and examined the patient along with  Dr. Tejeda  05-19-20 @ 11:22.    Patient Medical Record, hosptial course was reviewed and summarized as below:    Vitals: Vital Signs Last 24 Hrs  T(C): 36.9 (19 May 2020 04:16), Max: 36.9 (18 May 2020 14:18)  T(F): 98.5 (19 May 2020 04:16), Max: 98.5 (18 May 2020 14:18)  HR: 78 (19 May 2020 04:16) (78 - 83)  BP: 147/84 (19 May 2020 04:16) (124/71 - 147/84)    RR: 18 (19 May 2020 04:16) (18 - 18)  SpO2: 98% (19 May 2020 04:16) (94% - 98%)    Labs:Creatinine, Serum: 1.42 mg/dL (05-19-20 @ 06:27)  Bilirubin Total, Serum: 0.6 mg/dL (05-19-20 @ 06:27)      I/O: I&O's Summary    18 May 2020 07:01  -  19 May 2020 07:00  --------------------------------------------------------  IN: 580 mL / OUT: 3000 mL / NET: -2420 mL    19 May 2020 07:01  -  19 May 2020 11:22  --------------------------------------------------------  IN: 240 mL / OUT: 0 mL / NET: 240 mL      Nutritional Status:   Albumin, Serum: 3.5 g/dL (05-19-20 @ 06:27)    Last 24 hour events:  Noted mild EDUAR.    Recommendations: Recommend IV Albumin 25% 25 g q 8hrs x 3 dose. Keep current immunosupression regimen.      Plan discussed with Primary team.

## 2020-05-19 NOTE — CHART NOTE - NSCHARTNOTEFT_GEN_A_CORE
In light of VBG showing hypercarbia to pCO2 90s, discussed with patient regarding starting bipap, patient was initially agreeable but later declined, say he will try it tonight. Will repeat VBG and BMP and reassess

## 2020-05-19 NOTE — DIETITIAN INITIAL EVALUATION ADULT. - PROBLEM SELECTOR PLAN 7
- c/w spiriva, symbicort 160 2 bid  - will place on acapella, incentive spirometry   - c/w supplemental O2 3L (home O2 lvl)  - guille PRN q6

## 2020-05-19 NOTE — DIETITIAN INITIAL EVALUATION ADULT. - NAME AND PHONE
1. Will continue to monitor PO intake, weight, labs, skin, GI status, diet. 2. Encourage PO intake and obtain food preferences (pt did not have any at this time). 3. Reinforce nutrition therapy education as needed/requested -pt made aware RD remains available.

## 2020-05-19 NOTE — DIETITIAN INITIAL EVALUATION ADULT. - PHYSICAL APPEARANCE
No visual signs of muscle/fat loss noted/obese/other (specify) Ht: 73 inches UBW: 220 pounds BMI: 29.0 kg/m2 IBW: 184 (+/-10%) 119.6 %IBW  Noted +1 left leg and ankle edema as per flow sheets (previously +3 right leg, foot, and ankle and +2 left leg, foot, and ankle edema).  Skin: no noted pressure injuries as per documentation.

## 2020-05-19 NOTE — PROGRESS NOTE ADULT - SUBJECTIVE AND OBJECTIVE BOX
Subjective:  - No acute events overnight. He received a dose of Diamox yesterday for concerns of contraction alkalosis.     Medications:  albuterol/ipratropium for Nebulization 3 milliLiter(s) Nebulizer every 6 hours PRN  buprenorphine 2 mG/naloxone 0.5 mG SL  Tablet 1 Tablet(s) SubLingual <User Schedule>  ceFAZolin   IVPB 2000 milliGRAM(s) IV Intermittent every 8 hours  dextrose 40% Gel 15 Gram(s) Oral once PRN  dextrose 5%. 1000 milliLiter(s) IV Continuous <Continuous>  dextrose 50% Injectable 12.5 Gram(s) IV Push once  dextrose 50% Injectable 25 Gram(s) IV Push once  dextrose 50% Injectable 25 Gram(s) IV Push once  furosemide   Injectable 80 milliGRAM(s) IV Push every 12 hours  glucagon  Injectable 1 milliGRAM(s) IntraMuscular once PRN  heparin   Injectable 5000 Unit(s) SubCutaneous every 8 hours  insulin glargine Injectable (LANTUS) 38 Unit(s) SubCutaneous at bedtime  insulin glargine Injectable (LANTUS) 16 Unit(s) SubCutaneous every morning  insulin lispro (HumaLOG) corrective regimen sliding scale   SubCutaneous three times a day before meals  insulin lispro (HumaLOG) corrective regimen sliding scale   SubCutaneous at bedtime  metoprolol succinate ER 25 milliGRAM(s) Oral daily  multivitamin/minerals 1 Tablet(s) Oral daily  nystatin Powder 1 Application(s) Topical two times a day  pantoprazole    Tablet 40 milliGRAM(s) Oral two times a day  predniSONE   Tablet 3 milliGRAM(s) Oral every other day  sertraline 100 milliGRAM(s) Oral daily  sodium chloride 0.65% Nasal 1 Spray(s) Both Nostrils daily  spironolactone 25 milliGRAM(s) Oral two times a day  tacrolimus 0.5 milliGRAM(s) Oral two times a day  tamsulosin 0.4 milliGRAM(s) Oral at bedtime  tiotropium 18 MICROgram(s) Capsule 1 Capsule(s) Inhalation daily  trimethoprim   80 mG/sulfamethoxazole 400 mG 1 Tablet(s) Oral <User Schedule>    Physical Exam:    Vitals:  Vital Signs Last 24 Hours  T(C): 36.9 (20 @ 04:16), Max: 36.9 (20 @ 14:18)  HR: 78 (20 @ 04:16) (78 - 83)  BP: 147/84 (20 @ 04:16) (124/71 - 147/84)  RR: 18 (20 @ 04:16) (18 - 18)  SpO2: 98% (20 @ 04:16) (94% - 98%)    Weight in k.4 ( @ 10:47)    I&O's Summary    18 May 2020 07:01  -  19 May 2020 07:00  --------------------------------------------------------  IN: 580 mL / OUT: 3000 mL / NET: -2420 mL    Tele:    Appearance: No Acute Distress  HEENT: JVP at least moderately elevated   Cardiovascular: RRR, Normal S1 S2, No murmurs/rubs/gallops. +3 LE edema through thighs, improving  Respiratory: Clear to auscultation bilaterally  Gastrointestinal: Soft, Non-tender, non-distended	  Skin: no skin lesions  Neurologic: Non-focal  Extremities: Warm peripherally. improved erythema of R thigh  Psychiatry: A & O x 3, Mood & affect appropriate    Labs:                        8.1    3.72  )-----------( 112      ( 19 May 2020 06:27 )             See note        139  |  91<L>  |  33<H>  ----------------------------<  133<H>  4.2   |  41<H>  |  1.42<H>    Ca    9.1      19 May 2020 06:27  Phos  3.1       Mg     2.2         TPro  7.6  /  Alb  3.5  /  TBili  0.6  /  DBili  x   /  AST  22  /  ALT  <5<L>  /  AlkPhos  128<H>      Serum Pro-Brain Natriuretic Peptide: 849 pg/mL (05-15 @ 06:52)  Serum Pro-Brain Natriuretic Peptide: 2674 pg/mL ( @ 16:13)

## 2020-05-19 NOTE — DIETITIAN INITIAL EVALUATION ADULT. - PROBLEM SELECTOR PLAN 10
Transitions of Care Status:  1.  Name of PCP: Dr. Leandro Santos ([facility])   2.  PCP Contacted on Admission: [ ] Y    [ ] N    3.  PCP contacted at Discharge: [ ] Y    [ ] N    [ ] N/A  4.  Post-Discharge Appointment Date and Location:  5.  Summary of Handoff given to PCP:

## 2020-05-19 NOTE — PROGRESS NOTE ADULT - ATTENDING COMMENTS
69yrs, not know to HF service. HFpEF with RV dysfunction. Followed for cards at [facility].  Hep C and alcoholic chirhosis underwent liver transplant 2011 at [facility].   HTN, CKD baseline Cr 1.3-1.5, DM2 on insulin, COPD home 02,   hypelipidemia, chronic lymphadema.   Admitted 5.13 after a fall. found to be in ADHF related to diuretic non compliance. also found to be anemic.   IV diuretics 18lbs.   admission Cr 1.6, improved to 1.28  Seen by hepatology, patient needs endoscopy for anemia as outpatient. 2 PRBCs.   RLE cellulitis on cefazolin.   meds: lasix 80 IV bid, toprol XK 25, ald 25 bid, seboxone, prograf 0.5 bid (last level < 2), off cellcept, pred 3 QOD, bactrim, insulin. diamox one dose.   afebrile 70-80SR, 120/-140/,   I/O: -2.4  05-19    139  |  91<L>  |  33<H>  ----------------------------<  133<H>  4.2   |  41<H>  |  1.42<H>  Ca    9.1      19 May 2020 06:27  Phos  3.1     05-19  Mg     2.2     05-19  TPro  7.6  /  Alb  3.5  /  TBili  0.6  /  DBili  x   /  AST  22  /  ALT  <5<L>  /  AlkPhos  128<H>  05-19                        8.1    3.72  )-----------( 112      ( 19 May 2020 06:27 )             See note  WBC 3.7, 3.1, 2.88, 3.27 (11 on admission) 69yrs, not know to HF service. HFpEF with RV dysfunction. Followed for cards at [facility].  Hep C and alcoholic chirhosis underwent liver transplant 2011 at [facility].   HTN, CKD baseline Cr 1.3-1.5, DM2 on insulin, COPD home 02,   hypelipidemia, chronic lymphadema.   Admitted 5.13 after a fall. found to be in ADHF related to diuretic non compliance. also found to be anemic.   IV diuretics 18lbs.   admission Cr 1.6, improved to 1.28  Seen by hepatology, patient needs endoscopy for anemia as outpatient. 2 PRBCs.   RLE cellulitis on cefazolin.   meds: lasix 80 IV bid, toprol XK 25, ald 25 bid, seboxone, prograf 0.5 bid (last level < 2), off cellcept, pred 3 QOD, bactrim, insulin. diamox one dose.   afebrile 70-80SR, 120/-140/,   I/O: -2.4  05-19    139  |  91<L>  |  33<H>  ----------------------------<  133<H>  4.2   |  41<H>  |  1.42<H>  Ca    9.1      19 May 2020 06:27  Phos  3.1     05-19  Mg     2.2     05-19  TPro  7.6  /  Alb  3.5  /  TBili  0.6  /  DBili  x   /  AST  22  /  ALT  <5<L>  /  AlkPhos  128<H>  05-19                        8.1    3.72  )-----------( 112      ( 19 May 2020 06:27 )             See note  WBC 3.7, 3.1, 2.88, 3.27 (11 on admission)  Appears stable from a HF perspective. IV diuretics have been stopped.  Suggest:   Start Lasix 80 po QD from tomorrow.   Patient is effectively almost off immunosupression would consult with liver transplant team. Bactrim may be contributing to leukopenia.   Scott Macedo 69yrs, not know to HF service. HFpEF with RV dysfunction. Followed for cards at [facility].  Hep C and alcoholic chirhosis underwent liver transplant 2011 at [facility].   HTN, CKD baseline Cr 1.3-1.5, DM2 on insulin, COPD home 02,   hypelipidemia, chronic lymphadema.   Admitted 5.13 after a fall. found to be in ADHF related to diuretic non compliance. also found to be anemic.   IV diuretics 18lbs.   admission Cr 1.6, improved to 1.28  Seen by hepatology, patient needs endoscopy for anemia as outpatient. 2 PRBCs.   RLE cellulitis on cefazolin.   meds: lasix 80 IV bid, toprol XK 25, ald 25 bid, seboxone, prograf 0.5 bid (last level < 2), off cellcept, pred 3 QOD, bactrim, insulin. diamox one dose.   afebrile 70-80SR, 120/-140/,   I/O: -2.4  05-19    139  |  91<L>  |  33<H>  ----------------------------<  133<H>  4.2   |  41<H>  |  1.42<H>  Ca    9.1      19 May 2020 06:27  Phos  3.1     05-19  Mg     2.2     05-19  TPro  7.6  /  Alb  3.5  /  TBili  0.6  /  DBili  x   /  AST  22  /  ALT  <5<L>  /  AlkPhos  128<H>  05-19                        8.1    3.72  )-----------( 112      ( 19 May 2020 06:27 )             See note  WBC 3.7, 3.1, 2.88, 3.27 (11 on admission)  Appears stable from a HF perspective. IV diuretics have been stopped.  Suggest:   Start Lasix 80 po QD from tomorrow.   discussed immunosuppression with Dr Garcia.   Scott Macedo

## 2020-05-19 NOTE — DIETITIAN INITIAL EVALUATION ADULT. - NS FNS WEIGHT CHANGE REASON
Pt reports 30 pounds weight gain x 2 months PTA due to fluid accumulation, from  to 250 pounds. Weight as per flow sheets (05/13) 260 pounds -> (05/19) 242.6 pounds -?accuracy of weight fluctuations likely due fluid shifts as pt with edema, will continue to monitor.

## 2020-05-19 NOTE — PROGRESS NOTE ADULT - PROBLEM SELECTOR PLAN 3
- hold cellcept for active infection  - c/w tacro 0.5 BID  - c/w prednisone 3mg every other day  - Monitor tacro level 30min prior to AM dose every other day, as per Hepatology

## 2020-05-19 NOTE — DIETITIAN INITIAL EVALUATION ADULT. - PROBLEM SELECTOR PLAN 6
pt on lantus 26U qAM and 52U qPM, humalog 18U qAM and 24U qPM. A1c 7.2 in 1/2020.   - will restart lantus at 16U qAM and 34U qPM  - will place on mod ISS and FS qid  - will hold off on restarting standing premeal insulin  - can consider endo c/s if persistently hyperglycemic

## 2020-05-19 NOTE — PHARMACOTHERAPY INTERVENTION NOTE - COMMENTS
70 yo M w/ hx of HTN, T2DM (on insulin; A1C 6.6), COPD (on home O2 3L), s/p liver txp (2011) on cellcept and tacrolimus, HLD, and current treatment of cellulitis. At home takes lantus twice daily (52 units and 26 units) and humalog twice daily (18 units and 24 units). Currently on lantus 16 units AM and 38 units HS. BG in past 24 hrs: 251, 165, 283, 261. Would recommend adding humalog 4 units SQ TID premeal.    Jose Richards, PharmD, BCPS  [phone]

## 2020-05-19 NOTE — DIETITIAN INITIAL EVALUATION ADULT. - PROBLEM SELECTOR PLAN 1
hgb 7.0 on admission, was 10.2 on 2/2020 (in [employer]). pt endorsing dark stools, blood with wiping, overall weakness, positive FOBT. Last endoscopy/colonoscopy about 8-10 yrs ago per patient.   - s/p 1U PRBC in ED; f/u AM cbc   - maintain active T&S  - GI consult  - c/w PPI 40 IV BID

## 2020-05-19 NOTE — PROGRESS NOTE ADULT - SUBJECTIVE AND OBJECTIVE BOX
Chief Complaint:  Patient is a 69y old  Male who presents with a chief complaint of s/p [facility] fall (19 May 2020 08:44)    Interval Events:   No acute overnight events    Allergies:  clindamycin (Unknown)    Hospital Medications:  albuterol/ipratropium for Nebulization 3 milliLiter(s) Nebulizer every 6 hours PRN  buprenorphine 2 mG/naloxone 0.5 mG SL  Tablet 1 Tablet(s) SubLingual <User Schedule>  ceFAZolin   IVPB 2000 milliGRAM(s) IV Intermittent every 8 hours  dextrose 40% Gel 15 Gram(s) Oral once PRN  dextrose 5%. 1000 milliLiter(s) IV Continuous <Continuous>  dextrose 50% Injectable 12.5 Gram(s) IV Push once  dextrose 50% Injectable 25 Gram(s) IV Push once  dextrose 50% Injectable 25 Gram(s) IV Push once  glucagon  Injectable 1 milliGRAM(s) IntraMuscular once PRN  heparin   Injectable 5000 Unit(s) SubCutaneous every 8 hours  insulin glargine Injectable (LANTUS) 38 Unit(s) SubCutaneous at bedtime  insulin glargine Injectable (LANTUS) 16 Unit(s) SubCutaneous every morning  insulin lispro (HumaLOG) corrective regimen sliding scale   SubCutaneous three times a day before meals  insulin lispro (HumaLOG) corrective regimen sliding scale   SubCutaneous at bedtime  metoprolol succinate ER 25 milliGRAM(s) Oral daily  multivitamin/minerals 1 Tablet(s) Oral daily  nystatin Powder 1 Application(s) Topical two times a day  pantoprazole    Tablet 40 milliGRAM(s) Oral two times a day  predniSONE   Tablet 3 milliGRAM(s) Oral every other day  sertraline 100 milliGRAM(s) Oral daily  sodium chloride 0.65% Nasal 1 Spray(s) Both Nostrils daily  spironolactone 25 milliGRAM(s) Oral two times a day  tacrolimus 0.5 milliGRAM(s) Oral two times a day  tamsulosin 0.4 milliGRAM(s) Oral at bedtime  tiotropium 18 MICROgram(s) Capsule 1 Capsule(s) Inhalation daily  trimethoprim   80 mG/sulfamethoxazole 400 mG 1 Tablet(s) Oral <User Schedule>    PMHX/PSHX:  Hyperlipidemia  Hypertension  S/P liver transplant  Type 2 diabetes mellitus  Chronic obstructive pulmonary disease, unspecified COPD type  S/P liver transplant    ROS:   General:  No fevers, chills or night sweats  ENT:  No sore throat or dysphagia  CV:  No pain or palpitations  Resp:  No dyspnea, cough or  wheezing  GI:  No pain, No nausea, No vomiting, No diarrhea, No rectal bleeding, No tarry stools,  Skin:  No rash or edema    PHYSICAL EXAM:   Vital Signs:  Vital Signs Last 24 Hrs  T(C): 36.9 (19 May 2020 04:16), Max: 36.9 (18 May 2020 14:18)  T(F): 98.5 (19 May 2020 04:16), Max: 98.5 (18 May 2020 14:18)  HR: 78 (19 May 2020 04:16) (78 - 83)  BP: 147/84 (19 May 2020 04:16) (124/71 - 147/84)  BP(mean): --  RR: 18 (19 May 2020 04:16) (18 - 18)  SpO2: 98% (19 May 2020 04:16) (94% - 98%)  Daily     Daily Weight in k.4 (19 May 2020 10:34)    GENERAL:  NAD, Appears stated age  HEENT:  NC/AT,  conjunctivae clear and pink, sclera -anicteric  CHEST:  CTA B/L, Normal effort  HEART:  RRR S1/S2, No murmurs  ABDOMEN:  Soft, non-tender, non-distended, BS+, healed surgical scars  EXTEREMITIES:  No cyanosis + Edema, RLE mildly erythematous, chronic skin changes of venous stasis  SKIN:  Warm & Dry, no jaundice  NEURO:  Alert, oriented x4, no asterixis    LABS:                        8.1    3.72  )-----------( 112      ( 19 May 2020 06:27 )             See note    Mean Cell Volume: See note (20 @ 06:27)        139  |  91<L>  |  33<H>  ----------------------------<  133<H>  4.2   |  41<H>  |  1.42<H>    Ca    9.1      19 May 2020 06:27  Phos  3.1       Mg     2.2         TPro  7.6  /  Alb  3.5  /  TBili  0.6  /  DBili  x   /  AST  22  /  ALT  <5<L>  /  AlkPhos  128<H>      LIVER FUNCTIONS - ( 19 May 2020 06:27 )  Alb: 3.5 g/dL / Pro: 7.6 g/dL / ALK PHOS: 128 U/L / ALT: <5 U/L / AST: 22 U/L / GGT: x                              8.1    3.72  )-----------( 112      ( 19 May 2020 06:27 )             See note                        7.8    3.10  )-----------( 97       ( 18 May 2020 06:38 )             29.5                         7.3    2.88  )-----------( 79       ( 17 May 2020 06:08 )             27.4                         7.9    3.27  )-----------( 86       ( 16 May 2020 15:31 )             28.1       Imaging:

## 2020-05-19 NOTE — PROGRESS NOTE ADULT - ATTENDING COMMENTS
acute on chronic hypoxemic resp failure w hypercapnia, likley due in part from contraction alkalosis from overdiuresis, s/p diamox x 1  hold all diuretics today, aldactone and lasix, can resume from tomorrow  slight EDUAR on CKD stage III  C02, 92, repeat abg 1hr after trial of bipap, mentation is good  patient agreeable to PETROS, get choices  R leg cellulitis day 5/7 of ancef, greatly improving

## 2020-05-19 NOTE — PROGRESS NOTE ADULT - ASSESSMENT
69 year old M with HFpEF, RV systolic dysfunction of unknown chronicity (patient reports known for 4 years), followed by Dr. Garcia at [facility]. His history is notable for HCV/ETOH cirrhosis s/p liver transplant 2011 (on tacrolimus/cellcept/prednisone), HTN, CKD stage 3 (b/l Scr 1.3-1.5), DM2 on insulin, COPD w/ home 02, HLD, chronic lymphedema and remote history of substance abuse (quit 85') on Suboxone. He was admitted after a mechanical fall and found to be volume overloaded with acute on chronic decompensated RV systolic dysfunction in the setting of inconsistently use of his diuretics. Additionally, on arrival found to have anemia consistent with iron deficiency Hb of 7 and FOBT +, transfused with 1U PRBC with appropriate response. Thrombocytopenia appears chronic in nature (b/l count 60-70s). GI plans for outpatient scope. He was also started on IV cefazolin for suspicion of cellulitis of his RLE.     Some symptomatic improvement with initiation of IV diuresis but he remains markedly volume overloaded on exam. He remains with elevated central filling pressures but with some improvement in BLE edema. He received IV Diamox yesterday for concerns of contraction alkalosis, no appreciable change in chloride/bicarb today. He is hemodynamically stable and we are continuing to diurese him with intermittent IV Lasix.     *INCOMPLETE NOTE    Pertinient Cardiac Studies:  TTE 5/14/20: LVIDd 4.2cm, LVEF 55%, grossly reduced RVSF, mod AS, borderline PH (RVSP 36 mmHg)  TTE: 5/24/16: LVEDd 4.7cm, LVEF 60%, mild hypertrophy of interventricular septum without LVOT obstruction, nl RVSF, minimal AS, no PH (no mention of RVSP/PASP) 69 year old M with HFpEF, RV systolic dysfunction of unknown chronicity (patient reports known for 4 years), followed by Dr. Garcia at [facility]. His history is notable for HCV/ETOH cirrhosis s/p liver transplant 2011 (on tacrolimus/cellcept/prednisone), HTN, CKD stage 3 (b/l Scr 1.3-1.5), DM2 on insulin, COPD w/ home 02, HLD, chronic lymphedema and remote history of substance abuse (quit 85') on Suboxone. He was admitted after a mechanical fall and found to be volume overloaded with acute on chronic decompensated RV systolic dysfunction in the setting of inconsistently use of his diuretics. Additionally, on arrival found to have anemia consistent with iron deficiency Hb of 7 and FOBT +, transfused with 1U PRBC with appropriate response. Thrombocytopenia appears chronic in nature (b/l count 60-70s). GI plans for outpatient scope. He was also started on IV cefazolin for suspicion of cellulitis of his RLE.     He has diuresed well with significant improvement of BLE edema with intermittent IV lasix but remains with some elevation of central filling pressures. He received a dose a Diamox yesterday for concerns of contraction alkalosis and his diuretics were discontinued today by primary medicine team. His H/H remains stable with no overt signs of GI bleeding. His leukocytosis from admission has resolved but is now leukopenic.     Pertinient Cardiac Studies:  TTE 5/14/20: LVIDd 4.2cm, LVEF 55%, grossly reduced RVSF, mod AS, borderline PH (RVSP 36 mmHg)  TTE: 5/24/16: LVEDd 4.7cm, LVEF 60%, mild hypertrophy of interventricular septum without LVOT obstruction, nl RVSF, minimal AS, no PH (no mention of RVSP/PASP)

## 2020-05-19 NOTE — PROGRESS NOTE ADULT - PROBLEM SELECTOR PLAN 10
Transitions of Care Status:  1.  Name of PCP: Dr. Leandro Santos ([facility])   2.  PCP Contacted on Admission: [ ] Y    [ ] N    3.  PCP contacted at Discharge: [ ] Y    [ ] N    [ ] N/A  4.  Post-Discharge Appointment Date and Location:  5.  Summary of Handoff given to PCP:

## 2020-05-20 NOTE — PROGRESS NOTE ADULT - SUBJECTIVE AND OBJECTIVE BOX
Subjective:  - Spironolactone discontinued per primary team. Currently not on any diuretics. Additionally, he received 3 doses of albumin. Found to be hypercarbic with Pc02 90and is refusing BIPAP but mentating well and alert.     Medications:  albuterol/ipratropium for Nebulization 3 milliLiter(s) Nebulizer every 6 hours PRN  buprenorphine 2 mG/naloxone 0.5 mG SL  Tablet 1 Tablet(s) SubLingual <User Schedule>  ceFAZolin   IVPB 2000 milliGRAM(s) IV Intermittent every 8 hours  dextrose 40% Gel 15 Gram(s) Oral once PRN  dextrose 5%. 1000 milliLiter(s) IV Continuous <Continuous>  dextrose 50% Injectable 12.5 Gram(s) IV Push once  dextrose 50% Injectable 25 Gram(s) IV Push once  dextrose 50% Injectable 25 Gram(s) IV Push once  glucagon  Injectable 1 milliGRAM(s) IntraMuscular once PRN  heparin   Injectable 5000 Unit(s) SubCutaneous every 8 hours  insulin glargine Injectable (LANTUS) 38 Unit(s) SubCutaneous at bedtime  insulin glargine Injectable (LANTUS) 16 Unit(s) SubCutaneous every morning  insulin lispro (HumaLOG) corrective regimen sliding scale   SubCutaneous three times a day before meals  insulin lispro (HumaLOG) corrective regimen sliding scale   SubCutaneous at bedtime  metoprolol succinate ER 25 milliGRAM(s) Oral daily  multivitamin/minerals 1 Tablet(s) Oral daily  nystatin Powder 1 Application(s) Topical two times a day  pantoprazole    Tablet 40 milliGRAM(s) Oral two times a day  predniSONE   Tablet 3 milliGRAM(s) Oral every other day  sertraline 100 milliGRAM(s) Oral daily  sodium chloride 0.65% Nasal 1 Spray(s) Both Nostrils daily  tacrolimus 0.5 milliGRAM(s) Oral two times a day  tamsulosin 0.4 milliGRAM(s) Oral at bedtime  tiotropium 18 MICROgram(s) Capsule 1 Capsule(s) Inhalation daily  trimethoprim   80 mG/sulfamethoxazole 400 mG 1 Tablet(s) Oral <User Schedule>      Physical Exam:    Vitals:  Vital Signs Last 24 Hours  T(C): 36.6 (20 @ 04:09), Max: 36.9 (20 @ 20:42)  HR: 90 (20 @ 04:35) (87 - 92)  BP: 117/73 (20 @ 04:09) (117/73 - 142/80)  RR: 18 (20 @ 04:09) (18 - 18)  SpO2: 95% (20 @ 04:35) (95% - 99%)    Weight in k.4 ( @ 10:34)    I&O's Summary    19 May 2020 07:01  -  20 May 2020 07:00  --------------------------------------------------------  IN: 650 mL / OUT: 1625 mL / NET: -975 mL    Appearance: No Acute Distress  HEENT: JVP at least moderately elevated   Cardiovascular: RRR, Normal S1 S2, No murmurs/rubs/gallops. +3 LE edema through thighs, improving  Respiratory: Clear to auscultation bilaterally  Gastrointestinal: Soft, Non-tender, non-distended	  Skin: no skin lesions  Neurologic: Non-focal  Extremities: Warm peripherally. improved erythema of R thigh  Psychiatry: A & O x 3, Mood & affect appropriate    Labs:                        7.9    3.89  )-----------( 120      ( 20 May 2020 07:10 )             28.9         138  |  90<L>  |  39<H>  ----------------------------<  197<H>  4.7   |  38<H>  |  1.59<H>    Ca    9.3      20 May 2020 07:10  Phos  2.8       Mg     2.4         TPro  7.7  /  Alb  3.5  /  TBili  0.6  /  DBili  x   /  AST  26  /  ALT  <5<L>  /  AlkPhos  151<H>            Serum Pro-Brain Natriuretic Peptide: 849 pg/mL (05-15 @ 06:52)  Serum Pro-Brain Natriuretic Peptide: 2674 pg/mL ( @ 16:13)

## 2020-05-20 NOTE — PROGRESS NOTE ADULT - ATTENDING COMMENTS
multi-organ disease  monitor intake and output on lasix PO daily  cellulitis improved RLE  transfer to rehab later this week

## 2020-05-20 NOTE — PROGRESS NOTE ADULT - PROBLEM SELECTOR PLAN 7
- Chronic hypoxemic and hypercapnic respiratory failure, likely also with CLAUDE  - c/w spiriva and symbicort  - acapella, incentive spirometry   - c/w supplemental O2 3L (home O2 lvl)  - Nazia PRN q6  - TTE showing RV dysfunction  - CT chest for cancer screening without pulmonary nodules   - Pulm following, recs appreciated  - continue to monitor VBG  - pt currently refusing BiPAP for suspected CLAUDE - Chronic hypoxemic and hypercapnic respiratory failure, likely also with CLAUDE  - c/w spiriva and symbicort  - acapella, incentive spirometry   - c/w supplemental O2 3L (home O2 lvl)  - Nazia PRN q6  - TTE showing RV dysfunction  - CT chest for cancer screening without pulmonary nodules   - Pulm following, recs appreciated  - continue to monitor VBG  - pt currently refusing BiPAP for suspected CLAUDE, but pt is mentating well

## 2020-05-20 NOTE — PROGRESS NOTE ADULT - ASSESSMENT
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 Found w/ signif edema and Hgb-7--  multifactorial issues.  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 Found w/ signif edema and Hgb-7--  multifactorial issues.  ******************  5/15-s/p echo-RV dysfunction, mod AS  5/16-better over all--CHF evaln-continue diuresis  5/17-better over all-CHF team f/up-80 bid IV  5/18-improving but still no ambulation-CHF f/up  5/19-slow improvements-less edema, remains on NC 3-4 liters  5/20-CHF titrated down diuretics to PO lasix, cpap poorly tolerated

## 2020-05-20 NOTE — PROGRESS NOTE ADULT - ATTENDING COMMENTS
as above-improving, s/p CHF evaln (IV diuretics)--PT needed; completing ABX for cellulitis  multifactorial dyspnea--copd, CAD, PAH (portopulmonary), debility, anemia--O2 sat above 90%  copd-duoneb q 6, symbicort 160 2 bid, spiriva 1 q day, acapella, incentive spirometry  cards-s/p formal cards evaln--s/p echo to R/O PAH-RHF---CHF evaln (RV dysfunction/mod AS)-continue diuresis (IV)  GI-s/p liver transplant-Hailey et al.         Heme-evaln and GI evaln for anemia.  psych-depression--formal psych consult          Lung Ca screening--CT chest is No evidence of Dz  DVT/GI prophylaxis        Care coordinator consult--[facility] placement  Leandro Santos MD-Pulmonary   [phone] as above-improving, s/p CHF evaln (switch to diuretics)--PT needed; completing ABX for cellulitis  multifactorial dyspnea--copd, CAD, PAH (portopulmonary), debility, anemia--O2 sat above 90%  copd-duoneb q 6, symbicort 160 2 bid, spiriva 1 q day, acapella, incentive spirometry   ***OSAS-likely has it, cpap as able here d/w pt prior and now  cards-s/p formal cards evaln--s/p echo to R/O PAH-RHF---CHF evaln (RV dysfunction/mod AS)-continue diuresis-now PO  GI-s/p liver transplant-Hailey et al.         Heme-evaln and GI evaln for anemia.  psych-depression--formal psych consult          Lung Ca screening--CT chest is No evidence of Dz  DVT/GI prophylaxis        Care coordinator consult--NH/rehab placement  Leandro Santos MD-Pulmonary   [phone]

## 2020-05-20 NOTE — PROGRESS NOTE ADULT - PROBLEM SELECTOR PLAN 1
Appears to be chronic lymphedema; pt endorses increased swelling and has been non-adherent to diuretics. [facility] 60% and nl LVSF/RVSF.  - TTE 5/14/20: LVIDd 4.2cm, LVEF 55%, grossly reduced RVSF, mod AS, borderline PH (RVSP 36 mmHg)  - c/w spironolactone 25BID and lasix 80 IV BID (home dose is PO lasix 40 BID); however, concern for contraction alkalosis given bicarb up to 41 on BMP. As per HF, keep diuresis regimen and Diamox 500mg IV x1 given on 5/18. VBG (5/19) showed pH 7.3.  - wound care following  - monitor Is/Os, daily weights, fluid restrict to 1.5L /day   - LE doppler neg for RLE DVT  - Appreciate HF recs    # RLE Cellulitis   - c/w cefazolin (5/14-5/20). Appears to be improving  - Blood Cx (5/15): NGTD Appears to be chronic LE lymphedema; pt endorses increased swelling and has been non-adherent to diuretics. Last TTE in [employer] on 5/2016 shows EF 60% and nl LVSF/RVSF.  - TTE 5/14/20: LVIDd 4.2cm, LVEF 55%, grossly reduced RVSF, mod AS, borderline PH (RVSP 36 mmHg)   - LE doppler neg for RLE DVT  - monitor Is/Os, daily weights, fluid restrict to 1.5L /day  - dc'ed spironolactone 25 BID and IV lasix 80 IV BID (home dose is PO lasix 40 BID) for concern of contraction alkalosis  - s/p Diamox 500mg IV x1 on 5/18  - As per HF, would like to start Lasix 80 PO daily today    # RLE Cellulitis/Edema  - c/w cefazolin (5/14-5/20). Appears to be improving  - Blood Cx (5/15): NGTD  - wound care following Appears to be chronic LE lymphedema; pt endorses increased swelling and has been non-adherent to diuretics. [facility] EF 60% and nl LVSF/RVSF.  - TTE 5/14/20: LVIDd 4.2cm, LVEF 55%, grossly reduced RVSF, mod AS, borderline PH (RVSP 36 mmHg)   - LE doppler neg for RLE DVT  - monitor Is/Os, daily weights, fluid restrict to 1.5L /day  - dc'ed spironolactone 25 BID and IV lasix 80 IV BID (home dose is PO lasix 40 BID) for concern of contraction alkalosis  - s/p Diamox 500mg IV x1 on 5/18  - will continue to hold diuretics today, plan to start Lasix 80 PO daily tomorrow  - Appreciate HF recs    # RLE Cellulitis/Edema  - c/w cefazolin (5/14-5/20). Appears to be improving  - Blood Cx (5/15): NGTD  - wound care following

## 2020-05-20 NOTE — PROGRESS NOTE ADULT - PROBLEM SELECTOR PLAN 10
Transitions of Care Status:  1.  Name of PCP: Dr. Leandro Santos ([facility])   2.  PCP Contacted on Admission: [ ] Y    [ ] N    3.  PCP contacted at Discharge: [ ] Y    [ ] N    [ ] N/A  4.  Post-Discharge Appointment Date and Location:  5.  Summary of Handoff given to PCP:

## 2020-05-20 NOTE — PROGRESS NOTE ADULT - SUBJECTIVE AND OBJECTIVE BOX
PROGRESS NOTE:   Authored by Dr. Von Hobbs MD  Pager [phone] [facility], [id number] [facility]     Patient is a 69y old  Male who presents with a chief complaint of s/p mech fall (20 May 2020 05:58)      SUBJECTIVE / OVERNIGHT EVENTS:  - Overnight, no acute events. Refused BiPAP overnight due to discomfort and sensation of difficulty breathing with mask.  - Today, pt seen and examined at bedside in AM. Pt reports he feels okay. Pt appeared to be eating well. Has dyspnea at baseline (on 3LNC at home) Denies f/c/n/v, CP, SOB otherwise, abdominal pain, hematochezia and melena.    ADDITIONAL REVIEW OF SYSTEMS:    MEDICATIONS  (STANDING):  buprenorphine 2 mG/naloxone 0.5 mG SL  Tablet 1 Tablet(s) SubLingual <User Schedule>  ceFAZolin   IVPB 2000 milliGRAM(s) IV Intermittent every 8 hours  dextrose 5%. 1000 milliLiter(s) (50 mL/Hr) IV Continuous <Continuous>  dextrose 50% Injectable 12.5 Gram(s) IV Push once  dextrose 50% Injectable 25 Gram(s) IV Push once  dextrose 50% Injectable 25 Gram(s) IV Push once  heparin   Injectable 5000 Unit(s) SubCutaneous every 8 hours  insulin glargine Injectable (LANTUS) 38 Unit(s) SubCutaneous at bedtime  insulin glargine Injectable (LANTUS) 16 Unit(s) SubCutaneous every morning  insulin lispro (HumaLOG) corrective regimen sliding scale   SubCutaneous three times a day before meals  insulin lispro (HumaLOG) corrective regimen sliding scale   SubCutaneous at bedtime  metoprolol succinate ER 25 milliGRAM(s) Oral daily  multivitamin/minerals 1 Tablet(s) Oral daily  nystatin Powder 1 Application(s) Topical two times a day  pantoprazole    Tablet 40 milliGRAM(s) Oral two times a day  predniSONE   Tablet 3 milliGRAM(s) Oral every other day  sertraline 100 milliGRAM(s) Oral daily  sodium chloride 0.65% Nasal 1 Spray(s) Both Nostrils daily  tacrolimus 0.5 milliGRAM(s) Oral two times a day  tamsulosin 0.4 milliGRAM(s) Oral at bedtime  tiotropium 18 MICROgram(s) Capsule 1 Capsule(s) Inhalation daily  trimethoprim   80 mG/sulfamethoxazole 400 mG 1 Tablet(s) Oral <User Schedule>    MEDICATIONS  (PRN):  albuterol/ipratropium for Nebulization 3 milliLiter(s) Nebulizer every 6 hours PRN Shortness of Breath and/or Wheezing  dextrose 40% Gel 15 Gram(s) Oral once PRN Blood Glucose LESS THAN 70 milliGRAM(s)/deciliter  glucagon  Injectable 1 milliGRAM(s) IntraMuscular once PRN Glucose LESS THAN 70 milligrams/deciliter      CAPILLARY BLOOD GLUCOSE      POCT Blood Glucose.: 253 mg/dL (19 May 2020 21:31)  POCT Blood Glucose.: 244 mg/dL (19 May 2020 17:06)  POCT Blood Glucose.: 251 mg/dL (19 May 2020 12:12)  POCT Blood Glucose.: 165 mg/dL (19 May 2020 08:21)    I&O's Summary    19 May 2020 07:01  -  20 May 2020 07:00  --------------------------------------------------------  IN: 650 mL / OUT: 1625 mL / NET: -975 mL        PHYSICAL EXAM:  Vital Signs Last 24 Hrs  T(C): 36.6 (20 May 2020 04:09), Max: 36.9 (19 May 2020 20:42)  T(F): 97.9 (20 May 2020 04:09), Max: 98.5 (19 May 2020 20:42)  HR: 90 (20 May 2020 04:35) (87 - 92)  BP: 117/73 (20 May 2020 04:09) (117/73 - 142/80)  BP(mean): --  RR: 18 (20 May 2020 04:09) (18 - 18)  SpO2: 95% (20 May 2020 04:35) (95% - 99%)    GENERAL: NAD, obese man laying in bed  RESPIRATORY: Normal respiratory effort; lungs are clear to auscultation bilaterally, on 4LNC   CARDIOVASCULAR: Regular rate and rhythm, normal S1 and S2, no murmur/rub/gallop  ABDOMEN: +BS, Soft, Non-distended, Nontender to palpation  EXT: Significant b/l LE edema with venous stasis changes and erythema (R> L). Erythema extends up to the thigh on the RLE. Warm to touch. Non-tender to palpation. Overall RLE improving.    NEURO: Alert, good concentration, moving extremities     LABS:                        7.9    3.89  )-----------( 120      ( 20 May 2020 07:10 )             28.9     05-20    138  |  90<L>  |  39<H>  ----------------------------<  197<H>  4.7   |  38<H>  |  1.59<H>    Ca    9.3      20 May 2020 07:10  Phos  2.8     05-20  Mg     2.4     05-20    TPro  7.7  /  Alb  3.5  /  TBili  0.6  /  DBili  x   /  AST  26  /  ALT  <5<L>  /  AlkPhos  151<H>  05-20                RADIOLOGY & ADDITIONAL TESTS:  Results Reviewed:   Imaging Personally Reviewed:  Electrocardiogram Personally Reviewed:    COORDINATION OF CARE:  Care Discussed with Consultants/Other Providers [Y/N]:  Prior or Outpatient Records Reviewed [Y/N]: PROGRESS NOTE:   Authored by Dr. Von Hobbs MD  Pager [phone] [facility], [address] [facility]     Patient is a 69y old  Male who presents with a chief complaint of s/p mech fall (20 May 2020 05:58)      SUBJECTIVE / OVERNIGHT EVENTS:  - Overnight, no acute events. Refused BiPAP overnight due to discomfort and sensation of difficulty breathing with mask. Given Albumin yesterday.  - Today, pt seen and examined at bedside in AM. Pt reports he feels okay. Pt appeared to be eating well. Has dyspnea at baseline (on 3LNC at home) Denies f/c/n/v, CP, SOB otherwise, abdominal pain, hematochezia and melena.    ADDITIONAL REVIEW OF SYSTEMS:    MEDICATIONS  (STANDING):  buprenorphine 2 mG/naloxone 0.5 mG SL  Tablet 1 Tablet(s) SubLingual <User Schedule>  ceFAZolin   IVPB 2000 milliGRAM(s) IV Intermittent every 8 hours  dextrose 5%. 1000 milliLiter(s) (50 mL/Hr) IV Continuous <Continuous>  dextrose 50% Injectable 12.5 Gram(s) IV Push once  dextrose 50% Injectable 25 Gram(s) IV Push once  dextrose 50% Injectable 25 Gram(s) IV Push once  heparin   Injectable 5000 Unit(s) SubCutaneous every 8 hours  insulin glargine Injectable (LANTUS) 38 Unit(s) SubCutaneous at bedtime  insulin glargine Injectable (LANTUS) 16 Unit(s) SubCutaneous every morning  insulin lispro (HumaLOG) corrective regimen sliding scale   SubCutaneous three times a day before meals  insulin lispro (HumaLOG) corrective regimen sliding scale   SubCutaneous at bedtime  metoprolol succinate ER 25 milliGRAM(s) Oral daily  multivitamin/minerals 1 Tablet(s) Oral daily  nystatin Powder 1 Application(s) Topical two times a day  pantoprazole    Tablet 40 milliGRAM(s) Oral two times a day  predniSONE   Tablet 3 milliGRAM(s) Oral every other day  sertraline 100 milliGRAM(s) Oral daily  sodium chloride 0.65% Nasal 1 Spray(s) Both Nostrils daily  tacrolimus 0.5 milliGRAM(s) Oral two times a day  tamsulosin 0.4 milliGRAM(s) Oral at bedtime  tiotropium 18 MICROgram(s) Capsule 1 Capsule(s) Inhalation daily  trimethoprim   80 mG/sulfamethoxazole 400 mG 1 Tablet(s) Oral <User Schedule>    MEDICATIONS  (PRN):  albuterol/ipratropium for Nebulization 3 milliLiter(s) Nebulizer every 6 hours PRN Shortness of Breath and/or Wheezing  dextrose 40% Gel 15 Gram(s) Oral once PRN Blood Glucose LESS THAN 70 milliGRAM(s)/deciliter  glucagon  Injectable 1 milliGRAM(s) IntraMuscular once PRN Glucose LESS THAN 70 milligrams/deciliter      CAPILLARY BLOOD GLUCOSE      POCT Blood Glucose.: 253 mg/dL (19 May 2020 21:31)  POCT Blood Glucose.: 244 mg/dL (19 May 2020 17:06)  POCT Blood Glucose.: 251 mg/dL (19 May 2020 12:12)  POCT Blood Glucose.: 165 mg/dL (19 May 2020 08:21)    I&O's Summary    19 May 2020 07:01  -  20 May 2020 07:00  --------------------------------------------------------  IN: 650 mL / OUT: 1625 mL / NET: -975 mL        PHYSICAL EXAM:  Vital Signs Last 24 Hrs  T(C): 36.6 (20 May 2020 04:09), Max: 36.9 (19 May 2020 20:42)  T(F): 97.9 (20 May 2020 04:09), Max: 98.5 (19 May 2020 20:42)  HR: 90 (20 May 2020 04:35) (87 - 92)  BP: 117/73 (20 May 2020 04:09) (117/73 - 142/80)  BP(mean): --  RR: 18 (20 May 2020 04:09) (18 - 18)  SpO2: 95% (20 May 2020 04:35) (95% - 99%)    GENERAL: NAD, obese man laying in bed  RESPIRATORY: Normal respiratory effort; lungs are clear to auscultation bilaterally, on 4LNC   CARDIOVASCULAR: Regular rate and rhythm, normal S1 and S2, no murmur/rub/gallop  ABDOMEN: +BS, Soft, Non-distended, Nontender to palpation  EXT: Significant b/l LE edema with venous stasis changes and erythema (R> L). Erythema extends up to the thigh on the RLE. Warm to touch. Non-tender to palpation. Overall RLE improving.    NEURO: Alert, good concentration, moving extremities     LABS:                        7.9    3.89  )-----------( 120      ( 20 May 2020 07:10 )             28.9     05-20    138  |  90<L>  |  39<H>  ----------------------------<  197<H>  4.7   |  38<H>  |  1.59<H>    Ca    9.3      20 May 2020 07:10  Phos  2.8     05-20  Mg     2.4     05-20    TPro  7.7  /  Alb  3.5  /  TBili  0.6  /  DBili  x   /  AST  26  /  ALT  <5<L>  /  AlkPhos  151<H>  05-20                RADIOLOGY & ADDITIONAL TESTS:  Results Reviewed:   Imaging Personally Reviewed:  Electrocardiogram Personally Reviewed:    COORDINATION OF CARE:  Care Discussed with Consultants/Other Providers [Y/N]:  Prior or Outpatient Records Reviewed [Y/N]: PROGRESS NOTE:   Authored by Dr. Von Hobbs MD  Pager [phone] [facility], [address] [facility]     Patient is a 69y old  Male who [facility] a [facility] ([facility] May [facility] 05:58)      SUBJECTIVE / OVERNIGHT EVENTS:  - Overnight, no acute events. Refused BiPAP overnight due to discomfort and sensation of difficulty breathing with mask. Given Albumin yesterday and dc'ed spironolactone and IV Lasix.  - Today, pt seen and examined at bedside in AM. Pt reports he feels okay. Pt appeared to be eating well. Has dyspnea at baseline (on 3LNC at home) Denies f/c/n/v, CP, SOB otherwise, abdominal pain, hematochezia and melena.    ADDITIONAL REVIEW OF SYSTEMS:    MEDICATIONS  (STANDING):  buprenorphine 2 mG/naloxone 0.5 mG SL  Tablet 1 Tablet(s) SubLingual <User Schedule>  ceFAZolin   IVPB 2000 milliGRAM(s) IV Intermittent every 8 hours  dextrose 5%. 1000 milliLiter(s) (50 mL/Hr) IV Continuous <Continuous>  dextrose 50% Injectable 12.5 Gram(s) IV Push once  dextrose 50% Injectable 25 Gram(s) IV Push once  dextrose 50% Injectable 25 Gram(s) IV Push once  heparin   Injectable 5000 Unit(s) SubCutaneous every 8 hours  insulin glargine Injectable (LANTUS) 38 Unit(s) SubCutaneous at bedtime  insulin glargine Injectable (LANTUS) 16 Unit(s) SubCutaneous every morning  insulin lispro (HumaLOG) corrective regimen sliding scale   SubCutaneous three times a day before meals  insulin lispro (HumaLOG) corrective regimen sliding scale   SubCutaneous at bedtime  metoprolol succinate ER 25 milliGRAM(s) Oral daily  multivitamin/minerals 1 Tablet(s) Oral daily  nystatin Powder 1 Application(s) Topical two times a day  pantoprazole    Tablet 40 milliGRAM(s) Oral two times a day  predniSONE   Tablet 3 milliGRAM(s) Oral every other day  sertraline 100 milliGRAM(s) Oral daily  sodium chloride 0.65% Nasal 1 Spray(s) Both Nostrils daily  tacrolimus 0.5 milliGRAM(s) Oral two times a day  tamsulosin 0.4 milliGRAM(s) Oral at bedtime  tiotropium 18 MICROgram(s) Capsule 1 Capsule(s) Inhalation daily  trimethoprim   80 mG/sulfamethoxazole 400 mG 1 Tablet(s) Oral <User Schedule>    MEDICATIONS  (PRN):  albuterol/ipratropium for Nebulization 3 milliLiter(s) Nebulizer every 6 hours PRN Shortness of Breath and/or Wheezing  dextrose 40% Gel 15 Gram(s) Oral once PRN Blood Glucose LESS THAN 70 milliGRAM(s)/deciliter  glucagon  Injectable 1 milliGRAM(s) IntraMuscular once PRN Glucose LESS THAN 70 milligrams/deciliter      CAPILLARY BLOOD GLUCOSE      POCT Blood Glucose.: 253 mg/dL (19 May 2020 21:31)  POCT Blood Glucose.: 244 mg/dL (19 May 2020 17:06)  POCT Blood Glucose.: 251 mg/dL (19 May 2020 12:12)  POCT Blood Glucose.: 165 mg/dL (19 May 2020 08:21)    I&O's Summary    19 May 2020 07:01  -  20 May 2020 07:00  --------------------------------------------------------  IN: 650 mL / OUT: 1625 mL / NET: -975 mL        PHYSICAL EXAM:  Vital Signs Last 24 Hrs  T(C): 36.6 (20 May 2020 04:09), Max: 36.9 (19 May 2020 20:42)  T(F): 97.9 (20 May 2020 04:09), Max: 98.5 (19 May 2020 20:42)  HR: 90 (20 May 2020 04:35) (87 - 92)  BP: 117/73 (20 May 2020 04:09) (117/73 - 142/80)  BP(mean): --  RR: 18 (20 May 2020 04:09) (18 - 18)  SpO2: 95% (20 May 2020 04:35) (95% - 99%)    GENERAL: NAD, obese man laying in bed  RESPIRATORY: Normal respiratory effort; lungs are clear to auscultation bilaterally, on 4LNC   CARDIOVASCULAR: Regular rate and rhythm, normal S1 and S2, no murmur/rub/gallop  ABDOMEN: +BS, Soft, Non-distended, Nontender to palpation  EXT: Significant b/l LE edema with venous stasis changes and erythema (R> L). Erythema extends up to the thigh on the RLE. Warm to touch. Non-tender to palpation. Overall RLE improving.    NEURO: Alert, good concentration, moving extremities     LABS:                        7.9    3.89  )-----------( 120      ( 20 May 2020 07:10 )             28.9     05-20    138  |  90<L>  |  39<H>  ----------------------------<  197<H>  4.7   |  38<H>  |  1.59<H>    Ca    9.3      20 May 2020 07:10  Phos  2.8     05-20  Mg     2.4     05-20    TPro  7.7  /  Alb  3.5  /  TBili  0.6  /  DBili  x   /  AST  26  /  ALT  <5<L>  /  AlkPhos  151<H>  05-20                RADIOLOGY & ADDITIONAL TESTS:  Results Reviewed:   Imaging Personally Reviewed:  Electrocardiogram Personally Reviewed:    COORDINATION OF CARE:  Care Discussed with Consultants/Other Providers [Y/N]:  Prior or Outpatient Records Reviewed [Y/N]:

## 2020-05-20 NOTE — PROGRESS NOTE ADULT - PROBLEM SELECTOR PLAN 9
DVT ppx: HSQ 5000u q8h  Diet: DASH, CC  Dispo: PETROS as per PT DVT ppx: HSQ 5000u q8h  Diet: DASH, CC  Dispo: PETROS as per PT, pt is now amenable

## 2020-05-20 NOTE — PROGRESS NOTE ADULT - ASSESSMENT
69 year old M with HFpEF, RV systolic dysfunction of unknown chronicity (patient reports known for 4 years), followed by Dr. Garcia at [facility]. His history is notable for HCV/ETOH cirrhosis s/p liver transplant 2011 (on tacrolimus/cellcept/prednisone), HTN, CKD stage 3 (b/l Scr 1.3-1.5), DM2 on insulin, COPD w/ home 02, HLD, chronic lymphedema and remote history of substance abuse (quit 85') on Suboxone. He was admitted after a mechanical fall and found to be volume overloaded with acute on chronic decompensated RV systolic dysfunction in the setting of inconsistently use of his diuretics. Additionally, on arrival found to have anemia consistent with iron deficiency Hb of 7 and FOBT +, transfused with 1U PRBC with appropriate response. Thrombocytopenia appears chronic in nature (b/l count 60-70s). GI plans for outpatient scope. He was also started on IV cefazolin for suspicion of cellulitis of his RLE.     He has diuresed well with significant improvement of BLE edema with intermittent IV lasix but remains with some elevation of central filling pressures. He received a dose a Diamox 5/18 for concerns of contraction alkalosis and his diuretics were discontinued 5/19 by primary medicine team. His H/H remains stable with no overt signs of GI bleeding. His leukocytosis from admission has resolved but is now leukopenic. Alk/Phos appears to be uptrending with slight rise in his creatinine.     *INCOMPLETE NOTE    Pertinient Cardiac Studies:  TTE 5/14/20: LVIDd 4.2cm, LVEF 55%, grossly reduced RVSF, mod AS, borderline PH (RVSP 36 mmHg)  TTE: 5/24/16: LVEDd 4.7cm, LVEF 60%, mild hypertrophy of interventricular septum without LVOT obstruction, nl RVSF, minimal AS, no PH (no mention of RVSP/PASP) 69 year old M with HFpEF, RV systolic dysfunction of unknown chronicity (patient reports known for 4 years), followed by Dr. Garcia at [facility]. His history is notable for HCV/ETOH cirrhosis s/p liver transplant 2011 (on tacrolimus/cellcept/prednisone), HTN, CKD stage 3 (b/l Scr 1.3-1.5), DM2 on insulin, COPD w/ home 02, HLD, chronic lymphedema and remote history of substance abuse (quit 85') on Suboxone. He was admitted after a mechanical fall and found to be volume overloaded with acute on chronic decompensated RV systolic dysfunction in the setting of inconsistently use of his diuretics. Additionally, on arrival found to have anemia consistent with iron deficiency Hb of 7 and FOBT +, transfused with 1U PRBC with appropriate response. Thrombocytopenia appears chronic in nature (b/l count 60-70s). GI plans for outpatient scope. He was also started on IV cefazolin for suspicion of cellulitis of his RLE.     He has diuresed well with significant improvement of BLE edema with intermittent IV lasix but remains with some elevation of central filling pressures. He received a dose a Diamox 5/18 for concerns of contraction alkalosis and his diuretics were discontinued 5/19 by primary medicine team. He received 3 doses of albumin overnight. Alk/Phos appears to be uptrending with slight rise in his creatinine. His H/H remains stable with no overt signs of GI bleeding. His leukocytosis from admission has resolved but is now leukopenic.      *INCOMPLETE NOTE    Pertinient Cardiac Studies:  TTE 5/14/20: LVIDd 4.2cm, LVEF 55%, grossly reduced RVSF, mod AS, borderline PH (RVSP 36 mmHg)  TTE: 5/24/16: LVEDd 4.7cm, LVEF 60%, mild hypertrophy of interventricular septum without LVOT obstruction, nl RVSF, minimal AS, no PH (no mention of RVSP/PASP) 69 year old M with HFpEF, RV systolic dysfunction of unknown chronicity (patient reports known for 4 years), followed by Dr. Garcia at [facility]. His history is notable for HCV/ETOH cirrhosis s/p liver transplant 2011 (on tacrolimus/cellcept/prednisone), HTN, CKD stage 3 (b/l Scr 1.3-1.5), DM2 on insulin, COPD w/ home 02, HLD, chronic lymphedema and remote history of substance abuse (quit 85') on Suboxone. He was admitted after a mechanical fall and found to be volume overloaded with acute on chronic decompensated RV systolic dysfunction in the setting of inconsistently use of his diuretics. Additionally, on arrival found to have anemia consistent with iron deficiency Hb of 7 and FOBT +, transfused with 1U PRBC with appropriate response. Thrombocytopenia appears chronic in nature (b/l count 60-70s). GI plans for outpatient scope. He was also started on IV cefazolin for suspicion of cellulitis of his RLE.     He has diuresed well with significant improvement of BLE edema with intermittent IV lasix but remains with moderate elevation of central filling pressures. He received a dose a Diamox 5/18 for concerns of contraction alkalosis and his diuretics were discontinued 5/19 by primary medicine team. He received 3 doses of albumin overnight. Alk/Phos appears to be uptrending with slight rise in his creatinine. His H/H remains stable with no overt signs of GI bleeding. His leukocytosis from admission has resolved but is now leukopenic.      *INCOMPLETE NOTE    Pertinient Cardiac Studies:  TTE 5/14/20: LVIDd 4.2cm, LVEF 55%, grossly reduced RVSF, mod AS, borderline PH (RVSP 36 mmHg)  TTE: 5/24/16: LVEDd 4.7cm, LVEF 60%, mild hypertrophy of interventricular septum without LVOT obstruction, nl RVSF, minimal AS, no PH (no mention of RVSP/PASP)

## 2020-05-20 NOTE — PROGRESS NOTE ADULT - PROBLEM SELECTOR PLAN 1
- Would start lasix 80mg PO QD   - recommend compression stockings/elevation for LEs   - Strict I&Os, fluid restriction 1.5L daily  - Daily standing weights.

## 2020-05-20 NOTE — PROGRESS NOTE ADULT - SUBJECTIVE AND OBJECTIVE BOX
CHIEF COMPLAINT: f/up sob, copd, RHF, obesity-    Interval Events:    REVIEW OF SYSTEMS:  Constitutional: No fevers or chills. No weight loss. No fatigue or generalized malaise.  Eyes: No itching or discharge from the eyes  ENT: No ear pain. No ear discharge. No nasal congestion. No post nasal drip. No epistaxis. No throat pain. No sore throat. No difficulty swallowing.   CV: No chest pain. No palpitations. No lightheadedness or dizziness.   Resp: No dyspnea at rest. No dyspnea on exertion. No orthopnea. No wheezing. No cough. No stridor. No sputum production. No chest pain with respiration.  GI: No nausea. No vomiting. No diarrhea.  MSK: No joint pain or pain in any extremities  Integumentary: No skin lesions. No pedal edema.  Neurological: No gross motor weakness. No sensory changes.  [ ] All other systems negative  [ ] Unable to assess ROS because ________    OBJECTIVE:  ICU Vital Signs Last 24 Hrs  T(C): 36.6 (20 May 2020 04:09), Max: 36.9 (19 May 2020 20:42)  T(F): 97.9 (20 May 2020 04:09), Max: 98.5 (19 May 2020 20:42)  HR: 90 (20 May 2020 04:35) (87 - 92)  BP: 117/73 (20 May 2020 04:09) (117/73 - 142/80)  BP(mean): --  ABP: --  ABP(mean): --  RR: 18 (20 May 2020 04:09) (18 - 18)  SpO2: 95% (20 May 2020 04:35) (95% - 99%)        05-18 @ 07:01  -  05-19 @ 07:00  --------------------------------------------------------  IN: 580 mL / OUT: 3000 mL / NET: -2420 mL    05-19 @ 07:01  -  05-20 @ 05:58  --------------------------------------------------------  IN: 650 mL / OUT: 1625 mL / NET: -975 mL      CAPILLARY BLOOD GLUCOSE      POCT Blood Glucose.: 253 mg/dL (19 May 2020 21:31)      PHYSICAL EXAM:  General: Awake, alert, oriented X 3.   HEENT: Atraumatic, normocephalic.                 Mallampatti Grade                 No nasal congestion.                No tonsillar or pharyngeal exudates.  Lymph Nodes: No palpable lymphadenopathy  Neck: No JVD. No carotid bruit.   Respiratory: Normal chest expansion                         Normal percussion                         Normal and equal air entry                         No wheeze, rhonchi or rales.  Cardiovascular: S1 S2 normal. No murmurs, rubs or gallops.   Abdomen: Soft, non-tender, non-distended. No organomegaly. Normoactive bowel sounds.  Extremities: Warm to touch. Peripheral pulse palpable. No pedal edema.   Skin: No rashes or skin lesions  Neurological: Motor and sensory examination equal and normal in all four extremities.  Psychiatry: Appropriate mood and affect.    HOSPITAL MEDICATIONS:  MEDICATIONS  (STANDING):  buprenorphine 2 mG/naloxone 0.5 mG SL  Tablet 1 Tablet(s) SubLingual <User Schedule>  ceFAZolin   IVPB 2000 milliGRAM(s) IV Intermittent every 8 hours  dextrose 5%. 1000 milliLiter(s) (50 mL/Hr) IV Continuous <Continuous>  dextrose 50% Injectable 12.5 Gram(s) IV Push once  dextrose 50% Injectable 25 Gram(s) IV Push once  dextrose 50% Injectable 25 Gram(s) IV Push once  heparin   Injectable 5000 Unit(s) SubCutaneous every 8 hours  insulin glargine Injectable (LANTUS) 38 Unit(s) SubCutaneous at bedtime  insulin glargine Injectable (LANTUS) 16 Unit(s) SubCutaneous every morning  insulin lispro (HumaLOG) corrective regimen sliding scale   SubCutaneous three times a day before meals  insulin lispro (HumaLOG) corrective regimen sliding scale   SubCutaneous at bedtime  metoprolol succinate ER 25 milliGRAM(s) Oral daily  multivitamin/minerals 1 Tablet(s) Oral daily  nystatin Powder 1 Application(s) Topical two times a day  pantoprazole    Tablet 40 milliGRAM(s) Oral two times a day  predniSONE   Tablet 3 milliGRAM(s) Oral every other day  sertraline 100 milliGRAM(s) Oral daily  sodium chloride 0.65% Nasal 1 Spray(s) Both Nostrils daily  tacrolimus 0.5 milliGRAM(s) Oral two times a day  tamsulosin 0.4 milliGRAM(s) Oral at bedtime  tiotropium 18 MICROgram(s) Capsule 1 Capsule(s) Inhalation daily  trimethoprim   80 mG/sulfamethoxazole 400 mG 1 Tablet(s) Oral <User Schedule>    MEDICATIONS  (PRN):  albuterol/ipratropium for Nebulization 3 milliLiter(s) Nebulizer every 6 hours PRN Shortness of Breath and/or Wheezing  dextrose 40% Gel 15 Gram(s) Oral once PRN Blood Glucose LESS THAN 70 milliGRAM(s)/deciliter  glucagon  Injectable 1 milliGRAM(s) IntraMuscular once PRN Glucose LESS THAN 70 milligrams/deciliter      LABS:                        8.1    3.72  )-----------( 112      ( 19 May 2020 06:27 )             See note    05-19    138  |  90<L>  |  36<H>  ----------------------------<  228<H>  4.5   |  37<H>  |  1.49<H>    Ca    9.5      19 May 2020 16:54  Phos  3.8     05-19  Mg     2.1     05-19    TPro  7.6  /  Alb  3.5  /  TBili  0.6  /  DBili  x   /  AST  22  /  ALT  <5<L>  /  AlkPhos  128<H>  05-19          Venous Blood Gas:  05-19 @ 21:46  7.34/74/84/39/96  VBG Lactate: 1.5  Venous Blood Gas:  05-19 @ 06:29  7.30/93/21/45/22  VBG Lactate: 1.1      MICROBIOLOGY:     RADIOLOGY:  [ ] Reviewed and interpreted by me    Point of Care Ultrasound Findings:    PFT:    EKG: CHIEF COMPLAINT: f/up sob, copd, RHF, obesity-better-poorly tolerant of cpap, no cough and less sob    Interval Events: cpap set up    REVIEW OF SYSTEMS:  Constitutional: No fevers or chills. No weight loss. + fatigue or generalized malaise.  Eyes: No itching or discharge from the eyes  ENT: No ear pain. No ear discharge. No nasal congestion. No post nasal drip. No epistaxis. No throat pain. No sore throat. No difficulty swallowing.   CV: No chest pain. No palpitations. No lightheadedness or dizziness.   Resp: No dyspnea at rest. + dyspnea on exertion. No orthopnea. No wheezing. No cough. No stridor. No sputum production. No chest pain with respiration.  GI: No nausea. No vomiting. No diarrhea.  MSK: No joint pain or pain in any extremities  Integumentary: No skin lesions. +pedal edema.  Neurological: No gross motor weakness. No sensory changes.  [ +] All other systems negative  [ ] Unable to assess ROS because ________    OBJECTIVE:  ICU Vital Signs Last 24 Hrs  T(C): 36.6 (20 May 2020 04:09), Max: 36.9 (19 May 2020 20:42)  T(F): 97.9 (20 May 2020 04:09), Max: 98.5 (19 May 2020 20:42)  HR: 90 (20 May 2020 04:35) (87 - 92)  BP: 117/73 (20 May 2020 04:09) (117/73 - 142/80)  BP(mean): --  ABP: --  ABP(mean): --  RR: 18 (20 May 2020 04:09) (18 - 18)  SpO2: 95% (20 May 2020 04:35) (95% - 99%)        05-18 @ 07:01  -  05-19 @ 07:00  --------------------------------------------------------  IN: 580 mL / OUT: 3000 mL / NET: -2420 mL    05-19 @ 07:01  -  05-20 @ 05:58  --------------------------------------------------------  IN: 650 mL / OUT: 1625 mL / NET: -975 mL      CAPILLARY BLOOD GLUCOSE      POCT Blood Glucose.: 253 mg/dL (19 May 2020 21:31)      PHYSICAL EXAM: NAD in bed on NC  General: Awake, alert, oriented X 3.   HEENT: Atraumatic, normocephalic.                 Mallampatti Grade 3                No nasal congestion.                No tonsillar or pharyngeal exudates.  Lymph Nodes: No palpable lymphadenopathy  Neck: No JVD. No carotid bruit.   Respiratory: Normal chest expansion                         Normal percussion                         Normal and equal air entry                         No wheeze, rhonchi or rales.  Cardiovascular: S1 S2 normal. No murmurs, rubs or gallops.   Abdomen: Soft, non-tender, non-distended. No organomegaly. Normoactive bowel sounds.  Extremities: Warm to touch. Peripheral pulse palpable. ++ pedal edema.   Skin: No rashes or skin lesions  Neurological: Motor and sensory examination equal and normal in all four extremities.  Psychiatry: Appropriate mood and affect.    HOSPITAL MEDICATIONS:  MEDICATIONS  (STANDING):  buprenorphine 2 mG/naloxone 0.5 mG SL  Tablet 1 Tablet(s) SubLingual <User Schedule>  ceFAZolin   IVPB 2000 milliGRAM(s) IV Intermittent every 8 hours  dextrose 5%. 1000 milliLiter(s) (50 mL/Hr) IV Continuous <Continuous>  dextrose 50% Injectable 12.5 Gram(s) IV Push once  dextrose 50% Injectable 25 Gram(s) IV Push once  dextrose 50% Injectable 25 Gram(s) IV Push once  heparin   Injectable 5000 Unit(s) SubCutaneous every 8 hours  insulin glargine Injectable (LANTUS) 38 Unit(s) SubCutaneous at bedtime  insulin glargine Injectable (LANTUS) 16 Unit(s) SubCutaneous every morning  insulin lispro (HumaLOG) corrective regimen sliding scale   SubCutaneous three times a day before meals  insulin lispro (HumaLOG) corrective regimen sliding scale   SubCutaneous at bedtime  metoprolol succinate ER 25 milliGRAM(s) Oral daily  multivitamin/minerals 1 Tablet(s) Oral daily  nystatin Powder 1 Application(s) Topical two times a day  pantoprazole    Tablet 40 milliGRAM(s) Oral two times a day  predniSONE   Tablet 3 milliGRAM(s) Oral every other day  sertraline 100 milliGRAM(s) Oral daily  sodium chloride 0.65% Nasal 1 Spray(s) Both Nostrils daily  tacrolimus 0.5 milliGRAM(s) Oral two times a day  tamsulosin 0.4 milliGRAM(s) Oral at bedtime  tiotropium 18 MICROgram(s) Capsule 1 Capsule(s) Inhalation daily  trimethoprim   80 mG/sulfamethoxazole 400 mG 1 Tablet(s) Oral <User Schedule>    MEDICATIONS  (PRN):  albuterol/ipratropium for Nebulization 3 milliLiter(s) Nebulizer every 6 hours PRN Shortness of Breath and/or Wheezing  dextrose 40% Gel 15 Gram(s) Oral once PRN Blood Glucose LESS THAN 70 milliGRAM(s)/deciliter  glucagon  Injectable 1 milliGRAM(s) IntraMuscular once PRN Glucose LESS THAN 70 milligrams/deciliter      LABS:                        8.1    3.72  )-----------( 112      ( 19 May 2020 06:27 )             See note    05-19    138  |  90<L>  |  36<H>  ----------------------------<  228<H>  4.5   |  37<H>  |  1.49<H>    Ca    9.5      19 May 2020 16:54  Phos  3.8     05-19  Mg     2.1     05-19    TPro  7.6  /  Alb  3.5  /  TBili  0.6  /  DBili  x   /  AST  22  /  ALT  <5<L>  /  AlkPhos  128<H>  05-19          Venous Blood Gas:  05-19 @ 21:46  7.34/74/84/39/96  VBG Lactate: 1.5  Venous Blood Gas:  05-19 @ 06:29  7.30/93/21/45/22  VBG Lactate: 1.1      MICROBIOLOGY:     RADIOLOGY:  [ ] Reviewed and interpreted by me    Point of Care Ultrasound Findings:    PFT:    EKG:

## 2020-05-20 NOTE — PROGRESS NOTE ADULT - PROBLEM SELECTOR PLAN 3
- hold cellcept for active infection  - c/w tacro 0.5 BID  - c/w prednisone 3mg every other day  - Monitor tacro level 30min prior to AM dose every other day, as per Hepatology - c/w tacro 0.5 BID  - c/w prednisone 3mg every other day  - Monitor tacro level 30min prior to AM dose every other day, as per Hepatology  - As per Hepatology, resume home cellcept tomorrow after abx completes today

## 2020-05-20 NOTE — PROGRESS NOTE ADULT - ASSESSMENT
68 yo M w/ hx of HTN, T2DM (on insulin), COPD (on home O2 3L), HCV/EtOH cirrhosis s/p liver txp (2011) on cellcept and tacrolimus, HLD, chronic lymphedema, presents after mechanical fall 2/2 weakness, found to be anemic with positive FOBT and non-adherence to diuretics, admitted for anemia likely 2/2 GIB and LE edema 2/2 fluid overload in setting of medication non-adherence, likely c/b RLE cellulitis.

## 2020-05-21 NOTE — PROGRESS NOTE ADULT - ATTENDING COMMENTS
as above-improving, s/p CHF evaln (switch to diuretics)--PT needed; completing ABX for cellulitis  multifactorial dyspnea--copd, CAD, PAH (portopulmonary), debility, anemia--O2 sat above 90%  copd-duoneb q 6, symbicort 160 2 bid, spiriva 1 q day, acapella, incentive spirometry   ***OSAS-likely has it, cpap as able here d/w pt prior and now  cards-s/p formal cards evaln--s/p echo to R/O PAH-RHF---CHF evaln (RV dysfunction/mod AS)-continue diuresis-now PO  GI-s/p liver transplant-Hailey et al.         Heme-evaln and GI evaln for anemia.  psych-depression--formal psych consult          Lung Ca screening--CT chest is No evidence of Dz  DVT/GI prophylaxis        Care coordinator consult--NH/rehab placement  Leandro Santos MD-Pulmonary   [phone] as above-improving, s/p CHF evaln (switch to diuretics)--PT needed; completed ABX for cellulitis  multifactorial dyspnea--copd, CAD, PAH (portopulmonary), debility, anemia--O2 sat above 90%  copd-duoneb q 6, symbicort 160 2 bid, spiriva 1 q day, acapella, incentive spirometry   ***OSAS-likely has it, bipap as able here d/w pt prior and now  cards-s/p formal cards evaln--s/p echo to R/O PAH-RHF---CHF evaln (RV dysfunction/mod AS)-continue diuresis-now PO  GI-s/p liver transplant-Hailey et al.         Heme-evaln and GI evaln for anemia.   Hypercapneic resp failure-intolerant of bipap  psych-depression--formal psych consult          Lung Ca screening--CT chest is No evidence of Dz  DVT/GI prophylaxis        Care coordinator consult-rehab placement (diuretics to be addressed by cards pre DC)  Leandro Santos MD-Pulmonary   [phone]

## 2020-05-21 NOTE — PROGRESS NOTE ADULT - ATTENDING COMMENTS
diuretics not restarted.   meds: lasix 80 QD, toprol 25 QD, bactrim, prograf .5 bid, cellcept 500QD, pred 3 QOD, sebaxome.  afebrile, 70-90SR, 110//80, 98%3L  weights are bed weights and seem discrepant.   I/O: -600      134<L>  |  92<L>  |  40<H>  ----------------------------<  192<H>  4.7   |  35<H>  |  1.33<H>  BUN 40, 39, 36, 33  Cr 1.3, 1.59, 1.49  Ca    9.4      21 May 2020 05:57  Phos  3.2       Mg     2.5       TPro  7.7  /  Alb  3.5  /  TBili  0.5  /  DBili  x   /  AST  25  /  ALT  <5<L>  /  AlkPhos  149<H>    VB,38, 63, 35, 36, 61                        7.8    3.18  )-----------( 114      ( 21 May 2020 05:57 )             See note  WBC 3.18, 3.8, baseline (5-9)  Hb 7.8 (baseline 7's)   patient does not need to remain in hospital from a HF perspective.   elevated bicarb likely compensatory and related to CO2 retention and should not be treated diamox.   would discuss cellcept dose with liver transplant  in light of leukopenia (patient is also on bactim)  please arrange for follow up with Dr Ferguson in the heart failure program.   Scott Macedo

## 2020-05-21 NOTE — PROGRESS NOTE ADULT - PROBLEM SELECTOR PLAN 1
- Continue lasix 80mg PO QD   - Recommend compression stockings/elevation for LEs   - Strict I&Os, fluid restriction 1.5L daily  - Daily standing weights.  - HF signing off. Once he is ready for discharge to [facility] please contact the HF office at [phone] to schedule a follow-up appointment with Dr. Elvira Daley

## 2020-05-21 NOTE — PROGRESS NOTE ADULT - PROBLEM SELECTOR PLAN 1
Appears to be chronic LE lymphedema; pt endorses increased swelling and has been non-adherent to diuretics. Last TTE in [employer] on 5/2016 shows EF 60% and nl LVSF/RVSF.  - TTE 5/14/20: LVIDd 4.2cm, LVEF 55%, grossly reduced RVSF, mod AS, borderline PH (RVSP 36 mmHg)   - LE doppler neg for RLE DVT  - monitor Is/Os, daily weights, fluid restrict to 1.5L /day  - dc'ed spironolactone 25 BID and IV lasix 80 IV BID (home dose is PO lasix 40 BID) for concern of contraction alkalosis  - s/p Diamox 500mg IV x1 on 5/18  - will start Lasix 80 PO daily today  - Appreciate HF recs    # RLE Cellulitis/Edema, appears improved  - s/p cefazolin (5/14-5/20)  - Blood Cx (5/15): NGTD  - likely with component of venous stasis changes  - wound care following Appears to be chronic LE lymphedema; pt endorses increased swelling and has been non-adherent to diuretics. Last TTE in [employer] on 5/2016 shows EF 60% and nl LVSF/RVSF.  - TTE 5/14/20: LVIDd 4.2cm, LVEF 55%, grossly reduced RVSF, mod AS, borderline PH (RVSP 36 mmHg)   - LE doppler neg for RLE DVT  - monitor Is/Os, daily weights, fluid restrict to 1.5L /day  - dc'ed spironolactone 25 BID and IV lasix 80 IV BID (home dose is PO lasix 40 BID) for concern of contraction alkalosis  - s/p Diamox 500mg IV x1 on 5/18  - will start Lasix 80 PO daily today and plan to resume spironolactone 25 PO BID when K+<4.5  - Appreciate HF recs    # RLE Cellulitis/Edema, appears improved  - s/p cefazolin (5/14-5/20)  - Blood Cx (5/15): NGTD  - likely with component of venous stasis changes  - wound care following Appears to be chronic LE lymphedema; pt endorses increased swelling and has been non-adherent to diuretics. Last TTE in [employer] on 5/2016 shows EF 60% and nl LVSF/RVSF.  - TTE 5/14/20: LVIDd 4.2cm, LVEF 55%, grossly reduced RVSF, mod AS, borderline PH (RVSP 36 mmHg)   - LE doppler neg for RLE DVT  - monitor Is/Os, daily weights, fluid restrict to 1.5L /day  - dc'ed spironolactone 25 BID and IV lasix 80 IV BID (home dose is PO lasix 40 BID) for concern of contraction alkalosis  - s/p Diamox 500mg IV x1 on 5/18  - started Lasix 80 PO daily today and plan to resume spironolactone 25 PO BID when K+<4.0 (as per HF)  - Appreciate HF recs, f/u outpatient with Dr. Daley at HF clinic ([phone])    #RLE Cellulitis/Edema, appears improved  - s/p cefazolin (5/14-5/20)  - Blood Cx (5/15): NGTD  - likely with component of venous stasis changes  - wound care following

## 2020-05-21 NOTE — PROGRESS NOTE ADULT - PROBLEM SELECTOR PLAN 3
- c/w tacro 0.5 BID  - c/w prednisone 3mg every other day  - Monitor tacro level 30min prior to AM dose every other day, as per Hepatology  - As per Hepatology, resume home cellcept today

## 2020-05-21 NOTE — PROGRESS NOTE ADULT - SUBJECTIVE AND OBJECTIVE BOX
Subjective:    Medications:  albuterol/ipratropium for Nebulization 3 milliLiter(s) Nebulizer every 6 hours PRN  buprenorphine 2 mG/naloxone 0.5 mG SL  Tablet 1 Tablet(s) SubLingual <User Schedule>  dextrose 40% Gel 15 Gram(s) Oral once PRN  dextrose 5%. 1000 milliLiter(s) IV Continuous <Continuous>  dextrose 50% Injectable 12.5 Gram(s) IV Push once  dextrose 50% Injectable 25 Gram(s) IV Push once  dextrose 50% Injectable 25 Gram(s) IV Push once  furosemide    Tablet 80 milliGRAM(s) Oral daily  glucagon  Injectable 1 milliGRAM(s) IntraMuscular once PRN  heparin   Injectable 5000 Unit(s) SubCutaneous every 8 hours  insulin glargine Injectable (LANTUS) 19 Unit(s) SubCutaneous every morning  insulin glargine Injectable (LANTUS) 38 Unit(s) SubCutaneous at bedtime  insulin lispro (HumaLOG) corrective regimen sliding scale   SubCutaneous three times a day before meals  insulin lispro (HumaLOG) corrective regimen sliding scale   SubCutaneous at bedtime  metoprolol succinate ER 25 milliGRAM(s) Oral daily  multivitamin/minerals 1 Tablet(s) Oral daily  mycophenolate mofetil 500 milliGRAM(s) Oral daily  nystatin Powder 1 Application(s) Topical two times a day  pantoprazole    Tablet 40 milliGRAM(s) Oral two times a day  predniSONE   Tablet 3 milliGRAM(s) Oral every other day  sertraline 100 milliGRAM(s) Oral daily  sodium chloride 0.65% Nasal 1 Spray(s) Both Nostrils daily  tacrolimus 0.5 milliGRAM(s) Oral two times a day  tamsulosin 0.4 milliGRAM(s) Oral at bedtime  tiotropium 18 MICROgram(s) Capsule 1 Capsule(s) Inhalation daily  trimethoprim   80 mG/sulfamethoxazole 400 mG 1 Tablet(s) Oral <User Schedule>      Physical Exam:    Vitals:  Vital Signs Last 24 Hours  T(C): 36.8 (20 @ 04:41), Max: 36.8 (20 @ 12:18)  HR: 76 (20 @ 04:41) (76 - 85)  BP: 144/80 (20 @ 04:41) (144/71 - 147/76)  RR: 18 (05-21-20 @ 04:41) (18 - 18)  SpO2: 98% (20 @ 04:41) (94% - 98%)    Weight in k.2 ( @ 08:08)    I&O's Summary    20 May 2020 07:01  -  21 May 2020 07:00  --------------------------------------------------------  IN: 600 mL / OUT: 1200 mL / NET: -600 mL        Tele:    General: No distress. Comfortable.  HEENT: EOM intact.  Neck: Neck supple. JVP not elevated. No masses  Chest: Clear to auscultation bilaterally  CV: Normal S1 and S2. No murmurs, rub, or gallops. Radial pulses normal.  Abdomen: Soft, non-distended, non-tender  Skin: No rashes or skin breakdown  Neurology: Alert and oriented times three. Sensation intact  Psych: Affect normal    Labs:                        7.8    3.18  )-----------( 114      ( 21 May 2020 05:57 )             See note        134<L>  |  92<L>  |  40<H>  ----------------------------<  192<H>  4.7   |  35<H>  |  1.33<H>    Ca    9.4      21 May 2020 05:57  Phos  3.2       Mg     2.5         TPro  7.7  /  Alb  3.5  /  TBili  0.5  /  DBili  x   /  AST  25  /  ALT  <5<L>  /  AlkPhos  149<H>            Serum Pro-Brain Natriuretic Peptide: 849 pg/mL (05-15 @ 06:52) Subjective:  - Was able to walk slowly in the hallway with PT and walker yesterday without dyspnea. Feels his LE swelling has much improved     Medications:  albuterol/ipratropium for Nebulization 3 milliLiter(s) Nebulizer every 6 hours PRN  buprenorphine 2 mG/naloxone 0.5 mG SL  Tablet 1 Tablet(s) SubLingual <User Schedule>  dextrose 40% Gel 15 Gram(s) Oral once PRN  dextrose 5%. 1000 milliLiter(s) IV Continuous <Continuous>  dextrose 50% Injectable 12.5 Gram(s) IV Push once  dextrose 50% Injectable 25 Gram(s) IV Push once  dextrose 50% Injectable 25 Gram(s) IV Push once  furosemide    Tablet 80 milliGRAM(s) Oral daily  glucagon  Injectable 1 milliGRAM(s) IntraMuscular once PRN  heparin   Injectable 5000 Unit(s) SubCutaneous every 8 hours  insulin glargine Injectable (LANTUS) 19 Unit(s) SubCutaneous every morning  insulin glargine Injectable (LANTUS) 38 Unit(s) SubCutaneous at bedtime  insulin lispro (HumaLOG) corrective regimen sliding scale   SubCutaneous three times a day before meals  insulin lispro (HumaLOG) corrective regimen sliding scale   SubCutaneous at bedtime  metoprolol succinate ER 25 milliGRAM(s) Oral daily  multivitamin/minerals 1 Tablet(s) Oral daily  mycophenolate mofetil 500 milliGRAM(s) Oral daily  nystatin Powder 1 Application(s) Topical two times a day  pantoprazole    Tablet 40 milliGRAM(s) Oral two times a day  predniSONE   Tablet 3 milliGRAM(s) Oral every other day  sertraline 100 milliGRAM(s) Oral daily  sodium chloride 0.65% Nasal 1 Spray(s) Both Nostrils daily  tacrolimus 0.5 milliGRAM(s) Oral two times a day  tamsulosin 0.4 milliGRAM(s) Oral at bedtime  tiotropium 18 MICROgram(s) Capsule 1 Capsule(s) Inhalation daily  trimethoprim   80 mG/sulfamethoxazole 400 mG 1 Tablet(s) Oral <User Schedule>    Physical Exam:    Vitals:  Vital Signs Last 24 Hours  T(C): 36.8 (20 @ 04:41), Max: 36.8 (20 @ 12:18)  HR: 76 (20 @ 04:41) (76 - 85)  BP: 144/80 (20 @ 04:41) (144/71 - 147/76)  RR: 18 (20 @ 04:41) (18 - 18)  SpO2: 98% (20 @ 04:41) (94% - 98%)    Weight in k.2 ( @ 08:08)    I&O's Summary    20 May 2020 07:01  -  21 May 2020 07:00  --------------------------------------------------------  IN: 600 mL / OUT: 1200 mL / NET: -600 mL    Appearance: No Acute Distress  HEENT: JVP at least moderately elevated   Cardiovascular: RRR, Normal S1 S2, No murmurs/rubs/gallops. Chronic lymphedema of BLE with wrinkling   Respiratory: Clear to auscultation bilaterally  Gastrointestinal: Soft, Non-tender, non-distended	  Skin: no skin lesions  Neurologic: Non-focal  Extremities: Warm peripherally. improved erythema of R thigh  Psychiatry: A & O x 3, Mood & affect appropriate    Labs:                        7.8    3.18  )-----------( 114      ( 21 May 2020 05:57 )             See note        134<L>  |  92<L>  |  40<H>  ----------------------------<  192<H>  4.7   |  35<H>  |  1.33<H>    Ca    9.4      21 May 2020 05:57  Phos  3.2       Mg     2.5         TPro  7.7  /  Alb  3.5  /  TBili  0.5  /  DBili  x   /  AST  25  /  ALT  <5<L>  /  AlkPhos  149<H>      Serum Pro-Brain Natriuretic Peptide: 849 pg/mL (05-15 @ 06:52)

## 2020-05-21 NOTE — PROGRESS NOTE ADULT - PROBLEM SELECTOR PLAN 2
- Management per liver transplant team  - Remains on prednisone and Tacrolimus. Restarted on Cellcept today after finishing his course of IV ABX for cellulitis

## 2020-05-21 NOTE — PROGRESS NOTE ADULT - ASSESSMENT
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 Found w/ signif edema and Hgb-7--  multifactorial issues.  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 Found w/ signif edema and Hgb-7--  multifactorial issues.  ******************  5/15-s/p echo-RV dysfunction, mod AS  5/16-better over all--CHF evaln-continue diuresis  5/17-better over all-CHF team f/up-80 bid IV  5/18-improving but still no ambulation-CHF f/up  5/19-slow improvements-less edema, remains on NC 3-4 liters  5/20-CHF titrated down diuretics to PO lasix, cpap poorly tolerated  5/21-preparing for Rehab

## 2020-05-21 NOTE — PROGRESS NOTE ADULT - SUBJECTIVE AND OBJECTIVE BOX
CHIEF COMPLAINT:  f/up sob, copd, RHF, obesity-poorly tolerant of cpap    Interval Events:    REVIEW OF SYSTEMS:  Constitutional: No fevers or chills. No weight loss. No fatigue or generalized malaise.  Eyes: No itching or discharge from the eyes  ENT: No ear pain. No ear discharge. No nasal congestion. No post nasal drip. No epistaxis. No throat pain. No sore throat. No difficulty swallowing.   CV: No chest pain. No palpitations. No lightheadedness or dizziness.   Resp: No dyspnea at rest. No dyspnea on exertion. No orthopnea. No wheezing. No cough. No stridor. No sputum production. No chest pain with respiration.  GI: No nausea. No vomiting. No diarrhea.  MSK: No joint pain or pain in any extremities  Integumentary: No skin lesions. No pedal edema.  Neurological: No gross motor weakness. No sensory changes.  [ ] All other systems negative  [ ] Unable to assess ROS because ________    OBJECTIVE:  ICU Vital Signs Last 24 Hrs  T(C): 36.8 (21 May 2020 04:41), Max: 36.8 (20 May 2020 12:18)  T(F): 98.2 (21 May 2020 04:41), Max: 98.3 (20 May 2020 12:18)  HR: 76 (21 May 2020 04:41) (76 - 85)  BP: 144/80 (21 May 2020 04:41) (144/71 - 147/76)  BP(mean): --  ABP: --  ABP(mean): --  RR: 18 (21 May 2020 04:41) (18 - 18)  SpO2: 98% (21 May 2020 04:41) (94% - 98%)        05-19 @ 07:01  -  05-20 @ 07:00  --------------------------------------------------------  IN: 650 mL / OUT: 1625 mL / NET: -975 mL    05-20 @ 07:01  -  05-21 @ 05:24  --------------------------------------------------------  IN: 480 mL / OUT: 500 mL / NET: -20 mL      CAPILLARY BLOOD GLUCOSE      POCT Blood Glucose.: 263 mg/dL (20 May 2020 21:37)      PHYSICAL EXAM:  General: Awake, alert, oriented X 3.   HEENT: Atraumatic, normocephalic.                 Mallampatti Grade                 No nasal congestion.                No tonsillar or pharyngeal exudates.  Lymph Nodes: No palpable lymphadenopathy  Neck: No JVD. No carotid bruit.   Respiratory: Normal chest expansion                         Normal percussion                         Normal and equal air entry                         No wheeze, rhonchi or rales.  Cardiovascular: S1 S2 normal. No murmurs, rubs or gallops.   Abdomen: Soft, non-tender, non-distended. No organomegaly. Normoactive bowel sounds.  Extremities: Warm to touch. Peripheral pulse palpable. No pedal edema.   Skin: No rashes or skin lesions  Neurological: Motor and sensory examination equal and normal in all four extremities.  Psychiatry: Appropriate mood and affect.    HOSPITAL MEDICATIONS:  MEDICATIONS  (STANDING):  buprenorphine 2 mG/naloxone 0.5 mG SL  Tablet 1 Tablet(s) SubLingual <User Schedule>  dextrose 5%. 1000 milliLiter(s) (50 mL/Hr) IV Continuous <Continuous>  dextrose 50% Injectable 12.5 Gram(s) IV Push once  dextrose 50% Injectable 25 Gram(s) IV Push once  dextrose 50% Injectable 25 Gram(s) IV Push once  furosemide    Tablet 80 milliGRAM(s) Oral daily  heparin   Injectable 5000 Unit(s) SubCutaneous every 8 hours  insulin glargine Injectable (LANTUS) 19 Unit(s) SubCutaneous every morning  insulin glargine Injectable (LANTUS) 38 Unit(s) SubCutaneous at bedtime  insulin lispro (HumaLOG) corrective regimen sliding scale   SubCutaneous three times a day before meals  insulin lispro (HumaLOG) corrective regimen sliding scale   SubCutaneous at bedtime  metoprolol succinate ER 25 milliGRAM(s) Oral daily  multivitamin/minerals 1 Tablet(s) Oral daily  nystatin Powder 1 Application(s) Topical two times a day  pantoprazole    Tablet 40 milliGRAM(s) Oral two times a day  predniSONE   Tablet 3 milliGRAM(s) Oral every other day  sertraline 100 milliGRAM(s) Oral daily  sodium chloride 0.65% Nasal 1 Spray(s) Both Nostrils daily  tacrolimus 0.5 milliGRAM(s) Oral two times a day  tamsulosin 0.4 milliGRAM(s) Oral at bedtime  tiotropium 18 MICROgram(s) Capsule 1 Capsule(s) Inhalation daily  trimethoprim   80 mG/sulfamethoxazole 400 mG 1 Tablet(s) Oral <User Schedule>    MEDICATIONS  (PRN):  albuterol/ipratropium for Nebulization 3 milliLiter(s) Nebulizer every 6 hours PRN Shortness of Breath and/or Wheezing  dextrose 40% Gel 15 Gram(s) Oral once PRN Blood Glucose LESS THAN 70 milliGRAM(s)/deciliter  glucagon  Injectable 1 milliGRAM(s) IntraMuscular once PRN Glucose LESS THAN 70 milligrams/deciliter      LABS:                        7.9    3.89  )-----------( 120      ( 20 May 2020 07:10 )             28.9     05-20    138  |  90<L>  |  39<H>  ----------------------------<  197<H>  4.7   |  38<H>  |  1.59<H>    Ca    9.3      20 May 2020 07:10  Phos  2.8     05-20  Mg     2.4     05-20    TPro  7.7  /  Alb  3.5  /  TBili  0.6  /  DBili  x   /  AST  26  /  ALT  <5<L>  /  AlkPhos  151<H>  05-20          Venous Blood Gas:  05-20 @ 07:23  7.28/90/23/41/27  VBG Lactate: 2.5  Venous Blood Gas:  05-19 @ 21:46  7.34/74/84/39/96  VBG Lactate: 1.5  Venous Blood Gas:  05-19 @ 06:29  7.30/93/21/45/22  VBG Lactate: 1.1      MICROBIOLOGY:     RADIOLOGY:  [ ] Reviewed and interpreted by me    Point of Care Ultrasound Findings:    PFT:    EKG: CHIEF COMPLAINT:  f/up sob, copd, RHF, obesity-poorly tolerant of cpap-better over all-less sob -no pain    Interval Events: none    REVIEW OF SYSTEMS:  Constitutional: No fevers or chills. No weight loss. + fatigue or generalized malaise.  Eyes: No itching or discharge from the eyes  ENT: No ear pain. No ear discharge. No nasal congestion. No post nasal drip. No epistaxis. No throat pain. No sore throat. No difficulty swallowing.   CV: No chest pain. No palpitations. No lightheadedness or dizziness.   Resp: No dyspnea at rest. + dyspnea on exertion. No orthopnea. No wheezing. No cough. No stridor. No sputum production. No chest pain with respiration.  GI: No nausea. No vomiting. No diarrhea.  MSK: No joint pain or pain in any extremities  Integumentary: No skin lesions. No pedal edema.  Neurological: + gross motor weakness. No sensory changes.  [+ ] All other systems negative  [ ] Unable to assess ROS because ________    OBJECTIVE:  ICU Vital Signs Last 24 Hrs  T(C): 36.8 (21 May 2020 04:41), Max: 36.8 (20 May 2020 12:18)  T(F): 98.2 (21 May 2020 04:41), Max: 98.3 (20 May 2020 12:18)  HR: 76 (21 May 2020 04:41) (76 - 85)  BP: 144/80 (21 May 2020 04:41) (144/71 - 147/76)  BP(mean): --  ABP: --  ABP(mean): --  RR: 18 (21 May 2020 04:41) (18 - 18)  SpO2: 98% (21 May 2020 04:41) (94% - 98%)        05-19 @ 07:01  -  05-20 @ 07:00  --------------------------------------------------------  IN: 650 mL / OUT: 1625 mL / NET: -975 mL    05-20 @ 07:01  -  05-21 @ 05:24  --------------------------------------------------------  IN: 480 mL / OUT: 500 mL / NET: -20 mL      CAPILLARY BLOOD GLUCOSE      POCT Blood Glucose.: 263 mg/dL (20 May 2020 21:37)      PHYSICAL EXAM: NAD in bed  General: Awake, alert, oriented X 3.   HEENT: Atraumatic, normocephalic.                 Mallampatti Grade 3                No nasal congestion.                No tonsillar or pharyngeal exudates.  Lymph Nodes: No palpable lymphadenopathy  Neck: No JVD. No carotid bruit.   Respiratory: abnormal chest expansion                         Normal percussion                         Normal and equal air entry                         No wheeze, rhonchi or rales.  Cardiovascular: S1 S2 normal. No murmurs, rubs or gallops.   Abdomen: Soft, non-tender, non-distended. No organomegaly. Normoactive bowel sounds.  Extremities: Warm to touch. Peripheral pulse palpable. ++ pedal edema.   Skin: No rashes or skin lesions  Neurological: Motor and sensory examination equal and normal in all four extremities.  Psychiatry: Appropriate mood and affect.    HOSPITAL MEDICATIONS:  MEDICATIONS  (STANDING):  buprenorphine 2 mG/naloxone 0.5 mG SL  Tablet 1 Tablet(s) SubLingual <User Schedule>  dextrose 5%. 1000 milliLiter(s) (50 mL/Hr) IV Continuous <Continuous>  dextrose 50% Injectable 12.5 Gram(s) IV Push once  dextrose 50% Injectable 25 Gram(s) IV Push once  dextrose 50% Injectable 25 Gram(s) IV Push once  furosemide    Tablet 80 milliGRAM(s) Oral daily  heparin   Injectable 5000 Unit(s) SubCutaneous every 8 hours  insulin glargine Injectable (LANTUS) 19 Unit(s) SubCutaneous every morning  insulin glargine Injectable (LANTUS) 38 Unit(s) SubCutaneous at bedtime  insulin lispro (HumaLOG) corrective regimen sliding scale   SubCutaneous three times a day before meals  insulin lispro (HumaLOG) corrective regimen sliding scale   SubCutaneous at bedtime  metoprolol succinate ER 25 milliGRAM(s) Oral daily  multivitamin/minerals 1 Tablet(s) Oral daily  nystatin Powder 1 Application(s) Topical two times a day  pantoprazole    Tablet 40 milliGRAM(s) Oral two times a day  predniSONE   Tablet 3 milliGRAM(s) Oral every other day  sertraline 100 milliGRAM(s) Oral daily  sodium chloride 0.65% Nasal 1 Spray(s) Both Nostrils daily  tacrolimus 0.5 milliGRAM(s) Oral two times a day  tamsulosin 0.4 milliGRAM(s) Oral at bedtime  tiotropium 18 MICROgram(s) Capsule 1 Capsule(s) Inhalation daily  trimethoprim   80 mG/sulfamethoxazole 400 mG 1 Tablet(s) Oral <User Schedule>    MEDICATIONS  (PRN):  albuterol/ipratropium for Nebulization 3 milliLiter(s) Nebulizer every 6 hours PRN Shortness of Breath and/or Wheezing  dextrose 40% Gel 15 Gram(s) Oral once PRN Blood Glucose LESS THAN 70 milliGRAM(s)/deciliter  glucagon  Injectable 1 milliGRAM(s) IntraMuscular once PRN Glucose LESS THAN 70 milligrams/deciliter      LABS:                        7.9    3.89  )-----------( 120      ( 20 May 2020 07:10 )             28.9     05-20    138  |  90<L>  |  39<H>  ----------------------------<  197<H>  4.7   |  38<H>  |  1.59<H>    Ca    9.3      20 May 2020 07:10  Phos  2.8     05-20  Mg     2.4     05-20    TPro  7.7  /  Alb  3.5  /  TBili  0.6  /  DBili  x   /  AST  26  /  ALT  <5<L>  /  AlkPhos  151<H>  05-20          Venous Blood Gas:  05-20 @ 07:23  7.28/90/23/41/27  VBG Lactate: 2.5  Venous Blood Gas:  05-19 @ 21:46  7.34/74/84/39/96  VBG Lactate: 1.5  Venous Blood Gas:  05-19 @ 06:29  7.30/93/21/45/22  VBG Lactate: 1.1      MICROBIOLOGY:     RADIOLOGY:  [ ] Reviewed and interpreted by me    Point of Care Ultrasound Findings:    PFT:    EKG:

## 2020-05-21 NOTE — PROGRESS NOTE ADULT - SUBJECTIVE AND OBJECTIVE BOX
PROGRESS NOTE:   Authored by Dr. Von Hobbs MD  Pager [phone] [facility], [id number] [facility]     Patient is a 69y old  Male who [facility] a [facility] (21 May 2020 05:24[facility]      SUBJECTIVE / OVERNIGHT EVENTS:  - Overnight, no acute events. Continued to refuse BiPAP overnight. Completed course of cefazolin yesterday.  - Today, pt seen and examined at bedside in AM. Pt reports he feels okay. Pt appeared to be eating well. Has dyspnea at baseline (on 3LNC at home). Denies f/c/n/v, CP, SOB otherwise, abdominal pain, hematochezia and melena.    ADDITIONAL REVIEW OF SYSTEMS:    MEDICATIONS  (STANDING):  buprenorphine 2 mG/naloxone 0.5 mG SL  Tablet 1 Tablet(s) SubLingual <User Schedule>  dextrose 5%. 1000 milliLiter(s) (50 mL/Hr) IV Continuous <Continuous>  dextrose 50% Injectable 12.5 Gram(s) IV Push once  dextrose 50% Injectable 25 Gram(s) IV Push once  dextrose 50% Injectable 25 Gram(s) IV Push once  furosemide    Tablet 80 milliGRAM(s) Oral daily  heparin   Injectable 5000 Unit(s) SubCutaneous every 8 hours  insulin glargine Injectable (LANTUS) 19 Unit(s) SubCutaneous every morning  insulin glargine Injectable (LANTUS) 38 Unit(s) SubCutaneous at bedtime  insulin lispro (HumaLOG) corrective regimen sliding scale   SubCutaneous three times a day before meals  insulin lispro (HumaLOG) corrective regimen sliding scale   SubCutaneous at bedtime  metoprolol succinate ER 25 milliGRAM(s) Oral daily  multivitamin/minerals 1 Tablet(s) Oral daily  nystatin Powder 1 Application(s) Topical two times a day  pantoprazole    Tablet 40 milliGRAM(s) Oral two times a day  predniSONE   Tablet 3 milliGRAM(s) Oral every other day  sertraline 100 milliGRAM(s) Oral daily  sodium chloride 0.65% Nasal 1 Spray(s) Both Nostrils daily  tacrolimus 0.5 milliGRAM(s) Oral two times a day  tamsulosin 0.4 milliGRAM(s) Oral at bedtime  tiotropium 18 MICROgram(s) Capsule 1 Capsule(s) Inhalation daily  trimethoprim   80 mG/sulfamethoxazole 400 mG 1 Tablet(s) Oral <User Schedule>    MEDICATIONS  (PRN):  albuterol/ipratropium for Nebulization 3 milliLiter(s) Nebulizer every 6 hours PRN Shortness of Breath and/or Wheezing  dextrose 40% Gel 15 Gram(s) Oral once PRN Blood Glucose LESS THAN 70 milliGRAM(s)/deciliter  glucagon  Injectable 1 milliGRAM(s) IntraMuscular once PRN Glucose LESS THAN 70 milligrams/deciliter      CAPILLARY BLOOD GLUCOSE      POCT Blood Glucose.: 263 mg/dL (20 May 2020 21:37)  POCT Blood Glucose.: 180 mg/dL (20 May 2020 17:23)  POCT Blood Glucose.: 229 mg/dL (20 May 2020 12:22)  POCT Blood Glucose.: 226 mg/dL (20 May 2020 10:19)  POCT Blood Glucose.: 186 mg/dL (20 May 2020 08:23)    I&O's Summary    20 May 2020 07:01  -  21 May 2020 07:00  --------------------------------------------------------  IN: 600 mL / OUT: 1200 mL / NET: -600 mL        PHYSICAL EXAM:  Vital Signs Last 24 Hrs  T(C): 36.8 (21 May 2020 04:41), Max: 36.8 (20 May 2020 12:18)  T(F): 98.2 (21 May 2020 04:41), Max: 98.3 (20 May 2020 12:18)  HR: 76 (21 May 2020 04:41) (76 - 85)  BP: 144/80 (21 May 2020 04:41) (144/71 - 147/76)  BP(mean): --  RR: 18 (21 May 2020 04:41) (18 - 18)  SpO2: 98% (21 May 2020 04:41) (94% - 98%)    GENERAL: NAD, obese man laying in bed  RESPIRATORY: Normal respiratory effort; lungs are clear to auscultation bilaterally, on 4LNC   CARDIOVASCULAR: Regular rate and rhythm, normal S1 and S2, no murmur/rub/gallop  ABDOMEN: +BS, Soft, Non-distended, Nontender to palpation  EXT: Significant b/l LE edema with venous stasis changes and erythema (R> L). Erythema extends up to the thigh on the RLE. Warm to touch. Non-tender to palpation. Overall RLE improving.    NEURO: Alert, good concentration, moving extremities     LABS:                        7.8    3.18  )-----------( 114      ( 21 May 2020 05:57 )             See note    05-21    134<L>  |  92<L>  |  40<H>  ----------------------------<  192<H>  4.7   |  35<H>  |  1.33<H>    Ca    9.4      21 May 2020 05:57  Phos  3.2     05-21  Mg     2.5     05-21    TPro  7.7  /  Alb  3.5  /  TBili  0.5  /  DBili  x   /  AST  25  /  ALT  <5<L>  /  AlkPhos  149<H>  05-21                RADIOLOGY & ADDITIONAL TESTS:  Results Reviewed:   Imaging Personally Reviewed:  Electrocardiogram Personally Reviewed:    COORDINATION OF CARE:  Care Discussed with Consultants/Other Providers [Y/N]:  Prior or Outpatient Records Reviewed [Y/N]: PROGRESS NOTE:   Authored by Dr. Von Hobbs MD  Pager [phone] [facility], [id number] [facility]     Patient is a 69y old  Male who presents with a chief complaint of s/p mech fall (21 May 2020 05:24)      SUBJECTIVE / OVERNIGHT EVENTS:  - Overnight, no acute events. Continued to refuse BiPAP overnight. Completed course of cefazolin yesterday.  - Today, pt seen and examined at bedside in AM. Pt reports he feels okay, no complaints. States he feels legs are improved. Has dyspnea at baseline (on 3LNC at home). Denies f/c/n/v, CP, SOB otherwise, abdominal pain, hematochezia and melena.    ADDITIONAL REVIEW OF SYSTEMS:    MEDICATIONS  (STANDING):  buprenorphine 2 mG/naloxone 0.5 mG SL  Tablet 1 Tablet(s) SubLingual <User Schedule>  dextrose 5%. 1000 milliLiter(s) (50 mL/Hr) IV Continuous <Continuous>  dextrose 50% Injectable 12.5 Gram(s) IV Push once  dextrose 50% Injectable 25 Gram(s) IV Push once  dextrose 50% Injectable 25 Gram(s) IV Push once  furosemide    Tablet 80 milliGRAM(s) Oral daily  heparin   Injectable 5000 Unit(s) SubCutaneous every 8 hours  insulin glargine Injectable (LANTUS) 19 Unit(s) SubCutaneous every morning  insulin glargine Injectable (LANTUS) 38 Unit(s) SubCutaneous at bedtime  insulin lispro (HumaLOG) corrective regimen sliding scale   SubCutaneous three times a day before meals  insulin lispro (HumaLOG) corrective regimen sliding scale   SubCutaneous at bedtime  metoprolol succinate ER 25 milliGRAM(s) Oral daily  multivitamin/minerals 1 Tablet(s) Oral daily  nystatin Powder 1 Application(s) Topical two times a day  pantoprazole    Tablet 40 milliGRAM(s) Oral two times a day  predniSONE   Tablet 3 milliGRAM(s) Oral every other day  sertraline 100 milliGRAM(s) Oral daily  sodium chloride 0.65% Nasal 1 Spray(s) Both Nostrils daily  tacrolimus 0.5 milliGRAM(s) Oral two times a day  tamsulosin 0.4 milliGRAM(s) Oral at bedtime  tiotropium 18 MICROgram(s) Capsule 1 Capsule(s) Inhalation daily  trimethoprim   80 mG/sulfamethoxazole 400 mG 1 Tablet(s) Oral <User Schedule>    MEDICATIONS  (PRN):  albuterol/ipratropium for Nebulization 3 milliLiter(s) Nebulizer every 6 hours PRN Shortness of Breath and/or Wheezing  dextrose 40% Gel 15 Gram(s) Oral once PRN Blood Glucose LESS THAN 70 milliGRAM(s)/deciliter  glucagon  Injectable 1 milliGRAM(s) IntraMuscular once PRN Glucose LESS THAN 70 milligrams/deciliter      CAPILLARY BLOOD GLUCOSE      POCT Blood Glucose.: 263 mg/dL (20 May 2020 21:37)  POCT Blood Glucose.: 180 mg/dL (20 May 2020 17:23)  POCT Blood Glucose.: 229 mg/dL (20 May 2020 12:22)  POCT Blood Glucose.: 226 mg/dL (20 May 2020 10:19)  POCT Blood Glucose.: 186 mg/dL (20 May 2020 08:23)    I&O's Summary    20 May 2020 07:01  -  21 May 2020 07:00  --------------------------------------------------------  IN: 600 mL / OUT: 1200 mL / NET: -600 mL        PHYSICAL EXAM:  Vital Signs Last 24 Hrs  T(C): 36.8 (21 May 2020 04:41), Max: 36.8 (20 May 2020 12:18)  T(F): 98.2 (21 May 2020 04:41), Max: 98.3 (20 May 2020 12:18)  HR: 76 (21 May 2020 04:41) (76 - 85)  BP: 144/80 (21 May 2020 04:41) (144/71 - 147/76)  BP(mean): --  RR: 18 (21 May 2020 04:41) (18 - 18)  SpO2: 98% (21 May 2020 04:41) (94% - 98%)    GENERAL: NAD, obese man laying in bed  RESPIRATORY: Normal respiratory effort; lungs are clear to auscultation bilaterally, on 4LNC   CARDIOVASCULAR: Regular rate and rhythm, normal S1 and S2, no murmur/rub/gallop  ABDOMEN: +BS, Soft, Non-distended, Nontender to palpation  EXT: Significant b/l LE edema with venous stasis changes and erythema (R> L). Erythema extends up to the thigh on the RLE. Warm to touch. Non-tender to palpation. Overall RLE improving.    NEURO: Alert, good concentration, moving extremities     LABS:                        7.8    3.18  )-----------( 114      ( 21 May 2020 05:57 )             See note    05-21    134<L>  |  92<L>  |  40<H>  ----------------------------<  192<H>  4.7   |  35<H>  |  1.33<H>    Ca    9.4      21 May 2020 05:57  Phos  3.2     05-21  Mg     2.5     05-21    TPro  7.7  /  Alb  3.5  /  TBili  0.5  /  DBili  x   /  AST  25  /  ALT  <5<L>  /  AlkPhos  149<H>  05-21                RADIOLOGY & ADDITIONAL TESTS:  Results Reviewed:   Imaging Personally Reviewed:  Electrocardiogram Personally Reviewed:    COORDINATION OF CARE:  Care Discussed with Consultants/Other Providers [Y/N]:  Prior or Outpatient Records Reviewed [Y/N]:

## 2020-05-21 NOTE — PROGRESS NOTE ADULT - PROBLEM SELECTOR PLAN 10
Transitions of Care Status:  1.  Name of PCP: Dr. Leandro Santos ([facility])   2.  PCP Contacted on Admission: [ ] Y    [ ] N    3.  PCP contacted at Discharge: [ ] Y    [ ] N    [ ] N/A  4.  Post-Discharge Appointment Date and Location:  5.  Summary of Handoff given to PCP:

## 2020-05-21 NOTE — PROGRESS NOTE ADULT - PROBLEM SELECTOR PLAN 7
- Chronic hypoxemic and hypercapnic respiratory failure, likely also with CLAUDE  - c/w spiriva and symbicort  - acapella, incentive spirometry   - c/w supplemental O2 3L (home O2 lvl)  - Nazia PRN q6  - TTE showing RV dysfunction  - CT chest for cancer screening without pulmonary nodules   - Pulm following, recs appreciated  - continue to monitor VBG  - pt currently refusing BiPAP for suspected CLAUDE, but pt is mentating well

## 2020-05-21 NOTE — PROGRESS NOTE ADULT - ASSESSMENT
69 year old M with HFpEF, RV systolic dysfunction of unknown chronicity (patient reports known for 4 years), followed by Dr. Garcia at [facility]. His history is notable for HCV/ETOH cirrhosis s/p liver transplant 2011 (on tacrolimus/cellcept/prednisone), HTN, CKD stage 3 (b/l Scr 1.3-1.5), DM2 on insulin, COPD w/ home 02, HLD, chronic lymphedema and remote history of substance abuse (quit 85') on Suboxone. He was admitted after a mechanical fall and found to be volume overloaded with acute on chronic decompensated RV systolic dysfunction in the setting of inconsistently use of his diuretics. Additionally, on arrival found to have anemia consistent with iron deficiency Hb of 7 and FOBT +, transfused with 1U PRBC with appropriate response. Thrombocytopenia appears chronic in nature (b/l count 60-70s). GI plans for outpatient scope. He was also started on IV cefazolin for suspicion of cellulitis of his RLE.     He has diuresed well with significant improvement of BLE edema with intermittent IV lasix but remains with some elevation of central filling pressures. His diuretics were stopped on 5/19 and he received a dose of Diamox and 3 doses of albumin for concerns of contraction alkalosis by primary team. He was started on oral diuretics this morning. His H/H remains stable with no overt signs of GI bleeding. His leukocytosis from admission has resolved but is now leukopenic. He has completed his course of IV Cefazolin for cellulitis and was restarted on Cellcept by hepatology.     Pertinient Cardiac Studies:  TTE 5/14/20: LVIDd 4.2cm, LVEF 55%, grossly reduced RVSF, mod AS, borderline PH (RVSP 36 mmHg)  TTE: 5/24/16: LVEDd 4.7cm, LVEF 60%, mild hypertrophy of interventricular septum without LVOT obstruction, nl RVSF, minimal AS, no PH (no mention of RVSP/PASP)

## 2020-05-22 NOTE — PROGRESS NOTE ADULT - ATTENDING COMMENTS
Subjective   Chief Complaint   Patient presents with   • Establish Care   • Heartburn   • Anxiety   • Depression   • Daytime Sleepiness     Carol Preciado is a 42 y.o. female.     Patient Care Team:  Marielle Beauchamp MD as PCP - General (Family Medicine)  Andrew Caceres MD as Consulting Physician (Obstetrics and Gynecology)    She is coming in today to get established and she also wants to discuss her problems and medications.  She reports that for quite some time she has been dealing with GI issues, indigestion and some bloating.  She was seen by GI last year and had EGD done in September 2019.  She was started on omeprazole and has been taking this since then, she reports that this was helping more to begin with but lately she has been having some GI issues.  She also has been dealing with chronic constipation and has been on MiraLAX for some time.  She also wants to talk about her anxiety, depression, and daytime hypersomnia.  She is seen by psych nurse practitioner and gets Pristiq from there.  She reports that several years ago she was seen by neurologist due to daytime hypersomnia.  And had some testing done.  He recommended for her to be started on either Nuvigil or Provigil and she tried 1 of those medicines and helped tremendously, however at the time it was very expensive for her.  Then she was started on Adderall to help with that and has been on this ever since.       The following portions of the patient's history were reviewed and updated as appropriate: allergies, current medications, past family history, past medical history, past social history, past surgical history and problem list.  Past Medical History:   Diagnosis Date   • ADHD    • Allergic    • Anxiety    • Depression    • GERD (gastroesophageal reflux disease)    • Headache      Past Surgical History:   Procedure Laterality Date   • UPPER GASTROINTESTINAL ENDOSCOPY  09/03/2019     The patient has a family history of  Family History  "  Problem Relation Age of Onset   • Breast cancer Maternal Aunt    • Cancer Maternal Aunt    • Anxiety disorder Mother    • Stroke Mother    • Anxiety disorder Brother    • Hearing loss Maternal Grandmother      Social History     Socioeconomic History   • Marital status:      Spouse name: Not on file   • Number of children: Not on file   • Years of education: Not on file   • Highest education level: Not on file   Tobacco Use   • Smoking status: Current Every Day Smoker     Types: Cigarettes   • Smokeless tobacco: Never Used   Substance and Sexual Activity   • Alcohol use: Yes     Frequency: 4 or more times a week   • Drug use: No   • Sexual activity: Defer       Review of Systems   Constitutional: Negative for activity change, fatigue and fever.   Respiratory: Negative for cough, shortness of breath and wheezing.    Cardiovascular: Negative for chest pain, palpitations and leg swelling.   Gastrointestinal: Positive for constipation, GERD and indigestion. Negative for abdominal pain, diarrhea, nausea and vomiting.   Skin: Negative for color change, dry skin and rash.   Neurological: Negative for tremors and headache.   Psychiatric/Behavioral: Positive for depressed mood. Negative for sleep disturbance and stress. The patient is nervous/anxious.      Visit Vitals  /55 (BP Location: Left arm, Patient Position: Sitting, Cuff Size: Adult)   Pulse 86   Temp 98.3 °F (36.8 °C) (Oral)   Resp 16   Ht 172.7 cm (68\")   Wt 54.9 kg (121 lb)   SpO2 98%   BMI 18.40 kg/m²       Current Outpatient Medications:   •  polyethylene glycol (MIRALAX) packet, Take 17 g by mouth Daily., Disp: , Rfl:   •  Armodafinil 200 MG tablet, Take 200 tablets by mouth Daily., Disp: 30 tablet, Rfl: 2  •  desvenlafaxine (PRISTIQ) 50 MG 24 hr tablet, PRISTIQ 50 MG XR24H-TAB, Disp: , Rfl:   •  pantoprazole (PROTONIX) 40 MG EC tablet, Take 1 tablet by mouth Daily., Disp: 90 tablet, Rfl: 0    Objective   Physical Exam   Constitutional: She is " oriented to person, place, and time. She appears well-developed and well-nourished. No distress.   HENT:   Head: Normocephalic and atraumatic.   Eyes: Pupils are equal, round, and reactive to light. Conjunctivae and EOM are normal.   Neck: Normal range of motion. Neck supple.   Cardiovascular: Normal rate, regular rhythm, normal heart sounds and intact distal pulses. Exam reveals no gallop.   No murmur heard.  Pulmonary/Chest: Effort normal and breath sounds normal. No respiratory distress. She has no rales. She exhibits no tenderness.   Musculoskeletal: Normal range of motion. She exhibits no edema or deformity.   Neurological: She is alert and oriented to person, place, and time.   Skin: Skin is warm and dry. She is not diaphoretic.   Psychiatric: She has a normal mood and affect. Her behavior is normal. Judgment and thought content normal.   Nursing note and vitals reviewed.           No visits with results within 7 Day(s) from this visit.   Latest known visit with results is:   No results found for any previous visit.                 Assessment/Plan   Problems Addressed this Visit        Digestive    Gastroesophageal reflux disease without esophagitis - Primary    Relevant Medications    pantoprazole (PROTONIX) 40 MG EC tablet    Constipation, chronic       Other    Anxiety disorder    Relevant Medications    Armodafinil 200 MG tablet    Depression    Relevant Medications    Armodafinil 200 MG tablet    Hypersomnia, idiopathic    Relevant Medications    Armodafinil 200 MG tablet        I reviewed her medical problems and her medications.  She gets her Pristiq from the psych nurse practitioner.  Her acid reflux symptoms are not ideally controlled.  I reviewed a copy of her EGD report done in September 2019 and it showed inflammation in the stomach consistent with gastritis.  She has been on omeprazole which does not seem to be helping.  I will switch to pantoprazole.  She will continue MiraLAX for her  constipation.  If it comes to her hypersomnia Nuvigil or Provigil would definitely be a better option than Adderall.  At the time when she was started on these medicines years ago they were branded and more expensive.  I will try those medicines as they are now available in generic form.  Prescription was sent in.  All this was discussed with the patient and she agrees with the plan.             Requested Prescriptions     Signed Prescriptions Disp Refills   • pantoprazole (PROTONIX) 40 MG EC tablet 90 tablet 0     Sig: Take 1 tablet by mouth Daily.   • Armodafinil 200 MG tablet 30 tablet 2     Sig: Take 200 tablets by mouth Daily.      as above-improving, s/p CHF evaln (switch to diuretics)--PT needed; completed ABX for cellulitis  multifactorial dyspnea--copd, CAD, PAH (portopulmonary), debility, anemia--O2 sat above 90%  copd-duoneb q 6, symbicort 160 2 bid, spiriva 1 q day, acapella, incentive spirometry   ***OSAS-likely has it, bipap as able here d/w pt prior and now  cards-s/p formal cards evaln--s/p echo to R/O PAH-RHF---CHF evaln (RV dysfunction/mod AS)-continue diuresis-now PO  GI-s/p liver transplant-Hailey et al.         Heme-evaln and GI evaln for anemia.   Hypercapneic resp failure-intolerant of bipap  psych-depression--formal psych consult          Lung Ca screening--CT chest is No evidence of Dz  DVT/GI prophylaxis        Care coordinator consult-rehab placement (diuretics to be addressed by cards pre DC)  Leandro Santos MD-Pulmonary   [phone] as above-improving, s/p CHF evaln (switched to diuretics)--PT needed; completed ABX for cellulitis  multifactorial dyspnea--copd, CAD, PAH (portopulmonary), debility, anemia--O2 sat above 90%  copd-duoneb q 6, symbicort 160 2 bid, spiriva 1 q day, acapella, incentive spirometry   ***OSAS-likely has it, bipap as able here d/w pt prior and now  cards-s/p formal cards evaln--s/p echo to R/O PAH-RHF---CHF evaln (RV dysfunction/mod AS)-continue diuresis-now PO  GI-s/p liver transplant-Hailey et al.         Heme-evaln and GI evaln for anemia.   Hypercapneic resp failure-intolerant of bipap  psych-depression--formal psych consult          Lung Ca screening--CT chest is No evidence of Dz  DVT/GI prophylaxis        Care coordinator consult-rehab placement (diuretics to be addressed by cards pre DC) Ok for DC.  Leandro Santos MD-Pulmonary   [phone]

## 2020-05-22 NOTE — PROGRESS NOTE ADULT - PROBLEM SELECTOR PLAN 1
Appears to be chronic LE lymphedema; pt endorses increased swelling and has been non-adherent to diuretics. Last TTE in [employer] on 5/2016 shows EF 60% and nl LVSF/RVSF.  - TTE 5/14/20: LVIDd 4.2cm, LVEF 55%, grossly reduced RVSF, mod AS, borderline PH (RVSP 36 mmHg)   - LE doppler neg for RLE DVT  - monitor Is/Os, daily weights, fluid restrict to 1.5L /day  - dc'ed spironolactone 25 BID and IV lasix 80 IV BID (home dose is PO lasix 40 BID) for concern of contraction alkalosis  - s/p Diamox 500mg IV x1 on 5/18  - started Lasix 80 PO daily today and plan to resume spironolactone 25 PO BID when K+<4.0 (as per HF)  - Appreciate HF recs, f/u outpatient with Dr. Daley at HF clinic ([phone])    #RLE Cellulitis/Edema, appears improved  - s/p cefazolin (5/14-5/20)  - Blood Cx (5/15): NGTD  - likely with component of venous stasis changes  - wound care following Appears to be chronic LE lymphedema; pt endorses increased swelling and has been non-adherent to diuretics. Last TTE in [employer] on 5/2016 shows EF 60% and nl LVSF/RVSF.  - TTE 5/14/20: LVIDd 4.2cm, LVEF 55%, grossly reduced RVSF, mod AS, borderline PH (RVSP 36 mmHg)   - LE doppler neg for RLE DVT  - monitor Is/Os, daily weights, fluid restrict to 1.5L /day  - dc'ed spironolactone 25 BID and IV lasix 80 IV BID (home dose is PO lasix 40 BID) for concern of contraction alkalosis  - s/p Diamox 500mg IV x1 on 5/18  - c/w Lasix 80 PO daily today and plan to resume spironolactone 25 PO BID when K+<4.0 (as per HF)  - Appreciate HF recs, f/u outpatient with Dr. Daley at HF clinic ([phone])    #RLE Cellulitis/Edema, appears improved  - s/p cefazolin (5/14-5/20)  - Blood Cx (5/15): NGTD  - likely with component of venous stasis changes  - wound care following

## 2020-05-22 NOTE — PROGRESS NOTE ADULT - ASSESSMENT
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 Found w/ signif edema and Hgb-7--  multifactorial issues.  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 Found w/ signif edema and Hgb-7--  multifactorial issues.  ******************  5/15-s/p echo-RV dysfunction, mod AS  5/16-better over all--CHF evaln-continue diuresis  5/17-better over all-CHF team f/up-80 bid IV  5/18-improving but still no ambulation-CHF f/up  5/19-slow improvements-less edema, remains on NC 3-4 liters  5/20-CHF titrated down diuretics to PO lasix, cpap poorly tolerated  5/21-preparing for Rehab  5/22-better-occasional chest discomfort

## 2020-05-22 NOTE — PROGRESS NOTE ADULT - PROBLEM SELECTOR PLAN 3
s/p echo--RV dysfunction/mod AS--s/p formal CHF evaln-continue diuresis- IV lasix s/p echo--RV dysfunction/mod AS--s/p formal CHF evaln-continue diuresis- po lasix

## 2020-05-22 NOTE — PROGRESS NOTE ADULT - SUBJECTIVE AND OBJECTIVE BOX
PROGRESS NOTE:   Authored by Dr. Von Hobbs MD  Pager [phone] [facility], [id number] [facility]     Patient is a 69y old  Male who presents with a chief complaint of s/p mech fall (22 May 2020 04:53)      SUBJECTIVE / OVERNIGHT EVENTS:  - Overnight, no acute events. Continued to refuse BiPAP overnight.   - Today, pt seen and examined at bedside in AM. Pt reports he feels okay, no complaints. Legs remain improved. Has dyspnea at baseline (on 3LNC at home). Denies f/c/n/v, CP, SOB otherwise, abdominal pain, hematochezia and melena.    ADDITIONAL REVIEW OF SYSTEMS:    MEDICATIONS  (STANDING):  buprenorphine 2 mG/naloxone 0.5 mG SL  Tablet 1 Tablet(s) SubLingual <User Schedule>  dextrose 5%. 1000 milliLiter(s) (50 mL/Hr) IV Continuous <Continuous>  dextrose 50% Injectable 12.5 Gram(s) IV Push once  dextrose 50% Injectable 25 Gram(s) IV Push once  dextrose 50% Injectable 25 Gram(s) IV Push once  furosemide    Tablet 80 milliGRAM(s) Oral daily  heparin   Injectable 5000 Unit(s) SubCutaneous every 8 hours  insulin glargine Injectable (LANTUS) 19 Unit(s) SubCutaneous every morning  insulin glargine Injectable (LANTUS) 38 Unit(s) SubCutaneous at bedtime  insulin lispro (HumaLOG) corrective regimen sliding scale   SubCutaneous three times a day before meals  insulin lispro (HumaLOG) corrective regimen sliding scale   SubCutaneous at bedtime  lidocaine   Patch 1 Patch Transdermal once  metoprolol succinate ER 25 milliGRAM(s) Oral daily  multivitamin/minerals 1 Tablet(s) Oral daily  mycophenolate mofetil 500 milliGRAM(s) Oral daily  nystatin Powder 1 Application(s) Topical two times a day  pantoprazole    Tablet 40 milliGRAM(s) Oral two times a day  predniSONE   Tablet 3 milliGRAM(s) Oral every other day  sertraline 100 milliGRAM(s) Oral daily  sodium chloride 0.65% Nasal 1 Spray(s) Both Nostrils daily  tacrolimus 0.5 milliGRAM(s) Oral two times a day  tamsulosin 0.4 milliGRAM(s) Oral at bedtime  tiotropium 18 MICROgram(s) Capsule 1 Capsule(s) Inhalation daily  trimethoprim   80 mG/sulfamethoxazole 400 mG 1 Tablet(s) Oral <User Schedule>    MEDICATIONS  (PRN):  albuterol/ipratropium for Nebulization 3 milliLiter(s) Nebulizer every 6 hours PRN Shortness of Breath and/or Wheezing  dextrose 40% Gel 15 Gram(s) Oral once PRN Blood Glucose LESS THAN 70 milliGRAM(s)/deciliter  glucagon  Injectable 1 milliGRAM(s) IntraMuscular once PRN Glucose LESS THAN 70 milligrams/deciliter      CAPILLARY BLOOD GLUCOSE      POCT Blood Glucose.: 289 mg/dL (21 May 2020 21:32)  POCT Blood Glucose.: 254 mg/dL (21 May 2020 17:13)  POCT Blood Glucose.: 231 mg/dL (21 May 2020 12:08)  POCT Blood Glucose.: 199 mg/dL (21 May 2020 08:23)    I&O's Summary    21 May 2020 07:01  -  22 May 2020 07:00  --------------------------------------------------------  IN: 440 mL / OUT: 1500 mL / NET: -1060 mL        PHYSICAL EXAM:  Vital Signs Last 24 Hrs  T(C): 36.7 (22 May 2020 04:28), Max: 37.1 (21 May 2020 19:17)  T(F): 98.1 (22 May 2020 04:28), Max: 98.7 (21 May 2020 19:17)  HR: 73 (22 May 2020 04:28) (73 - 84)  BP: 125/75 (22 May 2020 04:28) (118/65 - 147/95)  BP(mean): --  RR: 18 (22 May 2020 04:28) (18 - 18)  SpO2: 98% (22 May 2020 04:28) (94% - 98%)    GENERAL: NAD, obese man laying in bed  RESPIRATORY: Normal respiratory effort; lungs are clear to auscultation bilaterally, on 4LNC   CARDIOVASCULAR: Regular rate and rhythm, normal S1 and S2, no murmur/rub/gallop  ABDOMEN: +BS, Soft, Non-distended, Nontender to palpation  EXT: Significant b/l LE edema with venous stasis changes and erythema (R> L). Erythema extends up to the thigh on the RLE. Warm to touch. Non-tender to palpation. Overall RLE improving.    NEURO: Alert, good concentration, moving extremities     LABS:                        7.7    3.34  )-----------( 110      ( 22 May 2020 06:52 )             28.2     05-22    137  |  94<L>  |  42<H>  ----------------------------<  169<H>  4.6   |  34<H>  |  1.60<H>    Ca    9.6      22 May 2020 06:52  Phos  3.2     05-22  Mg     2.3     05-22    TPro  7.7  /  Alb  3.5  /  TBili  0.5  /  DBili  x   /  AST  25  /  ALT  <5<L>  /  AlkPhos  149<H>  05-21                RADIOLOGY & ADDITIONAL TESTS:  Results Reviewed:   Imaging Personally Reviewed:  Electrocardiogram Personally Reviewed:    COORDINATION OF CARE:  Care Discussed with Consultants/Other Providers [Y/N]:  Prior or Outpatient Records Reviewed [Y/N]:

## 2020-05-22 NOTE — PROGRESS NOTE ADULT - ATTENDING COMMENTS
adjust insulin dose daily as appropriate based on glucose levels.   await PETROS bed  clinically stable

## 2020-05-22 NOTE — PROGRESS NOTE ADULT - PROBLEM SELECTOR PLAN 3
- c/w tacro 0.5 BID  - c/w prednisone 3mg every other day  - Monitor tacro level 30min prior to AM dose every other day, as per Hepatology  - As per Hepatology, resume home cellcept today - c/w home tacro 0.5mg BID  - c/w home prednisone 3mg every other day  - c/w home cellcept 500mg daily  - Monitor tacro level 30min prior to AM dose every other day, as per Hepatology

## 2020-05-22 NOTE — PROGRESS NOTE ADULT - PROBLEM SELECTOR PLAN 10
Transitions of Care Status:  1.  Name of PCP: Dr. Leandro Santos ([facility])   2.  PCP Contacted on Admission: [ ] Y    [ ] N    3.  PCP contacted at Discharge: [ ] Y    [ ] N    [ ] N/A  4.  Post-Discharge Appointment Date and Location:  5.  Summary of Handoff given to PCP:

## 2020-05-22 NOTE — PROGRESS NOTE ADULT - SUBJECTIVE AND OBJECTIVE BOX
CHIEF COMPLAINT: f/up sob, copd, RHF, obesity-poorly tolerant of cpap-    Interval Events:    REVIEW OF SYSTEMS:  Constitutional: No fevers or chills. No weight loss. No fatigue or generalized malaise.  Eyes: No itching or discharge from the eyes  ENT: No ear pain. No ear discharge. No nasal congestion. No post nasal drip. No epistaxis. No throat pain. No sore throat. No difficulty swallowing.   CV: No chest pain. No palpitations. No lightheadedness or dizziness.   Resp: No dyspnea at rest. No dyspnea on exertion. No orthopnea. No wheezing. No cough. No stridor. No sputum production. No chest pain with respiration.  GI: No nausea. No vomiting. No diarrhea.  MSK: No joint pain or pain in any extremities  Integumentary: No skin lesions. No pedal edema.  Neurological: No gross motor weakness. No sensory changes.  [ ] All other systems negative  [ ] Unable to assess ROS because ________    OBJECTIVE:  ICU Vital Signs Last 24 Hrs  T(C): 36.7 (22 May 2020 04:28), Max: 37.1 (21 May 2020 19:17)  T(F): 98.1 (22 May 2020 04:28), Max: 98.7 (21 May 2020 19:17)  HR: 73 (22 May 2020 04:28) (73 - 84)  BP: 125/75 (22 May 2020 04:28) (118/65 - 147/95)  BP(mean): --  ABP: --  ABP(mean): --  RR: 18 (22 May 2020 04:28) (18 - 18)  SpO2: 98% (22 May 2020 04:28) (94% - 98%)        05-20 @ 07:01  -  05-21 @ 07:00  --------------------------------------------------------  IN: 600 mL / OUT: 1200 mL / NET: -600 mL    05-21 @ 07:01  -  05-22 @ 04:53  --------------------------------------------------------  IN: 240 mL / OUT: 900 mL / NET: -660 mL      CAPILLARY BLOOD GLUCOSE      POCT Blood Glucose.: 289 mg/dL (21 May 2020 21:32)      PHYSICAL EXAM:  General: Awake, alert, oriented X 3.   HEENT: Atraumatic, normocephalic.                 Mallampatti Grade                 No nasal congestion.                No tonsillar or pharyngeal exudates.  Lymph Nodes: No palpable lymphadenopathy  Neck: No JVD. No carotid bruit.   Respiratory: Normal chest expansion                         Normal percussion                         Normal and equal air entry                         No wheeze, rhonchi or rales.  Cardiovascular: S1 S2 normal. No murmurs, rubs or gallops.   Abdomen: Soft, non-tender, non-distended. No organomegaly. Normoactive bowel sounds.  Extremities: Warm to touch. Peripheral pulse palpable. No pedal edema.   Skin: No rashes or skin lesions  Neurological: Motor and sensory examination equal and normal in all four extremities.  Psychiatry: Appropriate mood and affect.    HOSPITAL MEDICATIONS:  MEDICATIONS  (STANDING):  buprenorphine 2 mG/naloxone 0.5 mG SL  Tablet 1 Tablet(s) SubLingual <User Schedule>  dextrose 5%. 1000 milliLiter(s) (50 mL/Hr) IV Continuous <Continuous>  dextrose 50% Injectable 12.5 Gram(s) IV Push once  dextrose 50% Injectable 25 Gram(s) IV Push once  dextrose 50% Injectable 25 Gram(s) IV Push once  furosemide    Tablet 80 milliGRAM(s) Oral daily  heparin   Injectable 5000 Unit(s) SubCutaneous every 8 hours  insulin glargine Injectable (LANTUS) 19 Unit(s) SubCutaneous every morning  insulin glargine Injectable (LANTUS) 38 Unit(s) SubCutaneous at bedtime  insulin lispro (HumaLOG) corrective regimen sliding scale   SubCutaneous three times a day before meals  insulin lispro (HumaLOG) corrective regimen sliding scale   SubCutaneous at bedtime  metoprolol succinate ER 25 milliGRAM(s) Oral daily  multivitamin/minerals 1 Tablet(s) Oral daily  mycophenolate mofetil 500 milliGRAM(s) Oral daily  nystatin Powder 1 Application(s) Topical two times a day  pantoprazole    Tablet 40 milliGRAM(s) Oral two times a day  predniSONE   Tablet 3 milliGRAM(s) Oral every other day  sertraline 100 milliGRAM(s) Oral daily  sodium chloride 0.65% Nasal 1 Spray(s) Both Nostrils daily  tacrolimus 0.5 milliGRAM(s) Oral two times a day  tamsulosin 0.4 milliGRAM(s) Oral at bedtime  tiotropium 18 MICROgram(s) Capsule 1 Capsule(s) Inhalation daily  trimethoprim   80 mG/sulfamethoxazole 400 mG 1 Tablet(s) Oral <User Schedule>    MEDICATIONS  (PRN):  albuterol/ipratropium for Nebulization 3 milliLiter(s) Nebulizer every 6 hours PRN Shortness of Breath and/or Wheezing  dextrose 40% Gel 15 Gram(s) Oral once PRN Blood Glucose LESS THAN 70 milliGRAM(s)/deciliter  glucagon  Injectable 1 milliGRAM(s) IntraMuscular once PRN Glucose LESS THAN 70 milligrams/deciliter      LABS:                        7.8    3.18  )-----------( 114      ( 21 May 2020 05:57 )             See note    05-21    134<L>  |  92<L>  |  40<H>  ----------------------------<  192<H>  4.7   |  35<H>  |  1.33<H>    Ca    9.4      21 May 2020 05:57  Phos  3.2     05-21  Mg     2.5     05-21    TPro  7.7  /  Alb  3.5  /  TBili  0.5  /  DBili  x   /  AST  25  /  ALT  <5<L>  /  AlkPhos  149<H>  05-21          Venous Blood Gas:  05-21 @ 05:58  7.38/63/35/36/61  VBG Lactate: 1.1  Venous Blood Gas:  05-20 @ 07:23  7.28/90/23/41/27  VBG Lactate: 2.5      MICROBIOLOGY:     RADIOLOGY:  [ ] Reviewed and interpreted by me    Point of Care Ultrasound Findings:    PFT:    EKG: CHIEF COMPLAINT: f/up sob, copd, RHF, obesity-poorly tolerant of cpap-occasional left chest pain-even at rest, no HB or cough    Interval Events: ambulated more    REVIEW OF SYSTEMS:  Constitutional: No fevers or chills. No weight loss. + fatigue or generalized malaise.  Eyes: No itching or discharge from the eyes  ENT: No ear pain. No ear discharge. No nasal congestion. No post nasal drip. No epistaxis. No throat pain. No sore throat. No difficulty swallowing.   CV: No chest pain. No palpitations. No lightheadedness or dizziness.   Resp: No dyspnea at rest. + dyspnea on exertion. No orthopnea. No wheezing. No cough. No stridor. No sputum production. No chest pain with respiration.  GI: No nausea. No vomiting. No diarrhea.  MSK: No joint pain or pain in any extremities  Integumentary: No skin lesions. + pedal edema.  Neurological: + gross motor weakness. No sensory changes.  [+ ] All other systems negative  [ ] Unable to assess ROS because ________    OBJECTIVE:  ICU Vital Signs Last 24 Hrs  T(C): 36.7 (22 May 2020 04:28), Max: 37.1 (21 May 2020 19:17)  T(F): 98.1 (22 May 2020 04:28), Max: 98.7 (21 May 2020 19:17)  HR: 73 (22 May 2020 04:28) (73 - 84)  BP: 125/75 (22 May 2020 04:28) (118/65 - 147/95)  BP(mean): --  ABP: --  ABP(mean): --  RR: 18 (22 May 2020 04:28) (18 - 18)  SpO2: 98% (22 May 2020 04:28) (94% - 98%)        05-20 @ 07:01  -  05-21 @ 07:00  --------------------------------------------------------  IN: 600 mL / OUT: 1200 mL / NET: -600 mL    05-21 @ 07:01  -  05-22 @ 04:53  --------------------------------------------------------  IN: 240 mL / OUT: 900 mL / NET: -660 mL      CAPILLARY BLOOD GLUCOSE      POCT Blood Glucose.: 289 mg/dL (21 May 2020 21:32)      PHYSICAL EXAM: NAD in bed on NC O2  General: Awake, alert, oriented X 3.   HEENT: Atraumatic, normocephalic.                 Mallampatti Grade 3                No nasal congestion.                No tonsillar or pharyngeal exudates.  Lymph Nodes: No palpable lymphadenopathy  Neck: No JVD. No carotid bruit.   Respiratory: Normal chest expansion                         Normal percussion                         Normal and equal air entry                         No wheeze, rhonchi or rales.  Cardiovascular: S1 S2 normal. No murmurs, rubs or gallops.   Abdomen: Soft, non-tender, non-distended. No organomegaly. Normoactive bowel sounds.  Extremities: Warm to touch. Peripheral pulse palpable. ++ pedal edema.   Skin: No rashes or skin lesions  Neurological: Motor and sensory examination equal and normal in all four extremities.  Psychiatry: Appropriate mood and affect.    HOSPITAL MEDICATIONS:  MEDICATIONS  (STANDING):  buprenorphine 2 mG/naloxone 0.5 mG SL  Tablet 1 Tablet(s) SubLingual <User Schedule>  dextrose 5%. 1000 milliLiter(s) (50 mL/Hr) IV Continuous <Continuous>  dextrose 50% Injectable 12.5 Gram(s) IV Push once  dextrose 50% Injectable 25 Gram(s) IV Push once  dextrose 50% Injectable 25 Gram(s) IV Push once  furosemide    Tablet 80 milliGRAM(s) Oral daily  heparin   Injectable 5000 Unit(s) SubCutaneous every 8 hours  insulin glargine Injectable (LANTUS) 19 Unit(s) SubCutaneous every morning  insulin glargine Injectable (LANTUS) 38 Unit(s) SubCutaneous at bedtime  insulin lispro (HumaLOG) corrective regimen sliding scale   SubCutaneous three times a day before meals  insulin lispro (HumaLOG) corrective regimen sliding scale   SubCutaneous at bedtime  metoprolol succinate ER 25 milliGRAM(s) Oral daily  multivitamin/minerals 1 Tablet(s) Oral daily  mycophenolate mofetil 500 milliGRAM(s) Oral daily  nystatin Powder 1 Application(s) Topical two times a day  pantoprazole    Tablet 40 milliGRAM(s) Oral two times a day  predniSONE   Tablet 3 milliGRAM(s) Oral every other day  sertraline 100 milliGRAM(s) Oral daily  sodium chloride 0.65% Nasal 1 Spray(s) Both Nostrils daily  tacrolimus 0.5 milliGRAM(s) Oral two times a day  tamsulosin 0.4 milliGRAM(s) Oral at bedtime  tiotropium 18 MICROgram(s) Capsule 1 Capsule(s) Inhalation daily  trimethoprim   80 mG/sulfamethoxazole 400 mG 1 Tablet(s) Oral <User Schedule>    MEDICATIONS  (PRN):  albuterol/ipratropium for Nebulization 3 milliLiter(s) Nebulizer every 6 hours PRN Shortness of Breath and/or Wheezing  dextrose 40% Gel 15 Gram(s) Oral once PRN Blood Glucose LESS THAN 70 milliGRAM(s)/deciliter  glucagon  Injectable 1 milliGRAM(s) IntraMuscular once PRN Glucose LESS THAN 70 milligrams/deciliter      LABS:                        7.8    3.18  )-----------( 114      ( 21 May 2020 05:57 )             See note    05-21    134<L>  |  92<L>  |  40<H>  ----------------------------<  192<H>  4.7   |  35<H>  |  1.33<H>    Ca    9.4      21 May 2020 05:57  Phos  3.2     05-21  Mg     2.5     05-21    TPro  7.7  /  Alb  3.5  /  TBili  0.5  /  DBili  x   /  AST  25  /  ALT  <5<L>  /  AlkPhos  149<H>  05-21          Venous Blood Gas:  05-21 @ 05:58  7.38/63/35/36/61  VBG Lactate: 1.1  Venous Blood Gas:  05-20 @ 07:23  7.28/90/23/41/27  VBG Lactate: 2.5      MICROBIOLOGY:     RADIOLOGY:  [ ] Reviewed and interpreted by me    Point of Care Ultrasound Findings:    PFT:    EKG:

## 2020-05-22 NOTE — PROGRESS NOTE ADULT - PROBLEM SELECTOR PLAN 6
Pt previously documented as having T1DM, however, pt says he has T2DM.   - At home, pt is on lantus 26U qAM and 52U qPM, humalog 18U qAM and 24U qPM. A1c 7.2 in 1/2020.  - HbA1C (5/14): 6.6  - c/w lantus at 16U qAM and 38U qPM  - mod ISS and FS qid  - will hold off on restarting standing premeal insulin for now Pt previously documented as having T1DM, however, pt says he has T2DM.   - At home, pt is on lantus 26U qAM and 52U qPM, humalog 18U qAM and 24U qPM. A1c 7.2 in 1/2020.  - HbA1C (5/14): 6.6  - increased Lantus to 22u qAM and 41u qPM  - mod ISS and FS qid  - will hold off on restarting standing premeal insulin for now

## 2020-05-23 NOTE — PROGRESS NOTE ADULT - PROBLEM SELECTOR PLAN 5
Pt previously documented as having T1DM, however, pt says he has T2DM. At home, pt is on lantus 26U qAM and 52U qPM, humalog 18U qAM and 24U qPM.   - HbA1C (5/14): 6.6  - increased Lantus to 22u qAM and 41u qPM  - mod ISS and FS qid  - will hold off on restarting standing premeal insulin for now

## 2020-05-23 NOTE — PROGRESS NOTE ADULT - ASSESSMENT
69M w/ hx of HTN, T2DM (on insulin), COPD (on home O2 3L), HCV/EtOH cirrhosis s/p liver txp (2011) on cellcept and tacrolimus, HLD, chronic lymphedema, presents after mechanical fall 2/2 weakness, found to be anemic with positive FOBT and non-adherence to diuretics, admitted for anemia likely 2/2 GIB and LE edema 2/2 fluid overload in setting of medication non-adherence, likely c/b RLE cellulitis, now resolved pending PETROS

## 2020-05-23 NOTE — PROGRESS NOTE ADULT - PROBLEM SELECTOR PLAN 1
Appears to be chronic LE lymphedema; pt endorses increased swelling and has been non-adherent to diuretics. TTE 5/14: LVIDd 4.2cm, LVEF 55%, grossly reduced RVSF, mod AS, borderline PH (RVSP 36 mmHg)   - c/w Toprol 25  - LE doppler neg for RLE DVT  - monitor Is/Os, daily weights, fluid restrict to 1.5L /day  - s/p aggressive diuresis c/b metabolic alkalosis s/p diamox 500 mg x 1 on 5/18  - c/w Lasix 80 PO daily today   - plan to resume spironolactone 25 PO BID when K+<4.0 (as per HF)  - Appreciate HF recs, f/u outpatient with Dr. Daley at HF clinic ([phone])

## 2020-05-23 NOTE — PROGRESS NOTE ADULT - PROBLEM SELECTOR PLAN 9
Transitions of Care Status:  1.  Name of PCP: Dr. Leandro Santos ([facility])   2.  PCP Contacted on Admission: [ ] Y    [ ] N    3.  PCP contacted at Discharge: [ ] Y    [ ] N    [ ] N/A  4.  Post-Discharge Appointment Date and Location:  5.  Summary of Handoff given to PCP:

## 2020-05-23 NOTE — PROGRESS NOTE ADULT - SUBJECTIVE AND OBJECTIVE BOX
PROGRESS NOTE:     Patient is a 69y old  Male who presents with a chief complaint of s/p [facility] fall (22 May 2020 07:35)      SUBJECTIVE / OVERNIGHT EVENTS:  No issues overnight. Patient endorsing residual pain at L rib site which was TTP. Denies dyspnea, abdominal pain, LE pain. Has been sitting at edge of bed. No other complaints    ADDITIONAL REVIEW OF SYSTEMS:    MEDICATIONS  (STANDING):  buprenorphine 2 mG/naloxone 0.5 mG SL  Tablet 1 Tablet(s) SubLingual <User Schedule>  dextrose 5%. 1000 milliLiter(s) (50 mL/Hr) IV Continuous <Continuous>  dextrose 50% Injectable 12.5 Gram(s) IV Push once  dextrose 50% Injectable 25 Gram(s) IV Push once  dextrose 50% Injectable 25 Gram(s) IV Push once  furosemide    Tablet 80 milliGRAM(s) Oral daily  heparin   Injectable 5000 Unit(s) SubCutaneous every 8 hours  insulin glargine Injectable (LANTUS) 22 Unit(s) SubCutaneous every morning  insulin glargine Injectable (LANTUS) 41 Unit(s) SubCutaneous at bedtime  insulin lispro (HumaLOG) corrective regimen sliding scale   SubCutaneous three times a day before meals  insulin lispro (HumaLOG) corrective regimen sliding scale   SubCutaneous at bedtime  metoprolol succinate ER 25 milliGRAM(s) Oral daily  multivitamin/minerals 1 Tablet(s) Oral daily  mycophenolate mofetil 500 milliGRAM(s) Oral daily  nystatin Powder 1 Application(s) Topical two times a day  pantoprazole    Tablet 40 milliGRAM(s) Oral two times a day  predniSONE   Tablet 3 milliGRAM(s) Oral every other day  sertraline 100 milliGRAM(s) Oral daily  sodium chloride 0.65% Nasal 1 Spray(s) Both Nostrils daily  tacrolimus 0.5 milliGRAM(s) Oral two times a day  tamsulosin 0.4 milliGRAM(s) Oral at bedtime  tiotropium 18 MICROgram(s) Capsule 1 Capsule(s) Inhalation daily  trimethoprim   80 mG/sulfamethoxazole 400 mG 1 Tablet(s) Oral <User Schedule>    MEDICATIONS  (PRN):  albuterol/ipratropium for Nebulization 3 milliLiter(s) Nebulizer every 6 hours PRN Shortness of Breath and/or Wheezing  dextrose 40% Gel 15 Gram(s) Oral once PRN Blood Glucose LESS THAN 70 milliGRAM(s)/deciliter  glucagon  Injectable 1 milliGRAM(s) IntraMuscular once PRN Glucose LESS THAN 70 milligrams/deciliter      CAPILLARY BLOOD GLUCOSE      POCT Blood Glucose.: 220 mg/dL (23 May 2020 12:34)  POCT Blood Glucose.: 191 mg/dL (23 May 2020 08:41)  POCT Blood Glucose.: 244 mg/dL (22 May 2020 21:29)  POCT Blood Glucose.: 310 mg/dL (22 May 2020 17:22)    I&O's Summary    22 May 2020 07:01  -  23 May 2020 07:00  --------------------------------------------------------  IN: 710 mL / OUT: 2200 mL / NET: -1490 mL    23 May 2020 07:01  -  23 May 2020 13:13  --------------------------------------------------------  IN: 0 mL / OUT: 900 mL / NET: -900 mL        PHYSICAL EXAM:  Vital Signs Last 24 Hrs  T(C): 36.7 (23 May 2020 12:47), Max: 37.2 (22 May 2020 21:52)  T(F): 98 (23 May 2020 12:47), Max: 98.9 (22 May 2020 21:52)  HR: 72 (23 May 2020 12:47) (72 - 84)  BP: 161/71 (23 May 2020 12:47) (127/72 - 161/71)  BP(mean): --  RR: 18 (23 May 2020 12:47) (18 - 18)  SpO2: 99% (23 May 2020 12:47) (96% - 99%)    GENERAL: NAD, obese man laying in bed  RESPIRATORY: Normal respiratory effort; lungs are clear to auscultation bilaterally, on 4LNC   CARDIOVASCULAR: Regular rate and rhythm, normal S1 and S2, no murmur/rub/gallop  ABDOMEN: +BS, Soft, Non-distended, Nontender to palpation  EXT: Significant b/l LE edema with venous stasis changes and erythema (R> L).  Warm to touch. Non-tender to palpation.   NEURO: Alert, good concentration, moving extremities     LABS:                        7.7    3.34  )-----------( 110      ( 22 May 2020 06:52 )             28.2     05-22    137  |  94<L>  |  42<H>  ----------------------------<  169<H>  4.6   |  34<H>  |  1.60<H>    Ca    9.6      22 May 2020 06:52  Phos  3.2     05-22  Mg     2.3     05-22                  RADIOLOGY & ADDITIONAL TESTS:  Results Reviewed:   Imaging Personally Reviewed:  Electrocardiogram Personally Reviewed:    COORDINATION OF CARE:  Care Discussed with Consultants/Other Providers [Y/N]: Y  Prior or Outpatient Records Reviewed [Y/N]:

## 2020-05-23 NOTE — PROGRESS NOTE ADULT - PROBLEM SELECTOR PLAN 6
Chronic hypoxemic and hypercapnic respiratory failure, likely also with CLAUDE  - c/w spiriva and symbicort  - acapella, incentive spirometry   - c/w supplemental O2 3L (home O2 lvl)  - Duonebs PRN q6  - TTE showing RV dysfunction  - CT chest for cancer screening without pulmonary nodules   - Pulm following, recs appreciated  - refusing BiPAP for OHS/suspected CLAUDE

## 2020-05-23 NOTE — PROGRESS NOTE ADULT - PROBLEM SELECTOR PLAN 3
- c/w home tacro 0.5mg BID  - c/w home prednisone 3mg every other day  - c/w home cellcept 500mg daily  - Monitor tacro level 30min prior to AM dose every other day, as per Hepatology

## 2020-05-24 NOTE — PROGRESS NOTE ADULT - PROBLEM SELECTOR PLAN 5
Pt previously documented as having T1DM, however, pt says he has T2DM. At home, pt is on lantus 26U qAM and 52U qPM, humalog 18U qAM and 24U qPM.   - HbA1C (5/14): 6.6  - c/w Lantus 22u qAM and 41u qPM  - mod ISS and FS qid  - will hold off on restarting standing premeal insulin for now

## 2020-05-24 NOTE — PROGRESS NOTE ADULT - PROBLEM SELECTOR PLAN 2
Hgb 7.0 on admission, was 10.2 on 2/2020 (in [employer]). Pt endorsing dark stools, blood with wiping, overall weakness, positive FOBT. Last endoscopy/colonoscopy about 8-10 yrs ago per patient.   - s/p 1U PRBC in ED and 1U on 5/15  - Hgb stable ~7-8, continue to monitor CBC  - maintain active T&S  - c/w Protonix 40mg PO BID  - Hold home ASA for now  - As per Hepatology, non-emergent EGD/Colonoscopy +/- capsule currently planned for outpatient, but can consider inpatient scope if overt bleeding/dropping Hgb Hgb 7.0 on admission, was 10.2 on 2/2020 (in [employer]). Pt endorsing dark stools, blood with wiping, overall weakness, positive FOBT. Last endoscopy/colonoscopy about 8-10 yrs ago per patient.   - s/p 1U PRBC in ED and 1U on 5/15  - Hgb stable ~7-8, continue to monitor CBC  - maintain active T&S  - c/w Protonix 40mg PO BID  - restarted on home ASA given Hgb stable  - As per Hepatology, non-emergent EGD/Colonoscopy +/- capsule currently planned for outpatient, but can consider inpatient scope if overt bleeding/dropping Hgb

## 2020-05-24 NOTE — PROGRESS NOTE ADULT - ASSESSMENT
69M w/ hx of HTN, T2DM (on insulin), COPD (on home O2 3L), HCV/EtOH cirrhosis s/p liver txp (2011) on cellcept and tacrolimus, HLD, chronic lymphedema, presents after mechanical fall 2/2 weakness, found to be anemic with positive FOBT and non-adherence to diuretics, admitted for anemia likely 2/2 GIB and LE edema 2/2 fluid overload in setting of medication non-adherence, likely c/b RLE cellulitis, now resolved and pending PETROS

## 2020-05-24 NOTE — PROGRESS NOTE ADULT - SUBJECTIVE AND OBJECTIVE BOX
PROGRESS NOTE:   Authored by Dr. Von Hobbs MD  Pager [phone] [facility], [address] [facility]     Patient is a 69y old  Male who presents with a chief complaint of s/p mech fall (23 May 2020 13:13)      SUBJECTIVE / OVERNIGHT EVENTS:  - Overnight, no acute events. Continued to refuse BiPAP overnight.   - Today, pt seen and examined at bedside in AM. Pt reports he feels okay, no complaints. Minimal pain at previously noted L rib site. Legs remain improved. Has dyspnea at baseline (on 3LNC at home). Denies f/c/n/v, CP, SOB otherwise, abdominal pain, hematochezia and melena.    ADDITIONAL REVIEW OF SYSTEMS:    MEDICATIONS  (STANDING):  buprenorphine 2 mG/naloxone 0.5 mG SL  Tablet 1 Tablet(s) SubLingual <User Schedule>  dextrose 5%. 1000 milliLiter(s) (50 mL/Hr) IV Continuous <Continuous>  dextrose 50% Injectable 12.5 Gram(s) IV Push once  dextrose 50% Injectable 25 Gram(s) IV Push once  dextrose 50% Injectable 25 Gram(s) IV Push once  furosemide    Tablet 80 milliGRAM(s) Oral daily  heparin   Injectable 5000 Unit(s) SubCutaneous every 8 hours  insulin glargine Injectable (LANTUS) 22 Unit(s) SubCutaneous every morning  insulin glargine Injectable (LANTUS) 41 Unit(s) SubCutaneous at bedtime  insulin lispro (HumaLOG) corrective regimen sliding scale   SubCutaneous three times a day before meals  insulin lispro (HumaLOG) corrective regimen sliding scale   SubCutaneous at bedtime  metoprolol succinate ER 25 milliGRAM(s) Oral daily  multivitamin/minerals 1 Tablet(s) Oral daily  mycophenolate mofetil 500 milliGRAM(s) Oral daily  nystatin Powder 1 Application(s) Topical two times a day  pantoprazole    Tablet 40 milliGRAM(s) Oral two times a day  predniSONE   Tablet 3 milliGRAM(s) Oral every other day  sertraline 100 milliGRAM(s) Oral daily  sodium chloride 0.65% Nasal 1 Spray(s) Both Nostrils daily  tacrolimus 0.5 milliGRAM(s) Oral two times a day  tamsulosin 0.4 milliGRAM(s) Oral at bedtime  tiotropium 18 MICROgram(s) Capsule 1 Capsule(s) Inhalation daily  trimethoprim   80 mG/sulfamethoxazole 400 mG 1 Tablet(s) Oral <User Schedule>    MEDICATIONS  (PRN):  albuterol/ipratropium for Nebulization 3 milliLiter(s) Nebulizer every 6 hours PRN Shortness of Breath and/or Wheezing  dextrose 40% Gel 15 Gram(s) Oral once PRN Blood Glucose LESS THAN 70 milliGRAM(s)/deciliter  glucagon  Injectable 1 milliGRAM(s) IntraMuscular once PRN Glucose LESS THAN 70 milligrams/deciliter      CAPILLARY BLOOD GLUCOSE      POCT Blood Glucose.: 289 mg/dL (23 May 2020 21:42)  POCT Blood Glucose.: 226 mg/dL (23 May 2020 17:22)  POCT Blood Glucose.: 220 mg/dL (23 May 2020 12:34)  POCT Blood Glucose.: 191 mg/dL (23 May 2020 08:41)    I&O's Summary    23 May 2020 07:01  -  24 May 2020 07:00  --------------------------------------------------------  IN: 150 mL / OUT: 1700 mL / NET: -1550 mL        PHYSICAL EXAM:  Vital Signs Last 24 Hrs  T(C): 36.7 (24 May 2020 04:55), Max: 36.8 (23 May 2020 21:00)  T(F): 98 (24 May 2020 04:55), Max: 98.3 (23 May 2020 21:00)  HR: 83 (24 May 2020 04:55) (72 - 83)  BP: 144/77 (24 May 2020 04:55) (144/77 - 163/78)  BP(mean): --  RR: 18 (24 May 2020 04:55) (18 - 18)  SpO2: 98% (24 May 2020 04:55) (96% - 99%)    GENERAL: NAD, obese man laying in bed  RESPIRATORY: Normal respiratory effort; lungs are clear to auscultation bilaterally, on 4LNC   CARDIOVASCULAR: Regular rate and rhythm, normal S1 and S2, no murmur/rub/gallop  ABDOMEN: +BS, Soft, Non-distended, Nontender to palpation  EXT: Significant b/l LE edema with venous stasis changes and erythema (R> L).  Warm to touch. Non-tender to palpation.   NEURO: Alert, good concentration, moving extremities     LABS:                        8.0    2.88  )-----------( 106      ( 24 May 2020 06:45 )             29.3     05-24    134<L>  |  91<L>  |  40<H>  ----------------------------<  280<H>  4.5   |  31  |  1.27    Ca    8.8      24 May 2020 06:43                  RADIOLOGY & ADDITIONAL TESTS:  Results Reviewed:   Imaging Personally Reviewed:  Electrocardiogram Personally Reviewed:    COORDINATION OF CARE:  Care Discussed with Consultants/Other Providers [Y/N]:  Prior or Outpatient Records Reviewed [Y/N]: PROGRESS NOTE:   Authored by Dr. Von Hobbs MD  Pager [phone] [facility], [address] [facility]     Patient is a 69y old  Male who presents with a chief complaint of s/p mech fall (23 May 2020 13:13)      SUBJECTIVE / OVERNIGHT EVENTS:  - Overnight, no acute events. Continued to refuse BiPAP overnight.   - Today, pt seen and examined at bedside in AM. Pt reports he feels okay, no complaints. Minimal focal pain at previously noted L rib site (rated 2/10). Legs remain improved. Has dyspnea at baseline (on 3LNC at home). Denies f/c/n/v, CP, SOB otherwise, abdominal pain, hematochezia and melena.    ADDITIONAL REVIEW OF SYSTEMS:    MEDICATIONS  (STANDING):  buprenorphine 2 mG/naloxone 0.5 mG SL  Tablet 1 Tablet(s) SubLingual <User Schedule>  dextrose 5%. 1000 milliLiter(s) (50 mL/Hr) IV Continuous <Continuous>  dextrose 50% Injectable 12.5 Gram(s) IV Push once  dextrose 50% Injectable 25 Gram(s) IV Push once  dextrose 50% Injectable 25 Gram(s) IV Push once  furosemide    Tablet 80 milliGRAM(s) Oral daily  heparin   Injectable 5000 Unit(s) SubCutaneous every 8 hours  insulin glargine Injectable (LANTUS) 22 Unit(s) SubCutaneous every morning  insulin glargine Injectable (LANTUS) 41 Unit(s) SubCutaneous at bedtime  insulin lispro (HumaLOG) corrective regimen sliding scale   SubCutaneous three times a day before meals  insulin lispro (HumaLOG) corrective regimen sliding scale   SubCutaneous at bedtime  metoprolol succinate ER 25 milliGRAM(s) Oral daily  multivitamin/minerals 1 Tablet(s) Oral daily  mycophenolate mofetil 500 milliGRAM(s) Oral daily  nystatin Powder 1 Application(s) Topical two times a day  pantoprazole    Tablet 40 milliGRAM(s) Oral two times a day  predniSONE   Tablet 3 milliGRAM(s) Oral every other day  sertraline 100 milliGRAM(s) Oral daily  sodium chloride 0.65% Nasal 1 Spray(s) Both Nostrils daily  tacrolimus 0.5 milliGRAM(s) Oral two times a day  tamsulosin 0.4 milliGRAM(s) Oral at bedtime  tiotropium 18 MICROgram(s) Capsule 1 Capsule(s) Inhalation daily  trimethoprim   80 mG/sulfamethoxazole 400 mG 1 Tablet(s) Oral <User Schedule>    MEDICATIONS  (PRN):  albuterol/ipratropium for Nebulization 3 milliLiter(s) Nebulizer every 6 hours PRN Shortness of Breath and/or Wheezing  dextrose 40% Gel 15 Gram(s) Oral once PRN Blood Glucose LESS THAN 70 milliGRAM(s)/deciliter  glucagon  Injectable 1 milliGRAM(s) IntraMuscular once PRN Glucose LESS THAN 70 milligrams/deciliter      CAPILLARY BLOOD GLUCOSE      POCT Blood Glucose.: 289 mg/dL (23 May 2020 21:42)  POCT Blood Glucose.: 226 mg/dL (23 May 2020 17:22)  POCT Blood Glucose.: 220 mg/dL (23 May 2020 12:34)  POCT Blood Glucose.: 191 mg/dL (23 May 2020 08:41)    I&O's Summary    23 May 2020 07:01  -  24 May 2020 07:00  --------------------------------------------------------  IN: 150 mL / OUT: 1700 mL / NET: -1550 mL        PHYSICAL EXAM:  Vital Signs Last 24 Hrs  T(C): 36.7 (24 May 2020 04:55), Max: 36.8 (23 May 2020 21:00)  T(F): 98 (24 May 2020 04:55), Max: 98.3 (23 May 2020 21:00)  HR: 83 (24 May 2020 04:55) (72 - 83)  BP: 144/77 (24 May 2020 04:55) (144/77 - 163/78)  BP(mean): --  RR: 18 (24 May 2020 04:55) (18 - 18)  SpO2: 98% (24 May 2020 04:55) (96% - 99%)    GENERAL: NAD, obese man laying in bed  RESPIRATORY: Normal respiratory effort; lungs are clear to auscultation bilaterally, on 4LNC   CARDIOVASCULAR: Regular rate and rhythm, normal S1 and S2, no murmur/rub/gallop  ABDOMEN: +BS, Soft, Non-distended, Nontender to palpation  EXT: Significant b/l LE edema with venous stasis changes and erythema (R> L).  Warm to touch. Non-tender to palpation.   NEURO: Alert, good concentration, moving extremities     LABS:                        8.0    2.88  )-----------( 106      ( 24 May 2020 06:45 )             29.3     05-24    134<L>  |  91<L>  |  40<H>  ----------------------------<  280<H>  4.5   |  31  |  1.27    Ca    8.8      24 May 2020 06:43                  RADIOLOGY & ADDITIONAL TESTS:  Results Reviewed:   Imaging Personally Reviewed:  Electrocardiogram Personally Reviewed:    COORDINATION OF CARE:  Care Discussed with Consultants/Other Providers [Y/N]:  Prior or Outpatient Records Reviewed [Y/N]:

## 2020-05-24 NOTE — PROGRESS NOTE ADULT - PROBLEM SELECTOR PLAN 3
- c/w home tacro 0.5mg BID  - c/w home prednisone 3mg every other day  - c/w home cellcept 500mg daily  - Monitor tacro level 30min prior to AM dose every other day, as per Hepatology - c/w home tacro 0.5mg BID  - c/w home prednisone 3mg every other day  - c/w home cellcept 500mg daily  - Monitor tacro level 30min prior to AM dose every other day, as per Hepatologym less than 2 but no intervention as per hepatology

## 2020-05-24 NOTE — PROGRESS NOTE ADULT - PROBLEM SELECTOR PLAN 1
Appears to be chronic LE lymphedema; pt endorses increased swelling and has been non-adherent to diuretics. TTE 5/14: LVIDd 4.2cm, LVEF 55%, grossly reduced RVSF, mod AS, borderline PH (RVSP 36 mmHg)   - c/w Toprol 25  - LE doppler neg for RLE DVT  - monitor Is/Os, daily weights, fluid restrict to 1.5L /day  - s/p aggressive diuresis c/b metabolic alkalosis s/p diamox 500 mg x 1 on 5/18  - c/w Lasix 80 PO daily  - plan to resume spironolactone 25 PO BID when K+<4.0 (as per HF)  - Appreciate HF recs, f/u outpatient with Dr. Daley at HF clinic ([phone]) ; pt endorses increased swelling and has been non-adherent to diuretics. TTE 5/14: LVIDd 4.2cm, LVEF 55%, grossly reduced RVSF, mod AS, borderline PH (RVSP 36 mmHg)   - c/w Toprol 25  - LE doppler neg for RLE DVT  - monitor Is/Os, daily weights, fluid restrict to 1.5L /day  - s/p aggressive diuresis c/b metabolic alkalosis s/p diamox 500 mg x 1 on 5/18  - c/w Lasix 80 PO daily  - plan to resume spironolactone 25 PO BID when K+<4.0 (as per HF)  - Appreciate HF recs, f/u outpatient with Dr. Daley at HF clinic ([phone])

## 2020-05-24 NOTE — PROGRESS NOTE ADULT - PROBLEM SELECTOR PLAN 9
Transitions of Care Status:  1.  Name of PCP: Dr. Leandro Santos ([facility])   2.  PCP Contacted on Admission: [ ] Y    [ ] N    3.  PCP contacted at Discharge: [ ] Y    [ ] N    [ ] N/A  4.  Post-Discharge Appointment Date and Location:  5.  Summary of Handoff given to PCP:

## 2020-05-25 NOTE — PROGRESS NOTE ADULT - PROBLEM SELECTOR PLAN 1
; pt endorses increased swelling and has been non-adherent to diuretics. TTE 5/14: LVIDd 4.2cm, LVEF 55%, grossly reduced RVSF, mod AS, borderline PH (RVSP 36 mmHg)   - c/w Toprol 25  - LE doppler neg for RLE DVT  - monitor Is/Os, daily weights, fluid restrict to 1.5L /day  - s/p aggressive diuresis c/b metabolic alkalosis s/p diamox 500 mg x 1 on 5/18  - c/w Lasix 80 PO daily  - plan to resume spironolactone 25 PO BID when K+<4.0 (as per HF)  - Appreciate HF recs, f/u outpatient with Dr. Daley at HF clinic ([phone]) pt endorses increased swelling and has been non-adherent to diuretics. TTE 5/14: LVIDd 4.2cm, LVEF 55%, grossly reduced RVSF, mod AS, borderline PH (RVSP 36 mmHg)   - c/w Toprol 25  - LE doppler neg for RLE DVT  - monitor Is/Os, daily weights, fluid restrict to 1.5L /day  - s/p aggressive diuresis c/b metabolic alkalosis s/p diamox 500 mg x 1 on 5/18  - c/w Lasix 80 PO daily  - plan to resume spironolactone 25 PO BID when K+<4.0 (as per HF)  - Appreciate HF recs, f/u outpatient with Dr. Daley at HF clinic ([phone])

## 2020-05-25 NOTE — PROGRESS NOTE ADULT - PROBLEM SELECTOR PLAN 3
- c/w home tacro 0.5mg BID  - c/w home prednisone 3mg every other day  - c/w home cellcept 500mg daily  - Monitor tacro level 30min prior to AM dose every other day, as per Hepatologym less than 2 but no intervention as per hepatology

## 2020-05-25 NOTE — PROGRESS NOTE ADULT - PROBLEM SELECTOR PLAN 2
Hgb 7.0 on admission, was 10.2 on 2/2020 (in [employer]). Pt endorsing dark stools, blood with wiping, overall weakness, positive FOBT. Last endoscopy/colonoscopy about 8-10 yrs ago per patient.   - s/p 1U PRBC in ED and 1U on 5/15  - Hgb stable ~7-8, continue to monitor CBC  - maintain active T&S  - c/w Protonix 40mg PO BID  - restarted on home ASA given Hgb stable  - As per Hepatology, non-emergent EGD/Colonoscopy +/- capsule currently planned for outpatient, but can consider inpatient scope if overt bleeding/dropping Hgb

## 2020-05-25 NOTE — PROGRESS NOTE ADULT - PROBLEM SELECTOR PLAN 9
Transitions of Care Status:  1.  Name of PCP: Dr. Leandro Santos ([facility])   2.  PCP Contacted on Admission: [ ] Y    [ ] N    3.  PCP contacted at Discharge: [ ] Y    [ ] N    [ ] N/A  4.  Post-Discharge Appointment Date and Location:  5.  Summary of Handoff given to PCP:

## 2020-05-25 NOTE — PROGRESS NOTE ADULT - PROBLEM SELECTOR PLAN 5
Pt previously documented as having T1DM, however, pt says he has T2DM. At home, pt is on lantus 26U qAM and 52U qPM, humalog 18U qAM and 24U qPM.   - HbA1C (5/14): 6.6  - c/w Lantus 22u qAM and 41u qPM  - mod ISS and FS qid  - will hold off on restarting standing premeal insulin for now Pt previously documented as having T1DM, however, pt says he has T2DM. At home, pt is on lantus 26U qAM and 52U qPM, humalog 18U qAM and 24U qPM.   - HbA1C (5/14): 6.6  - c/w Lantus 26u qAM and 45u qPM  - mod ISS and FS qid  - will hold off on restarting standing premeal insulin for now

## 2020-05-25 NOTE — PROGRESS NOTE ADULT - SUBJECTIVE AND OBJECTIVE BOX
PROGRESS NOTE:   Authored by Dr. Von Hobbs MD  Pager [phone] [facility], [address] [facility]     Patient is a 69y old  Male who presents with a chief complaint of s/p mech fall (24 May 2020 07:42)      SUBJECTIVE / OVERNIGHT EVENTS:  - Overnight, no acute events. Continued to refuse BiPAP overnight.   - Today, pt seen and examined at bedside in AM. Pt reports he feels okay, no complaints. Minimal focal pain at previously noted L rib site (rated 2/10). Legs remain improved. Has dyspnea at baseline (on 3LNC at home). Denies f/c/n/v, CP, SOB otherwise, abdominal pain, hematochezia and melena.    ADDITIONAL REVIEW OF SYSTEMS:    MEDICATIONS  (STANDING):  aspirin enteric coated 81 milliGRAM(s) Oral daily  buprenorphine 2 mG/naloxone 0.5 mG SL  Tablet 1 Tablet(s) SubLingual <User Schedule>  dextrose 5%. 1000 milliLiter(s) (50 mL/Hr) IV Continuous <Continuous>  dextrose 50% Injectable 12.5 Gram(s) IV Push once  dextrose 50% Injectable 25 Gram(s) IV Push once  dextrose 50% Injectable 25 Gram(s) IV Push once  furosemide    Tablet 80 milliGRAM(s) Oral daily  heparin   Injectable 5000 Unit(s) SubCutaneous every 8 hours  insulin glargine Injectable (LANTUS) 22 Unit(s) SubCutaneous every morning  insulin glargine Injectable (LANTUS) 41 Unit(s) SubCutaneous at bedtime  insulin lispro (HumaLOG) corrective regimen sliding scale   SubCutaneous three times a day before meals  insulin lispro (HumaLOG) corrective regimen sliding scale   SubCutaneous at bedtime  metoprolol succinate ER 25 milliGRAM(s) Oral daily  multivitamin/minerals 1 Tablet(s) Oral daily  mycophenolate mofetil 500 milliGRAM(s) Oral daily  nystatin Powder 1 Application(s) Topical two times a day  pantoprazole    Tablet 40 milliGRAM(s) Oral two times a day  predniSONE   Tablet 3 milliGRAM(s) Oral every other day  sertraline 100 milliGRAM(s) Oral daily  sodium chloride 0.65% Nasal 1 Spray(s) Both Nostrils daily  tacrolimus 0.5 milliGRAM(s) Oral two times a day  tamsulosin 0.4 milliGRAM(s) Oral at bedtime  tiotropium 18 MICROgram(s) Capsule 1 Capsule(s) Inhalation daily  trimethoprim   80 mG/sulfamethoxazole 400 mG 1 Tablet(s) Oral <User Schedule>    MEDICATIONS  (PRN):  albuterol/ipratropium for Nebulization 3 milliLiter(s) Nebulizer every 6 hours PRN Shortness of Breath and/or Wheezing  dextrose 40% Gel 15 Gram(s) Oral once PRN Blood Glucose LESS THAN 70 milliGRAM(s)/deciliter  glucagon  Injectable 1 milliGRAM(s) IntraMuscular once PRN Glucose LESS THAN 70 milligrams/deciliter      CAPILLARY BLOOD GLUCOSE      POCT Blood Glucose.: 278 mg/dL (24 May 2020 22:34)  POCT Blood Glucose.: 235 mg/dL (24 May 2020 17:15)  POCT Blood Glucose.: 234 mg/dL (24 May 2020 12:49)  POCT Blood Glucose.: 254 mg/dL (24 May 2020 10:16)  POCT Blood Glucose.: 229 mg/dL (24 May 2020 09:00)    I&O's Summary    24 May 2020 07:01  -  25 May 2020 07:00  --------------------------------------------------------  IN: 1260 mL / OUT: 1250 mL / NET: 10 mL        PHYSICAL EXAM:  Vital Signs Last 24 Hrs  T(C): 36.7 (25 May 2020 04:01), Max: 37.1 (24 May 2020 12:15)  T(F): 98.1 (25 May 2020 04:01), Max: 98.7 (24 May 2020 12:15)  HR: 92 (25 May 2020 04:01) (78 - 92)  BP: 154/79 (25 May 2020 04:01) (115/70 - 154/79)  BP(mean): --  RR: 18 (25 May 2020 04:01) (18 - 18)  SpO2: 97% (25 May 2020 04:01) (95% - 97%)      GENERAL: NAD, obese man laying in bed  RESPIRATORY: Normal respiratory effort; lungs are clear to auscultation bilaterally, on 4LNC   CARDIOVASCULAR: Regular rate and rhythm, normal S1 and S2, no murmur/rub/gallop  ABDOMEN: +BS, Soft, Non-distended, Nontender to palpation  MSK: point of focal TTP in L lower chest rib  EXT: b/l LE edema with venous stasis changes and erythema (R> L).  Warm to touch. Non-tender to palpation. Appears improved overall.  NEURO: Alert, good concentration, moving extremities       LABS:                        8.0    2.88  )-----------( 106      ( 24 May 2020 06:45 )             29.3     05-24    134<L>  |  91<L>  |  40<H>  ----------------------------<  280<H>  4.5   |  31  |  1.27    Ca    8.8      24 May 2020 06:43                  RADIOLOGY & ADDITIONAL TESTS:  Results Reviewed:   Imaging Personally Reviewed:  Electrocardiogram Personally Reviewed:    COORDINATION OF CARE:  Care Discussed with Consultants/Other Providers [Y/N]:  Prior or Outpatient Records Reviewed [Y/N]:

## 2020-05-26 NOTE — CHART NOTE - NSCHARTNOTEFT_GEN_A_CORE
Nutrition follow up     Hospital course as per chart: Pt 70 y/o M with PMH: HTN, T2DM, COPD, HCV/EtOH cirrhosis S/P liver transplant (2011), HLD, chronic lymphedema, presents after mechanical fall 2/2 weakness, found to be anemic with positive FOBT and non-adherence to diuretics, admitted for anemia likely 2/2 GIB and LE edema 2/2 fluid overload in setting of medication non-adherence, likely c/b RLE cellulitis, acute on chronic diastolic congestive heart failure, now resolved and pending PETROS.     Source: Patient [x]    Family [ ]     other [x]; Medical record    Pt reports good appetite and PO intake. Noted 100% PO intake as per lunch tray at bedside. Denies difficulty chewing/swallowing. Pt denies nausea, vomiting, diarrhea, or constipation, last BM yesterday (05/25). Pt denies having questions/concerns about diet and nutrition; refused education/recommendations - made aware RD remains available.     Diet: Consistent Carbohydrate + DASH/TLC + 1500 ml fluid restriction     Enteral /Parenteral Nutrition: n/a    Current Weight: (05/13) 260 pounds -> (05/21) 254 pounds -> (05/26) 231.4 pounds -?accuracy of weight fluctuations likely due to fluid shifts as pt with edema, will continue to monitor.   % Weight Change: n/a    Pertinent Medications: MEDICATIONS  (STANDING):  aspirin enteric coated 81 milliGRAM(s) Oral daily  buprenorphine 2 mG/naloxone 0.5 mG SL  Tablet 1 Tablet(s) SubLingual <User Schedule>  dextrose 5%. 1000 milliLiter(s) (50 mL/Hr) IV Continuous <Continuous>  dextrose 50% Injectable 12.5 Gram(s) IV Push once  dextrose 50% Injectable 25 Gram(s) IV Push once  dextrose 50% Injectable 25 Gram(s) IV Push once  furosemide    Tablet 80 milliGRAM(s) Oral daily  heparin   Injectable 5000 Unit(s) SubCutaneous every 8 hours  insulin glargine Injectable (LANTUS) 26 Unit(s) SubCutaneous every morning  insulin glargine Injectable (LANTUS) 45 Unit(s) SubCutaneous at bedtime  insulin lispro (HumaLOG) corrective regimen sliding scale   SubCutaneous three times a day before meals  insulin lispro (HumaLOG) corrective regimen sliding scale   SubCutaneous at bedtime  metoprolol succinate ER 25 milliGRAM(s) Oral daily  multivitamin/minerals 1 Tablet(s) Oral daily  mycophenolate mofetil 500 milliGRAM(s) Oral daily  nystatin Powder 1 Application(s) Topical two times a day  pantoprazole    Tablet 40 milliGRAM(s) Oral two times a day  predniSONE   Tablet 3 milliGRAM(s) Oral every other day  sertraline 100 milliGRAM(s) Oral daily  sodium chloride 0.65% Nasal 1 Spray(s) Both Nostrils daily  tacrolimus 0.5 milliGRAM(s) Oral two times a day  tamsulosin 0.4 milliGRAM(s) Oral at bedtime  tiotropium 18 MICROgram(s) Capsule 1 Capsule(s) Inhalation daily  trimethoprim   80 mG/sulfamethoxazole 400 mG 1 Tablet(s) Oral <User Schedule>    MEDICATIONS  (PRN):  albuterol/ipratropium for Nebulization 3 milliLiter(s) Nebulizer every 6 hours PRN Shortness of Breath and/or Wheezing  dextrose 40% Gel 15 Gram(s) Oral once PRN Blood Glucose LESS THAN 70 milliGRAM(s)/deciliter  glucagon  Injectable 1 milliGRAM(s) IntraMuscular once PRN Glucose LESS THAN 70 milligrams/deciliter    Pertinent Labs: (05/26) Glu 185 mg/dL<H> Cr  1.38 mg/dL<H> BUN 39 mg/dL<H>   Finger sticks: (05/26) 171 - 219 (05/25) 205 - 283   (05/14) HbA1c 6.6%     Skin: no noted pressure injuries as per documentation.   Noted +1 [place]. leg, knee, ankle, and foot edema as per flow sheets (previously +2 left leg, foot, and ankle and +3 right leg, foot, and ankle).     Estimated Needs:   [x] no change since previous assessment  [ ] recalculated:     Previous Nutrition Diagnosis: [x] none     Nutrition Diagnosis is [x] not applicable     New Nutrition Diagnosis: [x] not applicable    Interventions:     1. Recommend Consistent Carbohydrate with snack + DASH/TLC + 1500 ml fluid restriction (defer fluids to team). Will continue to monitor and adjust as needed.   2. Encourage PO intake and obtain food preferences (pt did not have any at this time).   3. Reinforce nutrition therapy education as needed/requested -pt made aware RD remains available.  4. Continue to obtain weights to identify changes if any.     Monitoring and Evaluation:     [x] PO intake [x] Tolerance to diet prescription [x] weights [x] follow up per protocol    RD remains available.  Elsie Obregon MS RDN CDN #[phone].

## 2020-05-26 NOTE — PROGRESS NOTE ADULT - PROBLEM SELECTOR PLAN 3
s/p echo--RV dysfunction/mod AS--s/p formal CHF evaln-continue diuresis- po lasix s/p echo--RV dysfunction/mod AS--s/p formal CHF evaln-on po lasix/aldactone

## 2020-05-26 NOTE — PROGRESS NOTE ADULT - PROBLEM SELECTOR PLAN 9
Transitions of Care Status:  1.  Name of PCP: Dr. Leandro Santos ([facility])   2.  PCP Contacted on Admission: [ ] Y    [ ] N    3.  PCP contacted at Discharge: [ ] Y    [ ] N    [ ] N/A  4.  Post-Discharge Appointment Date and Location:  5.  Summary of Handoff given to PCP:

## 2020-05-26 NOTE — PROGRESS NOTE ADULT - ASSESSMENT
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 Found w/ signif edema and Hgb-7--  multifactorial issues.  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 Found w/ signif edema and Hgb-7--  multifactorial issues.  ******************  5/15-s/p echo-RV dysfunction, mod AS  5/16-better over all--CHF evaln-continue diuresis  5/17-better over all-CHF team f/up-80 bid IV  5/18-improving but still no ambulation-CHF f/up  5/19-slow improvements-less edema, remains on NC 3-4 liters  5/20-CHF titrated down diuretics to PO lasix, cpap poorly tolerated  5/21-preparing for Rehab  5/22-better-occasional chest discomfort  5/26-stable awaiting rehab

## 2020-05-26 NOTE — PROGRESS NOTE ADULT - SUBJECTIVE AND OBJECTIVE BOX
PROGRESS NOTE:   Authored by Dr. Von Hobbs MD  Pager [phone] [facility], [id number] [facility]     Patient is a 69y old  Male who presents with a chief complaint of s/p [facility] fall (26 May 2020 05:06)      SUBJECTIVE / OVERNIGHT EVENTS:    ADDITIONAL REVIEW OF SYSTEMS:    MEDICATIONS  (STANDING):  aspirin enteric coated 81 milliGRAM(s) Oral daily  buprenorphine 2 mG/naloxone 0.5 mG SL  Tablet 1 Tablet(s) SubLingual <User Schedule>  dextrose 5%. 1000 milliLiter(s) (50 mL/Hr) IV Continuous <Continuous>  dextrose 50% Injectable 12.5 Gram(s) IV Push once  dextrose 50% Injectable 25 Gram(s) IV Push once  dextrose 50% Injectable 25 Gram(s) IV Push once  furosemide    Tablet 80 milliGRAM(s) Oral daily  heparin   Injectable 5000 Unit(s) SubCutaneous every 8 hours  insulin glargine Injectable (LANTUS) 26 Unit(s) SubCutaneous every morning  insulin glargine Injectable (LANTUS) 45 Unit(s) SubCutaneous at bedtime  insulin lispro (HumaLOG) corrective regimen sliding scale   SubCutaneous three times a day before meals  insulin lispro (HumaLOG) corrective regimen sliding scale   SubCutaneous at bedtime  metoprolol succinate ER 25 milliGRAM(s) Oral daily  multivitamin/minerals 1 Tablet(s) Oral daily  mycophenolate mofetil 500 milliGRAM(s) Oral daily  nystatin Powder 1 Application(s) Topical two times a day  pantoprazole    Tablet 40 milliGRAM(s) Oral two times a day  predniSONE   Tablet 3 milliGRAM(s) Oral every other day  sertraline 100 milliGRAM(s) Oral daily  sodium chloride 0.65% Nasal 1 Spray(s) Both Nostrils daily  tacrolimus 0.5 milliGRAM(s) Oral two times a day  tamsulosin 0.4 milliGRAM(s) Oral at bedtime  tiotropium 18 MICROgram(s) Capsule 1 Capsule(s) Inhalation daily  trimethoprim   80 mG/sulfamethoxazole 400 mG 1 Tablet(s) Oral <User Schedule>    MEDICATIONS  (PRN):  albuterol/ipratropium for Nebulization 3 milliLiter(s) Nebulizer every 6 hours PRN Shortness of Breath and/or Wheezing  dextrose 40% Gel 15 Gram(s) Oral once PRN Blood Glucose LESS THAN 70 milliGRAM(s)/deciliter  glucagon  Injectable 1 milliGRAM(s) IntraMuscular once PRN Glucose LESS THAN 70 milligrams/deciliter      CAPILLARY BLOOD GLUCOSE      POCT Blood Glucose.: 237 mg/dL (25 May 2020 21:34)  POCT Blood Glucose.: 283 mg/dL (25 May 2020 16:57)  POCT Blood Glucose.: 217 mg/dL (25 May 2020 12:17)  POCT Blood Glucose.: 265 mg/dL (25 May 2020 10:32)  POCT Blood Glucose.: 205 mg/dL (25 May 2020 08:16)    I&O's Summary    25 May 2020 07:01  -  26 May 2020 07:00  --------------------------------------------------------  IN: 1080 mL / OUT: 2200 mL / NET: -1120 mL        PHYSICAL EXAM:  Vital Signs Last 24 Hrs  T(C): 36.6 (26 May 2020 04:12), Max: 36.8 (25 May 2020 21:26)  T(F): 97.8 (26 May 2020 04:12), Max: 98.3 (25 May 2020 21:26)  HR: 82 (26 May 2020 04:12) (77 - 82)  BP: 149/70 (26 May 2020 04:12) (119/64 - 149/70)  BP(mean): --  RR: 18 (26 May 2020 04:12) (18 - 18)  SpO2: 97% (26 May 2020 04:12) (97% - 98%)    GENERAL: NAD, obese man laying in bed  RESPIRATORY: Normal respiratory effort; lungs are clear to auscultation bilaterally, on 4LNC   CARDIOVASCULAR: Regular rate and rhythm, normal S1 and S2, no murmur/rub/gallop  ABDOMEN: +BS, Soft, Non-distended, Nontender to palpation  MSK: point of focal TTP in L lower chest rib  EXT: b/l LE edema with venous stasis changes and erythema (R> L).  Warm to touch. Non-tender to palpation. Appears improved overall.  NEURO: Alert, good concentration, moving extremities     LABS:  pending                  RADIOLOGY & ADDITIONAL TESTS:  Results Reviewed:   Imaging Personally Reviewed:  Electrocardiogram Personally Reviewed:    COORDINATION OF CARE:  Care Discussed with Consultants/Other Providers [Y/N]:  Prior or Outpatient Records Reviewed [Y/N]: PROGRESS NOTE:   Authored by Dr. Von Hobbs MD  Pager [phone] [facility], [address] [facility]     Patient is a 69y old  Male who presents with a chief complaint of s/p [facility] fall (26 May 2020 05:06)      SUBJECTIVE / OVERNIGHT EVENTS:  - Overnight, no acute events. Continued to refuse BiPAP overnight.   - Today, pt seen and examined at bedside in AM. Pt reports he feels okay, no complaints. Focal pain at previously noted L rib site, now resolved. Legs remain improved. Has dyspnea at baseline (on 3LNC at home), not any worse. Denies f/c/n/v, CP, SOB otherwise, abdominal pain, hematochezia and melena.    ADDITIONAL REVIEW OF SYSTEMS:    MEDICATIONS  (STANDING):  aspirin enteric coated 81 milliGRAM(s) Oral daily  buprenorphine 2 mG/naloxone 0.5 mG SL  Tablet 1 Tablet(s) SubLingual <User Schedule>  dextrose 5%. 1000 milliLiter(s) (50 mL/Hr) IV Continuous <Continuous>  dextrose 50% Injectable 12.5 Gram(s) IV Push once  dextrose 50% Injectable 25 Gram(s) IV Push once  dextrose 50% Injectable 25 Gram(s) IV Push once  furosemide    Tablet 80 milliGRAM(s) Oral daily  heparin   Injectable 5000 Unit(s) SubCutaneous every 8 hours  insulin glargine Injectable (LANTUS) 26 Unit(s) SubCutaneous every morning  insulin glargine Injectable (LANTUS) 45 Unit(s) SubCutaneous at bedtime  insulin lispro (HumaLOG) corrective regimen sliding scale   SubCutaneous three times a day before meals  insulin lispro (HumaLOG) corrective regimen sliding scale   SubCutaneous at bedtime  metoprolol succinate ER 25 milliGRAM(s) Oral daily  multivitamin/minerals 1 Tablet(s) Oral daily  mycophenolate mofetil 500 milliGRAM(s) Oral daily  nystatin Powder 1 Application(s) Topical two times a day  pantoprazole    Tablet 40 milliGRAM(s) Oral two times a day  predniSONE   Tablet 3 milliGRAM(s) Oral every other day  sertraline 100 milliGRAM(s) Oral daily  sodium chloride 0.65% Nasal 1 Spray(s) Both Nostrils daily  tacrolimus 0.5 milliGRAM(s) Oral two times a day  tamsulosin 0.4 milliGRAM(s) Oral at bedtime  tiotropium 18 MICROgram(s) Capsule 1 Capsule(s) Inhalation daily  trimethoprim   80 mG/sulfamethoxazole 400 mG 1 Tablet(s) Oral <User Schedule>    MEDICATIONS  (PRN):  albuterol/ipratropium for Nebulization 3 milliLiter(s) Nebulizer every 6 hours PRN Shortness of Breath and/or Wheezing  dextrose 40% Gel 15 Gram(s) Oral once PRN Blood Glucose LESS THAN 70 milliGRAM(s)/deciliter  glucagon  Injectable 1 milliGRAM(s) IntraMuscular once PRN Glucose LESS THAN 70 milligrams/deciliter      CAPILLARY BLOOD GLUCOSE      POCT Blood Glucose.: 237 mg/dL (25 May 2020 21:34)  POCT Blood Glucose.: 283 mg/dL (25 May 2020 16:57)  POCT Blood Glucose.: 217 mg/dL (25 May 2020 12:17)  POCT Blood Glucose.: 265 mg/dL (25 May 2020 10:32)  POCT Blood Glucose.: 205 mg/dL (25 May 2020 08:16)    I&O's Summary    25 May 2020 07:01  -  26 May 2020 07:00  --------------------------------------------------------  IN: 1080 mL / OUT: 2200 mL / NET: -1120 mL        PHYSICAL EXAM:  Vital Signs Last 24 Hrs  T(C): 36.6 (26 May 2020 04:12), Max: 36.8 (25 May 2020 21:26)  T(F): 97.8 (26 May 2020 04:12), Max: 98.3 (25 May 2020 21:26)  HR: 82 (26 May 2020 04:12) (77 - 82)  BP: 149/70 (26 May 2020 04:12) (119/64 - 149/70)  BP(mean): --  RR: 18 (26 May 2020 04:12) (18 - 18)  SpO2: 97% (26 May 2020 04:12) (97% - 98%)    GENERAL: NAD, obese man laying in bed  RESPIRATORY: Normal respiratory effort; lungs are clear to auscultation bilaterally, on 4LNC   CARDIOVASCULAR: Regular rate and rhythm, normal S1 and S2, no murmur/rub/gallop  ABDOMEN: +BS, Soft, Non-distended, Nontender to palpation  MSK: point of focal TTP in L lower chest rib  EXT: b/l LE edema with venous stasis changes and erythema (R> L), warm to touch, non-tender to palpation; appears improved overall.  NEURO: Alert, good concentration, moving extremities     LABS:                        7.9    3.10  )-----------( 98       ( 26 May 2020 07:09 )               29.6   05-26    137  |  93<L>  |  39<H>  ----------------------------<  185<H>  4.2   |  37<H>  |  1.38<H>    Ca    9.1      26 May 2020 07:09  Phos  3.7     05-26  Mg     2.2     05-26        RADIOLOGY & ADDITIONAL TESTS:  Results Reviewed:   Imaging Personally Reviewed:  Electrocardiogram Personally Reviewed:    COORDINATION OF CARE:  Care Discussed with Consultants/Other Providers [Y/N]:  Prior or Outpatient Records Reviewed [Y/N]:

## 2020-05-26 NOTE — PROGRESS NOTE ADULT - SUBJECTIVE AND OBJECTIVE BOX
CHIEF COMPLAINT: f/up sob, copd, RHF, obesity-poorly tolerant of cpap-    Interval Events:    REVIEW OF SYSTEMS:  Constitutional: No fevers or chills. No weight loss. No fatigue or generalized malaise.  Eyes: No itching or discharge from the eyes  ENT: No ear pain. No ear discharge. No nasal congestion. No post nasal drip. No epistaxis. No throat pain. No sore throat. No difficulty swallowing.   CV: No chest pain. No palpitations. No lightheadedness or dizziness.   Resp: No dyspnea at rest. No dyspnea on exertion. No orthopnea. No wheezing. No cough. No stridor. No sputum production. No chest pain with respiration.  GI: No nausea. No vomiting. No diarrhea.  MSK: No joint pain or pain in any extremities  Integumentary: No skin lesions. No pedal edema.  Neurological: No gross motor weakness. No sensory changes.  [ ] All other systems negative  [ ] Unable to assess ROS because ________    OBJECTIVE:  ICU Vital Signs Last 24 Hrs  T(C): 36.6 (26 May 2020 04:12), Max: 36.8 (25 May 2020 21:26)  T(F): 97.8 (26 May 2020 04:12), Max: 98.3 (25 May 2020 21:26)  HR: 82 (26 May 2020 04:12) (77 - 82)  BP: 149/70 (26 May 2020 04:12) (119/64 - 149/70)  BP(mean): --  ABP: --  ABP(mean): --  RR: 18 (26 May 2020 04:12) (18 - 18)  SpO2: 97% (26 May 2020 04:12) (97% - 98%)        05-24 @ 07:01  -  05-25 @ 07:00  --------------------------------------------------------  IN: 1260 mL / OUT: 1250 mL / NET: 10 mL    05-25 @ 07:01  -  05-26 @ 05:07  --------------------------------------------------------  IN: 720 mL / OUT: 2200 mL / NET: -1480 mL      CAPILLARY BLOOD GLUCOSE      POCT Blood Glucose.: 237 mg/dL (25 May 2020 21:34)      PHYSICAL EXAM:  General: Awake, alert, oriented X 3.   HEENT: Atraumatic, normocephalic.                 Mallampatti Grade                 No nasal congestion.                No tonsillar or pharyngeal exudates.  Lymph Nodes: No palpable lymphadenopathy  Neck: No JVD. No carotid bruit.   Respiratory: Normal chest expansion                         Normal percussion                         Normal and equal air entry                         No wheeze, rhonchi or rales.  Cardiovascular: S1 S2 normal. No murmurs, rubs or gallops.   Abdomen: Soft, non-tender, non-distended. No organomegaly. Normoactive bowel sounds.  Extremities: Warm to touch. Peripheral pulse palpable. No pedal edema.   Skin: No rashes or skin lesions  Neurological: Motor and sensory examination equal and normal in all four extremities.  Psychiatry: Appropriate mood and affect.    HOSPITAL MEDICATIONS:  MEDICATIONS  (STANDING):  aspirin enteric coated 81 milliGRAM(s) Oral daily  buprenorphine 2 mG/naloxone 0.5 mG SL  Tablet 1 Tablet(s) SubLingual <User Schedule>  dextrose 5%. 1000 milliLiter(s) (50 mL/Hr) IV Continuous <Continuous>  dextrose 50% Injectable 12.5 Gram(s) IV Push once  dextrose 50% Injectable 25 Gram(s) IV Push once  dextrose 50% Injectable 25 Gram(s) IV Push once  furosemide    Tablet 80 milliGRAM(s) Oral daily  heparin   Injectable 5000 Unit(s) SubCutaneous every 8 hours  insulin glargine Injectable (LANTUS) 26 Unit(s) SubCutaneous every morning  insulin glargine Injectable (LANTUS) 45 Unit(s) SubCutaneous at bedtime  insulin lispro (HumaLOG) corrective regimen sliding scale   SubCutaneous three times a day before meals  insulin lispro (HumaLOG) corrective regimen sliding scale   SubCutaneous at bedtime  metoprolol succinate ER 25 milliGRAM(s) Oral daily  multivitamin/minerals 1 Tablet(s) Oral daily  mycophenolate mofetil 500 milliGRAM(s) Oral daily  nystatin Powder 1 Application(s) Topical two times a day  pantoprazole    Tablet 40 milliGRAM(s) Oral two times a day  predniSONE   Tablet 3 milliGRAM(s) Oral every other day  sertraline 100 milliGRAM(s) Oral daily  sodium chloride 0.65% Nasal 1 Spray(s) Both Nostrils daily  tacrolimus 0.5 milliGRAM(s) Oral two times a day  tamsulosin 0.4 milliGRAM(s) Oral at bedtime  tiotropium 18 MICROgram(s) Capsule 1 Capsule(s) Inhalation daily  trimethoprim   80 mG/sulfamethoxazole 400 mG 1 Tablet(s) Oral <User Schedule>    MEDICATIONS  (PRN):  albuterol/ipratropium for Nebulization 3 milliLiter(s) Nebulizer every 6 hours PRN Shortness of Breath and/or Wheezing  dextrose 40% Gel 15 Gram(s) Oral once PRN Blood Glucose LESS THAN 70 milliGRAM(s)/deciliter  glucagon  Injectable 1 milliGRAM(s) IntraMuscular once PRN Glucose LESS THAN 70 milligrams/deciliter      LABS:                        8.0    2.88  )-----------( 106      ( 24 May 2020 06:45 )             29.3     05-24    134<L>  |  91<L>  |  40<H>  ----------------------------<  280<H>  4.5   |  31  |  1.27    Ca    8.8      24 May 2020 06:43                MICROBIOLOGY:     RADIOLOGY:  [ ] Reviewed and interpreted by me    Point of Care Ultrasound Findings:    PFT:    EKG: CHIEF COMPLAINT: f/up sob, copd, RHF, obesity-poorly tolerant of cpap-some mild left chest discomfort but less sob    Interval Events: ambulating-awaiting rehab    REVIEW OF SYSTEMS:  Constitutional: No fevers or chills. No weight loss. + fatigue or generalized malaise.  Eyes: No itching or discharge from the eyes  ENT: No ear pain. No ear discharge. No nasal congestion. No post nasal drip. No epistaxis. No throat pain. No sore throat. No difficulty swallowing.   CV: No chest pain. No palpitations. No lightheadedness or dizziness.   Resp: No dyspnea at rest. No dyspnea on exertion. No orthopnea. No wheezing. No cough. No stridor. No sputum production. +chest pain with respiration.  GI: No nausea. No vomiting. No diarrhea.  MSK: No joint pain or pain in any extremities  Integumentary: No skin lesions. No pedal edema.  Neurological: N+gross motor weakness. No sensory changes.  [+ ] All other systems negative  [ ] Unable to assess ROS because ________    OBJECTIVE:  ICU Vital Signs Last 24 Hrs  T(C): 36.6 (26 May 2020 04:12), Max: 36.8 (25 May 2020 21:26)  T(F): 97.8 (26 May 2020 04:12), Max: 98.3 (25 May 2020 21:26)  HR: 82 (26 May 2020 04:12) (77 - 82)  BP: 149/70 (26 May 2020 04:12) (119/64 - 149/70)  BP(mean): --  ABP: --  ABP(mean): --  RR: 18 (26 May 2020 04:12) (18 - 18)  SpO2: 97% (26 May 2020 04:12) (97% - 98%)        05-24 @ 07:01  -  05-25 @ 07:00  --------------------------------------------------------  IN: 1260 mL / OUT: 1250 mL / NET: 10 mL    05-25 @ 07:01  -  05-26 @ 05:07  --------------------------------------------------------  IN: 720 mL / OUT: 2200 mL / NET: -1480 mL      CAPILLARY BLOOD GLUCOSE      POCT Blood Glucose.: 237 mg/dL (25 May 2020 21:34)      PHYSICAL EXAM: NAD in bed on O2 NC  General: Awake, alert, oriented X 3.   HEENT: Atraumatic, normocephalic.                 Mallampatti Grade 3                No nasal congestion.                No tonsillar or pharyngeal exudates.  Lymph Nodes: No palpable lymphadenopathy  Neck: No JVD. No carotid bruit.   Respiratory: Normal chest expansion                         Normal percussion                         Normal and equal air entry                         No wheeze, rhonchi or rales.  Cardiovascular: S1 S2 normal. No murmurs, rubs or gallops.   Abdomen: Soft, non-tender, non-distended. No organomegaly. Normoactive bowel sounds.  Extremities: Warm to touch. Peripheral pulse palpable. + venous stasis changes/ pedal edema.   Skin: No rashes or skin lesions  Neurological: Motor and sensory examination equal and normal in all four extremities.  Psychiatry: Appropriate mood and affect.    HOSPITAL MEDICATIONS:  MEDICATIONS  (STANDING):  aspirin enteric coated 81 milliGRAM(s) Oral daily  buprenorphine 2 mG/naloxone 0.5 mG SL  Tablet 1 Tablet(s) SubLingual <User Schedule>  dextrose 5%. 1000 milliLiter(s) (50 mL/Hr) IV Continuous <Continuous>  dextrose 50% Injectable 12.5 Gram(s) IV Push once  dextrose 50% Injectable 25 Gram(s) IV Push once  dextrose 50% Injectable 25 Gram(s) IV Push once  furosemide    Tablet 80 milliGRAM(s) Oral daily  heparin   Injectable 5000 Unit(s) SubCutaneous every 8 hours  insulin glargine Injectable (LANTUS) 26 Unit(s) SubCutaneous every morning  insulin glargine Injectable (LANTUS) 45 Unit(s) SubCutaneous at bedtime  insulin lispro (HumaLOG) corrective regimen sliding scale   SubCutaneous three times a day before meals  insulin lispro (HumaLOG) corrective regimen sliding scale   SubCutaneous at bedtime  metoprolol succinate ER 25 milliGRAM(s) Oral daily  multivitamin/minerals 1 Tablet(s) Oral daily  mycophenolate mofetil 500 milliGRAM(s) Oral daily  nystatin Powder 1 Application(s) Topical two times a day  pantoprazole    Tablet 40 milliGRAM(s) Oral two times a day  predniSONE   Tablet 3 milliGRAM(s) Oral every other day  sertraline 100 milliGRAM(s) Oral daily  sodium chloride 0.65% Nasal 1 Spray(s) Both Nostrils daily  tacrolimus 0.5 milliGRAM(s) Oral two times a day  tamsulosin 0.4 milliGRAM(s) Oral at bedtime  tiotropium 18 MICROgram(s) Capsule 1 Capsule(s) Inhalation daily  trimethoprim   80 mG/sulfamethoxazole 400 mG 1 Tablet(s) Oral <User Schedule>    MEDICATIONS  (PRN):  albuterol/ipratropium for Nebulization 3 milliLiter(s) Nebulizer every 6 hours PRN Shortness of Breath and/or Wheezing  dextrose 40% Gel 15 Gram(s) Oral once PRN Blood Glucose LESS THAN 70 milliGRAM(s)/deciliter  glucagon  Injectable 1 milliGRAM(s) IntraMuscular once PRN Glucose LESS THAN 70 milligrams/deciliter      LABS:                        8.0    2.88  )-----------( 106      ( 24 May 2020 06:45 )             29.3     05-24    134<L>  |  91<L>  |  40<H>  ----------------------------<  280<H>  4.5   |  31  |  1.27    Ca    8.8      24 May 2020 06:43                MICROBIOLOGY:     RADIOLOGY:  [ ] Reviewed and interpreted by me    Point of Care Ultrasound Findings:    PFT:    EKG:

## 2020-05-26 NOTE — PROGRESS NOTE ADULT - PROBLEM SELECTOR PLAN 5
Pt previously documented as having T1DM, however, pt says he has T2DM. At home, pt is on lantus 26U qAM and 52U qPM, humalog 18U qAM and 24U qPM.   - HbA1C (5/14): 6.6  - c/w Lantus 26u qAM and 45u qPM  - mod ISS and FS qid  - will hold off on restarting standing premeal insulin for now Pt previously documented as having T1DM, however, pt says he has T2DM. At home, pt is on lantus 26U qAM and 52U qPM, humalog 18U qAM and 24U qPM.   - HbA1C (5/14): 6.6  - c/w Lantus 26u qAM and 45u qPM  - mod ISS and FS qid  - will hold off on restarting standing premeal insulin for now given concern for non-compliance

## 2020-05-26 NOTE — PROGRESS NOTE ADULT - ATTENDING COMMENTS
as above-improving, s/p CHF evaln (switched to diuretics)--PT needed; completed ABX for cellulitis  multifactorial dyspnea--copd, CAD, PAH (portopulmonary), debility, anemia--O2 sat above 90%  copd-duoneb q 6, symbicort 160 2 bid, spiriva 1 q day, acapella, incentive spirometry   ***OSAS-likely has it, bipap as able here d/w pt prior and now  cards-s/p formal cards evaln--s/p echo to R/O PAH-RHF---CHF evaln (RV dysfunction/mod AS)-continue diuresis-now PO  GI-s/p liver transplant-Hailey et al.         Heme-evaln and GI evaln for anemia.   Hypercapneic resp failure-intolerant of bipap  psych-depression--formal psych consult          Lung Ca screening--CT chest is No evidence of Dz  DVT/GI prophylaxis        Care coordinator consult-rehab placement (diuretics to be addressed by cards pre DC) Ok for DC.  Leandro Santos MD-Pulmonary   [phone] as above-improving, s/p CHF evaln (switched to PO diuretics)--PT needed; completed ABX for cellulitis-rehab pending  multifactorial dyspnea--copd, CAD, PAH (portopulmonary), debility, anemia--O2 sat above 90%  copd-duoneb q 6, symbicort 160 2 bid, spiriva 1 q day, acapella, incentive spirometry   ***OSAS-likely has it, bipap as able here d/w pt prior and now  cards-s/p formal cards evaln--s/p echo to R/O PAH-RHF---CHF evaln (RV dysfunction/mod AS)-continue diuresis-now PO  GI-s/p liver transplant-Hailey et al.         Heme-evaln and GI evaln for anemia.   Hypercapneic resp failure-intolerant of bipap  psych-depression--formal psych consult          Lung Ca screening--CT chest is No evidence of Dz  DVT/GI prophylaxis        Care coordinator consult-rehab placement (diuretics to be addressed by cards pre DC) Ok for DC.  Leandro Santos MD-Pulmonary   [phone]

## 2020-05-26 NOTE — PROGRESS NOTE ADULT - PROBLEM SELECTOR PLAN 1
pt endorses increased swelling and has been non-adherent to diuretics. TTE 5/14: LVIDd 4.2cm, LVEF 55%, grossly reduced RVSF, mod AS, borderline PH (RVSP 36 mmHg)   - c/w Toprol 25  - LE doppler neg for RLE DVT  - monitor Is/Os, daily weights, fluid restrict to 1.5L /day  - s/p aggressive diuresis c/b metabolic alkalosis s/p diamox 500 mg x 1 on 5/18  - c/w Lasix 80 PO daily  - plan to resume spironolactone 25 PO BID when K+<4.0 (as per HF)  - Appreciate HF recs, f/u outpatient with Dr. Daley at HF clinic ([phone]) pt endorses increased swelling and has been non-adherent to diuretics. TTE 5/14: LVIDd 4.2cm, LVEF 55%, grossly reduced RVSF, mod AS, borderline PH (RVSP 36 mmHg)   - c/w Toprol 25 ER daily  - LE doppler neg for RLE DVT  - monitor Is/Os, daily weights, fluid restrict to 1.5L /day  - s/p aggressive diuresis c/b metabolic alkalosis s/p diamox 500 mg x 1 on 5/18  - c/w Lasix 80 PO daily  - plan to resume spironolactone 25 PO BID when K+<4.0 (as per HF)  - Appreciate HF recs, f/u outpatient with Dr. Daley at HF clinic ([phone])

## 2020-05-27 NOTE — PROGRESS NOTE ADULT - PROBLEM SELECTOR PLAN 5
Pt previously documented as having T1DM, however, pt says he has T2DM. At home, pt is on lantus 26U qAM and 52U qPM, humalog 18U qAM and 24U qPM.   - HbA1C (5/14): 6.6  - c/w Lantus 26u qAM and 45u qPM  - mod ISS and FS qid  - will hold off on restarting standing premeal insulin for now given concern for non-compliance

## 2020-05-27 NOTE — PROGRESS NOTE ADULT - PROBLEM SELECTOR PLAN 1
pt endorses increased swelling and has been non-adherent to diuretics. TTE 5/14: LVIDd 4.2cm, LVEF 55%, grossly reduced RVSF, mod AS, borderline PH (RVSP 36 mmHg)   - c/w Toprol 25 ER daily  - LE doppler neg for RLE DVT  - monitor Is/Os, daily weights, fluid restrict to 1.5L /day  - s/p aggressive diuresis c/b metabolic alkalosis s/p diamox 500 mg x 1 on 5/18  - c/w Lasix 80 PO daily  - plan to resume spironolactone 25 PO BID when K+<4.0 (as per HF)  - Appreciate HF recs, f/u outpatient with Dr. Daley at HF clinic ([phone])

## 2020-05-27 NOTE — PROGRESS NOTE ADULT - SUBJECTIVE AND OBJECTIVE BOX
CHIEF COMPLAINT:  f/up sob, copd, RHF, obesity-poorly tolerant of cpap-    Interval Events:    REVIEW OF SYSTEMS:  Constitutional: No fevers or chills. No weight loss. No fatigue or generalized malaise.  Eyes: No itching or discharge from the eyes  ENT: No ear pain. No ear discharge. No nasal congestion. No post nasal drip. No epistaxis. No throat pain. No sore throat. No difficulty swallowing.   CV: No chest pain. No palpitations. No lightheadedness or dizziness.   Resp: No dyspnea at rest. No dyspnea on exertion. No orthopnea. No wheezing. No cough. No stridor. No sputum production. No chest pain with respiration.  GI: No nausea. No vomiting. No diarrhea.  MSK: No joint pain or pain in any extremities  Integumentary: No skin lesions. No pedal edema.  Neurological: No gross motor weakness. No sensory changes.  [ ] All other systems negative  [ ] Unable to assess ROS because ________    OBJECTIVE:  ICU Vital Signs Last 24 Hrs  T(C): 36.8 (27 May 2020 04:04), Max: 37.2 (26 May 2020 11:14)  T(F): 98.3 (27 May 2020 04:04), Max: 99 (26 May 2020 11:14)  HR: 89 (27 May 2020 04:04) (76 - 89)  BP: 134/75 (27 May 2020 04:04) (114/73 - 134/75)  BP(mean): --  ABP: --  ABP(mean): --  RR: 18 (27 May 2020 04:04) (18 - 18)  SpO2: 95% (27 May 2020 04:04) (95% - 97%)        05-25 @ 07:01  -  05-26 @ 07:00  --------------------------------------------------------  IN: 1080 mL / OUT: 2200 mL / NET: -1120 mL    05-26 @ 07:01  -  05-27 @ 05:27  --------------------------------------------------------  IN: 720 mL / OUT: 1400 mL / NET: -680 mL      CAPILLARY BLOOD GLUCOSE      POCT Blood Glucose.: 290 mg/dL (26 May 2020 21:41)      PHYSICAL EXAM:  General: Awake, alert, oriented X 3.   HEENT: Atraumatic, normocephalic.                 Mallampatti Grade                 No nasal congestion.                No tonsillar or pharyngeal exudates.  Lymph Nodes: No palpable lymphadenopathy  Neck: No JVD. No carotid bruit.   Respiratory: Normal chest expansion                         Normal percussion                         Normal and equal air entry                         No wheeze, rhonchi or rales.  Cardiovascular: S1 S2 normal. No murmurs, rubs or gallops.   Abdomen: Soft, non-tender, non-distended. No organomegaly. Normoactive bowel sounds.  Extremities: Warm to touch. Peripheral pulse palpable. No pedal edema.   Skin: No rashes or skin lesions  Neurological: Motor and sensory examination equal and normal in all four extremities.  Psychiatry: Appropriate mood and affect.    HOSPITAL MEDICATIONS:  MEDICATIONS  (STANDING):  aspirin enteric coated 81 milliGRAM(s) Oral daily  buprenorphine 2 mG/naloxone 0.5 mG SL  Tablet 1 Tablet(s) SubLingual <User Schedule>  dextrose 5%. 1000 milliLiter(s) (50 mL/Hr) IV Continuous <Continuous>  dextrose 50% Injectable 12.5 Gram(s) IV Push once  dextrose 50% Injectable 25 Gram(s) IV Push once  dextrose 50% Injectable 25 Gram(s) IV Push once  furosemide    Tablet 80 milliGRAM(s) Oral daily  heparin   Injectable 5000 Unit(s) SubCutaneous every 8 hours  insulin glargine Injectable (LANTUS) 26 Unit(s) SubCutaneous every morning  insulin glargine Injectable (LANTUS) 45 Unit(s) SubCutaneous at bedtime  insulin lispro (HumaLOG) corrective regimen sliding scale   SubCutaneous three times a day before meals  insulin lispro (HumaLOG) corrective regimen sliding scale   SubCutaneous at bedtime  metoprolol succinate ER 25 milliGRAM(s) Oral daily  multivitamin/minerals 1 Tablet(s) Oral daily  mycophenolate mofetil 500 milliGRAM(s) Oral daily  nystatin Powder 1 Application(s) Topical two times a day  pantoprazole    Tablet 40 milliGRAM(s) Oral two times a day  predniSONE   Tablet 3 milliGRAM(s) Oral every other day  sertraline 100 milliGRAM(s) Oral daily  sodium chloride 0.65% Nasal 1 Spray(s) Both Nostrils daily  tacrolimus 0.5 milliGRAM(s) Oral two times a day  tamsulosin 0.4 milliGRAM(s) Oral at bedtime  tiotropium 18 MICROgram(s) Capsule 1 Capsule(s) Inhalation daily  trimethoprim   80 mG/sulfamethoxazole 400 mG 1 Tablet(s) Oral <User Schedule>    MEDICATIONS  (PRN):  albuterol/ipratropium for Nebulization 3 milliLiter(s) Nebulizer every 6 hours PRN Shortness of Breath and/or Wheezing  dextrose 40% Gel 15 Gram(s) Oral once PRN Blood Glucose LESS THAN 70 milliGRAM(s)/deciliter  glucagon  Injectable 1 milliGRAM(s) IntraMuscular once PRN Glucose LESS THAN 70 milligrams/deciliter      LABS:                        7.9    3.10  )-----------( 98       ( 26 May 2020 07:09 )             29.6     05-26    137  |  93<L>  |  39<H>  ----------------------------<  185<H>  4.2   |  37<H>  |  1.38<H>    Ca    9.1      26 May 2020 07:09  Phos  3.7     05-26  Mg     2.2     05-26                MICROBIOLOGY:     RADIOLOGY:  [ ] Reviewed and interpreted by me    Point of Care Ultrasound Findings:    PFT:    EKG: CHIEF COMPLAINT:  f/up sob, copd, RHF, obesity-poorly tolerant of cpap-awaiting rehab, stronger daily    Interval Events: rehab pending    REVIEW OF SYSTEMS:  Constitutional: No fevers or chills. No weight loss. + fatigue or generalized malaise.  Eyes: No itching or discharge from the eyes  ENT: No ear pain. No ear discharge. No nasal congestion. No post nasal drip. No epistaxis. No throat pain. No sore throat. No difficulty swallowing.   CV: No chest pain. No palpitations. No lightheadedness or dizziness.   Resp: No dyspnea at rest. + dyspnea on exertion. No orthopnea. No wheezing. No cough. No stridor. No sputum production. No chest pain with respiration.  GI: No nausea. No vomiting. No diarrhea.  MSK: No joint pain or pain in any extremities  Integumentary: No skin lesions. No pedal edema.  Neurological: + gross motor weakness. No sensory changes.  [+ ] All other systems negative  [ ] Unable to assess ROS because ________    OBJECTIVE:  ICU Vital Signs Last 24 Hrs  T(C): 36.8 (27 May 2020 04:04), Max: 37.2 (26 May 2020 11:14)  T(F): 98.3 (27 May 2020 04:04), Max: 99 (26 May 2020 11:14)  HR: 89 (27 May 2020 04:04) (76 - 89)  BP: 134/75 (27 May 2020 04:04) (114/73 - 134/75)  BP(mean): --  ABP: --  ABP(mean): --  RR: 18 (27 May 2020 04:04) (18 - 18)  SpO2: 95% (27 May 2020 04:04) (95% - 97%)        05-25 @ 07:01  -  05-26 @ 07:00  --------------------------------------------------------  IN: 1080 mL / OUT: 2200 mL / NET: -1120 mL    05-26 @ 07:01  -  05-27 @ 05:27  --------------------------------------------------------  IN: 720 mL / OUT: 1400 mL / NET: -680 mL      CAPILLARY BLOOD GLUCOSE      POCT Blood Glucose.: 290 mg/dL (26 May 2020 21:41)      PHYSICAL EXAM: NAD in bed on NC O2  General: Awake, alert, oriented X 3.   HEENT: Atraumatic, normocephalic.                 Mallampatti Grade 3                No nasal congestion.                No tonsillar or pharyngeal exudates.  Lymph Nodes: No palpable lymphadenopathy  Neck: No JVD. No carotid bruit.   Respiratory: Normal chest expansion                         Normal percussion                         Normal and equal air entry                         No wheeze, rhonchi or rales.  Cardiovascular: S1 S2 normal. No murmurs, rubs or gallops.   Abdomen: Soft, non-tender, non-distended. No organomegaly. Normoactive bowel sounds.  Extremities: Warm to touch. Peripheral pulse palpable. + chronic venous stasis changes and pedal edema.   Skin: No rashes or skin lesions  Neurological: Motor and sensory examination equal and normal in all four extremities.  Psychiatry: Appropriate mood and affect.    HOSPITAL MEDICATIONS:  MEDICATIONS  (STANDING):  aspirin enteric coated 81 milliGRAM(s) Oral daily  buprenorphine 2 mG/naloxone 0.5 mG SL  Tablet 1 Tablet(s) SubLingual <User Schedule>  dextrose 5%. 1000 milliLiter(s) (50 mL/Hr) IV Continuous <Continuous>  dextrose 50% Injectable 12.5 Gram(s) IV Push once  dextrose 50% Injectable 25 Gram(s) IV Push once  dextrose 50% Injectable 25 Gram(s) IV Push once  furosemide    Tablet 80 milliGRAM(s) Oral daily  heparin   Injectable 5000 Unit(s) SubCutaneous every 8 hours  insulin glargine Injectable (LANTUS) 26 Unit(s) SubCutaneous every morning  insulin glargine Injectable (LANTUS) 45 Unit(s) SubCutaneous at bedtime  insulin lispro (HumaLOG) corrective regimen sliding scale   SubCutaneous three times a day before meals  insulin lispro (HumaLOG) corrective regimen sliding scale   SubCutaneous at bedtime  metoprolol succinate ER 25 milliGRAM(s) Oral daily  multivitamin/minerals 1 Tablet(s) Oral daily  mycophenolate mofetil 500 milliGRAM(s) Oral daily  nystatin Powder 1 Application(s) Topical two times a day  pantoprazole    Tablet 40 milliGRAM(s) Oral two times a day  predniSONE   Tablet 3 milliGRAM(s) Oral every other day  sertraline 100 milliGRAM(s) Oral daily  sodium chloride 0.65% Nasal 1 Spray(s) Both Nostrils daily  tacrolimus 0.5 milliGRAM(s) Oral two times a day  tamsulosin 0.4 milliGRAM(s) Oral at bedtime  tiotropium 18 MICROgram(s) Capsule 1 Capsule(s) Inhalation daily  trimethoprim   80 mG/sulfamethoxazole 400 mG 1 Tablet(s) Oral <User Schedule>    MEDICATIONS  (PRN):  albuterol/ipratropium for Nebulization 3 milliLiter(s) Nebulizer every 6 hours PRN Shortness of Breath and/or Wheezing  dextrose 40% Gel 15 Gram(s) Oral once PRN Blood Glucose LESS THAN 70 milliGRAM(s)/deciliter  glucagon  Injectable 1 milliGRAM(s) IntraMuscular once PRN Glucose LESS THAN 70 milligrams/deciliter      LABS:                        7.9    3.10  )-----------( 98       ( 26 May 2020 07:09 )             29.6     05-26    137  |  93<L>  |  39<H>  ----------------------------<  185<H>  4.2   |  37<H>  |  1.38<H>    Ca    9.1      26 May 2020 07:09  Phos  3.7     05-26  Mg     2.2     05-26                MICROBIOLOGY:     RADIOLOGY:  [ ] Reviewed and interpreted by me    Point of Care Ultrasound Findings:    PFT:    EKG:

## 2020-05-27 NOTE — PROGRESS NOTE ADULT - PROBLEM SELECTOR PLAN 3
- c/w home tacro 0.5mg BID  - c/w home prednisone 3mg every other day  - c/w home cellcept 500mg daily  - Monitor tacro level 30min prior to AM dose every other day as per Hepatology; less than 2 but no intervention as per hepatology

## 2020-05-27 NOTE — PROGRESS NOTE ADULT - PROBLEM SELECTOR PLAN 9
Transitions of Care Status:  1.  Name of PCP: Dr. Leandro Santos ([facility])   2.  PCP Contacted on Admission: [ ] Y    [ ] N    3.  PCP contacted at Discharge: [ ] Y    [ ] N    [ ] N/A  4.  Post-Discharge Appointment Date and Location:  5.  Summary of Handoff given to PCP:

## 2020-05-27 NOTE — PROGRESS NOTE ADULT - SUBJECTIVE AND OBJECTIVE BOX
PROGRESS NOTE:   Authored by Dr. Von Hobbs MD  Pager [phone] [facility], [id number] [facility]     Patient is a 69y old  Male who presents with a chief complaint of s/p mech fall (27 May 2020 05:27)      SUBJECTIVE / OVERNIGHT EVENTS:  - Overnight, no acute events. Continued to refuse BiPAP overnight.   - Today, pt seen and examined at bedside in AM. Pt reports he feels okay, no complaints. Focal pain at previously noted L rib site remains resolved. Legs remain improved. Has dyspnea at baseline (on 3LNC at home), not any worse. Denies f/c/n/v, CP, SOB otherwise, abdominal pain, hematochezia and melena.    ADDITIONAL REVIEW OF SYSTEMS:    MEDICATIONS  (STANDING):  aspirin enteric coated 81 milliGRAM(s) Oral daily  buprenorphine 2 mG/naloxone 0.5 mG SL  Tablet 1 Tablet(s) SubLingual <User Schedule>  dextrose 5%. 1000 milliLiter(s) (50 mL/Hr) IV Continuous <Continuous>  dextrose 50% Injectable 12.5 Gram(s) IV Push once  dextrose 50% Injectable 25 Gram(s) IV Push once  dextrose 50% Injectable 25 Gram(s) IV Push once  furosemide    Tablet 80 milliGRAM(s) Oral daily  heparin   Injectable 5000 Unit(s) SubCutaneous every 8 hours  insulin glargine Injectable (LANTUS) 26 Unit(s) SubCutaneous every morning  insulin glargine Injectable (LANTUS) 45 Unit(s) SubCutaneous at bedtime  insulin lispro (HumaLOG) corrective regimen sliding scale   SubCutaneous three times a day before meals  insulin lispro (HumaLOG) corrective regimen sliding scale   SubCutaneous at bedtime  metoprolol succinate ER 25 milliGRAM(s) Oral daily  multivitamin/minerals 1 Tablet(s) Oral daily  mycophenolate mofetil 500 milliGRAM(s) Oral daily  nystatin Powder 1 Application(s) Topical two times a day  pantoprazole    Tablet 40 milliGRAM(s) Oral two times a day  predniSONE   Tablet 3 milliGRAM(s) Oral every other day  sertraline 100 milliGRAM(s) Oral daily  sodium chloride 0.65% Nasal 1 Spray(s) Both Nostrils daily  tacrolimus 0.5 milliGRAM(s) Oral two times a day  tamsulosin 0.4 milliGRAM(s) Oral at bedtime  tiotropium 18 MICROgram(s) Capsule 1 Capsule(s) Inhalation daily  trimethoprim   80 mG/sulfamethoxazole 400 mG 1 Tablet(s) Oral <User Schedule>    MEDICATIONS  (PRN):  albuterol/ipratropium for Nebulization 3 milliLiter(s) Nebulizer every 6 hours PRN Shortness of Breath and/or Wheezing  dextrose 40% Gel 15 Gram(s) Oral once PRN Blood Glucose LESS THAN 70 milliGRAM(s)/deciliter  glucagon  Injectable 1 milliGRAM(s) IntraMuscular once PRN Glucose LESS THAN 70 milligrams/deciliter      CAPILLARY BLOOD GLUCOSE      POCT Blood Glucose.: 290 mg/dL (26 May 2020 21:41)  POCT Blood Glucose.: 253 mg/dL (26 May 2020 17:28)  POCT Blood Glucose.: 219 mg/dL (26 May 2020 12:16)  POCT Blood Glucose.: 171 mg/dL (26 May 2020 08:31)    I&O's Summary    26 May 2020 07:01  -  27 May 2020 07:00  --------------------------------------------------------  IN: 1080 mL / OUT: 2100 mL / NET: -1020 mL        PHYSICAL EXAM:  Vital Signs Last 24 Hrs  T(C): 36.8 (27 May 2020 04:04), Max: 37.2 (26 May 2020 11:14)  T(F): 98.3 (27 May 2020 04:04), Max: 99 (26 May 2020 11:14)  HR: 89 (27 May 2020 04:04) (76 - 89)  BP: 134/75 (27 May 2020 04:04) (114/73 - 134/75)  BP(mean): --  RR: 18 (27 May 2020 04:04) (18 - 18)  SpO2: 95% (27 May 2020 04:04) (95% - 97%)    GENERAL: NAD, obese man laying in bed  RESPIRATORY: Normal respiratory effort; lungs are clear to auscultation bilaterally, on 4LNC   CARDIOVASCULAR: Regular rate and rhythm, normal S1 and S2, no murmur/rub/gallop  ABDOMEN: +BS, Soft, Non-distended, Nontender to palpation  EXT: b/l LE edema with venous stasis changes and erythema (R> L), warm to touch, non-tender to palpation; appears improved overall.  NEURO: Alert, good concentration, moving extremities     LABS:                        7.9    3.10  )-----------( 98       ( 26 May 2020 07:09 )             29.6     05-26    137  |  93<L>  |  39<H>  ----------------------------<  185<H>  4.2   |  37<H>  |  1.38<H>    Ca    9.1      26 May 2020 07:09  Phos  3.7     05-26  Mg     2.2     05-26                  RADIOLOGY & ADDITIONAL TESTS:  Results Reviewed:   Imaging Personally Reviewed:  Electrocardiogram Personally Reviewed:    COORDINATION OF CARE:  Care Discussed with Consultants/Other Providers [Y/N]:  Prior or Outpatient Records Reviewed [Y/N]:

## 2020-05-27 NOTE — PHARMACOTHERAPY INTERVENTION NOTE - COMMENTS
Reviewed inpatient medications with patient at bedside and provided leaflet with medication information    Prema Cuellar, PharmD

## 2020-05-27 NOTE — PROGRESS NOTE ADULT - ATTENDING COMMENTS
as above-improving, s/p CHF evaln (switched to PO diuretics)--PT needed; completed ABX for cellulitis-rehab pending  multifactorial dyspnea--copd, CAD, PAH (portopulmonary), debility, anemia--O2 sat above 90%  copd-duoneb q 6, symbicort 160 2 bid, spiriva 1 q day, acapella, incentive spirometry   ***OSAS-likely has it, bipap as able here d/w pt prior and now  cards-s/p formal cards evaln--s/p echo to R/O PAH-RHF---CHF evaln (RV dysfunction/mod AS)-continue diuresis-now PO  GI-s/p liver transplant-Hailey et al.         Heme-evaln and GI evaln for anemia.   Hypercapneic resp failure-intolerant of bipap  psych-depression--formal psych consult          Lung Ca screening--CT chest is No evidence of Dz  DVT/GI prophylaxis        Care coordinator consult-rehab placement (diuretics to be addressed by cards pre DC) Ok for DC.  Leandro Santos MD-Pulmonary   [phone] as above-improving, s/p CHF evaln (switched to PO diuretics)--PT needed; completed ABX for cellulitis-rehab pending  multifactorial dyspnea--copd, CAD, PAH (portopulmonary), debility, anemia--O2 sat above 90%  copd-duoneb q 6, symbicort 160 2 bid, spiriva 1 q day, acapella, incentive spirometry   ***OSAS-likely has it, bipap as able here d/w pt prior and now  cards-s/p formal cards evaln--s/p echo to R/O PAH-RHF---CHF evaln (RV dysfunction/mod AS)-continue diuresis-now PO  GI-s/p liver transplant-Hailey et al.         Heme-evaln and GI evaln for anemia.   Hypercapneic resp failure-intolerant of bipap  psych-depression--formal psych consult          Lung Ca screening--CT chest is No evidence of Dz  DVT/GI prophylaxis        Care coordinator consult-rehab placement (diuretics to be addressed by cards pre DC) Ok for DC. Hopeful today.  Leandro Santos MD-Pulmonary   [phone]

## 2020-05-27 NOTE — PROGRESS NOTE ADULT - ASSESSMENT
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 Found w/ signif edema and Hgb-7--  multifactorial issues.  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 Found w/ signif edema and Hgb-7--  multifactorial issues.  ******************  5/15-s/p echo-RV dysfunction, mod AS  5/16-better over all--CHF evaln-continue diuresis  5/17-better over all-CHF team f/up-80 bid IV  5/18-improving but still no ambulation-CHF f/up  5/19-slow improvements-less edema, remains on NC 3-4 liters  5/20-CHF titrated down diuretics to PO lasix, cpap poorly tolerated  5/21-preparing for Rehab  5/22-better-occasional chest discomfort  5/26-stable awaiting rehab  5/27-awaiting rehab-no changes

## 2020-05-28 NOTE — PROGRESS NOTE ADULT - PROBLEM SELECTOR PROBLEM 8
Lung cancer screening declined by patient
Buprenorphine dependence
Lung cancer screening declined by patient
Need for prophylactic measure

## 2020-05-28 NOTE — PROGRESS NOTE ADULT - SUBJECTIVE AND OBJECTIVE BOX
CHIEF COMPLAINT:  f/up sob, copd, RHF, obesity-poorly tolerant of cpap-awaiting rehab,     Interval Events:    REVIEW OF SYSTEMS:  Constitutional: No fevers or chills. No weight loss. No fatigue or generalized malaise.  Eyes: No itching or discharge from the eyes  ENT: No ear pain. No ear discharge. No nasal congestion. No post nasal drip. No epistaxis. No throat pain. No sore throat. No difficulty swallowing.   CV: No chest pain. No palpitations. No lightheadedness or dizziness.   Resp: No dyspnea at rest. No dyspnea on exertion. No orthopnea. No wheezing. No cough. No stridor. No sputum production. No chest pain with respiration.  GI: No nausea. No vomiting. No diarrhea.  MSK: No joint pain or pain in any extremities  Integumentary: No skin lesions. No pedal edema.  Neurological: No gross motor weakness. No sensory changes.  [ ] All other systems negative  [ ] Unable to assess ROS because ________    OBJECTIVE:  ICU Vital Signs Last 24 Hrs  T(C): 36.8 (28 May 2020 04:08), Max: 37.2 (27 May 2020 14:13)  T(F): 98.2 (28 May 2020 04:08), Max: 99 (27 May 2020 14:13)  HR: 80 (28 May 2020 04:08) (80 - 88)  BP: 129/70 (28 May 2020 04:08) (125/72 - 131/69)  BP(mean): --  ABP: --  ABP(mean): --  RR: 18 (28 May 2020 04:08) (18 - 18)  SpO2: 98% (28 May 2020 04:08) (94% - 98%)        05-26 @ 07:01  -  05-27 @ 07:00  --------------------------------------------------------  IN: 1080 mL / OUT: 2100 mL / NET: -1020 mL    05-27 @ 07:01  -  05-28 @ 05:37  --------------------------------------------------------  IN: 1260 mL / OUT: 1500 mL / NET: -240 mL      CAPILLARY BLOOD GLUCOSE      POCT Blood Glucose.: 212 mg/dL (27 May 2020 21:41)      PHYSICAL EXAM:  General: Awake, alert, oriented X 3.   HEENT: Atraumatic, normocephalic.                 Mallampatti Grade                 No nasal congestion.                No tonsillar or pharyngeal exudates.  Lymph Nodes: No palpable lymphadenopathy  Neck: No JVD. No carotid bruit.   Respiratory: Normal chest expansion                         Normal percussion                         Normal and equal air entry                         No wheeze, rhonchi or rales.  Cardiovascular: S1 S2 normal. No murmurs, rubs or gallops.   Abdomen: Soft, non-tender, non-distended. No organomegaly. Normoactive bowel sounds.  Extremities: Warm to touch. Peripheral pulse palpable. No pedal edema.   Skin: No rashes or skin lesions  Neurological: Motor and sensory examination equal and normal in all four extremities.  Psychiatry: Appropriate mood and affect.    HOSPITAL MEDICATIONS:  MEDICATIONS  (STANDING):  aspirin enteric coated 81 milliGRAM(s) Oral daily  buprenorphine 2 mG/naloxone 0.5 mG SL  Tablet 1 Tablet(s) SubLingual <User Schedule>  dextrose 5%. 1000 milliLiter(s) (50 mL/Hr) IV Continuous <Continuous>  dextrose 50% Injectable 12.5 Gram(s) IV Push once  dextrose 50% Injectable 25 Gram(s) IV Push once  dextrose 50% Injectable 25 Gram(s) IV Push once  furosemide    Tablet 80 milliGRAM(s) Oral daily  heparin   Injectable 5000 Unit(s) SubCutaneous every 8 hours  insulin glargine Injectable (LANTUS) 26 Unit(s) SubCutaneous every morning  insulin glargine Injectable (LANTUS) 45 Unit(s) SubCutaneous at bedtime  insulin lispro (HumaLOG) corrective regimen sliding scale   SubCutaneous three times a day before meals  insulin lispro (HumaLOG) corrective regimen sliding scale   SubCutaneous at bedtime  metoprolol succinate ER 25 milliGRAM(s) Oral daily  multivitamin/minerals 1 Tablet(s) Oral daily  mycophenolate mofetil 500 milliGRAM(s) Oral daily  nystatin Powder 1 Application(s) Topical two times a day  pantoprazole    Tablet 40 milliGRAM(s) Oral two times a day  predniSONE   Tablet 3 milliGRAM(s) Oral every other day  sertraline 100 milliGRAM(s) Oral daily  sodium chloride 0.65% Nasal 1 Spray(s) Both Nostrils daily  tacrolimus 0.5 milliGRAM(s) Oral two times a day  tamsulosin 0.4 milliGRAM(s) Oral at bedtime  tiotropium 18 MICROgram(s) Capsule 1 Capsule(s) Inhalation daily  trimethoprim   80 mG/sulfamethoxazole 400 mG 1 Tablet(s) Oral <User Schedule>    MEDICATIONS  (PRN):  albuterol/ipratropium for Nebulization 3 milliLiter(s) Nebulizer every 6 hours PRN Shortness of Breath and/or Wheezing  dextrose 40% Gel 15 Gram(s) Oral once PRN Blood Glucose LESS THAN 70 milliGRAM(s)/deciliter  glucagon  Injectable 1 milliGRAM(s) IntraMuscular once PRN Glucose LESS THAN 70 milligrams/deciliter      LABS:                        7.9    3.10  )-----------( 98       ( 26 May 2020 07:09 )             29.6     05-26    137  |  93<L>  |  39<H>  ----------------------------<  185<H>  4.2   |  37<H>  |  1.38<H>    Ca    9.1      26 May 2020 07:09  Phos  3.7     05-26  Mg     2.2     05-26                MICROBIOLOGY:     RADIOLOGY:  [ ] Reviewed and interpreted by me    Point of Care Ultrasound Findings:    PFT:    EKG: CHIEF COMPLAINT:  f/up sob, copd, RHF, obesity-poorly tolerant of cpap-awaiting rehab, frustrated over all     Interval Events: awaiting rehab-? issues w/ bupenorpherine placement    REVIEW OF SYSTEMS:  Constitutional: No fevers or chills. No weight loss. No fatigue or generalized malaise.  Eyes: No itching or discharge from the eyes  ENT: No ear pain. No ear discharge. No nasal congestion. No post nasal drip. No epistaxis. No throat pain. No sore throat. No difficulty swallowing.   CV: No chest pain. No palpitations. No lightheadedness or dizziness.   Resp: No dyspnea at rest. + dyspnea on exertion. No orthopnea. No wheezing. No cough. No stridor. No sputum production. No chest pain with respiration.  GI: No nausea. No vomiting. No diarrhea.  MSK: No joint pain or pain in any extremities  Integumentary: No skin lesions. No pedal edema.  Neurological: + gross motor weakness. No sensory changes.  [+ ] All other systems negative  [ ] Unable to assess ROS because ________    OBJECTIVE:  ICU Vital Signs Last 24 Hrs  T(C): 36.8 (28 May 2020 04:08), Max: 37.2 (27 May 2020 14:13)  T(F): 98.2 (28 May 2020 04:08), Max: 99 (27 May 2020 14:13)  HR: 80 (28 May 2020 04:08) (80 - 88)  BP: 129/70 (28 May 2020 04:08) (125/72 - 131/69)  BP(mean): --  ABP: --  ABP(mean): --  RR: 18 (28 May 2020 04:08) (18 - 18)  SpO2: 98% (28 May 2020 04:08) (94% - 98%)        05-26 @ 07:01  -  05-27 @ 07:00  --------------------------------------------------------  IN: 1080 mL / OUT: 2100 mL / NET: -1020 mL    05-27 @ 07:01  -  05-28 @ 05:37  --------------------------------------------------------  IN: 1260 mL / OUT: 1500 mL / NET: -240 mL      CAPILLARY BLOOD GLUCOSE      POCT Blood Glucose.: 212 mg/dL (27 May 2020 21:41)      PHYSICAL EXAM: NAD in bed on NC O2  General: Awake, alert, oriented X 3.   HEENT: Atraumatic, normocephalic.                 Mallampatti Grade 3                No nasal congestion.                No tonsillar or pharyngeal exudates.  Lymph Nodes: No palpable lymphadenopathy  Neck: No JVD. No carotid bruit.   Respiratory: Normal chest expansion                         Normal percussion                         Normal and equal air entry                         No wheeze, rhonchi or rales.  Cardiovascular: S1 S2 normal. No murmurs, rubs or gallops.   Abdomen: Soft, non-tender, non-distended. No organomegaly. Normoactive bowel sounds.  Extremities: Warm to touch. Peripheral pulse palpable. + chronic venous stasis changes and pedal edema.   Skin: No rashes or skin lesions  Neurological: Motor and sensory examination equal and normal in all four extremities.  Psychiatry: Appropriate mood and affect.    HOSPITAL MEDICATIONS:  MEDICATIONS  (STANDING):  aspirin enteric coated 81 milliGRAM(s) Oral daily  buprenorphine 2 mG/naloxone 0.5 mG SL  Tablet 1 Tablet(s) SubLingual <User Schedule>  dextrose 5%. 1000 milliLiter(s) (50 mL/Hr) IV Continuous <Continuous>  dextrose 50% Injectable 12.5 Gram(s) IV Push once  dextrose 50% Injectable 25 Gram(s) IV Push once  dextrose 50% Injectable 25 Gram(s) IV Push once  furosemide    Tablet 80 milliGRAM(s) Oral daily  heparin   Injectable 5000 Unit(s) SubCutaneous every 8 hours  insulin glargine Injectable (LANTUS) 26 Unit(s) SubCutaneous every morning  insulin glargine Injectable (LANTUS) 45 Unit(s) SubCutaneous at bedtime  insulin lispro (HumaLOG) corrective regimen sliding scale   SubCutaneous three times a day before meals  insulin lispro (HumaLOG) corrective regimen sliding scale   SubCutaneous at bedtime  metoprolol succinate ER 25 milliGRAM(s) Oral daily  multivitamin/minerals 1 Tablet(s) Oral daily  mycophenolate mofetil 500 milliGRAM(s) Oral daily  nystatin Powder 1 Application(s) Topical two times a day  pantoprazole    Tablet 40 milliGRAM(s) Oral two times a day  predniSONE   Tablet 3 milliGRAM(s) Oral every other day  sertraline 100 milliGRAM(s) Oral daily  sodium chloride 0.65% Nasal 1 Spray(s) Both Nostrils daily  tacrolimus 0.5 milliGRAM(s) Oral two times a day  tamsulosin 0.4 milliGRAM(s) Oral at bedtime  tiotropium 18 MICROgram(s) Capsule 1 Capsule(s) Inhalation daily  trimethoprim   80 mG/sulfamethoxazole 400 mG 1 Tablet(s) Oral <User Schedule>    MEDICATIONS  (PRN):  albuterol/ipratropium for Nebulization 3 milliLiter(s) Nebulizer every 6 hours PRN Shortness of Breath and/or Wheezing  dextrose 40% Gel 15 Gram(s) Oral once PRN Blood Glucose LESS THAN 70 milliGRAM(s)/deciliter  glucagon  Injectable 1 milliGRAM(s) IntraMuscular once PRN Glucose LESS THAN 70 milligrams/deciliter      LABS:                        7.9    3.10  )-----------( 98       ( 26 May 2020 07:09 )             29.6     05-26    137  |  93<L>  |  39<H>  ----------------------------<  185<H>  4.2   |  37<H>  |  1.38<H>    Ca    9.1      26 May 2020 07:09  Phos  3.7     05-26  Mg     2.2     05-26                MICROBIOLOGY:     RADIOLOGY:  [ ] Reviewed and interpreted by me    Point of Care Ultrasound Findings:    PFT:    EKG:

## 2020-05-28 NOTE — PROGRESS NOTE ADULT - REASON FOR ADMISSION
s/p mech fall

## 2020-05-28 NOTE — DISCHARGE NOTE NURSING/CASE MANAGEMENT/SOCIAL WORK - PATIENT PORTAL LINK FT
You can access the FollowMyHealth Patient Portal offered by [facility] by registering at the following website: [email or web address]. By joining [facility] [employer]’s FollowMyHealth portal, you will also be able to view your health information using other applications (apps) compatible with our system.

## 2020-05-28 NOTE — PROGRESS NOTE ADULT - PROBLEM SELECTOR PLAN 4
- Started on IV Cefazolin  - RLE doppler negative for DVT 5/14.
- c/w Toprol 25 daily
f/up transplant team
plt 89 on admission, has been 60-90 on [employer] labs   - likely chronic and in setting of splenomegaly  - continue to monitor CBC, monitor for signs of bleeding
- Completed 7 day course of IV Cefazolin   - RLE doppler negative for DVT 5/14.
f/up transplant team

## 2020-05-28 NOTE — PROGRESS NOTE ADULT - PROBLEM SELECTOR PROBLEM 4
S/P liver transplant
Cellulitis of right lower extremity
Hypertension
S/P liver transplant
Thrombocytopenia
Cellulitis of right lower extremity

## 2020-05-28 NOTE — PROGRESS NOTE ADULT - SUBJECTIVE AND OBJECTIVE BOX
PROGRESS NOTE:   Authored by Dr. Von Hobbs MD  Pager [phone] [facility], [id number] [address]     Patient is a 69y old  Male who presents with a chief complaint of s/p mech fall (28 May 2020 05:36)      SUBJECTIVE / OVERNIGHT EVENTS:  - Overnight, no acute events. Continued to refuse BiPAP overnight.   - Today, pt seen and examined at bedside in AM. Pt continues to report he feels okay, no complaints. Focal pain at previously noted L rib site remains resolved. Legs remain improved. Has dyspnea at baseline (on 3LNC at home), not any worse. Denies f/c/n/v, CP, SOB otherwise, abdominal pain, hematochezia and melena.    ADDITIONAL REVIEW OF SYSTEMS:    MEDICATIONS  (STANDING):  aspirin enteric coated 81 milliGRAM(s) Oral daily  buprenorphine 2 mG/naloxone 0.5 mG SL  Tablet 1 Tablet(s) SubLingual <User Schedule>  dextrose 5%. 1000 milliLiter(s) (50 mL/Hr) IV Continuous <Continuous>  dextrose 50% Injectable 12.5 Gram(s) IV Push once  dextrose 50% Injectable 25 Gram(s) IV Push once  dextrose 50% Injectable 25 Gram(s) IV Push once  furosemide    Tablet 80 milliGRAM(s) Oral daily  heparin   Injectable 5000 Unit(s) SubCutaneous every 8 hours  insulin glargine Injectable (LANTUS) 26 Unit(s) SubCutaneous every morning  insulin glargine Injectable (LANTUS) 45 Unit(s) SubCutaneous at bedtime  insulin lispro (HumaLOG) corrective regimen sliding scale   SubCutaneous three times a day before meals  insulin lispro (HumaLOG) corrective regimen sliding scale   SubCutaneous at bedtime  metoprolol succinate ER 25 milliGRAM(s) Oral daily  multivitamin/minerals 1 Tablet(s) Oral daily  mycophenolate mofetil 500 milliGRAM(s) Oral daily  nystatin Powder 1 Application(s) Topical two times a day  pantoprazole    Tablet 40 milliGRAM(s) Oral two times a day  predniSONE   Tablet 3 milliGRAM(s) Oral every other day  sertraline 100 milliGRAM(s) Oral daily  sodium chloride 0.65% Nasal 1 Spray(s) Both Nostrils daily  tacrolimus 0.5 milliGRAM(s) Oral two times a day  tamsulosin 0.4 milliGRAM(s) Oral at bedtime  tiotropium 18 MICROgram(s) Capsule 1 Capsule(s) Inhalation daily  trimethoprim   80 mG/sulfamethoxazole 400 mG 1 Tablet(s) Oral <User Schedule>    MEDICATIONS  (PRN):  albuterol/ipratropium for Nebulization 3 milliLiter(s) Nebulizer every 6 hours PRN Shortness of Breath and/or Wheezing  dextrose 40% Gel 15 Gram(s) Oral once PRN Blood Glucose LESS THAN 70 milliGRAM(s)/deciliter  glucagon  Injectable 1 milliGRAM(s) IntraMuscular once PRN Glucose LESS THAN 70 milligrams/deciliter      CAPILLARY BLOOD GLUCOSE      POCT Blood Glucose.: 212 mg/dL (27 May 2020 21:41)  POCT Blood Glucose.: 242 mg/dL (27 May 2020 17:02)  POCT Blood Glucose.: 278 mg/dL (27 May 2020 12:03)  POCT Blood Glucose.: 232 mg/dL (27 May 2020 08:23)    I&O's Summary    27 May 2020 07:01  -  28 May 2020 07:00  --------------------------------------------------------  IN: 1700 mL / OUT: 1900 mL / NET: -200 mL        PHYSICAL EXAM:  Vital Signs Last 24 Hrs  T(C): 36.8 (28 May 2020 04:08), Max: 37.2 (27 May 2020 14:13)  T(F): 98.2 (28 May 2020 04:08), Max: 99 (27 May 2020 14:13)  HR: 80 (28 May 2020 04:08) (80 - 88)  BP: 129/70 (28 May 2020 04:08) (125/72 - 131/69)  BP(mean): --  RR: 18 (28 May 2020 04:08) (18 - 18)  SpO2: 98% (28 May 2020 04:08) (94% - 98%)    GENERAL: NAD, obese man laying in bed  RESPIRATORY: Normal respiratory effort; lungs are clear to auscultation bilaterally, on 4LNC   CARDIOVASCULAR: Regular rate and rhythm, normal S1 and S2, no murmur/rub/gallop  ABDOMEN: +BS, Soft, Non-distended, Nontender to palpation  EXT: b/l LE edema with venous stasis changes and erythema (R> L), warm to touch, non-tender to palpation; appears improved overall.  NEURO: Alert, good concentration, moving extremities     LABS:  no AM labs            RADIOLOGY & ADDITIONAL TESTS:  Results Reviewed:   Imaging Personally Reviewed:  Electrocardiogram Personally Reviewed:    COORDINATION OF CARE:  Care Discussed with Consultants/Other Providers [Y/N]:  Prior or Outpatient Records Reviewed [Y/N]: PROGRESS NOTE:   Authored by Dr. Von Hobbs MD  Pager [phone] [facility], [id number] [facility]     Patient is a 69y old  Male who presents with a chief complaint of s/p mech fall (28 May 2020 05:36)      SUBJECTIVE / OVERNIGHT EVENTS:  - Overnight, no acute events. Continued to refuse BiPAP overnight. Blister on RLE with serosanguinous discharge noted overnight.  - Today, pt seen and examined at bedside in AM. Pt continues to report he feels okay. Notes that his stool was hard yesterday when having a BM and wants a stool softener. Focal pain at previously noted L rib site remains resolved. Legs remain improved, blister wrapped in dressing. Has dyspnea at baseline (on 3LNC at home), not any worse. Denies f/c/n/v, CP, SOB otherwise, abdominal pain, hematochezia and melena.    ADDITIONAL REVIEW OF SYSTEMS:    MEDICATIONS  (STANDING):  aspirin enteric coated 81 milliGRAM(s) Oral daily  buprenorphine 2 mG/naloxone 0.5 mG SL  Tablet 1 Tablet(s) SubLingual <User Schedule>  dextrose 5%. 1000 milliLiter(s) (50 mL/Hr) IV Continuous <Continuous>  dextrose 50% Injectable 12.5 Gram(s) IV Push once  dextrose 50% Injectable 25 Gram(s) IV Push once  dextrose 50% Injectable 25 Gram(s) IV Push once  furosemide    Tablet 80 milliGRAM(s) Oral daily  heparin   Injectable 5000 Unit(s) SubCutaneous every 8 hours  insulin glargine Injectable (LANTUS) 26 Unit(s) SubCutaneous every morning  insulin glargine Injectable (LANTUS) 45 Unit(s) SubCutaneous at bedtime  insulin lispro (HumaLOG) corrective regimen sliding scale   SubCutaneous three times a day before meals  insulin lispro (HumaLOG) corrective regimen sliding scale   SubCutaneous at bedtime  metoprolol succinate ER 25 milliGRAM(s) Oral daily  multivitamin/minerals 1 Tablet(s) Oral daily  mycophenolate mofetil 500 milliGRAM(s) Oral daily  nystatin Powder 1 Application(s) Topical two times a day  pantoprazole    Tablet 40 milliGRAM(s) Oral two times a day  predniSONE   Tablet 3 milliGRAM(s) Oral every other day  sertraline 100 milliGRAM(s) Oral daily  sodium chloride 0.65% Nasal 1 Spray(s) Both Nostrils daily  tacrolimus 0.5 milliGRAM(s) Oral two times a day  tamsulosin 0.4 milliGRAM(s) Oral at bedtime  tiotropium 18 MICROgram(s) Capsule 1 Capsule(s) Inhalation daily  trimethoprim   80 mG/sulfamethoxazole 400 mG 1 Tablet(s) Oral <User Schedule>    MEDICATIONS  (PRN):  albuterol/ipratropium for Nebulization 3 milliLiter(s) Nebulizer every 6 hours PRN Shortness of Breath and/or Wheezing  dextrose 40% Gel 15 Gram(s) Oral once PRN Blood Glucose LESS THAN 70 milliGRAM(s)/deciliter  glucagon  Injectable 1 milliGRAM(s) IntraMuscular once PRN Glucose LESS THAN 70 milligrams/deciliter      CAPILLARY BLOOD GLUCOSE      POCT Blood Glucose.: 212 mg/dL (27 May 2020 21:41)  POCT Blood Glucose.: 242 mg/dL (27 May 2020 17:02)  POCT Blood Glucose.: 278 mg/dL (27 May 2020 12:03)  POCT Blood Glucose.: 232 mg/dL (27 May 2020 08:23)    I&O's Summary    27 May 2020 07:01  -  28 May 2020 07:00  --------------------------------------------------------  IN: 1700 mL / OUT: 1900 mL / NET: -200 mL        PHYSICAL EXAM:  Vital Signs Last 24 Hrs  T(C): 36.8 (28 May 2020 04:08), Max: 37.2 (27 May 2020 14:13)  T(F): 98.2 (28 May 2020 04:08), Max: 99 (27 May 2020 14:13)  HR: 80 (28 May 2020 04:08) (80 - 88)  BP: 129/70 (28 May 2020 04:08) (125/72 - 131/69)  BP(mean): --  RR: 18 (28 May 2020 04:08) (18 - 18)  SpO2: 98% (28 May 2020 04:08) (94% - 98%)    GENERAL: NAD, obese man laying in bed  RESPIRATORY: Normal respiratory effort; lungs are clear to auscultation bilaterally, on 4LNC   CARDIOVASCULAR: Regular rate and rhythm, normal S1 and S2, no murmur/rub/gallop  ABDOMEN: +BS, Soft, Non-distended, Nontender to palpation  EXT: b/l LE edema with venous stasis changes and erythema (R> L), warm to touch, non-tender to palpation; appears improved overall. RLE wrapped in dressing due to draining blister.  NEURO: Alert, good concentration, moving extremities     LABS:  no AM labs            RADIOLOGY & ADDITIONAL TESTS:  Results Reviewed:   Imaging Personally Reviewed:  Electrocardiogram Personally Reviewed:    COORDINATION OF CARE:  Care Discussed with Consultants/Other Providers [Y/N]:  Prior or Outpatient Records Reviewed [Y/N]: PROGRESS NOTE:   Authored by Dr. Von Hobbs MD  Pager [phone] [facility], [id number] [address]     Patient is a 69y old  Male who presents with a chief complaint of s/p mech fall (28 May 2020 05:36)      SUBJECTIVE / OVERNIGHT EVENTS:  - Overnight, no acute events. Continued to refuse BiPAP overnight. Blister on RLE with serosanguinous discharge noted overnight.  - Today, pt seen and examined at bedside in AM. Pt continues to report he feels okay. Notes that his stool was hard yesterday when having a BM and wants a stool softener. Focal pain at previously noted L rib site remains resolved. Legs remain improved, blister wrapped in dressing. Has dyspnea at baseline (on 3LNC at home), not any worse. Denies f/c/n/v, CP, SOB otherwise, abdominal pain, hematochezia and melena.  - Pt reported that he would consider other rehab facilities since [facility] would not accept him with the buprenorphine even if his Pain Management Specialist, Dr. Kapil Veloz, offered to prescribe the medication for the rehab center.    ADDITIONAL REVIEW OF SYSTEMS:    MEDICATIONS  (STANDING):  aspirin enteric coated 81 milliGRAM(s) Oral daily  buprenorphine 2 mG/naloxone 0.5 mG SL  Tablet 1 Tablet(s) SubLingual <User Schedule>  dextrose 5%. 1000 milliLiter(s) (50 mL/Hr) IV Continuous <Continuous>  dextrose 50% Injectable 12.5 Gram(s) IV Push once  dextrose 50% Injectable 25 Gram(s) IV Push once  dextrose 50% Injectable 25 Gram(s) IV Push once  furosemide    Tablet 80 milliGRAM(s) Oral daily  heparin   Injectable 5000 Unit(s) SubCutaneous every 8 hours  insulin glargine Injectable (LANTUS) 26 Unit(s) SubCutaneous every morning  insulin glargine Injectable (LANTUS) 45 Unit(s) SubCutaneous at bedtime  insulin lispro (HumaLOG) corrective regimen sliding scale   SubCutaneous three times a day before meals  insulin lispro (HumaLOG) corrective regimen sliding scale   SubCutaneous at bedtime  metoprolol succinate ER 25 milliGRAM(s) Oral daily  multivitamin/minerals 1 Tablet(s) Oral daily  mycophenolate mofetil 500 milliGRAM(s) Oral daily  nystatin Powder 1 Application(s) Topical two times a day  pantoprazole    Tablet 40 milliGRAM(s) Oral two times a day  predniSONE   Tablet 3 milliGRAM(s) Oral every other day  sertraline 100 milliGRAM(s) Oral daily  sodium chloride 0.65% Nasal 1 Spray(s) Both Nostrils daily  tacrolimus 0.5 milliGRAM(s) Oral two times a day  tamsulosin 0.4 milliGRAM(s) Oral at bedtime  tiotropium 18 MICROgram(s) Capsule 1 Capsule(s) Inhalation daily  trimethoprim   80 mG/sulfamethoxazole 400 mG 1 Tablet(s) Oral <User Schedule>    MEDICATIONS  (PRN):  albuterol/ipratropium for Nebulization 3 milliLiter(s) Nebulizer every 6 hours PRN Shortness of Breath and/or Wheezing  dextrose 40% Gel 15 Gram(s) Oral once PRN Blood Glucose LESS THAN 70 milliGRAM(s)/deciliter  glucagon  Injectable 1 milliGRAM(s) IntraMuscular once PRN Glucose LESS THAN 70 milligrams/deciliter      CAPILLARY BLOOD GLUCOSE      POCT Blood Glucose.: 212 mg/dL (27 May 2020 21:41)  POCT Blood Glucose.: 242 mg/dL (27 May 2020 17:02)  POCT Blood Glucose.: 278 mg/dL (27 May 2020 12:03)  POCT Blood Glucose.: 232 mg/dL (27 May 2020 08:23)    I&O's Summary    27 May 2020 07:01  -  28 May 2020 07:00  --------------------------------------------------------  IN: 1700 mL / OUT: 1900 mL / NET: -200 mL        PHYSICAL EXAM:  Vital Signs Last 24 Hrs  T(C): 36.8 (28 May 2020 04:08), Max: 37.2 (27 May 2020 14:13)  T(F): 98.2 (28 May 2020 04:08), Max: 99 (27 May 2020 14:13)  HR: 80 (28 May 2020 04:08) (80 - 88)  BP: 129/70 (28 May 2020 04:08) (125/72 - 131/69)  BP(mean): --  RR: 18 (28 May 2020 04:08) (18 - 18)  SpO2: 98% (28 May 2020 04:08) (94% - 98%)    GENERAL: NAD, obese man laying in bed  RESPIRATORY: Normal respiratory effort; lungs are clear to auscultation bilaterally, on 4LNC   CARDIOVASCULAR: Regular rate and rhythm, normal S1 and S2, no murmur/rub/gallop  ABDOMEN: +BS, Soft, Non-distended, Nontender to palpation  EXT: b/l LE edema with venous stasis changes and erythema (R> L), warm to touch, non-tender to palpation; appears improved overall. RLE wrapped in dressing due to draining blister.  NEURO: Alert, good concentration, moving extremities     LABS:  no AM labs            RADIOLOGY & ADDITIONAL TESTS:  Results Reviewed:   Imaging Personally Reviewed:  Electrocardiogram Personally Reviewed:    COORDINATION OF CARE:  Care Discussed with Consultants/Other Providers [Y/N]:  Prior or Outpatient Records Reviewed [Y/N]:

## 2020-05-28 NOTE — PROGRESS NOTE ADULT - ATTENDING COMMENTS
as above-improving, s/p CHF evaln (switched to PO diuretics)--PT needed; completed ABX for cellulitis-rehab pending  multifactorial dyspnea--copd, CAD, PAH (portopulmonary), debility, anemia--O2 sat above 90%  copd-duoneb q 6, symbicort 160 2 bid, spiriva 1 q day, acapella, incentive spirometry   ***OSAS-likely has it, bipap as able here d/w pt prior and now  cards-s/p formal cards evaln--s/p echo to R/O PAH-RHF---CHF evaln (RV dysfunction/mod AS)-continue diuresis-now PO  GI-s/p liver transplant-Hailey et al.         Heme-evaln and GI evaln for anemia.   Hypercapneic resp failure-intolerant of bipap  psych-depression--formal psych consult          Lung Ca screening--CT chest is No evidence of Dz  DVT/GI prophylaxis        Care coordinator consult-rehab placement (diuretics to be addressed by cards pre DC) Ok for DC. Hopeful today.  Leandro Santos MD-Pulmonary   [phone] as above-No changes--improving, s/p CHF evaln (switched to PO diuretics)--PT needed; completed ABX for cellulitis-rehab pending  multifactorial dyspnea--copd, CAD, PAH (portopulmonary), debility, anemia--O2 sat above 90%  copd-duoneb q 6, symbicort 160 2 bid, spiriva 1 q day, acapella, incentive spirometry   ***OSAS-likely has it, bipap as able here d/w pt prior and now  cards-s/p formal cards evaln--s/p echo to R/O PAH-RHF---CHF evaln (RV dysfunction/mod AS)-continue diuresis-now PO  GI-s/p liver transplant-Hailey et al.         Heme-evaln and GI evaln for anemia.   Hypercapneic resp failure-intolerant of bipap  psych-depression--formal psych consult          Lung Ca screening--CT chest is No evidence of Dz  DVT/GI prophylaxis        Care coordinator consult-rehab placement (diuretics to be addressed by cards pre DC) Ok for DC. Hopeful today.  Leandro Santos MD-Pulmonary   [phone]

## 2020-05-28 NOTE — PROGRESS NOTE ADULT - PROBLEM SELECTOR PLAN 1
pt endorses increased swelling and has been non-adherent to diuretics. TTE 5/14: LVIDd 4.2cm, LVEF 55%, grossly reduced RVSF, mod AS, borderline PH (RVSP 36 mmHg)   - c/w Toprol 25 ER daily  - LE doppler neg for RLE DVT  - monitor Is/Os, daily weights, fluid restrict to 1.5L /day  - s/p aggressive diuresis c/b metabolic alkalosis s/p diamox 500 mg x 1 on 5/18  - c/w Lasix 80 PO daily  - plan to resume spironolactone 25 PO BID when K+<4.0 (as per HF)  - Appreciate HF recs, f/u outpatient with Dr. Daley at HF clinic ([phone])

## 2020-05-28 NOTE — PROGRESS NOTE ADULT - PROBLEM SELECTOR PLAN 9
Transitions of Care Status:  1.  Name of PCP: Dr. Leandro Santos ([facility] [place])   2.  PCP Contacted on Admission: [ ] Y    [ ] N    3.  PCP contacted at Discharge: [ ] Y    [ ] N    [ ] N/A  4.  Post-Discharge Appointment Date and Location:  5.  Summary of Handoff given to PCP:

## 2020-05-28 NOTE — PROGRESS NOTE ADULT - PROBLEM SELECTOR PROBLEM 5
Anemia, unspecified type
Diabetes mellitus with insulin therapy
Thrombocytopenia
Type 2 diabetes mellitus
Diabetes mellitus with insulin therapy

## 2020-05-28 NOTE — PROGRESS NOTE ADULT - PROBLEM SELECTOR PROBLEM 7
Depression
Buprenorphine dependence
Chronic obstructive pulmonary disease, unspecified COPD type
Depression

## 2020-05-28 NOTE — DISCHARGE NOTE NURSING/CASE MANAGEMENT/SOCIAL WORK - NSDCFUADDAPPT_GEN_ALL_CORE_FT
Upon discharge please follow up as outpatient at [address] by calling [phone] to confirm/make an appointment. You should also follow up with your podiatrist.    Please follow up outpatient with Heart Failure Cardiologist, Dr. Daley, at the Heart Failure clinic by calling [phone] to make/confirm an appointment within 1-2 weeks after discharge.    Please follow up outpatient with the Gastroenterology/Hepatology at the their clinic by calling [phone] to make/confirm an appointment within 2-3 weeks after discharge to have further testing (endoscopy/colonoscopy) to figure out why you may have had a low blood count requiring blood transfusions.

## 2020-05-28 NOTE — PROGRESS NOTE ADULT - PROBLEM SELECTOR PROBLEM 9
Prophylactic measure
Discharge planning issues
Need for prophylactic measure
Prophylactic measure

## 2020-05-28 NOTE — PROGRESS NOTE ADULT - PROBLEM SELECTOR PROBLEM 3
Cardiac disease
Iron deficiency anemia, unspecified iron deficiency anemia type
S/P liver transplant
Iron deficiency anemia, unspecified iron deficiency anemia type

## 2020-05-28 NOTE — PROGRESS NOTE ADULT - PROBLEM SELECTOR PROBLEM 6
Debility
Chronic obstructive pulmonary disease, unspecified COPD type
Debility
Type 2 diabetes mellitus

## 2020-05-28 NOTE — PROGRESS NOTE ADULT - PROBLEM SELECTOR PLAN 8
- c/w buprenorphine 2mg TID  ISTOP checked, no opioids noted (ref #[id number])
- c/w buprenorphine 2mg TID  ISTOP checked, no opioids noted (ref #[id number])
- c/w buprenorphine 2mg TID  ISTOP checked, no opioids noted (ref #[id number])
- c/w buprenorphine 2mg TID  ISTOP checked, no opioids noted (ref #[id number])
- c/w buprenorphine 2mg TID  ISTOP checked, no opioids noted (ref #[id number])
- c/w buprenorphine 2mg TID  ISTOP checked, no opioids noted (ref #[id number])
- c/w buprenorphine 2mg TID  ISTOP checked, no opioids noted (ref #[id number])
- c/w buprenorphine 2mg TID  ISTOP checked, no opioids noted (ref #[id number])
- c/w buprenorphine 2mg TID  ISTOP checked, no opioids noted (ref #[id number])
DVT ppx: HSQ 5000u q8h  Diet: DASH, CC  Dispo: PETROS as per PT, pt is amenable
DVT ppx: HSQ 5000u q8h  Diet: DASH, CC  Dispo: [facility]ROS as per PT, pt is now amenable
ct chest f/up-next is 5/2021
ct chest f/up

## 2020-05-28 NOTE — PROGRESS NOTE ADULT - PROBLEM SELECTOR PROBLEM 1
Acute blood loss anemia
Acute on chronic diastolic (congestive) heart failure
Acute on chronic right-sided congestive heart failure
SOB (shortness of breath)
Acute on chronic right-sided congestive heart failure

## 2020-05-28 NOTE — PROGRESS NOTE ADULT - ASSESSMENT
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 Found w/ signif edema and Hgb-7--  multifactorial issues.  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 Found w/ signif edema and Hgb-7--  multifactorial issues.  ******************  5/15-s/p echo-RV dysfunction, mod AS  5/16-better over all--CHF evaln-continue diuresis  5/17-better over all-CHF team f/up-80 bid IV  5/18-improving but still no ambulation-CHF f/up  5/19-slow improvements-less edema, remains on NC 3-4 liters  5/20-CHF titrated down diuretics to PO lasix, cpap poorly tolerated  5/21-preparing for Rehab  5/22-better-occasional chest discomfort  5/26-stable awaiting rehab  5/27-awaiting rehab-no changes  5/28-still awaiting bed at a rehab (? issues w/ meds he takes)

## 2020-05-28 NOTE — PROGRESS NOTE ADULT - PROBLEM SELECTOR PLAN 7
- c/w buprenorphine 2mg TID  - follows with Dr. Kapil Veloz (pain management)  ISTOP checked, no opioids noted (ref #[id number]) - c/w buprenorphine 2mg TID  - follows with Dr. Kapil Veloz (pain management) who would like to keep the buprenorphine  ISTOP checked, no opioids noted (ref #[id number])

## 2020-06-03 PROBLEM — E11.9 TYPE 2 DIABETES MELLITUS WITHOUT COMPLICATIONS: Chronic | Status: ACTIVE | Noted: 2020-01-01

## 2020-06-03 PROBLEM — E78.5 HYPERLIPIDEMIA, UNSPECIFIED: Chronic | Status: ACTIVE | Noted: 2020-01-01

## 2020-06-03 PROBLEM — I10 ESSENTIAL (PRIMARY) HYPERTENSION: Chronic | Status: ACTIVE | Noted: 2020-01-01

## 2020-06-03 PROBLEM — J44.9 CHRONIC OBSTRUCTIVE PULMONARY DISEASE, UNSPECIFIED: Chronic | Status: ACTIVE | Noted: 2020-01-01

## 2020-06-03 PROBLEM — Z94.4 LIVER TRANSPLANT STATUS: Chronic | Status: ACTIVE | Noted: 2020-01-01

## 2020-08-04 PROBLEM — E11.65 UNCONTROLLED TYPE 2 DIABETES MELLITUS WITH HYPERGLYCEMIA: Status: ACTIVE | Noted: 2018-10-25

## 2020-08-06 NOTE — REASON FOR VISIT
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 Santos from pulmonary, heart failure service and hepatology\par discharged to sub-acute rehab, now discharged from rehab to home\par \par Discharge immunosuppression  tacro 0.5 mg bid, prednisone 3 mg every other day, cellcept 500 mg daily\par \par Metoprolol ER 25 mg daily,  furosemide 80 mg daily, ASA 81 mg daily, pantoprazole 40 mg bid, SMZ / TMZ daily, Zoloft 100 mg daily, tamsulosin 0.4 mg at HS, \par Glargine insulin 26 units in AM,  45 units in PM\par Pulmonary meds- breo inhaler, spiriva, budesonide prn, Performist PRN. \par \par \par

## 2020-08-06 NOTE — REASON FOR VISIT
[de-identified] : S/P liver transplant on 7/28/2011 due to Alcohol liver cirrhosis and HCV\par Multiple medical problems including COPD, heart failure, leg edema, recurrent LE cellulitis\par Patient admitted to [facility] through ER on 5/13/20 for SOB, volume overload, diabeted management and general deconditioning; - followed on that admission by DR. Santos from pulmonary, heart failure service and hepatology\par discharged to sub-acute rehab, now discharged from rehab to home\par \par Discharge immunosuppression  tacro 0.5 mg bid, prednisone 3 mg every other day, cellcept 500 mg daily\par \par Metoprolol ER 25 mg daily,  furosemide 80 mg daily, ASA 81 mg daily, pantoprazole 40 mg bid, SMZ / TMZ daily, Zoloft 100 mg daily, tamsulosin 0.4 mg at HS, \par Glargine insulin 26 units in AM,  45 units in PM\par Pulmonary meds- breo inhaler, spiriva, budesonide prn, Performist PRN. 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

## 2020-08-06 NOTE — HISTORY OF PRESENT ILLNESS
[Home] : at home, [unfilled] , at the time of the visit. [Medical Office: ([facility])___] : at the medical office located in  [Verbal consent obtained from patient] : the patient, [unfilled] [Hepatitis C Virus] : Hepatitis C Virus [Liver] : Liver [Not Working] : Not working [FreeTextEntry1] : Patient underwent liver transplant 7/28/2011 for HCV, alcoholic liver disease

## 2020-08-06 NOTE — HISTORY OF PRESENT ILLNESS
[Home] : at home, [unfilled] , at the time of the visit. [Medical Office: ([facility])___] : at the medical office located in  [Verbal consent obtained from patient] : the patient, [unfilled] [Hepatitis C Virus] : Hepatitis C Virus [Not Working] : Not working [Liver] : Liver [FreeTextEntry1] : Patient underwent liver transplant 7/28/2011 for HCV, alcoholic liver disease

## 2020-08-14 NOTE — DISCHARGE NOTE PROVIDER - NSDCADMDATE_GEN_ALL_CORE_FT
13-May-2020 17:20 Pain Pre-Op H&P Note    Loly Wilmore Hefzy    HPI: Neeta Maravilla  presents with low back pain. Recent discogram, was very painful, complaining of some tingling in her feet since then. She did very well with previous MBB, she wishes to go ahead with confirmatory block. Past Medical History:   Diagnosis Date    Abnormal stress test     Allergic rhinitis     Arthritis     CAD (coronary artery disease)     Dr. Jayla Oconnor last seen 2020    Chronic back pain     Diabetes mellitus ([address]     Former smoker 2020    30-year history.   Quit in 2018    Hyperlipidemia     Dr. Uzma Mauro Hypertension     Dr. Jinny Perez, cardiac 2020    SRI on CPAP     instructed to bring Dr. José Coles Wears prescription eyeglasses     Wellness examination     Dr. Chris Ashford last seen 2020       Past Surgical History:   Procedure Laterality Date    ANESTHESIA NERVE BLOCK Left 2019    NERVE BLOCK (DEFINE) - # 1 L5-S1 performed by Mary Jane Xiong MD at [facility] 2020    NERVE BLOCK BILATERAL - B MBB # 1 L4-5, L5-S1 performed [facility]g [facility] [employer] [id number]  2017    CARDIAC CATHETERIZATION  2019    CARDIAC CATHETERIZATION  2018    1 stent mid LAD   [address]  02/10/2020    for chest pain no new blockage    CARPAL TUNNEL RELEASE Right      SECTION      x3    COLONOSCOPY N/A 2018    COLONOSCOPY WITH BIOPSY performed by Adela Odom MD at [place] [address]      cataract surgery left eye    FOOT SURGERY      HAND TENDON SURGERY Right     [facility], [facility]. INJECTION PROCEDURE FOR SACROILIAC JOINT Left 2019    SACROILIAC JOINT INJECTION - #1 performed by Mary Jane Xiong MD at [facility]  2020     NERVE BLOCK BILATERAL - B MBB # 1 L4-5, L5-S1 (Bilateral     NERVE BLOCK pertinent to diagnosis, except as stated in HPI. Physical Exam:     BP (!) 141/80   Pulse 67   Temp 97.3 °F (36.3 °C) (Temporal)   Resp 16   Ht 5' 4\" (1.626 m)   Wt 233 lb 4 oz (105.8 kg)   SpO2 98%   BMI 40.04 kg/m²     Physical Exam  Vitals signs reviewed. Musculoskeletal:      Lumbar back: She exhibits tenderness. She exhibits no edema and no deformity. Neurological:      Mental Status: She is alert and oriented to person, place, and time. Patient's current physical status, medications, medical history, and HPI have been reviewed and updated as appropriate on this date: 08/14/20    Risk/Benefit(s): The risks, benefits, alternatives, and potential complications have been discussed with the patient/family and informed consent has been obtained for the procedure/sedation.     Diagnosis:   SPONDYLOSIS      Plan: lumbar ANDREW Perry

## 2020-09-01 PROBLEM — J32.9 CHRONIC SINUSITIS: Status: ACTIVE | Noted: 2019-03-18

## 2020-09-01 PROBLEM — Z72.820 POOR SLEEP: Status: ACTIVE | Noted: 2019-03-18

## 2020-09-01 NOTE — ASSESSMENT
[FreeTextEntry1] : Mr. WALTON is a 69 year old male with a history of hepatitis-C induced liver failure, s/p liver transplant, HTN, COPD, diabetes, venous insufficiency, overweight, chronic respiratory failure who video calls into the office today for a pulmonary re-evaluation. He currently feels that he is improved s/p [facility] admit\par \par His shortness of breath is multifactorial due to:\par - pulmonary disease (COPD and ?PAH)\par -poor breathing mechanics (due to pain and balance)\par -overweight/out of shape\par -?CAD \par \par problem 1: COPD (noncompliant)\par - continue Albuterol nebulizer QAM and Q6H (gargle and spit after use) \par -continue Perforomist BID by nebulizer, PRN  (gargle and rinse after use)\par -continue Pulmicort (Budesonide) (0.5) BID by nebulizer, PRN  (gargle and rinse after use)\par - continue Breo Ellipta 200 1 inhalation QD\par - continue Spiriva 1 inhalation QS\par \par -Inhaler technique reviewed as well as oral hygiene techniques reviewed with patient. Avoidance of cold air, extremes of temperature, rescue inhaler should be used before exercise. Order of medication reviewed with patient. Recommended use of a cool mist humidifier in the bedroom. \par -COPD is a progressive disease and although it can’t be cured , appropriate management can slow its progression, reduce frequency and severity of exacerbations, and improve symptoms and the patient quality of life. Hospitalizations are the greatest contributor to the total COPD costs and account for up to 87% of total COPD related costs. Exacerbations are the main cause of admissions and subsequently account for the 40-75% of COPD costs. Inhaled maintenance therapy reduces the incidence of exacerbations in patients with stable COPD. Incorrect inhaler use and nonadherence are major obstacles to achieving COPD treatment goals. Many COPD patients have challenges (impaired inhalation, limited dexterity, reduced cognition: that limit their ability to correctly use their COPD treatment devices resulting in reduced symptom control. Of most importance is smoking cessation and early intervention with respiratory illnesses and contemplation for pulmonary rehab to enhance quality of life.\par \par problem 2: ?PAH\par -complete echocardiogram to r/o PAH\par \par Disorder of the pulmonary arteries, important to distinguish the difference between pulmonary arterial hypertension which is idiopathic versus secondary pulmonary hypertension which is related to heart disease being diastolic dysfunction or congestive heart failure or other forms of pulmonary hypertension. Diagnostics include an initial echocardiogram evaluating the pulmonary artery pressures, if this is abnormal, to proceed with a VQ scan as well as a CTPA and an eventual right heart catheterization to absolutely confirm the echocardiogram findings. (No medication can be prescribed until the right heart catheterization). If present, the evaluation will include rheumatological blood testing, HIV testing, and potential evaluation for cirrhosis. Drug classes include PED5 (Revatio, Adcirca) ETRA (Tracleer, Macitentan, Letairis), Soluble guanylate cyclase (Adempas) Prostacyclins (Uptravi, Tyavso, Ventavis, Remodulin, or Orenitram derivatives). \par \par problem 3: allergies / sinus\par - Continue Olopatadine 0.6%, 1 sniff each nostril BID \par -recommended Navage sinus rinse\par -(noncompliant) complete blood work to include: IgE level, eosinophil level, vitamin D level, food IgE level, free and total testosterone levels, and asthma profile \par \par Environmental measures for allergies were encouraged including mattress and pillow cover, air purifier, and environmental controls.\par \par problem 4: chronic respiratory failure\par - Patient is a candidate for supplemental oxygen \par - Continue supplemental oxygen therapy at 2L with Inogen Air Compressor G3 (contact Ms. Esparza @ [phone])\par \par problem 5: lung cancer screening chest CT - re-do with pt\par -s/p chest CT 5/2020. Next 5/2021\par \par Lung cancer screening is recommended for people between the ages of 55 and 80 with prior 30+ pack year smoking histories. There is irrefutable evidence for realization of lung cancer screening based on two large randomized control trials demonstrating relative reduction in lung cancer mortality for patients undergoing low-dose CT scanning. Risks and benefits reviewed with the patient \par \par problem 6: r/o CLAUDE (improved)\par -complete home sleep study based on elevated MP class, fatigue \par -using O2 overnight 2 liters NC\par \par Sleep apnea is associated with adverse clinical consequences which an affect most organ systems. Cardiovascular disease risk includes arrhythmias, atrial fibrillation, hypertension, coronary artery disease, and stroke. Metabolic disorders include diabetes type 2, non-alcoholic fatty liver disease. Mood disorder especially depression; and cognitive decline especially in the elderly. Associations with chronic reflux/Gruber’s esophagus some but not all inclusive. \par -Reasons include arousal consistent with hypopnea; respiratory events most prominent in REM sleep or supine position; therefore sleep staging and body position are important for accurate diagnosis and estimation of AHI. \par  \par problem 7: poor balance\par - given prescription for gait training and balance therapy \par \par problem 8 : cardiac\par -referred patient to Dr. Carlton Barlow / Bandar Garcia\par -complete echocardiogram to r/o PAH\par \par problem 9: overweight/out of shape (improved)\par Weight loss, exercise, and diet control were discussed and are highly encouraged. Treatment options were given such as, aqua therapy, and contacting a nutritionist. Recommended to use the elliptical, stationary bike, less use of treadmill. Mindful eating was explained to the patient Obesity is associated with worsening asthma, shortness of breath, and potential for cardiac disease, diabetes, and other underlying medical conditions. \par \par problem 10 : poor breathing mechanics\par -Proper breathing techniques were reviewed with an emphasis of exhalation. Patient instructed to breath in for 1 second and out for four seconds. Patient was encouraged to not talk while walking. \par \par Problem 11 : health maintenance\par -recommended strep pneumonia vaccines: Prevnar-13 vaccine, followed by Pneumo vaccine 23 one year following\par -recommended early intervention for URIs\par -recommended regular osteoporosis evaluations\par -recommended early dermatological evaluations\par -recommended after the age of 50 to the age of 70, colonoscopy every 5 years \par \par F/U in 6-8 weeks (PT will be admitting himself to [facility] and has stated that he wants to go into rehab) \par Patient is encouraged to call with any changes, concerns, or questions. \par \par ** ADDENDUM: Patient requires 3-4L NC O2 : patient has variable pule Ox at resting room air - 88% at intake, on walk dropped down to 82%, with O2 3L recovery went up to 95% **

## 2020-09-01 NOTE — PHYSICAL EXAM
[General Appearance - Well Developed] : well developed [Normal Appearance] : normal appearance [Well Groomed] : well groomed [General Appearance - Well Nourished] : well nourished [No Deformities] : no deformities [General Appearance - In No Acute Distress] : no acute distress [Normal Conjunctiva] : the conjunctiva exhibited no abnormalities [Eyelids - No Xanthelasma] : the eyelids demonstrated no xanthelasmas [Normal Oropharynx] : normal oropharynx [III] : III [Neck Appearance] : the appearance of the neck was normal [Neck Cervical Mass (___cm)] : no neck mass was observed [Jugular Venous Distention Increased] : there was no jugular-venous distention [Thyroid Diffuse Enlargement] : the thyroid was not enlarged [Thyroid Nodule] : there were no palpable thyroid nodules [Heart Rate And Rhythm] : heart rate and rhythm were normal [Heart Sounds] : normal S1 and S2 [Murmurs] : no murmurs present [Respiration, Rhythm And Depth] : normal respiratory rhythm and effort [Exaggerated Use Of Accessory Muscles For Inspiration] : no accessory muscle use [Abdomen Soft] : soft [Abdomen Tenderness] : non-tender [Abdomen Mass (___ Cm)] : no abdominal mass palpated [Abnormal Walk] : normal gait [Gait - Sufficient For Exercise Testing] : the gait was sufficient for exercise testing [Nail Clubbing] : no clubbing of the fingernails [Cyanosis, Localized] : no localized cyanosis [Petechial Hemorrhages (___cm)] : no petechial hemorrhages [FreeTextEntry2] : 1-2+ LE edema [No Venous Stasis] : no venous stasis [Skin Lesions] : no skin lesions [No Skin Ulcers] : no skin ulcer [No Xanthoma] : no  xanthoma was observed [Deep Tendon Reflexes (DTR)] : deep tendon reflexes were 2+ and symmetric [Sensation] : the sensory exam was normal to light touch and pinprick [No Focal Deficits] : no focal deficits [Oriented To Time, Place, And Person] : oriented to person, place, and time [Impaired Insight] : insight and judgment were intact [Affect] : the affect was normal [Skin Color & Pigmentation] : normal skin color and pigmentation [Skin Turgor] : normal skin turgor [] : no rash [FreeTextEntry1] : on supplemental oxygen

## 2020-09-01 NOTE — HISTORY OF PRESENT ILLNESS
[Home] : at home, [unfilled] , at the time of the visit. [Medical Office: ([facility])___] : at the medical office located in  [Verbal consent obtained from patient] : the patient, [unfilled] [FreeTextEntry1] : Mr. Nguyen is a 69 year old male with a history of chronic respiratory failure with hypoxemia, chronic sinusitis, COPD, GOMEZ, obesity, poor balance, poor sleep, and SOB presenting to the office today via video call for a follow up visit. His chief complaint is recent hospitalization.\par -he notes he went to rehab for about one month after being in the hospital for 3 weeks\par -he reports his legs aren't swollen\par -he notes he has lost about 25 pounds, and feels improved and feels more in control now\par -he states he isn't sleeping well due to watching TV\par -he reports he saw Oleg Hart, who said he was doing well\par -he reports he isnt using his nebulizer treatments frequently as they werent doing much for him, and notes it gave him tachycardia\par -he reports he is using supplemental oxygen during the day and through the night\par \par -he denies any coughing, wheezing, chest pain, chest pressure, diarrhea, constipation, dysphagia, dizziness, sour taste in the mouth, leg swelling, leg pain, itchy eyes, itchy ears, heartburn, reflux, myalgias or arthralgias.

## 2020-09-01 NOTE — REASON FOR VISIT
[Follow-Up] : a follow-up visit [FreeTextEntry1] : Video Call - Chronic respiratory failure with hypoxemia, chronic sinusitis, COPD, GOMEZ, obesity, poor balanceC, poor sleep, and SOB

## 2020-09-01 NOTE — REVIEW OF SYSTEMS
[Negative] : Endocrine [Recent Wt Loss (___ Lbs)] : ~T recent [unfilled] lb weight loss [Cough] : no cough [Wheezing] : no wheezing [Chest Discomfort] : no chest discomfort [GERD] : no gerd [Diarrhea] : no diarrhea [Constipation] : no constipation [Dysphagia] : no dysphagia

## 2020-09-01 NOTE — ADDENDUM
[FreeTextEntry1] : Documented by Jordan Asher acting as a scribe for Dr. Leandro Santos on 09/01/2020.\par \par All medical record entries made by the Scribe were at my, Dr. Leandro Santos's, direction and personally dictated by me on 09/01/2020. I have reviewed the chart and agree that the record accurately reflects my personal performance of the history, physical exam, assessment and plan. I have also personally directed, reviewed, and agree with the discharge instructions.

## 2020-12-03 PROBLEM — J44.9 COPD (CHRONIC OBSTRUCTIVE PULMONARY DISEASE): Status: ACTIVE | Noted: 2017-06-15

## 2020-12-03 PROBLEM — R06.02 SOB (SHORTNESS OF BREATH): Status: ACTIVE | Noted: 2019-03-18

## 2020-12-03 PROBLEM — E66.9 OBESITY: Status: ACTIVE | Noted: 2019-03-18

## 2020-12-03 PROBLEM — J96.11 CHRONIC RESPIRATORY FAILURE WITH HYPOXIA: Status: ACTIVE | Noted: 2019-03-18

## 2020-12-03 PROBLEM — R26.89 POOR BALANCE: Status: ACTIVE | Noted: 2019-03-18

## 2020-12-03 NOTE — REASON FOR VISIT
[Follow-Up] : a follow-up visit [FreeTextEntry1] : Video Call - Chronic respiratory failure with hypoxemia, chronic sinusitis, COPD, GOMEZ, obesity, poor balance, poor sleep, and SOB

## 2020-12-03 NOTE — PHYSICAL EXAM
[General Appearance - Well Developed] : well developed [Normal Appearance] : normal appearance [Well Groomed] : well groomed [General Appearance - Well Nourished] : well nourished [No Deformities] : no deformities [General Appearance - In No Acute Distress] : no acute distress [FreeTextEntry1] : on supplemental oxygen

## 2020-12-03 NOTE — ADDENDUM
[FreeTextEntry1] : Documented by Jordan Asher acting as a scribe for Dr. Leandro Santos on 12/03/2020.\par \par All medical record entries made by the Scribe were at my, Dr. Leandro Santos's, direction and personally dictated by me on 12/03/2020. I have reviewed the chart and agree that the record accurately reflects my personal performance of the history, physical exam, assessment and plan. I have also personally directed, reviewed, and agree with the discharge instructions.

## 2020-12-03 NOTE — ASSESSMENT
[FreeTextEntry1] : Mr. WALTON is a 70 year old male with a history of hepatitis-C induced liver failure, s/p liver transplant, HTN, COPD, diabetes, venous insufficiency, overweight, chronic respiratory failure who video calls into the office today for a pulmonary re-evaluation. He currently feels that he is improved s/p [facility] admit 8/2020 - now stable.\par \par His shortness of breath is multifactorial due to:\par - pulmonary disease (COPD and ?PAH)\par -poor breathing mechanics (due to pain and balance)\par -overweight/out of shape\par -?CAD \par \par problem 1: COPD (noncompliant)\par - continue Albuterol nebulizer QAM and Q6H (gargle and spit after use) \par -continue Perforomist BID by nebulizer, PRN  (gargle and rinse after use)\par -continue Pulmicort (Budesonide) (0.5) BID by nebulizer, PRN  (gargle and rinse after use)\par - continue Breo Ellipta 200 1 inhalation QD\par - continue Spiriva 1 inhalation QS\par \par -Inhaler technique reviewed as well as oral hygiene techniques reviewed with patient. Avoidance of cold air, extremes of temperature, rescue inhaler should be used before exercise. Order of medication reviewed with patient. Recommended use of a cool mist humidifier in the bedroom. \par -COPD is a progressive disease and although it can’t be cured , appropriate management can slow its progression, reduce frequency and severity of exacerbations, and improve symptoms and the patient quality of life. Hospitalizations are the greatest contributor to the total COPD costs and account for up to 87% of total COPD related costs. Exacerbations are the main cause of admissions and subsequently account for the 40-75% of COPD costs. Inhaled maintenance therapy reduces the incidence of exacerbations in patients with stable COPD. Incorrect inhaler use and nonadherence are major obstacles to achieving COPD treatment goals. Many COPD patients have challenges (impaired inhalation, limited dexterity, reduced cognition: that limit their ability to correctly use their COPD treatment devices resulting in reduced symptom control. Of most importance is smoking cessation and early intervention with respiratory illnesses and contemplation for pulmonary rehab to enhance quality of life.\par \par problem 2: ?PAH\par -complete echocardiogram to r/o PAH\par \par Disorder of the pulmonary arteries, important to distinguish the difference between pulmonary arterial hypertension which is idiopathic versus secondary pulmonary hypertension which is related to heart disease being diastolic dysfunction or congestive heart failure or other forms of pulmonary hypertension. Diagnostics include an initial echocardiogram evaluating the pulmonary artery pressures, if this is abnormal, to proceed with a VQ scan as well as a CTPA and an eventual right heart catheterization to absolutely confirm the echocardiogram findings. (No medication can be prescribed until the right heart catheterization). If present, the evaluation will include rheumatological blood testing, HIV testing, and potential evaluation for cirrhosis. Drug classes include PED5 (Revatio, Adcirca) ETRA (Tracleer, Macitentan, Letairis), Soluble guanylate cyclase (Adempas) Prostacyclins (Uptravi, Tyavso, Ventavis, Remodulin, or Orenitram derivatives). \par \par problem 3: allergies / sinus\par - Continue Olopatadine 0.6%, 1 sniff each nostril BID \par -recommended Navage sinus rinse\par -(noncompliant) complete blood work to include: IgE level, eosinophil level, vitamin D level, food IgE level, free and total testosterone levels, and asthma profile \par \par Environmental measures for allergies were encouraged including mattress and pillow cover, air purifier, and environmental controls.\par \par problem 4: chronic respiratory failure\par - Patient is a candidate for supplemental oxygen \par - Continue supplemental oxygen therapy at 2L with Inogen Air Compressor G3 (contact Ms. Esparza @ [phone])\par \par problem 5: lung cancer screening chest CT - re-do with pt\par -s/p chest CT 5/2020. Next 5/2021\par \par Lung cancer screening is recommended for people between the ages of 55 and 80 with prior 30+ pack year smoking histories. There is irrefutable evidence for realization of lung cancer screening based on two large randomized control trials demonstrating relative reduction in lung cancer mortality for patients undergoing low-dose CT scanning. Risks and benefits reviewed with the patient \par \par problem 6: r/o CLAUDE (improved)\par -complete home sleep study based on elevated MP class, fatigue \par -using O2 overnight 2 liters NC\par \par Sleep apnea is associated with adverse clinical consequences which an affect most organ systems. Cardiovascular disease risk includes arrhythmias, atrial fibrillation, hypertension, coronary artery disease, and stroke. Metabolic disorders include diabetes type 2, non-alcoholic fatty liver disease. Mood disorder especially depression; and cognitive decline especially in the elderly. Associations with chronic reflux/Gruber’s esophagus some but not all inclusive. \par -Reasons include arousal consistent with hypopnea; respiratory events most prominent in REM sleep or supine position; therefore sleep staging and body position are important for accurate diagnosis and estimation of AHI. \par  \par problem 7: poor balance\par - given prescription for gait training and balance therapy \par \par problem 8 : cardiac\par -referred patient to Dr. Carlton Barlow / Band[facility] Garcia\par -complete echocardiogram to r/o PAH\par \par problem 9: overweight/out of shape (improved)\par Weight loss, exercise, and diet control were discussed and are highly encouraged. Treatment options were given such as, aqua therapy, and contacting a nutritionist. Recommended to use the elliptical, stationary bike, less use of treadmill. Mindful eating was explained to the patient Obesity is associated with worsening asthma, shortness of breath, and potential for cardiac disease, diabetes, and other underlying medical conditions. \par \par problem 10 : poor breathing mechanics\par -Proper breathing techniques were reviewed with an emphasis of exhalation. Patient instructed to breath in for 1 second and out for four seconds. Patient was encouraged to not talk while walking. \par \par Problem 11 : health maintenance\par -s/p flu shot 2020\par -recommended strep pneumonia vaccines: Prevnar-13 vaccine, followed by Pneumo vaccine 23 one year following (completed) \par -recommended early intervention for URIs\par -recommended regular osteoporosis evaluations\par -recommended early dermatological evaluations\par -recommended after the age of 50 to the age of 70, colonoscopy every 5 years \par \par F/U in 6-8 weeks (PT will be admitting himself to [facility] and has stated that he wants to go into rehab) \par Patient is encouraged to call with any changes, concerns, or questions.

## 2020-12-03 NOTE — HISTORY OF PRESENT ILLNESS
[Home] : at home, [unfilled] , at the time of the visit. [Medical Office: ([facility])___] : at the medical office located in  [Verbal consent obtained from patient] : the patient, [unfilled] [FreeTextEntry1] : Mr. Nguyen is a 70 year old male with a history of chronic respiratory failure with hypoxemia, chronic sinusitis, COPD, GOMEZ, obesity, poor balance, poor sleep, and SOB presenting to the office today via video call for a follow up visit. His chief complaint is\par -he reports feeling okay. He has been staying at home and is anxious about the pandemic\par -he states his legs have been improving. He uses his cane when walking, and is careful about trying not to fall\par -he notes he has lost about 20 pounds\par -he reports he isnt sleeping as well as he would like. He notes he goes to sleep too late\par -he reports he is walking in his home for exercise\par -he denies taking any new medications, vitamins, or supplements. \par -he notes his blood sugar is good, and is awaiting an A1C test tomorrow\par -he is s/p the flu shot this year\par -he denies any headaches, nausea, vomiting, fever, chills, sweats, chest pain, chest pressure, diarrhea, constipation, dysphagia, dizziness, sour taste in the mouth, leg swelling, leg pain, itchy eyes, itchy ears, heartburn, reflux, myalgias or arthralgias.

## 2020-12-07 NOTE — ED PROVIDER NOTE - CLINICAL SUMMARY MEDICAL DECISION MAKING FREE TEXT BOX
70M pmh copd, cirrhosis s/p liver transplant in 2011, p/w anemia on out patient blood work.  Pt endorses months of feeling more fatigued than usual.  Reports some blood on the tissue paper after BMs.  Denies chest pain, cough, fever, abd pain, nausea/vomiting.  Pt in NAD, on NC O2, heart rrr, lungs cta, abd soft ntnd, b/l lower ext with chronic hyperpigmentation and edema.  Concern for possible GI bleed leading to anemia - will check labs, type, guaiac and patient will likely require admission and prbc transfusion.  - Taya Lopes DO

## 2020-12-07 NOTE — ED ADULT NURSE NOTE - NSIMPLEMENTINTERV_GEN_ALL_ED
Implemented All Fall Risk Interventions:  [place] to call system. Call bell, personal items and telephone within reach. Instruct patient to call for assistance. Room bathroom lighting operational. Non-slip footwear when patient is off stretcher. Physically safe environment: no spills, clutter or unnecessary equipment. Stretcher in lowest position, wheels locked, appropriate side rails in place. Provide visual cue, wrist band, yellow gown, etc. Monitor gait and stability. Monitor for mental status changes and reorient to person, place, and time. Review medications for side effects contributing to fall risk. Reinforce activity limits and safety measures with patient and family.

## 2020-12-07 NOTE — ED PROVIDER NOTE - CARE PLAN
Principal Discharge DX:	Symptomatic anemia  Secondary Diagnosis:	Gastrointestinal hemorrhage, unspecified gastrointestinal hemorrhage type

## 2020-12-07 NOTE — ED ADULT NURSE NOTE - OBJECTIVE STATEMENT
Patient is a 70 yr old male with a PMH of HTN/HLD/liver transplant/DM2/COPD who presents to the ED for a low hemoglobin. Per patient he has been feeling weak lately and had a recent blood test which showed a hemoglobin of 6. Patient endorses some blood in stool. Patient also endorses chest pain when walking at times. Upon assessment, patient is AOx4, afebrile, on 2 L NC baseline, lung sounds clear upon ausculation, abdomen round/distended/non tender to palpation, heart sounds S1/S2 present, +pulses, bilateral lower extremety edema +2 non pitting edema, sera/dusky extremities, and denies n/v/d/f/chills/headache/cough. Provider assessed at bedside, heplock placed with labs drawn/sent, call bell at bedside and will continue to monitor.

## 2020-12-07 NOTE — H&P ADULT - PROBLEM SELECTOR PLAN 4
COPD on home O2 2~3L c/b CLAUDE and restrictive physiology from obesity, poor breathing mechanism due to balance issue/pain.   - followed by Dr. Santos as outpatient  - c/w home bronchodilators (Breo replaced by Symbicort, Spiriva, albuterol, budesonide).  - performist not in the order  - O2 supp via NC.  - monitor VS.

## 2020-12-07 NOTE — H&P ADULT - NSHPREVIEWOFSYSTEMS_GEN_ALL_CORE
CONSTITUTIONAL: weight loss. No fever. chills.  EYES: No eye pain, visual disturbances, or discharge  ENMT:  No difficulty hearing, tinnitus, vertigo; No sinus or throat pain  RESPIRATORY: chronic cough and wheezing, GOMEZ. No hemoptysis  CARDIOVASCULAR: Exertional palpitation. chronic leg swelling. no dizziness  GASTROINTESTINAL: No abdominal or epigastric pain. No nausea, vomiting, or hematemesis; No diarrhea or constipation. No melena or hematochezia.  GENITOURINARY: No dysuria, frequency, hematuria, or incontinence  NEUROLOGICAL: No headaches, loss of strength, numbness, or tremors  SKIN: No itching, burning, rashes, or lesions   LYMPH NODES: No enlarged glands  ENDOCRINE: No heat or cold intolerance; No polydipsia or polyuria  PSYCHIATRIC: Denies depression, anxiety  HEME/LYMPH: No easy bruising, or bleeding gums  ALLERGY AND IMMUNOLOGIC: No hives or eczema

## 2020-12-07 NOTE — H&P ADULT - PROBLEM SELECTOR PLAN 3
Hx of RV Systolic dysfunction during last admission on TTE.   - Patient used to follow cardiologist at [facility] yet now moved to [facility]. Not has followed by cardiologist for a long time.  - Seen by Heart failure during last admission, supposedly follow Dr. Daley as outpatient.   - currently on diuretics: Furosemide 80mg daily.   - ASA on hold in setting of anemia  - Metoprolol Succinate 25mg daily.  - EKG  - TTE

## 2020-12-07 NOTE — ED PROVIDER NOTE - NS ED ROS FT
Constitution: No Fever or chills, No Weight Loss,   HEENT: No cough, No Discharge, No Rhinorrhea, No URI symptoms  Cardio: (+) Chest pain, No Palpitations, No Dyspnea  Resp: (+) SOB, No Wheezing  GI: (+) GI Bleeding; No abdominal pain, No Nausea, No Vomiting, No Constipation, No Diarrhea  : No burning upon urination, trouble urinating, no foul odor from urine  MSK: No Back pain, No Numbness, No Tingling, No Weakness  Neuro: No Headache, Normal Gait  Skin: No rashes, No Bruising, No Swelling

## 2020-12-07 NOTE — CONSULT NOTE ADULT - ASSESSMENT
Impression:    #Acute-on-chronic anemia: Grossly brown stool in rectal vault with heme-negative stools. Iron studies from 05/2020 consistent with iron deficiency anemia (iron saturation 5% and serum iron 15). Suspect occult blood loss from GI tract due to GI malignancy vs. angioectasia. Rule out hemolytic anemia.  #Post-liver transplant: Transaminases and INR currently normal range. Currently immunosuppressed on Prograf once daily, CellCept once daily and prednisone every other day.   #HFpEF  #CKD   #Type 2 diabetes    Recommendations: Impression:    #Acute-on-chronic anemia: Grossly brown stool in rectal vault with heme-negative stools. Iron studies from 05/2020 consistent with iron deficiency anemia (iron saturation 5% and serum iron 15). Suspect occult blood loss from GI tract due to GI malignancy vs. angioectasia. Rule out hemolytic anemia.  #Post-liver transplant: Transaminases and INR currently normal range. Currently immunosuppressed on Prograf once daily, CellCept once daily and prednisone every other day. On prophylactic Bactrim and nystatin.   #HFpEF: Right ventricular systolic dysfunction and moderate AS with normal LVEF noted on TTE from 05/2020.   #CKD with serum Cr within baseline range  #Type 2 diabetes    Recommendations:  - Impression:    #Acute-on-chronic anemia: Grossly brown stool in rectal vault with heme-negative stools. Iron studies from 05/2020 consistent with iron deficiency anemia (iron saturation 5% and serum iron 15). Doubt active GI bleeding at this time. Differential for anemia includes bone marrow suppression vs. occult blood loss from GI tract from GI malignancy or angioectasia.  #Post-liver transplant: Transaminases and INR currently normal range. Currently immunosuppressed on Prograf once daily, CellCept once daily and prednisone every other day. On prophylactic Bactrim and nystatin.   #HFpEF: Right ventricular systolic dysfunction and moderate AS with normal LVEF noted on TTE from 05/2020.   #CKD with serum Cr within baseline range  #Type 2 diabetes    Recommendations:  - monitor CBC daily and maintain active type & screen  - agree with 1U pRBC transfusion for symptomatic anemia - follow up post-transfusion CBC  - will need non-emergent upper endoscopy and colonoscopy this admission when medically optimized  - Transplant Surgery consult for management of immunosuppression  - would hold CellCept at this time  - continue tacrolimus 0.5mg BID and prednisone 1 mg QOD or as per Transplant Surgery   - check serum tacrolimus in AM  - check reticulocyte count and index  - Heart Failure consult to assess cardiac function/optimization  - continue regular diet today; clear liquid diet tomorrow      Elsie Morton PGY-5  Gastroenterology/Hepatology Fellow  Pager #[phone]  E-mail [email or web address]  Call [phone] to speak to answering service for on-call GI fellow 5pm-7am and weekends.   Impression:    #Acute-on-chronic anemia: Grossly brown stool in rectal vault with heme-negative stools. Iron studies from 05/2020 consistent with iron deficiency anemia (iron saturation 5% and serum iron 15). Doubt active GI bleeding at this time. Differential for anemia includes bone marrow suppression vs. occult blood loss from GI tract from GI malignancy or angioectasia.  #Post-liver transplant: Transaminases and INR currently normal range. Currently immunosuppressed on Prograf once daily, CellCept once daily and prednisone every other day. On prophylactic Bactrim and nystatin.   #HFpEF: Right ventricular systolic dysfunction and moderate AS with normal LVEF noted on TTE from 05/2020.   #CKD with serum Cr within baseline range  #Type 2 diabetes    Recommendations:  - monitor CBC daily and maintain active type & screen  - agree with 1U pRBC transfusion for symptomatic anemia - follow up post-transfusion CBC  - will need non-emergent upper endoscopy and colonoscopy this admission when medically optimized  - Transplant Surgery consult for management of immunosuppression  - would hold CellCept at this time  - continue tacrolimus 0.5mg BID and prednisone 3 mg QOD or as per Transplant Surgery   - continue   - check serum tacrolimus in AM  - check reticulocyte count and index  - Heart Failure consult to assess cardiac function/optimization  - continue regular diet today; clear liquid diet tomorrow      Elsie Morton PGY-5  Gastroenterology/Hepatology Fellow  Pager #[phone]  E-mail [email or web address]  Call [phone] to speak to answering service for on-call GI fellow 5pm-7am and weekends.

## 2020-12-07 NOTE — H&P ADULT - HISTORY OF PRESENT ILLNESS
Mr. Nguyen is a 69 y/o Male w/ PMH significant for HTN, HLD, T2DM on insulin, COPD (on home O2 3L), EtOH/HCV cirrhosis s/p liver transplant (2011) on Tacro/Cellcelpt/Prednisone, chronic lymphedema, who presented to the hospital due to Hgb less than 7 found on outpatient labs.     Lately, he has noticed increasing in dyspnea on exertion and palpitation. He wears his supp O2 via NC at home and wears it during ambulation as well. He reports he can ambulate from his bed to bathroom yet gets very dyspneic and can feel worse palpitation.     Endorses worsening SOB, palpitations, and chest pain (exertional in presentation). Reports prior history of GIB, was supposed to follow up with GI outpatient, but never did. Endorses BRBPR, intermittently over the last few weeks. Denies any fevers, chills, cough, nausea, vomiting, diarrhea, constipation, dysuric symptoms. Mr. Nguyen is a 69 y/o Male w/ PMH significant for HTN, HLD, T2DM on insulin, COPD (on home O2 3L), EtOH/HCV cirrhosis s/p liver transplant (2011) on Tacro/Cellcelpt/Prednisone, chronic lymphedema, who presented to the hospital due to Hgb less than 7 found on outpatient labs.     Lately, he has noticed increasing in dyspnea on exertion and palpitation. He wears his supp O2 via NC at home and wears it during ambulation as well. He reports he can ambulate from his bed to bathroom yet gets very dyspneic and can feel worse palpitation. He states that he has been compliant with his medications, he lost weight rather than gaining and has been urinating well especially with diuretics. He denies any acute bleeding such as hematochezia/melena/hematuria/coffee ground emesis. He states that he occasionally has blue marks but not have one now. He had some bright red blood on his toilet paper intermittently in the past but not recently. No syncopal episode or fall after last discharge from hospital in May. Ambulates with walker at baseline. Lives alone and independently functioning (however, patient was found covered in trash and urine by EMS at last admission). His LE edema has been stable and chronic. Has been having some PO intake without dysphagia or odynophagia. Did not follow up with GI doctors as outpatient due to COVID-19 Pandemic. He used to be in [facility] system yet he moved to [facility] as his surgeon moved from [facility] to [facility], who he still follows.    In ED, patient is afebrile with stable vital signs. 100% on 2L NC. Received 1u pRBC.

## 2020-12-07 NOTE — H&P ADULT - PROBLEM SELECTOR PLAN 8
- DVT ppx: SCD in setting of symptomatic anemia  - Diet: DASH/Diabetic Diet  - Dispo: pending clinical improvement.

## 2020-12-07 NOTE — ED PROVIDER NOTE - OBJECTIVE STATEMENT
70M pmh cirrohosis s/p liver transplant in 2011, p/w anemia on out patient blood work. 70M pmh cirrohosis (EtOH and HCV) s/p liver transplant in 2011 (MD Hailey), p/w anemia on out patient blood work. Pt reports he was found to have Hb in the 6's yesterday on outpatient workup. Endorses worsening SOB, palpitations, and chest pain (exertional in presentation). Reports prior history of GIB, was supposed to follow up with GI outpatient, but never did. Endorses BRBPR, intermittently over the last few weeks. Denies any fevers, chills, cough, nausea, vomiting, diarrhea, constipation, dysuric symptoms.

## 2020-12-07 NOTE — H&P ADULT - ASSESSMENT
Mr. Nguyen is a 69 y/o Male w/ PMH significant for HTN, HLD, T2DM on insulin, COPD (on home O2 3L), EtOH/HCV cirrhosis s/p liver transplant (2011) on Tacro/Cellcept/Prednisone, chronic lymphedema, who is admitted for symptomatic anemia.

## 2020-12-07 NOTE — H&P ADULT - PROBLEM SELECTOR PLAN 7
pt on buprenorphine at home  - istop charted  - followed by pain medicine doctor  - c/w home dose buprenorphine.

## 2020-12-07 NOTE — H&P ADULT - PROBLEM SELECTOR PLAN 2
s/p liver transplant in 2011. followed by Dr. Askew as outpatient.  - c/w home Tacro 0.5mg BID, Prednisone 3mg every other day, Cellcept 500mg daily.  - c/w PPI BID and Bactrim for prophylaxis.  - hepatology consulted.

## 2020-12-07 NOTE — H&P ADULT - NSHPLABSRESULTS_GEN_ALL_CORE
6.4    3.81  )-----------( 108      ( 07 Dec 2020 11:11 )             24.7       12-07    137  |  96  |  33<H>  ----------------------------<  179<H>  4.3   |  28  |  1.46<H>    Ca    9.3      07 Dec 2020 11:11    TPro  7.1  /  Alb  4.4  /  TBili  0.5  /  DBili  x   /  AST  29  /  ALT  15  /  AlkPhos  89  12-07                  PT/INR - ( 07 Dec 2020 11:11 )   PT: 12.3 sec;   INR: 1.03 ratio         PTT - ( 07 Dec 2020 11:11 )  PTT:21.1 sec    Lactate Trend            CAPILLARY BLOOD GLUCOSE                RADIOLOGY, EKG & ADDITIONAL TESTS: Reviewed.

## 2020-12-07 NOTE — ED ADULT TRIAGE NOTE - CHIEF COMPLAINT QUOTE
sent by PMD for Hgb: 6, c/o fatigue, "small amount of blood on toilet paper", ( on Home O2 for COPD, hx liver transplant 2011 by dr lopez)

## 2020-12-07 NOTE — CONSULT NOTE ADULT - SUBJECTIVE AND OBJECTIVE BOX
Chief Complaint:  Patient is a 70y old  Male who presents with a chief complaint of symptomatic anemia (07 Dec 2020 13:34)      HPI:    70 yo M with HTN, HLD, DM, CKD (with baseline Cr 1.2-1.4), HFpEF (EF 55% 5/2020), moderate AS (TTE 5/2020), COPD, chronic lymphedema, and history of ALD/HCV cirrhosis s/p liver transplant (7/28/11) with chronic post-transplant thrombocytopenia (with baseline Plt 60s-90s), iron deficiency anemia (05/2020) now admitted for admitted with dyspnea and laboratory evidence of acute on chronic anemia.        Allergies:  clindamycin (Unknown)      Home Medications:    · 	insulin glargine: 45 unit(s) subcutaneous once (at bedtime, in PM)  · 	insulin glargine: 26 unit(s) subcutaneous once (in morning, in AM)  · 	buprenorphine-naloxone 2 mg-0.5 mg sublingual tablet:  sublingual   · 	ipratropium-albuterol 0.5 mg-2.5 mg/3 mLinhalation solution: 3 milliliter(s) inhaled every 6 hours, As needed, Shortness of Breath and/or Wheezing  · 	Multiple Vitamins with Minerals oral tablet: 1 tab(s) orally once a day  · 	sulfamethoxazole-trimethoprim 400 mg-80 mg oral tablet: 1 tab(s) orally   · 	pantoprazole 40 mg oral delayed release tablet: 1 tab(s) orally 2 times a day  · 	sodium chloride 0.65% nasal spray:  nasal   · 	senna oral tablet: 2 tab(s) orally once a day (at bedtime)  · 	tacrolimus 0.5 mg oral capsule: 1 cap(s) orally 2 times a day  · 	nystatin 100,000 units/g topical powder: 1 application topically 2 times a day  · 	furosemide 80 mg oral tablet: 1 tab(s) orally once a day  · 	Zoloft 100 mg oral tablet: 1 tab(s) orally once a day  · 	tamsulosin 0.4 mg oral capsule: 1 cap(s) orally once a day (at bedtime)  · 	predniSONE 1 mg oral tablet: 3 tab(s) orally every other day  · 	mupirocin 2% topical ointment: Apply topically to affected area 3 times a day, As Needed  · 	Breo Ellipta 200 mcg-25 mcg/inh inhalation powder: 1 puff(s) inhaled once a day  · 	Spiriva 18 mcg inhalation capsule: 1 cap(s) inhaled once a day  · 	budesonide 0.5 mg/2 mL inhalation suspension: 2 milliliter(s) inhaled 2 times a day, As Needed  · 	Perforomist 20 mcg/2 mL inhalation solution: 2 milliliter(s) inhaled 2 times a day, As Needed  · 	aspirin 81 mg oral delayed release tablet: 1 tab(s) orally once a day (in the morning)  · 	metoprolol succinate 25 mg oral tablet, extended release: 1 tab(s) orally once a day (in the morning)  · 	mycophenolate mofetil 500 mg oral tablet: 1 tab(s) orally once a day    Hospital Medications:  ALBUTerol    90 MICROgram(s) HFA Inhaler 2 Puff(s) Inhalation every 6 hours  buDESOnide    Inhalation Suspension 0.5 milliGRAM(s) Inhalation every 12 hours PRN  budesonide 160 MICROgram(s)/formoterol 4.5 MICROgram(s) Inhaler 2 Puff(s) Inhalation two times a day  buprenorphine 2 mG/naloxone 0.5 mG SL  Tablet 1 Tablet(s) SubLingual <User Schedule> PRN  dextrose 40% Gel 15 Gram(s) Oral once  dextrose 5%. 1000 milliLiter(s) IV Continuous <Continuous>  dextrose 5%. 1000 milliLiter(s) IV Continuous <Continuous>  dextrose 50% Injectable 25 Gram(s) IV Push once  dextrose 50% Injectable 12.5 Gram(s) IV Push once  dextrose 50% Injectable 25 Gram(s) IV Push once  furosemide    Tablet 80 milliGRAM(s) Oral daily  glucagon  Injectable 1 milliGRAM(s) IntraMuscular once  insulin glargine Injectable (LANTUS) 26 Unit(s) SubCutaneous two times a day  insulin lispro (ADMELOG) corrective regimen sliding scale   SubCutaneous three times a day before meals  insulin lispro (ADMELOG) corrective regimen sliding scale   SubCutaneous at bedtime  metoprolol succinate ER 25 milliGRAM(s) Oral daily  multivitamin/minerals 1 Tablet(s) Oral daily  mycophenolate mofetil 500 milliGRAM(s) Oral two times a day  pantoprazole    Tablet 40 milliGRAM(s) Oral two times a day  predniSONE   Tablet 3 milliGRAM(s) Oral every other day  senna 2 Tablet(s) Oral at bedtime  sertraline 100 milliGRAM(s) Oral daily  sodium chloride 0.65% Nasal 1 Spray(s) Both Nostrils daily PRN  tacrolimus 0.5 milliGRAM(s) Oral two times a day  tamsulosin 0.4 milliGRAM(s) Oral at bedtime  tiotropium 18 MICROgram(s) Capsule 1 Capsule(s) Inhalation daily  trimethoprim   80 mG/sulfamethoxazole 400 mG 1 Tablet(s) Oral daily      PMHX/PSHX:  Hyperlipidemia    Hypertension    S/P liver transplant    Type 2 diabetes mellitus    Chronic obstructive pulmonary disease, unspecified COPD type    S/P liver transplant      Family history:  No pertinent family history in first degree relatives    Social History:     ROS:     General:  No weight loss, fevers, chills, night sweats, fatigue  Eyes:  No vision changes, no yellowing of eyes   ENT:  No throat pain, runny nose  CV:  No chest pain, palpitations  Resp:  No SOB, cough, wheezing  GI:  See HPI  :  No burning with urination, no hematuria   Muscle:  No muscle pain, weakness  Neuro:  No numbness/tingling, memory problems  Psych:  No fatigue, insomnia, mood problems  Heme:  No easy bruisability  Skin:  No rash, itching       PHYSICAL EXAM:     GENERAL:  Appears stated age, well-groomed, well-nourished, no distress  HEENT:  NC/AT,  conjunctivae clear and pink,  no JVD  CHEST:  Full & symmetric excursion, no increased effort, breath sounds clear  HEART:  Regular rhythm, S1, S2, no murmur/rub/S3/S4, no abdominal bruit, no edema  ABDOMEN:  Soft, non-tender, non-distended, normoactive bowel sounds,  no masses ,  EXTREMITIES:  no cyanosis,clubbing or edema  SKIN:  No rash/erythema/ecchymoses/petechiae/wounds/abscess/warm/dry  NEURO:  Alert, oriented    Vital Signs:  Vital Signs Last 24 Hrs  T(C): 36.8 (07 Dec 2020 13:33), Max: 36.9 (07 Dec 2020 13:13)  T(F): 98.2 (07 Dec 2020 13:33), Max: 98.5 (07 Dec 2020 13:13)  HR: 75 (07 Dec 2020 13:33) (75 - 89)  BP: 145/82 (07 Dec 2020 13:33) (144/78 - 165/70)  BP(mean): --  RR: 20 (07 Dec 2020 13:33) (18 - 22)  SpO2: 100% (07 Dec 2020 13:33) (98% - 100%)  Daily Height in cm: 185.42 (07 Dec 2020 10:24)    Daily     LABS:                        6.4    3.81  )-----------( 108      ( 07 Dec 2020 11:11 )             24.7     12-07    137  |  96  |  33<H>  ----------------------------<  179<H>  4.3   |  28  |  1.46<H>    Ca    9.3      07 Dec 2020 11:11    TPro  7.1  /  Alb  4.4  /  TBili  0.5  /  DBili  x   /  AST  29  /  ALT  15  /  AlkPhos  89  12-07    LIVER FUNCTIONS - ( 07 Dec 2020 11:11 )  Alb: 4.4 g/dL / Pro: 7.1 g/dL / ALK PHOS: 89 U/L / ALT: 15 U/L / AST: 29 U/L / GGT: x           PT/INR - ( 07 Dec 2020 11:11 )   PT: 12.3 sec;   INR: 1.03 ratio   PTT - ( 07 Dec 2020 11:11 )  PTT:21.1 sec    Imaging:             Chief Complaint:  Patient is a 70y old  Male who presents with a chief complaint of symptomatic anemia (07 Dec 2020 13:34)      HPI:    69 year old man with HTN, HLD, DM2, CKD (with baseline Cr 1.2-1.4), HFpEF (EF 55% 5/2020), moderate AS (TTE 5/2020), COPD, chronic lymphedema,  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     Patient reports laboratory studies were ordered by outpatient Endocrinologist on 12/4 resulting with Hb 6.0. He also reports intermittent blood per rectum since May 2020 described as blood on toilet paper upon wiping after bowel movements. This has occurred  approximately twice per month since that time. He denies blood coated-stools or gross blood in the toilet bowel. No black/tarry stools. No NSAID use. He reports a normal colonoscopy at [facility] approximately 5 years ago. He last had an upper endoscopy prior to liver transplantation in 2001. He also endorses worsening shortness of breath from baseline in association with COPD. He is currently taking Lasix 80mg PO daily     Patient was previously evaluated for anemia during hospital admission to [facility] 5/13-5/26 (Hb 7.0 on admission; total 2U pRBC transfusions required). Anemia was associated with severe iron deficiency (ferritin 147, iron saturation 5% and serum iron 15). As no overt GI bleeding occurred during that hospitalization, patient was advised to follow up for outpatient upper endoscopy and colonoscopy +/- video capsule endoscopy.          Allergies:  clindamycin (Unknown)      Home Medications:    · 	insulin glargine: 45 unit(s) subcutaneous once (at bedtime, in PM)  · 	insulin glargine: 26 unit(s) subcutaneous once (in morning, in AM)  · 	buprenorphine-naloxone 2 mg-0.5 mg sublingual tablet:  sublingual   · 	ipratropium-albuterol 0.5 mg-2.5 mg/3 mLinhalation solution: 3 milliliter(s) inhaled every 6 hours, As needed, Shortness of Breath and/or Wheezing  · 	Multiple Vitamins with Minerals oral tablet: 1 tab(s) orally once a day  · 	sulfamethoxazole-trimethoprim 400 mg-80 mg oral tablet: 1 tab(s) orally   · 	pantoprazole 40 mg oral delayed release tablet: 1 tab(s) orally 2 times a day  · 	sodium chloride 0.65% nasal spray:  nasal   · 	senna oral tablet: 2 tab(s) orally once a day (at bedtime)  · 	tacrolimus 0.5 mg oral capsule: 1 cap(s) orally 2 times a day  · 	nystatin 100,000 units/g topical powder: 1 application topically 2 times a day  · 	furosemide 80 mg oral tablet: 1 tab(s) orally once a day  · 	Zoloft 100 mg oral tablet: 1 tab(s) orally once a day  · 	tamsulosin 0.4 mg oral capsule: 1 cap(s) orally once a day (at bedtime)  · 	predniSONE 1 mg oral tablet: 3 tab(s) orally every other day  · 	mupirocin 2% topical ointment: Apply topically to affected area 3 times a day, As Needed  · 	Breo Ellipta 200 mcg-25 mcg/inh inhalation powder: 1 puff(s) inhaled once a day  · 	Spiriva 18 mcg inhalation capsule: 1 cap(s) inhaled once a day  · 	budesonide 0.5 mg/2 mL inhalation suspension: 2 milliliter(s) inhaled 2 times a day, As Needed  · 	Perforomist 20 mcg/2 mL inhalation solution: 2 milliliter(s) inhaled 2 times a day, As Needed  · 	aspirin 81 mg oral delayed release tablet: 1 tab(s) orally once a day (in the morning)  · 	metoprolol succinate 25 mg oral tablet, extended release: 1 tab(s) orally once a day (in the morning)  · 	mycophenolate mofetil 500 mg oral tablet: 1 tab(s) orally once a day    Hospital Medications:  ALBUTerol    90 MICROgram(s) HFA Inhaler 2 Puff(s) Inhalation every 6 hours  buDESOnide    Inhalation Suspension 0.5 milliGRAM(s) Inhalation every 12 hours PRN  budesonide 160 MICROgram(s)/formoterol 4.5 MICROgram(s) Inhaler 2 Puff(s) Inhalation two times a day  buprenorphine 2 mG/naloxone 0.5 mG SL  Tablet 1 Tablet(s) SubLingual <User Schedule> PRN  dextrose 40% Gel 15 Gram(s) Oral once  dextrose 5%. 1000 milliLiter(s) IV Continuous <Continuous>  dextrose 5%. 1000 milliLiter(s) IV Continuous <Continuous>  dextrose 50% Injectable 25 Gram(s) IV Push once  dextrose 50% Injectable 12.5 Gram(s) IV Push once  dextrose 50% Injectable 25 Gram(s) IV Push once  furosemide    Tablet 80 milliGRAM(s) Oral daily  glucagon  Injectable 1 milliGRAM(s) IntraMuscular once  insulin glargine Injectable (LANTUS) 26 Unit(s) SubCutaneous two times a day  insulin lispro (ADMELOG) corrective regimen sliding scale   SubCutaneous three times a day before meals  insulin lispro (ADMELOG) corrective regimen sliding scale   SubCutaneous at bedtime  metoprolol succinate ER 25 milliGRAM(s) Oral daily  multivitamin/minerals 1 Tablet(s) Oral daily  mycophenolate mofetil 500 milliGRAM(s) Oral two times a day  pantoprazole    Tablet 40 milliGRAM(s) Oral two times a day  predniSONE   Tablet 3 milliGRAM(s) Oral every other day  senna 2 Tablet(s) Oral at bedtime  sertraline 100 milliGRAM(s) Oral daily  sodium chloride 0.65% Nasal 1 Spray(s) Both Nostrils daily PRN  tacrolimus 0.5 milliGRAM(s) Oral two times a day  tamsulosin 0.4 milliGRAM(s) Oral at bedtime  tiotropium 18 MICROgram(s) Capsule 1 Capsule(s) Inhalation daily  trimethoprim   80 mG/sulfamethoxazole 400 mG 1 Tablet(s) Oral daily      PMHX/PSHX:  Hyperlipidemia    Hypertension    S/P liver transplant    Type 2 diabetes mellitus    Chronic obstructive pulmonary disease, unspecified COPD type    S/P liver transplant      Family history:  No pertinent family history in first degree relatives    Social History:     ROS:     General:  No weight loss, fevers, chills, night sweats, fatigue  Eyes:  No vision changes, no yellowing of eyes   ENT:  No throat pain, runny nose  CV:  No chest pain, palpitations  Resp:  No SOB, cough, wheezing  GI:  See HPI  :  No burning with urination, no hematuria   Muscle:  No muscle pain, weakness  Neuro:  No numbness/tingling, memory problems  Psych:  No fatigue, insomnia, mood problems  Heme:  No easy bruisability  Skin:  No rash, itching       PHYSICAL EXAM:     GENERAL:  Appears stated age, well-groomed, well-nourished, no distress  HEENT:  NC/AT,  conjunctivae clear and pink,  no JVD  CHEST:  Full & symmetric excursion, no increased effort, breath sounds clear  HEART:  Regular rhythm, S1, S2, no murmur/rub/S3/S4, no abdominal bruit, no edema  ABDOMEN:  Soft, non-tender, non-distended, normoactive bowel sounds,  no masses ,  EXTREMITIES:  no cyanosis,clubbing or edema  SKIN:  No rash/erythema/ecchymoses/petechiae/wounds/abscess/warm/dry  NEURO:  Alert, oriented    Vital Signs:  Vital Signs Last 24 Hrs  T(C): 36.8 (07 Dec 2020 13:33), Max: 36.9 (07 Dec 2020 13:13)  T(F): 98.2 (07 Dec 2020 13:33), Max: 98.5 (07 Dec 2020 13:13)  HR: 75 (07 Dec 2020 13:33) (75 - 89)  BP: 145/82 (07 Dec 2020 13:33) (144/78 - 165/70)  BP(mean): --  RR: 20 (07 Dec 2020 13:33) (18 - 22)  SpO2: 100% (07 Dec 2020 13:33) (98% - 100%)  Daily Height in cm: 185.42 (07 Dec 2020 10:24)    Daily     LABS:                        6.4    3.81  )-----------( 108      ( 07 Dec 2020 11:11 )             24.7     12-07    137  |  96  |  33<H>  ----------------------------<  179<H>  4.3   |  28  |  1.46<H>    Ca    9.3      07 Dec 2020 11:11    TPro  7.1  /  Alb  4.4  /  TBili  0.5  /  DBili  x   /  AST  29  /  ALT  15  /  AlkPhos  89  12-07    LIVER FUNCTIONS - ( 07 Dec 2020 11:11 )  Alb: 4.4 g/dL / Pro: 7.1 g/dL / ALK PHOS: 89 U/L / ALT: 15 U/L / AST: 29 U/L / GGT: x           PT/INR - ( 07 Dec 2020 11:11 )   PT: 12.3 sec;   INR: 1.03 ratio   PTT - ( 07 Dec 2020 11:11 )  PTT:21.1 sec    Imaging:             Chief Complaint:  Patient is a 70y old  Male who presents with a chief complaint of symptomatic anemia (07 Dec 2020 13:34)      HPI:    69 year old man with HTN, HLD, DM2, CKD (with baseline Cr 1.2-1.4), HFpEF (EF 55% 5/2020), moderate AS (TTE 5/2020), COPD, chronic lymphedema,  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     Patient reports laboratory studies were ordered by outpatient Endocrinologist on 12/4 resulting with Hb 6.0. He also reports intermittent blood per rectum since May 2020 described as blood on toilet paper upon wiping after bowel movements. This has occurred  approximately twice per month since that time. He denies blood coated-stools or gross blood in the toilet bowel. No black/tarry stools. No NSAID use. He reports a normal colonoscopy at [facility] approximately 5 years ago. He last had an upper endoscopy prior to liver transplantation in 2011. No known family history of GI malignancy. He also endorses worsening shortness of breath from baseline in association with COPD. He is currently taking Lasix 80mg PO daily.     Patient was previously evaluated for anemia during hospital admission to [facility] 5/13-5/26 (Hb 7.0 on admission; total 2U pRBC transfusions required). Anemia was associated with severe iron deficiency (ferritin 147, iron saturation 5% and serum iron 15). As no overt GI bleeding occurred during that hospitalization, patient was advised to follow up for outpatient upper endoscopy and colonoscopy +/- video capsule endoscopy.      Allergies:  clindamycin (Unknown)      Home Medications:    · 	insulin glargine: 45 unit(s) subcutaneous once (at bedtime, in PM)  · 	insulin glargine: 26 unit(s) subcutaneous once (in morning, in AM)  · 	buprenorphine-naloxone 2 mg-0.5 mg sublingual tablet:  sublingual   · 	ipratropium-albuterol 0.5 mg-2.5 mg/3 mLinhalation solution: 3 milliliter(s) inhaled every 6 hours, As needed, Shortness of Breath and/or Wheezing  · 	Multiple Vitamins with Minerals oral tablet: 1 tab(s) orally once a day  · 	sulfamethoxazole-trimethoprim 400 mg-80 mg oral tablet: 1 tab(s) orally   · 	pantoprazole 40 mg oral delayed release tablet: 1 tab(s) orally 2 times a day  · 	sodium chloride 0.65% nasal spray:  nasal   · 	senna oral tablet: 2 tab(s) orally once a day (at bedtime)  · 	tacrolimus 0.5 mg oral capsule: 1 cap(s) orally 2 times a day  · 	nystatin 100,000 units/g topical powder: 1 application topically 2 times a day  · 	furosemide 80 mg oral tablet: 1 tab(s) orally once a day  · 	Zoloft 100 mg oral tablet: 1 tab(s) orally once a day  · 	tamsulosin 0.4 mg oral capsule: 1 cap(s) orally once a day (at bedtime)  · 	predniSONE 1 mg oral tablet: 3 tab(s) orally every other day  · 	mupirocin 2% topical ointment: Apply topically to affected area 3 times a day, As Needed  · 	Breo Ellipta 200 mcg-25 mcg/inh inhalation powder: 1 puff(s) inhaled once a day  · 	Spiriva 18 mcg inhalation capsule: 1 cap(s) inhaled once a day  · 	budesonide 0.5 mg/2 mL inhalation suspension: 2 milliliter(s) inhaled 2 times a day, As Needed  · 	Perforomist 20 mcg/2 mL inhalation solution: 2 milliliter(s) inhaled 2 times a day, As Needed  · 	aspirin 81 mg oral delayed release tablet: 1 tab(s) orally once a day (in the morning)  · 	metoprolol succinate 25 mg oral tablet, extended release: 1 tab(s) orally once a day (in the morning)  · 	mycophenolate mofetil 500 mg oral tablet: 1 tab(s) orally once a day    Hospital Medications:  ALBUTerol    90 MICROgram(s) HFA Inhaler 2 Puff(s) Inhalation every 6 hours  buDESOnide    Inhalation Suspension 0.5 milliGRAM(s) Inhalation every 12 hours PRN  budesonide 160 MICROgram(s)/formoterol 4.5 MICROgram(s) Inhaler 2 Puff(s) Inhalation two times a day  buprenorphine 2 mG/naloxone 0.5 mG SL  Tablet 1 Tablet(s) SubLingual <User Schedule> PRN  dextrose 40% Gel 15 Gram(s) Oral once  dextrose 5%. 1000 milliLiter(s) IV Continuous <Continuous>  dextrose 5%. 1000 milliLiter(s) IV Continuous <Continuous>  dextrose 50% Injectable 25 Gram(s) IV Push once  dextrose 50% Injectable 12.5 Gram(s) IV Push once  dextrose 50% Injectable 25 Gram(s) IV Push once  furosemide    Tablet 80 milliGRAM(s) Oral daily  glucagon  Injectable 1 milliGRAM(s) IntraMuscular once  insulin glargine Injectable (LANTUS) 26 Unit(s) SubCutaneous two times a day  insulin lispro (ADMELOG) corrective regimen sliding scale   SubCutaneous three times a day before meals  insulin lispro (ADMELOG) corrective regimen sliding scale   SubCutaneous at bedtime  metoprolol succinate ER 25 milliGRAM(s) Oral daily  multivitamin/minerals 1 Tablet(s) Oral daily  mycophenolate mofetil 500 milliGRAM(s) Oral two times a day  pantoprazole    Tablet 40 milliGRAM(s) Oral two times a day  predniSONE   Tablet 3 milliGRAM(s) Oral every other day  senna 2 Tablet(s) Oral at bedtime  sertraline 100 milliGRAM(s) Oral daily  sodium chloride 0.65% Nasal 1 Spray(s) Both Nostrils daily PRN  tacrolimus 0.5 milliGRAM(s) Oral two times a day  tamsulosin 0.4 milliGRAM(s) Oral at bedtime  tiotropium 18 MICROgram(s) Capsule 1 Capsule(s) Inhalation daily  trimethoprim   80 mG/sulfamethoxazole 400 mG 1 Tablet(s) Oral daily      PMHX/PSHX:  Hyperlipidemia    Hypertension    S/P liver transplant    Type 2 diabetes mellitus    Chronic obstructive pulmonary disease, unspecified COPD type    S/P liver transplant      Family history:  No pertinent family history in first degree relatives    Social History:     ROS:     General:  No weight loss, fevers, chills, night sweats, fatigue  Eyes:  No vision changes, no yellowing of eyes   ENT:  No throat pain, runny nose  CV:  No chest pain, palpitations  Resp:  No SOB, cough, wheezing  GI:  See HPI  :  No burning with urination, no hematuria   Muscle:  No muscle pain, weakness  Neuro:  No numbness/tingling, memory problems  Psych:  No fatigue, insomnia, mood problems  Heme:  No easy bruisability  Skin:  No rash, itching       PHYSICAL EXAM:     GENERAL:  Appears dyspneic, non-toxic,  HEENT:  Conjunctivae clear and pink,  no JVD  CHEST:  +Increased respiratory effort   HEART:  Regular rhythm & rate   ABDOMEN:  Soft, non-tender, non-distended +obese  RECTUM: Brown stool, no palpable internal hemorrhoids   EXTREMITIES:  2+ LLE>RLE edema   SKIN:  No rash/erythema/ecchymoses/petechiae +chronic stasis dermatitis in B/L LE  NEURO:  Alert, orientedx3     Vital Signs:  Vital Signs Last 24 Hrs  T(C): 36.8 (07 Dec 2020 13:33), Max: 36.9 (07 Dec 2020 13:13)  T(F): 98.2 (07 Dec 2020 13:33), Max: 98.5 (07 Dec 2020 13:13)  HR: 75 (07 Dec 2020 13:33) (75 - 89)  BP: 145/82 (07 Dec 2020 13:33) (144/78 - 165/70)  BP(mean): --  RR: 20 (07 Dec 2020 13:33) (18 - 22)  SpO2: 100% (07 Dec 2020 13:33) (98% - 100%)  Daily Height in cm: 185.42 (07 Dec 2020 10:24)    Daily     LABS:                        6.4    3.81  )-----------( 108      ( 07 Dec 2020 11:11 )             24.7     12-07    137  |  96  |  33<H>  ----------------------------<  179<H>  4.3   |  28  |  1.46<H>    Ca    9.3      07 Dec 2020 11:11    TPro  7.1  /  Alb  4.4  /  TBili  0.5  /  DBili  x   /  AST  29  /  ALT  15  /  AlkPhos  89  12-07    LIVER FUNCTIONS - ( 07 Dec 2020 11:11 )  Alb: 4.4 g/dL / Pro: 7.1 g/dL / ALK PHOS: 89 U/L / ALT: 15 U/L / AST: 29 U/L / GGT: x           PT/INR - ( 07 Dec 2020 11:11 )   PT: 12.3 sec;   INR: 1.03 ratio   PTT - ( 07 Dec 2020 11:11 )  PTT:21.1 sec    Imaging:           Chief Complaint:  Patient is a 70y old  Male who presents with a chief complaint of symptomatic anemia (07 Dec 2020 13:34)      HPI:    69 year old man with HTN, HLD, DM2, CKD (with baseline Cr 1.2-1.4), HFpEF (EF 55% 5/2020), moderate AS (TTE 5/2020), COPD, chronic lymphedema,  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     Patient reports laboratory studies were ordered by outpatient Endocrinologist on 12/4 resulting with Hb 6.0. He also reports intermittent blood per rectum since May 2020 described as blood on toilet paper upon wiping after bowel movements. This has occurred  approximately twice per month since that time. He denies blood coated-stools or gross blood in the toilet bowel. No black/tarry stools. No NSAID use. He reports a normal colonoscopy at [facility] approximately 5 years ago. He last had an upper endoscopy prior to liver transplantation in 2011. No known family history of GI malignancy. He also endorses worsening shortness of breath from baseline in association with COPD. He is currently taking Lasix 80mg PO daily.     Patient was previously evaluated for anemia during hospital admission to [facility] 5/13-5/26 (Hb 7.0 on admission; total 2U pRBC transfusions required). Anemia was associated with severe iron deficiency (ferritin 147, iron saturation 5% and serum iron 15). As no overt GI bleeding occurred during that hospitalization, patient was advised to follow up for outpatient upper endoscopy and colonoscopy +/- video capsule endoscopy but did not comply.       Allergies:  clindamycin (Unknown)      Home Medications:    · 	insulin glargine: 45 unit(s) subcutaneous once (at bedtime, in PM)  · 	insulin glargine: 26 unit(s) subcutaneous once (in morning, in AM)  · 	buprenorphine-naloxone 2 mg-0.5 mg sublingual tablet:  sublingual   · 	ipratropium-albuterol 0.5 mg-2.5 mg/3 mLinhalation solution: 3 milliliter(s) inhaled every 6 hours, As needed, Shortness of Breath and/or Wheezing  · 	Multiple Vitamins with Minerals oral tablet: 1 tab(s) orally once a day  · 	sulfamethoxazole-trimethoprim 400 mg-80 mg oral tablet: 1 tab(s) orally   · 	pantoprazole 40 mg oral delayed release tablet: 1 tab(s) orally 2 times a day  · 	sodium chloride 0.65% nasal spray:  nasal   · 	senna oral tablet: 2 tab(s) orally once a day (at bedtime)  · 	tacrolimus 0.5 mg oral capsule: 1 cap(s) orally 2 times a day  · 	nystatin 100,000 units/g topical powder: 1 application topically 2 times a day  · 	furosemide 80 mg oral tablet: 1 tab(s) orally once a day  · 	Zoloft 100 mg oral tablet: 1 tab(s) orally once a day  · 	tamsulosin 0.4 mg oral capsule: 1 cap(s) orally once a day (at bedtime)  · 	predniSONE 1 mg oral tablet: 3 tab(s) orally every other day  · 	mupirocin 2% topical ointment: Apply topically to affected area 3 times a day, As Needed  · 	Breo Ellipta 200 mcg-25 mcg/inh inhalation powder: 1 puff(s) inhaled once a day  · 	Spiriva 18 mcg inhalation capsule: 1 cap(s) inhaled once a day  · 	budesonide 0.5 mg/2 mL inhalation suspension: 2 milliliter(s) inhaled 2 times a day, As Needed  · 	Perforomist 20 mcg/2 mL inhalation solution: 2 milliliter(s) inhaled 2 times a day, As Needed  · 	aspirin 81 mg oral delayed release tablet: 1 tab(s) orally once a day (in the morning)  · 	metoprolol succinate 25 mg oral tablet, extended release: 1 tab(s) orally once a day (in the morning)  · 	mycophenolate mofetil 500 mg oral tablet: 1 tab(s) orally once a day    Hospital Medications:  ALBUTerol    90 MICROgram(s) HFA Inhaler 2 Puff(s) Inhalation every 6 hours  buDESOnide    Inhalation Suspension 0.5 milliGRAM(s) Inhalation every 12 hours PRN  budesonide 160 MICROgram(s)/formoterol 4.5 MICROgram(s) Inhaler 2 Puff(s) Inhalation two times a day  buprenorphine 2 mG/naloxone 0.5 mG SL  Tablet 1 Tablet(s) SubLingual <User Schedule> PRN  dextrose 40% Gel 15 Gram(s) Oral once  dextrose 5%. 1000 milliLiter(s) IV Continuous <Continuous>  dextrose 5%. 1000 milliLiter(s) IV Continuous <Continuous>  dextrose 50% Injectable 25 Gram(s) IV Push once  dextrose 50% Injectable 12.5 Gram(s) IV Push once  dextrose 50% Injectable 25 Gram(s) IV Push once  furosemide    Tablet 80 milliGRAM(s) Oral daily  glucagon  Injectable 1 milliGRAM(s) IntraMuscular once  insulin glargine Injectable (LANTUS) 26 Unit(s) SubCutaneous two times a day  insulin lispro (ADMELOG) corrective regimen sliding scale   SubCutaneous three times a day before meals  insulin lispro (ADMELOG) corrective regimen sliding scale   SubCutaneous at bedtime  metoprolol succinate ER 25 milliGRAM(s) Oral daily  multivitamin/minerals 1 Tablet(s) Oral daily  mycophenolate mofetil 500 milliGRAM(s) Oral two times a day  pantoprazole    Tablet 40 milliGRAM(s) Oral two times a day  predniSONE   Tablet 3 milliGRAM(s) Oral every other day  senna 2 Tablet(s) Oral at bedtime  sertraline 100 milliGRAM(s) Oral daily  sodium chloride 0.65% Nasal 1 Spray(s) Both Nostrils daily PRN  tacrolimus 0.5 milliGRAM(s) Oral two times a day  tamsulosin 0.4 milliGRAM(s) Oral at bedtime  tiotropium 18 MICROgram(s) Capsule 1 Capsule(s) Inhalation daily  trimethoprim   80 mG/sulfamethoxazole 400 mG 1 Tablet(s) Oral daily      PMHX/PSHX:  Hyperlipidemia    Hypertension    S/P liver transplant    Type 2 diabetes mellitus    Chronic obstructive pulmonary disease, unspecified COPD type    S/P liver transplant      Family history:  No pertinent family history in first degree relatives    Social History:     ROS:     General:  No weight loss, fevers, chills, night sweats, fatigue  Eyes:  No vision changes, no yellowing of eyes   ENT:  No throat pain, runny nose  CV:  No chest pain, palpitations  Resp:  No SOB, cough, wheezing  GI:  See HPI  :  No burning with urination, no hematuria   Muscle:  No muscle pain, weakness  Neuro:  No numbness/tingling, memory problems  Psych:  No fatigue, insomnia, mood problems  Heme:  No easy bruisability  Skin:  No rash, itching       PHYSICAL EXAM:     GENERAL:  Appears dyspneic, non-toxic,  HEENT:  Conjunctivae clear and pink,  no JVD  CHEST:  Increased respiratory effort   HEART:  Regular rhythm & rate   ABDOMEN:  Soft, non-tender, non-distended, obese  RECTUM: Brown stool, no external/prolapsed hemorrhoids, perianal hematoma   EXTREMITIES:  2+ LLE>RLE edema   SKIN:  No rash/erythema/ecchymoses/petechiae, chronic venous stasis dermatitis in B/L LE  NEURO:  Alert, orientedx3     Vital Signs:  Vital Signs Last 24 Hrs  T(C): 36.8 (07 Dec 2020 13:33), Max: 36.9 (07 Dec 2020 13:13)  T(F): 98.2 (07 Dec 2020 13:33), Max: 98.5 (07 Dec 2020 13:13)  HR: 75 (07 Dec 2020 13:33) (75 - 89)  BP: 145/82 (07 Dec 2020 13:33) (144/78 - 165/70)  BP(mean): --  RR: 20 (07 Dec 2020 13:33) (18 - 22)  SpO2: 100% (07 Dec 2020 13:33) (98% - 100%)  Daily Height in cm: 185.42 (07 Dec 2020 10:24)    Daily     LABS:                        6.4    3.81  )-----------( 108      ( 07 Dec 2020 11:11 )             24.7     12-07    137  |  96  |  33<H>  ----------------------------<  179<H>  4.3   |  28  |  1.46<H>    Ca    9.3      07 Dec 2020 11:11    TPro  7.1  /  Alb  4.4  /  TBili  0.5  /  DBili  x   /  AST  29  /  ALT  15  /  AlkPhos  89  12-07    LIVER FUNCTIONS - ( 07 Dec 2020 11:11 )  Alb: 4.4 g/dL / Pro: 7.1 g/dL / ALK PHOS: 89 U/L / ALT: 15 U/L / AST: 29 U/L / GGT: x           PT/INR - ( 07 Dec 2020 11:11 )   PT: 12.3 sec;   INR: 1.03 ratio   PTT - ( 07 Dec 2020 11:11 )  PTT:21.1 sec    Imaging:

## 2020-12-07 NOTE — ED ADULT NURSE NOTE - PMH
Chronic obstructive pulmonary disease, unspecified COPD type  on home O2  Hyperlipidemia    Hypertension    S/P liver transplant    Type 2 diabetes mellitus

## 2020-12-07 NOTE — PATIENT PROFILE ADULT - DO YOU FEEL THREATENED BY OTHERS?
Behavioral Health Intake has received the consult request for Dr. TIM Christianson and provider has been notified. Please direct follow up questions and concerns to [facility] Psych Specialist [phone] or after hours Intake [phone] or the provider directly.    no

## 2020-12-07 NOTE — H&P ADULT - ATTENDING COMMENTS
Patient seen and examined, case d/w house staff.  Liver transplant patient admitted with symptomatic anemia.  Transfuse to keep Hgb > 7, GI eval.

## 2020-12-07 NOTE — ED PROVIDER NOTE - PHYSICAL EXAMINATION
GEN - NAD; morbidly obese; A+Ox3   HEAD - NC/AT, No visible Ecchymosis, No Abrasions, No Lacerations/Skin Tears     EYES - EOMI, (+) conjunctival pallor, no scleral icterus  ENT -   mucous membranes  moist , no discharge      PULM - CTA B/L,  symmetric breath sounds  COR -  RRR, S1 S2, no murmurs  ABD - NT/ND, soft, no guarding, no rebound, no masses    BACK - no CVA tenderness  EXTREMS - 2+ edema symmetric, no gross deformity, warm and well perfused, no calf tenderness/swelling/erythema     - Chaperone (Lizzeth Garcia, ED Tech) - no evidence of hemorrhoids ext; no blood in vault  NEUROLOGIC - AO x 3,  moving all 4 ext

## 2020-12-07 NOTE — H&P ADULT - NSHPSOCIALHISTORY_GEN_ALL_CORE
Lives alone  uses cane to ambulate  retired  former smoker (quit > 20 yrs ago), no recent EtOH or rec drug use.

## 2020-12-07 NOTE — H&P ADULT - PROBLEM SELECTOR PLAN 5
Hx of T2DM on insulin  - was on long acting insulin 26u AM and 45u PM during last admission.  - will start with Lantus 26u BID with SSI qAC+qHS  - monitor FS.

## 2020-12-08 NOTE — PHYSICAL THERAPY INITIAL EVALUATION ADULT - PATIENT/FAMILY AGREES WITH PLAN
Home with home PT for safety assessment, gait,stair negotiation, balance, & strength training and to return pt to baseline functional mobility status./yes

## 2020-12-08 NOTE — PHYSICAL THERAPY INITIAL EVALUATION ADULT - PERTINENT HX OF CURRENT PROBLEM, REHAB EVAL
71 y/o Male w/ PMH significant for HTN, HLD, T2DM on insulin, COPD (on home O2 3L), EtOH/HCV cirrhosis s/p liver transplant (2011) on Tacro/Cellcelpt/Prednisone, chronic lymphedema, who presented to the hospital due to Hgb less than 7 found on outpatient labs .Lately, he has noticed increasing in dyspnea on exertion and palpitation.

## 2020-12-08 NOTE — PHYSICAL THERAPY INITIAL EVALUATION ADULT - DISCHARGE DISPOSITION, PT EVAL
home w/ home PT/Home with home PT for safety assessment, gait,stair negotiation, balance, & strength training and to return pt to baseline functional mobility status.

## 2020-12-08 NOTE — CHART NOTE - NSCHARTNOTEFT_GEN_A_CORE
Initial consult note filed 12/7/0. Patient seen and examined today by Attending.     Please note additional recommendations:  - will defer endoscopic evaluation for anemia at this time given no active/overt GI bleeding  - okay to resume regular diet today   - CT A/P non-contrast to evaluate for large masses  - Hematology consultation given evidence of BM suppression  - continue to hold CellCept pending Transplant Surgery consultation      Elsie Morton PGY-5  Gastroenterology Fellow  Pager #[phone]  E-mail [email or web address]  Call [phone] to speak to answering service for on-call GI fellow 5pm-7am and weekends.

## 2020-12-08 NOTE — PROGRESS NOTE ADULT - PROBLEM SELECTOR PLAN 3
Hx of RV Systolic dysfunction during last admission on TTE.   - Patient used to follow cardiologist at [facility] yet now moved to [facility]. Not has followed by cardiologist for a long time.  - Seen by Heart failure during last admission, supposedly follow Dr. Daley as outpatient.   - currently on diuretics: Furosemide 80mg daily.   - ASA on hold in setting of anemia  - Metoprolol Succinate 25mg daily.  - EKG  - TTE Hx of RV Systolic dysfunction during last admission on TTE.   - Patient used to follow cardiologist at [facility] yet now moved to [facility]. Not has followed by cardiologist for a long time.  - Seen by Heart failure during last admission, supposedly follow Dr. Daley as outpatient.   - currently on diuretics: Furosemide 80mg daily.   - ASA on hold in setting of anemia  - Metoprolol Succinate 25mg daily.  - TTE

## 2020-12-08 NOTE — PROGRESS NOTE ADULT - SUBJECTIVE AND OBJECTIVE BOX
Aquilino Hancock Regla  PGY-2  Pager: [phone]    Patient is a 70y old  Male who presents with a chief complaint of symptomatic anemia (07 Dec 2020 15:37)      SUBJECTIVE / OVERNIGHT EVENTS:    MEDICATIONS  (STANDING):  ALBUTerol    90 MICROgram(s) HFA Inhaler 2 Puff(s) Inhalation every 6 hours  budesonide 160 MICROgram(s)/formoterol 4.5 MICROgram(s) Inhaler 2 Puff(s) Inhalation two times a day  dextrose 40% Gel 15 Gram(s) Oral once  dextrose 5%. 1000 milliLiter(s) (50 mL/Hr) IV Continuous <Continuous>  dextrose 5%. 1000 milliLiter(s) (100 mL/Hr) IV Continuous <Continuous>  dextrose 50% Injectable 25 Gram(s) IV Push once  dextrose 50% Injectable 12.5 Gram(s) IV Push once  dextrose 50% Injectable 25 Gram(s) IV Push once  furosemide    Tablet 80 milliGRAM(s) Oral daily  glucagon  Injectable 1 milliGRAM(s) IntraMuscular once  insulin glargine Injectable (LANTUS) 26 Unit(s) SubCutaneous two times a day  insulin lispro (ADMELOG) corrective regimen sliding scale   SubCutaneous three times a day before meals  insulin lispro (ADMELOG) corrective regimen sliding scale   SubCutaneous at bedtime  metoprolol succinate ER 25 milliGRAM(s) Oral daily  multivitamin/minerals 1 Tablet(s) Oral daily  pantoprazole    Tablet 40 milliGRAM(s) Oral two times a day  predniSONE   Tablet 3 milliGRAM(s) Oral every other day  senna 2 Tablet(s) Oral at bedtime  sertraline 100 milliGRAM(s) Oral daily  tacrolimus 0.5 milliGRAM(s) Oral two times a day  tamsulosin 0.4 milliGRAM(s) Oral at bedtime  tiotropium 18 MICROgram(s) Capsule 1 Capsule(s) Inhalation daily  trimethoprim   80 mG/sulfamethoxazole 400 mG 1 Tablet(s) Oral daily    MEDICATIONS  (PRN):  buprenorphine 2 mG/naloxone 0.5 mG SL  Tablet 1 Tablet(s) SubLingual <User Schedule> PRN pain management  sodium chloride 0.65% Nasal 1 Spray(s) Both Nostrils daily PRN Nasal Congestion      T(C): 37.1 (12-08-20 @ 06:07), Max: 37.1 (12-08-20 @ 06:07)  HR: 72 (12-08-20 @ 06:07) (63 - 90)  BP: 162/82 (12-08-20 @ 06:07) (138/64 - 165/70)  RR: 18 (12-08-20 @ 06:07) (18 - 22)  SpO2: 97% (12-08-20 @ 06:07) (96% - 100%)  CAPILLARY BLOOD GLUCOSE      POCT Blood Glucose.: 174 mg/dL (07 Dec 2020 21:10)  POCT Blood Glucose.: 117 mg/dL (07 Dec 2020 17:29)    I&O's Summary    07 Dec 2020 07:01  -  08 Dec 2020 07:00  --------------------------------------------------------  IN: 0 mL / OUT: 1700 mL / NET: -1700 mL        PHYSICAL EXAM:  PHYSICAL EXAM:    Constitutional: NAD. well-developed; well-groomed; well-nourished;  HEENT: AT/NC, PERRLA; EOMI, MMM, no oropharyngeal lesions, no erythema, no exudates, Supple neck; normal thyroid gland, no cervical lymphadenopathy  Respiratory: CTAB. equal aeration bilaterally. no wheezing, no crackes, no rhonchi. no increase in WOB  Cardiovascular: RRR, no M/R/G. 2+ distal pulses. Cap refill < 2 seconds. no JVD  Gastrointestinal: soft; NT/ND, +BS, no rebounding tenderness / guarding / HSM / mass / ascites.  Genitourinary: not examined.  Extremities: no clubbing; no cyanosis; no pedal edema, non-tender to palpation, DP and Radial pulses intact.  Skin: warm and dry; color normal: no rash: no ulcers  Neurological: A&Ox 3; responds to pain; responds to verbal commands; epicritic and protopathic sensation intact: CN nerves grossly intact.   MSK/Back: no deformities. Active and passive ROM intact; strength intact, no CVA tenderness, No joint tenderness, swelling, erythema  Psychiatric: normal mood/affect. Denies SI/HI    LABS:                        7.2    3.23  )-----------( 103      ( 08 Dec 2020 06:25 )             26.3     12-08    138  |  97  |  32<H>  ----------------------------<  166<H>  4.3   |  31  |  1.56<H>    Ca    8.9      08 Dec 2020 06:25  Phos  4.2     12-08  Mg     2.6     12-08    TPro  7.0  /  Alb  4.1  /  TBili  0.5  /  DBili  x   /  AST  22  /  ALT  14  /  AlkPhos  83  12-08    PT/INR - ( 07 Dec 2020 11:11 )   PT: 12.3 sec;   INR: 1.03 ratio         PTT - ( 07 Dec 2020 11:11 )  PTT:21.1 sec          RADIOLOGY & ADDITIONAL TESTS:    Imaging Personally Reviewed: BRANDON    Consultant(s) Notes Reviewed:  BRANDON    Care Discussed with Consultants/Other Providers: BRANDON   Aquilino Hancock Regla  PGY-3  Pager: [phone]    Patient is a 70y old  Male who presents with a chief complaint of symptomatic anemia (07 Dec 2020 15:37)      SUBJECTIVE / OVERNIGHT EVENTS:  Patient reports getting more energized after blood transfusion yesterday.  Patient denies any active bleeding. Denies CP, palpitation, SOB (other than his baseline), palpitation.  He has been ambulating a little with NC O2. No fever, chills, rigors.        MEDICATIONS  (STANDING):  ALBUTerol    90 MICROgram(s) HFA Inhaler 2 Puff(s) Inhalation every 6 hours  budesonide 160 MICROgram(s)/formoterol 4.5 MICROgram(s) Inhaler 2 Puff(s) Inhalation two times a day  dextrose 40% Gel 15 Gram(s) Oral once  dextrose 5%. 1000 milliLiter(s) (50 mL/Hr) IV Continuous <Continuous>  dextrose 5%. 1000 milliLiter(s) (100 mL/Hr) IV Continuous <Continuous>  dextrose 50% Injectable 25 Gram(s) IV Push once  dextrose 50% Injectable 12.5 Gram(s) IV Push once  dextrose 50% Injectable 25 Gram(s) IV Push once  furosemide    Tablet 80 milliGRAM(s) Oral daily  glucagon  Injectable 1 milliGRAM(s) IntraMuscular once  insulin glargine Injectable (LANTUS) 26 Unit(s) SubCutaneous two times a day  insulin lispro (ADMELOG) corrective regimen sliding scale   SubCutaneous three times a day before meals  insulin lispro (ADMELOG) corrective regimen sliding scale   SubCutaneous at bedtime  metoprolol succinate ER 25 milliGRAM(s) Oral daily  multivitamin/minerals 1 Tablet(s) Oral daily  pantoprazole    Tablet 40 milliGRAM(s) Oral two times a day  predniSONE   Tablet 3 milliGRAM(s) Oral every other day  senna 2 Tablet(s) Oral at bedtime  sertraline 100 milliGRAM(s) Oral daily  tacrolimus 0.5 milliGRAM(s) Oral two times a day  tamsulosin 0.4 milliGRAM(s) Oral at bedtime  tiotropium 18 MICROgram(s) Capsule 1 Capsule(s) Inhalation daily  trimethoprim   80 mG/sulfamethoxazole 400 mG 1 Tablet(s) Oral daily    MEDICATIONS  (PRN):  buprenorphine 2 mG/naloxone 0.5 mG SL  Tablet 1 Tablet(s) SubLingual <User Schedule> PRN pain management  sodium chloride 0.65% Nasal 1 Spray(s) Both Nostrils daily PRN Nasal Congestion      T(C): 37.1 (12-08-20 @ 06:07), Max: 37.1 (12-08-20 @ 06:07)  HR: 72 (12-08-20 @ 06:07) (63 - 90)  BP: 162/82 (12-08-20 @ 06:07) (138/64 - 165/70)  RR: 18 (12-08-20 @ 06:07) (18 - 22)  SpO2: 97% (12-08-20 @ 06:07) (96% - 100%)  CAPILLARY BLOOD GLUCOSE      POCT Blood Glucose.: 174 mg/dL (07 Dec 2020 21:10)  POCT Blood Glucose.: 117 mg/dL (07 Dec 2020 17:29)    I&O's Summary    07 Dec 2020 07:01  -  08 Dec 2020 07:00  --------------------------------------------------------  IN: 0 mL / OUT: 1700 mL / NET: -1700 mL        PHYSICAL EXAM:    Constitutional: NAD. lying on bed comfortably on NC 3.5L.  HEENT: AT/NC, PERRLA; EOMI, conjunctival pallor, MMM supple neck.  Respiratory: CTAB with prolonged expiratory phase. equal aeration bilaterally. no increase in WOB  Cardiovascular: RRR, systolic murmur heard at RUSB. 2+ distal pulses. Cap refill < 3 seconds.  Gastrointestinal: soft; NT/ND, +BS  Genitourinary: not examined.  Extremities: no cyanosis; bilateral lymphedema with chronic skin changes, non-tender to palpation, DP and Radial pulses intact.  Neurological: A&Ox 3; responds to pain; responds to verbal commands; epicritic and protopathic sensation intact: CN nerves grossly intact. moving all four extremities against gravity.   Psychiatric: normal mood/affect.    LABS:                        7.2    3.23  )-----------( 103      ( 08 Dec 2020 06:25 )             26.3     12-08    138  |  97  |  32<H>  ----------------------------<  166<H>  4.3   |  31  |  1.56<H>    Ca    8.9      08 Dec 2020 06:25  Phos  4.2     12-08  Mg     2.6     12-08    TPro  7.0  /  Alb  4.1  /  TBili  0.5  /  DBili  x   /  AST  22  /  ALT  14  /  AlkPhos  83  12-08    PT/INR - ( 07 Dec 2020 11:11 )   PT: 12.3 sec;   INR: 1.03 ratio         PTT - ( 07 Dec 2020 11:11 )  PTT:21.1 sec          RADIOLOGY & ADDITIONAL TESTS:    Imaging Personally Reviewed: BRANDON    Consultant(s) Notes Reviewed:  BRANDON    Care Discussed with Consultants/Other Providers: BRANDON

## 2020-12-08 NOTE — PROGRESS NOTE ADULT - PROBLEM SELECTOR PLAN 1
Hgb 6.4 upon admission, worsening GOMEZ/palpitation/CP concerning for symptomatic anemia.  - 1u pRBC in the ED  - Cr baseline 1.3~1.6 possible CKD contributing to anemia  - Iron studies in last admission shows component of MALIA.  - no signs of acute GIB.   - f/u post transfusion CBC  - repeat CBC in the AM if post transfusion CBC shows adequate rise.  - will acquire EKG to r/o cardiac abnormality  - TTE to assess his valvulopathy and RV systolic dysfunction found on prior TTE.  - GI consulted given liver transplant status and prior history of anemia requring transfusion. Hgb 6.4 upon admission, worsening GOMEZ/palpitation/CP concerning for symptomatic anemia.  - s/p 1u pRBC. responded well.  - Cr baseline 1.3~1.6 possible CKD contributing to anemia  - Iron studies in last admission shows component of MALIA.  - no signs of acute GIB.   - TTE to assess his valvulopathy and RV systolic dysfunction found on prior TTE.  - GI consulted given liver transplant status and prior history of anemia requring transfusion: no plan for urgent scope procedures.

## 2020-12-08 NOTE — PROGRESS NOTE ADULT - PROBLEM SELECTOR PLAN 8
- DVT ppx: SCD in setting of symptomatic anemia  - Diet: DASH/Diabetic Diet  - Dispo: pending clinical improvement. - DVT ppx: SCD in setting of symptomatic anemia, patient ambulating  - Diet: DASH/Diabetic Diet  - Dispo: pending clinical improvement.

## 2020-12-08 NOTE — PROGRESS NOTE ADULT - PROBLEM SELECTOR PLAN 2
s/p liver transplant in 2011. followed by Dr. Askew as outpatient.  - c/w home Tacro 0.5mg BID, Prednisone 3mg every other day, Cellcept 500mg daily.  - c/w PPI BID and Bactrim for prophylaxis.  - hepatology consulted. s/p liver transplant in 2011. followed by Dr. Askew as outpatient.  - c/w home Tacro 0.5mg BID, Prednisone 3mg every other day,   - holding home Cellcept 500mg daily.  - c/w PPI BID and Bactrim for prophylaxis.  - hepatology consulted.  - transplant surgery consulted.

## 2020-12-08 NOTE — PROGRESS NOTE ADULT - PROBLEM SELECTOR PLAN 5
Hx of T2DM on insulin  - was on long acting insulin 26u AM and 45u PM during last admission.  - will start with Lantus 26u BID with SSI qAC+qHS  - monitor FS. Hx of T2DM on insulin  - was on long acting insulin 26u AM and 45u PM during last admission.  - will go back to home dose: Lantus 26u AM and 45u PM with SSI qAC+qHS  - monitor FS.

## 2020-12-08 NOTE — PHYSICAL THERAPY INITIAL EVALUATION ADULT - ADDITIONAL COMMENTS
Pt. lives alone in apt.  No steps to get in if going through garage, Patient ambulated with straight cane / rw independent. Pt. lives alone in apt.  No steps to get in if going through garage, Patient ambulated with straight cane / rw independent. Pt. owns rw, sc and shower chair @ home.

## 2020-12-08 NOTE — PHYSICAL THERAPY INITIAL EVALUATION ADULT - PLANNED THERAPY INTERVENTIONS, PT EVAL
transfer training/GOAL: Pt will negotiate 5 steps  up and down using HR support, independent  within 2-3 weeks./balance training/gait training/strengthening

## 2020-12-08 NOTE — PROGRESS NOTE ADULT - ASSESSMENT
Mr. Nguyen is a 69 y/o Male w/ PMH significant for HTN, HLD, T2DM on insulin, COPD (on home O2 3L), EtOH/HCV cirrhosis s/p liver transplant (2011) on Tacro/Cellcept/Prednisone, chronic lymphedema, who is admitted for symptomatic anemia.     Mr. Nguyen is a 69 y/o Male w/ PMH significant for HTN, HLD, T2DM on insulin, COPD (on home O2 3L), EtOH/HCV cirrhosis s/p liver transplant (2011) on Tacro/Cellcept/Prednisone, chronic lymphedema, who is admitted for symptomatic anemia, responding well to 1u pRBC.

## 2020-12-09 NOTE — PROGRESS NOTE ADULT - PROBLEM SELECTOR PLAN 3
Hx of RV Systolic dysfunction during last admission on TTE.   - Patient used to follow cardiologist at [facility] yet now moved to [facility]. Not has followed by cardiologist for a long time.  - Seen by Heart failure during last admission, supposedly follow Dr. Daley as outpatient.   - currently on diuretics: Furosemide 80mg daily.   - ASA on hold in setting of anemia  - Metoprolol Succinate 25mg daily.  - TTE Hx of RV Systolic dysfunction during last admission on TTE.   - Patient used to follow cardiologist at [facility] yet now moved to [facility]. Not has followed by cardiologist for a long time.  - Seen by Heart failure during last admission, supposedly follow Dr. Daley as outpatient.   - currently on diuretics: Furosemide 80mg daily.   - ASA on hold in setting of anemia  - Metoprolol Succinate 25mg daily.  - TTE: unchanged from prior Echo.   - HF consulted for evaluation and establishment of care per patient request.

## 2020-12-09 NOTE — PROGRESS NOTE ADULT - SUBJECTIVE AND OBJECTIVE BOX
Aquilino Hancock Regla  PGY-2  Pager: [phone]    Patient is a 70y old  Male who presents with a chief complaint of symptomatic anemia (08 Dec 2020 07:37)      SUBJECTIVE / OVERNIGHT EVENTS:    MEDICATIONS  (STANDING):  ALBUTerol    90 MICROgram(s) HFA Inhaler 2 Puff(s) Inhalation every 6 hours  budesonide 160 MICROgram(s)/formoterol 4.5 MICROgram(s) Inhaler 2 Puff(s) Inhalation two times a day  dextrose 40% Gel 15 Gram(s) Oral once  dextrose 5%. 1000 milliLiter(s) (50 mL/Hr) IV Continuous <Continuous>  dextrose 5%. 1000 milliLiter(s) (100 mL/Hr) IV Continuous <Continuous>  dextrose 50% Injectable 25 Gram(s) IV Push once  dextrose 50% Injectable 12.5 Gram(s) IV Push once  dextrose 50% Injectable 25 Gram(s) IV Push once  furosemide    Tablet 80 milliGRAM(s) Oral daily  glucagon  Injectable 1 milliGRAM(s) IntraMuscular once  insulin glargine Injectable (LANTUS) 26 Unit(s) SubCutaneous <User Schedule>  insulin glargine Injectable (LANTUS) 45 Unit(s) SubCutaneous <User Schedule>  insulin lispro (ADMELOG) corrective regimen sliding scale   SubCutaneous three times a day before meals  insulin lispro (ADMELOG) corrective regimen sliding scale   SubCutaneous at bedtime  metoprolol succinate ER 25 milliGRAM(s) Oral daily  multivitamin/minerals 1 Tablet(s) Oral daily  pantoprazole    Tablet 40 milliGRAM(s) Oral two times a day  predniSONE   Tablet 3 milliGRAM(s) Oral every other day  senna 2 Tablet(s) Oral at bedtime  sertraline 100 milliGRAM(s) Oral daily  tacrolimus 0.5 milliGRAM(s) Oral two times a day  tamsulosin 0.4 milliGRAM(s) Oral at bedtime  tiotropium 18 MICROgram(s) Capsule 1 Capsule(s) Inhalation daily  trimethoprim   80 mG/sulfamethoxazole 400 mG 1 Tablet(s) Oral daily    MEDICATIONS  (PRN):  buprenorphine 2 mG/naloxone 0.5 mG SL  Tablet 1 Tablet(s) SubLingual <User Schedule> PRN pain management  sodium chloride 0.65% Nasal 1 Spray(s) Both Nostrils daily PRN Nasal Congestion      T(C): 36.8 (12-09-20 @ 05:03), Max: 36.8 (12-09-20 @ 05:03)  HR: 81 (12-09-20 @ 05:03) (75 - 84)  BP: 145/77 (12-09-20 @ 05:03) (132/66 - 158/76)  RR: 18 (12-09-20 @ 05:03) (18 - 18)  SpO2: 98% (12-09-20 @ 05:03) (93% - 100%)  CAPILLARY BLOOD GLUCOSE      POCT Blood Glucose.: 255 mg/dL (08 Dec 2020 21:31)  POCT Blood Glucose.: 228 mg/dL (08 Dec 2020 17:28)  POCT Blood Glucose.: 275 mg/dL (08 Dec 2020 12:35)  POCT Blood Glucose.: 307 mg/dL (08 Dec 2020 09:58)  POCT Blood Glucose.: 204 mg/dL (08 Dec 2020 08:32)    I&O's Summary    08 Dec 2020 07:01  -  09 Dec 2020 07:00  --------------------------------------------------------  IN: 950 mL / OUT: 2450 mL / NET: -1500 mL        PHYSICAL EXAM:  PHYSICAL EXAM:    Constitutional: NAD. well-developed; well-groomed; well-nourished;  HEENT: AT/NC, PERRLA; EOMI, MMM, no oropharyngeal lesions, no erythema, no exudates, Supple neck; normal thyroid gland, no cervical lymphadenopathy  Respiratory: CTAB. equal aeration bilaterally. no wheezing, no crackes, no rhonchi. no increase in WOB  Cardiovascular: RRR, no M/R/G. 2+ distal pulses. Cap refill < 2 seconds. no JVD  Gastrointestinal: soft; NT/ND, +BS, no rebounding tenderness / guarding / HSM / mass / ascites.  Genitourinary: not examined.  Extremities: no clubbing; no cyanosis; no pedal edema, non-tender to palpation, DP and Radial pulses intact.  Skin: warm and dry; color normal: no rash: no ulcers  Neurological: A&Ox 3; responds to pain; responds to verbal commands; epicritic and protopathic sensation intact: CN nerves grossly intact.   MSK/Back: no deformities. Active and passive ROM intact; strength intact, no CVA tenderness, No joint tenderness, swelling, erythema  Psychiatric: normal mood/affect. Denies SI/HI    LABS:                        7.2    2.90  )-----------( 99       ( 09 Dec 2020 07:21 )             27.1     12-08    138  |  97  |  32<H>  ----------------------------<  166<H>  4.3   |  31  |  1.56<H>    Ca    8.9      08 Dec 2020 06:25  Phos  4.2     12-08  Mg     2.6     12-08    TPro  7.0  /  Alb  4.1  /  TBili  0.5  /  DBili  x   /  AST  22  /  ALT  14  /  AlkPhos  83  12-08    PT/INR - ( 07 Dec 2020 11:11 )   PT: 12.3 sec;   INR: 1.03 ratio         PTT - ( 07 Dec 2020 11:11 )  PTT:21.1 sec          RADIOLOGY & ADDITIONAL TESTS:    Imaging Personally Reviewed: BRANDON    Consultant(s) Notes Reviewed:  BRANDON    Care Discussed with Consultants/Other Providers: BRANDON   Aquilino Hancock Regla  PGY-2  Pager: [phone]    Patient is a 70y old  Male who presents with a chief complaint of symptomatic anemia (08 Dec 2020 07:37)      SUBJECTIVE / OVERNIGHT EVENTS:  Patient subjectively doing okay.   Patient denies any active bleeding. Denies CP, palpitation, SOB (other than his baseline), palpitation. Urinating frequently especially after taking diuretics.  He has been ambulating with NC O2. No fever, chills, rigors.      MEDICATIONS  (STANDING):  ALBUTerol    90 MICROgram(s) HFA Inhaler 2 Puff(s) Inhalation every 6 hours  budesonide 160 MICROgram(s)/formoterol 4.5 MICROgram(s) Inhaler 2 Puff(s) Inhalation two times a day  dextrose 40% Gel 15 Gram(s) Oral once  dextrose 5%. 1000 milliLiter(s) (50 mL/Hr) IV Continuous <Continuous>  dextrose 5%. 1000 milliLiter(s) (100 mL/Hr) IV Continuous <Continuous>  dextrose 50% Injectable 25 Gram(s) IV Push once  dextrose 50% Injectable 12.5 Gram(s) IV Push once  dextrose 50% Injectable 25 Gram(s) IV Push once  furosemide    Tablet 80 milliGRAM(s) Oral daily  glucagon  Injectable 1 milliGRAM(s) IntraMuscular once  insulin glargine Injectable (LANTUS) 26 Unit(s) SubCutaneous <User Schedule>  insulin glargine Injectable (LANTUS) 45 Unit(s) SubCutaneous <User Schedule>  insulin lispro (ADMELOG) corrective regimen sliding scale   SubCutaneous three times a day before meals  insulin lispro (ADMELOG) corrective regimen sliding scale   SubCutaneous at bedtime  metoprolol succinate ER 25 milliGRAM(s) Oral daily  multivitamin/minerals 1 Tablet(s) Oral daily  pantoprazole    Tablet 40 milliGRAM(s) Oral two times a day  predniSONE   Tablet 3 milliGRAM(s) Oral every other day  senna 2 Tablet(s) Oral at bedtime  sertraline 100 milliGRAM(s) Oral daily  tacrolimus 0.5 milliGRAM(s) Oral two times a day  tamsulosin 0.4 milliGRAM(s) Oral at bedtime  tiotropium 18 MICROgram(s) Capsule 1 Capsule(s) Inhalation daily  trimethoprim   80 mG/sulfamethoxazole 400 mG 1 Tablet(s) Oral daily    MEDICATIONS  (PRN):  buprenorphine 2 mG/naloxone 0.5 mG SL  Tablet 1 Tablet(s) SubLingual <User Schedule> PRN pain management  sodium chloride 0.65% Nasal 1 Spray(s) Both Nostrils daily PRN Nasal Congestion      T(C): 36.8 (12-09-20 @ 05:03), Max: 36.8 (12-09-20 @ 05:03)  HR: 81 (12-09-20 @ 05:03) (75 - 84)  BP: 145/77 (12-09-20 @ 05:03) (132/66 - 158/76)  RR: 18 (12-09-20 @ 05:03) (18 - 18)  SpO2: 98% (12-09-20 @ 05:03) (93% - 100%)  CAPILLARY BLOOD GLUCOSE      POCT Blood Glucose.: 255 mg/dL (08 Dec 2020 21:31)  POCT Blood Glucose.: 228 mg/dL (08 Dec 2020 17:28)  POCT Blood Glucose.: 275 mg/dL (08 Dec 2020 12:35)  POCT Blood Glucose.: 307 mg/dL (08 Dec 2020 09:58)  POCT Blood Glucose.: 204 mg/dL (08 Dec 2020 08:32)    I&O's Summary    08 Dec 2020 07:01  -  09 Dec 2020 07:00  --------------------------------------------------------  IN: 950 mL / OUT: 2450 mL / NET: -1500 mL        PHYSICAL EXAM:  PHYSICAL EXAM:    Constitutional: NAD. lying on bed comfortably on NC 3.5L.  HEENT: AT/NC, PERRLA; EOMI, conjunctival pallor, MMM supple neck.  Respiratory: CTAB with prolonged expiratory phase. equal aeration bilaterally. no increase in WOB  Cardiovascular: RRR, systolic murmur heard at RUSB. 2+ distal pulses. Cap refill < 3 seconds.  Gastrointestinal: soft; NT/ND, +BS  Genitourinary: not examined.  Extremities: no cyanosis; bilateral lymphedema with chronic skin changes, non-tender to palpation, DP and Radial pulses intact.  Neurological: A&Ox 3; responds to pain; responds to verbal commands; epicritic and protopathic sensation intact: CN nerves grossly intact. moving all four extremities against gravity.   Psychiatric: normal mood/affect.    LABS:                        7.2    2.90  )-----------( 99       ( 09 Dec 2020 07:21 )             27.1     12-08    138  |  97  |  32<H>  ----------------------------<  166<H>  4.3   |  31  |  1.56<H>    Ca    8.9      08 Dec 2020 06:25  Phos  4.2     12-08  Mg     2.6     12-08    TPro  7.0  /  Alb  4.1  /  TBili  0.5  /  DBili  x   /  AST  22  /  ALT  14  /  AlkPhos  83  12-08    PT/INR - ( 07 Dec 2020 11:11 )   PT: 12.3 sec;   INR: 1.03 ratio         PTT - ( 07 Dec 2020 11:11 )  PTT:21.1 sec          RADIOLOGY & ADDITIONAL TESTS:    Imaging Personally Reviewed: BRANDON    Consultant(s) Notes Reviewed:  BRANDON    Care Discussed with Consultants/Other Providers: BRANDON

## 2020-12-09 NOTE — PROGRESS NOTE ADULT - PROBLEM SELECTOR PLAN 8
- DVT ppx: SCD in setting of symptomatic anemia, patient ambulating  - Diet: DASH/Diabetic Diet  - Dispo: pending clinical improvement. - DVT ppx: SCD in setting of symptomatic anemia, patient ambulating  - Diet: DASH/Diabetic Diet  - Dispo: pending Anemia work up.

## 2020-12-09 NOTE — CONSULT NOTE ADULT - SUBJECTIVE AND OBJECTIVE BOX
Hematology Consult Note    HPI:  Mr. Nguyen is a 69 y/o Male w/ PMH significant for HTN, HLD, T2DM on insulin, COPD (on home O2 3L), EtOH/HCV cirrhosis s/p liver transplant (2011) on Tacro/Cellcelpt/Prednisone, chronic lymphedema, who presented to the hospital due to Hgb less than 7 found on outpatient labs.     Lately, he has noticed increasing in dyspnea on exertion and palpitation. He wears his supp O2 via NC at home and wears it during ambulation as well. He reports he can ambulate from his bed to bathroom yet gets very dyspneic and can feel worse palpitation. He states that he has been compliant with his medications, he lost weight rather than gaining and has been urinating well especially with diuretics. He denies any acute bleeding such as hematochezia/melena/hematuria/coffee ground emesis. He states that he occasionally has blue marks but not have one now. He had some bright red blood on his toilet paper intermittently in the past but not recently. No syncopal episode or fall after last discharge from hospital in May. Ambulates with walker at baseline. Lives alone and independently functioning (however, patient was found covered in trash and urine by EMS at last admission). His LE edema has been stable and chronic. Has been having some PO intake without dysphagia or odynophagia. Did not follow up with GI doctors as outpatient due to COVID-19 Pandemic. He used to be in Health system system yet he moved to [facility] as his surgeon moved from Health system to [facility], who he still follows.    In ED, patient is afebrile with stable vital signs. 100% on 2L NC. Received 1u pRBC. (07 Dec 2020 13:34)      Allergies    clindamycin (Unknown)    Intolerances        MEDICATIONS  (STANDING):  ALBUTerol    90 MICROgram(s) HFA Inhaler 2 Puff(s) Inhalation every 6 hours  budesonide 160 MICROgram(s)/formoterol 4.5 MICROgram(s) Inhaler 2 Puff(s) Inhalation two times a day  dextrose 40% Gel 15 Gram(s) Oral once  dextrose 5%. 1000 milliLiter(s) (50 mL/Hr) IV Continuous <Continuous>  dextrose 5%. 1000 milliLiter(s) (100 mL/Hr) IV Continuous <Continuous>  dextrose 50% Injectable 25 Gram(s) IV Push once  dextrose 50% Injectable 12.5 Gram(s) IV Push once  dextrose 50% Injectable 25 Gram(s) IV Push once  furosemide    Tablet 80 milliGRAM(s) Oral daily  glucagon  Injectable 1 milliGRAM(s) IntraMuscular once  insulin glargine Injectable (LANTUS) 26 Unit(s) SubCutaneous <User Schedule>  insulin glargine Injectable (LANTUS) 45 Unit(s) SubCutaneous <User Schedule>  insulin lispro (ADMELOG) corrective regimen sliding scale   SubCutaneous three times a day before meals  insulin lispro (ADMELOG) corrective regimen sliding scale   SubCutaneous at bedtime  metoprolol succinate ER 25 milliGRAM(s) Oral daily  multivitamin/minerals 1 Tablet(s) Oral daily  pantoprazole    Tablet 40 milliGRAM(s) Oral two times a day  predniSONE   Tablet 3 milliGRAM(s) Oral every other day  senna 2 Tablet(s) Oral at bedtime  sertraline 100 milliGRAM(s) Oral daily  tacrolimus 0.5 milliGRAM(s) Oral two times a day  tamsulosin 0.4 milliGRAM(s) Oral at bedtime  tiotropium 18 MICROgram(s) Capsule 1 Capsule(s) Inhalation daily  trimethoprim   80 mG/sulfamethoxazole 400 mG 1 Tablet(s) Oral daily    MEDICATIONS  (PRN):  buprenorphine 2 mG/naloxone 0.5 mG SL  Tablet 1 Tablet(s) SubLingual <User Schedule> PRN pain management  sodium chloride 0.65% Nasal 1 Spray(s) Both Nostrils daily PRN Nasal Congestion      PAST MEDICAL & SURGICAL HISTORY:  Hyperlipidemia    Hypertension    S/P liver transplant    Type 2 diabetes mellitus    Chronic obstructive pulmonary disease, unspecified COPD type  on home O2    S/P liver transplant        FAMILY HISTORY:  No pertinent family history in first degree relatives        SOCIAL HISTORY: No EtOH, no tobacco      REVIEW OF SYSTEMS:    CONSTITUTIONAL: No weakness, fevers or chills  EYES/ENT: No visual changes;  No vertigo or throat pain   NECK: No pain or stiffness  RESPIRATORY: No cough, wheezing, hemoptysis; + shortness of breath on exertion  CARDIOVASCULAR: No chest pain or palpitations  GASTROINTESTINAL: No abdominal or epigastric pain. No nausea, vomiting  GENITOURINARY: No dysuria, frequency or hematuria  NEUROLOGICAL: No numbness or weakness  SKIN: No itching, burning, rashes, or lesions   EXTREMITIES: bilateral leg swelling  All other review of systems is negative unless indicated above.        T(F): 98.2 (12-09-20 @ 05:03), Max: 98.2 (12-09-20 @ 05:03)  HR: 81 (12-09-20 @ 05:03)  BP: 145/77 (12-09-20 @ 05:03)  RR: 18 (12-09-20 @ 05:03)  SpO2: 98% (12-09-20 @ 05:03)  Wt(kg): --    Physical Exam  GENERAL: NAD, well-developed  HEAD: Atraumatic, Normocephalic  EYES: EOMI, conjunctiva and sclera clear  NECK: Supple, No JVD  CHEST/LUNG: Clear to auscultation bilaterally  HEART: Regular rate and rhythm; S1S2 present  ABDOMEN: Soft, Nontender, Nondistended; Bowel sounds present  EXTREMITIES:  2+ Peripheral Pulses, bilateral leg nonpitting edema  NEUROLOGY: non-focal  SKIN: No rashes or lesions                          7.2    2.90  )-----------( 99       ( 09 Dec 2020 07:21 )             27.1       12-09    138  |  96  |  30<H>  ----------------------------<  217<H>  4.4   |  31  |  1.45<H>    Ca    8.9      09 Dec 2020 06:50  Phos  4.0     12-09  Mg     2.5     12-09    TPro  x   /  Alb  x   /  TBili  0.5  /  DBili  x   /  AST  x   /  ALT  x   /  AlkPhos  x   12-09      Magnesium, Serum: 2.5 mg/dL (12-09 @ 06:50)  Phosphorus Level, Serum: 4.0 mg/dL (12-09 @ 06:50)      < from: CT Abdomen and Pelvis w/ IV Cont (12.08.20 @ 19:25) >  IMPRESSION:  Status post liver transplant with findings suggestive of cirrhosis with portal hypertension. Heterogeneous attenuation at the hepatic dome is nonspecific. Contrast-enhanced MR can be performed for further evaluation.    Bowel malrotation without obstruction or volvulus.    < end of copied text >

## 2020-12-09 NOTE — PROGRESS NOTE ADULT - ASSESSMENT
Mr. Nguyen is a 69 y/o Male w/ PMH significant for HTN, HLD, T2DM on insulin, COPD (on home O2 3L), EtOH/HCV cirrhosis s/p liver transplant (2011) on Tacro/Cellcept/Prednisone, chronic lymphedema, who is admitted for symptomatic anemia, responding well to 1u pRBC.     Mr. Nguyen is a 69 y/o Male w/ PMH significant for HTN, HLD, T2DM on insulin, COPD (on home O2 3L), EtOH/HCV cirrhosis s/p liver transplant (2011) on Tacro/Cellcept/Prednisone, chronic lymphedema, who is admitted for symptomatic anemia, responding well to 1u pRBC. No urgent scope need. Now pending hematology anemia workup.

## 2020-12-09 NOTE — PROGRESS NOTE ADULT - PROBLEM SELECTOR PLAN 2
s/p liver transplant in 2011. followed by Dr. Askew as outpatient.  - c/w home Tacro 0.5mg BID, Prednisone 3mg every other day,   - holding home Cellcept 500mg daily.  - c/w PPI BID and Bactrim for prophylaxis.  - hepatology consulted.  - transplant surgery consulted. s/p liver transplant in 2011. followed by Dr. Askew as outpatient.  - c/w home Tacro 0.5mg BID, Prednisone 3mg every other day,   - holding home Cellcept 500mg daily.  - c/w PPI BID and Bactrim for prophylaxis.  - transplant hepatology consulted.  - transplant surgery consulted.

## 2020-12-09 NOTE — CONSULT NOTE ADULT - ASSESSMENT
69 y/o Male w/ PMH significant for HTN, HLD, T2DM on insulin, COPD (on home O2 3L), EtOH/HCV cirrhosis s/p liver transplant (2011) on Tacro/Cellcelpt/Prednisone, chronic lymphedema presented to the hospital due to Hgb less than 7 found on outpatient labs. Hematology was consulted for anemia and splenomegaly found on CT A/P.      # Anemia  - Normocytic normochromic anemia, reti count index 1.67 c/w hypoproliferation  - FOBT negative, no active signs of GI bleeding.  - 5/14/20 iron panel shows low iron saturation, normal UIBC and ferritin compatible with anemia of chronic disease. B12 and folate were also WNL.  - Would repeat iron panel and other anemia w/u including hemolysis labs.  - Per radiology, splenomegaly is up to 18.5cm. Prior CT in 2007 showed normal sized spleen. Unclear if his splenomegaly is due to his previous liver cirrhosis. No other abdominal images available between [id number].  - Etiology of pancytopenia currently unclear, heme team will review the peripheral blood smear and f/u anemia w/u.  - Concurrent leukopenia and thrombocytopenia could be due to medication side effect; pt is on immunosuppressants. Check CBC w/ differential.  - May need a bone marrow aspiration and biopsy in non-urgent basis based on above results.          Neva Graham MD  PGY 5, Oncology/Hematology fellow  (P) [phone]  After 5pm, please contact on-call team.

## 2020-12-09 NOTE — PROGRESS NOTE ADULT - PROBLEM SELECTOR PLAN 1
Hgb 6.4 upon admission, worsening GOMEZ/palpitation/CP concerning for symptomatic anemia.  - s/p 1u pRBC. responded well.  - Cr baseline 1.3~1.6 possible CKD contributing to anemia  - Iron studies in last admission shows component of MALIA.  - no signs of acute GIB.   - TTE to assess his valvulopathy and RV systolic dysfunction found on prior TTE.  - GI consulted given liver transplant status and prior history of anemia requring transfusion: no plan for urgent scope procedures. Hgb 6.4 upon admission, worsening GOMEZ/palpitation/CP concerning for symptomatic anemia.  - s/p 1u pRBC. responded well.  - Cr baseline 1.3~1.6 possible CKD contributing to anemia  - Iron studies in last admission shows component of MALIA/AoCD.  - no signs of acute GIB.   - TTE to assess his valvulopathy and RV systolic dysfunction found on prior TTE.  - GI consulted given liver transplant status and prior history of anemia requring transfusion: no plan for urgent scope procedures.  - Hematology consulted: will look at the smear. Possible BM biopsy if indicated.  - repeat iron studies and B12/folate.  - Patient had some red blood on tissue after BM (< 3 cc per writer's observation). No blood inside the bowl. No anemic symptoms. Likely hemorrhoidal. Will give stool softner PRN.

## 2020-12-09 NOTE — PROGRESS NOTE ADULT - ASSESSMENT
Impression:    #Acute-on-chronic anemia: Grossly brown stool in rectal vault with heme-negative stools. Iron studies from 05/2020 consistent with iron deficiency anemia (iron saturation 5% and serum iron 15). Doubt active GI bleeding at this time. Differential for anemia includes bone marrow suppression vs. occult blood loss from GI tract from GI malignancy or angioectasia.   - Retic index 1.67 12/8/20 indicative of hypoproliferation.   - Abnormal blood smear with splenomegaly on CT A/P  #Post-liver transplant: Transaminases and INR currently normal range. Currently immunosuppressed on Prograf once daily, CellCept once daily and prednisone every other day. On prophylactic Bactrim and nystatin.   #HFpEF: Right ventricular systolic dysfunction and moderate AS with normal LVEF noted on TTE from 05/2020.   #CKD with serum Cr within baseline range  #Type 2 diabetes    Recommendations:  - will f/u official report of CT abdomen/pelvis   - Hematology c/s for abnormal blood smear and splenomegaly   - f/u Transplant Surgery recommendations  - continue to hold CellCept at this time   - continue tacrolimus 0.5mg BID and prednisone 3 mg QOD   - defer EGD/colonoscopy at this time given no active GI bleeding      Elsie Morton PGY-5  Gastroenterology/Hepatology Fellow  Pager #[phone]  E-mail [email or web address]

## 2020-12-09 NOTE — PROGRESS NOTE ADULT - PROBLEM SELECTOR PLAN 5
Hx of T2DM on insulin  - was on long acting insulin 26u AM and 45u PM during last admission.  - will go back to home dose: Lantus 26u AM and 45u PM with SSI qAC+qHS  - monitor FS. Hx of T2DM on insulin  - was on long acting insulin 26u AM and 45u PM during last admission.  - c/w Lantus 26u AM and 45u PM with SSI qAC+qHS  - monitor FS.

## 2020-12-09 NOTE — PROGRESS NOTE ADULT - SUBJECTIVE AND OBJECTIVE BOX
Chief Complaint:  Patient is a 70y old  Male who presents with a chief complaint of symptomatic anemia (09 Dec 2020 07:25)      Interval Events:     CT A/P performed - shows splenomegaly and nodular liver surface  Patient reports improved breathing - down to 3L O2 which is home dose.  TTE performed revealed RV enlargement, normal RV systolic function, normal LVSF.     Allergies:  clindamycin (Unknown)      Hospital Medications:  ALBUTerol    90 MICROgram(s) HFA Inhaler 2 Puff(s) Inhalation every 6 hours  budesonide 160 MICROgram(s)/formoterol 4.5 MICROgram(s) Inhaler 2 Puff(s) Inhalation two times a day  buprenorphine 2 mG/naloxone 0.5 mG SL  Tablet 1 Tablet(s) SubLingual <User Schedule> PRN  dextrose 40% Gel 15 Gram(s) Oral once  dextrose 5%. 1000 milliLiter(s) IV Continuous <Continuous>  dextrose 5%. 1000 milliLiter(s) IV Continuous <Continuous>  dextrose 50% Injectable 25 Gram(s) IV Push once  dextrose 50% Injectable 12.5 Gram(s) IV Push once  dextrose 50% Injectable 25 Gram(s) IV Push once  furosemide    Tablet 80 milliGRAM(s) Oral daily  glucagon  Injectable 1 milliGRAM(s) IntraMuscular once  insulin glargine Injectable (LANTUS) 26 Unit(s) SubCutaneous <User Schedule>  insulin glargine Injectable (LANTUS) 45 Unit(s) SubCutaneous <User Schedule>  insulin lispro (ADMELOG) corrective regimen sliding scale   SubCutaneous three times a day before meals  insulin lispro (ADMELOG) corrective regimen sliding scale   SubCutaneous at bedtime  metoprolol succinate ER 25 milliGRAM(s) Oral daily  multivitamin/minerals 1 Tablet(s) Oral daily  pantoprazole    Tablet 40 milliGRAM(s) Oral two times a day  predniSONE   Tablet 3 milliGRAM(s) Oral every other day  senna 2 Tablet(s) Oral at bedtime  sertraline 100 milliGRAM(s) Oral daily  sodium chloride 0.65% Nasal 1 Spray(s) Both Nostrils daily PRN  tacrolimus 0.5 milliGRAM(s) Oral two times a day  tamsulosin 0.4 milliGRAM(s) Oral at bedtime  tiotropium 18 MICROgram(s) Capsule 1 Capsule(s) Inhalation daily  trimethoprim   80 mG/sulfamethoxazole 400 mG 1 Tablet(s) Oral daily      PMHX/PSHX:  Hyperlipidemia    Hypertension    S/P liver transplant    Type 2 diabetes mellitus    Chronic obstructive pulmonary disease, unspecified COPD type    S/P liver transplant        Family history:  No pertinent family history in first degree relatives        ROS:     General:  No weight loss, fevers, chills, night sweats, fatigue   Eyes:  No vision changes  ENT:  No sore throat, pain, runny nose  CV:  No chest pain, palpitations, dizziness   Resp:  No SOB, cough, wheezing  GI:  See HPI  :  No burning with urination, hematuria  Muscle:  No pain, weakness  Neuro:  No weakness/tingling, memory problems  Psych:  No fatigue, insomnia, mood problems, depression  Heme:  No easy bruisability  Skin:  No rash, edema      PHYSICAL EXAM:     GENERAL:  Appears dyspneic, non-toxic,  HEENT:  Conjunctivae clear and pink,  no JVD  CHEST:  Increased respiratory effort   HEART:  Regular rhythm & rate   ABDOMEN:  Soft, non-tender, non-distended, obese  RECTUM: Brown stool, no external/prolapsed hemorrhoids, perianal hematoma   EXTREMITIES:  2+ LLE>RLE edema   SKIN:  No rash/erythema/ecchymoses/petechiae, chronic venous stasis dermatitis in B/L LE  NEURO:  Alert, orientedx3       Vital Signs:  Vital Signs Last 24 Hrs  T(C): 36.8 (09 Dec 2020 05:03), Max: 36.8 (09 Dec 2020 05:03)  T(F): 98.2 (09 Dec 2020 05:03), Max: 98.2 (09 Dec 2020 05:03)  HR: 81 (09 Dec 2020 05:03) (75 - 83)  BP: 145/77 (09 Dec 2020 05:03) (132/66 - 158/76)  BP(mean): 100 (09 Dec 2020 05:03) (100 - 100)  RR: 18 (09 Dec 2020 05:03) (18 - 18)  SpO2: 98% (09 Dec 2020 05:03) (93% - 100%)  Daily     Daily Weight in k (08 Dec 2020 16:54)    LABS:                        7.2    2.90  )-----------( 99       ( 09 Dec 2020 07:21 )             27.1     12-09    138  |  96  |  30<H>  ----------------------------<  217<H>  4.4   |  31  |  1.45<H>    Ca    8.9      09 Dec 2020 06:50  Phos  4.0       Mg     2.5         TPro  x   /  Alb  x   /  TBili  0.5  /  DBili  x   /  AST  x   /  ALT  x   /  AlkPhos  x       LIVER FUNCTIONS - ( 08 Dec 2020 06:25 )  Alb: 4.1 g/dL / Pro: 7.0 g/dL / ALK PHOS: 83 U/L / ALT: 14 U/L / AST: 22 U/L / GGT: x             Imaging:    < from: CT Abdomen and Pelvis w/ IV Cont (20 @ 19:25) >  ******PRELIMINARY REPORT******    ******PRELIMINARY REPORT******          EXAM:  CT ABDOMEN AND PELVIS IC                            PROCEDURE DATE:  2020      ******PRELIMINARY REPORT******    ******PRELIMINARY REPORT******              INTERPRETATION:  splenomegaly. nodular liver. f/u official report.        ******PRELIMINARY REPORT******    ******PRELIMINARY REPORT******          VINOD MADSEN MD; Resident Radiology

## 2020-12-10 NOTE — CONSULT NOTE ADULT - PROBLEM SELECTOR RECOMMENDATION 9
- increase lasix to 80 mg PO BID  - continue spironolactone 25 mg QD  - BP appears controlled with current therapies including Toprol XL 25 mg QD  - daily standing weights and strict I&O  - HF to sign off, please reconsult for any questions or concern. When stable for discharge contact the HF clinic at [phone] to schedule a follow-up appointment for 4-6 weeks following discharge with Dr. Elvira Daley.

## 2020-12-10 NOTE — PROGRESS NOTE ADULT - PROBLEM SELECTOR PLAN 2
s/p liver transplant in 2011. followed by Dr. Askew as outpatient.  - c/w home Tacro 0.5mg BID, Prednisone 3mg every other day,   - holding home Cellcept 500mg daily.  - c/w PPI BID and Bactrim for prophylaxis.  - transplant hepatology consulted.  - transplant surgery consulted. s/p liver transplant in 2011. followed by Dr. Askew as outpatient.  - c/w home Tacro 0.5mg BID, Prednisone 3mg every other day,   - holding home Cellcept 500mg daily.  - c/w PPI BID and Bactrim for prophylaxis.  - transplant hepatology consulted.  - transplant surgery consulted: recommend following up with transplant hepatology recommendation. No other recs.

## 2020-12-10 NOTE — PROGRESS NOTE ADULT - SUBJECTIVE AND OBJECTIVE BOX
Chief Complaint:  Patient is a 70y old  Male who presents with a chief complaint of symptomatic anemia (10 Dec 2020 07:39)      Interval Events:     CT A/P demonstrates nodular-appearing liver, enlarged spleen - no suspicious masses  TTE demonstrates enlarged RA and RV, normal LV systolic function.   Seen by Hematology - tentative plan for BM biopsy  No overt GI bleeding, brown stools        Allergies:  clindamycin (Unknown)      Hospital Medications:  ALBUTerol    90 MICROgram(s) HFA Inhaler 2 Puff(s) Inhalation every 6 hours  budesonide 160 MICROgram(s)/formoterol 4.5 MICROgram(s) Inhaler 2 Puff(s) Inhalation two times a day  buprenorphine 2 mG/naloxone 0.5 mG SL  Tablet 1 Tablet(s) SubLingual <User Schedule> PRN  dextrose 40% Gel 15 Gram(s) Oral once  dextrose 5%. 1000 milliLiter(s) IV Continuous <Continuous>  dextrose 5%. 1000 milliLiter(s) IV Continuous <Continuous>  dextrose 50% Injectable 25 Gram(s) IV Push once  dextrose 50% Injectable 12.5 Gram(s) IV Push once  dextrose 50% Injectable 25 Gram(s) IV Push once  furosemide    Tablet 80 milliGRAM(s) Oral daily  glucagon  Injectable 1 milliGRAM(s) IntraMuscular once  insulin glargine Injectable (LANTUS) 26 Unit(s) SubCutaneous <User Schedule>  insulin glargine Injectable (LANTUS) 45 Unit(s) SubCutaneous <User Schedule>  insulin lispro (ADMELOG) corrective regimen sliding scale   SubCutaneous three times a day before meals  insulin lispro (ADMELOG) corrective regimen sliding scale   SubCutaneous at bedtime  iron sucrose IVPB 200 milliGRAM(s) IV Intermittent every 24 hours  metoprolol succinate ER 25 milliGRAM(s) Oral daily  multivitamin/minerals 1 Tablet(s) Oral daily  pantoprazole    Tablet 40 milliGRAM(s) Oral two times a day  predniSONE   Tablet 3 milliGRAM(s) Oral every other day  senna 2 Tablet(s) Oral at bedtime  sertraline 100 milliGRAM(s) Oral daily  sodium chloride 0.65% Nasal 1 Spray(s) Both Nostrils daily PRN  tacrolimus 0.5 milliGRAM(s) Oral two times a day  tamsulosin 0.4 milliGRAM(s) Oral at bedtime  tiotropium 18 MICROgram(s) Capsule 1 Capsule(s) Inhalation daily  trimethoprim   80 mG/sulfamethoxazole 400 mG 1 Tablet(s) Oral daily      PMHX/PSHX:  Hyperlipidemia    Hypertension    S/P liver transplant    Type 2 diabetes mellitus    Chronic obstructive pulmonary disease, unspecified COPD type    S/P liver transplant      Family history:  No pertinent family history in first degree relatives      ROS:     General:  No weight loss, fevers, chills, night sweats, fatigue   Eyes:  No vision changes  ENT:  No sore throat, pain, runny nose  CV:  No chest pain, palpitations, dizziness   Resp:  No SOB, cough, wheezing  GI:  See HPI  :  No burning with urination, hematuria  Muscle:  No pain, weakness  Neuro:  No weakness/tingling, memory problems  Psych:  No fatigue, insomnia, mood problems, depression  Heme:  No easy bruisability  Skin:  No rash, edema      PHYSICAL EXAM:     GENERAL:  Appears dyspneic, non-toxic,  HEENT:  Conjunctivae clear and pink,  no JVD  CHEST:  Increased respiratory effort   HEART:  Regular rhythm & rate   ABDOMEN:  Soft, non-tender, non-distended, obese  RECTUM: Brown stool, no external/prolapsed hemorrhoids, perianal hematoma   EXTREMITIES:  2+ LLE>RLE edema   SKIN:  No rash/erythema/ecchymoses/petechiae, chronic venous stasis dermatitis in B/L LE  NEURO:  Alert, orientedx3       Vital Signs:  Vital Signs Last 24 Hrs  T(C): 36.5 (10 Dec 2020 05:19), Max: 36.9 (09 Dec 2020 14:34)  T(F): 97.7 (10 Dec 2020 05:19), Max: 98.4 (09 Dec 2020 14:34)  HR: 78 (10 Dec 2020 05:19) (69 - 84)  BP: 147/80 (10 Dec 2020 05:19) (135/65 - 150/76)  BP(mean): 103 (10 Dec 2020 05:19) (101 - 103)  RR: 18 (10 Dec 2020 05:19) (18 - 18)  SpO2: 100% (10 Dec 2020 05:19) (96% - 100%)  Daily     Daily Weight in k.8 (09 Dec 2020 14:34)    LABS:                        7.2    2.90  )-----------( 99       ( 09 Dec 2020 07:21 )             27.1     12-10    137  |  97  |  27<H>  ----------------------------<  201<H>  4.6   |  30  |  1.52<H>    Ca    8.8      10 Dec 2020 07:11  Phos  2.9     12-10  Mg     2.5     12-10    TPro  6.7  /  Alb  4.1  /  TBili  0.4  /  DBili  x   /  AST  20  /  ALT  10  /  AlkPhos  76  12-10    LIVER FUNCTIONS - ( 10 Dec 2020 07:11 )  Alb: 4.1 g/dL / Pro: 6.7 g/dL / ALK PHOS: 76 U/L / ALT: 10 U/L / AST: 20 U/L / GGT: x           PT/INR - ( 09 Dec 2020 06:50 )   PT: 11.3 sec;   INR: 0.94 ratio          Imaging:

## 2020-12-10 NOTE — CONSULT NOTE ADULT - SUBJECTIVE AND OBJECTIVE BOX
HPI:    Mr. Nguyen is a 70 year old M with history of HFpEF, RV systolic dysfunction, previously followed by Dr. Garcia at [facility]. His history is notable for HCV/ETOH cirrhosis s/p liver transplant  at [facility] with Dr. Askew (on tacrolimus/cellcept/prednisone), HTN, CKD stage 3 (b/l Scr 1.3-1.5), IDDM2 (Ha1c 6.3%), COPD w/ home 02, HLD, chronic lymphedema and remote history of substance abuse (abstained since ) on Suboxone who presented for anemia from outpatient labs. Prior to this, was recently hospitalized in May for mechanical fall, found to be in ADHF where HF was consulted for further management. During that hospitalization, he had required transfusions for MALIA in the setting of +FOBT. He was to follow up with our clinic following discharge but did not present.     He reports having been relatively well but developed worsening fatigue accompanied by marked palpitations with physical exertion and reduced activity tolerance to 1/2 block due to combination of dyspnea and BLE pain. He was taking Lasix 80 mg BID PTA and urinating well to this with reduction in weight. On arrival to ED, hemodynamically stable and afebrile but with pancytopenia (H/H 6.4/24.7, Plt of 108, WBC 3.8, now 2.9) for which he was transfused with 1U PRBC with appropriate response. FOBT negative and CT ABD notable for new splenomegaly. He is now being evaluated by heme/onc and underwent bone marrow biopsy today. He is also being treated with IV iron for MALIA.       PAST MEDICAL & SURGICAL HISTORY:  Hyperlipidemia  Hypertension  S/P liver transplant  Type 2 diabetes mellitus  Chronic obstructive pulmonary disease, unspecified COPD type  on home O2  S/P liver transplant    REVIEW OF SYSTEMS  The 14 point ROS is otherwise negative or non-contributory except as noted in HPI    MEDICATIONS  (STANDING):  ALBUTerol    90 MICROgram(s) HFA Inhaler 2 Puff(s) Inhalation every 6 hours  budesonide 160 MICROgram(s)/formoterol 4.5 MICROgram(s) Inhaler 2 Puff(s) Inhalation two times a day  dextrose 40% Gel 15 Gram(s) Oral once  dextrose 5%. 1000 milliLiter(s) (50 mL/Hr) IV Continuous <Continuous>  dextrose 5%. 1000 milliLiter(s) (100 mL/Hr) IV Continuous <Continuous>  dextrose 50% Injectable 25 Gram(s) IV Push once  dextrose 50% Injectable 12.5 Gram(s) IV Push once  dextrose 50% Injectable 25 Gram(s) IV Push once  furosemide    Tablet 80 milliGRAM(s) Oral daily  glucagon  Injectable 1 milliGRAM(s) IntraMuscular once  insulin glargine Injectable (LANTUS) 26 Unit(s) SubCutaneous <User Schedule>  insulin glargine Injectable (LANTUS) 45 Unit(s) SubCutaneous <User Schedule>  insulin lispro (ADMELOG) corrective regimen sliding scale   SubCutaneous three times a day before meals  insulin lispro (ADMELOG) corrective regimen sliding scale   SubCutaneous at bedtime  iron sucrose IVPB 200 milliGRAM(s) IV Intermittent every 24 hours  metoprolol succinate ER 25 milliGRAM(s) Oral daily  multivitamin/minerals 1 Tablet(s) Oral daily  pantoprazole    Tablet 40 milliGRAM(s) Oral two times a day  predniSONE   Tablet 3 milliGRAM(s) Oral every other day  senna 2 Tablet(s) Oral at bedtime  sertraline 100 milliGRAM(s) Oral daily  tacrolimus 0.5 milliGRAM(s) Oral two times a day  tamsulosin 0.4 milliGRAM(s) Oral at bedtime  tiotropium 18 MICROgram(s) Capsule 1 Capsule(s) Inhalation daily  trimethoprim   80 mG/sulfamethoxazole 400 mG 1 Tablet(s) Oral daily    MEDICATIONS  (PRN):  buprenorphine 2 mG/naloxone 0.5 mG SL  Tablet 1 Tablet(s) SubLingual <User Schedule> PRN pain management  sodium chloride 0.65% Nasal 1 Spray(s) Both Nostrils daily PRN Nasal Congestion      Allergies  clindamycin (Unknown)    SOCIAL HISTORY:  Lives alone  Retired salesman  Former smoker, quit   Remote hx ETOH/substance abuse, last used     FAMILY HISTORY:  Father  from MI at age 59  Brother suffered from MI at age 30     Vital Signs Last 24 Hrs  T(C): 37.3 (10 Dec 2020 15:45), Max: 37.3 (10 Dec 2020 15:45)  T(F): 99.1 (10 Dec 2020 15:45), Max: 99.1 (10 Dec 2020 15:45)  HR: 81 (10 Dec 2020 15:45) (78 - 84)  BP: 129/68 (10 Dec 2020 15:45) (129/68 - 150/76)  BP(mean): 103 (10 Dec 2020 05:19) (101 - 103)  RR: 18 (10 Dec 2020 15:45) (18 - 18)  SpO2: 93% (10 Dec 2020 15:45) (93% - 100%)    PHYSICAL EXAM:  General: Comfortable, NAD  HEET: EOMI  Neck: JVD ~12 cm H20, no masses  Cardiovascular: RRR, S1, S2. III/VI SM over apex  Chest: Clear to auscultation bilaterally  Abdomen: Obese. Soft, Non-distended, not tender, normoactive bowel sounds   Extremities: Trace BLE edema. Hyperpigmented BLE with chronic venous stasis skin changes  Neuro: Alert and oriented.   Psych: Normal affect and appropriate mood    LABS:                        7.2    2.90  )-----------( 99       ( 09 Dec 2020 07:21 )             27.1     12-10    137  |  97  |  27<H>  ----------------------------<  201<H>  4.6   |  30  |  1.52<H>    Ca    8.8      10 Dec 2020 07:11  Phos  2.9     12-10  Mg     2.5     12-10    TPro  6.7  /  Alb  4.1  /  TBili  0.4  /  DBili  x   /  AST  20  /  ALT  10  /  AlkPhos  76  12-10    PT/INR - ( 09 Dec 2020 06:50 )   PT: 11.3 sec;   INR: 0.94 ratio               RADIOLOGY & ADDITIONAL STUDIES:    < from: Transthoracic Echocardiogram (20 @ 14:34) >  PROCEDURE: Transthoracic echocardiogram with 2-D, M-Mode  and complete spectral and color flow Doppler.  INDICATION: Dyspnea, unspecified (R06.00)  ------------------------------------------------------------------------  Dimensions:    Normal Values:  LA:     4.3    2.0 - 4.0 cm  Ao:     3.6    2.0 - 3.8 cm  SEPTUM: 1.1    0.6 - 1.2 cm  PWT:    1.1    0.6 - 1.1 cm  LVIDd:  4.6    3.0 - 5.6 cm  LVIDs:  2.6    1.8 - 4.0 cm  Derived variables:  LVMI: 79 g/m2  RWT: 0.47  Fractional short: 43 %  EF (Visual Estimate): 75 %  Doppler Peak Velocity (m/sec): AoV=2.1  ------------------------------------------------------------------------  Observations:  Mitral Valve: Mitral annular calcification and calcified  mitral leaflets with decreased diastolic opening. Minimal  mitral regurgitation. Mean transmitral valve gradient  equals 5 mm Hg, consistent with mild- moderate mitral  stenosis.  Aortic Valve/Aorta: Aortic valve not well visualized;  appears calcified. Peak transaortic valve gradient equals  18 mm Hg, mean transaortic valve gradient equals 10 mm Hg,  estimated aortic valve area equals 1.5 sqcm (by continuity  equation), aortic valve velocity time integral equals 52  cm, consistent with moderate aortic stenosis. Minimal  aortic regurgitation.  Peak left ventricular outflow tract  gradient equals 4 mm Hg, mean gradient is equal to 2 mm Hg,  LVOT velocity time integral equals 24 cm.  Aortic Root: 3.6 cm.  LVOT diameter: 2 cm.  Left Atrium: Mildly dilated left atrium.  LA volume index =  37 cc/m2.  Left Ventricle: Normal left ventricular systolic function.  No segmental wall motion abnormalities. Normal left  ventricular internal dimensions and wall thicknesses.  Right Heart: Mild right atrial enlargement. Right  ventricular enlargement with normal right ventricular  systolic function. Normal tricuspid valve. Mild tricuspid  regurgitation. Pulmonic valve not well visualized, probably  normal.  Pericardium/Pleura: Normal pericardium with no pericardial  effusion.  Hemodynamic: Estimated right atrial pressure is 8 mm Hg.  Estimated right ventricular systolic pressure equals 41 mm  Hg, assuming right atrial pressure equals 8 mm Hg,  consistent with mild pulmonary hypertension.    < end of copied text >      < from: CT Abdomen and Pelvis w/ IV Cont (20 @ 19:25) >  PROCEDURE:  CT of the Abdomen and Pelvis was performed with intravenous contrast.  Intravenous contrast: 90 ml Omnipaque 350. 10 ml discarded.  Oral contrast: None.  Sagittal and coronal reformats were performed.    FINDINGS:  LOWER CHEST:Aortic valve and mitral annular calcification. Emphysema. Right lower lobe subsegmental atelectasis    LIVER: Status post liver transplant. Heterogeneous attenuation at the dome. Nodular contour. Subcentimeter hypodensity is too small to characterize.  BILE DUCTS: Normal caliber.  GALLBLADDER: Cholecystectomy.  SPLEEN: Splenomegaly.  PANCREAS: Within normal limits.  ADRENALS: Within normal limits.  KIDNEYS/URETERS: No hydronephrosis. Bilateral nonobstructing renal calculi measuring up to 4 mm. Left renal cyst and additional subcentimeter hypodensities too small to characterize.    BLADDER: Within normal limits.  REPRODUCTIVE ORGANS: Prostate is enlarged.    BOWEL: Reversal of the SMA and SMV. Small bowel in the right hemiabdomen, duodenum does not cross midline and large bowel in the left hemiabdomen. No bowel obstruction. Appendix is normal. Colonic diverticulosis.  PERITONEUM: No ascites.  VESSELS: Atherosclerotic changes. Paraesophageal varices.  RETROPERITONEUM/LYMPH NODES: No lymphadenopathy.  ABDOMINAL WALL: Skin thickening/subcutaneous infiltration along the left lower anterior abdominal wall, correlate for subcutaneous injection.  BONES: Left hip arthroplasty. Degenerative changes.    < end of copied text >      < from: Xray Chest 1 View- PORTABLE-Urgent (Xray Chest 1 View- PORTABLE-Urgent .) (20 @ 09:18) >  AP view of the chest demonstrates the lungs to be clear. There is a 3.2 cm right upper lobe bulla. There is no pleural effusion. There is no pneumothorax.    The heart mediastinum and thomas cannot be assessed due to rotation.     The pulmonary vasculature is normal.     Mild thoracic degenerative changes are present.    IMPRESSION:    No acute infiltrate. Right upper lobe bulla.    < end of copied text >

## 2020-12-10 NOTE — PROGRESS NOTE ADULT - ASSESSMENT
71 y/o Male w/ PMH significant for HTN, HLD, T2DM on insulin, COPD (on home O2 3L), EtOH/HCV cirrhosis s/p liver transplant (2011) on Tacro/Cellcelpt/Prednisone, chronic lymphedema presented to the hospital due to Hgb less than 7 found on outpatient labs. Hematology was consulted for anemia and splenomegaly found on CT A/P.      # Anemia  - Normocytic normochromic anemia, reti count index 1.67 c/w hypoproliferation  - FOBT negative, however repeat iron panel compatible with iron deficiency anemia, will need evaluation for bleeding. Evaluation per GI; patient has limited access to outpatient f/u given social situation, DC plan including follow-up outpatient needs to be detailed and be able to be carried out from patient's side.  - F/U other anemia lab including ferritin, B12 and folate, hemolysis labs.  - Per radiology, splenomegaly is up to 18.5cm. Prior CT in 2007 showed normal sized spleen. Unclear if his splenomegaly is due to his previous liver cirrhosis. No other abdominal images available between [id number].  - Etiology of pancytopenia currently unclear, heme team reviewed the peripheral blood smear which showed abnormal looking, hypolobulated neutrophils so we performed bone marrow aspiration and biopsy today.  - Concurrent leukopenia and thrombocytopenia could be due to medication side effect; pt is on immunosuppressants. Check CBC w/ differential.  - Start iron supplement, IV venofer for 2-3 days while in the hospital, and PO iron upon discharge.        Discussed with primary team and hematology attending.        Neva Graham MD  PGY 5, Oncology/Hematology fellow  (P) [phone]  After 5pm, please contact on-call team.

## 2020-12-10 NOTE — CONSULT NOTE ADULT - ATTENDING COMMENTS
severe symptomatic anemia  hx of splenomegaly  hx of liver transplant in 2011  new thrombocytopenia  concern for marrow dysplasia  will check anemia panel, and plan for BMbx   further reccs pending test results.
Hepatology Staff: Ana Abdalla MD    I saw and examined the patient along with  Dr. Morton 12-08-20 @ 10:44 ( Discussed over phone on 12-07-20)  Patient Medical Record, hosptial course was reviewed and summarized as below:    Vitals: Vital Signs Last 24 Hrs  T(C): 37.1 (08 Dec 2020 06:07), Max: 37.1 (08 Dec 2020 06:07)  T(F): 98.7 (08 Dec 2020 06:07), Max: 98.7 (08 Dec 2020 06:07)  HR: 72 (08 Dec 2020 06:07) (63 - 90)  BP: 162/82 (08 Dec 2020 06:07) (138/64 - 162/82)    RR: 18 (08 Dec 2020 06:07) (18 - 20)  SpO2: 97% (08 Dec 2020 06:07) (96% - 100%)    IV Fluids:   Antibiotics:trimethoprim   80 mG/sulfamethoxazole 400 mG 1 Tablet(s) Oral daily    Diuretics:furosemide    Tablet 80 milliGRAM(s) Oral daily    Labs:Creatinine, Serum: 1.56 mg/dL (12-08-20 @ 06:25)  Bilirubin Total, Serum: 0.5 mg/dL (12-08-20 @ 06:25)  Creatinine, Serum: 1.46 mg/dL (12-07-20 @ 11:11)  Bilirubin Total, Serum: 0.5 mg/dL (12-07-20 @ 11:11)  INR: 1.03 ratio (12-07-20 @ 11:11)      I/O: I&O's Summary    07 Dec 2020 07:01  -  08 Dec 2020 07:00  --------------------------------------------------------  IN: 0 mL / OUT: 1700 mL / NET: -1700 mL      Nutritional Status:   Albumin, Serum: 4.1 g/dL (12-08-20 @ 06:25)  Albumin, Serum: 4.4 g/dL (12-07-20 @ 11:11)    Recommendations: Patient with hx of OLT, and multiple comorbidities as outlined by Dr. Morton (HTN, HLD, DM2, CKD (with baseline Cr 1.2-1.4), HFpEF (EF 55% 5/2020), moderate AS (TTE 5/2020), COPD, chronic lymphedema,  history of alcohol-related/HCV cirrhosis s/p liver transplant (7/28/11) and iron deficiency anemia (05/2020))    Patient is admitted for worsening dyspnea and laboratory evidence of acute on chronic anemia. Hb on admission 6.4. No overt GI bleeding (brown stool). Anemia is multifactorial- but likely has a predominant component bone marrow suppression from immunosuppression. I would recommend holding CellCept, and keep Prograf 0.5 mg bid. Keep on Prednisone 3 mg qod ( home dose). His OLT was > 9 yrs ago, and risk of ACR is low.  Will defer EGD/Colonoscopy considering his comorbidities and severe COPD (needing 4 L of O2).  Consider CT AP without contrast to r/o any obvious colo-rectal mass (last colonoscopy was 5 yrs ago. Re-discuss endoscopy plan after imaging.    Plan discussed with Primary team.
Mr. Nguyen appears only mildly overloaded on exam. TTE reviewed with normal LV function and only mildly reduced RV function qualitatively. There are no signs of an infiltrative cardiomyopathy. Please increase his lasix to his home dose of 80 mg PO BID. Will arrange for longitudinal f/u in my clinic roughly 4-6 weeks after discharge.

## 2020-12-10 NOTE — CONSULT NOTE ADULT - PROBLEM SELECTOR PROBLEM 3
Stage 3 chronic kidney disease, unspecified whether stage 3a or 3b CKD Iron deficiency anemia, unspecified iron deficiency anemia type

## 2020-12-10 NOTE — CONSULT NOTE ADULT - ASSESSMENT
Mr. Nguyen is a 70 year old M with HFpEF, RV systolic dysfunction with history notable for HCV/ETOH cirrhosis s/p liver transplant 2011 at [facility] with Dr. Askew (on tacrolimus/cellcept/prednisone), HTN, CKD stage 3 (b/l Scr 1.3-1.5), IDDM2 (Ha1c 6.3%), COPD w/ home 02, HLD, chronic lymphedema and remote history of substance abuse (abstained since 85') on Suboxone who presented for symptomatic anemia found on outpatient labs. Prior to this, was recently hospitalized in May for ADHF where HF was consulted for further management. On arrival to ED, hemodynamically stable and afebrile but with pancytopenia (H/H 6.4/24.7, Plt of 108, WBC 3.8, now 2.9) for which he was transfused with 1U PRBC with appropriate response. FOBT negative and CT ABD notable for new splenomegaly. He is now being evaluated by heme/onc and underwent bone marrow biopsy today. He is also being treated with IV iron for MALIA. From a HF perspective he appears to be mildly volume expanded in the setting of reduced diuretics from home dose but otherwise compensated. Also, repeat TTE this admission with reports of normalized RV systolic function. He is normotensive with stable degree of renal dysfunction.    Mr. Nguyen is a 70 year old M with HFpEF, RV systolic dysfunction with history notable for HCV/ETOH cirrhosis s/p liver transplant 2011 at [facility] with Dr. Askew (on tacrolimus/cellcept/prednisone), HTN, CKD stage 3 (b/l Scr 1.3-1.5), IDDM2 (Ha1c 6.3%), COPD w/ home 02, HLD, chronic lymphedema and remote history of substance abuse (abstained since 85') on Suboxone who presented for symptomatic anemia found on outpatient labs. Prior to this, was recently hospitalized in May for ADHF where HF was consulted for further management. On arrival to ED, hemodynamically stable and afebrile but with pancytopenia (H/H 6.4/24.7, Plt of 108, WBC 3.8, now 2.9) for which he was transfused with 1U PRBC with appropriate response. FOBT negative and CT ABD notable for new splenomegaly. He is now being evaluated by heme/onc and underwent bone marrow biopsy today. He is also being treated with IV iron for MALIA. From a HF perspective he appears to be mildly volume expanded in the setting of reduced diuretics from home dose but otherwise compensated. Also, repeat TTE this admission with reports of normalized RV systolic function (but will review imaging). He is normotensive with stable degree of renal dysfunction.     Relevant Cardiac Studies  TTE 12/8/20: LVIDd 4.6cm, LVEF 75% with VTI 24cm, RVE with nl systolic function, mild biatrial enlargement, min MR, mild-mod MS, mod AS, min AR, mild TR, est RVSP 41 mmHg  TTE 5/14/20: LVIDd 4.2cm, LVEF 55%, grossly reduced RVSF, mod AS, borderline PH (RVSP 36 mmHg)  TTE: 5/24/16: LVEDd 4.7cm, LVEF 60%, mild hypertrophy of interventricular septum without LVOT obstruction, nl RVSF, minimal AS, no PH (no mention of RVSP/PASP)

## 2020-12-10 NOTE — PROCEDURE NOTE - NSICDXPROCEDURE_GEN_ALL_CORE_FT
PROCEDURES:  Bone marrow aspiration 10-Dec-2020 11:38:45  Neva Graham  BX bone marrow 10-Dec-2020 11:38:36  Neva Graham

## 2020-12-10 NOTE — PROGRESS NOTE ADULT - SUBJECTIVE AND OBJECTIVE BOX
Aquilino Hancock Regla  PGY-2  Pager: [phone]    Patient is a 70y old  Male who presents with a chief complaint of symptomatic anemia (09 Dec 2020 13:41)      SUBJECTIVE / OVERNIGHT EVENTS:    MEDICATIONS  (STANDING):  ALBUTerol    90 MICROgram(s) HFA Inhaler 2 Puff(s) Inhalation every 6 hours  budesonide 160 MICROgram(s)/formoterol 4.5 MICROgram(s) Inhaler 2 Puff(s) Inhalation two times a day  dextrose 40% Gel 15 Gram(s) Oral once  dextrose 5%. 1000 milliLiter(s) (50 mL/Hr) IV Continuous <Continuous>  dextrose 5%. 1000 milliLiter(s) (100 mL/Hr) IV Continuous <Continuous>  dextrose 50% Injectable 25 Gram(s) IV Push once  dextrose 50% Injectable 12.5 Gram(s) IV Push once  dextrose 50% Injectable 25 Gram(s) IV Push once  furosemide    Tablet 80 milliGRAM(s) Oral daily  glucagon  Injectable 1 milliGRAM(s) IntraMuscular once  insulin glargine Injectable (LANTUS) 26 Unit(s) SubCutaneous <User Schedule>  insulin glargine Injectable (LANTUS) 45 Unit(s) SubCutaneous <User Schedule>  insulin lispro (ADMELOG) corrective regimen sliding scale   SubCutaneous three times a day before meals  insulin lispro (ADMELOG) corrective regimen sliding scale   SubCutaneous at bedtime  metoprolol succinate ER 25 milliGRAM(s) Oral daily  multivitamin/minerals 1 Tablet(s) Oral daily  pantoprazole    Tablet 40 milliGRAM(s) Oral two times a day  predniSONE   Tablet 3 milliGRAM(s) Oral every other day  senna 2 Tablet(s) Oral at bedtime  sertraline 100 milliGRAM(s) Oral daily  tacrolimus 0.5 milliGRAM(s) Oral two times a day  tamsulosin 0.4 milliGRAM(s) Oral at bedtime  tiotropium 18 MICROgram(s) Capsule 1 Capsule(s) Inhalation daily  trimethoprim   80 mG/sulfamethoxazole 400 mG 1 Tablet(s) Oral daily    MEDICATIONS  (PRN):  buprenorphine 2 mG/naloxone 0.5 mG SL  Tablet 1 Tablet(s) SubLingual <User Schedule> PRN pain management  sodium chloride 0.65% Nasal 1 Spray(s) Both Nostrils daily PRN Nasal Congestion      T(C): 36.5 (12-10-20 @ 05:19), Max: 36.9 (12-09-20 @ 14:34)  HR: 78 (12-10-20 @ 05:19) (69 - 84)  BP: 147/80 (12-10-20 @ 05:19) (135/65 - 150/76)  RR: 18 (12-10-20 @ 05:19) (18 - 18)  SpO2: 100% (12-10-20 @ 05:19) (96% - 100%)  CAPILLARY BLOOD GLUCOSE      POCT Blood Glucose.: 183 mg/dL (09 Dec 2020 21:22)  POCT Blood Glucose.: 171 mg/dL (09 Dec 2020 17:10)  POCT Blood Glucose.: 251 mg/dL (09 Dec 2020 12:27)  POCT Blood Glucose.: 263 mg/dL (09 Dec 2020 08:45)    I&O's Summary    09 Dec 2020 07:01  -  10 Dec 2020 07:00  --------------------------------------------------------  IN: 840 mL / OUT: 1900 mL / NET: -1060 mL        PHYSICAL EXAM:  PHYSICAL EXAM:    Constitutional: NAD. well-developed; well-groomed; well-nourished;  HEENT: AT/NC, PERRLA; EOMI, MMM, no oropharyngeal lesions, no erythema, no exudates, Supple neck; normal thyroid gland, no cervical lymphadenopathy  Respiratory: CTAB. equal aeration bilaterally. no wheezing, no crackes, no rhonchi. no increase in WOB  Cardiovascular: RRR, no M/R/G. 2+ distal pulses. Cap refill < 2 seconds. no JVD  Gastrointestinal: soft; NT/ND, +BS, no rebounding tenderness / guarding / HSM / mass / ascites.  Genitourinary: not examined.  Extremities: no clubbing; no cyanosis; no pedal edema, non-tender to palpation, DP and Radial pulses intact.  Skin: warm and dry; color normal: no rash: no ulcers  Neurological: A&Ox 3; responds to pain; responds to verbal commands; epicritic and protopathic sensation intact: CN nerves grossly intact.   MSK/Back: no deformities. Active and passive ROM intact; strength intact, no CVA tenderness, No joint tenderness, swelling, erythema  Psychiatric: normal mood/affect. Denies SI/HI    LABS:                        7.2    2.90  )-----------( 99       ( 09 Dec 2020 07:21 )             27.1     12-09    138  |  96  |  30<H>  ----------------------------<  217<H>  4.4   |  31  |  1.45<H>    Ca    8.9      09 Dec 2020 06:50  Phos  4.0     12-09  Mg     2.5     12-09    TPro  x   /  Alb  x   /  TBili  0.5  /  DBili  x   /  AST  x   /  ALT  x   /  AlkPhos  x   12-09    PT/INR - ( 09 Dec 2020 06:50 )   PT: 11.3 sec;   INR: 0.94 ratio                   RADIOLOGY & ADDITIONAL TESTS:    Imaging Personally Reviewed: BRANDON    Consultant(s) Notes Reviewed:  BRANDON    Care Discussed with Consultants/Other Providers: BRANDON   Aquilino Hancock Regla  PGY-2  Pager: [phone]    Patient is a 70y old  Male who presents with a chief complaint of symptomatic anemia (09 Dec 2020 13:41)      SUBJECTIVE / OVERNIGHT EVENTS:  no acute events overnight.  Patient subjectively doing okay.   Patient denies any active bleeding. Denies CP, palpitation, SOB (other than his baseline), palpitation. urinating well.  He has been ambulating with NC O2. No fever, chills, rigors.    MEDICATIONS  (STANDING):  ALBUTerol    90 MICROgram(s) HFA Inhaler 2 Puff(s) Inhalation every 6 hours  budesonide 160 MICROgram(s)/formoterol 4.5 MICROgram(s) Inhaler 2 Puff(s) Inhalation two times a day  dextrose 40% Gel 15 Gram(s) Oral once  dextrose 5%. 1000 milliLiter(s) (50 mL/Hr) IV Continuous <Continuous>  dextrose 5%. 1000 milliLiter(s) (100 mL/Hr) IV Continuous <Continuous>  dextrose 50% Injectable 25 Gram(s) IV Push once  dextrose 50% Injectable 12.5 Gram(s) IV Push once  dextrose 50% Injectable 25 Gram(s) IV Push once  furosemide    Tablet 80 milliGRAM(s) Oral daily  glucagon  Injectable 1 milliGRAM(s) IntraMuscular once  insulin glargine Injectable (LANTUS) 26 Unit(s) SubCutaneous <User Schedule>  insulin glargine Injectable (LANTUS) 45 Unit(s) SubCutaneous <User Schedule>  insulin lispro (ADMELOG) corrective regimen sliding scale   SubCutaneous three times a day before meals  insulin lispro (ADMELOG) corrective regimen sliding scale   SubCutaneous at bedtime  metoprolol succinate ER 25 milliGRAM(s) Oral daily  multivitamin/minerals 1 Tablet(s) Oral daily  pantoprazole    Tablet 40 milliGRAM(s) Oral two times a day  predniSONE   Tablet 3 milliGRAM(s) Oral every other day  senna 2 Tablet(s) Oral at bedtime  sertraline 100 milliGRAM(s) Oral daily  tacrolimus 0.5 milliGRAM(s) Oral two times a day  tamsulosin 0.4 milliGRAM(s) Oral at bedtime  tiotropium 18 MICROgram(s) Capsule 1 Capsule(s) Inhalation daily  trimethoprim   80 mG/sulfamethoxazole 400 mG 1 Tablet(s) Oral daily    MEDICATIONS  (PRN):  buprenorphine 2 mG/naloxone 0.5 mG SL  Tablet 1 Tablet(s) SubLingual <User Schedule> PRN pain management  sodium chloride 0.65% Nasal 1 Spray(s) Both Nostrils daily PRN Nasal Congestion      T(C): 36.5 (12-10-20 @ 05:19), Max: 36.9 (12-09-20 @ 14:34)  HR: 78 (12-10-20 @ 05:19) (69 - 84)  BP: 147/80 (12-10-20 @ 05:19) (135/65 - 150/76)  RR: 18 (12-10-20 @ 05:19) (18 - 18)  SpO2: 100% (12-10-20 @ 05:19) (96% - 100%)  CAPILLARY BLOOD GLUCOSE      POCT Blood Glucose.: 183 mg/dL (09 Dec 2020 21:22)  POCT Blood Glucose.: 171 mg/dL (09 Dec 2020 17:10)  POCT Blood Glucose.: 251 mg/dL (09 Dec 2020 12:27)  POCT Blood Glucose.: 263 mg/dL (09 Dec 2020 08:45)    I&O's Summary    09 Dec 2020 07:01  -  10 Dec 2020 07:00  --------------------------------------------------------  IN: 840 mL / OUT: 1900 mL / NET: -1060 mL      PHYSICAL EXAM:  Constitutional: NAD. lying on bed comfortably on NC 3.5L.  HEENT: AT/NC, PERRLA; EOMI, conjunctival pallor, MMM supple neck.  Respiratory: CTAB with prolonged expiratory phase. equal aeration bilaterally. no increase in WOB  Cardiovascular: RRR, systolic murmur heard at RUSB. 2+ distal pulses. Cap refill < 3 seconds.  Gastrointestinal: soft; NT/ND, +BS  Genitourinary: not examined.  Extremities: no cyanosis; bilateral lymphedema with chronic skin changes, non-tender to palpation, DP and Radial pulses intact.  Neurological: A&Ox 3; responds to pain; responds to verbal commands; epicritic and protopathic sensation intact: CN nerves grossly intact. moving all four extremities against gravity.   Psychiatric: normal mood/affect.    LABS:                        7.2    2.90  )-----------( 99       ( 09 Dec 2020 07:21 )             27.1     12-09    138  |  96  |  30<H>  ----------------------------<  217<H>  4.4   |  31  |  1.45<H>    Ca    8.9      09 Dec 2020 06:50  Phos  4.0     12-09  Mg     2.5     12-09    TPro  x   /  Alb  x   /  TBili  0.5  /  DBili  x   /  AST  x   /  ALT  x   /  AlkPhos  x   12-09    PT/INR - ( 09 Dec 2020 06:50 )   PT: 11.3 sec;   INR: 0.94 ratio                   RADIOLOGY & ADDITIONAL TESTS:    Imaging Personally Reviewed: BRANDON    Consultant(s) Notes Reviewed:  BRANDON    Care Discussed with Consultants/Other Providers: BRANDON

## 2020-12-10 NOTE — PROGRESS NOTE ADULT - PROBLEM SELECTOR PLAN 1
Hgb 6.4 upon admission, worsening GOMEZ/palpitation/CP concerning for symptomatic anemia.  - s/p 1u pRBC. responded well.  - Cr baseline 1.3~1.6 possible CKD contributing to anemia  - Iron studies in last admission shows component of MALIA/AoCD.  - no signs of acute GIB.   - TTE to assess his valvulopathy and RV systolic dysfunction found on prior TTE.  - GI consulted given liver transplant status and prior history of anemia requring transfusion: no plan for urgent scope procedures.  - Hematology consulted: will look at the smear. Possible BM biopsy if indicated.  - repeat iron studies and B12/folate.  - Patient had some red blood on tissue after BM (< 3 cc per writer's observation). No blood inside the bowl. No anemic symptoms. Likely hemorrhoidal. Will give stool softner PRN. Hgb 6.4 upon admission, worsening GOMEZ/palpitation/CP concerning for symptomatic anemia.  - s/p 1u pRBC. responded well.  - Cr baseline 1.3~1.6 possible CKD contributing to anemia  - Iron studies in last admission shows component of MALIA/AoCD.  - repeat iron study shows MALIA picture. Ferritin pending.  - no signs of acute large GIB.   - TTE to assess his valvulopathy and RV systolic dysfunction found on prior TTE.  - GI consulted given liver transplant status and prior history of anemia requring transfusion: no plan for urgent scope procedures. outpatient follow up  - Hematology consulted: BM biopsy today.  - IV iron 200mg today and tomorrow. PO Iron supp afterwards. Follow up with hematology as outpatient for BM biopsy result.  - Patient had some red blood on tissue after BM (< 3 cc per writer's observation). No blood inside the bowl. No anemic symptoms. Likely hemorrhoidal. Will give stool softner PRN.

## 2020-12-10 NOTE — PROGRESS NOTE ADULT - PROBLEM SELECTOR PLAN 5
Hx of T2DM on insulin  - was on long acting insulin 26u AM and 45u PM during last admission.  - c/w Lantus 26u AM and 45u PM with SSI qAC+qHS  - monitor FS.

## 2020-12-10 NOTE — PROGRESS NOTE ADULT - SUBJECTIVE AND OBJECTIVE BOX
Hematology Follow-up    INTERVAL HPI/OVERNIGHT EVENTS:  Patient S&E at bedside. No o/n events, patient resting comfortably.     VITAL SIGNS:  T(F): 97.7 (12-10-20 @ 05:19)  HR: 78 (12-10-20 @ 05:19)  BP: 147/80 (12-10-20 @ 05:19)  RR: 18 (12-10-20 @ 05:19)  SpO2: 100% (12-10-20 @ 05:19)  Wt(kg): --    PHYSICAL EXAM:    Constitutional: AAOx3, NAD,   Eyes: PERRL, EOMI, sclera non-icteric  Neck: supple, no masses, no JVD  Respiratory: CTA b/l, good air entry b/l, no wheezing, rhonchi, rales, with normal respiratory effort and no intercostal retractions  Cardiovascular: RRR, normal S1S2, no M/R/G  Gastrointestinal: soft, NTND, no masses palpable, BS normal in all four quadrants, no HSM  Extremities:  no c/c/e  Neurological: Grossly intact  Skin: Normal temperature    MEDICATIONS  (STANDING):  ALBUTerol    90 MICROgram(s) HFA Inhaler 2 Puff(s) Inhalation every 6 hours  budesonide 160 MICROgram(s)/formoterol 4.5 MICROgram(s) Inhaler 2 Puff(s) Inhalation two times a day  dextrose 40% Gel 15 Gram(s) Oral once  dextrose 5%. 1000 milliLiter(s) (50 mL/Hr) IV Continuous <Continuous>  dextrose 5%. 1000 milliLiter(s) (100 mL/Hr) IV Continuous <Continuous>  dextrose 50% Injectable 25 Gram(s) IV Push once  dextrose 50% Injectable 12.5 Gram(s) IV Push once  dextrose 50% Injectable 25 Gram(s) IV Push once  furosemide    Tablet 80 milliGRAM(s) Oral daily  glucagon  Injectable 1 milliGRAM(s) IntraMuscular once  insulin glargine Injectable (LANTUS) 26 Unit(s) SubCutaneous <User Schedule>  insulin glargine Injectable (LANTUS) 45 Unit(s) SubCutaneous <User Schedule>  insulin lispro (ADMELOG) corrective regimen sliding scale   SubCutaneous three times a day before meals  insulin lispro (ADMELOG) corrective regimen sliding scale   SubCutaneous at bedtime  iron sucrose IVPB 200 milliGRAM(s) IV Intermittent every 24 hours  metoprolol succinate ER 25 milliGRAM(s) Oral daily  multivitamin/minerals 1 Tablet(s) Oral daily  pantoprazole    Tablet 40 milliGRAM(s) Oral two times a day  predniSONE   Tablet 3 milliGRAM(s) Oral every other day  senna 2 Tablet(s) Oral at bedtime  sertraline 100 milliGRAM(s) Oral daily  tacrolimus 0.5 milliGRAM(s) Oral two times a day  tamsulosin 0.4 milliGRAM(s) Oral at bedtime  tiotropium 18 MICROgram(s) Capsule 1 Capsule(s) Inhalation daily  trimethoprim   80 mG/sulfamethoxazole 400 mG 1 Tablet(s) Oral daily    MEDICATIONS  (PRN):  buprenorphine 2 mG/naloxone 0.5 mG SL  Tablet 1 Tablet(s) SubLingual <User Schedule> PRN pain management  sodium chloride 0.65% Nasal 1 Spray(s) Both Nostrils daily PRN Nasal Congestion      clindamycin (Unknown)      LABS:                        7.2    2.90  )-----------( 99       ( 09 Dec 2020 07:21 )             27.1     12-10    137  |  97  |  27<H>  ----------------------------<  201<H>  4.6   |  30  |  1.52<H>    Ca    8.8      10 Dec 2020 07:11  Phos  2.9     12-10  Mg     2.5     12-10    TPro  6.7  /  Alb  4.1  /  TBili  0.4  /  DBili  x   /  AST  20  /  ALT  10  /  AlkPhos  76  12-10    PT/INR - ( 09 Dec 2020 06:50 )   PT: 11.3 sec;   INR: 0.94 ratio              Vitamin B12, Serum: 845 pg/mL (12-09-20 @ 20:24)  Folate, Serum: 16.7 ng/mL (12-09-20 @ 20:24)  Iron Total, Serum: 26 ug/dL (12-09-20 @ 20:18)  Unsaturated Iron Binding Capacity: 423 ug/dL (12-09-20 @ 20:18)  Iron - Total Binding Capacity.: 450 ug/dL (12-09-20 @ 20:18)  % Saturation, Iron: 6 % (12-09-20 @ 20:18)        RADIOLOGY & ADDITIONAL TESTS:  Studies reviewed.   Hematology Follow-up    INTERVAL HPI/OVERNIGHT EVENTS:  Patient S&E at bedside. No o/n events, patient resting comfortably.     VITAL SIGNS:  T(F): 97.7 (12-10-20 @ 05:19)  HR: 78 (12-10-20 @ 05:19)  BP: 147/80 (12-10-20 @ 05:19)  RR: 18 (12-10-20 @ 05:19)  SpO2: 100% (12-10-20 @ 05:19)  Wt(kg): --    PHYSICAL EXAM:    Constitutional: AAOx3, NAD, obese  Eyes: EOMI, sclera non-icteric  Neck: supple, no masses, no JVD  Respiratory: CTA b/l, good air entry b/l  Cardiovascular: RRR, normal S1S2  Gastrointestinal: soft, NTND  Extremities:  no c/c/e  Neurological: Grossly intact  Skin: Normal temperature    MEDICATIONS  (STANDING):  ALBUTerol    90 MICROgram(s) HFA Inhaler 2 Puff(s) Inhalation every 6 hours  budesonide 160 MICROgram(s)/formoterol 4.5 MICROgram(s) Inhaler 2 Puff(s) Inhalation two times a day  dextrose 40% Gel 15 Gram(s) Oral once  dextrose 5%. 1000 milliLiter(s) (50 mL/Hr) IV Continuous <Continuous>  dextrose 5%. 1000 milliLiter(s) (100 mL/Hr) IV Continuous <Continuous>  dextrose 50% Injectable 25 Gram(s) IV Push once  dextrose 50% Injectable 12.5 Gram(s) IV Push once  dextrose 50% Injectable 25 Gram(s) IV Push once  furosemide    Tablet 80 milliGRAM(s) Oral daily  glucagon  Injectable 1 milliGRAM(s) IntraMuscular once  insulin glargine Injectable (LANTUS) 26 Unit(s) SubCutaneous <User Schedule>  insulin glargine Injectable (LANTUS) 45 Unit(s) SubCutaneous <User Schedule>  insulin lispro (ADMELOG) corrective regimen sliding scale   SubCutaneous three times a day before meals  insulin lispro (ADMELOG) corrective regimen sliding scale   SubCutaneous at bedtime  iron sucrose IVPB 200 milliGRAM(s) IV Intermittent every 24 hours  metoprolol succinate ER 25 milliGRAM(s) Oral daily  multivitamin/minerals 1 Tablet(s) Oral daily  pantoprazole    Tablet 40 milliGRAM(s) Oral two times a day  predniSONE   Tablet 3 milliGRAM(s) Oral every other day  senna 2 Tablet(s) Oral at bedtime  sertraline 100 milliGRAM(s) Oral daily  tacrolimus 0.5 milliGRAM(s) Oral two times a day  tamsulosin 0.4 milliGRAM(s) Oral at bedtime  tiotropium 18 MICROgram(s) Capsule 1 Capsule(s) Inhalation daily  trimethoprim   80 mG/sulfamethoxazole 400 mG 1 Tablet(s) Oral daily    MEDICATIONS  (PRN):  buprenorphine 2 mG/naloxone 0.5 mG SL  Tablet 1 Tablet(s) SubLingual <User Schedule> PRN pain management  sodium chloride 0.65% Nasal 1 Spray(s) Both Nostrils daily PRN Nasal Congestion      clindamycin (Unknown)      LABS:                        7.2    2.90  )-----------( 99       ( 09 Dec 2020 07:21 )             27.1     12-10    137  |  97  |  27<H>  ----------------------------<  201<H>  4.6   |  30  |  1.52<H>    Ca    8.8      10 Dec 2020 07:11  Phos  2.9     12-10  Mg     2.5     12-10    TPro  6.7  /  Alb  4.1  /  TBili  0.4  /  DBili  x   /  AST  20  /  ALT  10  /  AlkPhos  76  12-10    PT/INR - ( 09 Dec 2020 06:50 )   PT: 11.3 sec;   INR: 0.94 ratio              Vitamin B12, Serum: 845 pg/mL (12-09-20 @ 20:24)  Folate, Serum: 16.7 ng/mL (12-09-20 @ 20:24)  Iron Total, Serum: 26 ug/dL (12-09-20 @ 20:18)  Unsaturated Iron Binding Capacity: 423 ug/dL (12-09-20 @ 20:18)  Iron - Total Binding Capacity.: 450 ug/dL (12-09-20 @ 20:18)  % Saturation, Iron: 6 % (12-09-20 @ 20:18)        RADIOLOGY & ADDITIONAL TESTS:  Studies reviewed.

## 2020-12-10 NOTE — PROGRESS NOTE ADULT - PROBLEM SELECTOR PLAN 3
Hx of RV Systolic dysfunction during last admission on TTE.   - Patient used to follow cardiologist at [facility] yet now moved to [facility]. Not has followed by cardiologist for a long time.  - Seen by Heart failure during last admission, supposedly follow Dr. Daley as outpatient.   - currently on diuretics: Furosemide 80mg daily.   - ASA on hold in setting of anemia  - Metoprolol Succinate 25mg daily.  - TTE: unchanged from prior Echo.   - HF consulted for evaluation and establishment of care per patient request.

## 2020-12-10 NOTE — PROGRESS NOTE ADULT - PROBLEM SELECTOR PLAN 8
- DVT ppx: SCD in setting of symptomatic anemia, patient ambulating  - Diet: DASH/Diabetic Diet  - Dispo: pending Anemia work up.

## 2020-12-10 NOTE — PROCEDURE NOTE - NSPOSTCAREGUIDE_GEN_A_CORE
Instructed patient/caregiver regarding signs and symptoms of infection/Keep the cast/splint/dressing clean and dry/Verbal/written post procedure instructions were given to patient/caregiver

## 2020-12-10 NOTE — PROGRESS NOTE ADULT - ASSESSMENT
Impression:    #Acute-on-chronic anemia: Grossly brown stool in rectal vault with heme-negative stools. Iron studies 12/9/20 c/w severe iron deficiency. Doubt active GI bleeding at this time. Differential for anemia includes bone marrow suppression (retic index indicative of hypoproliferation) vs. hypersplenism (splenomegaly on CT A/P) vs. occult blood loss from GI tract from GI malignancy or angioectasia.   #Pancytopenia: Concern for BM suppression as above vs. cirrhosis of transplanted liver.  #Post-liver transplant: Concern for cirrhosis of transplanted liver however synthetic function intact (INR normal). Immunosuppressed on Prograf/CellCept/prednisone. On prophylactic Bactrim and nystatin.   #HFpEF: Right ventricular systolic dysfunction and moderate AS with normal LVEF noted on TTE from 05/2020.   #CKD with serum Cr within baseline range  #Type 2 diabetes    Recommendations:  - please order US elastography to assess for hepatic fibrosis - may also be done as outpatient  - f/u Hematology recs re: bone marrow biopsy and IV iron supplementation  - continue to hold home CellCept at this time   - continue tacrolimus 0.5mg BID and prednisone 3 mg QOD   - will need outpatient EGD/colonoscopy for evaluation of severe iron deficiency once discharge from hospital (patient was advised to follow up with Dr. Abdalla at the [facility] at [facility] [phone] and contact information was provided)      Elsie Morton PGY-5  Gastroenterology/Hepatology Fellow  Pager #[phone]  E-mail [email or web address]

## 2020-12-10 NOTE — PROGRESS NOTE ADULT - ASSESSMENT
Mr. Nguyen is a 71 y/o Male w/ PMH significant for HTN, HLD, T2DM on insulin, COPD (on home O2 3L), EtOH/HCV cirrhosis s/p liver transplant (2011) on Tacro/Cellcept/Prednisone, chronic lymphedema, who is admitted for symptomatic anemia, responding well to 1u pRBC. No urgent scope need. Now pending hematology anemia workup.

## 2020-12-11 NOTE — PROGRESS NOTE ADULT - SUBJECTIVE AND OBJECTIVE BOX
Aquilino Hancock Regla  PGY-2  Pager: [phone]    Patient is a 70y old  Male who presents with a chief complaint of symptomatic anemia (11 Dec 2020 08:51)      SUBJECTIVE / OVERNIGHT EVENTS:    MEDICATIONS  (STANDING):  ALBUTerol    90 MICROgram(s) HFA Inhaler 2 Puff(s) Inhalation every 6 hours  budesonide 160 MICROgram(s)/formoterol 4.5 MICROgram(s) Inhaler 2 Puff(s) Inhalation two times a day  dextrose 40% Gel 15 Gram(s) Oral once  dextrose 5%. 1000 milliLiter(s) (50 mL/Hr) IV Continuous <Continuous>  dextrose 5%. 1000 milliLiter(s) (100 mL/Hr) IV Continuous <Continuous>  dextrose 50% Injectable 25 Gram(s) IV Push once  dextrose 50% Injectable 12.5 Gram(s) IV Push once  dextrose 50% Injectable 25 Gram(s) IV Push once  furosemide    Tablet 80 milliGRAM(s) Oral two times a day  glucagon  Injectable 1 milliGRAM(s) IntraMuscular once  insulin glargine Injectable (LANTUS) 45 Unit(s) SubCutaneous <User Schedule>  insulin glargine Injectable (LANTUS) 26 Unit(s) SubCutaneous <User Schedule>  insulin lispro (ADMELOG) corrective regimen sliding scale   SubCutaneous three times a day before meals  insulin lispro (ADMELOG) corrective regimen sliding scale   SubCutaneous at bedtime  iron sucrose IVPB 200 milliGRAM(s) IV Intermittent every 24 hours  metoprolol succinate ER 25 milliGRAM(s) Oral daily  multivitamin/minerals 1 Tablet(s) Oral daily  pantoprazole    Tablet 40 milliGRAM(s) Oral two times a day  predniSONE   Tablet 3 milliGRAM(s) Oral every other day  senna 2 Tablet(s) Oral at bedtime  sertraline 100 milliGRAM(s) Oral daily  tacrolimus 0.5 milliGRAM(s) Oral two times a day  tamsulosin 0.4 milliGRAM(s) Oral at bedtime  tiotropium 18 MICROgram(s) Capsule 1 Capsule(s) Inhalation daily  trimethoprim   80 mG/sulfamethoxazole 400 mG 1 Tablet(s) Oral daily    MEDICATIONS  (PRN):  buprenorphine 2 mG/naloxone 0.5 mG SL  Tablet 1 Tablet(s) SubLingual <User Schedule> PRN pain management  sodium chloride 0.65% Nasal 1 Spray(s) Both Nostrils daily PRN Nasal Congestion      T(C): 36.5 (12-11-20 @ 08:30), Max: 37.3 (12-10-20 @ 15:45)  HR: 77 (12-11-20 @ 08:30) (77 - 94)  BP: 159/68 (12-11-20 @ 08:30) (129/68 - 159/68)  RR: 18 (12-11-20 @ 08:30) (17 - 18)  SpO2: 94% (12-11-20 @ 08:30) (93% - 95%)  CAPILLARY BLOOD GLUCOSE      POCT Blood Glucose.: 170 mg/dL (11 Dec 2020 08:19)  POCT Blood Glucose.: 305 mg/dL (10 Dec 2020 21:12)  POCT Blood Glucose.: 199 mg/dL (10 Dec 2020 17:11)  POCT Blood Glucose.: 237 mg/dL (10 Dec 2020 12:37)    I&O's Summary    10 Dec 2020 07:01  -  11 Dec 2020 07:00  --------------------------------------------------------  IN: 100 mL / OUT: 2350 mL / NET: -2250 mL    11 Dec 2020 07:01  -  11 Dec 2020 09:07  --------------------------------------------------------  IN: 0 mL / OUT: 900 mL / NET: -900 mL        PHYSICAL EXAM:  PHYSICAL EXAM:    Constitutional: NAD. well-developed; well-groomed; well-nourished;  HEENT: AT/NC, PERRLA; EOMI, MMM, no oropharyngeal lesions, no erythema, no exudates, Supple neck; normal thyroid gland, no cervical lymphadenopathy  Respiratory: CTAB. equal aeration bilaterally. no wheezing, no crackes, no rhonchi. no increase in WOB  Cardiovascular: RRR, no M/R/G. 2+ distal pulses. Cap refill < 2 seconds. no JVD  Gastrointestinal: soft; NT/ND, +BS, no rebounding tenderness / guarding / HSM / mass / ascites.  Genitourinary: not examined.  Extremities: no clubbing; no cyanosis; no pedal edema, non-tender to palpation, DP and Radial pulses intact.  Skin: warm and dry; color normal: no rash: no ulcers  Neurological: A&Ox 3; responds to pain; responds to verbal commands; epicritic and protopathic sensation intact: CN nerves grossly intact.   MSK/Back: no deformities. Active and passive ROM intact; strength intact, no CVA tenderness, No joint tenderness, swelling, erythema  Psychiatric: normal mood/affect. Denies SI/HI    LABS:                        7.0    3.67  )-----------( 92       ( 11 Dec 2020 07:18 )             26.5     12-11    137  |  95<L>  |  31<H>  ----------------------------<  146<H>  4.1   |  31  |  1.52<H>    Ca    9.0      11 Dec 2020 07:18  Phos  3.1     12-11  Mg     2.4     12-11    TPro  7.1  /  Alb  4.2  /  TBili  0.5  /  DBili  x   /  AST  25  /  ALT  14  /  AlkPhos  79  12-11              RADIOLOGY & ADDITIONAL TESTS:    Imaging Personally Reviewed: BRANDON    Consultant(s) Notes Reviewed:  BRANDON    Care Discussed with Consultants/Other Providers: BRANDON   Aquilino Hancock Regla  PGY-2  Pager: [phone]    Patient is a 70y old  Male who presents with a chief complaint of symptomatic anemia (11 Dec 2020 08:51)      SUBJECTIVE / OVERNIGHT EVENTS:  pt reports being a little weak, just like when he came in for admission.  Patient denies any active bleeding. Denies CP, palpitation, SOB (other than his baseline), palpitation. urinating well.  He has been ambulating with NC O2. No fever, chills, rigors.    MEDICATIONS  (STANDING):  ALBUTerol    90 MICROgram(s) HFA Inhaler 2 Puff(s) Inhalation every 6 hours  budesonide 160 MICROgram(s)/formoterol 4.5 MICROgram(s) Inhaler 2 Puff(s) Inhalation two times a day  dextrose 40% Gel 15 Gram(s) Oral once  dextrose 5%. 1000 milliLiter(s) (50 mL/Hr) IV Continuous <Continuous>  dextrose 5%. 1000 milliLiter(s) (100 mL/Hr) IV Continuous <Continuous>  dextrose 50% Injectable 25 Gram(s) IV Push once  dextrose 50% Injectable 12.5 Gram(s) IV Push once  dextrose 50% Injectable 25 Gram(s) IV Push once  furosemide    Tablet 80 milliGRAM(s) Oral two times a day  glucagon  Injectable 1 milliGRAM(s) IntraMuscular once  insulin glargine Injectable (LANTUS) 45 Unit(s) SubCutaneous <User Schedule>  insulin glargine Injectable (LANTUS) 26 Unit(s) SubCutaneous <User Schedule>  insulin lispro (ADMELOG) corrective regimen sliding scale   SubCutaneous three times a day before meals  insulin lispro (ADMELOG) corrective regimen sliding scale   SubCutaneous at bedtime  iron sucrose IVPB 200 milliGRAM(s) IV Intermittent every 24 hours  metoprolol succinate ER 25 milliGRAM(s) Oral daily  multivitamin/minerals 1 Tablet(s) Oral daily  pantoprazole    Tablet 40 milliGRAM(s) Oral two times a day  predniSONE   Tablet 3 milliGRAM(s) Oral every other day  senna 2 Tablet(s) Oral at bedtime  sertraline 100 milliGRAM(s) Oral daily  tacrolimus 0.5 milliGRAM(s) Oral two times a day  tamsulosin 0.4 milliGRAM(s) Oral at bedtime  tiotropium 18 MICROgram(s) Capsule 1 Capsule(s) Inhalation daily  trimethoprim   80 mG/sulfamethoxazole 400 mG 1 Tablet(s) Oral daily    MEDICATIONS  (PRN):  buprenorphine 2 mG/naloxone 0.5 mG SL  Tablet 1 Tablet(s) SubLingual <User Schedule> PRN pain management  sodium chloride 0.65% Nasal 1 Spray(s) Both Nostrils daily PRN Nasal Congestion      T(C): 36.5 (12-11-20 @ 08:30), Max: 37.3 (12-10-20 @ 15:45)  HR: 77 (12-11-20 @ 08:30) (77 - 94)  BP: 159/68 (12-11-20 @ 08:30) (129/68 - 159/68)  RR: 18 (12-11-20 @ 08:30) (17 - 18)  SpO2: 94% (12-11-20 @ 08:30) (93% - 95%)  CAPILLARY BLOOD GLUCOSE      POCT Blood Glucose.: 170 mg/dL (11 Dec 2020 08:19)  POCT Blood Glucose.: 305 mg/dL (10 Dec 2020 21:12)  POCT Blood Glucose.: 199 mg/dL (10 Dec 2020 17:11)  POCT Blood Glucose.: 237 mg/dL (10 Dec 2020 12:37)    I&O's Summary    10 Dec 2020 07:01  -  11 Dec 2020 07:00  --------------------------------------------------------  IN: 100 mL / OUT: 2350 mL / NET: -2250 mL    11 Dec 2020 07:01  -  11 Dec 2020 09:07  --------------------------------------------------------  IN: 0 mL / OUT: 900 mL / NET: -900 mL        PHYSICAL EXAM:  PHYSICAL EXAM:    Constitutional: NAD. lying on bed comfortably on NC 3.5L.  HEENT: AT/NC, PERRLA; EOMI, conjunctival pallor, MMM supple neck.  Respiratory: CTAB with prolonged expiratory phase. equal aeration bilaterally. no increase in WOB  Cardiovascular: RRR, systolic murmur heard at RUSB. 2+ distal pulses. Cap refill < 3 seconds.  Gastrointestinal: soft; NT/ND, +BS  Genitourinary: not examined.  Extremities: no cyanosis; bilateral lymphedema with chronic skin changes, non-tender to palpation, DP and Radial pulses intact.  Neurological: A&Ox 3; responds to pain; responds to verbal commands; epicritic and protopathic sensation intact: CN nerves grossly intact. moving all four extremities against gravity.   Psychiatric: normal mood/affect.    LABS:                        7.0    3.67  )-----------( 92       ( 11 Dec 2020 07:18 )             26.5     12-11    137  |  95<L>  |  31<H>  ----------------------------<  146<H>  4.1   |  31  |  1.52<H>    Ca    9.0      11 Dec 2020 07:18  Phos  3.1     12-11  Mg     2.4     12-11    TPro  7.1  /  Alb  4.2  /  TBili  0.5  /  DBili  x   /  AST  25  /  ALT  14  /  AlkPhos  79  12-11              RADIOLOGY & ADDITIONAL TESTS:    Imaging Personally Reviewed: BRANDON    Consultant(s) Notes Reviewed:  BRANDON    Care Discussed with Consultants/Other Providers: BRANDON

## 2020-12-11 NOTE — PROGRESS NOTE ADULT - SUBJECTIVE AND OBJECTIVE BOX
Chief Complaint:  Patient is a 70y old  Male who presents with a chief complaint of symptomatic anemia (10 Dec 2020 16:44)      Interval Events:     Seen by Heart Failure - Lasix increased to 80 PO BID  Bone marrow bx performed yesterday  Given IV iron sucrose, started on iron tablets     Allergies:  clindamycin (Unknown)      Hospital Medications:  ALBUTerol    90 MICROgram(s) HFA Inhaler 2 Puff(s) Inhalation every 6 hours  budesonide 160 MICROgram(s)/formoterol 4.5 MICROgram(s) Inhaler 2 Puff(s) Inhalation two times a day  buprenorphine 2 mG/naloxone 0.5 mG SL  Tablet 1 Tablet(s) SubLingual <User Schedule> PRN  dextrose 40% Gel 15 Gram(s) Oral once  dextrose 5%. 1000 milliLiter(s) IV Continuous <Continuous>  dextrose 5%. 1000 milliLiter(s) IV Continuous <Continuous>  dextrose 50% Injectable 25 Gram(s) IV Push once  dextrose 50% Injectable 12.5 Gram(s) IV Push once  dextrose 50% Injectable 25 Gram(s) IV Push once  furosemide    Tablet 80 milliGRAM(s) Oral two times a day  glucagon  Injectable 1 milliGRAM(s) IntraMuscular once  insulin glargine Injectable (LANTUS) 45 Unit(s) SubCutaneous <User Schedule>  insulin glargine Injectable (LANTUS) 26 Unit(s) SubCutaneous <User Schedule>  insulin lispro (ADMELOG) corrective regimen sliding scale   SubCutaneous three times a day before meals  insulin lispro (ADMELOG) corrective regimen sliding scale   SubCutaneous at bedtime  iron sucrose IVPB 200 milliGRAM(s) IV Intermittent every 24 hours  metoprolol succinate ER 25 milliGRAM(s) Oral daily  multivitamin/minerals 1 Tablet(s) Oral daily  pantoprazole    Tablet 40 milliGRAM(s) Oral two times a day  predniSONE   Tablet 3 milliGRAM(s) Oral every other day  senna 2 Tablet(s) Oral at bedtime  sertraline 100 milliGRAM(s) Oral daily  sodium chloride 0.65% Nasal 1 Spray(s) Both Nostrils daily PRN  tacrolimus 0.5 milliGRAM(s) Oral two times a day  tamsulosin 0.4 milliGRAM(s) Oral at bedtime  tiotropium 18 MICROgram(s) Capsule 1 Capsule(s) Inhalation daily  trimethoprim   80 mG/sulfamethoxazole 400 mG 1 Tablet(s) Oral daily      PMHX/PSHX:  Hyperlipidemia    Hypertension    S/P liver transplant    Type 2 diabetes mellitus    Chronic obstructive pulmonary disease, unspecified COPD type    S/P liver transplant        Family history:  No pertinent family history in first degree relatives        ROS:     General:  No weight loss, fevers, chills, night sweats, fatigue   Eyes:  No vision changes  ENT:  No sore throat, pain, runny nose  CV:  No chest pain, palpitations, dizziness   Resp:  No SOB, cough, wheezing  GI:  See HPI  :  No burning with urination, hematuria  Muscle:  No pain, weakness  Neuro:  No weakness/tingling, memory problems  Psych:  No fatigue, insomnia, mood problems, depression  Heme:  No easy bruisability  Skin:  No rash, edema      PHYSICAL EXAM:     GENERAL:  Sitting up in bed, no respiratory distress   HEENT:  Conjunctivae clear and pink,  no JVD  CHEST:  Increased respiratory effort +supplemental O2  HEART:  Regular rhythm & rate   ABDOMEN:  Soft, non-tender, non-distended, obese  RECTUM: Brown stool, no external/prolapsed hemorrhoids, perianal hematoma   EXTREMITIES:  2+ LLE>RLE edema   SKIN:  No rash/erythema/ecchymoses/petechiae, chronic venous stasis dermatitis in B/L LE  NEURO:  Alert, orientedx3       Vital Signs:  Vital Signs Last 24 Hrs  T(C): 36.5 (11 Dec 2020 08:30), Max: 37.3 (10 Dec 2020 15:45)  T(F): 97.7 (11 Dec 2020 08:30), Max: 99.1 (10 Dec 2020 15:45)  HR: 77 (11 Dec 2020 08:30) (77 - 94)  BP: 159/68 (11 Dec 2020 08:30) (129/68 - 159/68)  BP(mean): --  RR: 18 (11 Dec 2020 08:30) (17 - 18)  SpO2: 94% (11 Dec 2020 08:30) (93% - 95%)  Daily     Daily Weight in k.8 (11 Dec 2020 08:30)    LABS:                        7.0    3.67  )-----------( 92       ( 11 Dec 2020 07:18 )             26.5     12-11    137  |  95<L>  |  31<H>  ----------------------------<  146<H>  4.1   |  31  |  1.52<H>    Ca    9.0      11 Dec 2020 07:18  Phos  3.1     12-11  Mg     2.4     12-    TPro  7.1  /  Alb  4.2  /  TBili  0.5  /  DBili  x   /  AST  25  /  ALT  14  /  AlkPhos  79  12-11    LIVER FUNCTIONS - ( 11 Dec 2020 07:18 )  Alb: 4.2 g/dL / Pro: 7.1 g/dL / ALK PHOS: 79 U/L / ALT: 14 U/L / AST: 25 U/L / GGT: x             Imaging:

## 2020-12-11 NOTE — PROGRESS NOTE ADULT - PROBLEM SELECTOR PLAN 3
Hx of RV Systolic dysfunction during last admission on TTE.   - Patient used to follow cardiologist at [facility] yet now moved to [facility]. Not has followed by cardiologist for a long time.  - Seen by Heart failure during last admission, supposedly follow Dr. Daley as outpatient.   - currently on diuretics: Furosemide 80mg daily.   - ASA on hold in setting of anemia  - Metoprolol Succinate 25mg daily.  - TTE: unchanged from prior Echo.   - HF consulted for evaluation and establishment of care per patient request. Hx of RV Systolic dysfunction during last admission on TTE.   - Patient used to follow cardiologist at [facility] yet now moved to [facility]. Not has followed by cardiologist for a long time.  - Seen by Heart failure during last admission, supposedly follow Dr. Daley as outpatient.   - currently on diuretics: Furosemide 80mg BID.  - ASA on hold in setting of anemia  - Metoprolol Succinate 25mg daily.  - TTE: unchanged from prior Echo.   - HF consulted for evaluation and establishment of care per patient request.

## 2020-12-11 NOTE — PROGRESS NOTE ADULT - PROBLEM SELECTOR PLAN 1
Hgb 6.4 upon admission, worsening GOMEZ/palpitation/CP concerning for symptomatic anemia.  - s/p 1u pRBC. responded well.  - Cr baseline 1.3~1.6 possible CKD contributing to anemia  - Iron studies in last admission shows component of MALIA/AoCD.  - repeat iron study shows MALIA picture. Ferritin pending.  - no signs of acute large GIB.   - TTE to assess his valvulopathy and RV systolic dysfunction found on prior TTE.  - GI consulted given liver transplant status and prior history of anemia requring transfusion: no plan for urgent scope procedures. outpatient follow up  - Hematology consulted: BM biopsy today.  - IV iron 200mg today and tomorrow. PO Iron supp afterwards. Follow up with hematology as outpatient for BM biopsy result.  - Patient had some red blood on tissue after BM (< 3 cc per writer's observation). No blood inside the bowl. No anemic symptoms. Likely hemorrhoidal. Will give stool softner PRN. Hgb 6.4 upon admission, worsening GOMEZ/palpitation/CP concerning for symptomatic anemia.  - s/p 1u pRBC. responded well. 2nd 1u pRBC ordered. follow up post-transfusion CBC.  - Cr baseline 1.3~1.6 possible CKD contributing to anemia  - Iron studies in last admission shows component of MALIA/AoCD.  - repeat iron study shows MALIA picture. Ferritin pending.  - no signs of acute large GIB.   - TTE to assess his valvulopathy and RV systolic dysfunction found on prior TTE.  - GI consulted given liver transplant status and prior history of anemia requring transfusion: no plan for urgent scope procedures. outpatient follow up  - Hematology consulted: BM biopsy today.  - IV iron 200mg (2 days). PO Iron supp afterwards. Follow up with hematology as outpatient for BM biopsy result.  - Patient had some red blood on tissue after BM (< 3 cc per writer's observation). No blood inside the bowl. No anemic symptoms. Likely hemorrhoidal. Will give stool softner PRN.

## 2020-12-11 NOTE — PROGRESS NOTE ADULT - PROBLEM SELECTOR PLAN 2
s/p liver transplant in 2011. followed by Dr. Askew as outpatient.  - c/w home Tacro 0.5mg BID, Prednisone 3mg every other day,   - holding home Cellcept 500mg daily.  - c/w PPI BID and Bactrim for prophylaxis.  - transplant hepatology consulted.  - transplant surgery consulted: recommend following up with transplant hepatology recommendation. No other recs. s/p liver transplant in 2011. followed by Dr. Askew as outpatient.  - c/w home Tacro 0.5mg BID, Prednisone 3mg every other day,   - holding home Cellcept 500mg daily.  - c/w PPI BID and Bactrim for prophylaxis.  - transplant hepatology consulted.  - transplant surgery consulted: recommend following up with transplant hepatology recommendation. No other recs.  - US elastography done.

## 2020-12-11 NOTE — PROGRESS NOTE ADULT - ASSESSMENT
Impression:    #Acute-on-chronic anemia: Grossly brown stool in rectal vault with heme-negative stools. Iron studies 12/9/20 c/w severe iron deficiency. Doubt active GI bleeding at this time. Differential for anemia includes bone marrow suppression (retic index indicative of hypoproliferation) vs. hypersplenism (splenomegaly on CT A/P) vs. occult blood loss from GI tract from GI malignancy or angioectasia.   #Pancytopenia: Concern for BM suppression as above vs. cirrhosis of transplanted liver.  #Post-liver transplant: Concern for cirrhosis of transplanted liver however synthetic function intact (INR normal). Immunosuppressed on Prograf/CellCept/prednisone. On prophylactic Bactrim and nystatin.   #HFpEF: Right ventricular systolic dysfunction and moderate AS with normal LVEF noted on TTE from 05/2020.   #CKD with serum Cr within baseline range  #Type 2 diabetes    Recommendations:  - f/u US elastography to assess for hepatic fibrosis - may also be done as outpatient if   - IV iron sucrose today (dose 2/2) as per Hematology  - continue to hold home CellCept   - continue tacrolimus 0.5mg BID and prednisone 3 mg QOD   - Lasix 80mg PO BID and spironolactone 25mg PO daily as per HF  - will need outpatient EGD/colonoscopy for evaluation of severe iron deficiency once discharged from hospital (patient was advised to follow up with Dr. Abdalla at the [facility] at [facility] [phone] and contact information was provided)      Elsie Morton PGY-5  Gastroenterology/Hepatology Fellow  Pager #[phone]  E-mail [email or web address]  Call [phone] to speak to answering service for on-call GI fellow 5pm-7am and weekends.     Impression:    #Acute-on-chronic anemia: Grossly brown stool in rectal vault with heme-negative stools. Iron studies 12/9/20 c/w severe iron deficiency. Doubt active GI bleeding at this time. Differential for anemia includes bone marrow suppression (retic index indicative of hypoproliferation) vs. hypersplenism (splenomegaly on CT A/P) vs. occult blood loss from GI tract from GI malignancy or angioectasia.   #Pancytopenia: Concern for BM suppression as above vs. cirrhosis of transplanted liver.  #Post-liver transplant: Concern for cirrhosis of transplanted liver however synthetic function intact (INR normal). Immunosuppressed on Prograf/CellCept/prednisone. On prophylactic Bactrim and nystatin.   #HFpEF: Right ventricular systolic dysfunction and moderate AS with normal LVEF noted on TTE from 05/2020.   #CKD with serum Cr within baseline range  #Type 2 diabetes    Recommendations:  - would transfuse 1U pRBC for decreased Hb  - f/u US elastography to assess for hepatic fibrosis - may also be done as outpatient if   - IV iron sucrose today (dose 2/2) as per Hematology  - continue to hold home CellCept   - continue tacrolimus 0.5mg BID and prednisone 3 mg QOD   - Lasix 80mg PO BID and spironolactone 25mg PO daily as per HF  - will need outpatient EGD/colonoscopy for evaluation of severe iron deficiency once discharged from hospital (patient was advised to follow up with Dr. Abdalla at the [facility] at [facility] [phone] and contact information was provided)      Elsie Morton PGY-5  Gastroenterology/Hepatology Fellow  Pager #[phone]  E-mail [email or web address]  Call [phone] to speak to answering service for on-call GI fellow 5pm-7am and weekends.

## 2020-12-12 NOTE — DISCHARGE NOTE PROVIDER - HOSPITAL COURSE
Mr. Nguyen is a 69 y/o Male w/ PMH significant for HTN, HLD, T2DM on insulin, COPD (on home O2 3L), EtOH/HCV cirrhosis s/p liver transplant (2011) on Tacro/Cellcept/Prednisone, chronic lymphedema, who is admitted for symptomatic anemia. He presented with increased SOB/GOMEZ and palpitation upon exertion and outpatient lab showed Hgb of 6.5. In ED, patient received a unit of pRBC with appropriate response. Patient's symptom improved and no active bleeding throughout the hospital stay. Transplant hepatology and hematology were consulted. CT A/P with Iv contrast shows no active bleeding or RP bleed or big mass. Per Hepatology, no urgent scope procedures are needed and they will follow the patient outpatient. Hematology was consulted for splenomegaly (which was present in May 2020 US) and anemia/thrombocytopenia. Bone marrow biopsy was performed and IV iron 200mg was given for 2 days. Another 1u of pRBC was given as his Hgb was borderline (7) with appropriate rise and no overt bleeding overnight. Patient has been ambulating with home O2 (3L) and eating well without difficulty. TTE was done that shows unchanged findings regarding his heart failure and HF team was consulted to introduce patient for establishment of care. Patient to follow up with Dr. Daley at his HF clinic as outpatient. Patient was seen by physical therapy and home PT was recommended yet patient refused home PT during discussion with . Patient is hemodynamically stable and symptomatically improved, yet wanted to stay another day and agrees to leave the day after. 24 hour notice given on 12/12 and Case Manger, Nursing staff updated.     Patient will need close follow up with hematology, transplant hepatology, PCP, and heart failure clinic.

## 2020-12-12 NOTE — DISCHARGE NOTE PROVIDER - NSDCCPCAREPLAN_GEN_ALL_CORE_FT
PRINCIPAL DISCHARGE DIAGNOSIS  Diagnosis: Symptomatic anemia  Assessment and Plan of Treatment:       SECONDARY DISCHARGE DIAGNOSES  Diagnosis: Gastrointestinal hemorrhage, unspecified gastrointestinal hemorrhage type  Assessment and Plan of Treatment:      PRINCIPAL DISCHARGE DIAGNOSIS  Diagnosis: Symptomatic anemia  Assessment and Plan of Treatment: You came to the hospital with low hemoglobin. There was no signs of bleeding and CT scan didn't show any bleeding lesion inside abdomen. Hepatology was consulted, who recommended outpatient follow up without scoping procedure. Iron study came out low, and Iron was supplement through IV for two days and oral iron supplmenet will be prescribed to your pharmacy. Please follow up with hematology outpatient for bone marrow biopsy result and further work up. Take time to stand/change position and take rests during ambulation.

## 2020-12-12 NOTE — PROGRESS NOTE ADULT - PROBLEM SELECTOR PLAN 2
s/p liver transplant in 2011. followed by Dr. Askew as outpatient.  - c/w home Tacro 0.5mg BID, Prednisone 3mg every other day,   - holding home Cellcept 500mg daily.  - c/w PPI BID and Bactrim for prophylaxis.  - transplant hepatology consulted.  - transplant surgery consulted: recommend following up with transplant hepatology recommendation. No other recs.  - US elastography done.

## 2020-12-12 NOTE — PROGRESS NOTE ADULT - PROBLEM SELECTOR PLAN 3
Hx of RV Systolic dysfunction during last admission on TTE.   - Patient used to follow cardiologist at [facility] yet now moved to [facility]. Not has followed by cardiologist for a long time.  - Seen by Heart failure during last admission, supposedly follow Dr. Daley as outpatient.   - currently on diuretics: Furosemide 80mg BID.  - ASA on hold in setting of anemia  - Metoprolol Succinate 25mg daily.  - TTE: unchanged from prior Echo.   - HF consulted for evaluation and establishment of care per patient request.

## 2020-12-12 NOTE — DISCHARGE NOTE PROVIDER - CARE PROVIDERS DIRECT ADDRESSES
,[email or web address]

## 2020-12-12 NOTE — DISCHARGE NOTE PROVIDER - NSDCFUADDINST_GEN_ALL_CORE_FT
Please make the outpatient appointment with hematology/oncology Please make the outpatient appointment with hematology to follow up with Anemia work up including bone marrow biopsy within 2 weeks from discharge.  Please make the outpatient appointment with hepatology/GI within 2 weeks from discharge.  Please make the outpatient appointment with heart failure (Dr. Daley) and follow up within 4 weeks of discharge. Please make the outpatient appointment with hematology to follow up with Anemia work up including bone marrow biopsy within 2 weeks from discharge.  Please make the outpatient appointment with hepatology/GI within 2 weeks from discharge.  Please make the outpatient appointment with heart failure (Dr. Daley) and follow up within 4 weeks of discharge.  Please make the outpatient appointment with your Primary care doctor. The contact number for [address] [place] [address] clinic is included (Dr. Simmons) in case you want to establish care at resident clinic or need to see an internist until you establish care with a PCP of your choice.

## 2020-12-12 NOTE — PROGRESS NOTE ADULT - PROBLEM SELECTOR PLAN 1
Hgb 6.4 upon admission, worsening GOMEZ/palpitation/CP concerning for symptomatic anemia.  - s/p 1u pRBC. responded well. 2nd 1u pRBC ordered. follow up post-transfusion CBC.  - Cr baseline 1.3~1.6 possible CKD contributing to anemia  - Iron studies in last admission shows component of MALIA/AoCD.  - repeat iron study shows MALIA picture. Ferritin pending.  - no signs of acute large GIB.   - TTE to assess his valvulopathy and RV systolic dysfunction found on prior TTE.  - GI consulted given liver transplant status and prior history of anemia requring transfusion: no plan for urgent scope procedures. outpatient follow up  - Hematology consulted: BM biopsy today.  - IV iron 200mg (2 days). PO Iron supp afterwards. Follow up with hematology as outpatient for BM biopsy result.  - Patient had some red blood on tissue after BM (< 3 cc per writer's observation). No blood inside the bowl. No anemic symptoms. Likely hemorrhoidal. Will give stool softner PRN. Hgb 6.4 upon admission, worsening GOMEZ/palpitation/CP concerning for symptomatic anemia.  - s/p 2u pRBC. responded well.  - Cr baseline 1.3~1.6 possible CKD contributing to anemia  - Iron studies in last admission shows component of MALIA/AoCD.  - repeat iron study shows MALIA picture. Ferritin pending.  - no signs of acute large GIB.   - TTE to assess his valvulopathy and RV systolic dysfunction found on prior TTE.  - GI consulted given liver transplant status and prior history of anemia requring transfusion: no plan for urgent scope procedures. outpatient follow up  - Hematology consulted: BM biopsy today.  - IV iron 200mg (2 days). PO Iron supp afterwards. Follow up with hematology as outpatient for BM biopsy result.  - Patient had some red blood on tissue after BM (< 3 cc per writer's observation). No blood inside the bowl. No anemic symptoms. Likely hemorrhoidal. Will give stool softner PRN.

## 2020-12-12 NOTE — DISCHARGE NOTE PROVIDER - NSDCMRMEDTOKEN_GEN_ALL_CORE_FT
aspirin 81 mg oral delayed release tablet: 1 tab(s) orally once a day (in the morning)  Breo Ellipta 200 mcg-25 mcg/inh inhalation powder: 1 puff(s) inhaled once a day  budesonide 0.5 mg/2 mL inhalation suspension: 2 milliliter(s) inhaled 2 times a day, As Needed  buprenorphine-naloxone 2 mg-0.5 mg sublingual tablet:  sublingual   furosemide 80 mg oral tablet: 1 tab(s) orally once a day  insulin glargine: 26 unit(s) subcutaneous once (in morning, in AM)  insulin glargine: 45 unit(s) subcutaneous once (at bedtime, in PM)  ipratropium-albuterol 0.5 mg-2.5 mg/3 mLinhalation solution: 3 milliliter(s) inhaled every 6 hours, As needed, Shortness of Breath and/or Wheezing  metoprolol succinate 25 mg oral tablet, extended release: 1 tab(s) orally once a day (in the morning)  Multiple Vitamins with Minerals oral tablet: 1 tab(s) orally once a day  mupirocin 2% topical ointment: Apply topically to affected area 3 times a day, As Needed  mycophenolate mofetil 500 mg oral tablet: 1 tab(s) orally once a day  nystatin 100,000 units/g topical powder: 1 application topically 2 times a day  pantoprazole 40 mg oral delayed release tablet: 1 tab(s) orally 2 times a day  Perforomist 20 mcg/2 mL inhalation solution: 2 milliliter(s) inhaled 2 times a day, As Needed  predniSONE 1 mg oral tablet: 3 tab(s) orally every other day  senna oral tablet: 2 tab(s) orally once a day (at bedtime)  sodium chloride 0.65% nasal spray:  nasal   Spiriva 18 mcg inhalation capsule: 1 cap(s) inhaled once a day  sulfamethoxazole-trimethoprim 400 mg-80 mg oral tablet: 1 tab(s) orally   tacrolimus 0.5 mg oral capsule: 1 cap(s) orally 2 times a day  tamsulosin 0.4 mg oral capsule: 1 cap(s) orally once a day (at bedtime)  Zoloft 100 mg oral tablet: 1 tab(s) orally once a day   aspirin 81 mg oral delayed release tablet: 1 tab(s) orally once a day (in the morning)  Breo Ellipta 200 mcg-25 mcg/inh inhalation powder: 1 puff(s) inhaled once a day  budesonide 0.5 mg/2 mL inhalation suspension: 2 milliliter(s) inhaled 2 times a day, As Needed  buprenorphine-naloxone 2 mg-0.5 mg sublingual tablet:  sublingual   ferrous sulfate 325 mg (65 mg elemental iron) oral tablet: 1 tab(s) orally 2 times a day for Iron Deficiency Anemia  furosemide 80 mg oral tablet: 1 tab(s) orally 2 times a day  insulin glargine: 26 unit(s) subcutaneous once (in morning, in AM)  insulin glargine: 45 unit(s) subcutaneous once (at bedtime, in PM)  ipratropium-albuterol 0.5 mg-2.5 mg/3 mLinhalation solution: 3 milliliter(s) inhaled every 6 hours, As needed, Shortness of Breath and/or Wheezing  metoprolol succinate 25 mg oral tablet, extended release: 1 tab(s) orally once a day (in the morning)  Multiple Vitamins with Minerals oral tablet: 1 tab(s) orally once a day  mupirocin 2% topical ointment: Apply topically to affected area 3 times a day, As Needed  mycophenolate mofetil 500 mg oral tablet: 1 tab(s) orally once a day  nystatin 100,000 units/g topical powder: 1 application topically 2 times a day  pantoprazole 40 mg oral delayed release tablet: 1 tab(s) orally 2 times a day  Perforomist 20 mcg/2 mL inhalation solution: 2 milliliter(s) inhaled 2 times a day, As Needed  predniSONE 1 mg oral tablet: 3 tab(s) orally every other day  senna oral tablet: 2 tab(s) orally once a day (at bedtime)  sodium chloride 0.65% nasal spray:  nasal   Spiriva 18 mcg inhalation capsule: 1 cap(s) inhaled once a day  sulfamethoxazole-trimethoprim 400 mg-80 mg oral tablet: 1 tab(s) orally   tacrolimus 0.5 mg oral capsule: 1 cap(s) orally 2 times a day  tamsulosin 0.4 mg oral capsule: 1 cap(s) orally once a day (at bedtime)  Zoloft 100 mg oral tablet: 1 tab(s) orally once a day

## 2020-12-12 NOTE — DISCHARGE NOTE PROVIDER - PROVIDER TOKENS
PROVIDER:[TOKEN:[[id number]]],PROVIDER:[TOKEN:[[id number]]],PROVIDER:[TOKEN:[[id number]]] PROVIDER:[TOKEN:[[id number]]],PROVIDER:[TOKEN:[[id number]]],PROVIDER:[TOKEN:[[id number]]],PROVIDER:[TOKEN:[[id number]]]

## 2020-12-12 NOTE — PROGRESS NOTE ADULT - SUBJECTIVE AND OBJECTIVE BOX
Aquilino Hancock Regla  PGY-2  Pager: [phone]    Patient is a 70y old  Male who presents with a chief complaint of symptomatic anemia (11 Dec 2020 09:05)      SUBJECTIVE / OVERNIGHT EVENTS:    MEDICATIONS  (STANDING):  ALBUTerol    90 MICROgram(s) HFA Inhaler 2 Puff(s) Inhalation every 6 hours  budesonide 160 MICROgram(s)/formoterol 4.5 MICROgram(s) Inhaler 2 Puff(s) Inhalation two times a day  dextrose 40% Gel 15 Gram(s) Oral once  dextrose 5%. 1000 milliLiter(s) (100 mL/Hr) IV Continuous <Continuous>  dextrose 5%. 1000 milliLiter(s) (50 mL/Hr) IV Continuous <Continuous>  dextrose 50% Injectable 25 Gram(s) IV Push once  dextrose 50% Injectable 12.5 Gram(s) IV Push once  dextrose 50% Injectable 25 Gram(s) IV Push once  ferrous    sulfate 325 milliGRAM(s) Oral daily  furosemide    Tablet 80 milliGRAM(s) Oral two times a day  glucagon  Injectable 1 milliGRAM(s) IntraMuscular once  insulin glargine Injectable (LANTUS) 45 Unit(s) SubCutaneous <User Schedule>  insulin glargine Injectable (LANTUS) 26 Unit(s) SubCutaneous <User Schedule>  insulin lispro (ADMELOG) corrective regimen sliding scale   SubCutaneous three times a day before meals  insulin lispro (ADMELOG) corrective regimen sliding scale   SubCutaneous at bedtime  metoprolol succinate ER 25 milliGRAM(s) Oral daily  multivitamin/minerals 1 Tablet(s) Oral daily  pantoprazole    Tablet 40 milliGRAM(s) Oral two times a day  predniSONE   Tablet 3 milliGRAM(s) Oral every other day  senna 2 Tablet(s) Oral at bedtime  sertraline 100 milliGRAM(s) Oral daily  tacrolimus 0.5 milliGRAM(s) Oral two times a day  tamsulosin 0.4 milliGRAM(s) Oral at bedtime  tiotropium 18 MICROgram(s) Capsule 1 Capsule(s) Inhalation daily  trimethoprim   80 mG/sulfamethoxazole 400 mG 1 Tablet(s) Oral daily    MEDICATIONS  (PRN):  buprenorphine 2 mG/naloxone 0.5 mG SL  Tablet 1 Tablet(s) SubLingual <User Schedule> PRN pain management  sodium chloride 0.65% Nasal 1 Spray(s) Both Nostrils daily PRN Nasal Congestion      T(C): 36.8 (12-12-20 @ 05:49), Max: 37 (12-11-20 @ 15:50)  HR: 76 (12-12-20 @ 05:49) (74 - 81)  BP: 137/74 (12-12-20 @ 05:49) (137/74 - 159/68)  RR: 18 (12-12-20 @ 05:49) (16 - 18)  SpO2: 96% (12-12-20 @ 05:49) (94% - 99%)  CAPILLARY BLOOD GLUCOSE      POCT Blood Glucose.: 196 mg/dL (11 Dec 2020 21:23)  POCT Blood Glucose.: 212 mg/dL (11 Dec 2020 17:29)  POCT Blood Glucose.: 307 mg/dL (11 Dec 2020 12:28)  POCT Blood Glucose.: 170 mg/dL (11 Dec 2020 08:19)    I&O's Summary    11 Dec 2020 07:01  -  12 Dec 2020 07:00  --------------------------------------------------------  IN: 400 mL / OUT: 2850 mL / NET: -2450 mL        PHYSICAL EXAM:  PHYSICAL EXAM:    Constitutional: NAD. well-developed; well-groomed; well-nourished;  HEENT: AT/NC, PERRLA; EOMI, MMM, no oropharyngeal lesions, no erythema, no exudates, Supple neck; normal thyroid gland, no cervical lymphadenopathy  Respiratory: CTAB. equal aeration bilaterally. no wheezing, no crackes, no rhonchi. no increase in WOB  Cardiovascular: RRR, no M/R/G. 2+ distal pulses. Cap refill < 2 seconds. no JVD  Gastrointestinal: soft; NT/ND, +BS, no rebounding tenderness / guarding / HSM / mass / ascites.  Genitourinary: not examined.  Extremities: no clubbing; no cyanosis; no pedal edema, non-tender to palpation, DP and Radial pulses intact.  Skin: warm and dry; color normal: no rash: no ulcers  Neurological: A&Ox 3; responds to pain; responds to verbal commands; epicritic and protopathic sensation intact: CN nerves grossly intact.   MSK/Back: no deformities. Active and passive ROM intact; strength intact, no CVA tenderness, No joint tenderness, swelling, erythema  Psychiatric: normal mood/affect. Denies SI/HI    LABS:                        7.9    3.99  )-----------( 91       ( 11 Dec 2020 22:10 )             29.6     12-11    137  |  95<L>  |  31<H>  ----------------------------<  146<H>  4.1   |  31  |  1.52<H>    Ca    9.0      11 Dec 2020 07:18  Phos  3.1     12-11  Mg     2.4     12-11    TPro  7.1  /  Alb  4.2  /  TBili  0.5  /  DBili  x   /  AST  25  /  ALT  14  /  AlkPhos  79  12-11              RADIOLOGY & ADDITIONAL TESTS:    Imaging Personally Reviewed: BRANDON    Consultant(s) Notes Reviewed:  BRANDON    Care Discussed with Consultants/Other Providers: BRANDON   Aquilino Hancock Regla  PGY-3  Pager: [phone]    Patient is a 70y old  Male who presents with a chief complaint of symptomatic anemia (11 Dec 2020 09:05)      SUBJECTIVE / OVERNIGHT EVENTS:  No acute events overnight.  Patient reporting that he doesn't want to go home today. He will go home tomorrow. He states that he woke up in the middle of night without knowing where he is. This AM, he is AOx3. Nursing staff denies any agitation/confusion overnight that persisted. He finished eating entire breakfast while speaking this AM with writer.         MEDICATIONS  (STANDING):  ALBUTerol    90 MICROgram(s) HFA Inhaler 2 Puff(s) Inhalation every 6 hours  budesonide 160 MICROgram(s)/formoterol 4.5 MICROgram(s) Inhaler 2 Puff(s) Inhalation two times a day  dextrose 40% Gel 15 Gram(s) Oral once  dextrose 5%. 1000 milliLiter(s) (100 mL/Hr) IV Continuous <Continuous>  dextrose 5%. 1000 milliLiter(s) (50 mL/Hr) IV Continuous <Continuous>  dextrose 50% Injectable 25 Gram(s) IV Push once  dextrose 50% Injectable 12.5 Gram(s) IV Push once  dextrose 50% Injectable 25 Gram(s) IV Push once  ferrous    sulfate 325 milliGRAM(s) Oral daily  furosemide    Tablet 80 milliGRAM(s) Oral two times a day  glucagon  Injectable 1 milliGRAM(s) IntraMuscular once  insulin glargine Injectable (LANTUS) 45 Unit(s) SubCutaneous <User Schedule>  insulin glargine Injectable (LANTUS) 26 Unit(s) SubCutaneous <User Schedule>  insulin lispro (ADMELOG) corrective regimen sliding scale   SubCutaneous three times a day before meals  insulin lispro (ADMELOG) corrective regimen sliding scale   SubCutaneous at bedtime  metoprolol succinate ER 25 milliGRAM(s) Oral daily  multivitamin/minerals 1 Tablet(s) Oral daily  pantoprazole    Tablet 40 milliGRAM(s) Oral two times a day  predniSONE   Tablet 3 milliGRAM(s) Oral every other day  senna 2 Tablet(s) Oral at bedtime  sertraline 100 milliGRAM(s) Oral daily  tacrolimus 0.5 milliGRAM(s) Oral two times a day  tamsulosin 0.4 milliGRAM(s) Oral at bedtime  tiotropium 18 MICROgram(s) Capsule 1 Capsule(s) Inhalation daily  trimethoprim   80 mG/sulfamethoxazole 400 mG 1 Tablet(s) Oral daily    MEDICATIONS  (PRN):  buprenorphine 2 mG/naloxone 0.5 mG SL  Tablet 1 Tablet(s) SubLingual <User Schedule> PRN pain management  sodium chloride 0.65% Nasal 1 Spray(s) Both Nostrils daily PRN Nasal Congestion      T(C): 36.8 (12-12-20 @ 05:49), Max: 37 (12-11-20 @ 15:50)  HR: 76 (12-12-20 @ 05:49) (74 - 81)  BP: 137/74 (12-12-20 @ 05:49) (137/74 - 159/68)  RR: 18 (12-12-20 @ 05:49) (16 - 18)  SpO2: 96% (12-12-20 @ 05:49) (94% - 99%)  CAPILLARY BLOOD GLUCOSE      POCT Blood Glucose.: 196 mg/dL (11 Dec 2020 21:23)  POCT Blood Glucose.: 212 mg/dL (11 Dec 2020 17:29)  POCT Blood Glucose.: 307 mg/dL (11 Dec 2020 12:28)  POCT Blood Glucose.: 170 mg/dL (11 Dec 2020 08:19)    I&O's Summary    11 Dec 2020 07:01  -  12 Dec 2020 07:00  --------------------------------------------------------  IN: 400 mL / OUT: 2850 mL / NET: -2450 mL      PHYSICAL EXAM:    Constitutional: NAD. lying on bed comfortably on NC 3.5L.  HEENT: AT/NC, PERRLA; EOMI, conjunctival pallor, MMM supple neck.  Respiratory: CTAB with prolonged expiratory phase. equal aeration bilaterally. no increase in WOB  Cardiovascular: RRR, systolic murmur heard at RUSB. 2+ distal pulses. Cap refill < 3 seconds.  Gastrointestinal: soft; NT/ND, +BS  Genitourinary: not examined.  Extremities: no cyanosis; bilateral lymphedema with chronic skin changes, non-tender to palpation, DP and Radial pulses intact.  Neurological: A&Ox 3; responds to pain; responds to verbal commands; epicritic and protopathic sensation intact: CN nerves grossly intact. moving all four extremities against gravity.   Psychiatric: normal mood/affect.    LABS:                        7.9    3.99  )-----------( 91       ( 11 Dec 2020 22:10 )             29.6     12-11    137  |  95<L>  |  31<H>  ----------------------------<  146<H>  4.1   |  31  |  1.52<H>    Ca    9.0      11 Dec 2020 07:18  Phos  3.1     12-11  Mg     2.4     12-11    TPro  7.1  /  Alb  4.2  /  TBili  0.5  /  DBili  x   /  AST  25  /  ALT  14  /  AlkPhos  79  12-11              RADIOLOGY & ADDITIONAL TESTS:    Imaging Personally Reviewed: BRANDON    Consultant(s) Notes Reviewed:  BRANDON    Care Discussed with Consultants/Other Providers: BRANDON   Aquilino Hancock Regla  PGY-3  Pager: [phone]    Patient is a 70y old  Male who presents with a chief complaint of symptomatic anemia (11 Dec 2020 09:05)      SUBJECTIVE / OVERNIGHT EVENTS:  No acute events overnight.  Patient reporting that he doesn't want to go home today. He will go home tomorrow. He states that he woke up in the middle of night without knowing where he is. This AM, he is AOx3. Nursing staff denies any agitation/confusion overnight that persisted. He finished eating entire breakfast while speaking this AM with writer.     REVIEW OF SYSTEMS:    CONSTITUTIONAL: No weakness, fevers or chills  EYES/ENT: No visual changes;  No vertigo or throat pain   NECK: No pain or stiffness  RESPIRATORY: No cough, wheezing, hemoptysis; No shortness of breath  CARDIOVASCULAR: No chest pain or palpitations  GASTROINTESTINAL: No abdominal or epigastric pain. No nausea, vomiting, or hematemesis; No diarrhea or constipation. No melena or hematochezia.  GENITOURINARY: No dysuria, frequency or hematuria  NEUROLOGICAL: No numbness or weakness  SKIN: No itching, burning, rashes, or lesions  MSK: No joint pain, no back pain  HEME: No easy bleeding, no easy bruising  All other review of systems is negative unless indicated above.    MEDICATIONS  (STANDING):  ALBUTerol    90 MICROgram(s) HFA Inhaler 2 Puff(s) Inhalation every 6 hours  budesonide 160 MICROgram(s)/formoterol 4.5 MICROgram(s) Inhaler 2 Puff(s) Inhalation two times a day  dextrose 40% Gel 15 Gram(s) Oral once  dextrose 5%. 1000 milliLiter(s) (100 mL/Hr) IV Continuous <Continuous>  dextrose 5%. 1000 milliLiter(s) (50 mL/Hr) IV Continuous <Continuous>  dextrose 50% Injectable 25 Gram(s) IV Push once  dextrose 50% Injectable 12.5 Gram(s) IV Push once  dextrose 50% Injectable 25 Gram(s) IV Push once  ferrous    sulfate 325 milliGRAM(s) Oral daily  furosemide    Tablet 80 milliGRAM(s) Oral two times a day  glucagon  Injectable 1 milliGRAM(s) IntraMuscular once  insulin glargine Injectable (LANTUS) 45 Unit(s) SubCutaneous <User Schedule>  insulin glargine Injectable (LANTUS) 26 Unit(s) SubCutaneous <User Schedule>  insulin lispro (ADMELOG) corrective regimen sliding scale   SubCutaneous three times a day before meals  insulin lispro (ADMELOG) corrective regimen sliding scale   SubCutaneous at bedtime  metoprolol succinate ER 25 milliGRAM(s) Oral daily  multivitamin/minerals 1 Tablet(s) Oral daily  pantoprazole    Tablet 40 milliGRAM(s) Oral two times a day  predniSONE   Tablet 3 milliGRAM(s) Oral every other day  senna 2 Tablet(s) Oral at bedtime  sertraline 100 milliGRAM(s) Oral daily  tacrolimus 0.5 milliGRAM(s) Oral two times a day  tamsulosin 0.4 milliGRAM(s) Oral at bedtime  tiotropium 18 MICROgram(s) Capsule 1 Capsule(s) Inhalation daily  trimethoprim   80 mG/sulfamethoxazole 400 mG 1 Tablet(s) Oral daily    MEDICATIONS  (PRN):  buprenorphine 2 mG/naloxone 0.5 mG SL  Tablet 1 Tablet(s) SubLingual <User Schedule> PRN pain management  sodium chloride 0.65% Nasal 1 Spray(s) Both Nostrils daily PRN Nasal Congestion      T(C): 36.8 (12-12-20 @ 05:49), Max: 37 (12-11-20 @ 15:50)  HR: 76 (12-12-20 @ 05:49) (74 - 81)  BP: 137/74 (12-12-20 @ 05:49) (137/74 - 159/68)  RR: 18 (12-12-20 @ 05:49) (16 - 18)  SpO2: 96% (12-12-20 @ 05:49) (94% - 99%)  CAPILLARY BLOOD GLUCOSE      POCT Blood Glucose.: 196 mg/dL (11 Dec 2020 21:23)  POCT Blood Glucose.: 212 mg/dL (11 Dec 2020 17:29)  POCT Blood Glucose.: 307 mg/dL (11 Dec 2020 12:28)  POCT Blood Glucose.: 170 mg/dL (11 Dec 2020 08:19)    I&O's Summary    11 Dec 2020 07:01  -  12 Dec 2020 07:00  --------------------------------------------------------  IN: 400 mL / OUT: 2850 mL / NET: -2450 mL      PHYSICAL EXAM:    Constitutional: NAD. lying on bed comfortably on NC 3.5L.  HEENT: AT/NC, PERRLA; EOMI, conjunctival pallor, MMM supple neck.  Respiratory: CTAB with prolonged expiratory phase. equal aeration bilaterally. no increase in WOB  Cardiovascular: RRR, systolic murmur heard at RUSB. 2+ distal pulses. Cap refill < 3 seconds.  Gastrointestinal: soft; NT/ND, +BS  Genitourinary: not examined.  Extremities: no cyanosis; bilateral lymphedema with chronic skin changes, non-tender to palpation, DP and Radial pulses intact.  Neurological: A&Ox 3; responds to pain; responds to verbal commands; epicritic and protopathic sensation intact: CN nerves grossly intact. moving all four extremities against gravity.   Psychiatric: normal mood/affect.    LABS:                        7.9    3.99  )-----------( 91       ( 11 Dec 2020 22:10 )             29.6     12-11    137  |  95<L>  |  31<H>  ----------------------------<  146<H>  4.1   |  31  |  1.52<H>    Ca    9.0      11 Dec 2020 07:18  Phos  3.1     12-11  Mg     2.4     12-11    TPro  7.1  /  Alb  4.2  /  TBili  0.5  /  DBili  x   /  AST  25  /  ALT  14  /  AlkPhos  79  12-11              RADIOLOGY & ADDITIONAL TESTS:    Imaging Personally Reviewed: BRANDON    Consultant(s) Notes Reviewed:  BRANDON    Care Discussed with Consultants/Other Providers: BRANDON

## 2020-12-12 NOTE — DISCHARGE NOTE PROVIDER - CARE PROVIDER_API CALL
Elvira Daley)  Cardiology; Internal Medicine  [address] [place] [address]  Phone: [phone]  Fax: [phone]  Follow Up Time:     Ana Abdalla)  Gastroenterology; Transplant Hepatology  [address] [place] [address]  Phone: [phone]  Fax: [phone]  Follow Up Time:     Linh Wilson  INTERNAL MEDICINE  [address] [place] [address]  Phone: [phone]  Fax: [phone]  Follow Up Time:    Elvira Daley)  Cardiology; Internal Medicine  [address], [address] [place] [address]  Phone: [phone]  Fax: [phone]  Follow Up Time:     Ana Abdalla)  Gastroenterology; Transplant Hepatology  [address] [place] [address]  Phone: [phone]  Fax: [phone]  Follow Up Time:     Linh Wilson  INTERNAL MEDICINE  [address] [place] [address]  Phone: [phone]  Fax: [phone]  Follow Up Time:     Charli Simmons  INTERNAL MEDICINE  [address] [place] [address] [address]  [place] [address]  Phone: [phone]  Fax: [phone]  Follow Up Time:

## 2020-12-12 NOTE — PROGRESS NOTE ADULT - PROBLEM SELECTOR PLAN 8
- DVT ppx: SCD in setting of symptomatic anemia, patient ambulating  - Diet: DASH/Diabetic Diet  - Dispo: pending Anemia work up. - DVT ppx: SCD in setting of symptomatic anemia, patient ambulating  - Diet: DASH/Diabetic Diet  - Dispo: tomorrow.  updated for transport home. Pt refused home PT during discussion with CM.

## 2020-12-13 NOTE — PROGRESS NOTE ADULT - ATTENDING COMMENTS
Hepatology Staff: Ana Abdalla MD    I saw and examined the patient along with  Dr. Morton  12-10-20 @ 13:20.    Patient Medical Record, hosptial course was reviewed and summarized as below:    Vitals: Vital Signs Last 24 Hrs  T(C): 36.5 (10 Dec 2020 05:19), Max: 36.9 (09 Dec 2020 14:34)  T(F): 97.7 (10 Dec 2020 05:19), Max: 98.4 (09 Dec 2020 14:34)  HR: 78 (10 Dec 2020 05:19) (69 - 84)  BP: 147/80 (10 Dec 2020 05:19) (135/65 - 150/76)  BP(mean): 103 (10 Dec 2020 05:19) (101 - 103)  RR: 18 (10 Dec 2020 05:19) (18 - 18)  SpO2: 100% (10 Dec 2020 05:19) (96% - 100%)    Labs:Creatinine, Serum: 1.52 mg/dL (12-10-20 @ 07:11)  Bilirubin Total, Serum: 0.4 mg/dL (12-10-20 @ 07:11)    MELD Score:   Imaging Studies:  I/O: I&O's Summary    09 Dec 2020 07:01  -  10 Dec 2020 07:00  --------------------------------------------------------  IN: 840 mL / OUT: 1900 mL / NET: -1060 mL    10 Dec 2020 07:01  -  10 Dec 2020 13:20  --------------------------------------------------------  IN: 100 mL / OUT: 950 mL / NET: -850 mL      Nutritional Status:   Albumin, Serum: 4.1 g/dL (12-10-20 @ 07:11)    Last 24 hour events: Patient has been overall better. Anemia evaluation underway. No overt GI bleeding. Noted findings of Cirrhosis on imaging.    Recommendations: Agree with outlined recommendation by Dr. Morton which reflects my plan.      Plan discussed with Primary team.
Hepatology Staff: Ana Abdalla MD    I saw and examined the patient along with  Dr. Morton 12-09-20 @ 16:48.    Patient Medical Record, hosptial course was reviewed and summarized as below:    Vitals: Vital Signs Last 24 Hrs  T(C): 36.7 (09 Dec 2020 15:53), Max: 36.9 (09 Dec 2020 14:34)  T(F): 98.1 (09 Dec 2020 15:53), Max: 98.4 (09 Dec 2020 14:34)  HR: 69 (09 Dec 2020 15:53) (69 - 83)  BP: 135/65 (09 Dec 2020 15:53) (132/66 - 158/76)  BP(mean): 100 (09 Dec 2020 05:03) (100 - 100)  RR: 18 (09 Dec 2020 15:53) (18 - 18)  SpO2: 97% (09 Dec 2020 15:53) (93% - 100%)      Labs:Creatinine, Serum: 1.45 mg/dL (12-09-20 @ 06:50)  INR: 0.94 ratio (12-09-20 @ 06:50)  Creatinine, Serum: 1.45 mg/dL (12-09-20 @ 06:50)  Bilirubin Total, Serum: 0.5 mg/dL (12-09-20 @ 06:50)      I/O: I&O's Summary    08 Dec 2020 07:01  -  09 Dec 2020 07:00  --------------------------------------------------------  IN: 950 mL / OUT: 2450 mL / NET: -1500 mL    09 Dec 2020 07:01  -  09 Dec 2020 16:48  --------------------------------------------------------  IN: 240 mL / OUT: 900 mL / NET: -660 mL    Last 24 hour events: Patient has been stable clinically- no new events.,    Recommendations: Noetd findings on CT abd- 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 Anemia appears unrelated to GI blood loss. Pancytopenia- needs further evaluation. 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 Keep CellCept on hold. Cont with low dose of Prograf 0.5 mg PO bid and Prednisone 3 mg QOD.    Plan discussed with Primary team.
Hepatology Staff: Ana Abdalla MD    I saw and examined the patient along with  Dr. Morton  12-11-20 @ 13:54.    Patient Medical Record, hosptial course was reviewed and summarized as below:    Vitals: Vital Signs Last 24 Hrs  T(C): 36.5 (11 Dec 2020 08:30), Max: 37.3 (10 Dec 2020 15:45)  T(F): 97.7 (11 Dec 2020 08:30), Max: 99.1 (10 Dec 2020 15:45)  HR: 77 (11 Dec 2020 08:30) (77 - 94)  BP: 159/68 (11 Dec 2020 08:30) (129/68 - 159/68)    RR: 18 (11 Dec 2020 08:30) (17 - 18)  SpO2: 94% (11 Dec 2020 08:30) (93% - 95%)    Diuretics:furosemide    Tablet 80 milliGRAM(s) Oral two times a day    Labs:Creatinine, Serum: 1.52 mg/dL (12-11-20 @ 07:18)    Bilirubin Total, Serum: 0.5 mg/dL (12-11-20 @ 07:18)      I/O: I&O's Summary    10 Dec 2020 07:01  -  11 Dec 2020 07:00  --------------------------------------------------------  IN: 100 mL / OUT: 2350 mL / NET: -2250 mL    11 Dec 2020 07:01  -  11 Dec 2020 13:54  --------------------------------------------------------  IN: 0 mL / OUT: 900 mL / NET: -900 mL      Nutritional Status:   Albumin, Serum: 4.2 g/dL (12-11-20 @ 07:18)    Last 24 hour events: No new events. Noted drop in Hb ( 7.2 ->7) over the last 24 hrs. Noted patient had bone marrow bx.    Recommendations: Recommend I unit of PRBC ( prior to discharge). Cont with Prograf 0.5 mg PO bid. Keep CellCept on hold. US Elastography shows minimal risk for significant fibrosis despite suggestive findings of cirrhosis on CT scan.  OK to discharge home from hepatology perspective with plan for outpatient follow up.    Plan discussed with Primary team.
e/o iron def anemia  abnl wbc morphology on smear  BMBx done today for further w/u  IV venofer for iron replacement while inpt  d/c home on iron tabs bid  heme f/u OPD post dc
Patient seen and examined, case d/w house staff.  Feeling better post transfusion, monitor CBC, f/u transplant team recs.
Patient seen and examined, case d/w house staff.  Symptomatic anemia s/p 1 unit PRBC transfusion.  Hgb slightly down today, will transfuse additional unit today prior to d/c.  Will f/u with Heme/Onc as outpatient for BM bx results.  Total d/c time 45 minutes
Patient seen and examined, case d/w house staff.  d/c planning, pending CT read.  Total d/c time 45 minutes.
70 year old male with PMH HTN, HLD, T2DM on insulin, COPD (on home O2 3L), EtOH/HCV cirrhosis s/p liver transplant (2011) on Tacro/Cellcept/Prednisone and chronic lymphedema who was admitted for symptomatic anemia s/p 2 unit PRBC. Hemoglobin is now stable. No inpatient GI workup at this time. Will follow up bone marrow biopsy results as an outpatient. Total time spent discharge planning 39 minutes.    Miko Convissar, DO  Pager [phone]  If no answer [phone]
70 year old male with PMH HTN, HLD, T2DM on insulin, COPD (on home O2 3L), EtOH/HCV cirrhosis s/p liver transplant (2011) on Tacro/Cellcept/Prednisone and chronic lymphedema who was admitted for symptomatic anemia s/p 2 unit PRBC. Hemoglobin is now stable. No inpatient GI workup at this time. Will follow up bone marrow biopsy results as an outpatient. Likely discharge tomorrow. Rest of plan as above.

## 2020-12-13 NOTE — PROGRESS NOTE ADULT - PROBLEM SELECTOR PLAN 3
Hx of RV Systolic dysfunction during last admission on TTE.   - Patient used to follow cardiologist at [facility] yet now moved to [facility]. Not has followed by cardiologist for a long time.  - Seen by Heart failure during last admission, supposedly follow Dr. Daley as outpatient.   - currently on diuretics: Furosemide 80mg BID.  - ASA on hold in setting of anemia  - Metoprolol Succinate 25mg daily.  - TTE: unchanged from prior Echo.   - HF consulted for evaluation and establishment of care per patient request.

## 2020-12-13 NOTE — PROGRESS NOTE ADULT - ASSESSMENT
Mr. Nguyen is a 69 y/o Male w/ PMH significant for HTN, HLD, T2DM on insulin, COPD (on home O2 3L), EtOH/HCV cirrhosis s/p liver transplant (2011) on Tacro/Cellcept/Prednisone, chronic lymphedema, who is admitted for symptomatic anemia, responding well to 1u pRBC. No urgent scope need. Now pending hematology anemia workup.

## 2020-12-13 NOTE — DISCHARGE NOTE NURSING/CASE MANAGEMENT/SOCIAL WORK - PATIENT PORTAL LINK FT
You can access the FollowMyHealth Patient Portal offered by [facility] by registering at the following website: [email or web address]. By joining [email or web address] FollowMyHealth portal, you will also be able to view your health information using other applications (apps) compatible with our system.

## 2020-12-13 NOTE — PROGRESS NOTE ADULT - PROBLEM SELECTOR PLAN 8
- DVT ppx: SCD in setting of symptomatic anemia, patient ambulating  - Diet: DASH/Diabetic Diet  - Dispo: tomorrow.  updated for transport home. Pt refused home PT during discussion with CM. - DVT ppx: SCD in setting of symptomatic anemia, patient ambulating  - Diet: DASH/Diabetic Diet

## 2020-12-13 NOTE — PROGRESS NOTE ADULT - SUBJECTIVE AND OBJECTIVE BOX
PROGRESS NOTE:     SUBJECTIVE / OVERNIGHT EVENTS:  No acute events overnight. Patient seen and evaluated at bedside. No fever/chills.  Denies SOB at rest, chest pain, palpitations, abdominal pain, nausea/vomiting  Albuterol x 4, 5 U SSI.     MEDICATIONS  (STANDING):  ALBUTerol    90 MICROgram(s) HFA Inhaler 2 Puff(s) Inhalation every 6 hours  budesonide 160 MICROgram(s)/formoterol 4.5 MICROgram(s) Inhaler 2 Puff(s) Inhalation two times a day  dextrose 40% Gel 15 Gram(s) Oral once  dextrose 5%. 1000 milliLiter(s) (50 mL/Hr) IV Continuous <Continuous>  dextrose 5%. 1000 milliLiter(s) (100 mL/Hr) IV Continuous <Continuous>  dextrose 50% Injectable 25 Gram(s) IV Push once  dextrose 50% Injectable 12.5 Gram(s) IV Push once  dextrose 50% Injectable 25 Gram(s) IV Push once  ferrous    sulfate 325 milliGRAM(s) Oral two times a day  furosemide    Tablet 80 milliGRAM(s) Oral two times a day  glucagon  Injectable 1 milliGRAM(s) IntraMuscular once  insulin glargine Injectable (LANTUS) 26 Unit(s) SubCutaneous <User Schedule>  insulin glargine Injectable (LANTUS) 45 Unit(s) SubCutaneous <User Schedule>  insulin lispro (ADMELOG) corrective regimen sliding scale   SubCutaneous three times a day before meals  insulin lispro (ADMELOG) corrective regimen sliding scale   SubCutaneous at bedtime  metoprolol succinate ER 25 milliGRAM(s) Oral daily  multivitamin/minerals 1 Tablet(s) Oral daily  pantoprazole    Tablet 40 milliGRAM(s) Oral two times a day  predniSONE   Tablet 3 milliGRAM(s) Oral every other day  senna 2 Tablet(s) Oral at bedtime  sertraline 100 milliGRAM(s) Oral daily  tacrolimus 0.5 milliGRAM(s) Oral two times a day  tamsulosin 0.4 milliGRAM(s) Oral at bedtime  tiotropium 18 MICROgram(s) Capsule 1 Capsule(s) Inhalation daily  trimethoprim   80 mG/sulfamethoxazole 400 mG 1 Tablet(s) Oral daily    MEDICATIONS  (PRN):  buprenorphine 2 mG/naloxone 0.5 mG SL  Tablet 1 Tablet(s) SubLingual <User Schedule> PRN pain management  sodium chloride 0.65% Nasal 1 Spray(s) Both Nostrils daily PRN Nasal Congestion      CAPILLARY BLOOD GLUCOSE      POCT Blood Glucose.: 277 mg/dL (12 Dec 2020 21:24)  POCT Blood Glucose.: 194 mg/dL (12 Dec 2020 16:56)  POCT Blood Glucose.: 275 mg/dL (12 Dec 2020 12:08)  POCT Blood Glucose.: 183 mg/dL (12 Dec 2020 08:25)    I&O's Summary    12 Dec 2020 07:01  -  13 Dec 2020 07:00  --------------------------------------------------------  IN: 0 mL / OUT: 1500 mL / NET: -1500 mL        PHYSICAL EXAM:  Vital Signs Last 24 Hrs  T(C): 36.8 (13 Dec 2020 06:02), Max: 37.4 (12 Dec 2020 09:18)  T(F): 98.2 (13 Dec 2020 06:02), Max: 99.3 (12 Dec 2020 09:18)  HR: 76 (13 Dec 2020 06:02) (64 - 77)  BP: 132/71 (13 Dec 2020 06:02) (132/71 - 144/74)  BP(mean): --  RR: 18 (13 Dec 2020 06:02) (18 - 20)  SpO2: 99% (13 Dec 2020 06:02) (95% - 100%)    Constitutional: NAD. lying on bed comfortably on NC 3.5L.  HEENT: AT/NC, PERRLA; EOMI, conjunctival pallor, MMM supple neck.  Respiratory: CTAB with prolonged expiratory phase. equal aeration bilaterally. no increase in WOB  Cardiovascular: RRR, systolic murmur heard at RUSB. 2+ distal pulses. Cap refill < 3 seconds.  Gastrointestinal: soft; NT/ND, +BS  Genitourinary: not examined.  Extremities: no cyanosis; bilateral lymphedema with chronic skin changes, non-tender to palpation, DP and Radial pulses intact.  Neurological: A&Ox 3; responds to pain; responds to verbal commands; epicritic and protopathic sensation intact: CN nerves grossly intact. moving all four extremities against gravity.   Psychiatric: normal mood/affect.      LABS:                        7.9    3.99  )-----------( 91       ( 11 Dec 2020 22:10 )             29.6     12-11    137  |  95<L>  |  31<H>  ----------------------------<  146<H>  4.1   |  31  |  1.52<H>    Ca    9.0      11 Dec 2020 07:18  Phos  3.1     12-11  Mg     2.4     12-11    TPro  7.1  /  Alb  4.2  /  TBili  0.5  /  DBili  x   /  AST  25  /  ALT  14  /  AlkPhos  79  12-11               PROGRESS NOTE:     SUBJECTIVE / OVERNIGHT EVENTS:  No acute events overnight. Patient seen and evaluated at bedside. No fever/chills.  Denies SOB at rest, chest pain, palpitations, abdominal pain, nausea/vomiting  Albuterol x 4, 5 U SSI.     REVIEW OF SYSTEMS:    CONSTITUTIONAL: No weakness, fevers or chills  EYES/ENT: No visual changes;  No vertigo or throat pain   NECK: No pain or stiffness  RESPIRATORY: No cough, wheezing, hemoptysis; No shortness of breath  CARDIOVASCULAR: No chest pain or palpitations  GASTROINTESTINAL: No abdominal or epigastric pain. No nausea, vomiting, or hematemesis; No diarrhea or constipation. No melena or hematochezia.  GENITOURINARY: No dysuria, frequency or hematuria  NEUROLOGICAL: No numbness or weakness  SKIN: No itching, burning, rashes, or lesions  MSK: No joint pain, no back pain  HEME: No easy bleeding, no easy bruising  All other review of systems is negative unless indicated above.    MEDICATIONS  (STANDING):  ALBUTerol    90 MICROgram(s) HFA Inhaler 2 Puff(s) Inhalation every 6 hours  budesonide 160 MICROgram(s)/formoterol 4.5 MICROgram(s) Inhaler 2 Puff(s) Inhalation two times a day  dextrose 40% Gel 15 Gram(s) Oral once  dextrose 5%. 1000 milliLiter(s) (50 mL/Hr) IV Continuous <Continuous>  dextrose 5%. 1000 milliLiter(s) (100 mL/Hr) IV Continuous <Continuous>  dextrose 50% Injectable 25 Gram(s) IV Push once  dextrose 50% Injectable 12.5 Gram(s) IV Push once  dextrose 50% Injectable 25 Gram(s) IV Push once  ferrous    sulfate 325 milliGRAM(s) Oral two times a day  furosemide    Tablet 80 milliGRAM(s) Oral two times a day  glucagon  Injectable 1 milliGRAM(s) IntraMuscular once  insulin glargine Injectable (LANTUS) 26 Unit(s) SubCutaneous <User Schedule>  insulin glargine Injectable (LANTUS) 45 Unit(s) SubCutaneous <User Schedule>  insulin lispro (ADMELOG) corrective regimen sliding scale   SubCutaneous three times a day before meals  insulin lispro (ADMELOG) corrective regimen sliding scale   SubCutaneous at bedtime  metoprolol succinate ER 25 milliGRAM(s) Oral daily  multivitamin/minerals 1 Tablet(s) Oral daily  pantoprazole    Tablet 40 milliGRAM(s) Oral two times a day  predniSONE   Tablet 3 milliGRAM(s) Oral every other day  senna 2 Tablet(s) Oral at bedtime  sertraline 100 milliGRAM(s) Oral daily  tacrolimus 0.5 milliGRAM(s) Oral two times a day  tamsulosin 0.4 milliGRAM(s) Oral at bedtime  tiotropium 18 MICROgram(s) Capsule 1 Capsule(s) Inhalation daily  trimethoprim   80 mG/sulfamethoxazole 400 mG 1 Tablet(s) Oral daily    MEDICATIONS  (PRN):  buprenorphine 2 mG/naloxone 0.5 mG SL  Tablet 1 Tablet(s) SubLingual <User Schedule> PRN pain management  sodium chloride 0.65% Nasal 1 Spray(s) Both Nostrils daily PRN Nasal Congestion      CAPILLARY BLOOD GLUCOSE      POCT Blood Glucose.: 277 mg/dL (12 Dec 2020 21:24)  POCT Blood Glucose.: 194 mg/dL (12 Dec 2020 16:56)  POCT Blood Glucose.: 275 mg/dL (12 Dec 2020 12:08)  POCT Blood Glucose.: 183 mg/dL (12 Dec 2020 08:25)    I&O's Summary    12 Dec 2020 07:01  -  13 Dec 2020 07:00  --------------------------------------------------------  IN: 0 mL / OUT: 1500 mL / NET: -1500 mL        PHYSICAL EXAM:  Vital Signs Last 24 Hrs  T(C): 36.8 (13 Dec 2020 06:02), Max: 37.4 (12 Dec 2020 09:18)  T(F): 98.2 (13 Dec 2020 06:02), Max: 99.3 (12 Dec 2020 09:18)  HR: 76 (13 Dec 2020 06:02) (64 - 77)  BP: 132/71 (13 Dec 2020 06:02) (132/71 - 144/74)  BP(mean): --  RR: 18 (13 Dec 2020 06:02) (18 - 20)  SpO2: 99% (13 Dec 2020 06:02) (95% - 100%)    Constitutional: NAD. lying on bed comfortably on NC 3.5L.  HEENT: AT/NC, PERRLA; EOMI, conjunctival pallor, MMM supple neck.  Respiratory: CTAB with prolonged expiratory phase. equal aeration bilaterally. no increase in WOB  Cardiovascular: RRR, systolic murmur heard at RUSB. 2+ distal pulses. Cap refill < 3 seconds.  Gastrointestinal: soft; NT/ND, +BS  Genitourinary: not examined.  Extremities: no cyanosis; bilateral lymphedema with chronic skin changes, non-tender to palpation, DP and Radial pulses intact.  Neurological: A&Ox 3; responds to pain; responds to verbal commands; epicritic and protopathic sensation intact: CN nerves grossly intact. moving all four extremities against gravity.   Psychiatric: normal mood/affect.      LABS:                        7.9    3.99  )-----------( 91       ( 11 Dec 2020 22:10 )             29.6     12-11    137  |  95<L>  |  31<H>  ----------------------------<  146<H>  4.1   |  31  |  1.52<H>    Ca    9.0      11 Dec 2020 07:18  Phos  3.1     12-11  Mg     2.4     12-11    TPro  7.1  /  Alb  4.2  /  TBili  0.5  /  DBili  x   /  AST  25  /  ALT  14  /  AlkPhos  79  12-11

## 2020-12-13 NOTE — PROGRESS NOTE ADULT - PROBLEM SELECTOR PLAN 1
Hgb 6.4 upon admission, worsening GOMEZ/palpitation/CP concerning for symptomatic anemia.  - s/p 2u pRBC. responded well.  - Cr baseline 1.3~1.6 possible CKD contributing to anemia  - Iron studies in last admission shows component of MALIA/AoCD.  - repeat iron study shows MALIA picture. Ferritin pending.  - no signs of acute large GIB.   - TTE to assess his valvulopathy and RV systolic dysfunction found on prior TTE.  - GI consulted given liver transplant status and prior history of anemia requring transfusion: no plan for urgent scope procedures. outpatient follow up  - Hematology consulted: BM biopsy today.  - IV iron 200mg (2 days). PO Iron supp afterwards. Follow up with hematology as outpatient for BM biopsy result.  - Patient had some red blood on tissue after BM (< 3 cc per writer's observation). No blood inside the bowl. No anemic symptoms. Likely hemorrhoidal. Will give stool softner PRN.

## 2020-12-22 PROBLEM — D84.9 IMMUNOSUPPRESSION: Status: ACTIVE | Noted: 2018-03-15

## 2020-12-22 PROBLEM — Z01.812 PRE-PROCEDURAL LABORATORY EXAMINATION: Status: ACTIVE | Noted: 2020-01-01

## 2021-01-01 ENCOUNTER — APPOINTMENT (OUTPATIENT)
Dept: PULMONOLOGY | Facility: CLINIC | Age: 71
End: 2021-01-01

## 2021-01-01 ENCOUNTER — RX RENEWAL (OUTPATIENT)
Age: 71
End: 2021-01-01

## 2021-01-01 ENCOUNTER — APPOINTMENT (OUTPATIENT)
Dept: HEMATOLOGY ONCOLOGY | Facility: CLINIC | Age: 71
End: 2021-01-01
Payer: MEDICARE

## 2021-01-01 ENCOUNTER — OUTPATIENT (OUTPATIENT)
Dept: OUTPATIENT SERVICES | Facility: HOSPITAL | Age: 71
LOS: 1 days | Discharge: HOME | End: 2021-01-01

## 2021-01-01 ENCOUNTER — INPATIENT (INPATIENT)
Facility: HOSPITAL | Age: 71
LOS: 6 days | DRG: 870 | End: 2021-03-03
Attending: INTERNAL MEDICINE | Admitting: INTERNAL MEDICINE
Payer: MEDICARE

## 2021-01-01 ENCOUNTER — NON-APPOINTMENT (OUTPATIENT)
Age: 71
End: 2021-01-01

## 2021-01-01 VITALS — OXYGEN SATURATION: 57 % | RESPIRATION RATE: 34 BRPM

## 2021-01-01 VITALS — HEIGHT: 73 IN

## 2021-01-01 DIAGNOSIS — I10 ESSENTIAL (PRIMARY) HYPERTENSION: ICD-10-CM

## 2021-01-01 DIAGNOSIS — I50.9 HEART FAILURE, UNSPECIFIED: ICD-10-CM

## 2021-01-01 DIAGNOSIS — E87.2 ACIDOSIS: ICD-10-CM

## 2021-01-01 DIAGNOSIS — J96.01 ACUTE RESPIRATORY FAILURE WITH HYPOXIA: ICD-10-CM

## 2021-01-01 DIAGNOSIS — D64.9 ANEMIA, UNSPECIFIED: ICD-10-CM

## 2021-01-01 DIAGNOSIS — Z94.4 LIVER TRANSPLANT STATUS: Chronic | ICD-10-CM

## 2021-01-01 DIAGNOSIS — N17.9 ACUTE KIDNEY FAILURE, UNSPECIFIED: ICD-10-CM

## 2021-01-01 DIAGNOSIS — J96.00 ACUTE RESPIRATORY FAILURE, UNSPECIFIED WHETHER WITH HYPOXIA OR HYPERCAPNIA: ICD-10-CM

## 2021-01-01 DIAGNOSIS — D69.6 THROMBOCYTOPENIA, UNSPECIFIED: ICD-10-CM

## 2021-01-01 DIAGNOSIS — Z29.9 ENCOUNTER FOR PROPHYLACTIC MEASURES, UNSPECIFIED: ICD-10-CM

## 2021-01-01 DIAGNOSIS — R06.02 SHORTNESS OF BREATH: ICD-10-CM

## 2021-01-01 DIAGNOSIS — Z71.89 OTHER SPECIFIED COUNSELING: ICD-10-CM

## 2021-01-01 DIAGNOSIS — D46.9 MYELODYSPLASTIC SYNDROME, UNSPECIFIED: ICD-10-CM

## 2021-01-01 DIAGNOSIS — J44.9 CHRONIC OBSTRUCTIVE PULMONARY DISEASE, UNSPECIFIED: ICD-10-CM

## 2021-01-01 DIAGNOSIS — Z02.9 ENCOUNTER FOR ADMINISTRATIVE EXAMINATIONS, UNSPECIFIED: ICD-10-CM

## 2021-01-01 DIAGNOSIS — U07.1 COVID-19: ICD-10-CM

## 2021-01-01 DIAGNOSIS — Z94.4 LIVER TRANSPLANT STATUS: ICD-10-CM

## 2021-01-01 DIAGNOSIS — E78.5 HYPERLIPIDEMIA, UNSPECIFIED: ICD-10-CM

## 2021-01-01 DIAGNOSIS — E11.9 TYPE 2 DIABETES MELLITUS WITHOUT COMPLICATIONS: ICD-10-CM

## 2021-01-01 DIAGNOSIS — E11.65 TYPE 2 DIABETES MELLITUS WITH HYPERGLYCEMIA: ICD-10-CM

## 2021-01-01 LAB
-  AMIKACIN: SIGNIFICANT CHANGE UP
-  AMIKACIN: SIGNIFICANT CHANGE UP
-  AMOXICILLIN/CLAVULANIC ACID: SIGNIFICANT CHANGE UP
-  AMOXICILLIN/CLAVULANIC ACID: SIGNIFICANT CHANGE UP
-  AMPICILLIN/SULBACTAM: SIGNIFICANT CHANGE UP
-  AMPICILLIN/SULBACTAM: SIGNIFICANT CHANGE UP
-  AMPICILLIN: SIGNIFICANT CHANGE UP
-  AMPICILLIN: SIGNIFICANT CHANGE UP
-  AZTREONAM: SIGNIFICANT CHANGE UP
-  AZTREONAM: SIGNIFICANT CHANGE UP
-  CEFAZOLIN: SIGNIFICANT CHANGE UP
-  CEFAZOLIN: SIGNIFICANT CHANGE UP
-  CEFEPIME: SIGNIFICANT CHANGE UP
-  CEFEPIME: SIGNIFICANT CHANGE UP
-  CEFOXITIN: SIGNIFICANT CHANGE UP
-  CEFOXITIN: SIGNIFICANT CHANGE UP
-  CEFTRIAXONE: SIGNIFICANT CHANGE UP
-  CEFTRIAXONE: SIGNIFICANT CHANGE UP
-  CIPROFLOXACIN: SIGNIFICANT CHANGE UP
-  CIPROFLOXACIN: SIGNIFICANT CHANGE UP
-  ERTAPENEM: SIGNIFICANT CHANGE UP
-  ERTAPENEM: SIGNIFICANT CHANGE UP
-  GENTAMICIN: SIGNIFICANT CHANGE UP
-  GENTAMICIN: SIGNIFICANT CHANGE UP
-  IMIPENEM: SIGNIFICANT CHANGE UP
-  IMIPENEM: SIGNIFICANT CHANGE UP
-  LEVOFLOXACIN: SIGNIFICANT CHANGE UP
-  LEVOFLOXACIN: SIGNIFICANT CHANGE UP
-  MEROPENEM: SIGNIFICANT CHANGE UP
-  MEROPENEM: SIGNIFICANT CHANGE UP
-  NITROFURANTOIN: SIGNIFICANT CHANGE UP
-  PIPERACILLIN/TAZOBACTAM: SIGNIFICANT CHANGE UP
-  PIPERACILLIN/TAZOBACTAM: SIGNIFICANT CHANGE UP
-  STAPHYLOCOCCUS EPIDERMIDIS, METHICILLIN RESISTANT: SIGNIFICANT CHANGE UP
-  STREPTOCOCCUS SP. (NOT GRP A, B OR S PNEUMONIAE): SIGNIFICANT CHANGE UP
-  TIGECYCLINE: SIGNIFICANT CHANGE UP
-  TOBRAMYCIN: SIGNIFICANT CHANGE UP
-  TOBRAMYCIN: SIGNIFICANT CHANGE UP
-  TRIMETHOPRIM/SULFAMETHOXAZOLE: SIGNIFICANT CHANGE UP
-  TRIMETHOPRIM/SULFAMETHOXAZOLE: SIGNIFICANT CHANGE UP
A1C WITH ESTIMATED AVERAGE GLUCOSE RESULT: 6.9 % — HIGH (ref 4–5.6)
ALBUMIN SERPL ELPH-MCNC: 1.6 G/DL — LOW (ref 3.3–5)
ALBUMIN SERPL ELPH-MCNC: 1.9 G/DL — LOW (ref 3.3–5)
ALBUMIN SERPL ELPH-MCNC: 2 G/DL — LOW (ref 3.3–5)
ALBUMIN SERPL ELPH-MCNC: 2 G/DL — LOW (ref 3.3–5)
ALBUMIN SERPL ELPH-MCNC: 2.5 G/DL — LOW (ref 3.3–5)
ALBUMIN SERPL ELPH-MCNC: 2.6 G/DL — LOW (ref 3.3–5)
ALBUMIN SERPL ELPH-MCNC: 2.7 G/DL — LOW (ref 3.3–5)
ALBUMIN SERPL ELPH-MCNC: 3.1 G/DL — LOW (ref 3.3–5)
ALBUMIN SERPL ELPH-MCNC: 3.3 G/DL — SIGNIFICANT CHANGE UP (ref 3.3–5)
ALBUMIN SERPL ELPH-MCNC: 3.3 G/DL — SIGNIFICANT CHANGE UP (ref 3.3–5)
ALBUMIN SERPL ELPH-MCNC: 3.6 G/DL — SIGNIFICANT CHANGE UP (ref 3.3–5)
ALBUMIN SERPL ELPH-MCNC: 3.8 G/DL — SIGNIFICANT CHANGE UP (ref 3.3–5)
ALBUMIN SERPL ELPH-MCNC: 4.2 G/DL
ALP BLD-CCNC: 108 U/L
ALP SERPL-CCNC: 109 U/L — SIGNIFICANT CHANGE UP (ref 40–120)
ALP SERPL-CCNC: 110 U/L — SIGNIFICANT CHANGE UP (ref 40–120)
ALP SERPL-CCNC: 145 U/L — HIGH (ref 40–120)
ALP SERPL-CCNC: 231 U/L — HIGH (ref 40–120)
ALP SERPL-CCNC: 275 U/L — HIGH (ref 40–120)
ALP SERPL-CCNC: 278 U/L — HIGH (ref 40–120)
ALP SERPL-CCNC: 384 U/L — HIGH (ref 40–120)
ALP SERPL-CCNC: 83 U/L — SIGNIFICANT CHANGE UP (ref 40–120)
ALP SERPL-CCNC: 86 U/L — SIGNIFICANT CHANGE UP (ref 40–120)
ALP SERPL-CCNC: 88 U/L — SIGNIFICANT CHANGE UP (ref 40–120)
ALP SERPL-CCNC: 88 U/L — SIGNIFICANT CHANGE UP (ref 40–120)
ALP SERPL-CCNC: 95 U/L — SIGNIFICANT CHANGE UP (ref 40–120)
ALT FLD-CCNC: 18 U/L — SIGNIFICANT CHANGE UP (ref 10–45)
ALT FLD-CCNC: 204 U/L — HIGH (ref 10–45)
ALT FLD-CCNC: 211 U/L — HIGH (ref 10–45)
ALT FLD-CCNC: 212 U/L — HIGH (ref 10–45)
ALT FLD-CCNC: 23 U/L — SIGNIFICANT CHANGE UP (ref 10–45)
ALT FLD-CCNC: 24 U/L — SIGNIFICANT CHANGE UP (ref 10–45)
ALT FLD-CCNC: 24 U/L — SIGNIFICANT CHANGE UP (ref 10–45)
ALT FLD-CCNC: 26 U/L — SIGNIFICANT CHANGE UP (ref 10–45)
ALT FLD-CCNC: 28 U/L — SIGNIFICANT CHANGE UP (ref 10–45)
ALT FLD-CCNC: 513 U/L — HIGH (ref 10–45)
ALT SERPL-CCNC: 18 U/L
ANION GAP SERPL CALC-SCNC: 10 MMOL/L — SIGNIFICANT CHANGE UP (ref 5–17)
ANION GAP SERPL CALC-SCNC: 12 MMOL/L
ANION GAP SERPL CALC-SCNC: 13 MMOL/L — SIGNIFICANT CHANGE UP (ref 5–17)
ANION GAP SERPL CALC-SCNC: 14 MMOL/L — SIGNIFICANT CHANGE UP (ref 5–17)
ANION GAP SERPL CALC-SCNC: 15 MMOL/L — SIGNIFICANT CHANGE UP (ref 5–17)
ANION GAP SERPL CALC-SCNC: 16 MMOL/L — SIGNIFICANT CHANGE UP (ref 5–17)
ANION GAP SERPL CALC-SCNC: 17 MMOL/L — SIGNIFICANT CHANGE UP (ref 5–17)
ANION GAP SERPL CALC-SCNC: 18 MMOL/L — HIGH (ref 5–17)
ANION GAP SERPL CALC-SCNC: 19 MMOL/L — HIGH (ref 5–17)
ANION GAP SERPL CALC-SCNC: 20 MMOL/L — HIGH (ref 5–17)
APPEARANCE UR: ABNORMAL
APPEARANCE UR: ABNORMAL
APTT BLD: 25.9 SEC — LOW (ref 27.5–35.5)
APTT BLD: 27.3 SEC — LOW (ref 27.5–35.5)
APTT BLD: 27.3 SEC — LOW (ref 27.5–35.5)
APTT BLD: 27.7 SEC — SIGNIFICANT CHANGE UP (ref 27.5–35.5)
APTT BLD: 27.8 SEC — SIGNIFICANT CHANGE UP (ref 27.5–35.5)
APTT BLD: 27.9 SEC — SIGNIFICANT CHANGE UP (ref 27.5–35.5)
APTT BLD: 30.7 SEC — SIGNIFICANT CHANGE UP (ref 27.5–35.5)
APTT BLD: 31.1 SEC — SIGNIFICANT CHANGE UP (ref 27.5–35.5)
APTT BLD: 31.6 SEC — SIGNIFICANT CHANGE UP (ref 27.5–35.5)
APTT BLD: 32.6 SEC — SIGNIFICANT CHANGE UP (ref 27.5–35.5)
AST SERPL-CCNC: 1443 U/L — HIGH (ref 10–40)
AST SERPL-CCNC: 25 U/L
AST SERPL-CCNC: 27 U/L — SIGNIFICANT CHANGE UP (ref 10–40)
AST SERPL-CCNC: 327 U/L — HIGH (ref 10–40)
AST SERPL-CCNC: 338 U/L — HIGH (ref 10–40)
AST SERPL-CCNC: 355 U/L — HIGH (ref 10–40)
AST SERPL-CCNC: 37 U/L — SIGNIFICANT CHANGE UP (ref 10–40)
AST SERPL-CCNC: 39 U/L — SIGNIFICANT CHANGE UP (ref 10–40)
AST SERPL-CCNC: 45 U/L — HIGH (ref 10–40)
AST SERPL-CCNC: 45 U/L — HIGH (ref 10–40)
AST SERPL-CCNC: 49 U/L — HIGH (ref 10–40)
AST SERPL-CCNC: 56 U/L — HIGH (ref 10–40)
AST SERPL-CCNC: 57 U/L — HIGH (ref 10–40)
B-OH-BUTYR SERPL-SCNC: 0.4 MMOL/L — SIGNIFICANT CHANGE UP
B-OH-BUTYR SERPL-SCNC: 0.5 MMOL/L — HIGH
BACTERIA # UR AUTO: ABNORMAL
BACTERIA # UR AUTO: NEGATIVE — SIGNIFICANT CHANGE UP
BASE EXCESS BLDA CALC-SCNC: 1.6 MMOL/L — SIGNIFICANT CHANGE UP (ref -2–2)
BASE EXCESS BLDA CALC-SCNC: 1.6 MMOL/L — SIGNIFICANT CHANGE UP (ref -2–2)
BASE EXCESS BLDV CALC-SCNC: -5.3 MMOL/L — LOW (ref -2–2)
BASE EXCESS BLDV CALC-SCNC: 0.8 MMOL/L — SIGNIFICANT CHANGE UP (ref -2–2)
BASOPHILS # BLD AUTO: 0 K/UL — SIGNIFICANT CHANGE UP (ref 0–0.2)
BASOPHILS # BLD AUTO: 0.03 K/UL
BASOPHILS NFR BLD AUTO: 0 % — SIGNIFICANT CHANGE UP (ref 0–2)
BASOPHILS NFR BLD AUTO: 0.8 %
BILIRUB DIRECT SERPL-MCNC: 0.3 MG/DL — HIGH (ref 0–0.2)
BILIRUB INDIRECT FLD-MCNC: 0.3 MG/DL — SIGNIFICANT CHANGE UP (ref 0.2–1)
BILIRUB SERPL-MCNC: 0.4 MG/DL — SIGNIFICANT CHANGE UP (ref 0.2–1.2)
BILIRUB SERPL-MCNC: 0.5 MG/DL
BILIRUB SERPL-MCNC: 0.5 MG/DL — SIGNIFICANT CHANGE UP (ref 0.2–1.2)
BILIRUB SERPL-MCNC: 0.5 MG/DL — SIGNIFICANT CHANGE UP (ref 0.2–1.2)
BILIRUB SERPL-MCNC: 0.6 MG/DL — SIGNIFICANT CHANGE UP (ref 0.2–1.2)
BILIRUB SERPL-MCNC: 0.9 MG/DL — SIGNIFICANT CHANGE UP (ref 0.2–1.2)
BILIRUB SERPL-MCNC: 1 MG/DL — SIGNIFICANT CHANGE UP (ref 0.2–1.2)
BILIRUB SERPL-MCNC: 1 MG/DL — SIGNIFICANT CHANGE UP (ref 0.2–1.2)
BILIRUB SERPL-MCNC: 1.3 MG/DL — HIGH (ref 0.2–1.2)
BILIRUB SERPL-MCNC: 1.4 MG/DL — HIGH (ref 0.2–1.2)
BILIRUB SERPL-MCNC: 3.5 MG/DL — HIGH (ref 0.2–1.2)
BILIRUB UR-MCNC: NEGATIVE — SIGNIFICANT CHANGE UP
BILIRUB UR-MCNC: NEGATIVE — SIGNIFICANT CHANGE UP
BLD GP AB SCN SERPL QL: NEGATIVE — SIGNIFICANT CHANGE UP
BUN SERPL-MCNC: 28 MG/DL
BUN SERPL-MCNC: 39 MG/DL — HIGH (ref 7–23)
BUN SERPL-MCNC: 42 MG/DL — HIGH (ref 7–23)
BUN SERPL-MCNC: 45 MG/DL — HIGH (ref 7–23)
BUN SERPL-MCNC: 49 MG/DL — HIGH (ref 7–23)
BUN SERPL-MCNC: 50 MG/DL — HIGH (ref 7–23)
BUN SERPL-MCNC: 56 MG/DL — HIGH (ref 7–23)
BUN SERPL-MCNC: 57 MG/DL — HIGH (ref 7–23)
BUN SERPL-MCNC: 63 MG/DL — HIGH (ref 7–23)
BUN SERPL-MCNC: 68 MG/DL — HIGH (ref 7–23)
BUN SERPL-MCNC: 69 MG/DL — HIGH (ref 7–23)
BUN SERPL-MCNC: 73 MG/DL — HIGH (ref 7–23)
BUN SERPL-MCNC: 74 MG/DL — HIGH (ref 7–23)
BUN SERPL-MCNC: 84 MG/DL — HIGH (ref 7–23)
BUN SERPL-MCNC: 88 MG/DL — HIGH (ref 7–23)
BUN SERPL-MCNC: 89 MG/DL — HIGH (ref 7–23)
CA-I SERPL-SCNC: 1.09 MMOL/L — LOW (ref 1.12–1.3)
CALCIUM SERPL-MCNC: 7.8 MG/DL — LOW (ref 8.4–10.5)
CALCIUM SERPL-MCNC: 7.8 MG/DL — LOW (ref 8.4–10.5)
CALCIUM SERPL-MCNC: 7.9 MG/DL — LOW (ref 8.4–10.5)
CALCIUM SERPL-MCNC: 7.9 MG/DL — LOW (ref 8.4–10.5)
CALCIUM SERPL-MCNC: 8 MG/DL — LOW (ref 8.4–10.5)
CALCIUM SERPL-MCNC: 8 MG/DL — LOW (ref 8.4–10.5)
CALCIUM SERPL-MCNC: 8.1 MG/DL — LOW (ref 8.4–10.5)
CALCIUM SERPL-MCNC: 8.1 MG/DL — LOW (ref 8.4–10.5)
CALCIUM SERPL-MCNC: 8.2 MG/DL — LOW (ref 8.4–10.5)
CALCIUM SERPL-MCNC: 8.5 MG/DL — SIGNIFICANT CHANGE UP (ref 8.4–10.5)
CALCIUM SERPL-MCNC: 8.6 MG/DL — SIGNIFICANT CHANGE UP (ref 8.4–10.5)
CALCIUM SERPL-MCNC: 9.3 MG/DL
CHLORIDE BLDV-SCNC: 93 MMOL/L — LOW (ref 96–108)
CHLORIDE SERPL-SCNC: 102 MMOL/L — SIGNIFICANT CHANGE UP (ref 96–108)
CHLORIDE SERPL-SCNC: 89 MMOL/L — LOW (ref 96–108)
CHLORIDE SERPL-SCNC: 90 MMOL/L — LOW (ref 96–108)
CHLORIDE SERPL-SCNC: 92 MMOL/L — LOW (ref 96–108)
CHLORIDE SERPL-SCNC: 94 MMOL/L
CHLORIDE SERPL-SCNC: 94 MMOL/L — LOW (ref 96–108)
CHLORIDE SERPL-SCNC: 95 MMOL/L — LOW (ref 96–108)
CHLORIDE SERPL-SCNC: 96 MMOL/L — SIGNIFICANT CHANGE UP (ref 96–108)
CHLORIDE SERPL-SCNC: 97 MMOL/L — SIGNIFICANT CHANGE UP (ref 96–108)
CHLORIDE SERPL-SCNC: 97 MMOL/L — SIGNIFICANT CHANGE UP (ref 96–108)
CHLORIDE SERPL-SCNC: 98 MMOL/L — SIGNIFICANT CHANGE UP (ref 96–108)
CHLORIDE SERPL-SCNC: 99 MMOL/L — SIGNIFICANT CHANGE UP (ref 96–108)
CO2 BLDA-SCNC: 30 MMOL/L — SIGNIFICANT CHANGE UP (ref 22–30)
CO2 BLDA-SCNC: 30 MMOL/L — SIGNIFICANT CHANGE UP (ref 22–30)
CO2 BLDV-SCNC: 26 MMOL/L — SIGNIFICANT CHANGE UP (ref 22–30)
CO2 BLDV-SCNC: 30 MMOL/L — SIGNIFICANT CHANGE UP (ref 22–30)
CO2 SERPL-SCNC: 18 MMOL/L — LOW (ref 22–31)
CO2 SERPL-SCNC: 20 MMOL/L — LOW (ref 22–31)
CO2 SERPL-SCNC: 21 MMOL/L — LOW (ref 22–31)
CO2 SERPL-SCNC: 22 MMOL/L — SIGNIFICANT CHANGE UP (ref 22–31)
CO2 SERPL-SCNC: 23 MMOL/L — SIGNIFICANT CHANGE UP (ref 22–31)
CO2 SERPL-SCNC: 23 MMOL/L — SIGNIFICANT CHANGE UP (ref 22–31)
CO2 SERPL-SCNC: 24 MMOL/L — SIGNIFICANT CHANGE UP (ref 22–31)
CO2 SERPL-SCNC: 25 MMOL/L — SIGNIFICANT CHANGE UP (ref 22–31)
CO2 SERPL-SCNC: 25 MMOL/L — SIGNIFICANT CHANGE UP (ref 22–31)
CO2 SERPL-SCNC: 26 MMOL/L — SIGNIFICANT CHANGE UP (ref 22–31)
CO2 SERPL-SCNC: 28 MMOL/L — SIGNIFICANT CHANGE UP (ref 22–31)
CO2 SERPL-SCNC: 28 MMOL/L — SIGNIFICANT CHANGE UP (ref 22–31)
CO2 SERPL-SCNC: 30 MMOL/L — SIGNIFICANT CHANGE UP (ref 22–31)
CO2 SERPL-SCNC: 35 MMOL/L
COLOR SPEC: YELLOW — SIGNIFICANT CHANGE UP
COLOR SPEC: YELLOW — SIGNIFICANT CHANGE UP
CREAT SERPL-MCNC: 1.44 MG/DL — HIGH (ref 0.5–1.3)
CREAT SERPL-MCNC: 1.52 MG/DL
CREAT SERPL-MCNC: 1.6 MG/DL — HIGH (ref 0.5–1.3)
CREAT SERPL-MCNC: 1.72 MG/DL — HIGH (ref 0.5–1.3)
CREAT SERPL-MCNC: 1.75 MG/DL — HIGH (ref 0.5–1.3)
CREAT SERPL-MCNC: 1.78 MG/DL — HIGH (ref 0.5–1.3)
CREAT SERPL-MCNC: 1.83 MG/DL — HIGH (ref 0.5–1.3)
CREAT SERPL-MCNC: 1.85 MG/DL — HIGH (ref 0.5–1.3)
CREAT SERPL-MCNC: 1.85 MG/DL — HIGH (ref 0.5–1.3)
CREAT SERPL-MCNC: 1.92 MG/DL — HIGH (ref 0.5–1.3)
CREAT SERPL-MCNC: 1.95 MG/DL — HIGH (ref 0.5–1.3)
CREAT SERPL-MCNC: 2.02 MG/DL — HIGH (ref 0.5–1.3)
CREAT SERPL-MCNC: 2.03 MG/DL — HIGH (ref 0.5–1.3)
CREAT SERPL-MCNC: 2.13 MG/DL — HIGH (ref 0.5–1.3)
CREAT SERPL-MCNC: 2.46 MG/DL — HIGH (ref 0.5–1.3)
CREAT SERPL-MCNC: 2.6 MG/DL — HIGH (ref 0.5–1.3)
CREAT SERPL-MCNC: 2.75 MG/DL — HIGH (ref 0.5–1.3)
CRP SERPL-MCNC: 20.66 MG/DL — HIGH (ref 0–0.4)
CRP SERPL-MCNC: 24.13 MG/DL — HIGH (ref 0–0.4)
CRP SERPL-MCNC: 33.6 MG/DL — HIGH (ref 0–0.4)
CRP SERPL-MCNC: 40.96 MG/DL — HIGH (ref 0–0.4)
CRP SERPL-MCNC: 44.76 MG/DL — HIGH (ref 0–0.4)
CULTURE RESULTS: SIGNIFICANT CHANGE UP
D DIMER BLD IA.RAPID-MCNC: 1095 NG/ML DDU — HIGH
D DIMER BLD IA.RAPID-MCNC: 260 NG/ML DDU — HIGH
D DIMER BLD IA.RAPID-MCNC: 329 NG/ML DDU — HIGH
D DIMER BLD IA.RAPID-MCNC: 345 NG/ML DDU — HIGH
D DIMER BLD IA.RAPID-MCNC: 348 NG/ML DDU — HIGH
D DIMER BLD IA.RAPID-MCNC: 652 NG/ML DDU — HIGH
DIFF PNL FLD: ABNORMAL
DIFF PNL FLD: ABNORMAL
EOSINOPHIL # BLD AUTO: 0 K/UL — SIGNIFICANT CHANGE UP (ref 0–0.5)
EOSINOPHIL # BLD AUTO: 0.08 K/UL
EOSINOPHIL NFR BLD AUTO: 0 % — SIGNIFICANT CHANGE UP (ref 0–6)
EOSINOPHIL NFR BLD AUTO: 2 %
EPI CELLS # UR: 0 /HPF — SIGNIFICANT CHANGE UP
EPI CELLS # UR: 2 /HPF — SIGNIFICANT CHANGE UP
ESTIMATED AVERAGE GLUCOSE: 134 MG/DL
ESTIMATED AVERAGE GLUCOSE: 151 MG/DL — HIGH (ref 68–114)
FERRITIN SERPL-MCNC: 169 NG/ML — SIGNIFICANT CHANGE UP (ref 30–400)
FERRITIN SERPL-MCNC: 213 NG/ML — SIGNIFICANT CHANGE UP (ref 30–400)
FERRITIN SERPL-MCNC: 238 NG/ML — SIGNIFICANT CHANGE UP (ref 30–400)
FERRITIN SERPL-MCNC: 250 NG/ML — SIGNIFICANT CHANGE UP (ref 30–400)
FERRITIN SERPL-MCNC: 254 NG/ML — SIGNIFICANT CHANGE UP (ref 30–400)
FUNGITELL: <31 PG/ML — SIGNIFICANT CHANGE UP
GAS PNL BLDA: SIGNIFICANT CHANGE UP
GAS PNL BLDV: 131 MMOL/L — LOW (ref 135–145)
GAS PNL BLDV: SIGNIFICANT CHANGE UP
GAS PNL BLDV: SIGNIFICANT CHANGE UP
GLUCOSE BLDC GLUCOMTR-MCNC: 105 MG/DL — HIGH (ref 70–99)
GLUCOSE BLDC GLUCOMTR-MCNC: 107 MG/DL — HIGH (ref 70–99)
GLUCOSE BLDC GLUCOMTR-MCNC: 117 MG/DL — HIGH (ref 70–99)
GLUCOSE BLDC GLUCOMTR-MCNC: 141 MG/DL — HIGH (ref 70–99)
GLUCOSE BLDC GLUCOMTR-MCNC: 161 MG/DL — HIGH (ref 70–99)
GLUCOSE BLDC GLUCOMTR-MCNC: 163 MG/DL — HIGH (ref 70–99)
GLUCOSE BLDC GLUCOMTR-MCNC: 18 MG/DL — CRITICAL LOW (ref 70–99)
GLUCOSE BLDC GLUCOMTR-MCNC: 186 MG/DL — HIGH (ref 70–99)
GLUCOSE BLDC GLUCOMTR-MCNC: 187 MG/DL — HIGH (ref 70–99)
GLUCOSE BLDC GLUCOMTR-MCNC: 201 MG/DL — HIGH (ref 70–99)
GLUCOSE BLDC GLUCOMTR-MCNC: 217 MG/DL — HIGH (ref 70–99)
GLUCOSE BLDC GLUCOMTR-MCNC: 218 MG/DL — HIGH (ref 70–99)
GLUCOSE BLDC GLUCOMTR-MCNC: 222 MG/DL — HIGH (ref 70–99)
GLUCOSE BLDC GLUCOMTR-MCNC: 228 MG/DL — HIGH (ref 70–99)
GLUCOSE BLDC GLUCOMTR-MCNC: 237 MG/DL — HIGH (ref 70–99)
GLUCOSE BLDC GLUCOMTR-MCNC: 238 MG/DL — HIGH (ref 70–99)
GLUCOSE BLDC GLUCOMTR-MCNC: 256 MG/DL — HIGH (ref 70–99)
GLUCOSE BLDC GLUCOMTR-MCNC: 257 MG/DL — HIGH (ref 70–99)
GLUCOSE BLDC GLUCOMTR-MCNC: 259 MG/DL — HIGH (ref 70–99)
GLUCOSE BLDC GLUCOMTR-MCNC: 259 MG/DL — HIGH (ref 70–99)
GLUCOSE BLDC GLUCOMTR-MCNC: 276 MG/DL — HIGH (ref 70–99)
GLUCOSE BLDC GLUCOMTR-MCNC: 294 MG/DL — HIGH (ref 70–99)
GLUCOSE BLDC GLUCOMTR-MCNC: 340 MG/DL — HIGH (ref 70–99)
GLUCOSE BLDC GLUCOMTR-MCNC: 385 MG/DL — HIGH (ref 70–99)
GLUCOSE BLDC GLUCOMTR-MCNC: 388 MG/DL — HIGH (ref 70–99)
GLUCOSE BLDC GLUCOMTR-MCNC: 41 MG/DL — CRITICAL LOW (ref 70–99)
GLUCOSE BLDC GLUCOMTR-MCNC: 412 MG/DL — HIGH (ref 70–99)
GLUCOSE BLDC GLUCOMTR-MCNC: 81 MG/DL — SIGNIFICANT CHANGE UP (ref 70–99)
GLUCOSE BLDV-MCNC: 340 MG/DL — HIGH (ref 70–99)
GLUCOSE SERPL-MCNC: 103 MG/DL — HIGH (ref 70–99)
GLUCOSE SERPL-MCNC: 134 MG/DL — HIGH (ref 70–99)
GLUCOSE SERPL-MCNC: 139 MG/DL — HIGH (ref 70–99)
GLUCOSE SERPL-MCNC: 146 MG/DL — HIGH (ref 70–99)
GLUCOSE SERPL-MCNC: 166 MG/DL
GLUCOSE SERPL-MCNC: 174 MG/DL — HIGH (ref 70–99)
GLUCOSE SERPL-MCNC: 184 MG/DL — HIGH (ref 70–99)
GLUCOSE SERPL-MCNC: 199 MG/DL — HIGH (ref 70–99)
GLUCOSE SERPL-MCNC: 201 MG/DL — HIGH (ref 70–99)
GLUCOSE SERPL-MCNC: 222 MG/DL — HIGH (ref 70–99)
GLUCOSE SERPL-MCNC: 240 MG/DL — HIGH (ref 70–99)
GLUCOSE SERPL-MCNC: 264 MG/DL — HIGH (ref 70–99)
GLUCOSE SERPL-MCNC: 273 MG/DL — HIGH (ref 70–99)
GLUCOSE SERPL-MCNC: 296 MG/DL — HIGH (ref 70–99)
GLUCOSE SERPL-MCNC: 359 MG/DL — HIGH (ref 70–99)
GLUCOSE SERPL-MCNC: 393 MG/DL — HIGH (ref 70–99)
GLUCOSE UR QL: NEGATIVE — SIGNIFICANT CHANGE UP
GLUCOSE UR QL: NEGATIVE — SIGNIFICANT CHANGE UP
GRAM STN FLD: SIGNIFICANT CHANGE UP
HBA1C MFR BLD HPLC: 6.3 %
HCO3 BLDA-SCNC: 28 MMOL/L — SIGNIFICANT CHANGE UP (ref 21–29)
HCO3 BLDA-SCNC: 29 MMOL/L — SIGNIFICANT CHANGE UP (ref 21–29)
HCO3 BLDV-SCNC: 24 MMOL/L — SIGNIFICANT CHANGE UP (ref 21–29)
HCO3 BLDV-SCNC: 28 MMOL/L — SIGNIFICANT CHANGE UP (ref 21–29)
HCT VFR BLD CALC: 22.9 % — LOW (ref 39–50)
HCT VFR BLD CALC: 24.5 % — LOW (ref 39–50)
HCT VFR BLD CALC: 25.2 % — LOW (ref 39–50)
HCT VFR BLD CALC: 26.9 % — LOW (ref 39–50)
HCT VFR BLD CALC: 27 % — LOW (ref 39–50)
HCT VFR BLD CALC: 27.2 % — LOW (ref 39–50)
HCT VFR BLD CALC: 27.6 % — LOW (ref 39–50)
HCT VFR BLD CALC: 28.6 % — LOW (ref 39–50)
HCT VFR BLD CALC: 29.4 % — LOW (ref 39–50)
HCT VFR BLD CALC: 29.6 % — LOW (ref 39–50)
HCT VFR BLD CALC: 30.4 % — LOW (ref 39–50)
HCT VFR BLD CALC: 33.6 %
HCT VFR BLDA CALC: 30 % — LOW (ref 39–50)
HEPARINASE TEG R TIME: 6.7 MIN — SIGNIFICANT CHANGE UP (ref 4.3–8.3)
HGB BLD CALC-MCNC: 9.7 G/DL — LOW (ref 13–17)
HGB BLD-MCNC: 7.1 G/DL — LOW (ref 13–17)
HGB BLD-MCNC: 7.4 G/DL — LOW (ref 13–17)
HGB BLD-MCNC: 7.5 G/DL — LOW (ref 13–17)
HGB BLD-MCNC: 8.2 G/DL — LOW (ref 13–17)
HGB BLD-MCNC: 8.2 G/DL — LOW (ref 13–17)
HGB BLD-MCNC: 8.3 G/DL — LOW (ref 13–17)
HGB BLD-MCNC: 8.4 G/DL — LOW (ref 13–17)
HGB BLD-MCNC: 8.7 G/DL — LOW (ref 13–17)
HGB BLD-MCNC: 8.8 G/DL — LOW (ref 13–17)
HGB BLD-MCNC: 9.1 G/DL — LOW (ref 13–17)
HGB BLD-MCNC: 9.2 G/DL — LOW (ref 13–17)
HGB BLD-MCNC: 9.9 G/DL
HOROWITZ INDEX BLDA+IHG-RTO: 50 — SIGNIFICANT CHANGE UP
HYALINE CASTS # UR AUTO: 45 /LPF — HIGH (ref 0–2)
HYALINE CASTS # UR AUTO: 8 /LPF — HIGH (ref 0–2)
IMM GRANULOCYTES NFR BLD AUTO: 1.3 %
INR BLD: 1.11 RATIO — SIGNIFICANT CHANGE UP (ref 0.88–1.16)
INR BLD: 1.15 RATIO — SIGNIFICANT CHANGE UP (ref 0.88–1.16)
INR BLD: 1.19 RATIO — HIGH (ref 0.88–1.16)
INR BLD: 1.22 RATIO — HIGH (ref 0.88–1.16)
INR BLD: 1.36 RATIO — HIGH (ref 0.88–1.16)
INR BLD: 1.59 RATIO — HIGH (ref 0.88–1.16)
INR BLD: 2.03 RATIO — HIGH (ref 0.88–1.16)
KETONES UR-MCNC: NEGATIVE — SIGNIFICANT CHANGE UP
KETONES UR-MCNC: NEGATIVE — SIGNIFICANT CHANGE UP
LACTATE BLDV-MCNC: 5.5 MMOL/L — CRITICAL HIGH (ref 0.7–2)
LDH SERPL L TO P-CCNC: 303 U/L — HIGH (ref 50–242)
LDH SERPL L TO P-CCNC: 417 U/L — HIGH (ref 50–242)
LDH SERPL L TO P-CCNC: 423 U/L — HIGH (ref 50–242)
LDH SERPL L TO P-CCNC: 438 U/L — HIGH (ref 50–242)
LDH SERPL L TO P-CCNC: 470 U/L — HIGH (ref 50–242)
LEUKOCYTE ESTERASE UR-ACNC: ABNORMAL
LEUKOCYTE ESTERASE UR-ACNC: NEGATIVE — SIGNIFICANT CHANGE UP
LYMPHOCYTES # BLD AUTO: 0.08 K/UL — LOW (ref 1–3.3)
LYMPHOCYTES # BLD AUTO: 0.87 K/UL
LYMPHOCYTES # BLD AUTO: 1 % — LOW (ref 13–44)
LYMPHOCYTES NFR BLD AUTO: 21.9 %
MAGNESIUM SERPL-MCNC: 2.3 MG/DL
MAGNESIUM SERPL-MCNC: 2.3 MG/DL — SIGNIFICANT CHANGE UP (ref 1.6–2.6)
MAGNESIUM SERPL-MCNC: 2.3 MG/DL — SIGNIFICANT CHANGE UP (ref 1.6–2.6)
MAGNESIUM SERPL-MCNC: 2.4 MG/DL — SIGNIFICANT CHANGE UP (ref 1.6–2.6)
MAGNESIUM SERPL-MCNC: 2.4 MG/DL — SIGNIFICANT CHANGE UP (ref 1.6–2.6)
MAGNESIUM SERPL-MCNC: 2.5 MG/DL — SIGNIFICANT CHANGE UP (ref 1.6–2.6)
MAGNESIUM SERPL-MCNC: 2.6 MG/DL — SIGNIFICANT CHANGE UP (ref 1.6–2.6)
MAGNESIUM SERPL-MCNC: 2.7 MG/DL — HIGH (ref 1.6–2.6)
MAGNESIUM SERPL-MCNC: 2.7 MG/DL — HIGH (ref 1.6–2.6)
MAGNESIUM SERPL-MCNC: 2.8 MG/DL — HIGH (ref 1.6–2.6)
MAN DIFF?: NORMAL
MANUAL SMEAR VERIFICATION: SIGNIFICANT CHANGE UP
MCHC RBC-ENTMCNC: 29 PG — SIGNIFICANT CHANGE UP (ref 27–34)
MCHC RBC-ENTMCNC: 29 PG — SIGNIFICANT CHANGE UP (ref 27–34)
MCHC RBC-ENTMCNC: 29.5 GM/DL
MCHC RBC-ENTMCNC: 29.6 GM/DL — LOW (ref 32–36)
MCHC RBC-ENTMCNC: 29.7 PG — SIGNIFICANT CHANGE UP (ref 27–34)
MCHC RBC-ENTMCNC: 29.8 GM/DL — LOW (ref 32–36)
MCHC RBC-ENTMCNC: 29.8 PG — SIGNIFICANT CHANGE UP (ref 27–34)
MCHC RBC-ENTMCNC: 30 PG — SIGNIFICANT CHANGE UP (ref 27–34)
MCHC RBC-ENTMCNC: 30.1 GM/DL — LOW (ref 32–36)
MCHC RBC-ENTMCNC: 30.2 GM/DL — LOW (ref 32–36)
MCHC RBC-ENTMCNC: 30.3 GM/DL — LOW (ref 32–36)
MCHC RBC-ENTMCNC: 30.3 PG — SIGNIFICANT CHANGE UP (ref 27–34)
MCHC RBC-ENTMCNC: 30.4 GM/DL — LOW (ref 32–36)
MCHC RBC-ENTMCNC: 30.5 GM/DL — LOW (ref 32–36)
MCHC RBC-ENTMCNC: 30.5 PG — SIGNIFICANT CHANGE UP (ref 27–34)
MCHC RBC-ENTMCNC: 30.5 PG — SIGNIFICANT CHANGE UP (ref 27–34)
MCHC RBC-ENTMCNC: 30.7 GM/DL — LOW (ref 32–36)
MCHC RBC-ENTMCNC: 30.8 GM/DL — LOW (ref 32–36)
MCHC RBC-ENTMCNC: 30.9 GM/DL — LOW (ref 32–36)
MCHC RBC-ENTMCNC: 30.9 PG — SIGNIFICANT CHANGE UP (ref 27–34)
MCHC RBC-ENTMCNC: 31 GM/DL — LOW (ref 32–36)
MCHC RBC-ENTMCNC: 31 PG — SIGNIFICANT CHANGE UP (ref 27–34)
MCHC RBC-ENTMCNC: 31.1 PG — SIGNIFICANT CHANGE UP (ref 27–34)
MCHC RBC-ENTMCNC: 31.3 PG
MCV RBC AUTO: 100.7 FL — HIGH (ref 80–100)
MCV RBC AUTO: 101.5 FL — HIGH (ref 80–100)
MCV RBC AUTO: 101.9 FL — HIGH (ref 80–100)
MCV RBC AUTO: 106.3 FL
MCV RBC AUTO: 95.1 FL — SIGNIFICANT CHANGE UP (ref 80–100)
MCV RBC AUTO: 96.6 FL — SIGNIFICANT CHANGE UP (ref 80–100)
MCV RBC AUTO: 97.3 FL — SIGNIFICANT CHANGE UP (ref 80–100)
MCV RBC AUTO: 98.4 FL — SIGNIFICANT CHANGE UP (ref 80–100)
MCV RBC AUTO: 98.6 FL — SIGNIFICANT CHANGE UP (ref 80–100)
METHOD TYPE: SIGNIFICANT CHANGE UP
MONOCYTES # BLD AUTO: 0.24 K/UL — SIGNIFICANT CHANGE UP (ref 0–0.9)
MONOCYTES # BLD AUTO: 0.38 K/UL
MONOCYTES NFR BLD AUTO: 3 % — SIGNIFICANT CHANGE UP (ref 2–14)
MONOCYTES NFR BLD AUTO: 9.5 %
MRSA PCR RESULT.: SIGNIFICANT CHANGE UP
NEUTROPHILS # BLD AUTO: 2.57 K/UL
NEUTROPHILS # BLD AUTO: 7.75 K/UL — HIGH (ref 1.8–7.4)
NEUTROPHILS NFR BLD AUTO: 64.5 %
NEUTROPHILS NFR BLD AUTO: 89 % — HIGH (ref 43–77)
NEUTS BAND # BLD: 6 % — SIGNIFICANT CHANGE UP (ref 0–8)
NITRITE UR-MCNC: NEGATIVE — SIGNIFICANT CHANGE UP
NITRITE UR-MCNC: NEGATIVE — SIGNIFICANT CHANGE UP
NRBC # BLD: 0 /100 WBCS — SIGNIFICANT CHANGE UP (ref 0–0)
NRBC # BLD: 0 /100 — SIGNIFICANT CHANGE UP (ref 0–0)
NRBC # BLD: 2 /100 WBCS — HIGH (ref 0–0)
NRBC # BLD: 2 /100 WBCS — HIGH (ref 0–0)
NRBC # BLD: 3 /100 WBCS — HIGH (ref 0–0)
NRBC # BLD: 3 /100 WBCS — HIGH (ref 0–0)
NRBC # BLD: 4 /100 WBCS — HIGH (ref 0–0)
NRBC # BLD: 4 /100 WBCS — HIGH (ref 0–0)
NT-PROBNP SERPL-SCNC: 5065 PG/ML — HIGH (ref 0–300)
ORGANISM # SPEC MICROSCOPIC CNT: SIGNIFICANT CHANGE UP
PCO2 BLDA: 62 MMHG — HIGH (ref 32–46)
PCO2 BLDA: 63 MMHG — HIGH (ref 32–46)
PCO2 BLDV: 65 MMHG — HIGH (ref 35–50)
PCO2 BLDV: 73 MMHG — HIGH (ref 35–50)
PH BLDA: 7.28 — LOW (ref 7.35–7.45)
PH BLDA: 7.28 — LOW (ref 7.35–7.45)
PH BLDV: 7.14 — CRITICAL LOW (ref 7.35–7.45)
PH BLDV: 7.26 — LOW (ref 7.35–7.45)
PH UR: 5.5 — SIGNIFICANT CHANGE UP (ref 5–8)
PH UR: 6 — SIGNIFICANT CHANGE UP (ref 5–8)
PHOSPHATE SERPL-MCNC: 4.1 MG/DL — SIGNIFICANT CHANGE UP (ref 2.5–4.5)
PHOSPHATE SERPL-MCNC: 5 MG/DL — HIGH (ref 2.5–4.5)
PHOSPHATE SERPL-MCNC: 5.2 MG/DL — HIGH (ref 2.5–4.5)
PHOSPHATE SERPL-MCNC: 5.5 MG/DL — HIGH (ref 2.5–4.5)
PHOSPHATE SERPL-MCNC: 5.6 MG/DL — HIGH (ref 2.5–4.5)
PHOSPHATE SERPL-MCNC: 5.6 MG/DL — HIGH (ref 2.5–4.5)
PHOSPHATE SERPL-MCNC: 5.9 MG/DL — HIGH (ref 2.5–4.5)
PHOSPHATE SERPL-MCNC: 6.3 MG/DL — HIGH (ref 2.5–4.5)
PHOSPHATE SERPL-MCNC: 6.6 MG/DL — HIGH (ref 2.5–4.5)
PHOSPHATE SERPL-MCNC: 6.6 MG/DL — HIGH (ref 2.5–4.5)
PHOSPHATE SERPL-MCNC: 7 MG/DL — HIGH (ref 2.5–4.5)
PLAT MORPH BLD: NORMAL — SIGNIFICANT CHANGE UP
PLATELET # BLD AUTO: 100 K/UL — LOW (ref 150–400)
PLATELET # BLD AUTO: 105 K/UL — LOW (ref 150–400)
PLATELET # BLD AUTO: 120 K/UL — LOW (ref 150–400)
PLATELET # BLD AUTO: 125 K/UL — LOW (ref 150–400)
PLATELET # BLD AUTO: 146 K/UL — LOW (ref 150–400)
PLATELET # BLD AUTO: 266 K/UL — SIGNIFICANT CHANGE UP (ref 150–400)
PLATELET # BLD AUTO: 292 K/UL — SIGNIFICANT CHANGE UP (ref 150–400)
PLATELET # BLD AUTO: 308 K/UL — SIGNIFICANT CHANGE UP (ref 150–400)
PLATELET # BLD AUTO: 329 K/UL — SIGNIFICANT CHANGE UP (ref 150–400)
PLATELET # BLD AUTO: 367 K/UL — SIGNIFICANT CHANGE UP (ref 150–400)
PLATELET # BLD AUTO: 95 K/UL — LOW (ref 150–400)
PLATELET # BLD AUTO: 96 K/UL
PO2 BLDA: 62 MMHG — LOW (ref 74–108)
PO2 BLDA: 67 MMHG — LOW (ref 74–108)
PO2 BLDV: 37 MMHG — SIGNIFICANT CHANGE UP (ref 25–45)
PO2 BLDV: <20 MMHG — LOW (ref 25–45)
POTASSIUM BLDV-SCNC: 4.5 MMOL/L — SIGNIFICANT CHANGE UP (ref 3.5–5.3)
POTASSIUM SERPL-MCNC: 4.3 MMOL/L — SIGNIFICANT CHANGE UP (ref 3.5–5.3)
POTASSIUM SERPL-MCNC: 4.4 MMOL/L — SIGNIFICANT CHANGE UP (ref 3.5–5.3)
POTASSIUM SERPL-MCNC: 4.7 MMOL/L — SIGNIFICANT CHANGE UP (ref 3.5–5.3)
POTASSIUM SERPL-MCNC: 4.8 MMOL/L — SIGNIFICANT CHANGE UP (ref 3.5–5.3)
POTASSIUM SERPL-MCNC: 4.9 MMOL/L — SIGNIFICANT CHANGE UP (ref 3.5–5.3)
POTASSIUM SERPL-MCNC: 5.1 MMOL/L — SIGNIFICANT CHANGE UP (ref 3.5–5.3)
POTASSIUM SERPL-MCNC: 5.1 MMOL/L — SIGNIFICANT CHANGE UP (ref 3.5–5.3)
POTASSIUM SERPL-MCNC: 5.2 MMOL/L — SIGNIFICANT CHANGE UP (ref 3.5–5.3)
POTASSIUM SERPL-MCNC: 5.6 MMOL/L — HIGH (ref 3.5–5.3)
POTASSIUM SERPL-MCNC: 5.6 MMOL/L — HIGH (ref 3.5–5.3)
POTASSIUM SERPL-MCNC: 5.8 MMOL/L — HIGH (ref 3.5–5.3)
POTASSIUM SERPL-SCNC: 4.1 MMOL/L
POTASSIUM SERPL-SCNC: 4.3 MMOL/L — SIGNIFICANT CHANGE UP (ref 3.5–5.3)
POTASSIUM SERPL-SCNC: 4.4 MMOL/L — SIGNIFICANT CHANGE UP (ref 3.5–5.3)
POTASSIUM SERPL-SCNC: 4.7 MMOL/L — SIGNIFICANT CHANGE UP (ref 3.5–5.3)
POTASSIUM SERPL-SCNC: 4.8 MMOL/L — SIGNIFICANT CHANGE UP (ref 3.5–5.3)
POTASSIUM SERPL-SCNC: 4.9 MMOL/L — SIGNIFICANT CHANGE UP (ref 3.5–5.3)
POTASSIUM SERPL-SCNC: 5.1 MMOL/L — SIGNIFICANT CHANGE UP (ref 3.5–5.3)
POTASSIUM SERPL-SCNC: 5.1 MMOL/L — SIGNIFICANT CHANGE UP (ref 3.5–5.3)
POTASSIUM SERPL-SCNC: 5.2 MMOL/L — SIGNIFICANT CHANGE UP (ref 3.5–5.3)
POTASSIUM SERPL-SCNC: 5.6 MMOL/L — HIGH (ref 3.5–5.3)
POTASSIUM SERPL-SCNC: 5.6 MMOL/L — HIGH (ref 3.5–5.3)
POTASSIUM SERPL-SCNC: 5.8 MMOL/L — HIGH (ref 3.5–5.3)
PROCALCITONIN SERPL-MCNC: 0.39 NG/ML — HIGH (ref 0.02–0.1)
PROCALCITONIN SERPL-MCNC: 0.53 NG/ML — HIGH (ref 0.02–0.1)
PROCALCITONIN SERPL-MCNC: 0.57 NG/ML — HIGH (ref 0.02–0.1)
PROCALCITONIN SERPL-MCNC: 0.62 NG/ML — HIGH (ref 0.02–0.1)
PROCALCITONIN SERPL-MCNC: 0.99 NG/ML — HIGH (ref 0.02–0.1)
PROCALCITONIN SERPL-MCNC: 4.86 NG/ML — HIGH (ref 0.02–0.1)
PROT SERPL-MCNC: 5.1 G/DL — LOW (ref 6–8.3)
PROT SERPL-MCNC: 5.1 G/DL — LOW (ref 6–8.3)
PROT SERPL-MCNC: 5.5 G/DL — LOW (ref 6–8.3)
PROT SERPL-MCNC: 5.5 G/DL — LOW (ref 6–8.3)
PROT SERPL-MCNC: 6 G/DL — SIGNIFICANT CHANGE UP (ref 6–8.3)
PROT SERPL-MCNC: 6.2 G/DL — SIGNIFICANT CHANGE UP (ref 6–8.3)
PROT SERPL-MCNC: 6.2 G/DL — SIGNIFICANT CHANGE UP (ref 6–8.3)
PROT SERPL-MCNC: 6.5 G/DL — SIGNIFICANT CHANGE UP (ref 6–8.3)
PROT SERPL-MCNC: 6.5 G/DL — SIGNIFICANT CHANGE UP (ref 6–8.3)
PROT SERPL-MCNC: 6.6 G/DL
PROT SERPL-MCNC: 7 G/DL — SIGNIFICANT CHANGE UP (ref 6–8.3)
PROT SERPL-MCNC: 7.5 G/DL — SIGNIFICANT CHANGE UP (ref 6–8.3)
PROT SERPL-MCNC: 7.5 G/DL — SIGNIFICANT CHANGE UP (ref 6–8.3)
PROT UR-MCNC: ABNORMAL
PROT UR-MCNC: ABNORMAL
PROTHROM AB SERPL-ACNC: 13.3 SEC — SIGNIFICANT CHANGE UP (ref 10.6–13.6)
PROTHROM AB SERPL-ACNC: 13.5 SEC — SIGNIFICANT CHANGE UP (ref 10.6–13.6)
PROTHROM AB SERPL-ACNC: 14.2 SEC — HIGH (ref 10.6–13.6)
PROTHROM AB SERPL-ACNC: 14.5 SEC — HIGH (ref 10.6–13.6)
PROTHROM AB SERPL-ACNC: 16.1 SEC — HIGH (ref 10.6–13.6)
PROTHROM AB SERPL-ACNC: 18.7 SEC — HIGH (ref 10.6–13.6)
PROTHROM AB SERPL-ACNC: 23.5 SEC — HIGH (ref 10.6–13.6)
RAPID RVP RESULT: DETECTED
RAPIDTEG MAXIMUM AMPLITUDE: 68.9 MM — SIGNIFICANT CHANGE UP (ref 52–70)
RBC # BLD: 2.37 M/UL — LOW (ref 4.2–5.8)
RBC # BLD: 2.49 M/UL — LOW (ref 4.2–5.8)
RBC # BLD: 2.59 M/UL — LOW (ref 4.2–5.8)
RBC # BLD: 2.65 M/UL — LOW (ref 4.2–5.8)
RBC # BLD: 2.7 M/UL — LOW (ref 4.2–5.8)
RBC # BLD: 2.72 M/UL — LOW (ref 4.2–5.8)
RBC # BLD: 2.83 M/UL — LOW (ref 4.2–5.8)
RBC # BLD: 2.9 M/UL — LOW (ref 4.2–5.8)
RBC # BLD: 2.92 M/UL — LOW (ref 4.2–5.8)
RBC # BLD: 2.94 M/UL — LOW (ref 4.2–5.8)
RBC # BLD: 3.02 M/UL — LOW (ref 4.2–5.8)
RBC # BLD: 3.16 M/UL
RBC # FLD: 15.3 % — HIGH (ref 10.3–14.5)
RBC # FLD: 15.4 % — HIGH (ref 10.3–14.5)
RBC # FLD: 15.6 % — HIGH (ref 10.3–14.5)
RBC # FLD: 15.6 % — HIGH (ref 10.3–14.5)
RBC # FLD: 15.9 % — HIGH (ref 10.3–14.5)
RBC # FLD: 15.9 % — HIGH (ref 10.3–14.5)
RBC # FLD: 17.3 % — HIGH (ref 10.3–14.5)
RBC # FLD: 17.3 % — HIGH (ref 10.3–14.5)
RBC # FLD: 17.5 % — HIGH (ref 10.3–14.5)
RBC # FLD: 17.6 % — HIGH (ref 10.3–14.5)
RBC # FLD: 18.4 %
RBC # FLD: 18.9 % — HIGH (ref 10.3–14.5)
RBC BLD AUTO: SIGNIFICANT CHANGE UP
RBC CASTS # UR COMP ASSIST: 381 /HPF — HIGH (ref 0–4)
RBC CASTS # UR COMP ASSIST: >50 /HPF — HIGH (ref 0–4)
RH IG SCN BLD-IMP: POSITIVE — SIGNIFICANT CHANGE UP
S AUREUS DNA NOSE QL NAA+PROBE: SIGNIFICANT CHANGE UP
SAO2 % BLDA: 87 % — LOW (ref 92–96)
SAO2 % BLDA: 91 % — LOW (ref 92–96)
SAO2 % BLDV: 15 % — LOW (ref 67–88)
SAO2 % BLDV: 49 % — LOW (ref 67–88)
SARS-COV-2 IGG SERPL QL IA: NEGATIVE — SIGNIFICANT CHANGE UP
SARS-COV-2 IGM SERPL IA-ACNC: 0.08 INDEX — SIGNIFICANT CHANGE UP
SARS-COV-2 RNA SPEC QL NAA+PROBE: DETECTED
SARS-COV-2 RNA SPEC QL NAA+PROBE: DETECTED
SODIUM SERPL-SCNC: 130 MMOL/L — LOW (ref 135–145)
SODIUM SERPL-SCNC: 132 MMOL/L — LOW (ref 135–145)
SODIUM SERPL-SCNC: 132 MMOL/L — LOW (ref 135–145)
SODIUM SERPL-SCNC: 133 MMOL/L — LOW (ref 135–145)
SODIUM SERPL-SCNC: 133 MMOL/L — LOW (ref 135–145)
SODIUM SERPL-SCNC: 134 MMOL/L — LOW (ref 135–145)
SODIUM SERPL-SCNC: 135 MMOL/L — SIGNIFICANT CHANGE UP (ref 135–145)
SODIUM SERPL-SCNC: 137 MMOL/L — SIGNIFICANT CHANGE UP (ref 135–145)
SODIUM SERPL-SCNC: 137 MMOL/L — SIGNIFICANT CHANGE UP (ref 135–145)
SODIUM SERPL-SCNC: 140 MMOL/L
SODIUM SERPL-SCNC: 141 MMOL/L — SIGNIFICANT CHANGE UP (ref 135–145)
SP GR SPEC: 1.02 — SIGNIFICANT CHANGE UP (ref 1.01–1.02)
SP GR SPEC: 1.03 — HIGH (ref 1.01–1.02)
SPECIMEN SOURCE: SIGNIFICANT CHANGE UP
TACROLIMUS SERPL-MCNC: 2.3 NG/ML
TACROLIMUS SERPL-MCNC: 2.7 NG/ML — SIGNIFICANT CHANGE UP
TACROLIMUS SERPL-MCNC: <2 NG/ML — SIGNIFICANT CHANGE UP
TEG FUNCTIONAL FIBRINOGEN: 48 MM (ref 15–32)
TEG MAXIMUM AMPLITUDE: 69.2 MM (ref 52–69)
TEG REACTION TIME: 7.7 MIN — SIGNIFICANT CHANGE UP (ref 4.6–9.1)
TRIGL SERPL-MCNC: 294 MG/DL — HIGH
TROPONIN T, HIGH SENSITIVITY RESULT: 44 NG/L — SIGNIFICANT CHANGE UP (ref 0–51)
TROPONIN T, HIGH SENSITIVITY RESULT: 50 NG/L — SIGNIFICANT CHANGE UP (ref 0–51)
URATE CRY FLD QL MICRO: ABNORMAL
UROBILINOGEN FLD QL: ABNORMAL
UROBILINOGEN FLD QL: NEGATIVE — SIGNIFICANT CHANGE UP
VANCOMYCIN TROUGH SERPL-MCNC: 10.5 UG/ML — SIGNIFICANT CHANGE UP (ref 10–20)
VANCOMYCIN TROUGH SERPL-MCNC: 12.7 UG/ML — SIGNIFICANT CHANGE UP (ref 10–20)
VANCOMYCIN TROUGH SERPL-MCNC: 15.8 UG/ML — SIGNIFICANT CHANGE UP (ref 10–20)
VARIANT LYMPHS # BLD: 1 % — SIGNIFICANT CHANGE UP (ref 0–6)
WBC # BLD: 2.55 K/UL — LOW (ref 3.8–10.5)
WBC # BLD: 32.4 K/UL — HIGH (ref 3.8–10.5)
WBC # BLD: 40.41 K/UL — CRITICAL HIGH (ref 3.8–10.5)
WBC # BLD: 41.73 K/UL — CRITICAL HIGH (ref 3.8–10.5)
WBC # BLD: 43.21 K/UL — CRITICAL HIGH (ref 3.8–10.5)
WBC # BLD: 43.41 K/UL — CRITICAL HIGH (ref 3.8–10.5)
WBC # BLD: 5.13 K/UL — SIGNIFICANT CHANGE UP (ref 3.8–10.5)
WBC # BLD: 5.36 K/UL — SIGNIFICANT CHANGE UP (ref 3.8–10.5)
WBC # BLD: 5.96 K/UL — SIGNIFICANT CHANGE UP (ref 3.8–10.5)
WBC # BLD: 8.16 K/UL — SIGNIFICANT CHANGE UP (ref 3.8–10.5)
WBC # BLD: 8.95 K/UL — SIGNIFICANT CHANGE UP (ref 3.8–10.5)
WBC # FLD AUTO: 2.55 K/UL — LOW (ref 3.8–10.5)
WBC # FLD AUTO: 3.98 K/UL
WBC # FLD AUTO: 32.4 K/UL — HIGH (ref 3.8–10.5)
WBC # FLD AUTO: 40.41 K/UL — CRITICAL HIGH (ref 3.8–10.5)
WBC # FLD AUTO: 41.73 K/UL — CRITICAL HIGH (ref 3.8–10.5)
WBC # FLD AUTO: 43.21 K/UL — CRITICAL HIGH (ref 3.8–10.5)
WBC # FLD AUTO: 43.41 K/UL — CRITICAL HIGH (ref 3.8–10.5)
WBC # FLD AUTO: 5.13 K/UL — SIGNIFICANT CHANGE UP (ref 3.8–10.5)
WBC # FLD AUTO: 5.36 K/UL — SIGNIFICANT CHANGE UP (ref 3.8–10.5)
WBC # FLD AUTO: 5.96 K/UL — SIGNIFICANT CHANGE UP (ref 3.8–10.5)
WBC # FLD AUTO: 8.16 K/UL — SIGNIFICANT CHANGE UP (ref 3.8–10.5)
WBC # FLD AUTO: 8.95 K/UL — SIGNIFICANT CHANGE UP (ref 3.8–10.5)
WBC UR QL: 14 /HPF — HIGH (ref 0–5)
WBC UR QL: 65 /HPF — HIGH (ref 0–5)

## 2021-01-01 PROCEDURE — 99291 CRITICAL CARE FIRST HOUR: CPT | Mod: 25

## 2021-01-01 PROCEDURE — 36556 INSERT NON-TUNNEL CV CATH: CPT | Mod: GC

## 2021-01-01 PROCEDURE — 99291 CRITICAL CARE FIRST HOUR: CPT

## 2021-01-01 PROCEDURE — 99222 1ST HOSP IP/OBS MODERATE 55: CPT | Mod: CS,GC

## 2021-01-01 PROCEDURE — 99223 1ST HOSP IP/OBS HIGH 75: CPT | Mod: CS,GC

## 2021-01-01 PROCEDURE — 99497 ADVNCD CARE PLAN 30 MIN: CPT | Mod: CS,25,GC

## 2021-01-01 PROCEDURE — 99285 EMERGENCY DEPT VISIT HI MDM: CPT | Mod: CS

## 2021-01-01 PROCEDURE — 99222 1ST HOSP IP/OBS MODERATE 55: CPT

## 2021-01-01 PROCEDURE — 71045 X-RAY EXAM CHEST 1 VIEW: CPT | Mod: 26

## 2021-01-01 PROCEDURE — 99232 SBSQ HOSP IP/OBS MODERATE 35: CPT

## 2021-01-01 PROCEDURE — 99233 SBSQ HOSP IP/OBS HIGH 50: CPT | Mod: CS,GC

## 2021-01-01 PROCEDURE — 93306 TTE W/DOPPLER COMPLETE: CPT | Mod: 26

## 2021-01-01 PROCEDURE — 99233 SBSQ HOSP IP/OBS HIGH 50: CPT | Mod: GC

## 2021-01-01 PROCEDURE — 71045 X-RAY EXAM CHEST 1 VIEW: CPT | Mod: 26,77

## 2021-01-01 PROCEDURE — 99223 1ST HOSP IP/OBS HIGH 75: CPT | Mod: GC,25

## 2021-01-01 PROCEDURE — 93010 ELECTROCARDIOGRAM REPORT: CPT

## 2021-01-01 PROCEDURE — 99291 CRITICAL CARE FIRST HOUR: CPT | Mod: CS,25

## 2021-01-01 PROCEDURE — 99233 SBSQ HOSP IP/OBS HIGH 50: CPT

## 2021-01-01 PROCEDURE — 99232 SBSQ HOSP IP/OBS MODERATE 35: CPT | Mod: CS

## 2021-01-01 PROCEDURE — 99291 CRITICAL CARE FIRST HOUR: CPT | Mod: CS

## 2021-01-01 PROCEDURE — 93970 EXTREMITY STUDY: CPT | Mod: 26

## 2021-01-01 PROCEDURE — 99223 1ST HOSP IP/OBS HIGH 75: CPT | Mod: CS

## 2021-01-01 PROCEDURE — 99205 OFFICE O/P NEW HI 60 MIN: CPT | Mod: 95

## 2021-01-01 PROCEDURE — 99233 SBSQ HOSP IP/OBS HIGH 50: CPT | Mod: CS

## 2021-01-01 PROCEDURE — 90945 DIALYSIS ONE EVALUATION: CPT | Mod: GC

## 2021-01-01 RX ORDER — SODIUM BICARBONATE 1 MEQ/ML
50 SYRINGE (ML) INTRAVENOUS
Refills: 0 | Status: COMPLETED | OUTPATIENT
Start: 2021-01-01 | End: 2021-01-01

## 2021-01-01 RX ORDER — HUMAN INSULIN 100 [IU]/ML
8 INJECTION, SUSPENSION SUBCUTANEOUS EVERY 6 HOURS
Refills: 0 | Status: DISCONTINUED | OUTPATIENT
Start: 2021-01-01 | End: 2021-01-01

## 2021-01-01 RX ORDER — NOREPINEPHRINE BITARTRATE/D5W 8 MG/250ML
0.05 PLASTIC BAG, INJECTION (ML) INTRAVENOUS
Qty: 32 | Refills: 0 | Status: DISCONTINUED | OUTPATIENT
Start: 2021-01-01 | End: 2021-01-01

## 2021-01-01 RX ORDER — TAMSULOSIN HYDROCHLORIDE 0.4 MG/1
0.4 CAPSULE ORAL AT BEDTIME
Refills: 0 | Status: DISCONTINUED | OUTPATIENT
Start: 2021-01-01 | End: 2021-01-01

## 2021-01-01 RX ORDER — SODIUM CHLORIDE 9 MG/ML
1000 INJECTION, SOLUTION INTRAVENOUS
Refills: 0 | Status: DISCONTINUED | OUTPATIENT
Start: 2021-01-01 | End: 2021-01-01

## 2021-01-01 RX ORDER — TACROLIMUS 5 MG/1
0.5 CAPSULE ORAL
Refills: 0 | Status: DISCONTINUED | OUTPATIENT
Start: 2021-01-01 | End: 2021-01-01

## 2021-01-01 RX ORDER — MUPIROCIN 20 MG/G
1 OINTMENT TOPICAL
Qty: 0 | Refills: 0 | DISCHARGE

## 2021-01-01 RX ORDER — FENTANYL CITRATE 50 UG/ML
100 INJECTION INTRAVENOUS ONCE
Refills: 0 | Status: DISCONTINUED | OUTPATIENT
Start: 2021-01-01 | End: 2021-01-01

## 2021-01-01 RX ORDER — MEROPENEM 1 G/30ML
1000 INJECTION INTRAVENOUS EVERY 12 HOURS
Refills: 0 | Status: DISCONTINUED | OUTPATIENT
Start: 2021-01-01 | End: 2021-01-01

## 2021-01-01 RX ORDER — ALTEPLASE 100 MG
2 KIT INTRAVENOUS ONCE
Refills: 0 | Status: COMPLETED | OUTPATIENT
Start: 2021-01-01 | End: 2021-01-01

## 2021-01-01 RX ORDER — SODIUM BICARBONATE 1 MEQ/ML
50 SYRINGE (ML) INTRAVENOUS ONCE
Refills: 0 | Status: COMPLETED | OUTPATIENT
Start: 2021-01-01 | End: 2021-01-01

## 2021-01-01 RX ORDER — INSULIN HUMAN 100 [IU]/ML
6 INJECTION, SOLUTION SUBCUTANEOUS ONCE
Refills: 0 | Status: DISCONTINUED | OUTPATIENT
Start: 2021-01-01 | End: 2021-01-01

## 2021-01-01 RX ORDER — MEROPENEM 1 G/30ML
INJECTION INTRAVENOUS
Refills: 0 | Status: DISCONTINUED | OUTPATIENT
Start: 2021-01-01 | End: 2021-01-01

## 2021-01-01 RX ORDER — METOPROLOL TARTRATE 50 MG
25 TABLET ORAL DAILY
Refills: 0 | Status: DISCONTINUED | OUTPATIENT
Start: 2021-01-01 | End: 2021-01-01

## 2021-01-01 RX ORDER — MEROPENEM 1 G/30ML
1000 INJECTION INTRAVENOUS EVERY 8 HOURS
Refills: 0 | Status: DISCONTINUED | OUTPATIENT
Start: 2021-01-01 | End: 2021-01-01

## 2021-01-01 RX ORDER — CASPOFUNGIN ACETATE 7 MG/ML
50 INJECTION, POWDER, LYOPHILIZED, FOR SOLUTION INTRAVENOUS ONCE
Refills: 0 | Status: COMPLETED | OUTPATIENT
Start: 2021-01-01 | End: 2021-01-01

## 2021-01-01 RX ORDER — DEXTROSE 50 % IN WATER 50 %
50 SYRINGE (ML) INTRAVENOUS ONCE
Refills: 0 | Status: COMPLETED | OUTPATIENT
Start: 2021-01-01 | End: 2021-01-01

## 2021-01-01 RX ORDER — CHLORHEXIDINE GLUCONATE 213 G/1000ML
15 SOLUTION TOPICAL
Refills: 0 | Status: DISCONTINUED | OUTPATIENT
Start: 2021-01-01 | End: 2021-01-01

## 2021-01-01 RX ORDER — INSULIN GLARGINE 100 [IU]/ML
45 INJECTION, SOLUTION SUBCUTANEOUS ONCE
Refills: 0 | Status: COMPLETED | OUTPATIENT
Start: 2021-01-01 | End: 2021-01-01

## 2021-01-01 RX ORDER — FORMOTEROL FUMARATE 12 MCG
2 CAPSULE, WITH INHALATION DEVICE INHALATION
Qty: 0 | Refills: 0 | DISCHARGE

## 2021-01-01 RX ORDER — SODIUM BICARBONATE 1 MEQ/ML
100 SYRINGE (ML) INTRAVENOUS ONCE
Refills: 0 | Status: DISCONTINUED | OUTPATIENT
Start: 2021-01-01 | End: 2021-01-01

## 2021-01-01 RX ORDER — SODIUM ZIRCONIUM CYCLOSILICATE 10 G/10G
10 POWDER, FOR SUSPENSION ORAL ONCE
Refills: 0 | Status: COMPLETED | OUTPATIENT
Start: 2021-01-01 | End: 2021-01-01

## 2021-01-01 RX ORDER — SERTRALINE 25 MG/1
100 TABLET, FILM COATED ORAL DAILY
Refills: 0 | Status: DISCONTINUED | OUTPATIENT
Start: 2021-01-01 | End: 2021-01-01

## 2021-01-01 RX ORDER — BUPRENORPHINE AND NALOXONE 2; .5 MG/1; MG/1
1 TABLET SUBLINGUAL
Refills: 0 | Status: DISCONTINUED | OUTPATIENT
Start: 2021-01-01 | End: 2021-01-01

## 2021-01-01 RX ORDER — SPIRONOLACTONE 25 MG/1
1 TABLET, FILM COATED ORAL
Qty: 0 | Refills: 0 | DISCHARGE

## 2021-01-01 RX ORDER — INSULIN LISPRO 100/ML
VIAL (ML) SUBCUTANEOUS AT BEDTIME
Refills: 0 | Status: DISCONTINUED | OUTPATIENT
Start: 2021-01-01 | End: 2021-01-01

## 2021-01-01 RX ORDER — PANTOPRAZOLE SODIUM 20 MG/1
40 TABLET, DELAYED RELEASE ORAL DAILY
Refills: 0 | Status: DISCONTINUED | OUTPATIENT
Start: 2021-01-01 | End: 2021-01-01

## 2021-01-01 RX ORDER — MIDODRINE HYDROCHLORIDE 2.5 MG/1
10 TABLET ORAL EVERY 8 HOURS
Refills: 0 | Status: DISCONTINUED | OUTPATIENT
Start: 2021-01-01 | End: 2021-01-01

## 2021-01-01 RX ORDER — CALCIUM CHLORIDE
1000 POWDER (GRAM) MISCELLANEOUS ONCE
Refills: 0 | Status: COMPLETED | OUTPATIENT
Start: 2021-01-01 | End: 2021-01-01

## 2021-01-01 RX ORDER — ASPIRIN/CALCIUM CARB/MAGNESIUM 324 MG
1 TABLET ORAL
Qty: 0 | Refills: 0 | DISCHARGE

## 2021-01-01 RX ORDER — REMDESIVIR 5 MG/ML
100 INJECTION INTRAVENOUS EVERY 24 HOURS
Refills: 0 | Status: COMPLETED | OUTPATIENT
Start: 2021-01-01 | End: 2021-01-01

## 2021-01-01 RX ORDER — SENNA PLUS 8.6 MG/1
5 TABLET ORAL AT BEDTIME
Refills: 0 | Status: DISCONTINUED | OUTPATIENT
Start: 2021-01-01 | End: 2021-01-01

## 2021-01-01 RX ORDER — SODIUM BICARBONATE 1 MEQ/ML
0.26 SYRINGE (ML) INTRAVENOUS
Qty: 150 | Refills: 0 | Status: DISCONTINUED | OUTPATIENT
Start: 2021-01-01 | End: 2021-01-01

## 2021-01-01 RX ORDER — HEPARIN SODIUM 5000 [USP'U]/ML
300 INJECTION INTRAVENOUS; SUBCUTANEOUS
Qty: 10000 | Refills: 0 | Status: DISCONTINUED | OUTPATIENT
Start: 2021-01-01 | End: 2021-01-01

## 2021-01-01 RX ORDER — CASPOFUNGIN ACETATE 7 MG/ML
50 INJECTION, POWDER, LYOPHILIZED, FOR SOLUTION INTRAVENOUS EVERY 24 HOURS
Refills: 0 | Status: DISCONTINUED | OUTPATIENT
Start: 2021-01-01 | End: 2021-01-01

## 2021-01-01 RX ORDER — BUMETANIDE 0.25 MG/ML
1 INJECTION INTRAMUSCULAR; INTRAVENOUS ONCE
Refills: 0 | Status: COMPLETED | OUTPATIENT
Start: 2021-01-01 | End: 2021-01-01

## 2021-01-01 RX ORDER — SODIUM CHLORIDE 9 MG/ML
500 INJECTION, SOLUTION INTRAVENOUS ONCE
Refills: 0 | Status: COMPLETED | OUTPATIENT
Start: 2021-01-01 | End: 2021-01-01

## 2021-01-01 RX ORDER — DEXTROSE 50 % IN WATER 50 %
25 SYRINGE (ML) INTRAVENOUS ONCE
Refills: 0 | Status: DISCONTINUED | OUTPATIENT
Start: 2021-01-01 | End: 2021-01-01

## 2021-01-01 RX ORDER — KETAMINE HYDROCHLORIDE 100 MG/ML
2 INJECTION INTRAMUSCULAR; INTRAVENOUS
Qty: 1000 | Refills: 0 | Status: DISCONTINUED | OUTPATIENT
Start: 2021-01-01 | End: 2021-01-01

## 2021-01-01 RX ORDER — INSULIN LISPRO 100/ML
VIAL (ML) SUBCUTANEOUS EVERY 6 HOURS
Refills: 0 | Status: DISCONTINUED | OUTPATIENT
Start: 2021-01-01 | End: 2021-01-01

## 2021-01-01 RX ORDER — HUMAN INSULIN 100 [IU]/ML
10 INJECTION, SUSPENSION SUBCUTANEOUS EVERY 6 HOURS
Refills: 0 | Status: DISCONTINUED | OUTPATIENT
Start: 2021-01-01 | End: 2021-01-01

## 2021-01-01 RX ORDER — INSULIN GLARGINE 100 [IU]/ML
45 INJECTION, SOLUTION SUBCUTANEOUS AT BEDTIME
Refills: 0 | Status: DISCONTINUED | OUTPATIENT
Start: 2021-01-01 | End: 2021-01-01

## 2021-01-01 RX ORDER — NYSTATIN 500MM UNIT
1 POWDER (EA) MISCELLANEOUS EVERY 8 HOURS
Refills: 0 | Status: DISCONTINUED | OUTPATIENT
Start: 2021-01-01 | End: 2021-01-01

## 2021-01-01 RX ORDER — HUMAN INSULIN 100 [IU]/ML
2 INJECTION, SUSPENSION SUBCUTANEOUS ONCE
Refills: 0 | Status: COMPLETED | OUTPATIENT
Start: 2021-01-01 | End: 2021-01-01

## 2021-01-01 RX ORDER — METOPROLOL TARTRATE 50 MG
1 TABLET ORAL
Qty: 0 | Refills: 0 | DISCHARGE

## 2021-01-01 RX ORDER — CHLORHEXIDINE GLUCONATE 213 G/1000ML
1 SOLUTION TOPICAL
Refills: 0 | Status: DISCONTINUED | OUTPATIENT
Start: 2021-01-01 | End: 2021-01-01

## 2021-01-01 RX ORDER — DEXAMETHASONE 0.5 MG/5ML
6 ELIXIR ORAL ONCE
Refills: 0 | Status: DISCONTINUED | OUTPATIENT
Start: 2021-01-01 | End: 2021-01-01

## 2021-01-01 RX ORDER — SODIUM CHLORIDE 9 MG/ML
500 INJECTION, SOLUTION INTRAVENOUS ONCE
Refills: 0 | Status: DISCONTINUED | OUTPATIENT
Start: 2021-01-01 | End: 2021-01-01

## 2021-01-01 RX ORDER — DEXTROSE 50 % IN WATER 50 %
15 SYRINGE (ML) INTRAVENOUS ONCE
Refills: 0 | Status: DISCONTINUED | OUTPATIENT
Start: 2021-01-01 | End: 2021-01-01

## 2021-01-01 RX ORDER — ASPIRIN/CALCIUM CARB/MAGNESIUM 324 MG
81 TABLET ORAL DAILY
Refills: 0 | Status: DISCONTINUED | OUTPATIENT
Start: 2021-01-01 | End: 2021-01-01

## 2021-01-01 RX ORDER — IPRATROPIUM/ALBUTEROL SULFATE 18-103MCG
3 AEROSOL WITH ADAPTER (GRAM) INHALATION ONCE
Refills: 0 | Status: COMPLETED | OUTPATIENT
Start: 2021-01-01 | End: 2021-01-01

## 2021-01-01 RX ORDER — VANCOMYCIN HCL 1 G
1250 VIAL (EA) INTRAVENOUS ONCE
Refills: 0 | Status: COMPLETED | OUTPATIENT
Start: 2021-01-01 | End: 2021-01-01

## 2021-01-01 RX ORDER — IPRATROPIUM/ALBUTEROL SULFATE 18-103MCG
3 AEROSOL WITH ADAPTER (GRAM) INHALATION EVERY 6 HOURS
Refills: 0 | Status: DISCONTINUED | OUTPATIENT
Start: 2021-01-01 | End: 2021-01-01

## 2021-01-01 RX ORDER — TIOTROPIUM BROMIDE 18 UG/1
1 CAPSULE ORAL; RESPIRATORY (INHALATION)
Qty: 0 | Refills: 0 | DISCHARGE

## 2021-01-01 RX ORDER — MIDAZOLAM HYDROCHLORIDE 1 MG/ML
2 INJECTION, SOLUTION INTRAMUSCULAR; INTRAVENOUS ONCE
Refills: 0 | Status: DISCONTINUED | OUTPATIENT
Start: 2021-01-01 | End: 2021-01-01

## 2021-01-01 RX ORDER — CISATRACURIUM BESYLATE 2 MG/ML
20 INJECTION INTRAVENOUS ONCE
Refills: 0 | Status: COMPLETED | OUTPATIENT
Start: 2021-01-01 | End: 2021-01-01

## 2021-01-01 RX ORDER — FUROSEMIDE 40 MG
80 TABLET ORAL DAILY
Refills: 0 | Status: DISCONTINUED | OUTPATIENT
Start: 2021-01-01 | End: 2021-01-01

## 2021-01-01 RX ORDER — NYSTATIN 500MM UNIT
500000 POWDER (EA) MISCELLANEOUS EVERY 8 HOURS
Refills: 0 | Status: DISCONTINUED | OUTPATIENT
Start: 2021-01-01 | End: 2021-01-01

## 2021-01-01 RX ORDER — GLUCAGON INJECTION, SOLUTION 0.5 MG/.1ML
1 INJECTION, SOLUTION SUBCUTANEOUS ONCE
Refills: 0 | Status: DISCONTINUED | OUTPATIENT
Start: 2021-01-01 | End: 2021-01-01

## 2021-01-01 RX ORDER — DEXAMETHASONE 0.5 MG/5ML
6 ELIXIR ORAL DAILY
Refills: 0 | Status: DISCONTINUED | OUTPATIENT
Start: 2021-01-01 | End: 2021-01-01

## 2021-01-01 RX ORDER — FLUTICASONE FUROATE AND VILANTEROL TRIFENATATE 100; 25 UG/1; UG/1
1 POWDER RESPIRATORY (INHALATION)
Qty: 0 | Refills: 0 | DISCHARGE

## 2021-01-01 RX ORDER — ENOXAPARIN SODIUM 100 MG/ML
40 INJECTION SUBCUTANEOUS DAILY
Refills: 0 | Status: DISCONTINUED | OUTPATIENT
Start: 2021-01-01 | End: 2021-01-01

## 2021-01-01 RX ORDER — REMDESIVIR 5 MG/ML
INJECTION INTRAVENOUS
Refills: 0 | Status: COMPLETED | OUTPATIENT
Start: 2021-01-01 | End: 2021-01-01

## 2021-01-01 RX ORDER — FUROSEMIDE 40 MG
80 TABLET ORAL ONCE
Refills: 0 | Status: COMPLETED | OUTPATIENT
Start: 2021-01-01 | End: 2021-01-01

## 2021-01-01 RX ORDER — DEXTROSE 50 % IN WATER 50 %
12.5 SYRINGE (ML) INTRAVENOUS ONCE
Refills: 0 | Status: DISCONTINUED | OUTPATIENT
Start: 2021-01-01 | End: 2021-01-01

## 2021-01-01 RX ORDER — ENOXAPARIN SODIUM 100 MG/ML
40 INJECTION SUBCUTANEOUS
Refills: 0 | Status: DISCONTINUED | OUTPATIENT
Start: 2021-01-01 | End: 2021-01-01

## 2021-01-01 RX ORDER — PROPOFOL 10 MG/ML
10 INJECTION, EMULSION INTRAVENOUS
Qty: 1000 | Refills: 0 | Status: DISCONTINUED | OUTPATIENT
Start: 2021-01-01 | End: 2021-01-01

## 2021-01-01 RX ORDER — PANTOPRAZOLE SODIUM 20 MG/1
40 TABLET, DELAYED RELEASE ORAL
Refills: 0 | Status: DISCONTINUED | OUTPATIENT
Start: 2021-01-01 | End: 2021-01-01

## 2021-01-01 RX ORDER — HEPARIN SODIUM 5000 [USP'U]/ML
5000 INJECTION INTRAVENOUS; SUBCUTANEOUS EVERY 8 HOURS
Refills: 0 | Status: DISCONTINUED | OUTPATIENT
Start: 2021-01-01 | End: 2021-01-01

## 2021-01-01 RX ORDER — BUDESONIDE AND FORMOTEROL FUMARATE DIHYDRATE 160; 4.5 UG/1; UG/1
2 AEROSOL RESPIRATORY (INHALATION)
Refills: 0 | Status: DISCONTINUED | OUTPATIENT
Start: 2021-01-01 | End: 2021-01-01

## 2021-01-01 RX ORDER — SODIUM CHLORIDE 9 MG/ML
10 INJECTION INTRAMUSCULAR; INTRAVENOUS; SUBCUTANEOUS
Refills: 0 | Status: DISCONTINUED | OUTPATIENT
Start: 2021-01-01 | End: 2021-01-01

## 2021-01-01 RX ORDER — VANCOMYCIN HCL 1 G
1000 VIAL (EA) INTRAVENOUS ONCE
Refills: 0 | Status: COMPLETED | OUTPATIENT
Start: 2021-01-01 | End: 2021-01-01

## 2021-01-01 RX ORDER — DEXMEDETOMIDINE HYDROCHLORIDE IN 0.9% SODIUM CHLORIDE 4 UG/ML
0.2 INJECTION INTRAVENOUS
Qty: 200 | Refills: 0 | Status: DISCONTINUED | OUTPATIENT
Start: 2021-01-01 | End: 2021-01-01

## 2021-01-01 RX ORDER — SPIRONOLACTONE 25 MG/1
25 TABLET ORAL
Qty: 180 | Refills: 3 | Status: ACTIVE | COMMUNITY
Start: 2017-06-15 | End: 1900-01-01

## 2021-01-01 RX ORDER — FENTANYL CITRATE 50 UG/ML
50 INJECTION INTRAVENOUS ONCE
Refills: 0 | Status: DISCONTINUED | OUTPATIENT
Start: 2021-01-01 | End: 2021-01-01

## 2021-01-01 RX ORDER — INSULIN HUMAN 100 [IU]/ML
10 INJECTION, SOLUTION SUBCUTANEOUS ONCE
Refills: 0 | Status: COMPLETED | OUTPATIENT
Start: 2021-01-01 | End: 2021-01-01

## 2021-01-01 RX ORDER — INSULIN LISPRO 100/ML
VIAL (ML) SUBCUTANEOUS
Refills: 0 | Status: DISCONTINUED | OUTPATIENT
Start: 2021-01-01 | End: 2021-01-01

## 2021-01-01 RX ORDER — VANCOMYCIN HCL 1 G
1000 VIAL (EA) INTRAVENOUS DAILY
Refills: 0 | Status: DISCONTINUED | OUTPATIENT
Start: 2021-01-01 | End: 2021-01-01

## 2021-01-01 RX ORDER — CASPOFUNGIN ACETATE 7 MG/ML
INJECTION, POWDER, LYOPHILIZED, FOR SOLUTION INTRAVENOUS
Refills: 0 | Status: DISCONTINUED | OUTPATIENT
Start: 2021-01-01 | End: 2021-01-01

## 2021-01-01 RX ORDER — FENTANYL CITRATE 50 UG/ML
0.5 INJECTION INTRAVENOUS
Qty: 5000 | Refills: 0 | Status: DISCONTINUED | OUTPATIENT
Start: 2021-01-01 | End: 2021-01-01

## 2021-01-01 RX ORDER — NOREPINEPHRINE BITARTRATE/D5W 8 MG/250ML
0.04 PLASTIC BAG, INJECTION (ML) INTRAVENOUS
Qty: 8 | Refills: 0 | Status: DISCONTINUED | OUTPATIENT
Start: 2021-01-01 | End: 2021-01-01

## 2021-01-01 RX ORDER — MYCOPHENOLATE MOFETIL 250 MG/1
1 CAPSULE ORAL
Qty: 0 | Refills: 0 | DISCHARGE

## 2021-01-01 RX ORDER — INSULIN LISPRO 100/ML
8 VIAL (ML) SUBCUTANEOUS ONCE
Refills: 0 | Status: COMPLETED | OUTPATIENT
Start: 2021-01-01 | End: 2021-01-01

## 2021-01-01 RX ORDER — INSULIN GLARGINE 100 [IU]/ML
30 INJECTION, SOLUTION SUBCUTANEOUS EVERY MORNING
Refills: 0 | Status: DISCONTINUED | OUTPATIENT
Start: 2021-01-01 | End: 2021-01-01

## 2021-01-01 RX ORDER — VASOPRESSIN 20 [USP'U]/ML
0.04 INJECTION INTRAVENOUS
Qty: 50 | Refills: 0 | Status: DISCONTINUED | OUTPATIENT
Start: 2021-01-01 | End: 2021-01-01

## 2021-01-01 RX ORDER — BUMETANIDE 0.25 MG/ML
2 INJECTION INTRAMUSCULAR; INTRAVENOUS ONCE
Refills: 0 | Status: COMPLETED | OUTPATIENT
Start: 2021-01-01 | End: 2021-01-01

## 2021-01-01 RX ORDER — CISATRACURIUM BESYLATE 2 MG/ML
3 INJECTION INTRAVENOUS
Qty: 200 | Refills: 0 | Status: DISCONTINUED | OUTPATIENT
Start: 2021-01-01 | End: 2021-01-01

## 2021-01-01 RX ORDER — INSULIN LISPRO 100/ML
VIAL (ML) SUBCUTANEOUS EVERY 4 HOURS
Refills: 0 | Status: DISCONTINUED | OUTPATIENT
Start: 2021-01-01 | End: 2021-01-01

## 2021-01-01 RX ORDER — VANCOMYCIN HCL 1 G
750 VIAL (EA) INTRAVENOUS EVERY 12 HOURS
Refills: 0 | Status: DISCONTINUED | OUTPATIENT
Start: 2021-01-01 | End: 2021-01-01

## 2021-01-01 RX ORDER — MEROPENEM 1 G/30ML
1000 INJECTION INTRAVENOUS ONCE
Refills: 0 | Status: COMPLETED | OUTPATIENT
Start: 2021-01-01 | End: 2021-01-01

## 2021-01-01 RX ORDER — CALCIUM GLUCONATE 100 MG/ML
2 VIAL (ML) INTRAVENOUS ONCE
Refills: 0 | Status: COMPLETED | OUTPATIENT
Start: 2021-01-01 | End: 2021-01-01

## 2021-01-01 RX ORDER — PREDNISONE 10 MG/1
10 TABLET ORAL
Qty: 21 | Refills: 0 | Status: ACTIVE | COMMUNITY
Start: 2021-01-01 | End: 1900-01-01

## 2021-01-01 RX ORDER — BUDESONIDE, MICRONIZED 100 %
2 POWDER (GRAM) MISCELLANEOUS
Qty: 0 | Refills: 0 | DISCHARGE

## 2021-01-01 RX ORDER — HUMAN INSULIN 100 [IU]/ML
6 INJECTION, SUSPENSION SUBCUTANEOUS EVERY 6 HOURS
Refills: 0 | Status: DISCONTINUED | OUTPATIENT
Start: 2021-01-01 | End: 2021-01-01

## 2021-01-01 RX ORDER — LACTULOSE 10 G/15ML
10 SOLUTION ORAL EVERY 6 HOURS
Refills: 0 | Status: DISCONTINUED | OUTPATIENT
Start: 2021-01-01 | End: 2021-01-01

## 2021-01-01 RX ORDER — REMDESIVIR 5 MG/ML
200 INJECTION INTRAVENOUS EVERY 24 HOURS
Refills: 0 | Status: COMPLETED | OUTPATIENT
Start: 2021-01-01 | End: 2021-01-01

## 2021-01-01 RX ADMIN — HEPARIN SODIUM 5000 UNIT(S): 5000 INJECTION INTRAVENOUS; SUBCUTANEOUS at 05:51

## 2021-01-01 RX ADMIN — PANTOPRAZOLE SODIUM 40 MILLIGRAM(S): 20 TABLET, DELAYED RELEASE ORAL at 11:35

## 2021-01-01 RX ADMIN — BUDESONIDE AND FORMOTEROL FUMARATE DIHYDRATE 2 PUFF(S): 160; 4.5 AEROSOL RESPIRATORY (INHALATION) at 06:45

## 2021-01-01 RX ADMIN — Medication 1 APPLICATION(S): at 04:32

## 2021-01-01 RX ADMIN — Medication 250 MILLIGRAM(S): at 11:55

## 2021-01-01 RX ADMIN — PANTOPRAZOLE SODIUM 40 MILLIGRAM(S): 20 TABLET, DELAYED RELEASE ORAL at 13:06

## 2021-01-01 RX ADMIN — HUMAN INSULIN 6 UNIT(S): 100 INJECTION, SUSPENSION SUBCUTANEOUS at 00:57

## 2021-01-01 RX ADMIN — VASOPRESSIN 2.4 UNIT(S)/MIN: 20 INJECTION INTRAVENOUS at 01:31

## 2021-01-01 RX ADMIN — SODIUM ZIRCONIUM CYCLOSILICATE 10 GRAM(S): 10 POWDER, FOR SUSPENSION ORAL at 02:34

## 2021-01-01 RX ADMIN — HUMAN INSULIN 6 UNIT(S): 100 INJECTION, SUSPENSION SUBCUTANEOUS at 00:05

## 2021-01-01 RX ADMIN — VASOPRESSIN 2.4 UNIT(S)/MIN: 20 INJECTION INTRAVENOUS at 20:28

## 2021-01-01 RX ADMIN — PROPOFOL 6.83 MICROGRAM(S)/KG/MIN: 10 INJECTION, EMULSION INTRAVENOUS at 18:00

## 2021-01-01 RX ADMIN — Medication 4: at 04:50

## 2021-01-01 RX ADMIN — KETAMINE HYDROCHLORIDE 22.8 MG/KG/HR: 100 INJECTION INTRAMUSCULAR; INTRAVENOUS at 18:00

## 2021-01-01 RX ADMIN — CASPOFUNGIN ACETATE 260 MILLIGRAM(S): 7 INJECTION, POWDER, LYOPHILIZED, FOR SOLUTION INTRAVENOUS at 11:52

## 2021-01-01 RX ADMIN — CHLORHEXIDINE GLUCONATE 15 MILLILITER(S): 213 SOLUTION TOPICAL at 16:26

## 2021-01-01 RX ADMIN — CHLORHEXIDINE GLUCONATE 15 MILLILITER(S): 213 SOLUTION TOPICAL at 04:24

## 2021-01-01 RX ADMIN — Medication 200 MEQ/KG/HR: at 03:00

## 2021-01-01 RX ADMIN — Medication 6 MILLIGRAM(S): at 04:24

## 2021-01-01 RX ADMIN — MEROPENEM 100 MILLIGRAM(S): 1 INJECTION INTRAVENOUS at 21:54

## 2021-01-01 RX ADMIN — FENTANYL CITRATE 2.85 MICROGRAM(S)/KG/HR: 50 INJECTION INTRAVENOUS at 04:16

## 2021-01-01 RX ADMIN — TACROLIMUS 0.5 MILLIGRAM(S): 5 CAPSULE ORAL at 16:58

## 2021-01-01 RX ADMIN — Medication 500000 UNIT(S): at 12:50

## 2021-01-01 RX ADMIN — CHLORHEXIDINE GLUCONATE 15 MILLILITER(S): 213 SOLUTION TOPICAL at 04:08

## 2021-01-01 RX ADMIN — TACROLIMUS 0.5 MILLIGRAM(S): 5 CAPSULE ORAL at 16:52

## 2021-01-01 RX ADMIN — HUMAN INSULIN 10 UNIT(S): 100 INJECTION, SUSPENSION SUBCUTANEOUS at 16:51

## 2021-01-01 RX ADMIN — Medication 1 APPLICATION(S): at 16:57

## 2021-01-01 RX ADMIN — HUMAN INSULIN 6 UNIT(S): 100 INJECTION, SUSPENSION SUBCUTANEOUS at 13:06

## 2021-01-01 RX ADMIN — Medication 81 MILLIGRAM(S): at 13:06

## 2021-01-01 RX ADMIN — Medication 8.54 MICROGRAM(S)/KG/MIN: at 07:21

## 2021-01-01 RX ADMIN — Medication 500000 UNIT(S): at 12:51

## 2021-01-01 RX ADMIN — KETAMINE HYDROCHLORIDE 22.8 MG/KG/HR: 100 INJECTION INTRAMUSCULAR; INTRAVENOUS at 02:02

## 2021-01-01 RX ADMIN — HEPARIN SODIUM 5000 UNIT(S): 5000 INJECTION INTRAVENOUS; SUBCUTANEOUS at 21:42

## 2021-01-01 RX ADMIN — Medication 6 MILLIGRAM(S): at 05:51

## 2021-01-01 RX ADMIN — Medication 1 DROP(S): at 04:52

## 2021-01-01 RX ADMIN — Medication 1 TABLET(S): at 10:20

## 2021-01-01 RX ADMIN — KETAMINE HYDROCHLORIDE 22.8 MG/KG/HR: 100 INJECTION INTRAMUSCULAR; INTRAVENOUS at 06:42

## 2021-01-01 RX ADMIN — Medication 500000 UNIT(S): at 20:54

## 2021-01-01 RX ADMIN — HEPARIN SODIUM 5000 UNIT(S): 5000 INJECTION INTRAVENOUS; SUBCUTANEOUS at 12:49

## 2021-01-01 RX ADMIN — Medication 1 APPLICATION(S): at 16:49

## 2021-01-01 RX ADMIN — MEROPENEM 100 MILLIGRAM(S): 1 INJECTION INTRAVENOUS at 05:48

## 2021-01-01 RX ADMIN — CHLORHEXIDINE GLUCONATE 15 MILLILITER(S): 213 SOLUTION TOPICAL at 05:22

## 2021-01-01 RX ADMIN — CISATRACURIUM BESYLATE 20.5 MICROGRAM(S)/KG/MIN: 2 INJECTION INTRAVENOUS at 09:08

## 2021-01-01 RX ADMIN — Medication 1 DROP(S): at 16:26

## 2021-01-01 RX ADMIN — PANTOPRAZOLE SODIUM 40 MILLIGRAM(S): 20 TABLET, DELAYED RELEASE ORAL at 12:49

## 2021-01-01 RX ADMIN — Medication 81 MILLIGRAM(S): at 10:20

## 2021-01-01 RX ADMIN — SENNA PLUS 5 MILLILITER(S): 8.6 TABLET ORAL at 21:54

## 2021-01-01 RX ADMIN — MEROPENEM 100 MILLIGRAM(S): 1 INJECTION INTRAVENOUS at 02:34

## 2021-01-01 RX ADMIN — Medication 1 APPLICATION(S): at 05:22

## 2021-01-01 RX ADMIN — HUMAN INSULIN 10 UNIT(S): 100 INJECTION, SUSPENSION SUBCUTANEOUS at 16:58

## 2021-01-01 RX ADMIN — HEPARIN SODIUM 0.6 UNIT(S)/HR: 5000 INJECTION INTRAVENOUS; SUBCUTANEOUS at 02:05

## 2021-01-01 RX ADMIN — LACTULOSE 10 GRAM(S): 10 SOLUTION ORAL at 12:49

## 2021-01-01 RX ADMIN — MEROPENEM 100 MILLIGRAM(S): 1 INJECTION INTRAVENOUS at 13:33

## 2021-01-01 RX ADMIN — Medication 500000 UNIT(S): at 04:52

## 2021-01-01 RX ADMIN — Medication 2: at 00:49

## 2021-01-01 RX ADMIN — Medication 125 MILLIGRAM(S): at 15:30

## 2021-01-01 RX ADMIN — KETAMINE HYDROCHLORIDE 22.8 MG/KG/HR: 100 INJECTION INTRAMUSCULAR; INTRAVENOUS at 20:53

## 2021-01-01 RX ADMIN — Medication 500000 UNIT(S): at 13:44

## 2021-01-01 RX ADMIN — CISATRACURIUM BESYLATE 20.5 MICROGRAM(S)/KG/MIN: 2 INJECTION INTRAVENOUS at 01:36

## 2021-01-01 RX ADMIN — ENOXAPARIN SODIUM 40 MILLIGRAM(S): 100 INJECTION SUBCUTANEOUS at 10:20

## 2021-01-01 RX ADMIN — ENOXAPARIN SODIUM 40 MILLIGRAM(S): 100 INJECTION SUBCUTANEOUS at 01:01

## 2021-01-01 RX ADMIN — HUMAN INSULIN 8 UNIT(S): 100 INJECTION, SUSPENSION SUBCUTANEOUS at 10:08

## 2021-01-01 RX ADMIN — Medication 3 MILLILITER(S): at 15:15

## 2021-01-01 RX ADMIN — FENTANYL CITRATE 2.85 MICROGRAM(S)/KG/HR: 50 INJECTION INTRAVENOUS at 21:45

## 2021-01-01 RX ADMIN — HUMAN INSULIN 10 UNIT(S): 100 INJECTION, SUSPENSION SUBCUTANEOUS at 10:57

## 2021-01-01 RX ADMIN — Medication 1 TABLET(S): at 10:09

## 2021-01-01 RX ADMIN — CHLORHEXIDINE GLUCONATE 1 APPLICATION(S): 213 SOLUTION TOPICAL at 04:32

## 2021-01-01 RX ADMIN — Medication 1 DROP(S): at 16:49

## 2021-01-01 RX ADMIN — MIDODRINE HYDROCHLORIDE 10 MILLIGRAM(S): 2.5 TABLET ORAL at 12:50

## 2021-01-01 RX ADMIN — Medication 1 DROP(S): at 16:57

## 2021-01-01 RX ADMIN — Medication 1 TABLET(S): at 12:50

## 2021-01-01 RX ADMIN — MIDODRINE HYDROCHLORIDE 10 MILLIGRAM(S): 2.5 TABLET ORAL at 21:41

## 2021-01-01 RX ADMIN — Medication 200 GRAM(S): at 17:50

## 2021-01-01 RX ADMIN — LACTULOSE 10 GRAM(S): 10 SOLUTION ORAL at 16:56

## 2021-01-01 RX ADMIN — Medication 500000 UNIT(S): at 21:50

## 2021-01-01 RX ADMIN — KETAMINE HYDROCHLORIDE 22.8 MG/KG/HR: 100 INJECTION INTRAMUSCULAR; INTRAVENOUS at 05:50

## 2021-01-01 RX ADMIN — Medication 1 DROP(S): at 04:08

## 2021-01-01 RX ADMIN — REMDESIVIR 500 MILLIGRAM(S): 5 INJECTION INTRAVENOUS at 17:47

## 2021-01-01 RX ADMIN — FENTANYL CITRATE 2.85 MICROGRAM(S)/KG/HR: 50 INJECTION INTRAVENOUS at 18:00

## 2021-01-01 RX ADMIN — SERTRALINE 100 MILLIGRAM(S): 25 TABLET, FILM COATED ORAL at 10:09

## 2021-01-01 RX ADMIN — HEPARIN SODIUM 5000 UNIT(S): 5000 INJECTION INTRAVENOUS; SUBCUTANEOUS at 12:51

## 2021-01-01 RX ADMIN — FENTANYL CITRATE 2.85 MICROGRAM(S)/KG/HR: 50 INJECTION INTRAVENOUS at 20:54

## 2021-01-01 RX ADMIN — Medication 500000 UNIT(S): at 05:51

## 2021-01-01 RX ADMIN — PROPOFOL 6.83 MICROGRAM(S)/KG/MIN: 10 INJECTION, EMULSION INTRAVENOUS at 07:20

## 2021-01-01 RX ADMIN — MIDODRINE HYDROCHLORIDE 10 MILLIGRAM(S): 2.5 TABLET ORAL at 06:01

## 2021-01-01 RX ADMIN — MIDAZOLAM HYDROCHLORIDE 2 MILLIGRAM(S): 1 INJECTION, SOLUTION INTRAMUSCULAR; INTRAVENOUS at 20:53

## 2021-01-01 RX ADMIN — Medication 250 MILLIGRAM(S): at 06:26

## 2021-01-01 RX ADMIN — REMDESIVIR 200 MILLIGRAM(S): 5 INJECTION INTRAVENOUS at 21:04

## 2021-01-01 RX ADMIN — Medication 80 MILLIGRAM(S): at 09:54

## 2021-01-01 RX ADMIN — Medication 50 MILLILITER(S): at 06:20

## 2021-01-01 RX ADMIN — Medication 4: at 11:57

## 2021-01-01 RX ADMIN — Medication 50 MILLILITER(S): at 01:59

## 2021-01-01 RX ADMIN — SODIUM CHLORIDE 500 MILLILITER(S): 9 INJECTION, SOLUTION INTRAVENOUS at 17:30

## 2021-01-01 RX ADMIN — CHLORHEXIDINE GLUCONATE 15 MILLILITER(S): 213 SOLUTION TOPICAL at 05:57

## 2021-01-01 RX ADMIN — Medication 500000 UNIT(S): at 21:47

## 2021-01-01 RX ADMIN — KETAMINE HYDROCHLORIDE 22.8 MG/KG/HR: 100 INJECTION INTRAMUSCULAR; INTRAVENOUS at 18:47

## 2021-01-01 RX ADMIN — CHLORHEXIDINE GLUCONATE 1 APPLICATION(S): 213 SOLUTION TOPICAL at 05:37

## 2021-01-01 RX ADMIN — Medication 500000 UNIT(S): at 16:03

## 2021-01-01 RX ADMIN — CISATRACURIUM BESYLATE 20.5 MICROGRAM(S)/KG/MIN: 2 INJECTION INTRAVENOUS at 18:00

## 2021-01-01 RX ADMIN — Medication 1 APPLICATION(S): at 16:52

## 2021-01-01 RX ADMIN — REMDESIVIR 500 MILLIGRAM(S): 5 INJECTION INTRAVENOUS at 18:26

## 2021-01-01 RX ADMIN — Medication 5.34 MICROGRAM(S)/KG/MIN: at 19:41

## 2021-01-01 RX ADMIN — Medication 6 MILLIGRAM(S): at 05:22

## 2021-01-01 RX ADMIN — Medication 81 MILLIGRAM(S): at 12:49

## 2021-01-01 RX ADMIN — Medication 6: at 16:51

## 2021-01-01 RX ADMIN — Medication 6: at 16:57

## 2021-01-01 RX ADMIN — Medication 1 TABLET(S): at 16:03

## 2021-01-01 RX ADMIN — CISATRACURIUM BESYLATE 20.5 MICROGRAM(S)/KG/MIN: 2 INJECTION INTRAVENOUS at 07:19

## 2021-01-01 RX ADMIN — PROPOFOL 6.83 MICROGRAM(S)/KG/MIN: 10 INJECTION, EMULSION INTRAVENOUS at 21:46

## 2021-01-01 RX ADMIN — Medication 6: at 10:48

## 2021-01-01 RX ADMIN — ENOXAPARIN SODIUM 40 MILLIGRAM(S): 100 INJECTION SUBCUTANEOUS at 10:52

## 2021-01-01 RX ADMIN — INSULIN GLARGINE 45 UNIT(S): 100 INJECTION, SOLUTION SUBCUTANEOUS at 21:02

## 2021-01-01 RX ADMIN — HUMAN INSULIN 10 UNIT(S): 100 INJECTION, SUSPENSION SUBCUTANEOUS at 00:49

## 2021-01-01 RX ADMIN — BUMETANIDE 2 MILLIGRAM(S): 0.25 INJECTION INTRAMUSCULAR; INTRAVENOUS at 05:21

## 2021-01-01 RX ADMIN — Medication 6: at 13:07

## 2021-01-01 RX ADMIN — Medication 6 MILLIGRAM(S): at 04:50

## 2021-01-01 RX ADMIN — FENTANYL CITRATE 100 MICROGRAM(S): 50 INJECTION INTRAVENOUS at 10:25

## 2021-01-01 RX ADMIN — Medication 250 MILLIGRAM(S): at 16:56

## 2021-01-01 RX ADMIN — SERTRALINE 100 MILLIGRAM(S): 25 TABLET, FILM COATED ORAL at 13:06

## 2021-01-01 RX ADMIN — PROPOFOL 6.83 MICROGRAM(S)/KG/MIN: 10 INJECTION, EMULSION INTRAVENOUS at 17:19

## 2021-01-01 RX ADMIN — LACTULOSE 10 GRAM(S): 10 SOLUTION ORAL at 00:06

## 2021-01-01 RX ADMIN — Medication 500000 UNIT(S): at 05:23

## 2021-01-01 RX ADMIN — HUMAN INSULIN 10 UNIT(S): 100 INJECTION, SUSPENSION SUBCUTANEOUS at 04:07

## 2021-01-01 RX ADMIN — CISATRACURIUM BESYLATE 20.5 MICROGRAM(S)/KG/MIN: 2 INJECTION INTRAVENOUS at 17:19

## 2021-01-01 RX ADMIN — KETAMINE HYDROCHLORIDE 22.8 MG/KG/HR: 100 INJECTION INTRAMUSCULAR; INTRAVENOUS at 07:20

## 2021-01-01 RX ADMIN — CISATRACURIUM BESYLATE 20.5 MICROGRAM(S)/KG/MIN: 2 INJECTION INTRAVENOUS at 21:45

## 2021-01-01 RX ADMIN — TACROLIMUS 0.5 MILLIGRAM(S): 5 CAPSULE ORAL at 05:21

## 2021-01-01 RX ADMIN — INSULIN HUMAN 10 UNIT(S): 100 INJECTION, SOLUTION SUBCUTANEOUS at 17:50

## 2021-01-01 RX ADMIN — KETAMINE HYDROCHLORIDE 22.8 MG/KG/HR: 100 INJECTION INTRAMUSCULAR; INTRAVENOUS at 10:19

## 2021-01-01 RX ADMIN — Medication 5.34 MICROGRAM(S)/KG/MIN: at 09:07

## 2021-01-01 RX ADMIN — HUMAN INSULIN 2 UNIT(S): 100 INJECTION, SUSPENSION SUBCUTANEOUS at 05:40

## 2021-01-01 RX ADMIN — Medication 4: at 04:07

## 2021-01-01 RX ADMIN — TACROLIMUS 0.5 MILLIGRAM(S): 5 CAPSULE ORAL at 16:51

## 2021-01-01 RX ADMIN — SERTRALINE 100 MILLIGRAM(S): 25 TABLET, FILM COATED ORAL at 12:50

## 2021-01-01 RX ADMIN — Medication 1 DROP(S): at 16:52

## 2021-01-01 RX ADMIN — Medication 6: at 23:11

## 2021-01-01 RX ADMIN — PROPOFOL 6.83 MICROGRAM(S)/KG/MIN: 10 INJECTION, EMULSION INTRAVENOUS at 01:31

## 2021-01-01 RX ADMIN — PROPOFOL 6.83 MICROGRAM(S)/KG/MIN: 10 INJECTION, EMULSION INTRAVENOUS at 10:20

## 2021-01-01 RX ADMIN — Medication 6 MILLIGRAM(S): at 04:06

## 2021-01-01 RX ADMIN — FENTANYL CITRATE 2.85 MICROGRAM(S)/KG/HR: 50 INJECTION INTRAVENOUS at 18:47

## 2021-01-01 RX ADMIN — Medication 4: at 16:54

## 2021-01-01 RX ADMIN — Medication 50 MILLIEQUIVALENT(S): at 22:26

## 2021-01-01 RX ADMIN — KETAMINE HYDROCHLORIDE 22.8 MG/KG/HR: 100 INJECTION INTRAMUSCULAR; INTRAVENOUS at 00:00

## 2021-01-01 RX ADMIN — Medication 1000 MILLIGRAM(S): at 07:47

## 2021-01-01 RX ADMIN — Medication 50 MILLIEQUIVALENT(S): at 07:54

## 2021-01-01 RX ADMIN — Medication 250 MILLIGRAM(S): at 10:07

## 2021-01-01 RX ADMIN — Medication 1 TABLET(S): at 11:55

## 2021-01-01 RX ADMIN — PANTOPRAZOLE SODIUM 40 MILLIGRAM(S): 20 TABLET, DELAYED RELEASE ORAL at 10:19

## 2021-01-01 RX ADMIN — KETAMINE HYDROCHLORIDE 22.8 MG/KG/HR: 100 INJECTION INTRAMUSCULAR; INTRAVENOUS at 21:55

## 2021-01-01 RX ADMIN — REMDESIVIR 500 MILLIGRAM(S): 5 INJECTION INTRAVENOUS at 18:16

## 2021-01-01 RX ADMIN — FENTANYL CITRATE 2.85 MICROGRAM(S)/KG/HR: 50 INJECTION INTRAVENOUS at 09:07

## 2021-01-01 RX ADMIN — LACTULOSE 10 GRAM(S): 10 SOLUTION ORAL at 05:51

## 2021-01-01 RX ADMIN — Medication 8 UNIT(S): at 21:04

## 2021-01-01 RX ADMIN — Medication 200 GRAM(S): at 00:06

## 2021-01-01 RX ADMIN — CHLORHEXIDINE GLUCONATE 1 APPLICATION(S): 213 SOLUTION TOPICAL at 05:23

## 2021-01-01 RX ADMIN — Medication 80 MILLIGRAM(S): at 18:48

## 2021-01-01 RX ADMIN — Medication 4: at 16:51

## 2021-01-01 RX ADMIN — Medication 500000 UNIT(S): at 12:06

## 2021-01-01 RX ADMIN — REMDESIVIR 500 MILLIGRAM(S): 5 INJECTION INTRAVENOUS at 17:44

## 2021-01-01 RX ADMIN — FENTANYL CITRATE 2.85 MICROGRAM(S)/KG/HR: 50 INJECTION INTRAVENOUS at 07:19

## 2021-01-01 RX ADMIN — Medication 2: at 04:25

## 2021-01-01 RX ADMIN — INSULIN HUMAN 10 UNIT(S): 100 INJECTION, SOLUTION SUBCUTANEOUS at 02:00

## 2021-01-01 RX ADMIN — MEROPENEM 100 MILLIGRAM(S): 1 INJECTION INTRAVENOUS at 16:39

## 2021-01-01 RX ADMIN — Medication 50 MILLILITER(S): at 09:53

## 2021-01-01 RX ADMIN — INSULIN HUMAN 10 UNIT(S): 100 INJECTION, SOLUTION SUBCUTANEOUS at 09:54

## 2021-01-01 RX ADMIN — Medication 10: at 01:03

## 2021-01-01 RX ADMIN — Medication 500000 UNIT(S): at 04:08

## 2021-01-01 RX ADMIN — KETAMINE HYDROCHLORIDE 22.8 MG/KG/HR: 100 INJECTION INTRAMUSCULAR; INTRAVENOUS at 09:08

## 2021-01-01 RX ADMIN — Medication 1 DROP(S): at 05:55

## 2021-01-01 RX ADMIN — KETAMINE HYDROCHLORIDE 22.8 MG/KG/HR: 100 INJECTION INTRAMUSCULAR; INTRAVENOUS at 00:07

## 2021-01-01 RX ADMIN — CHLORHEXIDINE GLUCONATE 15 MILLILITER(S): 213 SOLUTION TOPICAL at 17:18

## 2021-01-01 RX ADMIN — Medication 1 APPLICATION(S): at 04:51

## 2021-01-01 RX ADMIN — ENOXAPARIN SODIUM 40 MILLIGRAM(S): 100 INJECTION SUBCUTANEOUS at 10:07

## 2021-01-01 RX ADMIN — PROPOFOL 6.83 MICROGRAM(S)/KG/MIN: 10 INJECTION, EMULSION INTRAVENOUS at 20:54

## 2021-01-01 RX ADMIN — SERTRALINE 100 MILLIGRAM(S): 25 TABLET, FILM COATED ORAL at 11:35

## 2021-01-01 RX ADMIN — Medication 1 DROP(S): at 16:53

## 2021-01-01 RX ADMIN — TACROLIMUS 0.5 MILLIGRAM(S): 5 CAPSULE ORAL at 05:18

## 2021-01-01 RX ADMIN — PROPOFOL 6.83 MICROGRAM(S)/KG/MIN: 10 INJECTION, EMULSION INTRAVENOUS at 02:02

## 2021-01-01 RX ADMIN — MIDODRINE HYDROCHLORIDE 10 MILLIGRAM(S): 2.5 TABLET ORAL at 20:54

## 2021-01-01 RX ADMIN — Medication 1 APPLICATION(S): at 16:26

## 2021-01-01 RX ADMIN — Medication 2: at 17:18

## 2021-01-01 RX ADMIN — CHLORHEXIDINE GLUCONATE 15 MILLILITER(S): 213 SOLUTION TOPICAL at 05:51

## 2021-01-01 RX ADMIN — Medication 500000 UNIT(S): at 22:51

## 2021-01-01 RX ADMIN — Medication 166.67 MILLIGRAM(S): at 11:13

## 2021-01-01 RX ADMIN — Medication 500000 UNIT(S): at 21:55

## 2021-01-01 RX ADMIN — Medication 5.34 MICROGRAM(S)/KG/MIN: at 20:54

## 2021-01-01 RX ADMIN — HUMAN INSULIN 10 UNIT(S): 100 INJECTION, SUSPENSION SUBCUTANEOUS at 11:58

## 2021-01-01 RX ADMIN — PROPOFOL 6.83 MICROGRAM(S)/KG/MIN: 10 INJECTION, EMULSION INTRAVENOUS at 09:07

## 2021-01-01 RX ADMIN — MIDODRINE HYDROCHLORIDE 10 MILLIGRAM(S): 2.5 TABLET ORAL at 21:54

## 2021-01-01 RX ADMIN — TACROLIMUS 0.5 MILLIGRAM(S): 5 CAPSULE ORAL at 16:53

## 2021-01-01 RX ADMIN — HUMAN INSULIN 6 UNIT(S): 100 INJECTION, SUSPENSION SUBCUTANEOUS at 04:50

## 2021-01-01 RX ADMIN — CISATRACURIUM BESYLATE 20 MILLIGRAM(S): 2 INJECTION INTRAVENOUS at 14:40

## 2021-01-01 RX ADMIN — VASOPRESSIN 2.4 UNIT(S)/MIN: 20 INJECTION INTRAVENOUS at 06:42

## 2021-01-01 RX ADMIN — MEROPENEM 100 MILLIGRAM(S): 1 INJECTION INTRAVENOUS at 05:54

## 2021-01-01 RX ADMIN — Medication 81 MILLIGRAM(S): at 11:35

## 2021-01-01 RX ADMIN — Medication 1 DROP(S): at 05:22

## 2021-01-01 RX ADMIN — FENTANYL CITRATE 100 MICROGRAM(S): 50 INJECTION INTRAVENOUS at 19:00

## 2021-01-01 RX ADMIN — Medication 1 APPLICATION(S): at 17:18

## 2021-01-01 RX ADMIN — MIDODRINE HYDROCHLORIDE 10 MILLIGRAM(S): 2.5 TABLET ORAL at 05:51

## 2021-01-01 RX ADMIN — Medication 1 DROP(S): at 05:57

## 2021-01-01 RX ADMIN — ENOXAPARIN SODIUM 40 MILLIGRAM(S): 100 INJECTION SUBCUTANEOUS at 00:48

## 2021-01-01 RX ADMIN — KETAMINE HYDROCHLORIDE 22.8 MG/KG/HR: 100 INJECTION INTRAMUSCULAR; INTRAVENOUS at 14:03

## 2021-01-01 RX ADMIN — CISATRACURIUM BESYLATE 20.5 MICROGRAM(S)/KG/MIN: 2 INJECTION INTRAVENOUS at 20:53

## 2021-01-01 RX ADMIN — FENTANYL CITRATE 2.85 MICROGRAM(S)/KG/HR: 50 INJECTION INTRAVENOUS at 17:19

## 2021-01-01 RX ADMIN — FENTANYL CITRATE 2.85 MICROGRAM(S)/KG/HR: 50 INJECTION INTRAVENOUS at 12:52

## 2021-01-01 RX ADMIN — KETAMINE HYDROCHLORIDE 22.8 MG/KG/HR: 100 INJECTION INTRAMUSCULAR; INTRAVENOUS at 20:31

## 2021-01-01 RX ADMIN — Medication 1 APPLICATION(S): at 05:55

## 2021-01-01 RX ADMIN — Medication 1 DROP(S): at 18:16

## 2021-01-01 RX ADMIN — CHLORHEXIDINE GLUCONATE 15 MILLILITER(S): 213 SOLUTION TOPICAL at 16:57

## 2021-01-01 RX ADMIN — Medication 8.54 MICROGRAM(S)/KG/MIN: at 17:19

## 2021-01-01 RX ADMIN — BUMETANIDE 1 MILLIGRAM(S): 0.25 INJECTION INTRAMUSCULAR; INTRAVENOUS at 23:30

## 2021-01-01 RX ADMIN — SERTRALINE 100 MILLIGRAM(S): 25 TABLET, FILM COATED ORAL at 10:20

## 2021-01-01 RX ADMIN — MIDODRINE HYDROCHLORIDE 10 MILLIGRAM(S): 2.5 TABLET ORAL at 04:32

## 2021-01-01 RX ADMIN — SENNA PLUS 5 MILLILITER(S): 8.6 TABLET ORAL at 21:41

## 2021-01-01 RX ADMIN — PANTOPRAZOLE SODIUM 40 MILLIGRAM(S): 20 TABLET, DELAYED RELEASE ORAL at 10:07

## 2021-01-01 RX ADMIN — Medication 8: at 06:44

## 2021-01-01 RX ADMIN — FENTANYL CITRATE 100 MICROGRAM(S): 50 INJECTION INTRAVENOUS at 16:00

## 2021-01-01 RX ADMIN — TACROLIMUS 0.5 MILLIGRAM(S): 5 CAPSULE ORAL at 05:53

## 2021-01-01 RX ADMIN — TACROLIMUS 0.5 MILLIGRAM(S): 5 CAPSULE ORAL at 04:50

## 2021-01-01 RX ADMIN — Medication 2: at 10:58

## 2021-01-01 RX ADMIN — Medication 1 APPLICATION(S): at 16:53

## 2021-01-01 RX ADMIN — Medication 500000 UNIT(S): at 05:57

## 2021-01-01 RX ADMIN — Medication 6: at 00:57

## 2021-01-01 RX ADMIN — Medication 50 MILLIEQUIVALENT(S): at 07:42

## 2021-01-01 RX ADMIN — TACROLIMUS 0.5 MILLIGRAM(S): 5 CAPSULE ORAL at 04:08

## 2021-01-01 RX ADMIN — HEPARIN SODIUM 5000 UNIT(S): 5000 INJECTION INTRAVENOUS; SUBCUTANEOUS at 21:54

## 2021-01-01 RX ADMIN — CHLORHEXIDINE GLUCONATE 1 APPLICATION(S): 213 SOLUTION TOPICAL at 00:44

## 2021-01-01 RX ADMIN — Medication 81 MILLIGRAM(S): at 10:07

## 2021-01-01 RX ADMIN — Medication 250 MILLIGRAM(S): at 21:50

## 2021-01-01 RX ADMIN — HUMAN INSULIN 10 UNIT(S): 100 INJECTION, SUSPENSION SUBCUTANEOUS at 23:11

## 2021-01-01 RX ADMIN — HUMAN INSULIN 6 UNIT(S): 100 INJECTION, SUSPENSION SUBCUTANEOUS at 04:26

## 2021-01-01 RX ADMIN — Medication 250 MILLIGRAM(S): at 10:21

## 2021-01-01 RX ADMIN — KETAMINE HYDROCHLORIDE 22.8 MG/KG/HR: 100 INJECTION INTRAMUSCULAR; INTRAVENOUS at 01:31

## 2021-01-01 RX ADMIN — Medication 1 APPLICATION(S): at 05:57

## 2021-01-01 RX ADMIN — HUMAN INSULIN 6 UNIT(S): 100 INJECTION, SUSPENSION SUBCUTANEOUS at 16:53

## 2021-01-01 RX ADMIN — HUMAN INSULIN 6 UNIT(S): 100 INJECTION, SUSPENSION SUBCUTANEOUS at 17:18

## 2021-01-01 RX ADMIN — Medication 50 MILLIEQUIVALENT(S): at 17:51

## 2021-01-01 RX ADMIN — CHLORHEXIDINE GLUCONATE 15 MILLILITER(S): 213 SOLUTION TOPICAL at 04:51

## 2021-01-01 RX ADMIN — CISATRACURIUM BESYLATE 20.5 MICROGRAM(S)/KG/MIN: 2 INJECTION INTRAVENOUS at 21:54

## 2021-01-01 RX ADMIN — MIDODRINE HYDROCHLORIDE 10 MILLIGRAM(S): 2.5 TABLET ORAL at 12:51

## 2021-01-01 RX ADMIN — FENTANYL CITRATE 100 MICROGRAM(S): 50 INJECTION INTRAVENOUS at 14:45

## 2021-01-01 RX ADMIN — ENOXAPARIN SODIUM 40 MILLIGRAM(S): 100 INJECTION SUBCUTANEOUS at 13:06

## 2021-01-01 RX ADMIN — ALTEPLASE 2 MILLIGRAM(S): KIT at 05:00

## 2021-01-01 RX ADMIN — CHLORHEXIDINE GLUCONATE 15 MILLILITER(S): 213 SOLUTION TOPICAL at 16:49

## 2021-01-01 RX ADMIN — CHLORHEXIDINE GLUCONATE 1 APPLICATION(S): 213 SOLUTION TOPICAL at 01:32

## 2021-01-01 RX ADMIN — Medication 2: at 12:51

## 2021-01-01 RX ADMIN — Medication 6: at 10:08

## 2021-01-01 RX ADMIN — Medication 2: at 00:05

## 2021-01-01 RX ADMIN — CHLORHEXIDINE GLUCONATE 15 MILLILITER(S): 213 SOLUTION TOPICAL at 16:53

## 2021-01-01 RX ADMIN — Medication 50 MILLILITER(S): at 17:51

## 2021-01-01 RX ADMIN — CHLORHEXIDINE GLUCONATE 15 MILLILITER(S): 213 SOLUTION TOPICAL at 16:52

## 2021-01-01 RX ADMIN — PROPOFOL 6.83 MICROGRAM(S)/KG/MIN: 10 INJECTION, EMULSION INTRAVENOUS at 14:03

## 2021-01-01 RX ADMIN — CHLORHEXIDINE GLUCONATE 1 APPLICATION(S): 213 SOLUTION TOPICAL at 04:24

## 2021-02-17 PROBLEM — D64.9 CHRONIC ANEMIA: Status: ACTIVE | Noted: 2017-07-18

## 2021-02-17 PROBLEM — D69.6 THROMBOCYTOPENIA: Status: ACTIVE | Noted: 2021-01-01

## 2021-02-17 NOTE — ASSESSMENT
[FreeTextEntry1] : 70 year-old Mr. WALTON is seen in consult for anemia and discussion on bone marrow biopsy results that was done when he was hospitalized with symptomatic anemia.  The bone marrow morphology do not suggest overt dysplasia. Conventional cytogenetics, MDS FISH studies are normal. However, OncoSit Myeloid panel showed 3 mutations (SRSF2, ASXL1 and RUNX1) of which only SRSF2 is Tier-I and is associated mostly with CMML. However, the patient doesn't have any monocytosis. Other mutations are Tier II/III and may be seen in a number of myeloid disorder. Patient's concerns are addressed with reassurance that he does not have the classic findings of myeloid neoplasm (definitely not acute myeloid leukemia) that he read in the portal. 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 However, better terminology in his case would be CHIP (clonal hematopoiesis of indeterminant potential).  Does not need any treatment now other than supportive care. Of note, patient is s/p liver transplant (2011) and is on MMF and Tacrolimus.\par \par # Anemia, macrocytic, thrombocytopenia (CHIP vs MDS)\par - BMB: no overt dysplasia\par - MDS FISH Panel: negative \par - NGS myeloid panel: SRSF2, ASXL1, RUNX1 mutation\par - No treatment needed at this time\par - Supportive care\par - Transfusion PRBC if HGB <8.0\par - CBC, LDH, CMP, Retic, Haptoglobin, B12, Folate, TSH next lab (home draw) \par - CBC every week (home draw)\par - Will schedule for a repeat BMB in April \par - Follow up in 2 months (After marrow) \par \par NOTE: WIll discuss with pathology regarding his bone marrow biopsy. \par \par

## 2021-02-17 NOTE — RESULTS/DATA
[FreeTextEntry1] : 2/17/2021\par Recent CBC (2/5/2021) \par HGB: 9.9 \par WBC: 3.1 ANC: 2.57\par PLT: 96K\par ------------------------------------------------------------------\par \par Bone marrow biopsy (12/10/2020)\par ACCESSION No:  10 [id number]\par \par KULWANT WALTON                  5\par \par Addendum Report\par \par Hematopathology Addendum\par Comprehensive Hematopathology Report\par \par Final Diagnosis:\par 1, 2. Bone marrow biopsy and bone marrow aspirate\par - Erythroid predominant trilineage hematopoiesis with\par maturation and adequate megakaryocytes (some hypolobated forms).\par \par Diagnostic Note:\par Please note findings of a normal male karyotype, and a negative\par MDS FISH panel, and an abnormal OnkoSight Myeloid Disorder Panel\par  {SRSF2 p.Ipn88Usa (Allele Freq: 50.0), ASXL1 p.Tey540Fcnir*22\par (Allele Freq: 18.72), RUNX1 p.Stz755Nrw (Allele Freq: 44.55) }.\par \par The abnomal NGS findings in conjunction with persistent cytopenia\par (anemia and thrombocytopenia), absence of absolute monocytosis,\par and some morphological evidence of dysplasia are highly\par suspicious for myelodysplastic syndrome.  Suggest clinical\par correlation and follow up.\par \par Morphology:\par Microscopic description:\par 1. Biopsy: Sections of clot and biopsy show normocellularity (30\par to 50%) with erythroid predominance, myeloid and erythroid\par maturation, megakaryocytes normal in number (focal clustering;\par some hypolobated forms), focal mild perivascular plasmacytosis,\par and decreased iron stores.\par \par 2. Aspirate: Particulate and cellular aspirate smear with\par decreased M:E ratio (0.9:1), erythroid predominance, myeloid and\par erythroid maturation, mild dyserythropoiesis, adequate\par megakaryocytes, and few lymphocytes.\par \par Bone Marrow Aspirate Differential: (200 Cells).\par Type            %    Normal*\par Blast                0%   0-3\par Neutrophil and\par Precursors        42%  33-63\par Eosinophil           1%   1-5\par Basophil        0%   0-1\par Pronormoblast        2%   0-2\par Normoblast           46%  15-25\par Monocyte        1%   0-2\par Lymphocyte           7%   10-15\par Plasma cell          1%   0-1\par *Adult Range\par \par Ancillary Studies:\par \par \par ISABELLE, KULWANT ALYSSA                  5\par \par Addendum Report\par \par Special stains: Bone marrow aspirate iron stain: Iron stores are\par decreased; no ring sideroblasts are seen.\par Flow cytometry of bone marrow aspirate shows heterogeneous\par population of T-cells (with normal CD4 to CD8 ratio, including\par increased proportion of natural killer-like T-cells and a subset\par with CD4-CD8-), natural killer cells, and polytypic B-cells; No\par increase in CD34, CD14 or  positive cells; Polytypic plasma\par cells.\par Immunohistochemical stains: CD3, CD20, , CD34, IZAIAH, GERALD\par performed on block 1A show scattered CD3+ T cells, rare CD20+ B\par cells, scattered + polytypic plasma cells (less than 5%),\par and no increase in CD34 positive cells.\par \par Cytogenetics:\par Result: Normal male karyotype\par Karyotype: 46,XY {20 }\par \par FISH:\par Result: NORMAL FISH - MDS PANEL\par Probe(s) and Location(s): N9M417/ D5S23 (5p15.2), EGR1 (5q31),\par D7Z1 (7p11.1-q11.1), R2V724 (7q31), CEP-8/D8Z2 (8p11.1-q11.1),\par D66Y904 (20q12)\par ISCN Nomenclature: nuc courtney(T1B281/D5S23,EGR1)x2 {198/200 },\par (D7Z1,L8S371)x2 {198/200 },(D8Z2,B07L662)x2 {195/200 }\par \par OnkoSight Myeloid Disorder Panel\par Final Report\par \par RESULT SUMMARY: ABNORMAL\par \par DETECTED GENOMIC ALTERATIONS:\par Tier I: Variants of Strong Clinical Significance\par SRSF2 p.Wra70Vgp (Allele Freq: 50.0)\par Tier II: Variants of Potential Clinical Significance\par ASXL1 p.Ttl705Swbbb*22 (Allele Freq: 18.72)\par RUNX1 p.Orc970Kvj (Allele Freq: 44.55)\par \par Clinical History/Data:\par Bone marrow aspiration and biopsy, pancytopenia, splenomegaly\par \par Verified by: Ramona Tuttle\par (Electronic Signature)\par Reported on: 01/15/21 14:24 EST, [address],\[place] [address]\par Phone: [phone]   Fax: [phone]\par _________________________________________________________________\par \par \par KULWANT WALTON                  5\par \par Addendum Report\par \par Hematopathology Addendum\par Immunohistochemical stains (CD3, CD20, , CD34, IZAIAH, GERALD)\par performed on block 1A show scattered CD3+ T cells, rare CD20+ B\par cells, scattered +\par polytypic plasma cells (less than 5%), and no increase in CD34\par positive cells.\par \par Verified by: Ramona Tuttle\par (Electronic Signature)\par Reported on: 12/29/20 16:06 EST, [address],\[place] [address]\par Phone: [phone]   Fax: [phone]\par _________________________________________________________________\par \par Hematopathology Report\par \par Final Diagnosis\par 1, 2. Bone marrow biopsy and bone marrow aspirate\par - Erythroid predominant trilineage hematopoiesis with\par maturation and adequate megakaryocytes (some hypolobated forms).\par \par See note and description.\par \par Diagnostic note:\par Suggest correlation with clinical, cytogenetic and NGS findings\par for definitive diagnosis.\par Pending IHCs.\par \par Comprehensive report with results of pending ancillary studies to\par follow.\par \par Ancillary studies\par Special stains: Bone marrow aspirate iron stain: Iron stores are\par decreased; no ring sideroblasts are seen.\par Flow cytometry of bone marrow aspirate shows heterogeneous\par population of T-cells (with normal CD4 to CD8 ratio, including\par increased proportion of natural killer-like T-cells and a subset\par with CD4-CD8-), natural killer cells, and polytypic B-cells; No\par increase in CD34, CD14 or  positive cells; Polytypic plasma\par cells.\par Immunohistochemical stains: Pending.\par \par Microscopic description:\par \par KULWANT WALTON                  5\par \par Hematopathology Report\par \par 1. Biopsy: Sections of clot and biopsy show normocellularity (30\par to 50%) with erythroid predominance, myeloid and erythroid\par maturation, megakaryocytes normal in number (focal clustering;\par some hypolobated forms), focal mild perivascular plasmacytosis,\par and decreased iron stores.\par \par 2. Aspirate: Particulate and cellular aspirate smear with\par decreased M:E ratio (0.9:1), erythroid predominance, myeloid and\par erythroid maturation, mild dyserythropoiesis, adequate\par megakaryocytes, and few lymphocytes.\par \par Bone Marrow Aspirate Differential: (200 Cells).\par Type            %    Normal*\par Blast                0%   0-3\par Neutrophil and\par Precursors        42%  33-63\par Eosinophil           1%   1-5\par Basophil        0%   0-1\par Pronormoblast        2%   0-2\par Normoblast           46%  15-25\par Monocyte        1%   0-2\par Lymphocyte           7%   10-15\par Plasma cell          1%   0-1\par *Adult Range\par Comment\par Iron stain (examined to evaluate for iron stores; see microscopic\par description) and Giemsa stain (shows appropriate staining\par pattern) are performed and\par evaluated on block(s): 1A, B.\par \par Verified by: Ramona Tuttle\par (Electronic Signature)\par Reported on: 12/15/20 15:34 EST, [address],\[place] [address]\par Phone: [phone]   Fax: [phone]\par _________________________________________________________________\par \par Clinical History\par bone marrow aspiration and biopsy, pancytopenia, splenomegaly\par \par Specimen(s) Submitted\par 1     Bone marrow biopsy\par \par Gross Description\par 1. The specimen is received in bouin's fixative and the specimen\par container is labeled: Right posterior iliac bone.  It consists of\par two segments of bone marrow cores measuring 0.5 and 1.0 cm in\par length both x 0.2 cm in diameter with\par \par KULWANT WALTON ALYSSA                  5\par \par Hematopathology Report\par \par a 1.6 x 1.0 x 0.4 cm aggregate of blood clot.  Entirely\par submitted.  Two cassettes: B = bone marrow core; A = blood clot.\par \par 2. Two Hobson-Giemsa and one iron stained bone marrow aspirate\par smears are submitted  {10-FL-; FL- }.\par \par In addition to other data that may appear on the specimen\par container, the label has been inspected to confirm the presence\par of the patient's name and date of birth\par \par Julius Cush 12/10/2020 11:42

## 2021-02-17 NOTE — HISTORY OF PRESENT ILLNESS
[Home] : at home, [unfilled] , at the time of the visit. [Medical Office: ([facility])___] : at the medical office located in  [Verbal consent obtained from patient] : the patient, [unfilled] [de-identified] : 70 year-old Mr. WALTON is seen in consult for cytopenia and discussion on a recent Bone marrow results. The patient was recently admitted to hospital for symptomatic anemia. He underwent a BMB that did not show any overt dysplasia, cytogenetic or FISH abnormalities but has had 3 mutations in OncoSit NGS panel -- SRSF2, ASXL1 and RUNX1. The patient read in portal that he has 'myeloid neoplasm' which he thought to be acute myeloid leukemia. He was upset and anxious and is eager to know his condition.  Of note, patient has history of liver transplant in 2011 and has been on MMF and Tacrolimus. The patient doesn't have any worsening SOB since receiving transfusion in the hospital.

## 2021-02-23 NOTE — PROGRESS NOTE ADULT - PROBLEM SELECTOR PROBLEM 8
Prophylactic measure
02313 Detailed

## 2021-02-24 NOTE — H&P ADULT - ASSESSMENT
71 y/o Male w/ PMH significant for HTN, HLD, T2DM on insulin, COPD (on home O2 3L), EtOH/HCV cirrhosis s/p liver transplant (2011) on Tacro/Cellcept/Prednisone, chronic lymphedema presenting with progressive SOB, found to be covid positive, admitted for AHRF likely 2/2 covid pna.  71 y/o Male w/ PMH significant for HTN, HLD, T2DM on insulin, COPD (on home O2 3L), EtOH/HCV cirrhosis s/p liver transplant (2011) on Tacro/Cellcept/Prednisone, chronic lymphedema presenting with progressive SOB, found to be covid positive, admitted for AHRF likely multifactorial 2/2 covid pna vs. chf exacerbation vs. PE. Also with HAGMA, inappropriately compensating, on bipap. 71 y/o Male w/ PMH significant for HTN, HLD, T2DM on insulin, COPD (on home O2 3L), EtOH/HCV cirrhosis s/p liver transplant (2011) on Tacro/Cellcept/Prednisone, HFpEF, possible MDS, chronic lymphedema presenting with acute on chronic worsening SOB x 1 week, found to have acute hypoxic respiratory failure and sepsis due tot  COVID pna c/b AHRF, EDUAR, COPD exacerbation and hyperglycemia

## 2021-02-24 NOTE — H&P ADULT - NSHPREVIEWOFSYSTEMS_GEN_ALL_CORE
CONSTITUTIONAL: No fever, no chills  RESPIRATORY: +sob  CARDIOVASCULAR: No CP, no palpitations  GASTROINTESTINAL: no abdominal pain, no n/v/d  GENITOURINARY: No dysuria, no hematuria  NEUROLOGICAL: No headaches, no weakness  MUSCULOSKELETAL: No joint pain, no joint swelling

## 2021-02-24 NOTE — H&P ADULT - PROBLEM SELECTOR PLAN 1
AHRF likely 2/2 covid pna. r/o concomitant CHF exacerbation    - CXR 2/24 Patchy airspace opacities bilaterally, c/f viral pneumonia   - lactate elevated, trend lactate AHRF likely 2/2 covid pna with possible CHF exacerbation. Also with hypercarbic respiratory failure as CO2 elevated however inappropriate to response of HAGMA. Also r/o PE/DVT given hypercoag state from covid  - currently on bipap, monitor O2 saturation as bipap places pt increased risk of PTX. If desats or hypotensive, STAT CXR   - MICU consult  - Remdesivir 5d course  - LE duplex r/o DVT. Unable to perform CTA given EDUAR at this time, unable to perform V/Q due to lung parenchymal dz.  - f/u d-dimer, start on lovenox 40 for now , if d-dimer significantly elevated >5k, add on 70mg for 110mg total.   - repeat ABG, BMP AHRF likely 2/2 covid pna with possible CHF exacerbation. Also with hypercarbic respiratory failure as CO2 elevated however inappropriate to response of HAGMA. Also r/o PE/DVT given hypercoag state from covid  - currently on bipap, monitor O2 saturation as bipap places pt increased risk of PTX. If desats or hypotensive, STAT CXR   - MICU consult  - Remdesivir 5d course, decadron 6mg 10d course  - LE duplex r/o DVT. Unable to perform CTA given EDUAR at this time, unable to perform V/Q due to lung parenchymal dz.  - f/u d-dimer, start on lovenox 40 for now , if d-dimer significantly elevated >5k, add on 70mg for 110mg total.   - repeat ABG, BMP Acute hypoxic respiratory failure likely 2/2 covid pna with possible CHF exacerbation. Although patient with chronic hypercapnic respiratoy failure, now inability to compensate for HAGMA. Also r/o PE/DVT given hypercoag state from covid  - currently on bipap  - monitor O2 saturation as bipap places pt increased risk of PTX.   - If desats or hypotensive, STAT CXR r/o PTX  - given tachypnea on bipap, MICU consult  - Remdesivir 5d course, decadron 6mg 10d course  - LE duplex r/o DVT. Unable to perform CTA given EDUAR at this time, unable to perform V/Q due to lung parenchymal dz.  - f/u d-dimer, start on lovenox 40 mg BID for prophylaxis  -  if d-dimer significantly elevated ie >5k or if change in clinical status, can change to lovenox 110 mg BID for therapeutic dose  - repeat ABG, BMP, lactate  - pulm consult in AM

## 2021-02-24 NOTE — ED PROVIDER NOTE - SKIN, MLM
+bilateral chronic lower extremity swelling and hypertrophic pigmentation b/l lower legs, no asymmetry. Otherwise, skin normal color for race, warm, dry and intact. No evidence of rash.

## 2021-02-24 NOTE — H&P ADULT - ATTENDING COMMENTS
69 y/o Male w/ PMH significant for HTN, HLD, T2DM on insulin, COPD (on home O2 3L), EtOH/HCV cirrhosis s/p liver transplant (2011 on Tacro/Cellcept/Prednisone), HFpEF, possible MDS, chronic lymphedema presenting with acute on chronic worsening SOB x 1 week, found to have acute hypoxic and hypercapnic respiratory failure and sepsis 2.2 COVID pna c/b AHRF, EDUAR, COPD exacerbation, and hyperglycemia    #Acute on chronic  hypoxic and hypercapnic respiratory failure: Patient coming in with dyspnea for 1 week, found to be hypoxic to 70% on RA which improved on BiPAP 100% but still with tachypnea. CXR showing extensive fluffy infiltrates c/w COVID pna with likely superimposing pulmonary edema. Unable to pursue CTA due to acute on chronic renal failure to r/o PE. Start Remdesevir, decadron (starting tomorrow, already given solumedrol 125mg by the ED). Will see if candidate for Tocilizumab. Start prophylactic dose Lovenox 40mg BID, and follow up d-dimer, LE duplex and monitor clinical status, consider empiric therapy with Lovenox 110mg BID. Pulm (Dr. Santos)/MICU consult given tenuous status    # Type 2 Diabetes with hyperglycemia: At home on lantus 70U qhs (different from prior split AM and PM dosing in past admission). Here 300s-400s, now s/p steroid exposure. AGMA of 81. Will check BHB. STAT Lantus 40 units and 6U of Humalog (given approx 9PM), thereafter LAntus 40 qhs and 30qAM. ISS and FS Q4 hours HAGMA resolves. Endo consult in AM as anticipate worsening sugars due to steroid use    #COPD excerebration: Hx of COPD on home O2; c/w home inhalers; decadron use as above for COVID    #CIrrhosis s/p liver transplant in 2011 at home on tacrolimus, mycophenolate, and prednisone. c/w tacro check tacro level in AM, hold cellcept, decadron in place of steroids. Transplant hep consult in AM    #Acute on chronic kidney failure: Cr currently 1.8 previously     # Acute decompensated heart failure: History of HFpEF, last TTE in 12/2020. Currently with diffuse ground glass opacities and pro-BNP ~5000, concerning for concomitant pulmonary edema. Patient s/p Lasix 80mg IV x1 in the ER. Will monitor UOP and further dosing pending Cr, UOP, respiratory status 71 y/o Male w/ PMH significant for HTN, HLD, T2DM on insulin, COPD (on home O2 3L), EtOH/HCV cirrhosis s/p liver transplant (2011 on Tacro/Cellcept/Prednisone), HFpEF, possible MDS, chronic lymphedema presenting with acute on chronic worsening SOB x 1 week, found to have acute hypoxic and hypercapnic respiratory failure and sepsis 2.2 COVID pna c/b AHRF, EDUAR, COPD exacerbation, and hyperglycemia    #Acute on chronic  hypoxic and hypercapnic respiratory failure: Patient coming in with dyspnea for 1 week, found to be hypoxic to 70% on RA which improved on BiPAP 100% but still with tachypnea. CXR showing extensive fluffy infiltrates c/w COVID pna with likely superimposing pulmonary edema. Unable to pursue CTA due to acute on chronic renal failure to r/o PE. Start Remdesevir, decadron (starting tomorrow, already given solumedrol 125mg by the ED). Will see if candidate for Tocilizumab. Start prophylactic dose Lovenox 40mg BID, and follow up d-dimer, LE duplex and monitor clinical status, consider empiric therapy with Lovenox 110mg BID. Pulm (Dr. Santos)/MICU consult given tenuous status    # Type 2 Diabetes with hyperglycemia: At home on lantus 70U qhs (different from prior split AM and PM dosing in past admission). Here 300s-400s, now s/p steroid exposure. AGMA of 81. Will check BHB. STAT Lantus 40 units and 6U of Humalog (given approx 9PM), thereafter LAntus 40 qhs and 30qAM. ISS and FS Q4 hours HAGMA resolves. Endo consult in AM as anticipate worsening sugars due to steroid use    #COPD excerebration: Hx of COPD on home O2; c/w home inhalers; decadron use as above for COVID    #CIrrhosis s/p liver transplant in 2011 at home on tacrolimus, mycophenolate, and prednisone. c/w tacro check tacro level in AM, hold cellcept, decadron in place of steroids. Transplant hep consult in AM    #Acute on chronic kidney failure: Cr currently 1.8 previously 1.3-1.4, s/p lasix 80 mg x1 in ED for pulm edema; will monitor UOP, Cr    #Acute decompensated heart failure: History of HFpEF, last TTE in 12/2020. Currently with diffuse ground glass opacities and pro-BNP ~5000, concerning for concomitant pulmonary edema. Patient s/p Lasix 80mg IV x1 in the ER. Will monitor UOP and further dosing pending Cr, UOP, respiratory status    #GOC: Full code for now, patient contemplating overall code status. Brother designated as proxy/surrogate. Guarded prognosis 71 y/o Male w/ PMH significant for HTN, HLD, T2DM on insulin, COPD (on home O2 3L), EtOH/HCV cirrhosis s/p liver transplant (2011 on Tacro/Cellcept/Prednisone), HFpEF, possible MDS, chronic lymphedema presenting with acute on chronic worsening SOB x 1 week, found to have acute hypoxic and hypercapnic respiratory failure and sepsis 2.2 COVID pna c/b AHRF, EDUAR, COPD exacerbation, and hyperglycemia    #Acute on chronic  hypoxic and hypercapnic respiratory failure: Patient coming in with dyspnea for 1 week, found to be hypoxic to 70% on RA which improved on BiPAP 100% but still with tachypnea. CXR showing extensive fluffy infiltrates c/w COVID pna with likely superimposing pulmonary edema. Procal 0.39, will observe off abx for now. Unable to pursue CTA due to acute on chronic renal failure to r/o PE. Start Remdesevir, decadron (starting tomorrow, already given solumedrol 125mg by the ED). Will see if candidate for Tocilizumab. Start prophylactic dose Lovenox 40mg BID, and follow up d-dimer, LE duplex and monitor clinical status, consider empiric therapy with Lovenox 110mg BID. Pulm (Dr. Santos)/MICU consult given tenuous status    # Type 2 Diabetes with hyperglycemia: At home on lantus 70U qhs (different from prior split AM and PM dosing in past admission). Here 300s-400s, now s/p steroid exposure. AGMA of 81. Will check BHB. STAT Lantus 40 units and 6U of Humalog (given approx 9PM), thereafter LAntus 40 qhs and 30qAM. ISS and FS Q4 hours HAGMA resolves. Endo consult in AM as anticipate worsening sugars due to steroid use    #COPD excerebration: Hx of COPD on home O2; c/w home inhalers; decadron use as above for COVID    #CIrrhosis s/p liver transplant in 2011 at home on tacrolimus, mycophenolate, and prednisone. c/w tacro check tacro level in AM, hold cellcept, decadron in place of steroids. Transplant hep consult in AM    #Acute on chronic kidney failure: Cr currently 1.8 previously 1.3-1.4, s/p lasix 80 mg x1 in ED for pulm edema; will monitor UOP, Cr    #ADHF: History of HFpEF, last TTE in 12/2020. Currently with diffuse GGOs and pro-BNP ~5000, concerning for concomitant pulmonary edema. EKg no ischemic 1st trop in 50s, awaiting repeat. Patient s/p Lasix 80mg IV x1 in the ER. Will monitor UOP and further dosing pending Cr, UOP, respiratory status. Consider repeat TTE    #GOC: Full code for now, patient contemplating overall code status. Brother designated as proxy/surrogate. Guarded prognosis

## 2021-02-24 NOTE — H&P ADULT - PROBLEM SELECTOR PLAN 3
history of HFpEF  - TTE  - s/p lasix 80 ivp in ED, monitor off lasix for now giving EDUAR  - strict I&Os history of HFpEF  - TTE  - s/p lasix 80 ivp in ED, monitor off lasix for now giving EDUAR  - strict I&Os  - continue home metoprolol, hold spirinolactone for now history of HFpEF  - TTE  - s/p lasix 80 ivp in ED,   - will watch UOP, monitor off lasix for now giiven EDUAR  - strict I&Os  - continue home metoprolol, hold spirinolactone for now

## 2021-02-24 NOTE — H&P ADULT - CONVERSATION DETAILS
Discussed with patient he currently has COVID pna and number of concomitant acute medical issues. HE understands gravity of the situation. He reached out to his brother, who is aware of current clinical status and would want to be HCP #1. Alternate would be Redd Persaud (patient friends- contact ; [phone]).     Discussed code status in light of patient multiple comorbidities, he feels too anxious to make the decision now. Encourage patient while he is able to mentate and make decision and discuss with his brother. Answered all questions. Continue with bipap for now as able to tolerate. Will consider proning, but suspect will be unable to tolerate due to orthopnea.

## 2021-02-24 NOTE — H&P ADULT - PROBLEM SELECTOR PLAN 6
on glargine 45u PM, 26u AM  - A1c in AM bone marrow biopsy with myeloid abnormality  - likely cause of anemia  - f/u heme outpatient bone marrow biopsy with myeloid abnormality in 12/2020 during recent admission for anemia to 6s warranting blood transfusion  - likely cause of anemia  - Hb currently stable in the 9s  - f/u heme outpatient

## 2021-02-24 NOTE — ED PROVIDER NOTE - OBJECTIVE STATEMENT
71 yo male PMHx COPD (on 3L NC), HfpEF 71 yo male PMHx COPD (on 3L NC), HfpEF, HTN, HLD, T2DM on insulin, ETOH/HCV w/ cirrhosis s/p live transplant in 2011 on tacrolimus and cellcept presents to the ED c/o c/o increased shortness of breath that started today. Pt very poor historian but states he's felt well the last few days, today woke up and felt very short of breath w/ fatigue. Denies cough, fever/chills, chest pain, recent sick contacts, n/v/d, abd pain, dizziness, lightheadedness.

## 2021-02-24 NOTE — ED PROVIDER NOTE - ATTENDING CONTRIBUTION TO CARE
pt with Mr. Nguyen is a 69 y/o Male w/ PMH significant for HTN, HLD, T2DM on insulin, COPD (on home O2 3L), EtOH/HCV cirrhosis s/p liver transplant (2011) on Tacro/Cellcept/Prednisone, chronic lymphedema, who is admitted for symptomatic anemia. He presented with increased SOB/GOMEZ today came on on cpap, poor historian no cough or fever, diminished bs with possible rales at bases, copd excerbation nebs, steroids, doesn't want bipap now, vbg ordered, awating sat, chf work up, likely admission. Mr. Nguyen is a 69 y/o Male w/ PMH significant for HTN, HLD, T2DM on insulin, COPD (on home O2 3L), EtOH/HCV cirrhosis s/p liver transplant (2011) on Tacro/Cellcept/Prednisone, chronic lymphedema, who is admitted for symptomatic anemia. He presented with increased SOB/GOMEZ today came on on cpap, poor historian no cough or fever, diminished bs with possible rales at bases, copd excerbation nebs, steroids, doesn't want bipap now, vbg ordered, awaiting sat, chf work up, likely admission.

## 2021-02-24 NOTE — H&P ADULT - NSHPPHYSICALEXAM_GEN_ALL_CORE
Vital Signs Last 24 Hrs  T(C): 37.1 (24 Feb 2021 17:30), Max: 37.1 (24 Feb 2021 17:30)  T(F): 98.8 (24 Feb 2021 17:30), Max: 98.8 (24 Feb 2021 17:30)  HR: 97 (24 Feb 2021 18:06) (95 - 99)  BP: 126/59 (24 Feb 2021 18:06) (126/59 - 136/66)  BP(mean): 84 (24 Feb 2021 17:30) (84 - 84)  RR: 28 (24 Feb 2021 18:06) (26 - 28)  SpO2: 100% (24 Feb 2021 18:06) (87% - 100%) Vital Signs Last 24 Hrs  T(C): 37.1 (24 Feb 2021 17:30), Max: 37.1 (24 Feb 2021 17:30)  T(F): 98.8 (24 Feb 2021 17:30), Max: 98.8 (24 Feb 2021 17:30)  HR: 97 (24 Feb 2021 18:06) (95 - 99)  BP: 126/59 (24 Feb 2021 18:06) (126/59 - 136/66)  BP(mean): 84 (24 Feb 2021 17:30) (84 - 84)  RR: 28 (24 Feb 2021 18:06) (26 - 28)  SpO2: 100% (24 Feb 2021 18:06) (87% - 100%)    PHYSICAL EXAM:  GENERAL: NAD, lying in bed comfortably, BIPAP in place  CHEST/LUNG: Diminshed breath sounds, wheezes, crackles base of lungs b/l  HEART: Regular rate and rhythm; S1, S2 present. No murmurs, rubs, or gallops  ABDOMEN: Bowel sounds present; Soft, Nontender, Nondistended.   EXTREMITIES:  lymphedema changes LE bilaterally, no TTP    NERVOUS SYSTEM:  Alert & Oriented X3, speech clear. No deficits   MSK: FROM all 4 extremities, full and equal strength

## 2021-02-24 NOTE — H&P ADULT - PROBLEM SELECTOR PLAN 10
SPoke with patient, in distress to make decision at this time. However has opened up to his brother about current clinical status and COVID infection and has designated him to be surrogate/proxy if he were unable to make decisions. As of now full code. Consider palliative consult

## 2021-02-24 NOTE — H&P ADULT - PROBLEM SELECTOR PLAN 4
SCR 1.85 upon admission  - s/p lasix 80 ivp in ED SCR 1.85 upon admission  - s/p lasix 80 ivp in ED  - monitor SCr SCR 1.85 upon admission baseline 1.3, likely in setting of sepsis, ADHF  - s/p lasix 80 ivp in ED  - monitor SC  - monitor lili LUO

## 2021-02-24 NOTE — ED PROVIDER NOTE - PROGRESS NOTE DETAILS
AG attg: patient signed out to me - possible copd vs chf vs infectious cause of resp failure. 2nd abg with improved ph, O2, pco2. Patient took off his bipap transiently due to discomfort (after being counseled to keep it on) prior to 3rd vbg. Although 3rd vbg slightly more acidemic, patient now compliant with bipap, comfortable appearing, only minorly dyspneic. mentating well and clinically improved. Discussed the importance of keeping bipap on to avoid intubation. Will continue to monitor on BiPap and attempt to avoid intubation, especially in light of +covid and increased morbidity/mortality on Ventilator.

## 2021-02-24 NOTE — H&P ADULT - HISTORY OF PRESENT ILLNESS
69 y/o Male w/ PMH significant for HTN, HLD, T2DM on insulin, COPD (on home O2 3L), EtOH/HCV cirrhosis s/p liver transplant (2011) on Tacro/Cellcept/Prednisone, chronic lymphedema presenting with SOB.  69 y/o Male w/ PMH significant for HTN, HLD, T2DM on insulin, COPD (on home O2 3L), EtOH/HCV cirrhosis s/p liver transplant (2011) on Tacro/Cellcept/Prednisone, chronic lymphedema presenting with worsening SOB of several months duration. Difficult to obtain history as pt is on bipap. Pt reports SOB started "several months ago" that has been progressively getting worse. He has had GOMEZ, better with rest. Taking his medications helps temporarily difficulty breathing returns. He does not remember any inciting events that may have caused him to become more SOB. He has been having difficulty sleeping. Pt did not have any improvement in his breathing and decided to call an ambulance to come to the hospital. Of note pt had previous admission in 12/2020 for symptomatic anemia. Reports currently feeling better on bipap.     In ED,  98.2F, HR 99, 130/58, RR 28, desatted to 88% on NC 3L, now 100% on BiPAP   s/p 3x duonebs, solumedrol 125 IV, lasix 80 IV     71 y/o Male w/ PMH significant for HTN, HLD, T2DM on insulin, COPD (on home O2 3L), EtOH/HCV cirrhosis s/p liver transplant (2011) on Tacro/Cellcept/Prednisone, HFpEF, possible MDS, chronic lymphedema presenting with worsening SOB of several months duration. Difficult to obtain history as pt is on bipap. Pt reports SOB started "several months ago" that has been progressively getting worse. He has had GOMEZ, better with rest. Taking his medications helps temporarily difficulty breathing returns. He does not remember any inciting events that may have caused him to become more SOB. He has been having difficulty sleeping. Pt did not have any improvement in his breathing and decided to call an ambulance to come to the hospital. Of note pt had previous admission in 12/2020 for symptomatic anemia s/p BM biopsy . Reports currently feeling better on bipap. No sick contacts. Denies chest pain, nausea, vomiting, + chronic orthopnea.     In ED,  98.2F, HR 99, 130/58, RR 28, desatted to 88% on NC 3L, now 100% on BiPAP   s/p 3x duonebs, solumedrol 125 IV, lasix 80 IV

## 2021-02-24 NOTE — ED ADULT NURSE NOTE - OBJECTIVE STATEMENT
69 yo male hx liver transplant, COPD on 3L O2 via NC, presents to ED from home for respiratory distress.  Patient arrives to ED with BiPAP in place, SpO2 99%, tachypneic to 30. Patient has b/l rales. Patient denies CP, nvd, fever/chills, sick contacts, falls/loc. Patient A&Ox3, breathing spontaneously, airway patent, abdomen nontender, +pulses, cap refill <2 seconds. Patient resting in bed, side rails up, plan of care explained. MD at bedside, cardiac monitor in place. Respiratory at bedside.

## 2021-02-24 NOTE — H&P ADULT - NSHPLABSRESULTS_GEN_ALL_CORE
LABS:                        9.2    8.16  )-----------( 125      ( 24 Feb 2021 15:09 )             30.4     02-24    132<L>  |  89<L>  |  39<H>  ----------------------------<  359<H>  4.8   |  25  |  1.85<H>    Ca    8.6      24 Feb 2021 15:09    TPro  7.5  /  Alb  3.6  /  TBili  1.0  /  DBili  x   /  AST  56<H>  /  ALT  26  /  AlkPhos  110  02-24    PT/INR - ( 24 Feb 2021 15:09 )   PT: 14.5 sec;   INR: 1.22 ratio         PTT - ( 24 Feb 2021 15:09 )  PTT:25.9 sec    CAPILLARY BLOOD GLUCOSE      POCT Blood Glucose.: 326 mg/dL (24 Feb 2021 14:50)        ABG - ( 24 Feb 2021 18:27 )  pH, Arterial: 7.28  pH, Blood: x     /  pCO2: 63    /  pO2: 62    / HCO3: 29    / Base Excess: 1.6   /  SaO2: 87        Serum Pro-Brain Natriuretic Peptide: 5065 pg/mL (02.24.21 @ 15:09)      RADIOLOGY/ADDITIONAL TESTS:    < from: Xray Chest 1 View- PORTABLE-Urgent (Xray Chest 1 View- PORTABLE-Urgent .) (02.24.21 @ 15:04) >    FINDINGS:  The cardiac silhouette is normal in size. Patchy airspace opacities bilaterally. There are no pleural effusions. The hilar and mediastinal structures appear unremarkable. The osseous structures are intact.    IMPRESSION: Patchy airspace opacities bilaterally, concerning for viral pneumonia such as COVID 19.    < end of copied text >    EKG: LABS:                        9.2    8.16  )-----------( 125      ( 24 Feb 2021 15:09 )             30.4     02-24    132<L>  |  89<L>  |  39<H>  ----------------------------<  359<H>  4.8   |  25  |  1.85<H>    Ca    8.6      24 Feb 2021 15:09    TPro  7.5  /  Alb  3.6  /  TBili  1.0  /  DBili  x   /  AST  56<H>  /  ALT  26  /  AlkPhos  110  02-24    PT/INR - ( 24 Feb 2021 15:09 )   PT: 14.5 sec;   INR: 1.22 ratio         PTT - ( 24 Feb 2021 15:09 )  PTT:25.9 sec    CAPILLARY BLOOD GLUCOSE      POCT Blood Glucose.: 326 mg/dL (24 Feb 2021 14:50)        ABG - ( 24 Feb 2021 18:27 )  pH, Arterial: 7.28  pH, Blood: x     /  pCO2: 63    /  pO2: 62    / HCO3: 29    / Base Excess: 1.6   /  SaO2: 87        Serum Pro-Brain Natriuretic Peptide: 5065 pg/mL (02.24.21 @ 15:09)      RADIOLOGY/ADDITIONAL TESTS:    < from: Xray Chest 1 View- PORTABLE-Urgent (Xray Chest 1 View- PORTABLE-Urgent .) (02.24.21 @ 15:04) >    FINDINGS:  The cardiac silhouette is normal in size. Patchy airspace opacities bilaterally. There are no pleural effusions. The hilar and mediastinal structures appear unremarkable. The osseous structures are intact.    IMPRESSION: Patchy airspace opacities bilaterally, concerning for viral pneumonia such as COVID 19.    < end of copied text >    COVID PCR detected    EKG: SR

## 2021-02-24 NOTE — H&P ADULT - PROBLEM SELECTOR PLAN 8
DVT ppx: heparin subq tid - DVT ppx: lovenox 40 for now, may need to uptitrate to 110mg if d-dimer significantly elevated or decompensating (hypotensive, increasingly hypoxic)  - NPO on bipap  - FULL CODE. Next of kin brother - DVT ppx: lovenox 40 for now, may need to uptitrate to 110mg if d-dimer significantly elevated or decompensating (hypotensive, increasingly hypoxic)  - NPO on bipap  - FULL CODE. Next of kin is brother, Cody Nguyen

## 2021-02-24 NOTE — H&P ADULT - PROBLEM SELECTOR PLAN 2
COVID PCR+ 2/24  - start remdesivir 5d course  - dexamethasone 10d course likely combination from lactic acidosis and possible DKA. AG opening from 11 to 18., FSG elevated to 300-400s.  - f/u BHB add on   - FSG q4h for now  - mISS q4h for now  - lantus 45 and admelog 8 stat now, lantus 30 in AM   - monitor AG  - cannot give fluids due to covid pna and possible chf exacerbation  - endocrine consult in AM

## 2021-02-24 NOTE — ED ADULT NURSE NOTE - NS ED PATIENT SAFETY CONCERN
Return to  Work Release:  [facility]    Date: 9/17/2020      Name: Jordon Gonzalez                       YOB: 1985    Medical Record Number: [id number]    The patient was seen at: [facility].  He was discharged from the hospital on 8/27/20.     Restrictions if any: follow up for care as needed    Resume Activity: Monday, September 21, 2020.        _________________________  Kelsi Larkin NP   No

## 2021-02-24 NOTE — ED ADULT NURSE NOTE - NSIMPLEMENTINTERV_GEN_ALL_ED
Implemented All Fall Risk Interventions:  [place] to call system. Call bell, personal items and telephone within reach. Instruct patient to call for assistance. Room bathroom lighting operational. Non-slip footwear when patient is off stretcher. Physically safe environment: no spills, clutter or unnecessary equipment. Stretcher in lowest position, wheels locked, appropriate side rails in place. Provide visual cue, wrist band, yellow gown, etc. Monitor gait and stability. Monitor for mental status changes and reorient to person, place, and time. Review medications for side effects contributing to fall risk. Reinforce activity limits and safety measures with patient and family.

## 2021-02-24 NOTE — CHART NOTE - NSCHARTNOTEFT_GEN_A_CORE
TO BE COMPLETED WITHIN 6 HOURS OF INITIAL ASSESSMENT:    For use in patients that have 2 sepsis criteria and new organ dysfunction   •	New or increased oxygen requirement  •	Creatinine >2mg/dL  •	Bilirubin>2mg/dL  •	Platelet <100,00/mm3  •	INR >1.5, PTT>60  •	Lactate >2    If patient persistent hypotension (SBP<90) or any lactate >4 then provider evaluation (Physician/PA/NP) within 30 minutes of bolus completion is required.    Vital Signs Last 24 Hrs  T(C): 37.1 (24 Feb 2021 17:30), Max: 37.1 (24 Feb 2021 17:30)  T(F): 98.8 (24 Feb 2021 17:30), Max: 98.8 (24 Feb 2021 17:30)  HR: 93 (24 Feb 2021 18:43) (93 - 99)  BP: 135/65 (24 Feb 2021 18:43) (126/59 - 136/66)  BP(mean): 84 (24 Feb 2021 17:30) (84 - 84)  RR: 28 (24 Feb 2021 18:43) (26 - 28)  SpO2: 100% (24 Feb 2021 18:43) (87% - 100%)  		  LUNGS:  [  x]Clear bilaterally [  ] Wheeze [  ] Rhonchi [  ] Rales [  ] Crackles; Other:  HEART: [ x ]RRR [  ] No murmur[ x ]  Normal S1S2[  ] Tachycardia;  Other:  CAPILLARY REFiLL:  	Fingers: [  x] less than 2 seconds [  ] more than 2 seconds                                           Toes: [ x ]  less than 2 seconds [  ] more than 2 seconds   PERIPHERAL PULSES:  Radial: [ x ] Palpable  [  ]  non-palpable                                         Dorsalis Pedis: [ x ] Palpable  [  ] non-palpable                                         Posterior Tibial: [  ] Palpable  [  ] non-palpable                                          Other:  SKIN:   [  ]  Diaphoretic  [  ]  mottling  [  ]  Cold extremities  [  x]  Warm [  ]  Dry                      Other:    BEDSIDE ULTRASOUND FINDINGS (IF APPLICABLE):    Labs:  24 Feb 2021 15:09    132    |  89     |  39     ----------------------------<  359    4.8     |  25     |  1.85     Ca    8.6        24 Feb 2021 15:09    TPro  7.5    /  Alb  3.6    /  TBili  1.0    /  DBili  x      /  AST  56     /  ALT  26     /  AlkPhos  110    24 Feb 2021 15:09                          9.2    8.16  )-----------( 125      ( 24 Feb 2021 15:09 )             30.4     PT/INR - ( 24 Feb 2021 15:09 )   PT: 14.5 sec;   INR: 1.22 ratio         PTT - ( 24 Feb 2021 15:09 )  PTT:25.9 sec  Lactate:    Plan (orders must be placed in EMR):     [ x ]  Check Repeat Lactate   [  ]  No change in current plan  [  ]  Start Vasopressors:  [  ]  Repeat Fluid Bolus:  [  ] other:    Care Discussed with Consultants/Other Providers [ ] YES  [ ] NO

## 2021-02-24 NOTE — H&P ADULT - PROBLEM SELECTOR PLAN 5
on tacrolimus, prednisone, and cellcept at home   - transplant hepatology consult in AM  - on tacrolimus, prednisone, and cellcept at home   - continue tacro, hold cellcept   - transplant hepatology consult

## 2021-02-25 NOTE — RAPID RESPONSE TEAM SUMMARY - NSSITUATIONBACKGROUNDRRT_GEN_ALL_CORE
69 y/o Male w/ PMH significant for HTN, HLD, T2DM on insulin, COPD (on home O2 3L), EtOH/HCV cirrhosis s/p liver transplant (2011) on Tacro/Cellcept/Prednisone, HFpEF, possible MDS, chronic lymphedema presenting with acute on chronic worsening SOB x 1 week, found to have acute hypoxic respiratory failure and sepsis due tot  COVID pna c/b AHRF, EDUAR, COPD exacerbation and hyperglycemia. Patient was put on bipap in the ED. Tachypnea slightly improves. However, continues to require 100% FiO2 and high tidal volume on bipap. Patient desats on high flows.

## 2021-02-25 NOTE — CHART NOTE - NSCHARTNOTEFT_GEN_A_CORE
Reference #: [id number]    Others' Prescriptions  Patient Name: Kem Washington Date: 1950  Address: [address] [place] [id number]: Male  Rx Written	Rx Dispensed	Drug	Quantity	Days Supply	Prescriber Name	Payment Method	Dispenser  01/09/2021	01/12/2021	buprenorphine 2 mg tablet sl	90	30	Roselia Kapil CARY	Insurance	[facility]  12/07/2020	12/14/2020	buprenorphine 2 mg tablet sl	90	30	Roselia Kapil 	Insurance	[facility]  11/04/2020	11/06/2020	buprenorphine 2 mg tablet sl	90	30	Roselia Kapil 	Insurance	[facility]  10/05/2020	10/09/2020	buprenorphine 2 mg tablet sl	90	30	Roselia Kapil 	Insurance	[facility]  09/08/2020	09/11/2020	buprenorphine 2 mg tablet sl	90	30	Roselia Kapil 	Insurance	[facility]  08/06/2020	08/07/2020	buprenorphine 2 mg tablet sl	90	30	Roselia Kapil 	Insurance	[facility]  07/08/2020	07/09/2020	buprenorphine 2 mg tablet sl	90	30	Roselia Kapil 	Insurance	[facility]  04/29/2020	04/30/2020	buprenorphine 2 mg tablet sl	90	30	Kapil Veloz 	Insurance	[facility]  03/30/2020	04/01/2020	buprenorphine 2 mg tablet sl	90	30	Kapil Veloz 	Insurance	[facility]  02/26/2020	02/27/2020	buprenorphine 2 mg tablet sl	90	30	Kapil Veloz 	Insurance	[facility]    Patient Name: Jordin Washington Date: 1950  Address: [address] [place] [id number]: Male  Rx Written	Rx Dispensed	Drug	Quantity	Days Supply	Prescriber Name	Payment Method	Dispenser  05/31/2020	06/02/2020	buprenorphine-naloxone 2-0.5 mg sl tablet	45	15	Rubin Doll MD	Insurance	[facility]  05/29/2020	05/29/2020	buprenorphine-naloxone 2-0.5 mg sl tablet	15	5	Rubin Doll MD	[facility]

## 2021-02-25 NOTE — CHART NOTE - NSCHARTNOTEFT_GEN_A_CORE
HPI:  69 y/o Male w/ PMH significant for HTN, HLD, T2DM on insulin, COPD (on home O2 3L), EtOH/HCV cirrhosis s/p liver transplant (2011) on Tacro/Cellcept/Prednisone, chronic lymphedema presenting with worsening SOB of several months duration. Difficult to obtain history as pt is on bipap. Pt reports SOB started "several months ago" that has been progressively getting worse. He has had GOMEZ, better with rest. Taking his medications helps temporarily difficulty breathing returns. He does not remember any inciting events that may have caused him to become more SOB. He has been having difficulty sleeping. Pt did not have any improvement in his breathing and decided to call an ambulance to come to the hospital. Of note pt had previous admission in 12/2020 for symptomatic anemia. Reports currently feeling better on bipap.     In ED,  98.2F, HR 99, 130/58, RR 28, desatted to 88% on NC 3L, now 100% on BiPAP   s/p 3x duonebs, solumedrol 125 IV, lasix 80 IV. Serum sugar was 388 at 9pm, and patient received Lantus 45 u and Lispro 8 u. BP was 108/41, sat 94% on BiPap, RR 32 and HR 71.    T(C): 37.1 (02-24-21 @ 17:30), Max: 37.1 (02-24-21 @ 17:30)  HR: 88 (02-24-21 @ 21:13) (88 - 99)  BP: 108/63 (02-24-21 @ 21:13) (108/63 - 136/66)  RR: 25 (02-24-21 @ 21:13) (25 - 28)  SpO2: 100% (02-24-21 @ 21:13) (87% - 100%)  Wt(kg): --  GENERAL: NAD, well-developed  HEAD:  Atraumatic, Normocephalic  EYES: EOMI, PERRLA, conjunctiva and sclera clear  NECK: Supple, No JVD  CHEST/LUNG: On BiPAP. Wheezing and crackles bilaterally  HEART: Regular rate and rhythm; No murmurs, rubs, or gallops  ABDOMEN: Soft, Nontender, Nondistended; Bowel sounds present  EXTREMITIES:  2+ Peripheral Pulses, chronic lymphedema  PSYCH: AAOx3  NEUROLOGY: non-focal  SKIN: No rashes or lesions      Assessment and Plan:  69 y/o Male w/ PMH significant for HTN, HLD, T2DM on insulin, COPD (on home O2 3L), EtOH/HCV cirrhosis s/p liver transplant (2011) on Tacro/Cellcept/Prednisone, chronic lymphedema presenting with worsening SOB of several months duration.    #Neuro:  A&Ox 3.     #Pulmonary:  Acute respiratory failure with hypoxia and hypercapnia likely secondary to COVID infection, with possible CHF exacerbation. Given COVID hypercoagulable state, need to rule out PE.   -V/Q scan to rule out PE  -continue BiPAP  -remdesivir 5 day course  -decadron 6mg 10 day course  -LE duplex to rule out DVT  -on Lovenox 40 mg BID for prophylaxis  -If d-dimer > 5k or if change in clinical status, increase to lovenox 110 mg BID   -repeat ABG, BMP, lactate  -douneb q6hr for COPD    #cardiovascular:  History of HFpEF. S/p 80mg IV lasix in ED.   -strict I/O  -continue home metoprolol, hold spirinolactone    #Renal:  EDUAR, baseline Cr was 1.3.   -UOP, electrolytes  -avoid nephrotoxic drugs  -trend Cr    #GI:  S/p liver transplant.   -continue tacro, hold cellcept      #ID:  COVID infection.   -remdesivir 5 day course  -decadron 6mg 10 day course    #Endo:  High anion gap metabolic acidosis, likely from lactic acidosis vs. possible DKA. S/p Lantus 45u and Lispro 8u.   -f/u BHB  -FS q2hr with ISS  -lantus 30 u in the morning  -monitor AG  -endocrine consult    #Heme-onc:  Bone marrow biopsy with myeloid abnormality in 12/2020 during recent admission for anemia to 6s warranting blood transfusion  - likely cause of anemia  - Hb currently stable in the 9s  - f/u heme outpatient.    #DVT ppx:  -Lovenox 40mg

## 2021-02-25 NOTE — PROGRESS NOTE ADULT - PROBLEM SELECTOR PLAN 4
SCR 1.85 upon admission baseline 1.3, likely in setting of sepsis, ADHF  - s/p lasix 80 ivp in ED  - monitor SC  - monitor lili LUO SCR 1.85 upon admission baseline 1.3, likely in setting of sepsis, ADHF  - s/p lasix 80 ivp in ED  - monitor SCr  - monitor lili LUO

## 2021-02-25 NOTE — RAPID RESPONSE TEAM SUMMARY - NSOTHERINTERVENTIONSRRT_GEN_ALL_CORE
Given persistent tachypnea and concerns for barotrauma, patient decided to get elective intubation. Anesthesia was called. Patient  was intubated and transferred to MICU.

## 2021-02-25 NOTE — PROGRESS NOTE ADULT - ASSESSMENT
69 y/o Male w/ PMH significant for HTN, HLD, T2DM on insulin, COPD (on home O2 3L), EtOH/HCV cirrhosis s/p liver transplant (2011) on Tacro/Cellcept/Prednisone, HFpEF, possible MDS, chronic lymphedema presenting with acute on chronic worsening SOB x 1 week, found to have acute hypoxic respiratory failure and sepsis due tot  COVID pna c/b AHRF, EDUAR, COPD exacerbation and hyperglycemia

## 2021-02-25 NOTE — CHART NOTE - NSCHARTNOTEFT_GEN_A_CORE
MICU Accept Note    CHIEF COMPLAINT:     HPI / INTERVAL HISTORY:  "71 y/o Male w/ PMH significant for HTN, HLD, T2DM on insulin, COPD (on home O2 3L), EtOH/HCV cirrhosis s/p liver transplant (2011) on Tacro/Cellcept/Prednisone, HFpEF, possible MDS, chronic lymphedema presenting with worsening SOB of several months duration. Difficult to obtain history as pt is on bipap. Pt reports SOB started "several months ago" that has been progressively getting worse. He has had GOMEZ, better with rest. Taking his medications helps temporarily difficulty breathing returns. He does not remember any inciting events that may have caused him to become more SOB. He has been having difficulty sleeping. Pt did not have any improvement in his breathing and decided to call an ambulance to come to the hospital. Of note pt had previous admission in 12/2020 for symptomatic anemia s/p BM biopsy . Reports currently feeling better on bipap. No sick contacts. Denies chest pain, nausea, vomiting, + chronic orthopnea.   He was found to have COVID-19 pneumonia   Transplant hepatology called for his immunosuppressant medications management."    Since admission to the floor, patient has been on BIPAP, with ABG overnight showing PO2 while on 14/10, 100% FiO2. Currently on Decadron and Remdesivir. Trial HFNC during the day and patient desatted down to 60s. Upon MICU examination, patient arousable, but lethargic appearing. RR up to 40s while speaking, and TV up to 700-800, although patient himself denies SOB.  Of note, patient initially had mild DKA in the setting of unable to take insulin in the ED, given insulin, now gap has closed.     PAST MEDICAL & SURGICAL HISTORY:  Hyperlipidemia    Hypertension    S/P liver transplant    Type 2 diabetes mellitus    Chronic obstructive pulmonary disease, unspecified COPD type  on home O2    S/P liver transplant        FAMILY HISTORY:  No pertinent family history in first degree relatives        SOCIAL HISTORY:  Smoking: [  ] Never Smoked  [ x ] Former Smoker (# packs x # years)  [  ] Current Smoker (# packs x # years)  Substance Use: No   EtOH Use: No  Marital Status: [  ] Single  [  ]   [  ]   [  ]   Sexual History:   Occupation:  Recent Travel:  Country of Birth:   Advance Directives:     HOME MEDICATIONS:      Allergies    clindamycin (Unknown)    Intolerances          REVIEW OF SYSTEMS:  Constitutional: No fevers, chills, weight loss, weight gain  HEENT: No vision problems, eye pain, nasal congestion, rhinorrhea, sore throat, dysphagia  CV: No chest pain, orthopnea, palpitations  Resp: No cough, dyspnea, wheezing, hemoptysis  GI: No nausea, vomiting, diarrhea, constipation, abdominal pain  : [ ] dysuria [ ] nocturia [ ] hematuria [ ] increased urinary frequency  Musculoskeletal: [ ] back pain [ ] myalgias [ ] arthralgias [ ] fracture  Skin: [ ] rash [ ] itch  Neurological: [ ] headache [ ] dizziness [ ] syncope [ ] weakness [ ] numbness  Psychiatric: [ ] anxiety [ ] depression  Endocrine: [ ] diabetes [ ] thyroid problem  Hematologic/Lymphatic: [ ] anemia [ ] bleeding problem  Allergic/Immunologic: [ ] itchy eyes [ ] nasal discharge [ ] hives [ ] angioedema  [x] All other systems negative  [ ] Unable to assess ROS because __________    OBJECTIVE:  ICU Vital Signs Last 24 Hrs  T(C): 36.6 (25 Feb 2021 08:51), Max: 37.1 (24 Feb 2021 17:30)  T(F): 97.8 (25 Feb 2021 08:51), Max: 98.8 (24 Feb 2021 17:30)  HR: 78 (25 Feb 2021 10:05) (70 - 99)  BP: 163/80 (25 Feb 2021 08:51) (108/63 - 163/80)  BP(mean): 84 (24 Feb 2021 17:30) (84 - 84)  ABP: --  ABP(mean): --  RR: 22 (25 Feb 2021 08:51) (22 - 28)  SpO2: 88% (25 Feb 2021 10:05) (85% - 100%)        CAPILLARY BLOOD GLUCOSE      POCT Blood Glucose.: 259 mg/dL (25 Feb 2021 10:09)      PHYSICAL EXAM:  General: On BIPAP, mildly tachypnea and lethargic appearing  Heck: supple, no lympadenopathy   Chest/Lungs: b/l crackles, belly breathing  Heart: RRR, no r/g  Abdomen: NTTP, +BS  Extremities: mottled b/l LE, 1+ edema and TTP of the skin  Skin: chronic venous stasis changes noted in b/l LE  Neuro: moving all extremities  Psych: AAOx3, normal affect    LINES:     HOSPITAL MEDICATIONS:  MEDICATIONS  (STANDING):  albuterol/ipratropium for Nebulization 3 milliLiter(s) Nebulizer every 6 hours  aspirin  chewable 81 milliGRAM(s) Oral daily  budesonide  80 MICROgram(s)/formoterol 4.5 MICROgram(s) Inhaler 2 Puff(s) Inhalation two times a day  dexAMETHasone     Tablet 6 milliGRAM(s) Oral daily  dextrose 40% Gel 15 Gram(s) Oral once  dextrose 50% Injectable 25 Gram(s) IV Push once  dextrose 50% Injectable 12.5 Gram(s) IV Push once  dextrose 50% Injectable 25 Gram(s) IV Push once  enoxaparin Injectable 40 milliGRAM(s) SubCutaneous two times a day  glucagon  Injectable 1 milliGRAM(s) IntraMuscular once  insulin glargine Injectable (LANTUS) 45 Unit(s) SubCutaneous at bedtime  insulin lispro (ADMELOG) corrective regimen sliding scale   SubCutaneous every 4 hours  metoprolol succinate ER 25 milliGRAM(s) Oral daily  pantoprazole    Tablet 40 milliGRAM(s) Oral before breakfast  remdesivir  IVPB   IV Intermittent   remdesivir  IVPB 100 milliGRAM(s) IV Intermittent every 24 hours  sertraline 100 milliGRAM(s) Oral daily  tacrolimus 0.5 milliGRAM(s) Oral two times a day  tamsulosin 0.4 milliGRAM(s) Oral at bedtime    MEDICATIONS  (PRN):  buprenorphine 2 mG/naloxone 0.5 mG SL  Tablet 1 Tablet(s) SubLingual <User Schedule> PRN pain management      LABS:                        9.1    2.55  )-----------( 95       ( 25 Feb 2021 07:27 )             29.6     Hgb Trend: 9.1<--, 9.2<--  02-25    133<L>  |  92<L>  |  50<H>  ----------------------------<  296<H>  4.9   |  28  |  1.95<H>    Ca    8.2<L>      25 Feb 2021 07:26  Phos  5.0     02-25  Mg     2.8     02-25    TPro  7.0  /  Alb  3.3  /  TBili  0.4  /  DBili  x   /  AST  49<H>  /  ALT  24  /  AlkPhos  95  02-25    Creatinine Trend: 1.95<--, 1.83<--, 1.85<--, 1.85<--  PT/INR - ( 25 Feb 2021 10:16 )   PT: 13.5 sec;   INR: 1.15 ratio         PTT - ( 24 Feb 2021 15:09 )  PTT:25.9 sec    Arterial Blood Gas:  02-25 @ 01:53  7.36/56/70/31/94/5.4  ABG lactate: --  Arterial Blood Gas:  02-24 @ 18:27  7.28/63/62/29/87/1.6  ABG lactate: --  Arterial Blood Gas:  02-24 @ 15:25  7.34/50/33/26/49/1.0  ABG lactate: --    Venous Blood Gas:  02-24 @ 15:09  7.26/65/<20/28/15  VBG Lactate: 5.5      MICROBIOLOGY:     RADIOLOGY & ADDITIONAL TESTS:  < from: Xray Chest 1 View- PORTABLE-Routine (Xray Chest 1 View- PORTABLE-Routine in AM.) (02.25.21 @ 08:38) >    IMPRESSION:  Progression of right infiltrate.    < end of copied text >    Assessment & Plan:    71 y/o Male w/ PMH significant for HTN, HLD, T2DM on insulin, COPD (on home O2 3L), EtOH/HCV cirrhosis s/p liver transplant (2011) on Tacro/Cellcept/Prednisone, HFpEF, possible MDS, chronic lymphedema presenting with acute on chronic worsening SOB x 1 week, found to have acute hypoxic respiratory failure and sepsis due tot  COVID pna c/b AHRF, EDUAR, COPD exacerbation and hyperglycemia . Now with progessive hypoxic respiratory failure, not responding to BIPAP, intubated and transferred to COVID ICU.    #Neuro:    #Pulm:    #Cards:    #GI:    #Renal:    #Endo:    #ID:    #Heme:    Code: Full  Diet: Tube feeds  PPX: on Lovenox 40mg BID    Dispo: COVID ICU MICU Accept Note    CHIEF COMPLAINT:     HPI / INTERVAL HISTORY:  "71 y/o Male w/ PMH significant for HTN, HLD, T2DM on insulin, COPD (on home O2 3L), EtOH/HCV cirrhosis s/p liver transplant (2011) on Tacro/Cellcept/Prednisone, HFpEF, possible MDS, chronic lymphedema presenting with worsening SOB of several months duration. Difficult to obtain history as pt is on bipap. Pt reports SOB started "several months ago" that has been progressively getting worse. He has had GOMEZ, better with rest. Taking his medications helps temporarily difficulty breathing returns. He does not remember any inciting events that may have caused him to become more SOB. He has been having difficulty sleeping. Pt did not have any improvement in his breathing and decided to call an ambulance to come to the hospital. Of note pt had previous admission in 12/2020 for symptomatic anemia s/p BM biopsy . Reports currently feeling better on bipap. No sick contacts. Denies chest pain, nausea, vomiting, + chronic orthopnea.   He was found to have COVID-19 pneumonia   Transplant hepatology called for his immunosuppressant medications management."    Since admission to the floor, patient has been on BIPAP, with ABG overnight showing PO2 while on 14/10, 100% FiO2. Currently on Decadron and Remdesivir. Trial HFNC during the day and patient desatted down to 60s. Upon MICU examination, patient arousable, but lethargic appearing. RR up to 40s while speaking, and TV up to 700-800, although patient himself denies SOB.  Of note, patient initially had mild DKA in the setting of unable to take insulin in the ED, given insulin, now gap has closed.     PAST MEDICAL & SURGICAL HISTORY:  Hyperlipidemia    Hypertension    S/P liver transplant    Type 2 diabetes mellitus    Chronic obstructive pulmonary disease, unspecified COPD type  on home O2    S/P liver transplant        FAMILY HISTORY:  No pertinent family history in first degree relatives        SOCIAL HISTORY:  Smoking: [  ] Never Smoked  [ x ] Former Smoker (# packs x # years)  [  ] Current Smoker (# packs x # years)  Substance Use: No   EtOH Use: No  Marital Status: [  ] Single  [  ]   [  ]   [  ]   Sexual History:   Occupation:  Recent Travel:  Country of Birth:   Advance Directives:     HOME MEDICATIONS:      Allergies    clindamycin (Unknown)    Intolerances          REVIEW OF SYSTEMS:  Constitutional: No fevers, chills, weight loss, weight gain  HEENT: No vision problems, eye pain, nasal congestion, rhinorrhea, sore throat, dysphagia  CV: No chest pain, orthopnea, palpitations  Resp: No cough, dyspnea, wheezing, hemoptysis  GI: No nausea, vomiting, diarrhea, constipation, abdominal pain  : [ ] dysuria [ ] nocturia [ ] hematuria [ ] increased urinary frequency  Musculoskeletal: [ ] back pain [ ] myalgias [ ] arthralgias [ ] fracture  Skin: [ ] rash [ ] itch  Neurological: [ ] headache [ ] dizziness [ ] syncope [ ] weakness [ ] numbness  Psychiatric: [ ] anxiety [ ] depression  Endocrine: [ ] diabetes [ ] thyroid problem  Hematologic/Lymphatic: [ ] anemia [ ] bleeding problem  Allergic/Immunologic: [ ] itchy eyes [ ] nasal discharge [ ] hives [ ] angioedema  [x] All other systems negative  [ ] Unable to assess ROS because __________    OBJECTIVE:  ICU Vital Signs Last 24 Hrs  T(C): 36.6 (25 Feb 2021 08:51), Max: 37.1 (24 Feb 2021 17:30)  T(F): 97.8 (25 Feb 2021 08:51), Max: 98.8 (24 Feb 2021 17:30)  HR: 78 (25 Feb 2021 10:05) (70 - 99)  BP: 163/80 (25 Feb 2021 08:51) (108/63 - 163/80)  BP(mean): 84 (24 Feb 2021 17:30) (84 - 84)  ABP: --  ABP(mean): --  RR: 22 (25 Feb 2021 08:51) (22 - 28)  SpO2: 88% (25 Feb 2021 10:05) (85% - 100%)        CAPILLARY BLOOD GLUCOSE      POCT Blood Glucose.: 259 mg/dL (25 Feb 2021 10:09)      PHYSICAL EXAM:  General: On BIPAP, mildly tachypnea and lethargic appearing  Heck: supple, no lympadenopathy   Chest/Lungs: b/l crackles, belly breathing  Heart: RRR, no r/g  Abdomen: NTTP, +BS  Extremities: mottled b/l LE, 1+ edema and TTP of the skin  Skin: chronic venous stasis changes noted in b/l LE  Neuro: moving all extremities  Psych: AAOx3, normal affect    LINES:     HOSPITAL MEDICATIONS:  MEDICATIONS  (STANDING):  albuterol/ipratropium for Nebulization 3 milliLiter(s) Nebulizer every 6 hours  aspirin  chewable 81 milliGRAM(s) Oral daily  budesonide  80 MICROgram(s)/formoterol 4.5 MICROgram(s) Inhaler 2 Puff(s) Inhalation two times a day  dexAMETHasone     Tablet 6 milliGRAM(s) Oral daily  dextrose 40% Gel 15 Gram(s) Oral once  dextrose 50% Injectable 25 Gram(s) IV Push once  dextrose 50% Injectable 12.5 Gram(s) IV Push once  dextrose 50% Injectable 25 Gram(s) IV Push once  enoxaparin Injectable 40 milliGRAM(s) SubCutaneous two times a day  glucagon  Injectable 1 milliGRAM(s) IntraMuscular once  insulin glargine Injectable (LANTUS) 45 Unit(s) SubCutaneous at bedtime  insulin lispro (ADMELOG) corrective regimen sliding scale   SubCutaneous every 4 hours  metoprolol succinate ER 25 milliGRAM(s) Oral daily  pantoprazole    Tablet 40 milliGRAM(s) Oral before breakfast  remdesivir  IVPB   IV Intermittent   remdesivir  IVPB 100 milliGRAM(s) IV Intermittent every 24 hours  sertraline 100 milliGRAM(s) Oral daily  tacrolimus 0.5 milliGRAM(s) Oral two times a day  tamsulosin 0.4 milliGRAM(s) Oral at bedtime    MEDICATIONS  (PRN):  buprenorphine 2 mG/naloxone 0.5 mG SL  Tablet 1 Tablet(s) SubLingual <User Schedule> PRN pain management      LABS:                        9.1    2.55  )-----------( 95       ( 25 Feb 2021 07:27 )             29.6     Hgb Trend: 9.1<--, 9.2<--  02-25    133<L>  |  92<L>  |  50<H>  ----------------------------<  296<H>  4.9   |  28  |  1.95<H>    Ca    8.2<L>      25 Feb 2021 07:26  Phos  5.0     02-25  Mg     2.8     02-25    TPro  7.0  /  Alb  3.3  /  TBili  0.4  /  DBili  x   /  AST  49<H>  /  ALT  24  /  AlkPhos  95  02-25    Creatinine Trend: 1.95<--, 1.83<--, 1.85<--, 1.85<--  PT/INR - ( 25 Feb 2021 10:16 )   PT: 13.5 sec;   INR: 1.15 ratio         PTT - ( 24 Feb 2021 15:09 )  PTT:25.9 sec    Arterial Blood Gas:  02-25 @ 01:53  7.36/56/70/31/94/5.4  ABG lactate: --  Arterial Blood Gas:  02-24 @ 18:27  7.28/63/62/29/87/1.6  ABG lactate: --  Arterial Blood Gas:  02-24 @ 15:25  7.34/50/33/26/49/1.0  ABG lactate: --    Venous Blood Gas:  02-24 @ 15:09  7.26/65/<20/28/15  VBG Lactate: 5.5      MICROBIOLOGY:     RADIOLOGY & ADDITIONAL TESTS:  < from: Xray Chest 1 View- PORTABLE-Routine (Xray Chest 1 View- PORTABLE-Routine in AM.) (02.25.21 @ 08:38) >    IMPRESSION:  Progression of right infiltrate.    < end of copied text >    Assessment & Plan:    71 y/o Male w/ PMH significant for HTN, HLD, T2DM on insulin, COPD (on home O2 3L), EtOH/HCV cirrhosis s/p liver transplant (2011) on Tacro/Cellcept/Prednisone, HFpEF, possible MDS, chronic lymphedema presenting with acute on chronic worsening SOB x 1 week, found to have acute hypoxic respiratory failure and sepsis due tot  COVID pna c/b AHRF, EDUAR, COPD exacerbation and hyperglycemia . Now with progessive hypoxic respiratory failure, not responding to BIPAP, intubated and transferred to COVID ICU.    #Neuro:  - AAOx3 at baseline  - currently sedated on     #Pulm:  Acute hypoxic respiratory failure 2/2 COVID  - intubated on vent setting:  - f/u post intubation ABG  - CXR to confirm ETT placement    #Cards:  HFpEF  - f/u TTE  - s/p lasix 80 ivp in ED  - will watch UOP, monitor off lasix for now given EDUAR  - strict I&Os  - continue home metoprolol, hold spirinolactone for now    Cardiogenic shock:  - in the setting of sedation  - c/w Levo to keep MAP > 65    #GI:  Liver transplant:  - continue to hold home CellCept and prednisone 3 mg ( currently on dexamethazone)   - continue tacrolimus 0.5mg BID   - check prograf level every 3 days   - continue Bactrim and nystatin  - Hep consulted, charles recs  - OGT once intubated and start feeding    #Renal:  SCR 1.85 upon admission baseline 1.3, likely in setting of sepsis, ADHF  - s/p lasix 80 ivp in ED  - monitor SCr  - will place palacios, and monitor UOP, lytes    #Endo:  DMT2:  - FSG q4h with mISS q4h for now given NPO status  - s/p lantus 45 and admelog 8, lantus 30 in AM   - currently on lantus 45 qhs +mISS  - endocrine consulted, appreciate recs    #ID:  Acute hypoxic respiratory failure likely 2/2 COVID PNA  - failed BIPAP and now intubated in ICU  - Remdesivir 5d course, decadron 6mg IV 10d course  - f/u LE duplex r/o DVT    #Heme:  bone marrow biopsy with myeloid abnormality in 12/2020 during recent admission for anemia to 6s warranting blood transfusion  - likely cause of anemia  - Hb currently stable in the 9s  - f/u heme outpatient.    Code: Full  Diet: Tube feeds  PPX: on Lovenox 40mg BID  Next of kin is brotherCody    Dispo: COVID ICU

## 2021-02-25 NOTE — PROGRESS NOTE ADULT - SUBJECTIVE AND OBJECTIVE BOX
70 year old morbidly obese on h/o COPD on home oxygen h/o liver transplant admitted to [address] [place] [address] on the following settings    155/81 Temp 97.5 89-90% rr 26    Patient can be observed on [address] [place] [address] until intubation is needed.

## 2021-02-25 NOTE — PROGRESS NOTE ADULT - PROBLEM SELECTOR PLAN 3
history of HFpEF  - TTE  - s/p lasix 80 ivp in ED,   - will watch UOP, monitor off lasix for now giiven EDUAR  - strict I&Os  - continue home metoprolol, hold spirinolactone for now history of HFpEF  - f/u TTE  - s/p lasix 80 ivp in ED  - will watch UOP, monitor off lasix for now given EDUAR  - strict I&Os  - continue home metoprolol, hold spirinolactone for now

## 2021-02-25 NOTE — PROGRESS NOTE ADULT - PROBLEM SELECTOR PLAN 1
Acute hypoxic respiratory failure likely 2/2 covid pna with possible CHF exacerbation. Although patient with chronic hypercapnic respiratoy failure, unable to fully compensate for HAGMA.  - currently on bipap for almost 24hrs now, increased risk of barotrauma  - monitor O2 saturation as bipap places pt increased risk of PTX.   - If desats or hypotensive, STAT CXR r/o PTX  - re-consult micu as pt still on bipap, unable to downtitrate to HFNC   - Remdesivir 5d course, decadron 6mg IV 10d course  - LE duplex r/o DVT. Unable to perform CTA given EDUAR at this time, unable to perform V/Q due to lung parenchymal dz. D-dimer 329, lower concern of active PE/DVT at this time

## 2021-02-25 NOTE — CONSULT NOTE ADULT - PROBLEM SELECTOR RECOMMENDATION 9
Diabetes Education and Nutrition Eval  Start Lantus 60 units qhs (received 45 units last night has been NPO all day on BIPAP and now intubated with persistent hyperglycemia)  Mod correction scale q4 while NPO. If tube feeds started can transition to NPH q6.  Goal glucose 100-180  Outpt. endo follow-up  Outpt. optho, podiatry, nephrology  Plan to d/c on basal bolus insulin regimen

## 2021-02-25 NOTE — CONSULT NOTE ADULT - ASSESSMENT
71 y/o Male w/ PMH significant for HTN, HLD, T2DM on insulin, COPD (on home O2 3L), EtOH/HCV cirrhosis s/p liver transplant (2011) on Tacro/Cellcept/Prednisone, HFpEF, possible MDS, chronic lymphedema presenting with acute on chronic worsening SOB x 1 week, found to have acute hypoxic respiratory failure due  COVID -19 pneumonia.    Impression:  # COVID- 19 pneumonia on remdisivir   # Acute hypoxic respiratory failure  #Post-liver transplant: on Prograf/CellCept/prednisone. On prophylactic Bactrim and nystatin.   #HFpEF: Right ventricular systolic dysfunction and moderate AS with normal LVEF noted on TTE from 05/2020.   #EDUAR on CKD with serum Cr now with 1.9 (baseline range 1.3 )  #Type 2 diabetes    Recommendations:  - continue to hold home CellCept and prednisone 3 mg ( currently on dexamethazone)   - continue tacrolimus 0.5mg BID   - check prograf level daily       Keyana Solorzano   Gastroenterology Fellow  Pager: [phone]  Please call answering service [phone] / on-call GI fellow after 5pm and before 8am, and on weekends. 69 y/o Male w/ PMH significant for HTN, HLD, T2DM on insulin, COPD (on home O2 3L), EtOH/HCV cirrhosis s/p liver transplant (2011) on Tacro/Cellcept/Prednisone, HFpEF, possible MDS, chronic lymphedema presenting with acute on chronic worsening SOB x 1 week, found to have acute hypoxic respiratory failure due  COVID -19 pneumonia.    Impression:  # COVID- 19 pneumonia on remdisivir   # Acute hypoxic respiratory failure  #Post-liver transplant: on Prograf/CellCept/prednisone. On prophylactic Bactrim and nystatin.   #HFpEF: Right ventricular systolic dysfunction and moderate AS with normal LVEF noted on TTE from 05/2020.   #EDUAR on CKD with serum Cr now with 1.9 (baseline range 1.3 )  #Type 2 diabetes    Recommendations:  - continue to hold home CellCept and prednisone 3 mg ( currently on dexamethazone)   - continue tacrolimus 0.5mg BID   - check prograf level every 5-7 days       Keyana Solorzano   Gastroenterology Fellow  Pager: [phone]  Please call answering service [phone] / on-call GI fellow after 5pm and before 8am, and on weekends. 69 y/o Male w/ PMH significant for HTN, HLD, T2DM on insulin, COPD (on home O2 3L), EtOH/HCV cirrhosis s/p liver transplant (2011) on Tacro/Cellcept/Prednisone, HFpEF, possible MDS, chronic lymphedema presenting with acute on chronic worsening SOB x 1 week, found to have acute hypoxic respiratory failure due  COVID -19 pneumonia.    Impression:  # COVID- 19 pneumonia on remdisivir   # Acute hypoxic respiratory failure requiring BIPAP  #Post-liver transplant: on Prograf/CellCept/prednisone. On prophylactic Bactrim and nystatin.   #HFpEF: Right ventricular systolic dysfunction and moderate AS with normal LVEF noted on TTE from 05/2020.   #EDUAR on CKD with serum Cr now with 1.9 (baseline range 1.3 )  #Type 2 diabetes    Recommendations:  - continue to hold home CellCept and prednisone 3 mg ( currently on dexamethazone)   - continue tacrolimus 0.5mg BID   - check prograf level every 3 days   - continue Bactrim and nystatin      Keyana Solorzano   Gastroenterology Fellow  Pager: [phone]  Please call answering service [phone] / on-call GI fellow after 5pm and before 8am, and on weekends.

## 2021-02-25 NOTE — PROGRESS NOTE ADULT - SUBJECTIVE AND OBJECTIVE BOX
PROGRESS NOTE:   Authored by Dr. Regina Hooks MD, Pager [phone] [facility], [id number] [facility]     Patient is a 70y old  Male who presents with a chief complaint of SOB (25 Feb 2021 09:08)      SUBJECTIVE / OVERNIGHT EVENTS: No events overnight.    ADDITIONAL REVIEW OF SYSTEMS: Denies fevers, chills, n/v.    MEDICATIONS  (STANDING):  albuterol/ipratropium for Nebulization 3 milliLiter(s) Nebulizer every 6 hours  aspirin  chewable 81 milliGRAM(s) Oral daily  budesonide  80 MICROgram(s)/formoterol 4.5 MICROgram(s) Inhaler 2 Puff(s) Inhalation two times a day  dexAMETHasone     Tablet 6 milliGRAM(s) Oral daily  dextrose 40% Gel 15 Gram(s) Oral once  dextrose 50% Injectable 25 Gram(s) IV Push once  dextrose 50% Injectable 12.5 Gram(s) IV Push once  dextrose 50% Injectable 25 Gram(s) IV Push once  enoxaparin Injectable 40 milliGRAM(s) SubCutaneous two times a day  glucagon  Injectable 1 milliGRAM(s) IntraMuscular once  insulin glargine Injectable (LANTUS) 30 Unit(s) SubCutaneous every morning  insulin glargine Injectable (LANTUS) 45 Unit(s) SubCutaneous at bedtime  insulin lispro (ADMELOG) corrective regimen sliding scale   SubCutaneous every 4 hours  metoprolol succinate ER 25 milliGRAM(s) Oral daily  pantoprazole    Tablet 40 milliGRAM(s) Oral before breakfast  remdesivir  IVPB   IV Intermittent   remdesivir  IVPB 100 milliGRAM(s) IV Intermittent every 24 hours  sertraline 100 milliGRAM(s) Oral daily  tacrolimus 0.5 milliGRAM(s) Oral two times a day  tamsulosin 0.4 milliGRAM(s) Oral at bedtime    MEDICATIONS  (PRN):  buprenorphine 2 mG/naloxone 0.5 mG SL  Tablet 1 Tablet(s) SubLingual <User Schedule> PRN pain management      CAPILLARY BLOOD GLUCOSE      POCT Blood Glucose.: 340 mg/dL (25 Feb 2021 06:01)  POCT Blood Glucose.: 385 mg/dL (25 Feb 2021 00:31)  POCT Blood Glucose.: 388 mg/dL (24 Feb 2021 20:59)  POCT Blood Glucose.: 412 mg/dL (24 Feb 2021 19:46)  POCT Blood Glucose.: 326 mg/dL (24 Feb 2021 14:50)    I&O's Summary      PHYSICAL EXAM:  Vital Signs Last 24 Hrs  T(C): 36.6 (25 Feb 2021 08:51), Max: 37.1 (24 Feb 2021 17:30)  T(F): 97.8 (25 Feb 2021 08:51), Max: 98.8 (24 Feb 2021 17:30)  HR: 79 (25 Feb 2021 08:51) (70 - 99)  BP: 163/80 (25 Feb 2021 08:51) (108/63 - 163/80)  BP(mean): 84 (24 Feb 2021 17:30) (84 - 84)  RR: 22 (25 Feb 2021 08:51) (22 - 28)  SpO2: 90% (25 Feb 2021 08:51) (85% - 100%)    CONSTITUTIONAL: NAD, lying in bed comfortably  RESPIRATORY: Normal respiratory effort; CTABL  CARDIOVASCULAR: Regular rate and rhythm, normal S1 and S2, no murmur/rub/gallop  ABDOMEN: Soft, nondistended, nontender to palpation, normoactive bowel sounds, no rebound/guarding  MUSCLOSKELETAL: no joint swelling or tenderness to palpation, FROM all extremities  NEURO: AAOx3 to person, place, and time, full and equal strength all extremities   EXTREMITIES: no pedal edema    LABS:                        9.1    2.55  )-----------( 95       ( 25 Feb 2021 07:27 )             29.6     02-25    133<L>  |  92<L>  |  50<H>  ----------------------------<  296<H>  4.9   |  28  |  1.95<H>    Ca    8.2<L>      25 Feb 2021 07:26  Phos  5.0     02-25  Mg     2.8     02-25    TPro  7.0  /  Alb  3.3  /  TBili  0.4  /  DBili  x   /  AST  49<H>  /  ALT  24  /  AlkPhos  95  02-25    PT/INR - ( 24 Feb 2021 22:24 )   PT: 14.2 sec;   INR: 1.19 ratio         PTT - ( 24 Feb 2021 15:09 )  PTT:25.9 sec            RADIOLOGY & ADDITIONAL TESTS:     PROGRESS NOTE:   Authored by Dr. Regina Hooks MD, Pager [phone] [facility], [id number] [facility]     Patient is a 70y old  Male who presents with a chief complaint of SOB (25 Feb 2021 09:08)      SUBJECTIVE / OVERNIGHT EVENTS:   ADDITIONAL REVIEW OF SYSTEMS:     MEDICATIONS  (STANDING):  albuterol/ipratropium for Nebulization 3 milliLiter(s) Nebulizer every 6 hours  aspirin  chewable 81 milliGRAM(s) Oral daily  budesonide  80 MICROgram(s)/formoterol 4.5 MICROgram(s) Inhaler 2 Puff(s) Inhalation two times a day  dexAMETHasone     Tablet 6 milliGRAM(s) Oral daily  dextrose 40% Gel 15 Gram(s) Oral once  dextrose 50% Injectable 25 Gram(s) IV Push once  dextrose 50% Injectable 12.5 Gram(s) IV Push once  dextrose 50% Injectable 25 Gram(s) IV Push once  enoxaparin Injectable 40 milliGRAM(s) SubCutaneous two times a day  glucagon  Injectable 1 milliGRAM(s) IntraMuscular once  insulin glargine Injectable (LANTUS) 30 Unit(s) SubCutaneous every morning  insulin glargine Injectable (LANTUS) 45 Unit(s) SubCutaneous at bedtime  insulin lispro (ADMELOG) corrective regimen sliding scale   SubCutaneous every 4 hours  metoprolol succinate ER 25 milliGRAM(s) Oral daily  pantoprazole    Tablet 40 milliGRAM(s) Oral before breakfast  remdesivir  IVPB   IV Intermittent   remdesivir  IVPB 100 milliGRAM(s) IV Intermittent every 24 hours  sertraline 100 milliGRAM(s) Oral daily  tacrolimus 0.5 milliGRAM(s) Oral two times a day  tamsulosin 0.4 milliGRAM(s) Oral at bedtime    MEDICATIONS  (PRN):  buprenorphine 2 mG/naloxone 0.5 mG SL  Tablet 1 Tablet(s) SubLingual <User Schedule> PRN pain management      CAPILLARY BLOOD GLUCOSE      POCT Blood Glucose.: 340 mg/dL (25 Feb 2021 06:01)  POCT Blood Glucose.: 385 mg/dL (25 Feb 2021 00:31)  POCT Blood Glucose.: 388 mg/dL (24 Feb 2021 20:59)  POCT Blood Glucose.: 412 mg/dL (24 Feb 2021 19:46)  POCT Blood Glucose.: 326 mg/dL (24 Feb 2021 14:50)    I&O's Summary      PHYSICAL EXAM:  Vital Signs Last 24 Hrs  T(C): 36.6 (25 Feb 2021 08:51), Max: 37.1 (24 Feb 2021 17:30)  T(F): 97.8 (25 Feb 2021 08:51), Max: 98.8 (24 Feb 2021 17:30)  HR: 79 (25 Feb 2021 08:51) (70 - 99)  BP: 163/80 (25 Feb 2021 08:51) (108/63 - 163/80)  BP(mean): 84 (24 Feb 2021 17:30) (84 - 84)  RR: 22 (25 Feb 2021 08:51) (22 - 28)  SpO2: 90% (25 Feb 2021 08:51) (85% - 100%)    CONSTITUTIONAL: NAD, lying in bed comfortably  RESPIRATORY: Normal respiratory effort; diminished breath sounds  CARDIOVASCULAR: Regular rate and rhythm, normal S1 and S2, no murmur/rub/gallop  ABDOMEN: Soft, nondistended, nontender to palpation, normoactive bowel sounds, no rebound/guarding  MUSCLOSKELETAL: no joint swelling or tenderness to palpation, FROM all extremities  NEURO: AAOx3 to person, place, and time, full and equal strength all extremities   EXTREMITIES: no pedal edema    LABS:                        9.1    2.55  )-----------( 95       ( 25 Feb 2021 07:27 )             29.6     02-25    133<L>  |  92<L>  |  50<H>  ----------------------------<  296<H>  4.9   |  28  |  1.95<H>    Ca    8.2<L>      25 Feb 2021 07:26  Phos  5.0     02-25  Mg     2.8     02-25    TPro  7.0  /  Alb  3.3  /  TBili  0.4  /  DBili  x   /  AST  49<H>  /  ALT  24  /  AlkPhos  95  02-25    PT/INR - ( 24 Feb 2021 22:24 )   PT: 14.2 sec;   INR: 1.19 ratio         PTT - ( 24 Feb 2021 15:09 )  PTT:25.9 sec            RADIOLOGY & ADDITIONAL TESTS:     PROGRESS NOTE:   Authored by Dr. Regina Hooks MD, Pager [phone] [facility], [id number] [facility]     Patient is a 70y old  Male who presents with a chief complaint of SOB (25 Feb 2021 09:08)      SUBJECTIVE / OVERNIGHT EVENTS:   ADDITIONAL REVIEW OF SYSTEMS:     MEDICATIONS  (STANDING):  albuterol/ipratropium for Nebulization 3 milliLiter(s) Nebulizer every 6 hours  aspirin  chewable 81 milliGRAM(s) Oral daily  budesonide  80 MICROgram(s)/formoterol 4.5 MICROgram(s) Inhaler 2 Puff(s) Inhalation two times a day  dexAMETHasone     Tablet 6 milliGRAM(s) Oral daily  dextrose 40% Gel 15 Gram(s) Oral once  dextrose 50% Injectable 25 Gram(s) IV Push once  dextrose 50% Injectable 12.5 Gram(s) IV Push once  dextrose 50% Injectable 25 Gram(s) IV Push once  enoxaparin Injectable 40 milliGRAM(s) SubCutaneous two times a day  glucagon  Injectable 1 milliGRAM(s) IntraMuscular once  insulin glargine Injectable (LANTUS) 30 Unit(s) SubCutaneous every morning  insulin glargine Injectable (LANTUS) 45 Unit(s) SubCutaneous at bedtime  insulin lispro (ADMELOG) corrective regimen sliding scale   SubCutaneous every 4 hours  metoprolol succinate ER 25 milliGRAM(s) Oral daily  pantoprazole    Tablet 40 milliGRAM(s) Oral before breakfast  remdesivir  IVPB   IV Intermittent   remdesivir  IVPB 100 milliGRAM(s) IV Intermittent every 24 hours  sertraline 100 milliGRAM(s) Oral daily  tacrolimus 0.5 milliGRAM(s) Oral two times a day  tamsulosin 0.4 milliGRAM(s) Oral at bedtime    MEDICATIONS  (PRN):  buprenorphine 2 mG/naloxone 0.5 mG SL  Tablet 1 Tablet(s) SubLingual <User Schedule> PRN pain management      CAPILLARY BLOOD GLUCOSE      POCT Blood Glucose.: 340 mg/dL (25 Feb 2021 06:01)  POCT Blood Glucose.: 385 mg/dL (25 Feb 2021 00:31)  POCT Blood Glucose.: 388 mg/dL (24 Feb 2021 20:59)  POCT Blood Glucose.: 412 mg/dL (24 Feb 2021 19:46)  POCT Blood Glucose.: 326 mg/dL (24 Feb 2021 14:50)    I&O's Summary      PHYSICAL EXAM:  Vital Signs Last 24 Hrs  T(C): 36.6 (25 Feb 2021 08:51), Max: 37.1 (24 Feb 2021 17:30)  T(F): 97.8 (25 Feb 2021 08:51), Max: 98.8 (24 Feb 2021 17:30)  HR: 79 (25 Feb 2021 08:51) (70 - 99)  BP: 163/80 (25 Feb 2021 08:51) (108/63 - 163/80)  BP(mean): 84 (24 Feb 2021 17:30) (84 - 84)  RR: 22 (25 Feb 2021 08:51) (22 - 28)  SpO2: 90% (25 Feb 2021 08:51) (85% - 100%)    CONSTITUTIONAL: NAD, lying in bed comfortably  RESPIRATORY: Normal respiratory effort; diminished breath sounds, bibasilar crackles with wheezes b/l  CARDIOVASCULAR: Regular rate and rhythm, normal S1 and S2, no murmur/rub/gallop  ABDOMEN: Soft, nondistended, nontender to palpation, normoactive bowel sounds, no rebound/guarding  MUSCLOSKELETAL: no joint swelling or tenderness to palpation, FROM all extremities  NEURO: AAOx3 to person, place, and time, full and equal strength all extremities   EXTREMITIES: lymphadematous changes b/l, no TT.   LABS:                        9.1    2.55  )-----------( 95       ( 25 Feb 2021 07:27 )             29.6     02-25    133<L>  |  92<L>  |  50<H>  ----------------------------<  296<H>  4.9   |  28  |  1.95<H>    Ca    8.2<L>      25 Feb 2021 07:26  Phos  5.0     02-25  Mg     2.8     02-25    TPro  7.0  /  Alb  3.3  /  TBili  0.4  /  DBili  x   /  AST  49<H>  /  ALT  24  /  AlkPhos  95  02-25    PT/INR - ( 24 Feb 2021 22:24 )   PT: 14.2 sec;   INR: 1.19 ratio         PTT - ( 24 Feb 2021 15:09 )  PTT:25.9 sec    RADIOLOGY & ADDITIONAL TESTS:     PROGRESS NOTE:   Authored by Dr. Regina Hooks MD, Pager [phone] [facility], [id number] [facility]     Patient is a 70y old  Male who presents with a chief complaint of SOB (25 Feb 2021 09:08)      SUBJECTIVE / OVERNIGHT EVENTS: saturating 88-90% on bipap, desaturates when on 100% 60L HFNC to SpO2 60s.      ADDITIONAL REVIEW OF SYSTEMS:     MEDICATIONS  (STANDING):  albuterol/ipratropium for Nebulization 3 milliLiter(s) Nebulizer every 6 hours  aspirin  chewable 81 milliGRAM(s) Oral daily  budesonide  80 MICROgram(s)/formoterol 4.5 MICROgram(s) Inhaler 2 Puff(s) Inhalation two times a day  dexAMETHasone     Tablet 6 milliGRAM(s) Oral daily  dextrose 40% Gel 15 Gram(s) Oral once  dextrose 50% Injectable 25 Gram(s) IV Push once  dextrose 50% Injectable 12.5 Gram(s) IV Push once  dextrose 50% Injectable 25 Gram(s) IV Push once  enoxaparin Injectable 40 milliGRAM(s) SubCutaneous two times a day  glucagon  Injectable 1 milliGRAM(s) IntraMuscular once  insulin glargine Injectable (LANTUS) 30 Unit(s) SubCutaneous every morning  insulin glargine Injectable (LANTUS) 45 Unit(s) SubCutaneous at bedtime  insulin lispro (ADMELOG) corrective regimen sliding scale   SubCutaneous every 4 hours  metoprolol succinate ER 25 milliGRAM(s) Oral daily  pantoprazole    Tablet 40 milliGRAM(s) Oral before breakfast  remdesivir  IVPB   IV Intermittent   remdesivir  IVPB 100 milliGRAM(s) IV Intermittent every 24 hours  sertraline 100 milliGRAM(s) Oral daily  tacrolimus 0.5 milliGRAM(s) Oral two times a day  tamsulosin 0.4 milliGRAM(s) Oral at bedtime    MEDICATIONS  (PRN):  buprenorphine 2 mG/naloxone 0.5 mG SL  Tablet 1 Tablet(s) SubLingual <User Schedule> PRN pain management      CAPILLARY BLOOD GLUCOSE      POCT Blood Glucose.: 340 mg/dL (25 Feb 2021 06:01)  POCT Blood Glucose.: 385 mg/dL (25 Feb 2021 00:31)  POCT Blood Glucose.: 388 mg/dL (24 Feb 2021 20:59)  POCT Blood Glucose.: 412 mg/dL (24 Feb 2021 19:46)  POCT Blood Glucose.: 326 mg/dL (24 Feb 2021 14:50)    I&O's Summary      PHYSICAL EXAM:  Vital Signs Last 24 Hrs  T(C): 36.6 (25 Feb 2021 08:51), Max: 37.1 (24 Feb 2021 17:30)  T(F): 97.8 (25 Feb 2021 08:51), Max: 98.8 (24 Feb 2021 17:30)  HR: 79 (25 Feb 2021 08:51) (70 - 99)  BP: 163/80 (25 Feb 2021 08:51) (108/63 - 163/80)  BP(mean): 84 (24 Feb 2021 17:30) (84 - 84)  RR: 22 (25 Feb 2021 08:51) (22 - 28)  SpO2: 90% (25 Feb 2021 08:51) (85% - 100%)    CONSTITUTIONAL: NAD  RESPIRATORY: Normal respiratory effort; diminished breath sounds, bibasilar crackles with wheezes b/l  CARDIOVASCULAR: Regular rate and rhythm, normal S1 and S2, no murmur/rub/gallop  ABDOMEN: Soft, nondistended, nontender to palpation, normoactive bowel sounds, no rebound/guarding  MUSCLOSKELETAL: no joint swelling or tenderness to palpation, FROM all extremities  NEURO: AAOx3 to person, place, and time, full and equal strength all extremities   EXTREMITIES: lymphadematous changes b/l, no TTP.   LABS:                        9.1    2.55  )-----------( 95       ( 25 Feb 2021 07:27 )             29.6     02-25    133<L>  |  92<L>  |  50<H>  ----------------------------<  296<H>  4.9   |  28  |  1.95<H>    Ca    8.2<L>      25 Feb 2021 07:26  Phos  5.0     02-25  Mg     2.8     02-25    TPro  7.0  /  Alb  3.3  /  TBili  0.4  /  DBili  x   /  AST  49<H>  /  ALT  24  /  AlkPhos  95  02-25    PT/INR - ( 24 Feb 2021 22:24 )   PT: 14.2 sec;   INR: 1.19 ratio         PTT - ( 24 Feb 2021 15:09 )  PTT:25.9 sec    RADIOLOGY & ADDITIONAL TESTS:

## 2021-02-25 NOTE — PROGRESS NOTE ADULT - PROBLEM SELECTOR PLAN 8
- DVT ppx: lovenox 40 for now, may need to uptitrate to 110mg if d-dimer significantly elevated or decompensating (hypotensive, increasingly hypoxic)  - NPO on bipap  - FULL CODE. Next of kin is brother, Cody Nguyen - DVT ppx: lovenox 40 BID  - NPO on bipap  - FULL CODE. Next of kin is brother, Cody Huttonn

## 2021-02-25 NOTE — PROGRESS NOTE ADULT - PROBLEM SELECTOR PLAN 2
No, patient can just keep the 9/11/17 appt. No need for 6/14/17 appt   likely combination from lactic acidosis and borderline DKA. AG opened to 18 with BHB 0.5, now closing. FSG originally elevated to 300-400s, now downtrending  - FSG q4h with mISS q4h for now given NPO status  - s/p lantus 45 and admelog 8, lantus 30 in AM   - continue lantus 45 qhs  - cannot give fluids due to covid pna and possible chf exacerbation    - endocrine consulted, appreciate recs likely combination from lactic acidosis and borderline DKA. AG opened to 18 with BHB 0.5, now closing. FSG originally elevated to 300-400s, now downtrending  - FSG q4h with mISS q4h for now given NPO status  - s/p lantus 45 and admelog 8, lantus 30 in AM   - continue lantus 45 qhs  - cannot give fluids due to covid pna and possible chf exacerbation  - endocrine consulted, appreciate recs

## 2021-02-25 NOTE — PROGRESS NOTE ADULT - PROBLEM SELECTOR PLAN 6
bone marrow biopsy with myeloid abnormality in 12/2020 during recent admission for anemia to 6s warranting blood transfusion  - likely cause of anemia  - Hb currently stable in the 9s  - f/u heme outpatient

## 2021-02-25 NOTE — PROGRESS NOTE ADULT - PROBLEM SELECTOR PLAN 5
on tacrolimus, prednisone, and cellcept at home   - continue tacro, hold cellcept   - transplant hepatology consult on tacrolimus, prednisone, and cellcept at home   - continue tacro, cellcept   - transplant hepatology consulted, appreciate recs

## 2021-02-26 NOTE — PROGRESS NOTE ADULT - SUBJECTIVE AND OBJECTIVE BOX
Chief Complaint:     History:    MEDICATIONS  (STANDING):  artificial  tears Solution 1 Drop(s) Both EYES every 12 hours  aspirin  chewable 81 milliGRAM(s) Oral daily  chlorhexidine 0.12% Liquid 15 milliLiter(s) Oral Mucosa two times a day  chlorhexidine 4% Liquid 1 Application(s) Topical <User Schedule>  cisatracurium Infusion 3 MICROgram(s)/kG/Min (20.5 mL/Hr) IV Continuous <Continuous>  dexAMETHasone  Injectable 6 milliGRAM(s) IV Push daily  dextrose 40% Gel 15 Gram(s) Oral once  dextrose 5%. 1000 milliLiter(s) (50 mL/Hr) IV Continuous <Continuous>  dextrose 5%. 1000 milliLiter(s) (100 mL/Hr) IV Continuous <Continuous>  dextrose 50% Injectable 25 Gram(s) IV Push once  dextrose 50% Injectable 12.5 Gram(s) IV Push once  enoxaparin Injectable 40 milliGRAM(s) SubCutaneous daily  fentaNYL   Infusion... 0.5 MICROgram(s)/kG/Hr (2.85 mL/Hr) IV Continuous <Continuous>  glucagon  Injectable 1 milliGRAM(s) IntraMuscular once  insulin lispro (ADMELOG) corrective regimen sliding scale   SubCutaneous every 6 hours  insulin NPH human recombinant 6 Unit(s) SubCutaneous every 6 hours  ketamine Infusion. 2 mG/kG/Hr (22.8 mL/Hr) IV Continuous <Continuous>  norepinephrine Infusion 0.04 MICROgram(s)/kG/Min (8.54 mL/Hr) IV Continuous <Continuous>  pantoprazole  Injectable 40 milliGRAM(s) IV Push daily  petrolatum Ophthalmic Ointment 1 Application(s) Both EYES two times a day  propofol Infusion 10 MICROgram(s)/kG/Min (6.83 mL/Hr) IV Continuous <Continuous>  remdesivir  IVPB   IV Intermittent   remdesivir  IVPB 100 milliGRAM(s) IV Intermittent every 24 hours  sertraline 100 milliGRAM(s) Oral daily  tacrolimus    0.5 mG/mL Suspension 0.5 milliGRAM(s) Oral two times a day    MEDICATIONS  (PRN):      Allergies    clindamycin (Unknown)    Intolerances      Review of Systems:  Constitutional: No fever  Eyes: No blurry vision  Neuro: No tremors  HEENT: No pain  Cardiovascular: No chest pain, palpitations  Respiratory: No SOB, no cough  GI: No nausea, vomiting, abdominal pain  : No dysuria  Skin: no rash  Psych: no depression  Endocrine: no polyuria, polydipsia  Hem/lymph: no swelling  Osteoporosis: no fractures    ALL OTHER SYSTEMS REVIEWED AND NEGATIVE    UNABLE TO OBTAIN    PHYSICAL EXAM:  VITALS: T(C): 37.1 (02-26-21 @ 08:00)  T(F): 98.8 (02-26-21 @ 08:00), Max: 98.8 (02-26-21 @ 08:00)  HR: 72 (02-26-21 @ 09:00) (45 - 82)  BP: --  RR:  (24 - 31)  SpO2:  (67% - 100%)  Wt(kg): --  GENERAL: NAD, well-groomed, well-developed  EYES: No proptosis, no lid lag, anicteric  HEENT:  Atraumatic, Normocephalic, moist mucous membranes  THYROID: Normal size, no palpable nodules  RESPIRATORY: Clear to auscultation bilaterally; No rales, rhonchi, wheezing, or rubs  CARDIOVASCULAR: Regular rate and rhythm; No murmurs; no peripheral edema  GI: Soft, nontender, non distended, normal bowel sounds  SKIN: Dry, intact, No rashes or lesions  MUSCULOSKELETAL: Full range of motion, normal strength  NEURO: sensation intact, extraocular movements intact, no tremor, normal reflexes  PSYCH: Alert and oriented x 3, normal affect, normal mood  CUSHING'S SIGNS: no striae    POCT Blood Glucose.: 186 mg/dL (02-25-21 @ 23:30)  POCT Blood Glucose.: 201 mg/dL (02-25-21 @ 13:26)  POCT Blood Glucose.: 259 mg/dL (02-25-21 @ 10:09)  POCT Blood Glucose.: 340 mg/dL (02-25-21 @ 06:01)  POCT Blood Glucose.: 385 mg/dL (02-25-21 @ 00:31)  POCT Blood Glucose.: 388 mg/dL (02-24-21 @ 20:59)  POCT Blood Glucose.: 412 mg/dL (02-24-21 @ 19:46)  POCT Blood Glucose.: 326 mg/dL (02-24-21 @ 14:50)      02-26    137  |  96  |  63<H>  ----------------------------<  184<H>  4.9   |  28  |  1.78<H>    EGFR if : 44<L>  EGFR if non : 38<L>    Ca    8.0<L>      02-26  Mg     2.7     02-26  Phos  5.2     02-26    TPro  6.5  /  Alb  3.1<L>  /  TBili  0.5  /  DBili  x   /  AST  37  /  ALT  23  /  AlkPhos  88  02-26          Thyroid Function Tests:                           Chief Complaint: T2DM    History: Patient is being treated with dexamethasone. Tolerating TFs.     MEDICATIONS  (STANDING):  artificial  tears Solution 1 Drop(s) Both EYES every 12 hours  aspirin  chewable 81 milliGRAM(s) Oral daily  chlorhexidine 0.12% Liquid 15 milliLiter(s) Oral Mucosa two times a day  chlorhexidine 4% Liquid 1 Application(s) Topical <User Schedule>  cisatracurium Infusion 3 MICROgram(s)/kG/Min (20.5 mL/Hr) IV Continuous <Continuous>  dexAMETHasone  Injectable 6 milliGRAM(s) IV Push daily  dextrose 40% Gel 15 Gram(s) Oral once  dextrose 5%. 1000 milliLiter(s) (50 mL/Hr) IV Continuous <Continuous>  dextrose 5%. 1000 milliLiter(s) (100 mL/Hr) IV Continuous <Continuous>  dextrose 50% Injectable 25 Gram(s) IV Push once  dextrose 50% Injectable 12.5 Gram(s) IV Push once  enoxaparin Injectable 40 milliGRAM(s) SubCutaneous daily  fentaNYL   Infusion... 0.5 MICROgram(s)/kG/Hr (2.85 mL/Hr) IV Continuous <Continuous>  glucagon  Injectable 1 milliGRAM(s) IntraMuscular once  insulin lispro (ADMELOG) corrective regimen sliding scale   SubCutaneous every 6 hours  insulin NPH human recombinant 6 Unit(s) SubCutaneous every 6 hours  ketamine Infusion. 2 mG/kG/Hr (22.8 mL/Hr) IV Continuous <Continuous>  norepinephrine Infusion 0.04 MICROgram(s)/kG/Min (8.54 mL/Hr) IV Continuous <Continuous>  pantoprazole  Injectable 40 milliGRAM(s) IV Push daily  petrolatum Ophthalmic Ointment 1 Application(s) Both EYES two times a day  propofol Infusion 10 MICROgram(s)/kG/Min (6.83 mL/Hr) IV Continuous <Continuous>  remdesivir  IVPB   IV Intermittent   remdesivir  IVPB 100 milliGRAM(s) IV Intermittent every 24 hours  sertraline 100 milliGRAM(s) Oral daily  tacrolimus    0.5 mG/mL Suspension 0.5 milliGRAM(s) Oral two times a day    MEDICATIONS  (PRN):      Allergies    clindamycin (Unknown)    Intolerances      Review of Systems:      UNABLE TO OBTAIN    PHYSICAL EXAM:  VITALS: T(C): 37.1 (02-26-21 @ 08:00)  T(F): 98.8 (02-26-21 @ 08:00), Max: 98.8 (02-26-21 @ 08:00)  HR: 72 (02-26-21 @ 09:00) (45 - 82)  BP: --  RR:  (24 - 31)  SpO2:  (67% - 100%)  Wt(kg): --  GENERAL: NAD  EYES: No proptosis  HEENT:  Intubated  RESPIRATORY: Clear to auscultation bilaterally  CARDIOVASCULAR: Regular rate and rhythm  GI: Soft, nontender      POCT Blood Glucose.: 186 mg/dL (02-25-21 @ 23:30)  POCT Blood Glucose.: 201 mg/dL (02-25-21 @ 13:26)  POCT Blood Glucose.: 259 mg/dL (02-25-21 @ 10:09)  POCT Blood Glucose.: 340 mg/dL (02-25-21 @ 06:01)  POCT Blood Glucose.: 385 mg/dL (02-25-21 @ 00:31)  POCT Blood Glucose.: 388 mg/dL (02-24-21 @ 20:59)  POCT Blood Glucose.: 412 mg/dL (02-24-21 @ 19:46)  POCT Blood Glucose.: 326 mg/dL (02-24-21 @ 14:50)      02-26    137  |  96  |  63<H>  ----------------------------<  184<H>  4.9   |  28  |  1.78<H>    EGFR if : 44<L>  EGFR if non : 38<L>    Ca    8.0<L>      02-26  Mg     2.7     02-26  Phos  5.2     02-26    TPro  6.5  /  Alb  3.1<L>  /  TBili  0.5  /  DBili  x   /  AST  37  /  ALT  23  /  AlkPhos  88  02-26

## 2021-02-26 NOTE — PROGRESS NOTE ADULT - ASSESSMENT
69 y/o Male w/ PMH significant for HTN, HLD, T2DM on insulin, COPD (on home O2 3L), EtOH/HCV cirrhosis s/p liver transplant (2011) on Tacro/Cellcept/Prednisone, HFpEF, possible MDS, chronic lymphedema presenting with acute on chronic worsening SOB x 1 week, found to have acute hypoxic respiratory failure due  COVID -19 pneumonia.    Impression:  # COVID- 19 pneumonia on remdisivir   # Acute hypoxic respiratory failure   # Post-liver transplant: on Prograf/CellCept/prednisone. On prophylactic Bactrim and nystatin.   # HFpEF: Right ventricular systolic dysfunction and moderate AS with normal LVEF noted on TTE from 05/2020.   # EDUAR on CKD with serum Cr now with 1.9 (baseline range 1.3 )  # Type 2 diabetes    Recommendations:  - continue to hold home CellCept and prednisone 3 mg ( currently on dexamethazone)   - continue tacrolimus 0.5mg BID ( last prograf level was <2)  - check prograf level every 3 days   - continue Bactrim and nystatin      Keyana Solorzano   Gastroenterology Fellow  Pager: [phone]  Please call answering service [phone] / on-call GI fellow after 5pm and before 8am, and on weekends.

## 2021-02-26 NOTE — PROGRESS NOTE ADULT - SUBJECTIVE AND OBJECTIVE BOX
Interval Events:   Intubated on  and transferred to [facility]    Hospital Medications:  artificial  tears Solution 1 Drop(s) Both EYES every 12 hours  aspirin  chewable 81 milliGRAM(s) Oral daily  chlorhexidine 0.12% Liquid 15 milliLiter(s) Oral Mucosa two times a day  chlorhexidine 4% Liquid 1 Application(s) Topical <User Schedule>  cisatracurium Infusion 3 MICROgram(s)/kG/Min IV Continuous <Continuous>  dexAMETHasone  Injectable 6 milliGRAM(s) IV Push daily  dextrose 40% Gel 15 Gram(s) Oral once  dextrose 5%. 1000 milliLiter(s) IV Continuous <Continuous>  dextrose 5%. 1000 milliLiter(s) IV Continuous <Continuous>  dextrose 50% Injectable 25 Gram(s) IV Push once  dextrose 50% Injectable 12.5 Gram(s) IV Push once  enoxaparin Injectable 40 milliGRAM(s) SubCutaneous daily  fentaNYL   Infusion... 0.5 MICROgram(s)/kG/Hr IV Continuous <Continuous>  glucagon  Injectable 1 milliGRAM(s) IntraMuscular once  insulin lispro (ADMELOG) corrective regimen sliding scale   SubCutaneous every 6 hours  insulin NPH human recombinant 6 Unit(s) SubCutaneous every 6 hours  ketamine Infusion. 2 mG/kG/Hr IV Continuous <Continuous>  norepinephrine Infusion 0.04 MICROgram(s)/kG/Min IV Continuous <Continuous>  pantoprazole  Injectable 40 milliGRAM(s) IV Push daily  petrolatum Ophthalmic Ointment 1 Application(s) Both EYES two times a day  propofol Infusion 10 MICROgram(s)/kG/Min IV Continuous <Continuous>  remdesivir  IVPB   IV Intermittent   remdesivir  IVPB 100 milliGRAM(s) IV Intermittent every 24 hours  sertraline 100 milliGRAM(s) Oral daily  tacrolimus    0.5 mG/mL Suspension 0.5 milliGRAM(s) Oral two times a day        ROS: unable to obtained. Patient is intubated and sedated     PHYSICAL EXAM:   Vital Signs:  Vital Signs Last 24 Hrs  T(C): 37.1 (2021 08:00), Max: 37.1 (2021 08:00)  T(F): 98.8 (2021 08:00), Max: 98.8 (2021 08:00)  HR: 71 (2021 10:25) (45 - 82)  BP: --  BP(mean): --  RR: 25 (2021 09:00) (24 - 31)  SpO2: 92% (2021 10:25) (67% - 100%)  Daily     Daily     PHYSICAL EXAM:   GENERAL: intubated and sedated   HEENT:  sclera -anicteric  CHEST:    HEART:  RRR S1/S2,  ABDOMEN:  Soft, non-tender, non-distended,  no masses   EXTREMITIES: chronic lymphedema   SKIN:  Warm & Dry. No rash or erythema  NEURO:  sedated    LABS:                        8.4    5.13  )-----------( 100      ( 2021 04:17 )             27.2     Mean Cell Volume: 100.7 fl (- @ 04:17)        137  |  96  |  63<H>  ----------------------------<  184<H>  4.9   |  28  |  1.78<H>    Ca    8.0<L>      2021 04:17  Phos  5.2       Mg     2.7         TPro  6.5  /  Alb  3.1<L>  /  TBili  0.5  /  DBili  x   /  AST  37  /  ALT  23  /  AlkPhos  88      LIVER FUNCTIONS - ( 2021 04:17 )  Alb: 3.1 g/dL / Pro: 6.5 g/dL / ALK PHOS: 88 U/L / ALT: 23 U/L / AST: 37 U/L / GGT: x           PT/INR - ( 2021 10:16 )   PT: 13.5 sec;   INR: 1.15 ratio         PTT - ( 2021 04:17 )  PTT:27.9 sec  Urinalysis Basic - ( 2021 15:23 )    Color: Yellow / Appearance: Slightly Turbid / S.019 / pH: x  Gluc: x / Ketone: Negative  / Bili: Negative / Urobili: Negative   Blood: x / Protein: 30 mg/dL / Nitrite: Negative   Leuk Esterase: Negative / RBC: >50 /hpf / WBC 14 /HPF   Sq Epi: x / Non Sq Epi: 2 /hpf / Bacteria: Negative                              8.4    5.13  )-----------( 100      ( 2021 04:17 )             27.2                         8.8    5.36  )-----------( 105      ( 2021 15:23 )             28.6                         9.1    2.55  )-----------( 95       ( 2021 07:27 )             29.6                         9.2    8.16  )-----------( 125      ( 2021 15:09 )             30.4       Imaging:       Interval Events:   Intubated on  and transferred to [facility]    Hospital Medications:  artificial  tears Solution 1 Drop(s) Both EYES every 12 hours  aspirin  chewable 81 milliGRAM(s) Oral daily  chlorhexidine 0.12% Liquid 15 milliLiter(s) Oral Mucosa two times a day  chlorhexidine 4% Liquid 1 Application(s) Topical <User Schedule>  cisatracurium Infusion 3 MICROgram(s)/kG/Min IV Continuous <Continuous>  dexAMETHasone  Injectable 6 milliGRAM(s) IV Push daily  dextrose 40% Gel 15 Gram(s) Oral once  dextrose 5%. 1000 milliLiter(s) IV Continuous <Continuous>  dextrose 5%. 1000 milliLiter(s) IV Continuous <Continuous>  dextrose 50% Injectable 25 Gram(s) IV Push once  dextrose 50% Injectable 12.5 Gram(s) IV Push once  enoxaparin Injectable 40 milliGRAM(s) SubCutaneous daily  fentaNYL   Infusion... 0.5 MICROgram(s)/kG/Hr IV Continuous <Continuous>  glucagon  Injectable 1 milliGRAM(s) IntraMuscular once  insulin lispro (ADMELOG) corrective regimen sliding scale   SubCutaneous every 6 hours  insulin NPH human recombinant 6 Unit(s) SubCutaneous every 6 hours  ketamine Infusion. 2 mG/kG/Hr IV Continuous <Continuous>  norepinephrine Infusion 0.04 MICROgram(s)/kG/Min IV Continuous <Continuous>  pantoprazole  Injectable 40 milliGRAM(s) IV Push daily  petrolatum Ophthalmic Ointment 1 Application(s) Both EYES two times a day  propofol Infusion 10 MICROgram(s)/kG/Min IV Continuous <Continuous>  remdesivir  IVPB   IV Intermittent   remdesivir  IVPB 100 milliGRAM(s) IV Intermittent every 24 hours  sertraline 100 milliGRAM(s) Oral daily  tacrolimus    0.5 mG/mL Suspension 0.5 milliGRAM(s) Oral two times a day        ROS: unable to obtained. Patient is intubated and sedated     PHYSICAL EXAM:   Vital Signs:  Vital Signs Last 24 Hrs  T(C): 37.1 (2021 08:00), Max: 37.1 (2021 08:00)  T(F): 98.8 (2021 08:00), Max: 98.8 (2021 08:00)  HR: 71 (2021 10:25) (45 - 82)  BP: --  BP(mean): --  RR: 25 (2021 09:00) (24 - 31)  SpO2: 92% (2021 10:25) (67% - 100%)  Daily     Daily     PHYSICAL EXAM:   GENERAL: intubated and sedated   HEENT:  sclera -anicteric  CHEST:  no wheezes   HEART:  RRR S1/S2,  ABDOMEN:  Soft, non-tender, non-distended,    EXTREMITIES: chronic lymphedema   SKIN:  Warm & Dry. No rash or erythema  NEURO:  sedated    LABS:                        8.4    5.13  )-----------( 100      ( 2021 04:17 )             27.2     Mean Cell Volume: 100.7 fl (- @ 04:17)        137  |  96  |  63<H>  ----------------------------<  184<H>  4.9   |  28  |  1.78<H>    Ca    8.0<L>      2021 04:17  Phos  5.2       Mg     2.7         TPro  6.5  /  Alb  3.1<L>  /  TBili  0.5  /  DBili  x   /  AST  37  /  ALT  23  /  AlkPhos  88      LIVER FUNCTIONS - ( 2021 04:17 )  Alb: 3.1 g/dL / Pro: 6.5 g/dL / ALK PHOS: 88 U/L / ALT: 23 U/L / AST: 37 U/L / GGT: x           PT/INR - ( 2021 10:16 )   PT: 13.5 sec;   INR: 1.15 ratio         PTT - ( 2021 04:17 )  PTT:27.9 sec  Urinalysis Basic - ( 2021 15:23 )    Color: Yellow / Appearance: Slightly Turbid / S.019 / pH: x  Gluc: x / Ketone: Negative  / Bili: Negative / Urobili: Negative   Blood: x / Protein: 30 mg/dL / Nitrite: Negative   Leuk Esterase: Negative / RBC: >50 /hpf / WBC 14 /HPF   Sq Epi: x / Non Sq Epi: 2 /hpf / Bacteria: Negative                              8.4    5.13  )-----------( 100      ( 2021 04:17 )             27.2                         8.8    5.36  )-----------( 105      ( 2021 15:23 )             28.6                         9.1    2.55  )-----------( 95       ( 2021 07:27 )             29.6                         9.2    8.16  )-----------( 125      ( 2021 15:09 )             30.4       Imaging:  < from: Xray Chest 1 View- PORTABLE-Urgent (Xray Chest 1 View- PORTABLE-Urgent .) (21 @ 18:30) >    EXAM:  XR CHEST PORTABLE URGENT 1V                            PROCEDURE DATE:  2021            INTERPRETATION:  A single chest x-ray was obtained on 2021.    Indication: A new central line was placed on the left.    Pression:    The heart is slightly enlarged. Diffuse opacity is seen throughout the lungs which remain unchanged when compared to previous study done 2021. Changes compatible with multifocal pneumonia such as seen in Covid 19. Endotracheal tube is in good position. NG tube is in the stomach. A central line was placed on the left and the tip is in superior vena cava.    < end of copied text >

## 2021-02-26 NOTE — PROGRESS NOTE ADULT - SUBJECTIVE AND OBJECTIVE BOX
KULWANT WALTON  MRN-[id number]  Patient is a 70y old  Male who presents with a chief complaint of SOB (2021 16:38)    HPI:   69 y/o Male w/ PMH significant for HTN, HLD, T2DM on insulin, COPD (on home O2 3L), EtOH/HCV cirrhosis s/p liver transplant () on Tacro/Cellcept/Prednisone, HFpEF, possible MDS, chronic lymphedema presenting with worsening SOB of several months duration. Difficult to obtain history as pt is on bipap. Pt reports SOB started "several months ago" that has been progressively getting worse. He has had GOMEZ, better with rest. Taking his medications helps temporarily difficulty breathing returns. He does not remember any inciting events that may have caused him to become more SOB. He has been having difficulty sleeping. Pt did not have any improvement in his breathing and decided to call an ambulance to come to the hospital. Of note pt had previous admission in 2020 for symptomatic anemia s/p BM biopsy . Reports currently feeling better on bipap. No sick contacts. Denies chest pain, nausea, vomiting, + chronic orthopnea. Intubated 2021 and transferred to [facility].     Today: Patient sedated and paralyzed and proned.     REVIEW OF SYSTEMS:  Gen: No fever  EYES/ENT: No visual changes;  No vertigo or throat pain   NECK: No pain   RES:  No shortness of breath or Cough  Chest: + incisional pain  CARD: No chest pain   GI: No abdominal pain  : No dysuria  NEURO: No weakness  SKIN: No itching, rashes     Physical Exam:  Vital Signs Last 24 Hrs  T(C): 37.1 (2021 08:00), Max: 37.1 (2021 08:00)  T(F): 98.8 (2021 08:00), Max: 98.8 (2021 08:00)  HR: 72 (2021 09:00) (45 - 82)  BP: --  BP(mean): --  RR: 25 (2021 09:00) (24 - 31)  SpO2: 92% (2021 09:00) (67% - 100%)  Gen:  Awake, alert   CNS: non focal 	  Neck: no JVD  RES : clear , no wheezing    Chest:   + chest tubes                     CVS: Regular  rhythm. Normal S1/S2  Abd: Soft, non-distended. Bowel sounds present.  Skin: No rash.  Ext:  no edema, A Line    ============================I/O===========================   I&O's Detail    2021 07:  -  2021 07:00  --------------------------------------------------------  IN:    Cisatracurium: 286.8 mL    Enteral Tube Flush: 100 mL    FentaNYL: 386.3 mL    Ketamine: 330.6 mL    Lactated Ringers Bolus: 500 mL    Norepinephrine: 4.3 mL    Propofol: 311 mL    Vital1.0: 80 mL  Total IN: 1999 mL    OUT:    Indwelling Catheter - Urethral (mL): 660 mL  Total OUT: 660 mL    Total NET: 1339 mL      2021 07:  -  2021 09:37  --------------------------------------------------------  IN:    Cisatracurium: 41 mL    FentaNYL: 34.2 mL    Ketamine: 45.6 mL    Propofol: 41 mL  Total IN: 161.8 mL    OUT:    Indwelling Catheter - Urethral (mL): 250 mL    Norepinephrine: 0 mL  Total OUT: 250 mL    Total NET: -88.2 mL        ============================ LABS =========================                        8.4    5.13  )-----------( 100      ( 2021 04:17 )             27.2     -    137  |  96  |  63<H>  ----------------------------<  184<H>  4.9   |  28  |  1.78<H>    Ca    8.0<L>      2021 04:17  Phos  5.2       Mg     2.7         TPro  6.5  /  Alb  3.1<L>  /  TBili  0.5  /  DBili  x   /  AST  37  /  ALT  23  /  AlkPhos  88      LIVER FUNCTIONS - ( 2021 04:17 )  Alb: 3.1 g/dL / Pro: 6.5 g/dL / ALK PHOS: 88 U/L / ALT: 23 U/L / AST: 37 U/L / GGT: x           PT/INR - ( 2021 10:16 )   PT: 13.5 sec;   INR: 1.15 ratio         PTT - ( 2021 04:17 )  PTT:27.9 sec  ABG - ( 2021 07:58 )  pH, Arterial: 7.34  pH, Blood: x     /  pCO2: 59    /  pO2: 119   / HCO3: 31    / Base Excess: 5.1   /  SaO2: 99                Urinalysis Basic - ( 2021 15:23 )    Color: Yellow / Appearance: Slightly Turbid / S.019 / pH: x  Gluc: x / Ketone: Negative  / Bili: Negative / Urobili: Negative   Blood: x / Protein: 30 mg/dL / Nitrite: Negative   Leuk Esterase: Negative / RBC: >50 /hpf / WBC 14 /HPF   Sq Epi: x / Non Sq Epi: 2 /hpf / Bacteria: Negative      ======================Micro/Rad/Cardio=================  Culture: Reviewed   CXR: Reviewed  Echo:Reviewed  ======================================================  PAST MEDICAL & SURGICAL HISTORY:  Hyperlipidemia    Hypertension    S/P liver transplant    Type 2 diabetes mellitus    Chronic obstructive pulmonary disease, unspecified COPD type  on home O2    S/P liver transplant

## 2021-02-26 NOTE — PROGRESS NOTE ADULT - ASSESSMENT
#Neuro:  - AAOx3 at baseline  - currently sedated, paralyzed and proned  Prop 20, Fent 4, Nimbex 3, Ket 2     #Pulm:  Acute hypoxic respiratory failure 2/2 COVID  - intubated on vent settin/450/16/50  Follow ABGs   Proned at 7PM will supine later today      #Cards:  HFpEF  - will watch UOP, monitor off lasix for now given EDUAR  - strict I&Os    Cardiogenic shock:  - in the setting of sedation  - c/w Levo to keep MAP > 65    #GI:  Liver transplant:  - continue to hold home CellCept and prednisone 3 mg ( currently on dexamethazone)   - continue tacrolimus 0.5mg BID   - check prograf level every 3 days   - continue Bactrim and nystatin  - Hep consulted, charles recs  - OGT once intubated and start feeding    #Renal:  SCR 1.85 upon admission baseline 1.3, likely in setting of sepsis, ADHF  - s/p lasix 80 ivp in ED  - monitor SCr  - will place palacios, and monitor UOP, lytes    #Endo:  DMT2:  - FSG q4h with mISS q4h for now given NPO status  - s/p lantus 45 and admelog 8, lantus 30 in AM   - currently on lantus 45 qhs +mISS  - endocrine consulted, appreciate recs    #ID:  Acute hypoxic respiratory failure likely 2/2 COVID PNA  - failed BIPAP and now intubated in ICU  - Remdesivir 5d course, decadron 6mg IV 10d course      #Heme:  bone marrow biopsy with myeloid abnormality in 2020 during recent admission for anemia to 6s warranting blood transfusion  - likely cause of anemia  - Hb currently stable in the 9s  - f/u heme outpatient.    Code: Full  Diet: Tube feeds  PPX: on Lovenox 40mg daily  Next of kin is brotherCody    Dispo: COVID ICU.

## 2021-02-26 NOTE — PROGRESS NOTE ADULT - PROBLEM SELECTOR PLAN 1
Recommend increase in NPH to 8 Units q 6 hours   Continue moderate admelog scale q 6 hours   Goal Glucose 100-180  Discharge plan tbd  will follow

## 2021-02-27 NOTE — PROGRESS NOTE ADULT - ASSESSMENT
Jordin Nguyen is a 70 y.o. man with history of HTN, HLD, DM, COPD on home O2, and cirrhosis s/p Liver transplant (2/2 ETOH+HCV) who was admitted on  and intubated on  for worsening hypoxic respiratory failure 2/2 COVID pneumonia. The patient was placed in supine position on  due to worsening respiratory function. The patient's hospitalization has been further compromised by methacillin-resistant S. epidermidis bacteremia and EDUAR.     #Neuro:  - AAOx3 at baseline  - currently sedated, paralyzed and proned  - Prop, Fent, Ketamine, Nimbex     #Pulm:  Acute hypoxic respiratory failure / COVID  - intubated on vent settin/450//, P:F 300  - Prone @ 1900 on   - Supinate @ 1300 on     #Cards:  HFpEF  - will watch UOP, monitor off lasix for now given EDUAR  - strict I&Os    Cardiogenic shock:  - in the setting of sedation  - off pressors     #GI:  Liver transplant:  - continue to hold home CellCept and prednisone 3 mg ( currently on dexamethazone)   - continue tacrolimus 0.5mg BID   - check prograf level every 3 days   - continue Bactrim and nystatin  - Hep consulted, charles recs  - OGT once intubated and start feeding    #Renal:  SCR 1.85 upon admission baseline 1.3, likely in setting of sepsis, ADHF  - s/p lasix 80 ivp in ED  - monitor SCr    #Endo:  DMT2:  - FSG q4h with mISS q4h for now given NPO status  - sNPH 10U Q6H + ISS (takes Lantus 70 U in PM at home; may need to add once acute illness is treated)  - endocrine consulted, appreciate recs    #ID:  Acute hypoxic respiratory failure likely 2/2 COVID PNA  - failed BIPAP and now intubated in ICU  - Remdesivir 5d course, decadron 6mg IV 10d course  - Patient is on steroids at home: prednisone 3 every other day      #Heme:  bone marrow biopsy with myeloid abnormality in 2020 during recent admission for anemia to 6s warranting blood transfusion  - likely cause of anemia  - Hb currently stable in the 9s  - f/u heme outpatient.    Code: Full  Diet: Tube feeds  PPX: on Lovenox 40mg daily  Next of kin is brother, Cody Nguyen    Dispo: COVID ICU.    Hussain Recinos, PGY-3

## 2021-02-27 NOTE — CHART NOTE - NSCHARTNOTEFT_GEN_A_CORE
Endocrine following for diabetes management with hyperglycemia exacerbated by steroids. Based on insulin requirements would recommend increase NPH to 10 units q6. Can start 10 units with next dose in 6 hours. Will adjust further as needed.      Romero Kapoor D.O  [phone]

## 2021-02-27 NOTE — PROGRESS NOTE ADULT - SUBJECTIVE AND OBJECTIVE BOX
INTERVAL HPI/OVERNIGHT EVENTS:  - Placed in prone position for worsening hypoxic respiratory failure.   - Hyperglycemic requiring increasing doses of insulin.    SUBJECTIVE: Patient seen and examined at bedside.       VITAL SIGNS:  ICU Vital Signs Last 24 Hrs  T(C): 37.3 (2021 07:00), Max: 37.3 (2021 04:00)  T(F): 99.1 (2021 07:00), Max: 99.1 (2021 04:00)  HR: 68 (2021 10:00) (62 - 73)  ABP: 125/48 (2021 10:00) (114/45 - 135/62)  ABP(mean): 74 (2021 10:00) (67 - 86)  RR: 25 (2021 10:00) (25 - 30)  SpO2: 99% (2021 10:00) (92% - 100%)    Mode: AC/ CMV (Assist Control/ Continuous Mandatory Ventilation), RR (machine): 25, TV (machine): 450, FiO2: 50, PEEP: 14, ITime: 1, MAP: 19, PIP: 31  Plateau pressure:   P/F ratio:      @ 07: @ 07:00  --------------------------------------------------------  IN: 3071.6 mL / OUT: 1605 mL / NET: 1466.6 mL     @ 07: @ 11:30  --------------------------------------------------------  IN: 753.6 mL / OUT: 300 mL / NET: 453.6 mL      CAPILLARY BLOOD GLUCOSE      POCT Blood Glucose.: 257 mg/dL (2021 10:05)      PHYSICAL EXAM:    General: sedated, in prone position   HEENT: no blood in oropharynx, deep suction of ETT produces blood-tinged sputum, suction catheter passes easily through ETT  Neck: left IJ central line without signs of bleeding or infection   Respiratory: vent, coarse breath sounds  Cardiovascular: normotensive without pressor support, regular rhythm on monitor   Abdomen: unable to assess due to prone positioning   Extremities: warm, brisk capillary refill   Neurological: sedated and paralyzed     MEDICATIONS:  MEDICATIONS  (STANDING):  artificial  tears Solution 1 Drop(s) Both EYES every 12 hours  aspirin  chewable 81 milliGRAM(s) Oral daily  chlorhexidine 0.12% Liquid 15 milliLiter(s) Oral Mucosa two times a day  chlorhexidine 4% Liquid 1 Application(s) Topical <User Schedule>  cisatracurium Infusion 3 MICROgram(s)/kG/Min (20.5 mL/Hr) IV Continuous <Continuous>  dexAMETHasone  Injectable 6 milliGRAM(s) IV Push daily  dextrose 40% Gel 15 Gram(s) Oral once  dextrose 5%. 1000 milliLiter(s) (50 mL/Hr) IV Continuous <Continuous>  dextrose 5%. 1000 milliLiter(s) (100 mL/Hr) IV Continuous <Continuous>  dextrose 50% Injectable 25 Gram(s) IV Push once  dextrose 50% Injectable 12.5 Gram(s) IV Push once  enoxaparin Injectable 40 milliGRAM(s) SubCutaneous daily  fentaNYL   Infusion... 0.5 MICROgram(s)/kG/Hr (2.85 mL/Hr) IV Continuous <Continuous>  glucagon  Injectable 1 milliGRAM(s) IntraMuscular once  insulin lispro (ADMELOG) corrective regimen sliding scale   SubCutaneous every 6 hours  insulin NPH human recombinant 8 Unit(s) SubCutaneous every 6 hours  ketamine Infusion. 2 mG/kG/Hr (22.8 mL/Hr) IV Continuous <Continuous>  norepinephrine Infusion 0.04 MICROgram(s)/kG/Min (8.54 mL/Hr) IV Continuous <Continuous>  nystatin    Suspension [id number] Unit(s) Oral every 8 hours  pantoprazole  Injectable 40 milliGRAM(s) IV Push daily  petrolatum Ophthalmic Ointment 1 Application(s) Both EYES two times a day  propofol Infusion 10 MICROgram(s)/kG/Min (6.83 mL/Hr) IV Continuous <Continuous>  remdesivir  IVPB   IV Intermittent   remdesivir  IVPB 100 milliGRAM(s) IV Intermittent every 24 hours  sertraline 100 milliGRAM(s) Oral daily  tacrolimus    0.5 mG/mL Suspension 0.5 milliGRAM(s) Oral two times a day  trimethoprim  160 mG/sulfamethoxazole 800 mG 1 Tablet(s) Oral daily  vancomycin  IVPB 1000 milliGRAM(s) IV Intermittent daily      ALLERGIES:  Allergies    clindamycin (Unknown)      LABS:                        8.2    5.96  )-----------( 120      ( 2021 00:40 )             27.0         134<L>  |  98  |  69<H>  ----------------------------<  264<H>  4.9   |  23  |  1.92<H>    Ca    8.1<L>      2021 00:40  Phos  5.5       Mg     2.8         TPro  6.0  /  Alb  2.6<L>  /  TBili  0.4  /  DBili  x   /  AST  39  /  ALT  23  /  AlkPhos  83      PT/INR - ( 2021 00:50 )   PT: 13.3 sec;   INR: 1.11 ratio         PTT - ( 2021 00:40 )  PTT:27.3 sec  Urinalysis Basic - ( 2021 15:23 )    Color: Yellow / Appearance: Slightly Turbid / S.019 / pH: x  Gluc: x / Ketone: Negative  / Bili: Negative / Urobili: Negative   Blood: x / Protein: 30 mg/dL / Nitrite: Negative   Leuk Esterase: Negative / RBC: >50 /hpf / WBC 14 /HPF   Sq Epi: x / Non Sq Epi: 2 /hpf / Bacteria: Negative        RADIOLOGY & ADDITIONAL TESTS: Reviewed.

## 2021-02-28 NOTE — CONSULT NOTE ADULT - ASSESSMENT
69 y/o Male w/ PMH significant for HTN, HLD, T2DM on insulin, COPD (on home O2 3L), EtOH/HCV cirrhosis s/p liver transplant (2011) on Tacro/Cellcept/Prednisone, HFpEF, possible MDS, chronic lymphedema presenting with worsening SOB of several months duration.    Pt with hypoxic respiratory failure due to COVID infection leading to pneumonia.   bacteremia with strep and CoNS.   Acute on chronic renal failure  liver transplant recipient       Assessment: Bacteremia with 2 different organisms and with CoNS and maris strep likely represents procurement contaminant rather than real infection, especially in absence of hardware and the TLC was placed on 25th so less likely a source of the bacteremia.       Plan:   recommend stopping vancomycin   c/w remdesivir  monitor LFT's and Cr while on remdesivir  c/w dexamethasone  trend CBC for lymphopenia  trend markers of inflammation including ferritin, D-dimer, CRP periodically  follow up repeat blood cx, NTD   can repeat another set.   immunosuppression per transplant hepatology

## 2021-02-28 NOTE — PROGRESS NOTE ADULT - SUBJECTIVE AND OBJECTIVE BOX
Critical care progress Note:  CC: COVID + Acute hypoxic resp failure    70 y.o. man with history of HTN, HLD, DM, COPD on home O2, and cirrhosis s/p Liver transplant (2/2 ETOH+HCV) who was admitted on 2/24 and intubated on 2/25 for worsening hypoxic respiratory failure 2/2 COVID pneumonia. The patient was placed in supine position on 2/26 due to worsening respiratory function. The patient's hospitalization has been further compromised by methacillin-resistant S. epidermidis bacteremia and EDUAR.       INTERVAL HPI/OVERNIGHT EVENTS:  - Placed in prone position for worsening hypoxic respiratory failure due to be supined at 1300.   - On vancomycin be level  - On sedation and paralyized with Nimbex    SUBJECTIVE: Patient seen and examined at bedside.          MEDICATIONS  (STANDING):  artificial  tears Solution 1 Drop(s) Both EYES every 12 hours  aspirin  chewable 81 milliGRAM(s) Oral daily  chlorhexidine 0.12% Liquid 15 milliLiter(s) Oral Mucosa two times a day  chlorhexidine 4% Liquid 1 Application(s) Topical <User Schedule>  cisatracurium Infusion 3 MICROgram(s)/kG/Min (20.5 mL/Hr) IV Continuous <Continuous>  dexAMETHasone  Injectable 6 milliGRAM(s) IV Push daily  enoxaparin Injectable 40 milliGRAM(s) SubCutaneous daily  fentaNYL   Infusion... 0.5 MICROgram(s)/kG/Hr (2.85 mL/Hr) IV Continuous <Continuous>  insulin lispro (ADMELOG) corrective regimen sliding scale   SubCutaneous every 6 hours  insulin NPH human recombinant 10 Unit(s) SubCutaneous every 6 hours  ketamine Infusion. 2 mG/kG/Hr (22.8 mL/Hr) IV Continuous <Continuous>  norepinephrine Infusion 0.04 MICROgram(s)/kG/Min (8.54 mL/Hr) IV Continuous <Continuous>  nystatin    Suspension 284211 Unit(s) Oral every 8 hours  pantoprazole  Injectable 40 milliGRAM(s) IV Push daily  petrolatum Ophthalmic Ointment 1 Application(s) Both EYES two times a day  propofol Infusion 10 MICROgram(s)/kG/Min (6.83 mL/Hr) IV Continuous <Continuous>  remdesivir  IVPB   IV Intermittent   remdesivir  IVPB 100 milliGRAM(s) IV Intermittent every 24 hours  sertraline 100 milliGRAM(s) Oral daily  tacrolimus    0.5 mG/mL Suspension 0.5 milliGRAM(s) Oral two times a day  trimethoprim  160 mG/sulfamethoxazole 800 mG 1 Tablet(s) Oral daily  vancomycin  IVPB 1000 milliGRAM(s) IV Intermittent daily    MEDICATIONS  (PRN):      PAST MEDICAL & SURGICAL HISTORY:  Hyperlipidemia    Hypertension    S/P liver transplant    Type 2 diabetes mellitus    Chronic obstructive pulmonary disease, unspecified COPD type  on home O2    S/P liver transplant        Vital Signs Last 24 Hrs  T(C): 37.4 (28 Feb 2021 03:00), Max: 37.5 (27 Feb 2021 23:00)  T(F): 99.3 (28 Feb 2021 03:00), Max: 99.5 (27 Feb 2021 23:00)  HR: 86 (28 Feb 2021 07:15) (67 - 94)  BP: --  BP(mean): --  RR: 25 (28 Feb 2021 07:15) (25 - 25)  SpO2: 92% (28 Feb 2021 07:15) (88% - 99%)    I&O's Summary    27 Feb 2021 07:01  -  28 Feb 2021 07:00  --------------------------------------------------------  IN: 3357.5 mL / OUT: 1480 mL / NET: 1877.5 mL                              8.3    8.95  )-----------( 146      ( 28 Feb 2021 00:41 )             27.6     02-28    141  |  102  |  74<H>  ----------------------------<  103<H>  4.9   |  25  |  2.02<H>    Ca    8.0<L>      28 Feb 2021 00:41  Phos  4.1     02-28  Mg     2.7     02-28    TPro  6.2  /  Alb  2.7<L>  /  TBili  0.4  /  DBili  x   /  AST  27  /  ALT  18  /  AlkPhos  88  02-28    Tacrolimus (), Serum: <2.0 ng/mL (02-27 @ 04:49)        Culture - Blood (collected 02-27-21 @ 02:32)  Source: .Blood Blood  Preliminary Report (02-28-21 @ 03:01):    No growth to date.    Culture - Blood (collected 02-27-21 @ 02:32)  Source: .Blood Blood  Preliminary Report (02-28-21 @ 03:01):    No growth to date.    Culture - Blood (collected 02-25-21 @ 18:33)  Source: .Blood Blood-Peripheral  Gram Stain (02-26-21 @ 17:38):    Growth in aerobic bottle: Gram Positive Cocci in Clusters    Growth in anaerobic bottle: Gram Positive Cocci in Clusters  Final Report (02-27-21 @ 20:57):    Growth in aerobic and anaerobic bottles: Staphylococcus epidermidis    Coag Negative Staphylococcus    Single set isolate, possible contaminant. Contact    Microbiology if susceptibility testing clinically    indicated.    ***Blood Panel PCR results on this specimen are available    approximately 3 hours after the Gram stain result.***    Gram stain, PCR, and/or culture results may not always    correspond due to difference in methodologies.    ************************************************************    This PCR assay was performed by multiplex PCR. This    Assay tests for 66 bacterial and resistance gene targets.    Please refer to the [facility] test directory    at [email or web address] for details.  Organism: Blood Culture PCR (02-27-21 @ 20:57)  Organism: Blood Culture PCR (02-27-21 @ 20:57)    Culture - Blood (collected 02-25-21 @ 18:32)  Source: .Blood Blood  Gram Stain (02-26-21 @ 19:44):    Growth in aerobic bottle: Gram Positive Cocci in Pairs and Chains  Final Report (02-27-21 @ 18:28):    Growth in aerobic bottle: Streptococcus gordonii    Alpha hemolytic strep    (not Strep. pneumoniae or Enterococcus)    Single set isolate, possible contaminant. Contact    Microbiology if susceptibility testing clinically    indicated.    ***Blood Panel PCR results on this specimen are available    approximately 3 hours after the Gram stain result.***    Gram stain, PCR, and/or culture results may not always    correspond due to difference in methodologies.    ************************************************************    This PCR assay was performed by multiplex PCR. This    Assay tests for 66 bacterial and resistance gene targets.    Please refer to the [facility] My Mobile test directory    at [email or web address] for details.  Organism: Blood Culture PCR (02-27-21 @ 18:28)  Organism: Blood Culture PCR (02-27-21 @ 18:28)    Culture - Sputum (collected 02-25-21 @ 18:13)  Source: .Sputum Sputum  Gram Stain (02-25-21 @ 23:04):    Few Squamous epithelial cells per low power field    Rare polymorphonuclear leukocytes    Numerous Gram Positive Cocci in Pairs and Chains per oil power field    Rare Yeast like cells per oil power field  Final Report (02-27-21 @ 19:38):    Normal Respiratory Steffanie present                PHYSICAL EXAM:    General: sedated, in prone position   HEENT: no blood in oropharynx, deep suction of ETT produces blood-tinged sputum, suction catheter passes easily through ETT  Neck: left IJ central line without signs of bleeding or infection   Respiratory: vent, coarse breath sounds  Cardiovascular: normotensive without pressor support, regular rhythm on monitor   Abdomen: unable to assess due to prone positioning   Extremities: warm, brisk capillary refill   Neurological: sedated and paralyzed

## 2021-03-01 NOTE — PROGRESS NOTE ADULT - ASSESSMENT
70 y.o. man with history of HTN, HLD, DM, COPD on home O2, and cirrhosis s/p Liver transplant ( ETOH+HCV) who was admitted on  and intubated on  for worsening hypoxic respiratory failure 2/2 COVID pneumonia. The patient was placed in supine position on  due to worsening respiratory function. The patient's hospitalization has been further compromised by methacillin-resistant S. epidermidis bacteremia and EDUAR.     #Neuro:  - Continue sedation Prop @50mcg/kg/min, Fent @3mcg/kg/hr, Ket @3mg/kg/hr, paralyzed with Nimbex @3mcg/kg/min.    - Zoloft 100mg   - AAOx3 at baseline    #Pulm:  Acute hypoxic respiratory failure / COVID  - Intubated on vent settin/  - Worsening resp acidosis and hypoxia.   - S/p multiple prone/supine x3  - Prone 1900 --> Supine 1300     #Cards:  HFpEF  - On norepinephrin @0.02mcg/kg/min  - TTE : EF 55% mild AS and RV dysfunction.     #GI:  Liver transplant   - continue to hold home Cellcept and prednisone 3 mg ( currently on dexamethasone)   - continue tacrolimus 0.5mg BID   - check prograf level every 3 days (<.2 on )  - continue Nystatin and Bactrim for PCP ppx   - Hepatology consulted, charles recs  - Tolerating TF @goal 20cc    #Renal:  EDUAR on CKD  - EDUAR: worsening renal function Bun/Cr 74/2.02 from 69/1.9 (SCR baseline 1.3)   - Bumex x1 overnight w/ no response --> Bumex 2 mg + Metolazone 10 mg total output x 24 hours 1480ml   - palacios in place; monitor UOP, lytes    #Endo:  T2DM:  - FSG q6h with mISS   - s/p lantus 45 and admelog 8, lantus 30 in AM   - currently on NPH 10U q6hr +mISS  - endocrine consulted, appreciate recs    #ID:  +Staph Epi Meth Resistant/+Strep BCx  - Bcx () + for MR Staph Epi and undefined Strep -->  BCX NGTD.   - Sputum () many Gram +'s, likely Strep/Staph ?pnumonia.   - Vancomycin started on  dose by level given EDUAR. Vanco Trough 15.8 on .     #Heme:  bone marrow biopsy with myeloid abnormality in 2020 during recent admission for anemia to 6s warranting blood transfusion  - likely cause of anemia  - Hb currently stable in the 9s  - f/u heme outpatient.  - cont Lovenox 40 daily         70 y.o. man with history of HTN, HLD, DM, COPD on home O2, and cirrhosis s/p Liver transplant ( ETOH+HCV) who was admitted on  and intubated on  for worsening hypoxic respiratory failure 2/ COVID pneumonia. The patient was placed in supine position on  due to worsening respiratory function. The patient's hospitalization has been further compromised by methacillin-resistant S. epidermidis bacteremia and EDUAR.     Neuro:  - Continue sedation Prop 50, Fent 3, Ket 3, paralyzed with Nimbex 3.    - Zoloft 100mg       #Pulm:  Acute hypoxic respiratory failure 2/ COVID  - Intubated on vent settin/  - Worsening resp acidosis and hypoxia.   - S/p multiple prone/supine x4. Proned . Supine: 1200 today     #Cards:  HFpEF  - On low dose levo start midodrin 10mg TID  - TTE : EF 55% mild AS and RV dysfunction.     #GI:  Liver transplant   - continue to hold home Cellcept and prednisone 3 mg ( currently on dexamethasone)   - continue tacrolimus 0.5mg BID   - check prograf level every 3 days (<.2 on )  - continue Nystatin and Bactrim for PCP ppx   - Hepatology consulted, charles recs  - Tolerating TF @goal 20cc    #Renal:  EDUAR on CKD  - EDUAR: worsening renal function Cr up to 2.4. UOP decreased.   - palacios in place; monitor UOP, lytes    #Endo:  T2DM:  - FSG q6h with mISS   - s/p lantus 45 and admelog 8, lantus 30 in AM   - currently on NPH 10U q6hr +mISS  - endocrine consulted, appreciate recs    #ID:  - Leukocytosis. Rpt pan culture sent. Fungitell sent. Added Alethea and Caspo (to Vanco 1 g daily).   - UA positive --> culture sent. Palacios swap   +Staph Epi Meth Resistant/+Strep BCx (Per ID this must be contaminayed)  - Bcx () + for MR Staph Epi and undefined Strep -->  BCX NGTD.   - Sputum () many Gram +'s, likely Strep/Staph ?pnumonia.     #Heme:  bone marrow biopsy with myeloid abnormality in 2020 during recent admission for anemia to 6s warranting blood transfusion  - likely cause of anemia  - Hb currently stable in the 9s  - f/u heme outpatient.  - d/c lovenox and start heparin SQ 2/2 worsening renal fx

## 2021-03-01 NOTE — PROGRESS NOTE ADULT - SUBJECTIVE AND OBJECTIVE BOX
CHIEF COMPLAINT:  Patient is a 70y old  Male who presents with a chief complaint of SOB (2021 17:46)    HPI:   69 y/o Male w/ PMH significant for HTN, HLD, T2DM on insulin, COPD (on home O2 3L), EtOH/HCV cirrhosis s/p liver transplant () on Tacro/Cellcept/Prednisone, HFpEF, possible MDS, chronic lymphedema presenting with worsening SOB of several months duration. Difficult to obtain history as pt is on bipap. Pt reports SOB started "several months ago" that has been progressively getting worse. He has had GOMEZ, better with rest. Taking his medications helps temporarily difficulty breathing returns. He does not remember any inciting events that may have caused him to become more SOB. He has been having difficulty sleeping. Pt did not have any improvement in his breathing and decided to call an ambulance to come to the hospital. Of note pt had previous admission in 2020 for symptomatic anemia s/p BM biopsy . Reports currently feeling better on bipap. No sick contacts. Denies chest pain, nausea, vomiting, + chronic orthopnea.     In ED,  98.2F, HR 99, 130/58, RR 28, desatted to 88% on NC 3L, now 100% on BiPAP   s/p 3x duonebs, solumedrol 125 IV, lasix 80 IV    (2021 18:34)      Interval Events: wbc doubled david added      REVIEW OF SYSTEMS: unable          OBJECTIVE:  ICU Vital Signs Last 24 Hrs  T(C): 37.4 (01 Mar 2021 03:00), Max: 37.4 (01 Mar 2021 03:00)  T(F): 99.3 (01 Mar 2021 03:00), Max: 99.3 (01 Mar 2021 03:00)  HR: 94 (01 Mar 2021 05:45) (65 - 95)  BP: --  BP(mean): --  ABP: 99/46 (01 Mar 2021 05:45) (-10/-10 - 137/38)  ABP(mean): 63 (01 Mar 2021 05:45) (-10 - 71)  RR: 30 (01 Mar 2021 05:45) (25 - 30)  SpO2: 95% (01 Mar 2021 05:45) (88% - 100%)    Mode: AC/ CMV (Assist Control/ Continuous Mandatory Ventilation), RR (machine): 30, TV (machine): 540, FiO2: 100, PEEP: 12, ITime: 1, MAP: 21, PIP: 39    02 @ 07:01   @ 07:00  --------------------------------------------------------  IN: 3357.5 mL / OUT: 1480 mL / NET: 1877.5 mL     @ 07:01   @ 06:33  --------------------------------------------------------  IN: 4300.1 mL / OUT: 1110 mL / NET: 3190.1 mL      CAPILLARY BLOOD GLUCOSE      POCT Blood Glucose.: 218 mg/dL (01 Mar 2021 04:02)          PHYSICAL EXAM:  GENERAL: NAD,  HEENT:  Atraumatic, Normocephalic  EYES: PERRLA, conjunctiva and sclera clear  NECK: Supple, No JVD  CHEST/LUNG: diminished bilaterally; No wheezes, rales, or rhonchi  HEART: Regular rate and rhythm; No murmurs, rubs, or gallops  ABDOMEN: Soft, Nontender, Nondistended; Bowel sounds present  EXTREMITIES:  2+ Peripheral Pulses, No clubbing, cyanosis, or edema  PSYCH: unable as pt is sedated  NEUROLOGY: sedated  SKIN: No rashes or lesions        HOSPITAL MEDICATIONS:  MEDICATIONS  (STANDING):  artificial  tears Solution 1 Drop(s) Both EYES every 12 hours  aspirin  chewable 81 milliGRAM(s) Oral daily  chlorhexidine 0.12% Liquid 15 milliLiter(s) Oral Mucosa two times a day  chlorhexidine 4% Liquid 1 Application(s) Topical <User Schedule>  cisatracurium Infusion 3 MICROgram(s)/kG/Min (20.5 mL/Hr) IV Continuous <Continuous>  dexAMETHasone  Injectable 6 milliGRAM(s) IV Push daily  enoxaparin Injectable 40 milliGRAM(s) SubCutaneous daily  fentaNYL   Infusion... 0.5 MICROgram(s)/kG/Hr (2.85 mL/Hr) IV Continuous <Continuous>  insulin lispro (ADMELOG) corrective regimen sliding scale   SubCutaneous every 6 hours  insulin NPH human recombinant 10 Unit(s) SubCutaneous every 6 hours  ketamine Infusion. 2 mG/kG/Hr (22.8 mL/Hr) IV Continuous <Continuous>  meropenem  IVPB 1000 milliGRAM(s) IV Intermittent every 12 hours  meropenem  IVPB      midodrine 10 milliGRAM(s) Oral every 8 hours  norepinephrine Infusion 0.04 MICROgram(s)/kG/Min (8.54 mL/Hr) IV Continuous <Continuous>  nystatin    Suspension 529405 Unit(s) Oral every 8 hours  pantoprazole  Injectable 40 milliGRAM(s) IV Push daily  petrolatum Ophthalmic Ointment 1 Application(s) Both EYES two times a day  propofol Infusion 10 MICROgram(s)/kG/Min (6.83 mL/Hr) IV Continuous <Continuous>  sertraline 100 milliGRAM(s) Oral daily  tacrolimus    0.5 mG/mL Suspension 0.5 milliGRAM(s) Oral two times a day  trimethoprim  160 mG/sulfamethoxazole 800 mG 1 Tablet(s) Oral daily  vancomycin  IVPB 1000 milliGRAM(s) IV Intermittent daily    MEDICATIONS  (PRN):      LABS:                        8.7    32.40 )-----------( 367      ( 01 Mar 2021 00:21 )             29.4     Hgb Trend: 8.7<--, 8.3<--, 8.2<--, 8.4<--, 8.8<--  03-    137  |  99  |  84<H>  ----------------------------<  199<H>  5.6<H>   |  21<L>  |  2.46<H>    Ca    7.9<L>      01 Mar 2021 00:21  Phos  5.9     03-  Mg     2.8     03-    TPro  6.2  /  Alb  2.5<L>  /  TBili  0.9  /  DBili  x   /  AST  45<H>  /  ALT  23  /  AlkPhos  145<H>  03-    LIVER FUNCTIONS - ( 01 Mar 2021 00:21 )  Alb: 2.5 g/dL / Pro: 6.2 g/dL / ALK PHOS: 145 U/L / ALT: 23 U/L / AST: 45 U/L / GGT: x           Creatinine Trend: 2.46<--, 2.02<--, 1.92<--, 1.78<--, 1.72<--, 1.95<--  PTT - ( 01 Mar 2021 00:21 )  PTT:30.7 sec  Urinalysis Basic - ( 01 Mar 2021 03:22 )    Color: Yellow / Appearance: Slightly Turbid / S.026 / pH: x  Gluc: x / Ketone: Negative  / Bili: Negative / Urobili: 2 mg/dL   Blood: x / Protein: 30 mg/dL / Nitrite: Negative   Leuk Esterase: Large / RBC: 381 /hpf / WBC 65 /HPF   Sq Epi: x / Non Sq Epi: 0 /hpf / Bacteria: Moderate      Arterial Blood Gas:   @ 02:03  7.19/62/107/23/98/-5.2  ABG lactate: --  Arterial Blood Gas:   @ 00:14  7.17/70/98/24/96/-4.2  ABG lactate: --  Arterial Blood Gas:   @ 17:20  7.22/61/61/24/86/-3.8  ABG lactate: --  Arterial Blood Gas:   @ 14:06  7.24/61/64/25/89/-2.4  ABG lactate: --  Arterial Blood Gas:   @ 11:14  7.23/63/74/26/95/-1.6  ABG lactate: --  Arterial Blood Gas:   @ 05:47  7.24/67/67//90/-.1  ABG lactate: --  Arterial Blood Gas:   @ 00:38  7.27/66/65/29/91/2.1  ABG lactate: --  Arterial Blood Gas:   @ 21:44  7.25/68/66/29/89/1.7  ABG lactate: --  Arterial Blood Gas:   @ 15:35  7.28/62/67//91/1.6  ABG lactate: --        MICROBIOLOGY:     RADIOLOGY:  [ ] Reviewed and interpreted by me    EKG:

## 2021-03-01 NOTE — CHART NOTE - NSCHARTNOTESELECT_GEN_ALL_CORE
I-stop/Event Note
MICU Transfer Note/Event Note
Nutrition Services
CRRT/DIALYSIS/Event Note
Endocrinology/Event Note
MICU acceptance note/Transfer Note
Sepsis Note/Event Note

## 2021-03-01 NOTE — CONSULT NOTE ADULT - ATTENDING COMMENTS
Allyson Lanza  Pager: [phone]. If no response or past 5 pm call [phone].
Please see updated note above.
Covid + pt with ARF, respiratory failure requiring high FIO2  1.  ARF on CKD3--agree with initiation of CVVHDF  2.  Hyperkalemia--regimen set for improved K clearance  3.  Respiratory failure, acute hypoxemic, hypercarbic--will attempt UF to improve lung compliance and decrease FIO2 requirements  4.  Hypotension--volume optimization.  May need increased pressor support to facilitate UF.  Can follow up to lactate <4 where hypoperfusion systemically may -->risk than benefit    CVVHDF:  Orders reviewed and discussed with RN, team

## 2021-03-01 NOTE — PROGRESS NOTE ADULT - ASSESSMENT
69 y/o Male w/ PMH significant for HTN, HLD, T2DM on insulin, COPD (on home O2 3L), EtOH/HCV cirrhosis s/p liver transplant (2011) on Tacro/Cellcept/Prednisone, HFpEF, possible MDS, chronic lymphedema presenting with worsening SOB of several months duration.    Pt with hypoxic respiratory failure due to COVID infection leading to pneumonia.   bacteremia with strep and CoNS.   Acute on chronic renal failure  liver transplant recipient   new leucocytosis       Assessment: Bacteremia with 2 different organisms and with CoNS and maris strep likely represents procurement contaminant rather than real infection, especially in absence of hardware and the TLC was placed on 25th so less likely a source of the bacteremia.       Plan:   recommend stopping vancomycin   c/w meropenem given GNR in sputum cx   c/w remdesivir  monitor LFT's and Cr while on remdesivir  c/w dexamethasone  trend CBC for lymphopenia  trend markers of inflammation including ferritin, D-dimer, CRP periodically  follow up repeat blood cx, NTD   repeat blood cx in lab   immunosuppression per transplant hepatology     prognosis guarded

## 2021-03-01 NOTE — PROGRESS NOTE ADULT - SUBJECTIVE AND OBJECTIVE BOX
Chief Complaint:     History:    MEDICATIONS  (STANDING):  artificial  tears Solution 1 Drop(s) Both EYES every 12 hours  aspirin  chewable 81 milliGRAM(s) Oral daily  chlorhexidine 0.12% Liquid 15 milliLiter(s) Oral Mucosa two times a day  chlorhexidine 4% Liquid 1 Application(s) Topical <User Schedule>  cisatracurium Infusion 3 MICROgram(s)/kG/Min (20.5 mL/Hr) IV Continuous <Continuous>  dexAMETHasone  Injectable 6 milliGRAM(s) IV Push daily  fentaNYL   Infusion... 0.5 MICROgram(s)/kG/Hr (2.85 mL/Hr) IV Continuous <Continuous>  furosemide   Injectable 80 milliGRAM(s) IV Push daily  heparin   Injectable 5000 Unit(s) SubCutaneous every 8 hours  insulin lispro (ADMELOG) corrective regimen sliding scale   SubCutaneous every 6 hours  insulin NPH human recombinant 10 Unit(s) SubCutaneous every 6 hours  ketamine Infusion. 2 mG/kG/Hr (22.8 mL/Hr) IV Continuous <Continuous>  meropenem  IVPB 1000 milliGRAM(s) IV Intermittent every 12 hours  meropenem  IVPB      midodrine 10 milliGRAM(s) Oral every 8 hours  norepinephrine Infusion 0.04 MICROgram(s)/kG/Min (8.54 mL/Hr) IV Continuous <Continuous>  nystatin    Suspension 200279 Unit(s) Oral every 8 hours  pantoprazole  Injectable 40 milliGRAM(s) IV Push daily  petrolatum Ophthalmic Ointment 1 Application(s) Both EYES two times a day  propofol Infusion 10 MICROgram(s)/kG/Min (6.83 mL/Hr) IV Continuous <Continuous>  sertraline 100 milliGRAM(s) Oral daily  sodium bicarbonate  Infusion 0.132 mEq/kG/Hr (100 mL/Hr) IV Continuous <Continuous>  tacrolimus    0.5 mG/mL Suspension 0.5 milliGRAM(s) Oral two times a day  trimethoprim  160 mG/sulfamethoxazole 800 mG 1 Tablet(s) Oral daily    MEDICATIONS  (PRN):      Allergies    clindamycin (Unknown)    Intolerances      Review of Systems:  Constitutional: No fever  Eyes: No blurry vision  Neuro: No tremors  HEENT: No pain  Cardiovascular: No chest pain, palpitations  Respiratory: No SOB, no cough  GI: No nausea, vomiting, abdominal pain  : No dysuria  Skin: no rash  Psych: no depression  Endocrine: no polyuria, polydipsia  Hem/lymph: no swelling  Osteoporosis: no fractures    ALL OTHER SYSTEMS REVIEWED AND NEGATIVE    UNABLE TO OBTAIN    PHYSICAL EXAM:  VITALS: T(C): 37.2 (03-01-21 @ 12:00)  T(F): 99 (03-01-21 @ 12:00), Max: 99.3 (03-01-21 @ 03:00)  HR: 84 (03-01-21 @ 14:00) (67 - 95)  BP: --  RR:  (27 - 30)  SpO2:  (87% - 100%)  Wt(kg): --  GENERAL: NAD, well-groomed, well-developed  EYES: No proptosis, no lid lag, anicteric  HEENT:  Atraumatic, Normocephalic, moist mucous membranes  THYROID: Normal size, no palpable nodules  RESPIRATORY: Clear to auscultation bilaterally; No rales, rhonchi, wheezing, or rubs  CARDIOVASCULAR: Regular rate and rhythm; No murmurs; no peripheral edema  GI: Soft, nontender, non distended, normal bowel sounds  SKIN: Dry, intact, No rashes or lesions  MUSCULOSKELETAL: Full range of motion, normal strength  NEURO: sensation intact, extraocular movements intact, no tremor, normal reflexes  PSYCH: Alert and oriented x 3, normal affect, normal mood  CUSHING'S SIGNS: no striae    POCT Blood Glucose.: 217 mg/dL (03-01-21 @ 11:51)  POCT Blood Glucose.: 237 mg/dL (03-01-21 @ 10:25)  POCT Blood Glucose.: 218 mg/dL (03-01-21 @ 04:02)  POCT Blood Glucose.: 187 mg/dL (03-01-21 @ 01:31)  POCT Blood Glucose.: 222 mg/dL (02-28-21 @ 16:47)  POCT Blood Glucose.: 163 mg/dL (02-28-21 @ 10:57)  POCT Blood Glucose.: 117 mg/dL (02-28-21 @ 05:33)  POCT Blood Glucose.: 107 mg/dL (02-28-21 @ 00:30)  POCT Blood Glucose.: 276 mg/dL (02-27-21 @ 16:51)  POCT Blood Glucose.: 257 mg/dL (02-27-21 @ 10:05)  POCT Blood Glucose.: 228 mg/dL (02-27-21 @ 04:48)  POCT Blood Glucose.: 238 mg/dL (02-26-21 @ 16:51)      03-01    134<L>  |  97  |  89<H>  ----------------------------<  201<H>  5.8<H>   |  22  |  2.75<H>    EGFR if : 26<L>  EGFR if non : 22<L>    Ca    7.8<L>      03-01  Mg     2.8     03-01  Phos  5.9     03-01    TPro  6.2  /  Alb  2.5<L>  /  TBili  0.9  /  DBili  x   /  AST  45<H>  /  ALT  23  /  AlkPhos  145<H>  03-01          Thyroid Function Tests:                           Chief Complaint: Type 2 DM    History: No acute events. No hypoglycemia. Pt is tolerating TFs.     MEDICATIONS  (STANDING):  artificial  tears Solution 1 Drop(s) Both EYES every 12 hours  aspirin  chewable 81 milliGRAM(s) Oral daily  chlorhexidine 0.12% Liquid 15 milliLiter(s) Oral Mucosa two times a day  chlorhexidine 4% Liquid 1 Application(s) Topical <User Schedule>  cisatracurium Infusion 3 MICROgram(s)/kG/Min (20.5 mL/Hr) IV Continuous <Continuous>  dexAMETHasone  Injectable 6 milliGRAM(s) IV Push daily  fentaNYL   Infusion... 0.5 MICROgram(s)/kG/Hr (2.85 mL/Hr) IV Continuous <Continuous>  furosemide   Injectable 80 milliGRAM(s) IV Push daily  heparin   Injectable 5000 Unit(s) SubCutaneous every 8 hours  insulin lispro (ADMELOG) corrective regimen sliding scale   SubCutaneous every 6 hours  insulin NPH human recombinant 10 Unit(s) SubCutaneous every 6 hours  ketamine Infusion. 2 mG/kG/Hr (22.8 mL/Hr) IV Continuous <Continuous>  meropenem  IVPB 1000 milliGRAM(s) IV Intermittent every 12 hours  meropenem  IVPB      midodrine 10 milliGRAM(s) Oral every 8 hours  norepinephrine Infusion 0.04 MICROgram(s)/kG/Min (8.54 mL/Hr) IV Continuous <Continuous>  nystatin    Suspension 323433 Unit(s) Oral every 8 hours  pantoprazole  Injectable 40 milliGRAM(s) IV Push daily  petrolatum Ophthalmic Ointment 1 Application(s) Both EYES two times a day  propofol Infusion 10 MICROgram(s)/kG/Min (6.83 mL/Hr) IV Continuous <Continuous>  sertraline 100 milliGRAM(s) Oral daily  sodium bicarbonate  Infusion 0.132 mEq/kG/Hr (100 mL/Hr) IV Continuous <Continuous>  tacrolimus    0.5 mG/mL Suspension 0.5 milliGRAM(s) Oral two times a day  trimethoprim  160 mG/sulfamethoxazole 800 mG 1 Tablet(s) Oral daily    MEDICATIONS  (PRN):      Allergies    clindamycin (Unknown)    Intolerances      Review of Systems:      UNABLE TO OBTAIN    PHYSICAL EXAM:  VITALS: T(C): 37.2 (03-01-21 @ 12:00)  T(F): 99 (03-01-21 @ 12:00), Max: 99.3 (03-01-21 @ 03:00)  HR: 84 (03-01-21 @ 14:00) (67 - 95)  BP: --  RR:  (27 - 30)  SpO2:  (87% - 100%)  Wt(kg): --  GENERAL: NAD  EYES: No proptosis  HEENT:  Atraumatic  RESPIRATORY: Intubated  CARDIOVASCULAR: Regular rate and rhythm  GI: Soft, nontender, non distended, normal bowel sounds        POCT Blood Glucose.: 217 mg/dL (03-01-21 @ 11:51)  POCT Blood Glucose.: 237 mg/dL (03-01-21 @ 10:25)  POCT Blood Glucose.: 218 mg/dL (03-01-21 @ 04:02)  POCT Blood Glucose.: 187 mg/dL (03-01-21 @ 01:31)  POCT Blood Glucose.: 222 mg/dL (02-28-21 @ 16:47)  POCT Blood Glucose.: 163 mg/dL (02-28-21 @ 10:57)  POCT Blood Glucose.: 117 mg/dL (02-28-21 @ 05:33)  POCT Blood Glucose.: 107 mg/dL (02-28-21 @ 00:30)  POCT Blood Glucose.: 276 mg/dL (02-27-21 @ 16:51)  POCT Blood Glucose.: 257 mg/dL (02-27-21 @ 10:05)  POCT Blood Glucose.: 228 mg/dL (02-27-21 @ 04:48)  POCT Blood Glucose.: 238 mg/dL (02-26-21 @ 16:51)      03-01    134<L>  |  97  |  89<H>  ----------------------------<  201<H>  5.8<H>   |  22  |  2.75<H>    EGFR if : 26<L>  EGFR if non : 22<L>    Ca    7.8<L>      03-01  Mg     2.8     03-01  Phos  5.9     03-01    TPro  6.2  /  Alb  2.5<L>  /  TBili  0.9  /  DBili  x   /  AST  45<H>  /  ALT  23  /  AlkPhos  145<H>  03-01

## 2021-03-01 NOTE — CONSULT NOTE ADULT - ASSESSMENT
70y M with EDUAR on CKD in setting of COVID19 and hypoxic respiratory failure    #EDUAR on CKD3  - Patient with baseline CKD, Scr ~1.5mg/dl as of December 2020, now with EDUAR in setting of COVID19, likely ATN. Scr 2.75mg/dl with hyperkalemia to 5.8  - Oliguric despite diuretic challenge, high Fio2 requirements  - Plan for CVVHDF once access established. Can d/c bicarb gtt once on CRRT  - HD consent in the chart  - Immunosuppressive regimen per hepatology team  - Dose medications to CRRT

## 2021-03-01 NOTE — CHART NOTE - NSCHARTNOTEFT_GEN_A_CORE
Thoroughly reviewed risks and benefits of CRRT/HD with patient's brother Cody WALTON agrees to proceed with CRRT  All questions were answered  Plan is to proceed with CRRT later today once HD catheter is placed    If you have any questions, please feel free to contact me  Edilson Velez  Nephrology Fellow  [phone]  (After 5pm or on weekends please page the on-call fellow)

## 2021-03-01 NOTE — PROGRESS NOTE ADULT - SUBJECTIVE AND OBJECTIVE BOX
70y old  Male who presents with a chief complaint of SOB (01 Mar 2021 17:03)      Interval history:  Afebrile, on 100% FiO2 now, some secretions, no diarrhea.     Allergies:   clindamycin (Unknown)      Antimicrobials:  meropenem  IVPB 1000 milliGRAM(s) IV Intermittent every 12 hours  nystatin    Suspension 599621 Unit(s) Oral every 8 hours  trimethoprim  160 mG/sulfamethoxazole 800 mG 1 Tablet(s) Oral daily      REVIEW OF SYSTEMS:  Unable to obtain as patient intubated and sedated.       Vital Signs Last 24 Hrs  T(C): 37 (03-01-21 @ 16:00), Max: 37.4 (03-01-21 @ 03:00)  T(F): 98.6 (03-01-21 @ 16:00), Max: 99.3 (03-01-21 @ 03:00)  HR: 87 (03-01-21 @ 16:45) (78 - 95)  BP: --  BP(mean): --  RR: 30 (03-01-21 @ 16:45) (27 - 30)  SpO2: 91% (03-01-21 @ 16:45) (87% - 100%)      PHYSICAL EXAM:  Patient intubated sedated  facial scabs from proning   Cardiovascular: S1S2 normal.  Lungs: + air entry B/L lung fields.  Gastrointestinal: soft, nondistended.  Extremities: + edema.  + TLC  + palacios                             8.7    32.40 )-----------( 367      ( 01 Mar 2021 00:21 )             29.4   03-01    134<L>  |  97  |  89<H>  ----------------------------<  201<H>  5.8<H>   |  22  |  2.75<H>    Ca    7.8<L>      01 Mar 2021 08:39  Phos  5.9     03-01  Mg     2.8     03-01    TPro  6.2  /  Alb  2.5<L>  /  TBili  0.9  /  DBili  x   /  AST  45<H>  /  ALT  23  /  AlkPhos  145<H>  03-01      LIVER FUNCTIONS - ( 01 Mar 2021 00:21 )  Alb: 2.5 g/dL / Pro: 6.2 g/dL / ALK PHOS: 145 U/L / ALT: 23 U/L / AST: 45 U/L / GGT: x             Culture - Sputum (collected 01 Mar 2021 08:55)  Source: .Sputum Sputum  Gram Stain (01 Mar 2021 16:01):    Numerous polymorphonuclear leukocytes per low power field    Rare Squamous epithelial cells per low power field    Numerous Gram Negative Rods per oil power field    Culture - Blood (collected 27 Feb 2021 02:32)  Source: .Blood Blood  Preliminary Report (28 Feb 2021 03:01):    No growth to date.    Culture - Blood (collected 27 Feb 2021 02:32)  Source: .Blood Blood  Preliminary Report (28 Feb 2021 03:01):    No growth to date.

## 2021-03-01 NOTE — CONSULT NOTE ADULT - CONSULT REASON
EDUAR
Medication management
SOB
Bacteremia
Uncontrolled Type 2 DM w/ hyperglycemia exacerbated by steroids

## 2021-03-01 NOTE — CHART NOTE - NSCHARTNOTEFT_GEN_A_CORE
Nutrition Initial Assessment    Source: medical record, communication with team. Unable to speak to pt due to current airborne isolation contact precautions related to COVID-19, pt is intubated.    Subjective information: Per chart, NKFA, micronutrient supplementation PTA included: ferrous sulfate, Multivitamin/minerals. Unknown chewing/swallowing function PTA at this time.    Admitting Diagnosis: 70M, PMH of HTN, HLD, T2DM, COPD (on home O2 3L), EtOH/HCV cirrhosis s/p liver transplant (2011; on Tacro/Cellcept/Prednisone), HFpEF, possible MDS, chronic lymphedema presenting with acute on chronic worsening SOB x 1 week, found to have acute hypoxic respiratory failure and sepsis due tot  COVID PNA c/b AHRF, EDUAR, COPD exacerbation and hyperglycemia. Pt intubated 2/25 for worsening hypoxia and transferred to [facility] ICU.       Past Medical History:   Hyperlipidemia  Hypertension  S/P liver transplant  Type 2 diabetes mellitus  Chronic obstructive pulmonary disease, unspecified COPD type      Diet Order: Diet, NPO with Tube Feed:   Tube Feeding Modality: Orogastric  Vital AF (VITALAFRTH)  Total Volume for 24 Hours (mL): 480  Continuous  Starting Tube Feed Rate {mL per Hour}: 10  Increase Tube Feed Rate by (mL): 10     Every 8 hours  Until Goal Tube Feed Rate (mL per Hour): 20  Tube Feed Duration (in Hours): 24  Tube Feed Start Time: 16:00  No Carb Prosource (1pkg = 15gms Protein)     Qty per Day:  2 (02-25-21 @ 17:09)    CURRENT EN ORDER PROVIDES: 696kcals, 66g protein, 389ml free H2O  - Meets 6.1kcals/kg based on dosing wt 113.9kg & 0.8g protein/kg based on IBW 86.2kg  - EN provisions Per flowsheets:    (3/01) 200ml (so far)    (2/28) 390ml, 81% EN goal; Propofol infusions provided an additional 903kcals via lipids    (2/27) 390ml, 81% EN goal; Propofol infusions provided an additional 640kcals via lipids    (2/26) 400ml 83% EN goal; Propofol infusions provided an additional 485kcals via lipids    (2/25) 0ml, NPO; Propofol infusions provided an additional 174kcals via lipids    (2/24) NPO  - Propofol ordered - if infusion continues at current rate (34.2 mL/hr), will provide 903 kcals/day     Nutrition Events:   - Potassium (5.8) <H> - may be 2/2 tacrolimus; lokelma ordered earlier this AM,  - Phos (5.9) <H>; Na (134) <L>  -  Sedated (prop, fent, ket) / Paralyzed (nimbex)  - Pressor support (levo)   - NPH and Insulin Sliding Scale ordered to optimize BG; decadron ordered  - S/p multiple prone/supine x3  - MRSE bacteremia and now growing strep in the blood - on abx course  - S/p Liver transplant 2011 - home Cellcept & prednisone held; currently receiving tacrolimus &  prograf   - EDUAR: worsening renal function; receiving diuretic therapies  - DM, A1c 6.9% (2/25) - Per H&P medically managed PTA with Lantus    GI: No documented BM in-house, noted with constipation - no bowel regimen ordered at this time    Anthropometric Measurements:  Height (cm): 188 (02-25-21 @ 01:15), 185.4 (12-07-20 @ 10:24)  Weight (kg): 113.852 (02-25-21 @ 01:15), 106.6 (12-07-20 @ 10:24)  BMI (kg/m2): 32.2 (02-25-21 @ 01:15), 31 (12-07-20 @ 10:24)  IBW: 190Ibs/86.2kg  %IBW: 132%  Unable to visually assess pt or conduct nutrition-focused physical exam at this time due to current airborne/isolation precautions related to COVID-19.    Medications: MEDICATIONS  (STANDING):  artificial  tears Solution 1 Drop(s) Both EYES every 12 hours  aspirin  chewable 81 milliGRAM(s) Oral daily  chlorhexidine 0.12% Liquid 15 milliLiter(s) Oral Mucosa two times a day  chlorhexidine 4% Liquid 1 Application(s) Topical <User Schedule>  cisatracurium Infusion 3 MICROgram(s)/kG/Min (20.5 mL/Hr) IV Continuous <Continuous>  dexAMETHasone  Injectable 6 milliGRAM(s) IV Push daily  enoxaparin Injectable 40 milliGRAM(s) SubCutaneous daily  fentaNYL   Infusion... 0.5 MICROgram(s)/kG/Hr (2.85 mL/Hr) IV Continuous <Continuous>  insulin lispro (ADMELOG) corrective regimen sliding scale   SubCutaneous every 6 hours  insulin NPH human recombinant 10 Unit(s) SubCutaneous every 6 hours  ketamine Infusion. 2 mG/kG/Hr (22.8 mL/Hr) IV Continuous <Continuous>  meropenem  IVPB 1000 milliGRAM(s) IV Intermittent every 12 hours  meropenem  IVPB      midodrine 10 milliGRAM(s) Oral every 8 hours  norepinephrine Infusion 0.04 MICROgram(s)/kG/Min (8.54 mL/Hr) IV Continuous <Continuous>  nystatin    Suspension 960269 Unit(s) Oral every 8 hours  pantoprazole  Injectable 40 milliGRAM(s) IV Push daily  petrolatum Ophthalmic Ointment 1 Application(s) Both EYES two times a day  propofol Infusion 10 MICROgram(s)/kG/Min (6.83 mL/Hr) IV Continuous <Continuous>  sertraline 100 milliGRAM(s) Oral daily  tacrolimus    0.5 mG/mL Suspension 0.5 milliGRAM(s) Oral two times a day  trimethoprim  160 mG/sulfamethoxazole 800 mG 1 Tablet(s) Oral daily  vancomycin  IVPB 1000 milliGRAM(s) IV Intermittent daily    MEDICATIONS  (PRN):    Labs: 03-01 @ 05:05: Sodium --, Potassium --, Calcium --, Magnesium --, Phosphorus --, BUN --, Creatinine --, Glucose --, Alk Phos --, ALT/SGPT --, AST/SGOT --, Albumin --, Prealbumin --, Total Bilirubin --, Hemoglobin --, Hematocrit --, Ferritin 213, C-Reactive Protein --, Creatine Kinase <<27>  03-01 @ 00:21: Sodium 137, Potassium 5.6<H>, Calcium 7.9<L>, Magnesium 2.8<H>, Phosphorus 5.9<H>, BUN 84<H>, Creatinine 2.46<H>, Glucose 199<H>, Alk Phos 145<H>, ALT/SGPT 23, AST/SGOT 45<H>, Albumin 2.5<L>, Prealbumin --, Total Bilirubin 0.9, Hemoglobin 8.7<L>, Hematocrit 29.4<L>, Ferritin --, C-Reactive Protein 40.96<H>, Creatine Kinase <<27>  02-28 @ 11:26: Sodium --, Potassium --, Calcium --, Magnesium --, Phosphorus --, BUN --, Creatinine --, Glucose --, Alk Phos --, ALT/SGPT --, AST/SGOT --, Albumin --, Prealbumin --, Total Bilirubin --, Hemoglobin --, Hematocrit --, Ferritin 169, C-Reactive Protein --, Creatine Kinase <<27>      POCT Blood Glucose.: 218 mg/dL (03-01-21 @ 04:02)  POCT Blood Glucose.: 187 mg/dL (03-01-21 @ 01:31)  POCT Blood Glucose.: 222 mg/dL (02-28-21 @ 16:47)  POCT Blood Glucose.: 163 mg/dL (02-28-21 @ 10:57)    Skin: no pressure injuries noted  Edema: 2+ generalized    Estimated Needs: Calculated with consideration for intubation and BMI >30  Energy (11-14 kcals/kg): 9551-2784 - Based on dosing wt 113.9kg  Protein (1.2-1.6 g/kg): 103.4-138 - Based on IBW 86.2kg  [place] Equation (REE): 2338    Nutrition Diagnosis: Inadequate protein intake  Related to inability to receive adequate provisions 2/2 complicated hospital course 2/2 COVID-19 as evidenced by meeting ~65% protein needs >5 days    Goal: Pt will meet >80% estimated nutrient needs    Recommended Interventions:   1. In setting of significant kcals provided by propofol and current renal function, recommend modifying current EN regimen to trickle feeds of Nepro and increase as medically feasible/tolerated to goal rate of 15ml/hr x 24hrs + 4 'No Carb Prosource'. At goal to provide 360ml formula, 888kcals, 89g protein, 262ml free H2O; propofol at current rate providing an additional 903kcals/day (whole regimen meets 16kcals/kg based on dosing wt 113.9kg & 1.03g protein/kg based on IBW 86.2kg). RD to monitor propofol provisions and adjust EN accordingly  2. Consider addition of Nephrovite supplementation pending no existing contraindications   3. Recommend initiating bowel regimen - defer to team  4. Monitor trig levels with propofol infusions    Monitoring and Evaluation:   Continue to monitor nutrition provision and tolerance, weights, labs, skin integrity.   RD remains available upon request and will follow up per protocol.    Emily Borrego MS, RD, CDN, [facility]  pager #[phone]

## 2021-03-01 NOTE — CONSULT NOTE ADULT - SUBJECTIVE AND OBJECTIVE BOX
[facility] DIVISION OF KIDNEY DISEASES AND HYPERTENSION -- INITIAL CONSULT NOTE  --------------------------------------------------------------------------------  Zach Reyez   Nephrology Fellow  Pager NS: [phone]/ [facility]: [phone]  (After 5 pm or on weekends please page the on-call fellow)    HPI: Patient is a 70y old  Male with a pmhx of HTN, HLD, T2DM on insulin, COPD (on home O2 3L), EtOH/HCV cirrhosis s/p liver transplant (2011) on Tacro/Cellcept/Prednisone, HFpEF, possible MDS, chronic lymphedema presenting with hypoxic respiratory failure 2/2 COVID19 on 2/24. Patient decompensated despite non invasive ventilation and required on 2/25. Since intubation patient has had progressively worsening renal failure. Nephrology consulted for EDUAR, hyperkalemia management. Patient at baseline has CKD3, Scr ~1.5mg/dl as of Dec 2020. Now with rising Scr up to 2.75mg/dl, along with hyperkalemia to 5.8, pH 7.19, Pco2 61, po2 73. Vent settings: PEEP 12, Fio2 100%.    PAST HISTORY  --------------------------------------------------------------------------------  PAST MEDICAL & SURGICAL HISTORY:  Hyperlipidemia    Hypertension    S/P liver transplant    Type 2 diabetes mellitus    Chronic obstructive pulmonary disease, unspecified COPD type  on home O2    S/P liver transplant      FAMILY HISTORY:  No pertinent family history in first degree relatives      PAST SOCIAL HISTORY: Unable to assess    ALLERGIES & MEDICATIONS  --------------------------------------------------------------------------------  Allergies    clindamycin (Unknown)    Intolerances      Standing Inpatient Medications  artificial  tears Solution 1 Drop(s) Both EYES every 12 hours  aspirin  chewable 81 milliGRAM(s) Oral daily  chlorhexidine 0.12% Liquid 15 milliLiter(s) Oral Mucosa two times a day  chlorhexidine 4% Liquid 1 Application(s) Topical <User Schedule>  cisatracurium Infusion 3 MICROgram(s)/kG/Min IV Continuous <Continuous>  CRRT Treatment    <Continuous>  dexAMETHasone  Injectable 6 milliGRAM(s) IV Push daily  fentaNYL   Infusion... 0.5 MICROgram(s)/kG/Hr IV Continuous <Continuous>  furosemide   Injectable 80 milliGRAM(s) IV Push daily  heparin   Injectable 5000 Unit(s) SubCutaneous every 8 hours  insulin lispro (ADMELOG) corrective regimen sliding scale   SubCutaneous every 6 hours  insulin NPH human recombinant 10 Unit(s) SubCutaneous every 6 hours  ketamine Infusion. 2 mG/kG/Hr IV Continuous <Continuous>  meropenem  IVPB      meropenem  IVPB 1000 milliGRAM(s) IV Intermittent every 12 hours  midodrine 10 milliGRAM(s) Oral every 8 hours  norepinephrine Infusion 0.04 MICROgram(s)/kG/Min IV Continuous <Continuous>  nystatin    Suspension [id number] Unit(s) Oral every 8 hours  pantoprazole  Injectable 40 milliGRAM(s) IV Push daily  petrolatum Ophthalmic Ointment 1 Application(s) Both EYES two times a day  PrismaSATE Dialysate BK 0 / 3.5 5000 milliLiter(s) CRRT <Continuous>  PrismaSOL Filtration BGK 4 / 2.5 5000 milliLiter(s) CRRT <Continuous>  PrismaSOL Filtration BGK 4 / 2.5 5000 milliLiter(s) CRRT <Continuous>  propofol Infusion 10 MICROgram(s)/kG/Min IV Continuous <Continuous>  senna Syrup 5 milliLiter(s) Oral at bedtime  sertraline 100 milliGRAM(s) Oral daily  sodium bicarbonate  Infusion 0.132 mEq/kG/Hr IV Continuous <Continuous>  tacrolimus    0.5 mG/mL Suspension 0.5 milliGRAM(s) Oral two times a day  trimethoprim  160 mG/sulfamethoxazole 800 mG 1 Tablet(s) Oral daily    PRN Inpatient Medications  sodium chloride 0.9% lock flush 10 milliLiter(s) IV Push every 1 hour PRN      REVIEW OF SYSTEMS  --------------------------------------------------------------------------------  Unable to assess    VITALS/PHYSICAL EXAM  --------------------------------------------------------------------------------  T(C): 37 (03-01-21 @ 16:00), Max: 37.4 (03-01-21 @ 03:00)  HR: 87 (03-01-21 @ 16:45) (78 - 95)  BP: --  RR: 30 (03-01-21 @ 16:45) (27 - 30)  SpO2: 91% (03-01-21 @ 16:45) (87% - 100%)  Wt(kg): --        02-28-21 @ 07:01  -  03-01-21 @ 07:00  --------------------------------------------------------  IN: 4473.6 mL / OUT: 1120 mL / NET: 3353.6 mL    03-01-21 @ 07:01  -  03-01-21 @ 17:03  --------------------------------------------------------  IN: 2274 mL / OUT: 565 mL / NET: 1709 mL      Physical Exam deferred due to COVID19 and PPE preservation          LABS/STUDIES  --------------------------------------------------------------------------------              8.7    32.40 >-----------<  367      [03-01-21 @ 00:21]              29.4     134  |  97  |  89  ----------------------------<  201      [03-01-21 @ 08:39]  5.8   |  22  |  2.75        Ca     7.8     [03-01-21 @ 08:39]      Mg     2.8     [03-01-21 @ 00:21]      Phos  5.9     [03-01-21 @ 00:21]    TPro  6.2  /  Alb  2.5  /  TBili  0.9  /  DBili  x   /  AST  45  /  ALT  23  /  AlkPhos  145  [03-01-21 @ 00:21]      PTT: 30.7       [03-01-21 @ 00:21]          [03-01-21 @ 00:21]    Creatinine Trend:  SCr 2.75 [03-01 @ 08:39]  SCr 2.46 [03-01 @ 00:21]  SCr 2.02 [02-28 @ 00:41]  SCr 1.92 [02-27 @ 00:40]  SCr 1.78 [02-26 @ 04:17]    Urinalysis - [03-01-21 @ 03:22]      Color Yellow / Appearance Slightly Turbid / SG 1.026 / pH 6.0      Gluc Negative / Ketone Negative  / Bili Negative / Urobili 2 mg/dL       Blood Large / Protein 30 mg/dL / Leuk Est Large / Nitrite Negative       / WBC 65 / Hyaline 8 / Gran  / Sq Epi  / Non Sq Epi 0 / Bacteria Moderate      Iron 26, TIBC 450, %sat 6      [12-09-20 @ 20:18]  Ferritin 213      [03-01-21 @ 05:05]  HbA1c 6.3      [12-13-18 @ 07:30]    HCV 13.02, Reactive      [05-14-20 @ 08:25]    
  Chief Complaint:  shortness of breath     HPI:    69 y/o Male w/ PMH significant for HTN, HLD, T2DM on insulin, COPD (on home O2 3L), EtOH/HCV cirrhosis s/p liver transplant (2011) on Tacro/Cellcept/Prednisone, HFpEF, possible MDS, chronic lymphedema presenting with worsening SOB of several months duration. Difficult to obtain history as pt is on bipap. Pt reports SOB started "several months ago" that has been progressively getting worse. He has had GOMEZ, better with rest. Taking his medications helps temporarily difficulty breathing returns. He does not remember any inciting events that may have caused him to become more SOB. He has been having difficulty sleeping. Pt did not have any improvement in his breathing and decided to call an ambulance to come to the hospital. Of note pt had previous admission in 12/2020 for symptomatic anemia s/p BM biopsy . Reports currently feeling better on bipap. No sick contacts. Denies chest pain, nausea, vomiting, + chronic orthopnea.   He was found to have COVID-19 pneumonia   Transplant hepatology called for his immunosuppressant medications management         Allergies:  clindamycin (Unknown)    Home Medications:  aspirin 81 mg oral delayed release tablet: 1 tab(s) orally once a day (in the morning) (24 Feb 2021 20:55)  Breo Ellipta 200 mcg-25 mcg/inh inhalation powder: 1 puff(s) inhaled once a day (24 Feb 2021 20:41)  budesonide 0.5 mg/2 mL inhalation suspension: 2 milliliter(s) inhaled 2 times a day, As Needed (24 Feb 2021 20:41)  buprenorphine-naloxone 2 mg-0.5 mg sublingual tablet:  sublingual  (24 Feb 2021 20:41)  furosemide 40 mg oral tablet: 3 tab(s) orally once a day in the morning  2 tab(s) orally once a day in the everning  ipratropium-albuterol 0.5 mg-2.5 mg/3 mLinhalation solution: 3 milliliter(s) inhaled every 6 hours, As needed, Shortness of Breath and/or Wheezing (24 Feb 2021 20:41)  Lantus Solostar Pen 100 units/mL subcutaneous solution: 70 unit(s) subcutaneous once a day (at bedtime)  metoprolol succinate 25 mg oral tablet, extended release: 1 tab(s) orally once a day (in the morning)  Multiple Vitamins with Minerals oral tablet: 1 tab(s) orally once a day (24 Feb 2021 20:41)  mupirocin 2% topical ointment: Apply topically to affected area 3 times a day, As Needed (24 Feb 2021 20:41)  mycophenolate mofetil 500 mg oral tablet: 1 tab(s) orally once a day  nystatin 100,000 units/g topical powder: 1 application topically 2 times a day (24 Feb 2021 20:41)  pantoprazole 40 mg oral delayed release tablet: 1 tab(s) orally 2 times a day (24 Feb 2021 20:41)  Perforomist 20 mcg/2 mL inhalation solution: 2 milliliter(s) inhaled 2 times a day, As Needed (24 Feb 2021 20:41)  predniSONE 1 mg oral tablet: 3 tab(s) orally every other day  senna oral tablet: 2 tab(s) orally once a day (at bedtime) (24 Feb 2021 20:41)  sodium chloride 0.65% nasal spray:  nasal  (24 Feb 2021 20:41)  Spiriva 18 mcg inhalation capsule: 1 cap(s) inhaled once a day (24 Feb 2021 20:41)  spironolactone 25 mg oral tablet: 1 tab(s) orally 2 times a day  sulfamethoxazole-trimethoprim 400 mg-80 mg oral tablet: 1 tab(s) orally Monday, Wednesday, and Friday (24 Feb 2021 20:41)  tacrolimus 0.5 mg oral capsule: 1 cap(s) orally 2 times a day  tamsulosin 0.4 mg oral capsule: 1 cap(s) orally once a day (at bedtime)  Zoloft 100 mg oral tablet: 1 tab(s) orally once a day      Hospital Medications:  albuterol/ipratropium for Nebulization 3 milliLiter(s) Nebulizer every 6 hours  aspirin  chewable 81 milliGRAM(s) Oral daily  budesonide  80 MICROgram(s)/formoterol 4.5 MICROgram(s) Inhaler 2 Puff(s) Inhalation two times a day  dexAMETHasone     Tablet 6 milliGRAM(s) Oral daily  dextrose 40% Gel 15 Gram(s) Oral once  dextrose 50% Injectable 25 Gram(s) IV Push once  dextrose 50% Injectable 12.5 Gram(s) IV Push once  dextrose 50% Injectable 25 Gram(s) IV Push once  enoxaparin Injectable 40 milliGRAM(s) SubCutaneous two times a day  glucagon  Injectable 1 milliGRAM(s) IntraMuscular once  insulin glargine Injectable (LANTUS) 30 Unit(s) SubCutaneous every morning  insulin glargine Injectable (LANTUS) 45 Unit(s) SubCutaneous at bedtime  insulin lispro (ADMELOG) corrective regimen sliding scale   SubCutaneous every 4 hours  metoprolol succinate ER 25 milliGRAM(s) Oral daily  pantoprazole    Tablet 40 milliGRAM(s) Oral before breakfast  remdesivir  IVPB   IV Intermittent   remdesivir  IVPB 100 milliGRAM(s) IV Intermittent every 24 hours  sertraline 100 milliGRAM(s) Oral daily  tacrolimus 0.5 milliGRAM(s) Oral two times a day  tamsulosin 0.4 milliGRAM(s) Oral at bedtime      PMHX/PSHX:    Hyperlipidemia  Hypertension  S/P liver transplant  Type 2 diabetes mellitus  Chronic obstructive pulmonary disease, unspecified COPD type  S/P liver transplant        Family history:  No family history of cirrhosis in first degree relatives        Social History: no smoking    ROS:   General:  No fevers, chills or night sweats  ENT:  No sore throat or dysphagia  CV:  No pain or palpitations  Resp:  + dyspnea, cough   GI:  as above  Skin:  No rash or edema  Neuro: no weakness   Hematologic: no bleeding  Musculoskeletal: no muscle pain or join pain  Psych: no agitation     : no dysuria      PHYSICAL EXAM:   GENERAL: On bipap  HEENT:  NC/AT,  conjunctivae clear and pink, sclera -anicteric  CHEST:  Diminished breath sounds, wheezes, crackles base of lungs b/l  HEART:  RRR S1/S2,  ABDOMEN:  Soft, non-tender, non-distended,  no masses   EXTREMITIES: chronic lymphedema   SKIN:  Warm & Dry. No rash or erythema  NEURO:  Alert, oriented, no focal deficit    Vital Signs:  Vital Signs Last 24 Hrs  T(C): 36.6 (25 Feb 2021 08:51), Max: 37.1 (24 Feb 2021 17:30)  T(F): 97.8 (25 Feb 2021 08:51), Max: 98.8 (24 Feb 2021 17:30)  HR: 79 (25 Feb 2021 08:51) (70 - 99)  BP: 163/80 (25 Feb 2021 08:51) (108/63 - 163/80)  BP(mean): 84 (24 Feb 2021 17:30) (84 - 84)  RR: 22 (25 Feb 2021 08:51) (22 - 28)  SpO2: 90% (25 Feb 2021 08:51) (85% - 100%)  Daily Height in cm: 187.96 (25 Feb 2021 01:15)    Daily     LABS:                        9.1    2.55  )-----------( 95       ( 25 Feb 2021 07:27 )             29.6     Mean Cell Volume: 100.7 fl (02-25-21 @ 07:27)    02-25    133<L>  |  92<L>  |  50<H>  ----------------------------<  296<H>  4.9   |  28  |  1.95<H>    Ca    8.2<L>      25 Feb 2021 07:26  Phos  5.0     02-25  Mg     2.8     02-25    TPro  7.0  /  Alb  3.3  /  TBili  0.4  /  DBili  x   /  AST  49<H>  /  ALT  24  /  AlkPhos  95  02-25    LIVER FUNCTIONS - ( 25 Feb 2021 07:26 )  Alb: 3.3 g/dL / Pro: 7.0 g/dL / ALK PHOS: 95 U/L / ALT: 24 U/L / AST: 49 U/L / GGT: x           PT/INR - ( 24 Feb 2021 22:24 )   PT: 14.2 sec;   INR: 1.19 ratio         PTT - ( 24 Feb 2021 15:09 )  PTT:25.9 sec                            9.1    2.55  )-----------( 95       ( 25 Feb 2021 07:27 )             29.6                         9.2    8.16  )-----------( 125      ( 24 Feb 2021 15:09 )             30.4     Imaging:  < from: US Elastography Parynchyma (12.11.20 @ 10:20) >  EXAM:  US ELASTOGRAPHY PARYNCHYMA                          EXAM:  US ABDOMEN RT UPR QUADRANT                            PROCEDURE DATE:  12/11/2020            INTERPRETATION:  CLINICAL INFORMATION: Nodular contour of liver on CT, status post liver transplant 2011, evaluate for hepatic cirrhosis of liver transplant.    TECHNIQUE: Sonography of the abdomen.  Shear Wave Elastography was performed on a [employer] E9 system.    COMPARISON: CT abdomen and pelvis from 12/8/2020, abdominal Doppler ultrasound from 5/14/2020    FINDINGS:    Liver: Liver transplant demonstrates coarsened echotexture. Lobulated contour, nonspecific. No focal lesion is identified.  Bile ducts: Normal caliber. Common bile duct measures 8 mm.  Gallbladder: Cholecystectomy.  Pancreas: Visualized portions are within normal limits.  Right kidney: 10.5 cm. No hydronephrosis. Upper pole cyst measures 1.5 cm.  Ascites: None.  Aorta: Visualized portions are within normal limits.    ELASTOGRAPHY:    Patient position for exam: Supine  Penetration mode used: No    Median stiffness value: 1.54  IQR/Median: 12.9%    LIVER FIBROSIS STAGING:    Minimal risk of clinically significant fibrosis; METAVIR F0, F1: Median stiffness value < 1.66 m/s  Moderate risk of clinically significant fibrosis; METAVIR F2: Median stiffness value 1.66 - 1.77 m/s  High risk of clinically significant fibrosis; METAVIR F3, F4: Median stiffness value > 1.77 m/s    Above cutoff values based on [employer] E9 Shear Wave Elastography White Paper.    IMPRESSION:  No focal hepatic lesion is identified.    Minimal risk of clinically significant fibrosis.      < end of copied text >        < from: Xray Chest 1 View- PORTABLE-Urgent (Xray Chest 1 View- PORTABLE-Urgent .) (02.24.21 @ 15:04) >    EXAM:  XR CHEST PORTABLE URGENT 1V                            PROCEDURE DATE:  02/24/2021            INTERPRETATION:  HISTORY: Dyspnea    TECHNIQUE: A single portable view of the chest was obtained.    COMPARISON: 5/22/2020    FINDINGS:  The cardiac silhouette is normal in size. Patchy airspace opacities bilaterally. There are no pleural effusions. The hilar and mediastinal structures appear unremarkable. The osseous structures are intact.    IMPRESSION: Patchy airspace opacities bilaterally, concerning for viral pneumonia such as COVID 19.    < end of copied text >    
HPI:  71 y/o Male w/ PMH significant for HTN, HLD, T2DM on insulin, COPD (on home O2 3L), EtOH/HCV cirrhosis s/p liver transplant (2011) on Tacro/Cellcept/Prednisone, chronic lymphedema presenting with worsening SOB of several months duration. Difficult to obtain history as pt is on bipap. Pt reports SOB started "several months ago" that has been progressively getting worse. He has had GOMEZ, better with rest. Taking his medications helps temporarily difficulty breathing returns. He does not remember any inciting events that may have caused him to become more SOB. He has been having difficulty sleeping. Pt did not have any improvement in his breathing and decided to call an ambulance to come to the hospital. Of note pt had previous admission in 12/2020 for symptomatic anemia. Reports currently feeling better on bipap.     In ED,  98.2F, HR 99, 130/58, RR 28, desatted to 88% on NC 3L, now 100% on BiPAP   s/p 3x duonebs, solumedrol 125 IV, lasix 80 IV. Serum sugar was 388 at 9pm, and patient received Lantus 45 u and Lispro 8 u. BP was 108/41, sat 94% on BiPap, RR 32 and HR 71.    T(C): 37.1 (02-24-21 @ 17:30), Max: 37.1 (02-24-21 @ 17:30)  HR: 88 (02-24-21 @ 21:13) (88 - 99)  BP: 108/63 (02-24-21 @ 21:13) (108/63 - 136/66)  RR: 25 (02-24-21 @ 21:13) (25 - 28)  SpO2: 100% (02-24-21 @ 21:13) (87% - 100%)  Wt(kg): --  GENERAL: NAD, well-developed  HEAD:  Atraumatic, Normocephalic  EYES: EOMI, PERRLA, conjunctiva and sclera clear  NECK: Supple, No JVD  CHEST/LUNG: On BiPAP. Wheezing and crackles bilaterally  HEART: Regular rate and rhythm; No murmurs, rubs, or gallops  ABDOMEN: Soft, Nontender, Nondistended; Bowel sounds present  EXTREMITIES:  2+ Peripheral Pulses, chronic lymphedema  PSYCH: AAOx3  NEUROLOGY: non-focal  SKIN: No rashes or lesions  
Patient is a 70y old  Male who presents with a chief complaint of SOB (28 Feb 2021 07:36)      HPI:   69 y/o Male w/ PMH significant for HTN, HLD, T2DM on insulin, COPD (on home O2 3L), EtOH/HCV cirrhosis s/p liver transplant (2011) on Tacro/Cellcept/Prednisone, HFpEF, possible MDS, chronic lymphedema presenting with worsening SOB of several months duration. Difficult to obtain history as pt is on bipap. Pt reports SOB started "several months ago" that has been progressively getting worse. He has had GOMEZ, better with rest. Taking his medications helps temporarily difficulty breathing returns. He does not remember any inciting events that may have caused him to become more SOB. He has been having difficulty sleeping. Pt did not have any improvement in his breathing and decided to call an ambulance to come to the hospital. Of note pt had previous admission in 12/2020 for symptomatic anemia s/p BM biopsy . Reports currently feeling better on bipap. No sick contacts. Denies chest pain, nausea, vomiting, + chronic orthopnea.   In ED,  98.2F, HR 99, 130/58, RR 28, desatted to 88% on NC 3L, now 100% on BiPAP   s/p 3x duonebs, solumedrol 125 IV, lasix 80 IV    (24 Feb 2021 18:34)  Above reviewed;   Pt with hypoxic respiratory failure, intubated, proned a few times. Not much secretions, found to have positive blood cx.       PAST MEDICAL & SURGICAL HISTORY:  Hyperlipidemia    Hypertension    S/P liver transplant    Type 2 diabetes mellitus    Chronic obstructive pulmonary disease, unspecified COPD type  on home O2    S/P liver transplant        REVIEW OF SYSTEMS  Unable to obtain as patient intubated and sedated.       Social history:  Unable to obtain as patient intubated and sedated.       FAMILY HISTORY:  Unable to obtain as patient intubated and sedated.       Allergies  clindamycin (Unknown)      Antimicrobials:  nystatin    Suspension [id number] Unit(s) Oral every 8 hours  remdesivir  IVPB   IV Intermittent   remdesivir  IVPB 100 milliGRAM(s) IV Intermittent every 24 hours  trimethoprim  160 mG/sulfamethoxazole 800 mG 1 Tablet(s) Oral daily  vancomycin  IVPB 1000 milliGRAM(s) IV Intermittent daily        Vital Signs Last 24 Hrs  T(C): 36.5 (28 Feb 2021 16:00), Max: 37.5 (27 Feb 2021 23:00)  T(F): 97.7 (28 Feb 2021 16:00), Max: 99.5 (27 Feb 2021 23:00)  HR: 73 (28 Feb 2021 17:00) (65 - 94)  BP: --  BP(mean): --  RR: 28 (28 Feb 2021 17:00) (25 - 28)  SpO2: 90% (28 Feb 2021 17:00) (88% - 95%)      PHYSICAL EXAM:     Constitutional: Intubated sedated     Eyes: closed     ENT: + ET tube, chin dressing and lt sided TLC .    Neck: Supple, areas of ecchymosis b/l shoulder and upper chest due to proning     Respiratory: + air entry bilaterally.    Cardiovascular: S1 S2 wnl,     Gastrointestinal: Soft BS(+)  non distended. obese    Genitourinary: + palacios     Extremities: trace edema.    Vascular: peripheral pulses felt    Skin: No rash, areas of ecchymosis     Musculoskeletal: No joint swelling.    Psychiatric: sedated                               8.3    8.95  )-----------( 146      ( 28 Feb 2021 00:41 )             27.6       02-28    141  |  102  |  74<H>  ----------------------------<  103<H>  4.9   |  25  |  2.02<H>    Ca    8.0<L>      28 Feb 2021 00:41  Phos  4.1     02-28  Mg     2.7     02-28    TPro  6.2  /  Alb  2.7<L>  /  TBili  0.4  /  DBili  x   /  AST  27  /  ALT  18  /  AlkPhos  88  02-28        Culture - Blood (collected 27 Feb 2021 02:32)  Source: .Blood Blood  Preliminary Report (28 Feb 2021 03:01):    No growth to date.    Culture - Blood (collected 27 Feb 2021 02:32)  Source: .Blood Blood  Preliminary Report (28 Feb 2021 03:01):    No growth to date.    Culture - Blood (collected 25 Feb 2021 18:33)  Source: .Blood Blood-Peripheral  Gram Stain (26 Feb 2021 17:38):    Growth in aerobic bottle: Gram Positive Cocci in Clusters    Growth in anaerobic bottle: Gram Positive Cocci in Clusters  Final Report (27 Feb 2021 20:57):    Growth in aerobic and anaerobic bottles: Staphylococcus epidermidis    Coag Negative Staphylococcus    Single set isolate, possible contaminant. Contact    Microbiology if susceptibility testing clinically    indicated.    ***Blood Panel PCR results on this specimen are available    approximately 3 hours after the Gram stain result.***    Gram stain, PCR, and/or culture results may not always    correspond due to difference in methodologies.    ************************************************************    This PCR assay was performed by multiplex PCR. This    Assay tests for 66 bacterial and resistance gene targets.    Please refer to the [employer] test directory    at [email or web address] for details.  Organism: Blood Culture PCR (27 Feb 2021 20:57)  Organism: Blood Culture PCR (27 Feb 2021 20:57)    Culture - Blood (collected 25 Feb 2021 18:32)  Source: .Blood Blood  Gram Stain (26 Feb 2021 19:44):    Growth in aerobic bottle: Gram Positive Cocci in Pairs and Chains  Final Report (27 Feb 2021 18:28):    Growth in aerobic bottle: Streptococcus gordonii    Alpha hemolytic strep    (not Strep. pneumoniae or Enterococcus)    Single set isolate, possible contaminant. Contact    Microbiology if susceptibility testing clinically    indicated.    ***Blood Panel PCR results on this specimen are available    approximately 3 hours after the Gram stain result.***    Gram stain, PCR, and/or culture results may not always    correspond due to difference in methodologies.    ************************************************************    This PCR assay was performed by multiplex PCR. This    Assay tests for 66 bacterial and resistance gene targets.    Please refer to the [employer] test directory    at [email or web address] for details.  Organism: Blood Culture PCR (27 Feb 2021 18:28)  Organism: Blood Culture PCR (27 Feb 2021 18:28)    Culture - Sputum (collected 25 Feb 2021 18:13)  Source: .Sputum Sputum  Gram Stain (25 Feb 2021 23:04):    Few Squamous epithelial cells per low power field    Rare polymorphonuclear leukocytes    Numerous Gram Positive Cocci in Pairs and Chains per oil power field    Rare Yeast like cells per oil power field  Final Report (27 Feb 2021 19:38):    Normal Respiratory Steffanie present          Radiology: Imaging reviewed and visualized personally [ x]  < from: Xray Chest 1 View- PORTABLE-Urgent (Xray Chest 1 View- PORTABLE-Urgent .) (02.26.21 @ 22:38) >  IMPRESSION:  Tubes and lines as above.    Multifocal pneumonia. Such as seen in Covid 19 pneumonia.      < from: Xray Chest 1 View- PORTABLE-Routine (Xray Chest 1 View- PORTABLE-Routine in AM.) (02.25.21 @ 08:38) >  IMPRESSION:  Progression of right infiltrate.                  
    HPI:  71 y/o Male w/ PMH significant for HTN, HLD, T2DM on insulin, COPD (on home O2 3L), EtOH/HCV cirrhosis s/p liver transplant (2011) on Tacro/Cellcept/Prednisone, HFpEF, possible MDS, chronic lymphedema presenting with worsening SOB of several months duration. SOB started "several months ago" that has been progressively getting worse. He has had GOMEZ, better with rest. Taking his medications helps temporarily difficulty breathing returns. He does not remember any inciting events that may have caused him to become more SOB. He has been having difficulty sleeping. Pt did not have any improvement in his breathing and decided to call an ambulance to come to the hospital. Of note pt had previous admission in 12/2020 for symptomatic anemia s/p BM biopsy .  Started on decadron with COVID endocrine consulted for hyperglycemia.  Pt. being seen currently but in the process of getting intubated. Unable to obtain history from the patient at this time. Based on chart review patient on Lantus 45 units at bedtime and 30 units in the morning. A1c 6.3%      PAST MEDICAL & SURGICAL HISTORY:  Hyperlipidemia    Hypertension    S/P liver transplant    Type 2 diabetes mellitus    Chronic obstructive pulmonary disease, unspecified COPD type  on home O2    S/P liver transplant        FAMILY HISTORY:  No pertinent family history in first degree relatives        Social History: Unable to obtain at this time    Outpatient Medications:  · 	ferrous sulfate 325 mg (65 mg elemental iron) oral tablet: Last Dose Taken:  , 1 tab(s) orally 2 times a day for Iron Deficiency Anemia  · 	Lantus Solostar Pen 100 units/mL subcutaneous solution: Last Dose Taken:  , 70 unit(s) subcutaneous once a day (at bedtime)  	  	NOTE: PHARMACY LAST DISPENSED ON AUG 2020 FOR 90 DAYS  · 	buprenorphine-naloxone 2 mg-0.5 mg sublingual tablet: Last Dose Taken:  ,  sublingual   · 	ipratropium-albuterol 0.5 mg-2.5 mg/3 mLinhalation solution: Last Dose Taken:  , 3 milliliter(s) inhaled every 6 hours, As needed, Shortness of Breath and/or Wheezing  · 	Multiple Vitamins with Minerals oral tablet: Last Dose Taken:  , 1 tab(s) orally once a day  · 	sulfamethoxazole-trimethoprim 400 mg-80 mg oral tablet: Last Dose Taken:  , 1 tab(s) orally Monday, Wednesday, and Friday  · 	pantoprazole 40 mg oral delayed release tablet: Last Dose Taken:  , 1 tab(s) orally 2 times a day  · 	sodium chloride 0.65% nasal spray: Last Dose Taken:  ,  nasal   · 	senna oral tablet: Last Dose Taken:  , 2 tab(s) orally once a day (at bedtime)  · 	tacrolimus 0.5 mg oral capsule: Last Dose Taken:  , 1 cap(s) orally 2 times a day  	  	NOTE: PHARMACY LAST DISPENSED ON NOV 2020 for 90 DAYS  · 	nystatin 100,000 units/g topical powder: Last Dose Taken:  , 1 application topically 2 times a day  · 	furosemide 40 mg oral tablet: Last Dose Taken:  , 3 tab(s) orally once a day in the morning  	2 tab(s) orally once a day in the everning  	  	NOTE: PHARMACY LAST DISPENSED ON AUG 15 2020 for 90 days  · 	Zoloft 100 mg oral tablet: Last Dose Taken:  , 1 tab(s) orally once a day  	  	NOTE: PHARMACY LAST DISPENSED ON NOV 2020 for 90 days  · 	tamsulosin 0.4 mg oral capsule: Last Dose Taken:  , 1 cap(s) orally once a day (at bedtime)  	  	NOTE: PHARMACY LAST DISPENSED ON NOV 2020  for 90 days  · 	predniSONE 1 mg oral tablet: Last Dose Taken:  , 3 tab(s) orally every other day  	  	NOTE: PHARMACY LAST DISPENSED ON MAY 2020 for 90 days supply  · 	mupirocin 2% topical ointment: Last Dose Taken:  , Apply topically to affected area 3 times a day, As Needed  · 	Breo Ellipta 200 mcg-25 mcg/inh inhalation powder: Last Dose Taken:  , 1 puff(s) inhaled once a day  · 	Spiriva 18 mcg inhalation capsule: Last Dose Taken:  , 1 cap(s) inhaled once a day  · 	budesonide 0.5 mg/2 mL inhalation suspension: Last Dose Taken:  , 2 milliliter(s) inhaled 2 times a day, As Needed  · 	Perforomist 20 mcg/2 mL inhalation solution: Last Dose Taken:  , 2 milliliter(s) inhaled 2 times a day, As Needed  · 	aspirin 81 mg oral delayed release tablet: Last Dose Taken:  , 1 tab(s) orally once a day (in the morning)  · 	metoprolol succinate 25 mg oral tablet, extended release: Last Dose Taken:  , 1 tab(s) orally once a day (in the morning)  	  	NOTE: PHARMACY LAST DISPENSED ON NOV 2020 for 90 days supply  · 	mycophenolate mofetil 500 mg oral tablet: Last Dose Taken:  , 1 tab(s) orally once a day  	  	NOTE: PHARMACY LAST DISPENSED ON NOV 2020 for 90 days  · 	spironolactone 25 mg oral tablet: Last Dose Taken:  , 1 tab(s) orally 2 times a day  	  	NOTE: PHARMACY LAST DISPENSED ON NOV 2020 for 90 days      MEDICATIONS  (STANDING):  albuterol/ipratropium for Nebulization 3 milliLiter(s) Nebulizer every 6 hours  artificial  tears Solution 1 Drop(s) Both EYES every 12 hours  aspirin  chewable 81 milliGRAM(s) Oral daily  budesonide  80 MICROgram(s)/formoterol 4.5 MICROgram(s) Inhaler 2 Puff(s) Inhalation two times a day  chlorhexidine 0.12% Liquid 15 milliLiter(s) Oral Mucosa two times a day  chlorhexidine 4% Liquid 1 Application(s) Topical <User Schedule>  cisatracurium Infusion 3 MICROgram(s)/kG/Min (20.5 mL/Hr) IV Continuous <Continuous>  cisatracurium Injectable 20 milliGRAM(s) IV Push once  dexAMETHasone  Injectable 6 milliGRAM(s) IV Push daily  dextrose 40% Gel 15 Gram(s) Oral once  dextrose 5%. 1000 milliLiter(s) (50 mL/Hr) IV Continuous <Continuous>  dextrose 5%. 1000 milliLiter(s) (100 mL/Hr) IV Continuous <Continuous>  dextrose 50% Injectable 25 Gram(s) IV Push once  dextrose 50% Injectable 12.5 Gram(s) IV Push once  dextrose 50% Injectable 25 Gram(s) IV Push once  enoxaparin Injectable 40 milliGRAM(s) SubCutaneous two times a day  fentaNYL    Injectable 100 MICROGram(s) IV Push once  fentaNYL   Infusion... 0.5 MICROgram(s)/kG/Hr (2.85 mL/Hr) IV Continuous <Continuous>  glucagon  Injectable 1 milliGRAM(s) IntraMuscular once  insulin lispro (ADMELOG) corrective regimen sliding scale   SubCutaneous every 4 hours  insulin NPH human recombinant 6 Unit(s) SubCutaneous every 6 hours  metoprolol succinate ER 25 milliGRAM(s) Oral daily  norepinephrine Infusion 0.04 MICROgram(s)/kG/Min (8.54 mL/Hr) IV Continuous <Continuous>  pantoprazole  Injectable 40 milliGRAM(s) IV Push daily  propofol Infusion 10 MICROgram(s)/kG/Min (6.83 mL/Hr) IV Continuous <Continuous>  remdesivir  IVPB 100 milliGRAM(s) IV Intermittent every 24 hours  remdesivir  IVPB   IV Intermittent   sertraline 100 milliGRAM(s) Oral daily  tacrolimus 0.5 milliGRAM(s) Oral two times a day  tamsulosin 0.4 milliGRAM(s) Oral at bedtime    MEDICATIONS  (PRN):  buprenorphine 2 mG/naloxone 0.5 mG SL  Tablet 1 Tablet(s) SubLingual <User Schedule> PRN pain management      Allergies    clindamycin (Unknown)    Intolerances      Review of Systems: Unable to obtain at this time as patient intubated      PHYSICAL EXAM:  VITALS: T(C): 35.6 (02-25-21 @ 14:30)  T(F): 96.1 (02-25-21 @ 14:30), Max: 98.8 (02-24-21 @ 17:30)  HR: 73 (02-25-21 @ 15:15) (66 - 98)  BP: 163/80 (02-25-21 @ 08:51) (108/63 - 163/80)  RR:  (22 - 31)  SpO2:  (67% - 100%)  Wt(kg): --  GENERAL: NAD at this time, intubated  EYES: closed  HEENT:  Atraumatic, Normocephalic, +ET Tube  THYROID: unable to assess at this time.  GI: Soft, nontender, non distended, normal bowel sounds  SKIN: Dry, intact  NEURO: cannot follow commmands  PSYCH: intubated unable to assess  CUSHING'S SIGNS: no striae      POCT Blood Glucose.: 201 mg/dL (02-25-21 @ 13:26)  POCT Blood Glucose.: 259 mg/dL (02-25-21 @ 10:09)  POCT Blood Glucose.: 340 mg/dL (02-25-21 @ 06:01)  POCT Blood Glucose.: 385 mg/dL (02-25-21 @ 00:31)  POCT Blood Glucose.: 388 mg/dL (02-24-21 @ 20:59)  POCT Blood Glucose.: 412 mg/dL (02-24-21 @ 19:46)  POCT Blood Glucose.: 326 mg/dL (02-24-21 @ 14:50)                              8.8    5.36  )-----------( 105      ( 25 Feb 2021 15:23 )             28.6       02-25    135  |  95<L>  |  57<H>  ----------------------------<  174<H>  4.4   |  26  |  1.72<H>    EGFR if : 46<L>  EGFR if non : 39<L>    Ca    8.5      02-25  Mg     2.8     02-25  Phos  5.0     02-25    TPro  6.5  /  Alb  3.3  /  TBili  0.5  /  DBili  x   /  AST  45<H>  /  ALT  24  /  AlkPhos  86  02-25    Thyroid Function Tests:            Radiology:

## 2021-03-01 NOTE — PROCEDURE NOTE - NSPROCDETAILS_GEN_ALL_CORE
guidewire recovered/lumen(s) aspirated and flushed/sterile dressing applied/sterile technique, catheter placed/ultrasound guidance with use of sterile gel and probe cove
location identified, draped/prepped, sterile technique used, needle inserted/introduced/positive blood return obtained via catheter/connected to a pressurized flush line/sutured in place/Seldinger technique/all materials/supplies accounted for at end of procedure

## 2021-03-01 NOTE — PROGRESS NOTE ADULT - PROBLEM SELECTOR PLAN 1
Recommend increase in NPH to 8 Units q 6 hours   Continue moderate admelog scale q 6 hours   Goal Glucose 100-180  Discharge plan tbd  will follow Recommend increase in NPH to 12 Units q 6 hours   Continue moderate admelog scale q 6 hours   Goal Glucose 100-180  Discharge plan tbd  will follow

## 2021-03-02 NOTE — PROGRESS NOTE ADULT - ASSESSMENT
70y M with EDUAR on CKD in setting of COVID19 and hypoxic respiratory failure    #EDUAR on CKD3  - Patient with baseline CKD, Scr ~1.5mg/dl as of December 2020, now with EDUAR in setting of COVID19, likely ATN. Scr 2.75mg/dl with hyperkalemia to 5.8.  - Pt. initiated on CVVHDF on 3/1/21 given oliguric renal failure.  - Plan to continue CVVHDF today. Will use oXiris hemofilter with heparin prime.   - Immunosuppressive regimen per hepatology team  - Aim for UF to optimize volume status and help with improving FiO2 requirements/respiratory failure  - Dose medications to CRRT     If you have any questions, please feel free to contact me  Edilson Velez  Nephrology Fellow  [phone]  (After 5pm or on weekends please page the on-call fellow)

## 2021-03-02 NOTE — PROGRESS NOTE ADULT - ASSESSMENT
70 y.o. man with history of HTN, HLD, DM, COPD on home O2, and cirrhosis s/p Liver transplant ( ETOH+HCV) who was admitted on  and intubated on  for worsening hypoxic respiratory failure 2/ COVID pneumonia. The patient was placed in supine position on  due to worsening respiratory function. The patient's hospitalization has been further compromised by methacillin-resistant S. epidermidis bacteremia and EDUAR.     Neuro:  - Continue sedation Prop 50, Fent 3, Ket 3, paralyzed with Nimbex 3.    - Zoloft 100mg       #Pulm:  Acute hypoxic respiratory failure 2/ COVID  - Intubated on vent settin/  - Worsening resp acidosis and hypoxia.   - S/p multiple prone/supine x4. Proned . Supine: 1200 today     #Cards:  HFpEF  - On low dose levo start midodrin 10mg TID  - TTE : EF 55% mild AS and RV dysfunction.     #GI:  Liver transplant   - continue to hold home Cellcept and prednisone 3 mg ( currently on dexamethasone)   - continue tacrolimus 0.5mg BID   - check prograf level every 3 days (<.2 on )  - continue Nystatin and Bactrim for PCP ppx   - Hepatology consulted, charles recs  - Tolerating TF @goal 20cc    #Renal:  EDUAR on CKD  - EDUAR: worsening renal function Cr up to 2.4. UOP decreased.   - palacios in place; monitor UOP, lytes    #Endo:  T2DM:  - FSG q6h with mISS   - s/p lantus 45 and admelog 8, lantus 30 in AM   - currently on NPH 10U q6hr +mISS  - endocrine consulted, appreciate recs    #ID:  - Leukocytosis. Rpt pan culture sent. Fungitell sent. Added Alethea and Caspo, vanco readded   -Persistent +Staph Epi Meth Resistant/+Strep BCx   - Bcx () + for MR Staph Epi and undefined Strep -->  BCX NGTD.   - Sputum () many Gram +'s, likely Strep/Staph ?pnumonia.     #Heme:  bone marrow biopsy with myeloid abnormality in 2020 during recent admission for anemia to 6s warranting blood transfusion  - likely cause of anemia-transfuse 1 unit prbc today  - Hb currently stable in the 9s  - f/u heme outpatient.  - d/c lovenox and start heparin SQ 2/2 worsening renal fx   70 y.o. man with history of HTN, HLD, DM, COPD on home O2, and cirrhosis s/p Liver transplant ( ETOH+HCV) who was admitted on  and intubated on  for worsening hypoxic respiratory failure 2/ COVID pneumonia. The patient was placed in supine position on  due to worsening respiratory function. The patient's hospitalization has been further compromised by methacillin-resistant S. epidermidis bacteremia and EDUAR.     Neuro:  - Continue sedation Prop 50, Fent 3, Ket 3, paralyzed with Nimbex 3.    - Zoloft 100mg       #Pulm:  Acute hypoxic respiratory failure 2/ COVID  - Intubated on vent settin/  - Worsening resp acidosis and hypoxia.   - S/p multiple prone/supine x5.  to be Supined: 193 today     #Cards:  HFpEF  - On low dose levo   Continue  midodrine 10mg TID may need dose increase  - TTE : EF 55% mild AS and RV dysfunction.     #GI:  Liver transplant   - continue to hold home Cellcept and prednisone 3 mg ( currently on dexamethasone)   - continue tacrolimus 0.5mg BID is dcd per transplant recs x48 hrs (revisit on Thursday)  - continue Nystatin and Bactrim for PCP ppx   - Hepatology on board  - Tolerating TF @goal 20cc    #Renal:  EDUAR on CKD  - EDUAR: worsening renal function   - Now requiring cvvh   - palacios in place; monitor UOP, lytes    #Endo:  T2DM:  - FSG q6h with mISS     #ID:  - Leukocytosis. Rpt pan culture sent. Fungitell sent. Added Alethea and Caspo, f/U fungitel, vanco readded   -Persistent +Staph Epi Meth Resistant/+Strep BCx   - Bcx () + for MR Staph Epi and undefined Strep -->  BCX NGTD.   - Sputum (3/1)  many Gram +'s, likely Strep/Staph ?pnumonia.   - 3/1 persistent GPC bacteremia, repeat sent today    #Heme:  bone marrow biopsy with myeloid abnormality in 2020 during recent admission for anemia to 6s warranting blood transfusion  - likely cause of anemia-transfuse 1 unit prbc today  - continue to trend  -Continue heparin SQ

## 2021-03-02 NOTE — PROGRESS NOTE ADULT - ASSESSMENT
69 y/o M w/ uncontrolled Type 2 DM w/ hyperglycemia exacerbated by steroids for COVID. Now intubated on pressors/cvvh w/severe metabolic and respiratory acidosis. NPH discontinued after drop in glucose overnight. BG goal (140-180mg/dl).

## 2021-03-02 NOTE — PROGRESS NOTE ADULT - SUBJECTIVE AND OBJECTIVE BOX
[facility] Division of Kidney Diseases & Hypertension  FOLLOW UP NOTE  [phone]--------------------------------------------------------------------------------    Chief Complaint: Hypoxic respiratory failure secondary to COVID 19, EDUAR    24 hour events/subjective: Pt. was seen in the MICU today. Pt. remains intubated, in prone position. Pt. currently tolerating CRRT, however overnight had filter clotting.    PAST HISTORY  --------------------------------------------------------------------------------  No significant changes to PMH, PSH, FHx, SHx, unless otherwise noted    ALLERGIES & MEDICATIONS  --------------------------------------------------------------------------------  Allergies    clindamycin (Unknown)    Intolerances      Standing Inpatient Medications  artificial  tears Solution 1 Drop(s) Both EYES every 12 hours  aspirin  chewable 81 milliGRAM(s) Oral daily  chlorhexidine 0.12% Liquid 15 milliLiter(s) Oral Mucosa two times a day  chlorhexidine 4% Liquid 1 Application(s) Topical <User Schedule>  cisatracurium Infusion 3 MICROgram(s)/kG/Min IV Continuous <Continuous>  CRRT Treatment    <Continuous>  dexAMETHasone  Injectable 6 milliGRAM(s) IV Push daily  fentaNYL   Infusion... 0.5 MICROgram(s)/kG/Hr IV Continuous <Continuous>  heparin   Injectable 5000 Unit(s) SubCutaneous every 8 hours  heparin  Infusion Syringe 300 Unit(s)/Hr CRRT <Continuous>  insulin lispro (ADMELOG) corrective regimen sliding scale   SubCutaneous every 6 hours  ketamine Infusion. 2 mG/kG/Hr IV Continuous <Continuous>  lactulose Syrup 10 Gram(s) Oral every 6 hours  meropenem  IVPB 1000 milliGRAM(s) IV Intermittent every 12 hours  meropenem  IVPB      midodrine 10 milliGRAM(s) Oral every 8 hours  norepinephrine Infusion 0.05 MICROgram(s)/kG/Min IV Continuous <Continuous>  nystatin    Suspension 728436 Unit(s) Oral every 8 hours  pantoprazole  Injectable 40 milliGRAM(s) IV Push daily  petrolatum Ophthalmic Ointment 1 Application(s) Both EYES two times a day  PrismaSATE Dialysate BK 0 / 3.5 5000 milliLiter(s) CRRT <Continuous>  PrismaSOL Filtration BGK 0 / 2.5 5000 milliLiter(s) CRRT <Continuous>  PrismaSOL Filtration BGK 0 / 2.5 5000 milliLiter(s) CRRT <Continuous>  propofol Infusion 10 MICROgram(s)/kG/Min IV Continuous <Continuous>  senna Syrup 5 milliLiter(s) Oral at bedtime  sertraline 100 milliGRAM(s) Oral daily  tacrolimus    0.5 mG/mL Suspension 0.5 milliGRAM(s) Oral two times a day  trimethoprim  160 mG/sulfamethoxazole 800 mG 1 Tablet(s) Oral daily  vancomycin  IVPB 750 milliGRAM(s) IV Intermittent every 12 hours  vancomycin  IVPB 1250 milliGRAM(s) IV Intermittent once  vasopressin Infusion 0.04 Unit(s)/Min IV Continuous <Continuous>    VITALS/PHYSICAL EXAM  --------------------------------------------------------------------------------  T(C): 37.7 (03-02-21 @ 08:00), Max: 37.7 (03-01-21 @ 23:00)  HR: 86 (03-02-21 @ 10:00) (79 - 96)  BP: --  RR: 28 (03-02-21 @ 10:00) (19 - 30)  SpO2: 95% (03-02-21 @ 10:00) (82% - 100%)  Wt(kg): --    03-01-21 @ 07:01  -  03-02-21 @ 07:00  --------------------------------------------------------  IN: 7038 mL / OUT: 3761 mL / NET: 3277 mL    03-02-21 @ 07:01  -  03-02-21 @ 10:52  --------------------------------------------------------  IN: 480.2 mL / OUT: 498 mL / NET: -17.8 mL    Physical Exam:  Physical Exam deferred due to COVID19 and PPE preservation  Pt. currently intubated, in prone position and on CRRT via Right Femoral Non-tunneled HD catheter.    LABS/STUDIES  --------------------------------------------------------------------------------              7.1    40.41 >-----------<  292      [03-02-21 @ 06:49]              22.9     134  |  96  |  68  ----------------------------<  222      [03-02-21 @ 06:49]  5.1   |  24  |  2.03        Ca     7.8     [03-02-21 @ 06:49]      Mg     2.3     [03-02-21 @ 06:49]      Phos  5.6     [03-02-21 @ 06:49]    TPro  5.1  /  Alb  1.6  /  TBili  1.4  /  DBili  x   /  AST  338  /  ALT  211  /  AlkPhos  275  [03-02-21 @ 06:49]    PT/INR: PT 18.7 , INR 1.59       [03-02-21 @ 06:49]  PTT: 31.6       [03-02-21 @ 06:49]          [03-01-21 @ 00:21]    Creatinine Trend:  SCr 2.03 [03-02 @ 06:49]  SCr 2.13 [03-02 @ 02:37]  SCr 2.60 [03-01 @ 21:39]  SCr 2.75 [03-01 @ 08:39]  SCr 2.46 [03-01 @ 00:21]    Urinalysis - [03-01-21 @ 03:22]      Color Yellow / Appearance Slightly Turbid / SG 1.026 / pH 6.0      Gluc Negative / Ketone Negative  / Bili Negative / Urobili 2 mg/dL       Blood Large / Protein 30 mg/dL / Leuk Est Large / Nitrite Negative       / WBC 65 / Hyaline 8 / Gran  / Sq Epi  / Non Sq Epi 0 / Bacteria Moderate

## 2021-03-02 NOTE — PROGRESS NOTE ADULT - PROBLEM SELECTOR PLAN 2
-hypotensive requiring pressors at this time    discussed w/RN  pager: [phone]   office:  [phone] (M-F 9a-5pm)               [phone] (nights/weekends)    -I spent a total time of 25 mins with the patient at bedside/on unit of which more than 50% of time was spent on counseling/coordination of care.

## 2021-03-02 NOTE — PROGRESS NOTE ADULT - SUBJECTIVE AND OBJECTIVE BOX
Interval Events:   Liver enzymes worsened over the last 24 hours ( previously normal)       Hospital Medications:  artificial  tears Solution 1 Drop(s) Both EYES every 12 hours  aspirin  chewable 81 milliGRAM(s) Oral daily  chlorhexidine 0.12% Liquid 15 milliLiter(s) Oral Mucosa two times a day  chlorhexidine 4% Liquid 1 Application(s) Topical <User Schedule>  cisatracurium Infusion 3 MICROgram(s)/kG/Min IV Continuous <Continuous>  CRRT Treatment    <Continuous>  dexAMETHasone  Injectable 6 milliGRAM(s) IV Push daily  fentaNYL   Infusion... 0.5 MICROgram(s)/kG/Hr IV Continuous <Continuous>  heparin   Injectable 5000 Unit(s) SubCutaneous every 8 hours  heparin  Infusion Syringe 300 Unit(s)/Hr CRRT <Continuous>  insulin lispro (ADMELOG) corrective regimen sliding scale   SubCutaneous every 6 hours  ketamine Infusion. 2 mG/kG/Hr IV Continuous <Continuous>  meropenem  IVPB 1000 milliGRAM(s) IV Intermittent every 12 hours  meropenem  IVPB      midodrine 10 milliGRAM(s) Oral every 8 hours  norepinephrine Infusion 0.05 MICROgram(s)/kG/Min IV Continuous <Continuous>  nystatin    Suspension 589121 Unit(s) Oral every 8 hours  pantoprazole  Injectable 40 milliGRAM(s) IV Push daily  petrolatum Ophthalmic Ointment 1 Application(s) Both EYES two times a day  PrismaSATE Dialysate BK 0 / 3.5 5000 milliLiter(s) CRRT <Continuous>  PrismaSOL Filtration BGK 0 / 2.5 5000 milliLiter(s) CRRT <Continuous>  PrismaSOL Filtration BGK 0 / 2.5 5000 milliLiter(s) CRRT <Continuous>  propofol Infusion 10 MICROgram(s)/kG/Min IV Continuous <Continuous>  senna Syrup 5 milliLiter(s) Oral at bedtime  sertraline 100 milliGRAM(s) Oral daily  sodium chloride 0.9% lock flush 10 milliLiter(s) IV Push every 1 hour PRN  tacrolimus    0.5 mG/mL Suspension 0.5 milliGRAM(s) Oral two times a day  trimethoprim  160 mG/sulfamethoxazole 800 mG 1 Tablet(s) Oral daily  vasopressin Infusion 0.04 Unit(s)/Min IV Continuous <Continuous>        ROS: unable to be obtained, patient is intubated and sedated     PHYSICAL EXAM:   Vital Signs:  Vital Signs Last 24 Hrs  T(C): 37.5 (02 Mar 2021 03:00), Max: 37.7 (01 Mar 2021 23:00)  T(F): 99.5 (02 Mar 2021 03:00), Max: 99.9 (01 Mar 2021 23:00)  HR: 85 (02 Mar 2021 06:45) (79 - 96)  BP: --  BP(mean): --  RR: 30 (02 Mar 2021 06:45) (19 - 30)  SpO2: 100% (02 Mar 2021 06:45) (82% - 100%)  Daily     Daily     GENERAL: intubated and sedated   HEENT:  sclera -anicteric  CHEST:  on ventilator   HEART: S1 + S2,   ABDOMEN:  Soft, non-tender, non-distended  EXTREMITIES:  edema  SKIN:  chronic lymphedema   NEURO: intubated and sedated    LABS:                        7.1    40.41 )-----------( 292      ( 02 Mar 2021 06:49 )             22.9     Mean Cell Volume: 96.6 fl (- @ 06:49)    03-    134<L>  |  96  |  68<H>  ----------------------------<  222<H>  5.1   |  24  |  2.03<H>    Ca    7.8<L>      02 Mar 2021 06:49  Phos  5.6     03-02  Mg     2.3     03-02    TPro  5.1<L>  /  Alb  1.6<L>  /  TBili  1.4<H>  /  DBili  x   /  AST  338<H>  /  ALT  211<H>  /  AlkPhos  275<H>  03-02    LIVER FUNCTIONS - ( 02 Mar 2021 06:49 )  Alb: 1.6 g/dL / Pro: 5.1 g/dL / ALK PHOS: 275 U/L / ALT: 211 U/L / AST: 338 U/L / GGT: x           PT/INR - ( 02 Mar 2021 06:49 )   PT: 18.7 sec;   INR: 1.59 ratio         PTT - ( 02 Mar 2021 06:49 )  PTT:31.6 sec  Urinalysis Basic - ( 01 Mar 2021 03:22 )    Color: Yellow / Appearance: Slightly Turbid / S.026 / pH: x  Gluc: x / Ketone: Negative  / Bili: Negative / Urobili: 2 mg/dL   Blood: x / Protein: 30 mg/dL / Nitrite: Negative   Leuk Esterase: Large / RBC: 381 /hpf / WBC 65 /HPF   Sq Epi: x / Non Sq Epi: 0 /hpf / Bacteria: Moderate                              7.1    40.41 )-----------( 292      ( 02 Mar 2021 06:49 )             22.9                         7.5    43.21 )-----------( 329      ( 02 Mar 2021 02:37 )             25.2                         7.4    41.73 )-----------( 308      ( 01 Mar 2021 21:39 )             24.5                         8.7    32.40 )-----------( 367      ( 01 Mar 2021 00:21 )             29.4                         8.3    8.95  )-----------( 146      ( 2021 00:41 )             27.6       Imaging:

## 2021-03-02 NOTE — PROGRESS NOTE ADULT - ASSESSMENT
71 y/o Male w/ PMH significant for HTN, HLD, T2DM on insulin, COPD (on home O2 3L), EtOH/HCV cirrhosis s/p liver transplant (2011) on Tacro/Cellcept/Prednisone, HFpEF, possible MDS, chronic lymphedema presenting with acute on chronic worsening SOB x 1 week, found to have acute hypoxic respiratory failure due  COVID -19 pneumonia and EDUAR.  Hospital course complicated with worsening hypoxia requiring intubation, worsening EDUAR requiring CVVHD since 03/01, bacteremia with 2 different organisms and with CoNS and maris strep from 02/25 and 03/01    Impression:  # COVID- 19 pneumonia with likely superimposed bacterial PNA on remdisivir since 02/24 and Meropenem    # Acute hypoxic respiratory failure and septic shock 2nd to above   # Elevated liver enzymes likely due to sepsis  # Post-liver transplant: on Prograf/CellCept/prednisone. On prophylactic Bactrim and nystatin.   # HFpEF: Right ventricular systolic dysfunction and moderate AS with normal LVEF noted on TTE from 05/2020.   # Type 2 diabetes    Recommendations:  - Continue supportive care   - continue to hold home CellCept and prednisone 3 mg ( currently on dexamethazone)   - continue tacrolimus 0.5mg BID ( last prograf level was <2)  - check prograf level every 3 days   - continue Bactrim and nystatin      Keyana Solorzano   Gastroenterology Fellow  Pager: [phone]  Please call answering service [phone] / on-call GI fellow after 5pm and before 8am, and on weekends.    Keyana Solorzano   Gastroenterology Fellow  Pager: [phone]  Please call answering service [phone] / on-call GI fellow after 5pm and before 8am, and on weekends.

## 2021-03-02 NOTE — PROGRESS NOTE ADULT - PROBLEM SELECTOR PLAN 1
-test BG q6h  -c/w Admelog moderate correction scale Q6h for now  -If BG values become difficult to control would favor IV insulin infusion in setting of acidosis and pressor requirements.   -Discharge plan: [facility]

## 2021-03-02 NOTE — PROGRESS NOTE ADULT - ASSESSMENT
69 y/o Male w/ PMH significant for HTN, HLD, T2DM on insulin, COPD (on home O2 3L), EtOH/HCV cirrhosis s/p liver transplant (2011) on Tacro/Cellcept/Prednisone, HFpEF, possible MDS, chronic lymphedema presenting with worsening SOB of several months duration.    Pt with hypoxic respiratory failure due to COVID infection leading to pneumonia.   bacteremia with strep and CoNS.   Acute on chronic renal failure  liver transplant recipient   rising leucocytosis   E coli pneumonia         Plan:   c/w vancomycin, dose per CVVH.   c/w meropenem   c/w remdesivir  initiated on caspo empirically   monitor LFT's and Cr while on remdesivir  c/w dexamethasone  trend CBC for leucocytosis   trend markers of inflammation including ferritin, D-dimer, CRP periodically  follow up repeat blood cx, NTD   repeat blood cx   immunosuppression per transplant hepatology     prognosis guarded

## 2021-03-02 NOTE — PROGRESS NOTE ADULT - SUBJECTIVE AND OBJECTIVE BOX
CHIEF COMPLAINT:  Patient is a 70y old  Male who presents with a chief complaint of SOB (01 Mar 2021 17:45)    HPI:   71 y/o Male w/ PMH significant for HTN, HLD, T2DM on insulin, COPD (on home O2 3L), EtOH/HCV cirrhosis s/p liver transplant () on Tacro/Cellcept/Prednisone, HFpEF, possible MDS, chronic lymphedema presenting with worsening SOB of several months duration. Difficult to obtain history as pt is on bipap. Pt reports SOB started "several months ago" that has been progressively getting worse. He has had GOMEZ, better with rest. Taking his medications helps temporarily difficulty breathing returns. He does not remember any inciting events that may have caused him to become more SOB. He has been having difficulty sleeping. Pt did not have any improvement in his breathing and decided to call an ambulance to come to the hospital. Of note pt had previous admission in 2020 for symptomatic anemia s/p BM biopsy . Reports currently feeling better on bipap. No sick contacts. Denies chest pain, nausea, vomiting, + chronic orthopnea.     In ED,  98.2F, HR 99, 130/58, RR 28, desatted to 88% on NC 3L, now 100% on BiPAP   s/p 3x duonebs, solumedrol 125 IV, lasix 80 IV    (2021 18:34)      Interval Events:      REVIEW OF SYSTEMS:          OBJECTIVE:  ICU Vital Signs Last 24 Hrs  T(C): 37.5 (02 Mar 2021 03:00), Max: 37.7 (01 Mar 2021 23:00)  T(F): 99.5 (02 Mar 2021 03:00), Max: 99.9 (01 Mar 2021 23:00)  HR: 84 (02 Mar 2021 06:15) (79 - 96)  BP: --  BP(mean): --  ABP: 118/36 (02 Mar 2021 06:15) (99/46 - 148/48)  ABP(mean): 58 (02 Mar 2021 06:15) (52 - 74)  RR: 30 (02 Mar 2021 06:15) (19 - 30)  SpO2: 95% (02 Mar 2021 06:15) (82% - 96%)    Mode: AC/ CMV (Assist Control/ Continuous Mandatory Ventilation), RR (machine): 30, TV (machine): 540, FiO2: 100, PEEP: 12, ITime: 1, MAP: 19, PIP: 36     @ 07: @ 07:00  --------------------------------------------------------  IN: 4473.6 mL / OUT: 1120 mL / NET: 3353.6 mL     @ 07: @ 06:34  --------------------------------------------------------  IN: 6993.2 mL / OUT: 3629 mL / NET: 3364.2 mL      CAPILLARY BLOOD GLUCOSE      POCT Blood Glucose.: 81 mg/dL (02 Mar 2021 05:39)          PHYSICAL EXAM:          HOSPITAL MEDICATIONS:  MEDICATIONS  (STANDING):  artificial  tears Solution 1 Drop(s) Both EYES every 12 hours  aspirin  chewable 81 milliGRAM(s) Oral daily  chlorhexidine 0.12% Liquid 15 milliLiter(s) Oral Mucosa two times a day  chlorhexidine 4% Liquid 1 Application(s) Topical <User Schedule>  cisatracurium Infusion 3 MICROgram(s)/kG/Min (20.5 mL/Hr) IV Continuous <Continuous>  CRRT Treatment    <Continuous>  dexAMETHasone  Injectable 6 milliGRAM(s) IV Push daily  fentaNYL   Infusion... 0.5 MICROgram(s)/kG/Hr (2.85 mL/Hr) IV Continuous <Continuous>  heparin   Injectable 5000 Unit(s) SubCutaneous every 8 hours  heparin  Infusion Syringe 300 Unit(s)/Hr (0.6 mL/Hr) CRRT <Continuous>  insulin lispro (ADMELOG) corrective regimen sliding scale   SubCutaneous every 6 hours  ketamine Infusion. 2 mG/kG/Hr (22.8 mL/Hr) IV Continuous <Continuous>  meropenem  IVPB      meropenem  IVPB 1000 milliGRAM(s) IV Intermittent every 12 hours  midodrine 10 milliGRAM(s) Oral every 8 hours  norepinephrine Infusion 0.05 MICROgram(s)/kG/Min (5.34 mL/Hr) IV Continuous <Continuous>  nystatin    Suspension 480689 Unit(s) Oral every 8 hours  pantoprazole  Injectable 40 milliGRAM(s) IV Push daily  petrolatum Ophthalmic Ointment 1 Application(s) Both EYES two times a day  PrismaSATE Dialysate BK 0 / 3.5 5000 milliLiter(s) (1800 mL/Hr) CRRT <Continuous>  PrismaSOL Filtration BGK 0 / 2.5 5000 milliLiter(s) (1500 mL/Hr) CRRT <Continuous>  PrismaSOL Filtration BGK 0 / 2.5 5000 milliLiter(s) (300 mL/Hr) CRRT <Continuous>  propofol Infusion 10 MICROgram(s)/kG/Min (6.83 mL/Hr) IV Continuous <Continuous>  senna Syrup 5 milliLiter(s) Oral at bedtime  sertraline 100 milliGRAM(s) Oral daily  tacrolimus    0.5 mG/mL Suspension 0.5 milliGRAM(s) Oral two times a day  trimethoprim  160 mG/sulfamethoxazole 800 mG 1 Tablet(s) Oral daily  vasopressin Infusion 0.04 Unit(s)/Min (2.4 mL/Hr) IV Continuous <Continuous>    MEDICATIONS  (PRN):  sodium chloride 0.9% lock flush 10 milliLiter(s) IV Push every 1 hour PRN Pre/post blood products, medications, blood draw, and to maintain line patency      LABS:                        7.5    43.21 )-----------( 329      ( 02 Mar 2021 02:37 )             25.2     Hgb Trend: 7.5<--, 7.4<--, 8.7<--, 8.3<--, 8.2<--  03-02    134<L>  |  95<L>  |  73<H>  ----------------------------<  240<H>  5.2   |  23  |  2.13<H>    Ca    8.2<L>      02 Mar 2021 02:37  Phos  5.6     03-02  Mg     2.4     03-02    TPro  5.5<L>  /  Alb  2.0<L>  /  TBili  1.3<H>  /  DBili  x   /  AST  327<H>  /  ALT  212<H>  /  AlkPhos  278<H>      LIVER FUNCTIONS - ( 02 Mar 2021 02:37 )  Alb: 2.0 g/dL / Pro: 5.5 g/dL / ALK PHOS: 278 U/L / ALT: 212 U/L / AST: 327 U/L / GGT: x           Creatinine Trend: 2.13<--, 2.60<--, 2.75<--, 2.46<--, 2.02<--, 1.92<--  PT/INR - ( 01 Mar 2021 21:39 )   PT: 16.1 sec;   INR: 1.36 ratio         PTT - ( 02 Mar 2021 02:37 )  PTT:27.8 sec  Urinalysis Basic - ( 01 Mar 2021 03:22 )    Color: Yellow / Appearance: Slightly Turbid / S.026 / pH: x  Gluc: x / Ketone: Negative  / Bili: Negative / Urobili: 2 mg/dL   Blood: x / Protein: 30 mg/dL / Nitrite: Negative   Leuk Esterase: Large / RBC: 381 /hpf / WBC 65 /HPF   Sq Epi: x / Non Sq Epi: 0 /hpf / Bacteria: Moderate      Arterial Blood Gas:   @ 02:36  7.23/64/73/26/91/-1.7  ABG lactate: --  Arterial Blood Gas:   @ 21:36  7.20/63/51/24/79/--  ABG lactate: --  Arterial Blood Gas:   @ 19:47  7.17/68/55/24/83/-4.4  ABG lactate: --  Arterial Blood Gas:   @ 19:08  7.16/69/56/24/82/-4.8  ABG lactate: --  Arterial Blood Gas:   @ 18:19  7.19/64/65/23/90/-4.5  ABG lactate: --  Arterial Blood Gas:   @ 14:06  7.19/61/73/22/93/-5.5  ABG lactate: --  Arterial Blood Gas:   @ 02:03  7.19/62/107/23/98/-5.2  ABG lactate: --  Arterial Blood Gas:   @ 00:14  7.17/70/98/24/96/-4.2  ABG lactate: --  Arterial Blood Gas:   @ 17:20  7.22/61/61/24/86/-3.8  ABG lactate: --  Arterial Blood Gas:   @ 14:06  7./61/64/25/89/-2.4  ABG lactate: --  Arterial Blood Gas:   @ 11:14  7.23/63/74/26/95/-1.6  ABG lactate: --    Venous Blood Gas:   @ 16:11  7.14/73/37/24/49  VBG Lactate: --      MICROBIOLOGY:     RADIOLOGY:  [ ] Reviewed and interpreted by me    EKG:       CHIEF COMPLAINT:  Patient is a 70y old  Male who presents with a chief complaint of SOB (01 Mar 2021 17:45)    HPI:   69 y/o Male w/ PMH significant for HTN, HLD, T2DM on insulin, COPD (on home O2 3L), EtOH/HCV cirrhosis s/p liver transplant () on Tacro/Cellcept/Prednisone, HFpEF, possible MDS, chronic lymphedema presenting with worsening SOB of several months duration. Difficult to obtain history as pt is on bipap. Pt reports SOB started "several months ago" that has been progressively getting worse. He has had GOMEZ, better with rest. Taking his medications helps temporarily difficulty breathing returns. He does not remember any inciting events that may have caused him to become more SOB. He has been having difficulty sleeping. Pt did not have any improvement in his breathing and decided to call an ambulance to come to the hospital. Of note pt had previous admission in 2020 for symptomatic anemia s/p BM biopsy . Reports currently feeling better on bipap. No sick contacts. Denies chest pain, nausea, vomiting, + chronic orthopnea.     In ED,  98.2F, HR 99, 130/58, RR 28, desatted to 88% on NC 3L, now 100% on BiPAP   s/p 3x duonebs, solumedrol 125 IV, lasix 80 IV    (2021 18:34)      Interval Events: No acute events      REVIEW OF SYSTEMS: unable          OBJECTIVE:  ICU Vital Signs Last 24 Hrs  T(C): 37.5 (02 Mar 2021 03:00), Max: 37.7 (01 Mar 2021 23:00)  T(F): 99.5 (02 Mar 2021 03:00), Max: 99.9 (01 Mar 2021 23:00)  HR: 84 (02 Mar 2021 06:15) (79 - 96)  BP: --  BP(mean): --  ABP: 118/36 (02 Mar 2021 06:15) (99/46 - 148/48)  ABP(mean): 58 (02 Mar 2021 06:15) (52 - 74)  RR: 30 (02 Mar 2021 06:15) (19 - 30)  SpO2: 95% (02 Mar 2021 06:15) (82% - 96%)    Mode: AC/ CMV (Assist Control/ Continuous Mandatory Ventilation), RR (machine): 30, TV (machine): 540, FiO2: 100, PEEP: 12, ITime: 1, MAP: 19, PIP: 36     @ 07: @ 07:00  --------------------------------------------------------  IN: 4473.6 mL / OUT: 1120 mL / NET: 3353.6 mL     @ 07: @ 06:34  --------------------------------------------------------  IN: 6993.2 mL / OUT: 3629 mL / NET: 3364.2 mL      CAPILLARY BLOOD GLUCOSE      POCT Blood Glucose.: 81 mg/dL (02 Mar 2021 05:39)          PHYSICAL EXAM:  GENERAL: NAD,  HEENT:  Atraumatic, Normocephalic  EYES: PERRLA, conjunctiva and sclera clear  NECK: Supple, No JVD  CHEST/LUNG: diminished bilaterally; No wheezes, rales, or rhonchi  HEART: Regular rate and rhythm; No murmurs, rubs, or gallops  ABDOMEN: Soft, Nontender, Nondistended; Bowel sounds present  EXTREMITIES:  2+ Peripheral Pulses, No clubbing, cyanosis, or edema  PSYCH: unable as pt is sedated  NEUROLOGY: sedated  SKIN: No rashes or lesions        HOSPITAL MEDICATIONS:  MEDICATIONS  (STANDING):  artificial  tears Solution 1 Drop(s) Both EYES every 12 hours  aspirin  chewable 81 milliGRAM(s) Oral daily  chlorhexidine 0.12% Liquid 15 milliLiter(s) Oral Mucosa two times a day  chlorhexidine 4% Liquid 1 Application(s) Topical <User Schedule>  cisatracurium Infusion 3 MICROgram(s)/kG/Min (20.5 mL/Hr) IV Continuous <Continuous>  CRRT Treatment    <Continuous>  dexAMETHasone  Injectable 6 milliGRAM(s) IV Push daily  fentaNYL   Infusion... 0.5 MICROgram(s)/kG/Hr (2.85 mL/Hr) IV Continuous <Continuous>  heparin   Injectable 5000 Unit(s) SubCutaneous every 8 hours  heparin  Infusion Syringe 300 Unit(s)/Hr (0.6 mL/Hr) CRRT <Continuous>  insulin lispro (ADMELOG) corrective regimen sliding scale   SubCutaneous every 6 hours  ketamine Infusion. 2 mG/kG/Hr (22.8 mL/Hr) IV Continuous <Continuous>  meropenem  IVPB      meropenem  IVPB 1000 milliGRAM(s) IV Intermittent every 12 hours  midodrine 10 milliGRAM(s) Oral every 8 hours  norepinephrine Infusion 0.05 MICROgram(s)/kG/Min (5.34 mL/Hr) IV Continuous <Continuous>  nystatin    Suspension 445412 Unit(s) Oral every 8 hours  pantoprazole  Injectable 40 milliGRAM(s) IV Push daily  petrolatum Ophthalmic Ointment 1 Application(s) Both EYES two times a day  PrismaSATE Dialysate BK 0 / 3.5 5000 milliLiter(s) (1800 mL/Hr) CRRT <Continuous>  PrismaSOL Filtration BGK 0 / 2.5 5000 milliLiter(s) (1500 mL/Hr) CRRT <Continuous>  PrismaSOL Filtration BGK 0 / 2.5 5000 milliLiter(s) (300 mL/Hr) CRRT <Continuous>  propofol Infusion 10 MICROgram(s)/kG/Min (6.83 mL/Hr) IV Continuous <Continuous>  senna Syrup 5 milliLiter(s) Oral at bedtime  sertraline 100 milliGRAM(s) Oral daily  tacrolimus    0.5 mG/mL Suspension 0.5 milliGRAM(s) Oral two times a day  trimethoprim  160 mG/sulfamethoxazole 800 mG 1 Tablet(s) Oral daily  vasopressin Infusion 0.04 Unit(s)/Min (2.4 mL/Hr) IV Continuous <Continuous>    MEDICATIONS  (PRN):  sodium chloride 0.9% lock flush 10 milliLiter(s) IV Push every 1 hour PRN Pre/post blood products, medications, blood draw, and to maintain line patency      LABS:                        7.5    43.21 )-----------( 329      ( 02 Mar 2021 02:37 )             25.2     Hgb Trend: 7.5<--, 7.4<--, 8.7<--, 8.3<--, 8.2<--  03-02    134<L>  |  95<L>  |  73<H>  ----------------------------<  240<H>  5.2   |  23  |  2.13<H>    Ca    8.2<L>      02 Mar 2021 02:37  Phos  5.6     03-  Mg     2.4     -    TPro  5.5<L>  /  Alb  2.0<L>  /  TBili  1.3<H>  /  DBili  x   /  AST  327<H>  /  ALT  212<H>  /  AlkPhos  278<H>      LIVER FUNCTIONS - ( 02 Mar 2021 02:37 )  Alb: 2.0 g/dL / Pro: 5.5 g/dL / ALK PHOS: 278 U/L / ALT: 212 U/L / AST: 327 U/L / GGT: x           Creatinine Trend: 2.13<--, 2.60<--, 2.75<--, 2.46<--, 2.02<--, 1.92<--  PT/INR - ( 01 Mar 2021 21:39 )   PT: 16.1 sec;   INR: 1.36 ratio         PTT - ( 02 Mar 2021 02:37 )  PTT:27.8 sec  Urinalysis Basic - ( 01 Mar 2021 03:22 )    Color: Yellow / Appearance: Slightly Turbid / S.026 / pH: x  Gluc: x / Ketone: Negative  / Bili: Negative / Urobili: 2 mg/dL   Blood: x / Protein: 30 mg/dL / Nitrite: Negative   Leuk Esterase: Large / RBC: 381 /hpf / WBC 65 /HPF   Sq Epi: x / Non Sq Epi: 0 /hpf / Bacteria: Moderate      Arterial Blood Gas:   @ 02:36  7.23/64/73/26/91/-1.7  ABG lactate: --  Arterial Blood Gas:   @ 21:36  7.20/63/51/24/79/--  ABG lactate: --  Arterial Blood Gas:   @ 19:47  7.17/68/55/24/83/-4.4  ABG lactate: --  Arterial Blood Gas:   @ 19:08  7.16/69/56/24/82/-4.8  ABG lactate: --  Arterial Blood Gas:   @ 18:19  7.19/64/65/23/90/-4.5  ABG lactate: --  Arterial Blood Gas:   @ 14:06  7.19/61/73/22/93/-5.5  ABG lactate: --  Arterial Blood Gas:   @ 02:03  7.19/62/107/23/98/-5.2  ABG lactate: --  Arterial Blood Gas:   @ 00:14  7.17/70/98/24/96/-4.2  ABG lactate: --  Arterial Blood Gas:   @ 17:20  7.22/61/61/24/86/-3.8  ABG lactate: --  Arterial Blood Gas:   @ 14:06  7.24/61/64/25/89/-2.4  ABG lactate: --  Arterial Blood Gas:   @ 11:14  7.23/63/74/26/95/-1.6  ABG lactate: --    Venous Blood Gas:   @ 16:11  7.14/73/37/24/49  VBG Lactate: --      MICROBIOLOGY:     RADIOLOGY:  [ ] Reviewed and interpreted by me    EKG:

## 2021-03-02 NOTE — PROGRESS NOTE ADULT - SUBJECTIVE AND OBJECTIVE BOX
Diabetes Follow up note:    Chief complaint: T2DM    Interval Hx: NPH discontinued after BG dropped to 80s early this AM. Pt intubated/proned on sedatives/pressors. Currently on Nepro @ 15 cc/hr.     Review of Systems:  sedated    MEDS:  dexAMETHasone  Injectable 6 milliGRAM(s) IV Push daily  insulin lispro (ADMELOG) corrective regimen sliding scale   SubCutaneous every 6 hours  vasopressin Infusion 0.04 Unit(s)/Min IV Continuous <Continuous>    caspofungin IVPB      meropenem  IVPB 1000 milliGRAM(s) IV Intermittent every 8 hours  nystatin    Suspension 659872 Unit(s) Oral every 8 hours  trimethoprim  160 mG/sulfamethoxazole 800 mG 1 Tablet(s) Oral daily  vancomycin  IVPB 750 milliGRAM(s) IV Intermittent every 12 hours    Allergies    clindamycin (Unknown)        PE:  General: Male lying proned in bed. Intubated  Vital Signs Last 24 Hrs  T(C): 36.7 (02 Mar 2021 12:00), Max: 37.7 (01 Mar 2021 23:00)  T(F): 98.1 (02 Mar 2021 12:00), Max: 99.9 (01 Mar 2021 23:00)  HR: 82 (02 Mar 2021 15:30) (81 - 96)  BP: --  BP(mean): --  RR: 28 (02 Mar 2021 15:30) (19 - 30)  SpO2: 98% (02 Mar 2021 15:30) (82% - 100%)  HEENT: ETT in place. On vent.   Abd: Soft, NT,ND, Obese  Extremities: Warm  Neuro: Sedated     LABS:  POCT Blood Glucose.: 81 mg/dL (03-02-21 @ 05:39)  POCT Blood Glucose.: 256 mg/dL (03-01-21 @ 23:03)  POCT Blood Glucose.: 294 mg/dL (03-01-21 @ 16:43)  POCT Blood Glucose.: 217 mg/dL (03-01-21 @ 11:51)  POCT Blood Glucose.: 237 mg/dL (03-01-21 @ 10:25)  POCT Blood Glucose.: 218 mg/dL (03-01-21 @ 04:02)  POCT Blood Glucose.: 187 mg/dL (03-01-21 @ 01:31)  POCT Blood Glucose.: 222 mg/dL (02-28-21 @ 16:47)  POCT Blood Glucose.: 163 mg/dL (02-28-21 @ 10:57)  POCT Blood Glucose.: 117 mg/dL (02-28-21 @ 05:33)  POCT Blood Glucose.: 107 mg/dL (02-28-21 @ 00:30)  POCT Blood Glucose.: 276 mg/dL (02-27-21 @ 16:51)                            7.1    40.41 )-----------( 292      ( 02 Mar 2021 06:49 )             22.9       03-02    134<L>  |  97  |  56<H>  ----------------------------<  146<H>  4.9   |  22  |  1.75<H>    Ca    7.9<L>      02 Mar 2021 11:45  Phos  6.6     03-02  Mg     2.5     03-02    TPro  5.1<L>  /  Alb  1.6<L>  /  TBili  1.4<H>  /  DBili  x   /  AST  338<H>  /  ALT  211<H>  /  AlkPhos  275<H>  03-02        A1C with Estimated Average Glucose Result: 6.9 % (02-25-21 @ 10:48)  A1C with Estimated Average Glucose Result: 6.3 % (12-08-20 @ 08:27)  A1C with Estimated Average Glucose Result: 6.6 % (05-14-20 @ 08:14)          Contact number: lacy [phone] or [phone]

## 2021-03-02 NOTE — PROGRESS NOTE ADULT - SUBJECTIVE AND OBJECTIVE BOX
70y old  Male who presents with a chief complaint of SOB (02 Mar 2021 15:56)      Interval history:  Afebrile, proned, on CVVH.     Allergies:   clindamycin (Unknown)      Antimicrobials:  meropenem  IVPB 1000 milliGRAM(s) IV Intermittent every 8 hours  nystatin    Suspension 828954 Unit(s) Oral every 8 hours  trimethoprim  160 mG/sulfamethoxazole 800 mG 1 Tablet(s) Oral daily  vancomycin  IVPB 750 milliGRAM(s) IV Intermittent every 12 hours      REVIEW OF SYSTEMS:  Unable to obtain as patient intubated and sedated.       Vital Signs Last 24 Hrs  T(C): 37 (03-02-21 @ 16:00), Max: 37.7 (03-01-21 @ 23:00)  T(F): 98.6 (03-02-21 @ 16:00), Max: 99.9 (03-01-21 @ 23:00)  HR: 81 (03-02-21 @ 17:30) (81 - 96)  BP: --  BP(mean): --  RR: 28 (03-02-21 @ 17:30) (19 - 30)  SpO2: 95% (03-02-21 @ 17:30) (82% - 100%)      PHYSICAL EXAM:  Patient intubated sedated  facial scabs from proning   Cardiovascular: S1S2 normal.  Lungs: + air entry B/L lung fields except rt upper lung   Gastrointestinal: soft, nondistended.  Extremities: + edema.  + TLC  + palacios                           7.1    40.41 )-----------( 292      ( 02 Mar 2021 06:49 )             22.9   03-02    132<L>  |  95<L>  |  49<H>  ----------------------------<  134<H>  5.1   |  18<L>  |  1.60<H>    Ca    8.2<L>      02 Mar 2021 17:15  Phos  7.0     03-02  Mg     2.4     03-02    TPro  5.1<L>  /  Alb  1.6<L>  /  TBili  1.4<H>  /  DBili  x   /  AST  338<H>  /  ALT  211<H>  /  AlkPhos  275<H>  03-02      LIVER FUNCTIONS - ( 02 Mar 2021 06:49 )  Alb: 1.6 g/dL / Pro: 5.1 g/dL / ALK PHOS: 275 U/L / ALT: 211 U/L / AST: 338 U/L / GGT: x               Culture - Sputum (collected 01 Mar 2021 08:55)  Source: .Sputum Sputum  Gram Stain (01 Mar 2021 16:01):    Numerous polymorphonuclear leukocytes per low power field    Rare Squamous epithelial cells per low power field    Numerous Gram Negative Rods per oil power field  Preliminary Report (02 Mar 2021 11:02):    Numerous Escherichia coli    Normal Respiratory Steffanie absent    Culture - Blood (collected 01 Mar 2021 08:16)  Source: .Blood Blood-Peripheral  Preliminary Report (02 Mar 2021 09:01):    No growth to date.    Culture - Blood (collected 01 Mar 2021 08:16)  Source: .Blood Blood-Peripheral  Gram Stain (02 Mar 2021 03:27):    Growth in aerobic bottle: Gram Positive Cocci in Clusters  Preliminary Report (02 Mar 2021 03:27):    Growth in aerobic bottle: Gram Positive Cocci in Clusters    Culture - Blood (collected 28 Feb 2021 21:02)  Source: .Blood Blood  Preliminary Report (01 Mar 2021 22:01):    No growth to date.      Radiology:  < from: Xray Chest 1 View- PORTABLE-Urgent (Xray Chest 1 View- PORTABLE-Urgent .) (02.26.21 @ 22:38) >  IMPRESSION:  Tubes and lines as above.    Multifocal pneumonia. Such as seen in Covid 19 pneumonia.

## 2021-03-03 NOTE — PROGRESS NOTE ADULT - PROBLEM SELECTOR PROBLEM 1
Type 2 diabetes mellitus with hyperglycemia, with long-term current use of insulin
EDUAR (acute kidney injury)
EDUAR (acute kidney injury)
Type 2 diabetes mellitus with hyperglycemia, with long-term current use of insulin
Type 2 diabetes mellitus with hyperglycemia, with long-term current use of insulin
Acute respiratory failure with hypoxia and hypercapnia

## 2021-03-03 NOTE — PROGRESS NOTE ADULT - SUBJECTIVE AND OBJECTIVE BOX
[facility] Division of Kidney Diseases & Hypertension  FOLLOW UP NOTE  [phone]--------------------------------------------------------------------------------    Chief Complaint: Hypoxic respiratory failure secondary to COVID 19, EDUAR    24 hour events/subjective: Pt. was seen in the MICU today. Pt. remains intubated. Pt. unable to tolerate CRRT overnight due to hemodynamic instability. Pt. remains on multiple vasopressors. [facility] discussion ongoing.    PAST HISTORY  --------------------------------------------------------------------------------  No significant changes to PMH, PSH, FHx, SHx, unless otherwise noted    ALLERGIES & MEDICATIONS  --------------------------------------------------------------------------------  Allergies    clindamycin (Unknown)    Intolerances      Standing Inpatient Medications  artificial  tears Solution 1 Drop(s) Both EYES every 12 hours  aspirin  chewable 81 milliGRAM(s) Oral daily  caspofungin IVPB      caspofungin IVPB 50 milliGRAM(s) IV Intermittent every 24 hours  chlorhexidine 0.12% Liquid 15 milliLiter(s) Oral Mucosa two times a day  chlorhexidine 4% Liquid 1 Application(s) Topical <User Schedule>  CRRT Treatment    <Continuous>  fentaNYL    Injectable 100 MICROGram(s) IV Push once  fentaNYL   Infusion... 0.5 MICROgram(s)/kG/Hr IV Continuous <Continuous>  heparin  Infusion Syringe 300 Unit(s)/Hr CRRT <Continuous>  insulin lispro (ADMELOG) corrective regimen sliding scale   SubCutaneous every 6 hours  ketamine Infusion. 2 mG/kG/Hr IV Continuous <Continuous>  meropenem  IVPB 1000 milliGRAM(s) IV Intermittent every 8 hours  norepinephrine Infusion 0.05 MICROgram(s)/kG/Min IV Continuous <Continuous>  nystatin    Suspension 581090 Unit(s) Oral every 8 hours  pantoprazole  Injectable 40 milliGRAM(s) IV Push daily  petrolatum Ophthalmic Ointment 1 Application(s) Both EYES two times a day  PrismaSATE Dialysate BK 0 / 3.5 5000 milliLiter(s) CRRT <Continuous>  PrismaSOL Filtration BGK 0 / 2.5 5000 milliLiter(s) CRRT <Continuous>  PrismaSOL Filtration BGK 0 / 2.5 5000 milliLiter(s) CRRT <Continuous>  propofol Infusion 10 MICROgram(s)/kG/Min IV Continuous <Continuous>  vancomycin  IVPB 750 milliGRAM(s) IV Intermittent every 12 hours  vasopressin Infusion 0.04 Unit(s)/Min IV Continuous <Continuous>    VITALS/PHYSICAL EXAM  --------------------------------------------------------------------------------  T(C): 37.3 (03-03-21 @ 08:00), Max: 37.3 (03-03-21 @ 08:00)  HR: 81 (03-03-21 @ 09:45) (79 - 97)  BP: 91/46 (03-03-21 @ 09:45) (91/46 - 159/70)  RR: 34 (03-03-21 @ 09:45) (28 - 36)  SpO2: 78% (03-03-21 @ 09:30) (76% - 99%)  Wt(kg): --    03-02-21 @ 07:01  -  03-03-21 @ 07:00  --------------------------------------------------------  IN: 6148.7 mL / OUT: 4534 mL / NET: 1614.7 mL    03-03-21 @ 07:01  -  03-03-21 @ 10:20  --------------------------------------------------------  IN: 438 mL / OUT: 10 mL / NET: 428 mL    Physical Exam:  General : Intubated, sedated   HEENT : ET+  Chest : Deferred, Intubated  Vascular access : RIJ non-tunneled HD catheter site : no bleeding    LABS/STUDIES  --------------------------------------------------------------------------------              8.2    43.41 >-----------<  266      [03-03-21 @ 00:16]              26.9     134  |  94  |  42  ----------------------------<  139      [03-03-21 @ 00:16]  4.3   |  20  |  1.44        Ca     8.1     [03-03-21 @ 00:16]      Mg     2.3     [03-03-21 @ 00:16]      Phos  6.6     [03-03-21 @ 00:16]    TPro  5.1  /  Alb  1.9  /  TBili  3.5  /  DBili  x   /  AST  1443  /  ALT  513  /  AlkPhos  384  [03-03-21 @ 00:16]    PT/INR: PT 23.5 , INR 2.03       [03-03-21 @ 00:16]  PTT: 32.6       [03-03-21 @ 00:16]    Creatinine Trend:  SCr 1.44 [03-03 @ 00:16]  SCr 1.60 [03-02 @ 17:15]  SCr 1.75 [03-02 @ 11:45]  SCr 2.03 [03-02 @ 06:49]  SCr 2.13 [03-02 @ 02:37]    Urinalysis - [03-01-21 @ 03:22]      Color Yellow / Appearance Slightly Turbid / SG 1.026 / pH 6.0      Gluc Negative / Ketone Negative  / Bili Negative / Urobili 2 mg/dL       Blood Large / Protein 30 mg/dL / Leuk Est Large / Nitrite Negative       / WBC 65 / Hyaline 8 / Gran  / Sq Epi  / Non Sq Epi 0 / Bacteria Moderate    Ferritin 213      [03-01-21 @ 05:05]  Lipid: chol --, , HDL --, LDL --      [03-03-21 @ 00:14]

## 2021-03-03 NOTE — PROGRESS NOTE ADULT - PROBLEM SELECTOR PROBLEM 3
Hyperlipidemia, unspecified hyperlipidemia type
2019 novel coronavirus disease (COVID-19)
2019 novel coronavirus disease (COVID-19)
Hyperlipidemia, unspecified hyperlipidemia type
Acute on chronic heart failure

## 2021-03-03 NOTE — PROGRESS NOTE ADULT - ATTENDING COMMENTS
Allyson Lanza  Pager: [phone]. If no response or past 5 pm call [phone].
Allyson Lanza  Pager: [phone]. If no response or past 5 pm call [phone].
71 y/o Male w/ PMH significant for HTN, HLD, T2DM on insulin, COPD (on home O2 3L), EtOH/HCV cirrhosis s/p liver transplant (2011) on Tacro/Cellcept/Prednisone, HFpEF, possible MDS, p/w acute hypoxic respiratory failure from covid  and intubated.  Receiving paralysis and proning as p/f ratio very low from severe ards. Cont lung protective ventilation. titrate fio2/peep to maintain sat >90%. Cont dexamethasone for covid. titrate pressors for map >65. COnt sedation while paralyzed. cont prograf. f/u transplant reccs. Overall condition w/ severe ards and high peep requirements despite proning remain concerning. Prognosis guarded
Agree with above. Critically ill with COVID requiring frequent bedside assessment. 70 y.o. man with history of HTN, HLD, DM, COPD on home O2, and cirrhosis s/p Liver transplant (2/2 ETOH+HCV) who was admitted on 2/24 and intubated on 2/25 for worsening hypoxic respiratory failure 2/2 COVID pneumonia. The patient was placed in supine position on 2/26 due to worsening respiratory function. The patient's hospitalization has been further compromised by methacillin-resistant S. epidermidis bacteremia and EDUAR. Worsening renal failure today. Close follow up of electrolytes and acid base. Also renal consult for severe acidosis and worsening EDUAR. Supportive care.
Agree with above. Seen and examined with team on rounds. Critically ill with COVID in the ICU. Poor prognosis for recovery. Not tolerating CVVHD. GOC discussion with family.
71 y/o M with PMH as above here with acute hypoxic respiratory failure secondary to COVID-19 viral pneumonia.  Patient presented with shortness of breath on 2/24, developed progressive respiratory failure requiring intubation on 2/25.  Hospital course c/b MRSE bacteremia and now growing strep in the blood as well, cont antibiotics.  Will need surveillance blood cultures check to document clearance.  Acute kidney injury, good UOP, responded to diuresis.  Patient proned and planned for supine at 1 pm.  Check serial ABGs.  Patient examined and case reviewed in detail on bedside rounds. Frequent bedside visits with therapy change today.  Prognosis guarded.
71 y/o M with PMH as above here with acute hypoxic respiratory failure secondary to COVID-19 viral pneumonia.  Patient presented with shortness of breath on 2/24, developed progressive respiratory failure requiring intubation on 2/25.  Hospital course c/b MRSE bactermia on vanco.  Check level today.  Will need surveillance blood cultures check to document clearance.  Acute kidney injury, good UOP, cont to trend.  Patient proned and planned for supine at 1 pm.  Check serial ABGs.  Patient examined and case reviewed in detail on bedside rounds. Frequent bedside visits with therapy change today.  Prognosis guarded.
Agree with above. Seen and examined with team on rounds. Critically ill with COVID and respiratory failure requiring frequent bedside visits. On CVVHD with severe metabolic and respiratory acidosis. Will continue supportive care, high WBC and high lactate. Will broaden to add antifungals, vanco and reculture. Poor prognosis for functional recovery.
Hepatology Staff: Ana Abdalla MD    I saw and examined the patient along with  Dr. Solorzano  03-02-21 @ 10:47.    Patient Medical Record, hosptial course was reviewed and summarized as below:    Vitals: Vital Signs Last 24 Hrs  T(C): 37.7 (02 Mar 2021 08:00), Max: 37.7 (01 Mar 2021 23:00)  T(F): 99.9 (02 Mar 2021 08:00), Max: 99.9 (01 Mar 2021 23:00)  HR: 86 (02 Mar 2021 10:00) (79 - 96)  BP: --  BP(mean): --  RR: 28 (02 Mar 2021 10:00) (19 - 30)  SpO2: 95% (02 Mar 2021 10:00) (82% - 100%)    Antibiotics:meropenem  IVPB 1000 milliGRAM(s) IV Intermittent every 12 hours  vancomycin  IVPB 750 milliGRAM(s) IV Intermittent every 12 hours  vancomycin  IVPB 1250 milliGRAM(s) IV Intermittent once    Diuretics:  Labs:INR: 1.59 ratio (03-02-21 @ 06:49)  Creatinine, Serum: 2.03 mg/dL (03-02-21 @ 06:49)  Bilirubin Total, Serum: 1.4 mg/dL (03-02-21 @ 06:49)  Creatinine, Serum: 2.13 mg/dL (03-02-21 @ 02:37)  Bilirubin Total, Serum: 1.3 mg/dL (03-02-21 @ 02:37)  Creatinine, Serum: 2.60 mg/dL (03-01-21 @ 21:39)  Bilirubin Total, Serum: 1.0 mg/dL (03-01-21 @ 21:39)  INR: 1.36 ratio (03-01-21 @ 21:39)    I/O: I&O's Summary    01 Mar 2021 07:01  -  02 Mar 2021 07:00  --------------------------------------------------------  IN: 7038 mL / OUT: 3761 mL / NET: 3277 mL    02 Mar 2021 07:01  -  02 Mar 2021 10:47  --------------------------------------------------------  IN: 480.2 mL / OUT: 498 mL / NET: -17.8 mL      Nutritional Status:   Albumin, Serum: 1.6 g/dL (03-02-21 @ 06:49)  Albumin, Serum: 2.0 g/dL (03-02-21 @ 02:37)  Albumin, Serum: 2.0 g/dL (03-01-21 @ 21:39)    Recommendations: Overnight events were noted.  Patient with septic shock with significant leukocytosis.  I noted CellCept has been discontinued as well as prednisone.  He still remains on Prograf 0.5 mg twice a day although tacrolimus level is less than 2.  Multiple blood cultures are positive, unclear if they are related to procurement contamination.  ID input appreciated.  Considering severe sepsis, which I think is not related to COVID-19, and more likely to secondary infection induced by COVID-19 infection, I would recommend to hold all immunosuppression including tacrolimus at least for 48 hours.  Elevated liver function tests-less likely related to acute cellular rejection.  It seems more likely related to overwhelming sepsis.    Keep patient on IV remdesivir as well as dexamethasone.    Plan discussed with Primary team.
Tania Abraham (pager [phone])  On evenings and weekends, please call [phone] or page endocrine fellow on call.   Please note that this patient may be followed by different provider tomorrow. If no answer, contact endocrine fellow on call.
Remains intubated, prone on CRRT  1.  ARF--CVVHDF.  Parameters reviewed with primary RN, team.  Trend BUN, Cr to assess adequacy of clearance  2.  Respiratory failure, acute--attempt to remove volume to improve lung compliance  3.  Covid + infection--switch to oXiris filter after clotting of current [id number]
Seen early am, hypotensive, intubted  1.  Lactic acidosis--reflects poor perfusion and O2 delivery  2.  Respiratory failure, acute hypoxemic, hypercarbic--reflects poorly compliant severely damaged lungs NOT susceptible to improvement with UF (difficult with 3)  3.  Hypotension--pressor optimization  4.  ARF--CVVHDF difficult to perform given 3.
Tania Abraham (pager [phone])  On evenings and weekends, please call [phone] or page endocrine fellow on call.   Please note that this patient may be followed by different provider tomorrow. If no answer, contact endocrine fellow on call.
pt seen and examined.  above plan discussed on rounds today.  In addition,    71 y/o Male w/ PMH significant for HTN, HLD, T2DM on insulin, COPD (on home O2 3L), EtOH/HCV cirrhosis s/p liver transplant (2011 on Tacro/Cellcept/Prednisone), HFpEF, possible MDS, chronic lymphedema presenting with acute on chronic worsening SOB x 1 week, found to have acute hypoxic and hypercapnic respiratory failure and sepsis 2.2 COVID pna c/b AHRF, EDUAR, COPD exacerbation, and hyperglycemia    #Acute on chronic  hypoxic and hypercapnic respiratory failure  - pt not improving.  - pt will high oxygen demand on Bipap  - will try patient on High Flow Nasal Cannula, if he fails he will need reevaluation for mechanical ventilation to help avoid barotrauma from Bipap    pt is critically ill

## 2021-03-03 NOTE — PROGRESS NOTE ADULT - ASSESSMENT
69 y/o Male w/ PMH significant for HTN, HLD, T2DM on insulin, COPD (on home O2 3L), EtOH/HCV cirrhosis s/p liver transplant (2011) on Tacro/Cellcept/Prednisone, HFpEF, possible MDS, chronic lymphedema presenting with acute on chronic worsening SOB x 1 week, found to have acute hypoxic respiratory failure due  COVID -19 pneumonia and EDUAR.  Hospital course complicated with worsening hypoxia requiring intubation, worsening EDUAR requiring CVVHD since 03/01, bacteremia with 2 different organisms and with CoNS and maris strep from 02/25 and 03/01    Impression:  # COVID- 19 pneumonia with likely superimposed bacterial PNA on remdisivir since 02/24 and Meropenem    # Acute hypoxic respiratory failure and septic shock 2nd to above   # Elevated liver enzymes likely due to sepsis and ischemia. Less likely acute cellular rejection  # Post-liver transplant: on Prograf/CellCept/prednisone. On prophylactic Bactrim and nystatin.   # HFpEF: Right ventricular systolic dysfunction and moderate AS with normal LVEF noted on TTE from 05/2020.   # Type 2 diabetes    Recommendations:  - Continue supportive care   - Considering severe sepsis, continue to hold tacrolimus at least for 48 hours. (Last dose 03/02/2021)  - Continue to hold home CellCept and prednisone 3 mg ( currently on dexamethazone)   - Continue Bactrim and nystatin      Keyana Solorzano   Gastroenterology Fellow  Pager: [phone]  Please call answering service [phone] / on-call GI fellow after 5pm and before 8am, and on weekends.

## 2021-03-03 NOTE — DISCHARGE NOTE FOR THE EXPIRED PATIENT - HOSPITAL COURSE
70 y.o. man with history of HTN, HLD, DM, COPD on home O2, and cirrhosis s/p Liver transplant (2/2 ETOH+HCV) who was admitted on 2/24 and intubated on 2/25 for worsening hypoxic respiratory failure 2/2 COVID pneumonia. The patient was placed in supine position on 2/26 due to worsening respiratory function. The patient's hospitalization has been further compromised by methacillin-resistant S. epidermidis bacteremia and EDUAR requiring CRRT.  Patient hemodynamically unstable not tolerating CRRT and made DNR by brother (surrogate) on 3/3/21.

## 2021-03-03 NOTE — PROGRESS NOTE ADULT - ASSESSMENT
70 y.o. man with history of HTN, HLD, DM, COPD on home O2, and cirrhosis s/p Liver transplant ( ETOH+HCV) who was admitted on  and intubated on  for worsening hypoxic respiratory failure 2/ COVID pneumonia. The patient was placed in supine position on  due to worsening respiratory function. The patient's hospitalization has been further compromised by methacillin-resistant S. epidermidis bacteremia and EDUAR.     Neuro:  - Continue sedation Prop 50, Fent 3, Ket 3, paralyzed with Nimbex 3.    - Zoloft 100mg       #Pulm:  Acute hypoxic respiratory failure 2/ COVID  - Intubated on vent settin/  - Worsening resp acidosis and hypoxia.   - S/p multiple prone/supine x5.  to be Supined: 193 today     #Cards:  HFpEF  - On low dose levo   Continue  midodrine 10mg TID may need dose increase  - TTE : EF 55% mild AS and RV dysfunction.     #GI:  Liver transplant   - continue to hold home Cellcept and prednisone 3 mg ( currently on dexamethasone)   - continue tacrolimus 0.5mg BID is dcd per transplant recs x48 hrs (revisit on Thursday)  - continue Nystatin and Bactrim for PCP ppx   - Hepatology on board  - Tolerating TF @goal 20cc    #Renal:  EDUAR on CKD  - EDUAR: worsening renal function   - Now requiring cvvh   - palacios in place; monitor UOP, lytes    #Endo:  T2DM:  - FSG q6h with mISS     #ID:  - Leukocytosis. Rpt pan culture sent. Fungitell sent. Added Alethea and Caspo, f/U fungitel, vanco readded   -Persistent +Staph Epi Meth Resistant/+Strep BCx   - Bcx () + for MR Staph Epi and undefined Strep -->  BCX NGTD.   - Sputum (3/1)  many Gram +'s, likely Strep/Staph ?pnumonia.   - 3/1 persistent GPC bacteremia, repeat sent today    #Heme:  bone marrow biopsy with myeloid abnormality in 2020 during recent admission for anemia to 6s warranting blood transfusion  - likely cause of anemia-transfuse 1 unit prbc today  - continue to trend  -Continue heparin SQ

## 2021-03-03 NOTE — PROGRESS NOTE ADULT - NSHPATTENDINGPLANDISCUSS_GEN_ALL_CORE
MICU Attending
ICU NP
Plan discussed with resident/fellow/nurse.
Plan discussed with resident/fellow/nurse.
MICU team, attending
MICU team, attending
HS t7

## 2021-03-03 NOTE — PROGRESS NOTE ADULT - SUBJECTIVE AND OBJECTIVE BOX
CHIEF COMPLAINT:  Patient is a 70y old  Male who presents with a chief complaint of SOB (03 Mar 2021 06:35)    HPI:   69 y/o Male w/ PMH significant for HTN, HLD, T2DM on insulin, COPD (on home O2 3L), EtOH/HCV cirrhosis s/p liver transplant (2011) on Tacro/Cellcept/Prednisone, HFpEF, possible MDS, chronic lymphedema presenting with worsening SOB of several months duration. Difficult to obtain history as pt is on bipap. Pt reports SOB started "several months ago" that has been progressively getting worse. He has had GOMEZ, better with rest. Taking his medications helps temporarily difficulty breathing returns. He does not remember any inciting events that may have caused him to become more SOB. He has been having difficulty sleeping. Pt did not have any improvement in his breathing and decided to call an ambulance to come to the hospital. Of note pt had previous admission in 12/2020 for symptomatic anemia s/p BM biopsy . Reports currently feeling better on bipap. No sick contacts. Denies chest pain, nausea, vomiting, + chronic orthopnea.     In ED,  98.2F, HR 99, 130/58, RR 28, desatted to 88% on NC 3L, now 100% on BiPAP   s/p 3x duonebs, solumedrol 125 IV, lasix 80 IV    (24 Feb 2021 18:34)    Interval Events:  -hypotension w/ increasing levo requirements    REVIEW OF SYSTEMS:  -unable to assess due to sedation/intubation    OBJECTIVE:  ICU Vital Signs Last 24 Hrs  T(C): 37.1 (03 Mar 2021 03:00), Max: 37.7 (02 Mar 2021 08:00)  T(F): 98.8 (03 Mar 2021 03:00), Max: 99.9 (02 Mar 2021 08:00)  HR: 81 (03 Mar 2021 06:45) (79 - 97)  BP: 147/66 (03 Mar 2021 06:45) (135/62 - 159/70)  BP(mean): 95 (03 Mar 2021 06:45) (88 - 100)  ABP: 118/45 (03 Mar 2021 06:45) (68/41 - 144/61)  ABP(mean): 67 (03 Mar 2021 06:45) (51 - 88)  RR: 32 (03 Mar 2021 06:45) (28 - 36)  SpO2: 92% (03 Mar 2021 06:45) (76% - 100%)    Mode: AC/ CMV (Assist Control/ Continuous Mandatory Ventilation), RR (machine): 32, TV (machine): 570, FiO2: 85, PEEP: 10, ITime: 1, MAP: 18, PIP: 35    03-01 @ 07:01  -  03-02 @ 07:00  --------------------------------------------------------  IN: 7038 mL / OUT: 3761 mL / NET: 3277 mL    03-02 @ 07:01 - 03-03 @ 06:58  --------------------------------------------------------  IN: 5679.7 mL / OUT: 4534 mL / NET: 1145.7 mL    CAPILLARY BLOOD GLUCOSE:  POCT Blood Glucose.: 161 mg/dL (03 Mar 2021 06:34)    PHYSICAL EXAM:  GENERAL: NAD, intubated/sedated  HEENT:  Atraumatic, Normocephalic  EYES: PERRLA, conjunctiva and sclera clear  NECK: Supple, No JVD  CHEST/LUNG: diminished bilaterally; No wheezes, rales, or rhonchi  HEART: Regular rate and rhythm; No murmurs, rubs, or gallops  ABDOMEN: Soft, Nontender, Nondistended; Bowel sounds present  EXTREMITIES:  2+ Peripheral Pulses, No clubbing, cyanosis, or edema  PSYCH: unable as pt is sedated  NEUROLOGY: sedated  SKIN: No rashes or lesions    HOSPITAL MEDICATIONS:  MEDICATIONS  (STANDING):  artificial  tears Solution 1 Drop(s) Both EYES every 12 hours  aspirin  chewable 81 milliGRAM(s) Oral daily  caspofungin IVPB      caspofungin IVPB 50 milliGRAM(s) IV Intermittent every 24 hours  chlorhexidine 0.12% Liquid 15 milliLiter(s) Oral Mucosa two times a day  chlorhexidine 4% Liquid 1 Application(s) Topical <User Schedule>  cisatracurium Infusion 3 MICROgram(s)/kG/Min (20.5 mL/Hr) IV Continuous <Continuous>  CRRT Treatment    <Continuous>  dexAMETHasone  Injectable 6 milliGRAM(s) IV Push daily  fentaNYL   Infusion... 0.5 MICROgram(s)/kG/Hr (2.85 mL/Hr) IV Continuous <Continuous>  heparin  Infusion Syringe 300 Unit(s)/Hr (0.6 mL/Hr) CRRT <Continuous>  insulin lispro (ADMELOG) corrective regimen sliding scale   SubCutaneous every 6 hours  ketamine Infusion. 2 mG/kG/Hr (22.8 mL/Hr) IV Continuous <Continuous>  lactulose Syrup 10 Gram(s) Oral every 6 hours  meropenem  IVPB 1000 milliGRAM(s) IV Intermittent every 8 hours  midodrine 10 milliGRAM(s) Oral every 8 hours  norepinephrine Infusion 0.05 MICROgram(s)/kG/Min (5.34 mL/Hr) IV Continuous <Continuous>  nystatin    Suspension 225567 Unit(s) Oral every 8 hours  pantoprazole  Injectable 40 milliGRAM(s) IV Push daily  petrolatum Ophthalmic Ointment 1 Application(s) Both EYES two times a day  PrismaSATE Dialysate BK 0 / 3.5 5000 milliLiter(s) (1800 mL/Hr) CRRT <Continuous>  PrismaSOL Filtration BGK 0 / 2.5 5000 milliLiter(s) (1500 mL/Hr) CRRT <Continuous>  PrismaSOL Filtration BGK 0 / 2.5 5000 milliLiter(s) (300 mL/Hr) CRRT <Continuous>  propofol Infusion 10 MICROgram(s)/kG/Min (6.83 mL/Hr) IV Continuous <Continuous>  senna Syrup 5 milliLiter(s) Oral at bedtime  sertraline 100 milliGRAM(s) Oral daily  trimethoprim  160 mG/sulfamethoxazole 800 mG 1 Tablet(s) Oral daily  vancomycin  IVPB 750 milliGRAM(s) IV Intermittent every 12 hours  vasopressin Infusion 0.04 Unit(s)/Min (2.4 mL/Hr) IV Continuous <Continuous>    MEDICATIONS  (PRN):  sodium chloride 0.9% lock flush 10 milliLiter(s) IV Push every 1 hour PRN Pre/post blood products, medications, blood draw, and to maintain line patency      LABS:                        8.2    43.41 )-----------( 266      ( 03 Mar 2021 00:16 )             26.9     Hgb Trend: 8.2<--, 7.1<--, 7.5<--, 7.4<--, 8.7<--  03-03    134<L>  |  94<L>  |  42<H>  ----------------------------<  139<H>  4.3   |  20<L>  |  1.44<H>    Ca    8.1<L>      03 Mar 2021 00:16  Phos  6.6     03-03  Mg     2.3     03-03    TPro  5.1<L>  /  Alb  1.9<L>  /  TBili  3.5<H>  /  DBili  x   /  AST  1443<H>  /  ALT  513<H>  /  AlkPhos  384<H>  03-03    LIVER FUNCTIONS - ( 03 Mar 2021 00:16 )  Alb: 1.9 g/dL / Pro: 5.1 g/dL / ALK PHOS: 384 U/L / ALT: 513 U/L / AST: 1443 U/L / GGT: x           Creatinine Trend: 1.44<--, 1.60<--, 1.75<--, 2.03<--, 2.13<--, 2.60<--  PT/INR - ( 03 Mar 2021 00:16 )   PT: 23.5 sec;   INR: 2.03 ratio    PTT - ( 03 Mar 2021 00:16 )  PTT:32.6 sec    Arterial Blood Gas:  03-03 @ 00:11  7.21/55/75/21/91/-6.1  ABG lactate: --  Arterial Blood Gas:  03-02 @ 21:57  7.16/55/67/19/86/-9.3  ABG lactate: --  Arterial Blood Gas:  03-02 @ 18:48  7.20/56/72/21/90/-6.5  ABG lactate: --  Arterial Blood Gas:  03-02 @ 17:14  7.16/60/70/20/89/-7.7  ABG lactate: --  Arterial Blood Gas:  03-02 @ 10:26  7.20/63/66/23/88/-4.3  ABG lactate: --  Arterial Blood Gas:  03-02 @ 06:40  7.24/61/78/25/93/-1.6  ABG lactate: --  Arterial Blood Gas:  03-02 @ 02:36  7.23/64/73/26/91/-1.7  ABG lactate: --  Arterial Blood Gas:  03-01 @ 21:36  7.20/63/51/24/79/--  ABG lactate: --  Arterial Blood Gas:  03-01 @ 19:47  7.17/68/55/24/83/-4.4  ABG lactate: --  Arterial Blood Gas:  03-01 @ 19:08  7.16/69/56/24/82/-4.8  ABG lactate: --  Arterial Blood Gas:  03-01 @ 18:19  7.19/64/65/23/90/-4.5  ABG lactate: --  Arterial Blood Gas:  03-01 @ 14:06  7.19/61/73/22/93/-5.5  ABG lactate: --    Venous Blood Gas:  03-01 @ 16:11  7.14/73/37/24/49  VBG Lactate: --    MICROBIOLOGY: cx pending    RADIOLOGY: Reviewed and interpreted by me    EKG: nsr

## 2021-03-03 NOTE — PROGRESS NOTE ADULT - ASSESSMENT
70y M with EDUAR on CKD in setting of COVID19 and hypoxic respiratory failure    #EDUAR on CKD3  - Patient with baseline CKD, Scr ~1.5mg/dl as of December 2020, now with EDUAR in setting of COVID19, likely ATN. Scr 2.75mg/dl with hyperkalemia to 5.8.  - Pt. initiated on CVVHDF on 3/1/21 given oliguric renal failure. Pt. unable to tolerate CRRT overnight due to hemodynamic instability. Pt. remains oliguric and currently on multiple vasopressors.   - GOC discussion ongoing. Will resume CRRT if hemodynamic status improves and if in line with GOC.   - Immunosuppressive regimen per hepatology team  - Dose medications to CRRT/ eGFR < 10    Overall prognosis extremely guarded. GOC discussion ongoing.    If you have any questions, please feel free to contact me  Edilson Velez  Nephrology Fellow  [phone]  (After 5pm or on weekends please page the on-call fellow)

## 2021-03-03 NOTE — PROGRESS NOTE ADULT - SUBJECTIVE AND OBJECTIVE BOX
Interval Events:   Remains intubated  Liver enzymes trending up     Hospital Medications:  artificial  tears Solution 1 Drop(s) Both EYES every 12 hours  aspirin  chewable 81 milliGRAM(s) Oral daily  caspofungin IVPB      caspofungin IVPB 50 milliGRAM(s) IV Intermittent every 24 hours  chlorhexidine 0.12% Liquid 15 milliLiter(s) Oral Mucosa two times a day  chlorhexidine 4% Liquid 1 Application(s) Topical <User Schedule>  cisatracurium Infusion 3 MICROgram(s)/kG/Min IV Continuous <Continuous>  CRRT Treatment    <Continuous>  dexAMETHasone  Injectable 6 milliGRAM(s) IV Push daily  fentaNYL   Infusion... 0.5 MICROgram(s)/kG/Hr IV Continuous <Continuous>  heparin  Infusion Syringe 300 Unit(s)/Hr CRRT <Continuous>  insulin lispro (ADMELOG) corrective regimen sliding scale   SubCutaneous every 6 hours  ketamine Infusion. 2 mG/kG/Hr IV Continuous <Continuous>  lactulose Syrup 10 Gram(s) Oral every 6 hours  meropenem  IVPB 1000 milliGRAM(s) IV Intermittent every 8 hours  midodrine 10 milliGRAM(s) Oral every 8 hours  norepinephrine Infusion 0.05 MICROgram(s)/kG/Min IV Continuous <Continuous>  nystatin    Suspension 554340 Unit(s) Oral every 8 hours  pantoprazole  Injectable 40 milliGRAM(s) IV Push daily  petrolatum Ophthalmic Ointment 1 Application(s) Both EYES two times a day  PrismaSATE Dialysate BK 0 / 3.5 5000 milliLiter(s) CRRT <Continuous>  PrismaSOL Filtration BGK 0 / 2.5 5000 milliLiter(s) CRRT <Continuous>  PrismaSOL Filtration BGK 0 / 2.5 5000 milliLiter(s) CRRT <Continuous>  propofol Infusion 10 MICROgram(s)/kG/Min IV Continuous <Continuous>  senna Syrup 5 milliLiter(s) Oral at bedtime  sertraline 100 milliGRAM(s) Oral daily  sodium chloride 0.9% lock flush 10 milliLiter(s) IV Push every 1 hour PRN  trimethoprim  160 mG/sulfamethoxazole 800 mG 1 Tablet(s) Oral daily  vancomycin  IVPB 750 milliGRAM(s) IV Intermittent every 12 hours  vasopressin Infusion 0.04 Unit(s)/Min IV Continuous <Continuous>        ROS: unable to be obtained, patient is intubated and sedated     PHYSICAL EXAM:   Vital Signs:  Vital Signs Last 24 Hrs  T(C): 37.1 (03 Mar 2021 03:00), Max: 37.7 (02 Mar 2021 08:00)  T(F): 98.8 (03 Mar 2021 03:00), Max: 99.9 (02 Mar 2021 08:00)  HR: 90 (03 Mar 2021 05:00) (79 - 95)  BP: 153/65 (03 Mar 2021 05:00) (153/65 - 159/70)  BP(mean): 93 (03 Mar 2021 05:00) (93 - 100)  RR: 32 (03 Mar 2021 05:00) (28 - 36)  SpO2: 80% (03 Mar 2021 05:00) (80% - 100%)  Daily     Daily     GENERAL: intubated and sedated   HEENT:  sclera -icteric  CHEST:  on ventilator   HEART: S1 + S2,   ABDOMEN:  Soft, non-tender, non-distended  EXTREMITIES:  edema  SKIN:  chronic lymphedema   NEURO: intubated and sedated    LABS:                        8.2    43.41 )-----------( 266      ( 03 Mar 2021 00:16 )             26.9     Mean Cell Volume: 95.1 fl (03-03-21 @ 00:16)    03-03    134<L>  |  94<L>  |  42<H>  ----------------------------<  139<H>  4.3   |  20<L>  |  1.44<H>    Ca    8.1<L>      03 Mar 2021 00:16  Phos  6.6     03-03  Mg     2.3     03-03    TPro  5.1<L>  /  Alb  1.9<L>  /  TBili  3.5<H>  /  DBili  x   /  AST  1443<H>  /  ALT  513<H>  /  AlkPhos  384<H>  03-03    LIVER FUNCTIONS - ( 03 Mar 2021 00:16 )  Alb: 1.9 g/dL / Pro: 5.1 g/dL / ALK PHOS: 384 U/L / ALT: 513 U/L / AST: 1443 U/L / GGT: x           PT/INR - ( 03 Mar 2021 00:16 )   PT: 23.5 sec;   INR: 2.03 ratio         PTT - ( 03 Mar 2021 00:16 )  PTT:32.6 sec                            8.2    43.41 )-----------( 266      ( 03 Mar 2021 00:16 )             26.9                         7.1    40.41 )-----------( 292      ( 02 Mar 2021 06:49 )             22.9                         7.5    43.21 )-----------( 329      ( 02 Mar 2021 02:37 )             25.2                         7.4    41.73 )-----------( 308      ( 01 Mar 2021 21:39 )             24.5                         8.7    32.40 )-----------( 367      ( 01 Mar 2021 00:21 )             29.4       Imaging:

## 2021-03-03 NOTE — PROGRESS NOTE ADULT - PROBLEM SELECTOR PROBLEM 2
Acute respiratory failure with hypoxia and hypercapnia
Acute respiratory failure with hypoxia and hypercapnia
Hypertension, unspecified type
High anion gap metabolic acidosis

## 2021-03-04 LAB
CULTURE RESULTS: SIGNIFICANT CHANGE UP
CULTURE RESULTS: SIGNIFICANT CHANGE UP
FUNGITELL: <31 PG/ML — SIGNIFICANT CHANGE UP
SPECIMEN SOURCE: SIGNIFICANT CHANGE UP
SPECIMEN SOURCE: SIGNIFICANT CHANGE UP

## 2021-03-05 LAB
CULTURE RESULTS: SIGNIFICANT CHANGE UP
SPECIMEN SOURCE: SIGNIFICANT CHANGE UP

## 2021-03-06 LAB
CULTURE RESULTS: SIGNIFICANT CHANGE UP
SPECIMEN SOURCE: SIGNIFICANT CHANGE UP

## 2021-03-31 LAB
CULTURE RESULTS: SIGNIFICANT CHANGE UP
SPECIMEN SOURCE: SIGNIFICANT CHANGE UP

## 2021-04-07 ENCOUNTER — APPOINTMENT (OUTPATIENT)
Dept: HEMATOLOGY ONCOLOGY | Facility: CLINIC | Age: 71
End: 2021-04-07

## 2021-04-27 ENCOUNTER — APPOINTMENT (OUTPATIENT)
Dept: PULMONOLOGY | Facility: CLINIC | Age: 71
End: 2021-04-27

## 2021-05-13 NOTE — END OF VISIT
[Time Spent: ___ minutes] : I have spent [unfilled] minutes of time on the encounter. Encounter for palliative care

## 2021-06-03 PROCEDURE — 82565 ASSAY OF CREATININE: CPT

## 2021-06-03 PROCEDURE — 71045 X-RAY EXAM CHEST 1 VIEW: CPT

## 2021-06-03 PROCEDURE — 84100 ASSAY OF PHOSPHORUS: CPT

## 2021-06-03 PROCEDURE — 86140 C-REACTIVE PROTEIN: CPT

## 2021-06-03 PROCEDURE — 87150 DNA/RNA AMPLIFIED PROBE: CPT

## 2021-06-03 PROCEDURE — 83605 ASSAY OF LACTIC ACID: CPT

## 2021-06-03 PROCEDURE — 83036 HEMOGLOBIN GLYCOSYLATED A1C: CPT

## 2021-06-03 PROCEDURE — 87449 NOS EACH ORGANISM AG IA: CPT

## 2021-06-03 PROCEDURE — 85014 HEMATOCRIT: CPT

## 2021-06-03 PROCEDURE — 82435 ASSAY OF BLOOD CHLORIDE: CPT

## 2021-06-03 PROCEDURE — P9016: CPT

## 2021-06-03 PROCEDURE — 96374 THER/PROPH/DIAG INJ IV PUSH: CPT

## 2021-06-03 PROCEDURE — 84478 ASSAY OF TRIGLYCERIDES: CPT

## 2021-06-03 PROCEDURE — 0225U NFCT DS DNA&RNA 21 SARSCOV2: CPT

## 2021-06-03 PROCEDURE — 87077 CULTURE AEROBIC IDENTIFY: CPT

## 2021-06-03 PROCEDURE — 87070 CULTURE OTHR SPECIMN AEROBIC: CPT

## 2021-06-03 PROCEDURE — 87640 STAPH A DNA AMP PROBE: CPT

## 2021-06-03 PROCEDURE — 84295 ASSAY OF SERUM SODIUM: CPT

## 2021-06-03 PROCEDURE — 86850 RBC ANTIBODY SCREEN: CPT

## 2021-06-03 PROCEDURE — 85396 CLOTTING ASSAY WHOLE BLOOD: CPT

## 2021-06-03 PROCEDURE — U0005: CPT

## 2021-06-03 PROCEDURE — 85610 PROTHROMBIN TIME: CPT

## 2021-06-03 PROCEDURE — 99285 EMERGENCY DEPT VISIT HI MDM: CPT | Mod: 25

## 2021-06-03 PROCEDURE — C8929: CPT

## 2021-06-03 PROCEDURE — 94002 VENT MGMT INPAT INIT DAY: CPT

## 2021-06-03 PROCEDURE — 86901 BLOOD TYPING SEROLOGIC RH(D): CPT

## 2021-06-03 PROCEDURE — 85730 THROMBOPLASTIN TIME PARTIAL: CPT

## 2021-06-03 PROCEDURE — 82947 ASSAY GLUCOSE BLOOD QUANT: CPT

## 2021-06-03 PROCEDURE — 36600 WITHDRAWAL OF ARTERIAL BLOOD: CPT

## 2021-06-03 PROCEDURE — 82010 KETONE BODYS QUAN: CPT

## 2021-06-03 PROCEDURE — 87186 SC STD MICRODIL/AGAR DIL: CPT

## 2021-06-03 PROCEDURE — 80202 ASSAY OF VANCOMYCIN: CPT

## 2021-06-03 PROCEDURE — 83735 ASSAY OF MAGNESIUM: CPT

## 2021-06-03 PROCEDURE — 80197 ASSAY OF TACROLIMUS: CPT

## 2021-06-03 PROCEDURE — 80076 HEPATIC FUNCTION PANEL: CPT

## 2021-06-03 PROCEDURE — 83880 ASSAY OF NATRIURETIC PEPTIDE: CPT

## 2021-06-03 PROCEDURE — 84132 ASSAY OF SERUM POTASSIUM: CPT

## 2021-06-03 PROCEDURE — 85379 FIBRIN DEGRADATION QUANT: CPT

## 2021-06-03 PROCEDURE — 82728 ASSAY OF FERRITIN: CPT

## 2021-06-03 PROCEDURE — 82962 GLUCOSE BLOOD TEST: CPT

## 2021-06-03 PROCEDURE — 84484 ASSAY OF TROPONIN QUANT: CPT

## 2021-06-03 PROCEDURE — 85027 COMPLETE CBC AUTOMATED: CPT

## 2021-06-03 PROCEDURE — 36430 TRANSFUSION BLD/BLD COMPNT: CPT

## 2021-06-03 PROCEDURE — 81001 URINALYSIS AUTO W/SCOPE: CPT

## 2021-06-03 PROCEDURE — 93308 TTE F-UP OR LMTD: CPT

## 2021-06-03 PROCEDURE — 94660 CPAP INITIATION&MGMT: CPT

## 2021-06-03 PROCEDURE — 80048 BASIC METABOLIC PNL TOTAL CA: CPT

## 2021-06-03 PROCEDURE — 87641 MR-STAPH DNA AMP PROBE: CPT

## 2021-06-03 PROCEDURE — 87040 BLOOD CULTURE FOR BACTERIA: CPT

## 2021-06-03 PROCEDURE — 86769 SARS-COV-2 COVID-19 ANTIBODY: CPT

## 2021-06-03 PROCEDURE — 86900 BLOOD TYPING SEROLOGIC ABO: CPT

## 2021-06-03 PROCEDURE — 87103 BLOOD FUNGUS CULTURE: CPT

## 2021-06-03 PROCEDURE — 84145 PROCALCITONIN (PCT): CPT

## 2021-06-03 PROCEDURE — 80053 COMPREHEN METABOLIC PANEL: CPT

## 2021-06-03 PROCEDURE — 94003 VENT MGMT INPAT SUBQ DAY: CPT

## 2021-06-03 PROCEDURE — 83615 LACTATE (LD) (LDH) ENZYME: CPT

## 2021-06-03 PROCEDURE — 93005 ELECTROCARDIOGRAM TRACING: CPT

## 2021-06-03 PROCEDURE — U0003: CPT

## 2021-06-03 PROCEDURE — 85018 HEMOGLOBIN: CPT

## 2021-06-03 PROCEDURE — 87086 URINE CULTURE/COLONY COUNT: CPT

## 2021-06-03 PROCEDURE — 85025 COMPLETE CBC W/AUTO DIFF WBC: CPT

## 2021-06-03 PROCEDURE — 94640 AIRWAY INHALATION TREATMENT: CPT

## 2021-06-03 PROCEDURE — 93970 EXTREMITY STUDY: CPT

## 2021-06-03 PROCEDURE — 82803 BLOOD GASES ANY COMBINATION: CPT

## 2021-06-03 PROCEDURE — 86923 COMPATIBILITY TEST ELECTRIC: CPT

## 2021-06-03 PROCEDURE — 82330 ASSAY OF CALCIUM: CPT

## 2021-09-13 NOTE — ED PROVIDER NOTE - NEUROLOGICAL, MLM
Spoke with patient via phone for follow up anticoagulation visit.    Last INR on 8/30/21 was 2.2.  Dose maintained.   Today's INR is 2.9 and is within goal range.    Current warfarin total weekly dose of 15 mg verified.  Informed the INR result is within therapeutic range and instructed to maintain current dose per protocol. Discussed dose and return date of 9/27/21 for next INR. See Anticoagulation flowsheet.    Dr Hicks is in the office today supervising the treatment.       Instructed to contact the clinic with any unusual bleeding or bruising, any changes in medications, diet, health status, lifestyle, or any other changes, questions or concerns. Verbalized understanding of all discussed.      Alert and oriented, no focal deficits, no motor or sensory deficits.

## 2022-01-05 NOTE — PROGRESS NOTE ADULT - PROBLEM SELECTOR PLAN 6
2022    Karen Muhammad  [address]  [place] [address]             [facility]  [address]  [place] [address]  Phone: [phone]  Fax: [phone] To whom it may concern,         My patient, Karen Muhammad  1962, is under my care. At last visit 2021 she complained of large breasts causing chronic strain on neck and shoulders. She feels it contributes to migraines.  She has deep grooves from her bra straps. She gets yeast associated rashes under both breast at least once a year.  She would likely benefit from a breast reduction. Thank you for consideration of this matter.    If you have any questions or concerns, please don't hesitate to call.    Sincerely,        Cristina Barcenas MD      Pt previously documented as having T1DM, however, pt says he has T2DM.   - At home, pt is on lantus 26U qAM and 52U qPM, humalog 18U qAM and 24U qPM. A1c 7.2 in 1/2020.  - A1C 6.6   - C/w lantus at 16U qAM and 34U qPM  - mod ISS and FS qid  - will hold off on restarting standing premeal insulin for now   - can consider endo c/s if persistently hyperglycemic

## 2022-04-07 NOTE — PATIENT PROFILE ADULT - AD AGENT PHONE NUMBER
Spoke with patient and he would prefer not to see Dr. Kent or Dr. Olvera. Patient states he will be following up with Dr. Trmible.   [phone]

## 2022-05-04 NOTE — PROGRESS NOTE ADULT - PROBLEM SELECTOR PLAN 2
Stephany Vega says they can order this for the pt as long as the PA is completed and approved   s/p liver transplant in 2011. followed by Dr. Askew as outpatient.  - c/w home Tacro 0.5mg BID, Prednisone 3mg every other day,   - holding home Cellcept 500mg daily.  - c/w PPI BID and Bactrim for prophylaxis.  - transplant hepatology consulted.  - transplant surgery consulted: recommend following up with transplant hepatology recommendation. No other recs.  - US elastography done.

## 2022-07-04 NOTE — PROGRESS NOTE ADULT - ASSESSMENT
70 y.o. man with history of HTN, HLD, DM, COPD on home O2, and cirrhosis s/p Liver transplant ( ETOH+HCV) who was admitted on  and intubated on  for worsening hypoxic respiratory failure 2/2 COVID pneumonia. The patient was placed in supine position on  due to worsening respiratory function. The patient's hospitalization has been further compromised by methacillin-resistant S. epidermidis bacteremia and EDUAR.     #Neuro:  - Continue sedation Prop @50mcg/kg/min, Fent @3mcg/kg/hr, Ket @3mg/kg/hr, paralyzed with Nimbex @3mcg/kg/min.    - Zoloft 100mg   - AAOx3 at baseline    #Pulm:  Acute hypoxic respiratory failure / COVID  - Intubated on vent settin/  - Worsening resp acidosis and hypoxia.   - S/p multiple prone/supine x3  - Prone 1900 --> Supine 1300     #Cards:  HFpEF  - On norepinephrin @0.02mcg/kg/min  - TTE : EF 55% mild AS and RV dysfunction.     #GI:  Liver transplant   - continue to hold home Cellcept and prednisone 3 mg ( currently on dexamethasone)   - continue tacrolimus 0.5mg BID   - check prograf level every 3 days (<.2 on )  - continue Nystatin and Bactrim for PCP ppx   - Hepatology consulted, charles recs  - Tolerating TF @goal 20cc    #Renal:  EDUAR on CKD  - EDUAR: worsening renal function Bun/Cr 74/2.02 from 69/1.9 (SCR baseline 1.3)   - Bumex x1 overnight w/ no response --> Bumex 2 mg + Metolazone 10 mg total output x 24 hours 1480ml   - palacios in place; monitor UOP, lytes    #Endo:  T2DM:  - FSG q6h with mISS   - s/p lantus 45 and admelog 8, lantus 30 in AM   - currently on NPH 10U q6hr +mISS  - endocrine consulted, appreciate recs    #ID:  +Staph Epi Meth Resistant/+Strep BCx  - Bcx () + for MR Staph Epi and undefined Strep -->  BCX NGTD.   - Sputum () many Gram +'s, likely Strep/Staph ?pnumonia.   - Vancomycin started on  dose by level given EDUAR. Vanco Trough 15.8 on .     #Heme:  bone marrow biopsy with myeloid abnormality in 2020 during recent admission for anemia to 6s warranting blood transfusion  - likely cause of anemia  - Hb currently stable in the 9s  - f/u heme outpatient.  - cont Lovenox 40 daily         left groin pains going down her leg x 1 week and rash to left knee and foot with swelling and pain x 2 days. H/O DM. added she was exercising the day before pain started.

## 2023-01-25 NOTE — PROGRESS NOTE ADULT - PROBLEM SELECTOR PROBLEM 4
Chronic obstructive pulmonary disease, unspecified COPD type
Yes

## 2024-01-30 NOTE — PATIENT PROFILE ADULT - NSPROPASSIVESMOKEEXPOSURE_GEN_A_NUR
Patient is seen in my office today with chief complaint of worsening pain in the left lower rib cage area.  The pain is worse with the sneezing or taking deep breath.  According to the patient this pain has been there off and on since June of last year.  He was admitted to [facility] at that time the workup was negative to explain the cause of his pain.  After the initial hospitalization patient was released to acute rehab for ambulatory dysfunction and then he was discharged home.  He had been having pain off and on but recently that during the last week and it was worse.  Up apparently patient was also constipated at this time which could have compounded his symptoms.  He denies any chest pain or pain on activity except sneezing and coughing.  Has no shortness of breath or wheezing.  Has no nausea matting pain abdomen.  He has a chronic constipation.  Has no new onset weakness of any extremity.  Review of other systems are negative.    On examination:  General examination: There is no acute discomfort  Eyes: There is no pallor or jaundice  Lungs: Clear to auscultation  Chest wall: There is some tenderness of the lower ribs on the left side.  CVS: Rhythm is irregularly irregular.  There is no murmur  Abdomen: Soft there is no tenderness    Assessment and plan  1.  Pleuritic chest pain: I will order a chest x-ray for further evaluation.  Patient is already anticoagulated we will risk of developing a pulmonary embolism is low.  2.  Left rib tenderness and pain: X-ray of the rib is being ordered for further evaluation  3.  Hypertension: Blood pressure readings are good today.  I will call the patient as soon as the result of x-ray is available.  4.  Patient is being followed by his neurologist Dr. Espinoza for his previous stroke and neurological problems.   No

## 2024-02-09 NOTE — PATIENT PROFILE ADULT - DEAF OR HARD OF HEARING?
Your Care Transitions Nurse is Yessica Mohr RN and can be directly reached at [phone]  .   
no

## 2024-03-12 NOTE — PHYSICAL THERAPY INITIAL EVALUATION ADULT - PERTINENT HX OF CURRENT PROBLEM, REHAB EVAL
Pt is a 70 y/o male admitted to [facility] on 5/13/20 PMH of HTN, T2DM (on insulin), COPD (on home O2 3L), s/p liver txp (2011) on cellcept and tacrolimus, HLD, chronic lymphedema, presents after mechanical fall earlier today. Patient states that he felt weak and his legs gave out, and it was difficult for him to get back up. He denies any LOC, lightheadedness, dizziness. Denies hitting his head. - - -

## 2024-10-08 NOTE — PROGRESS NOTE ADULT - PROVIDER SPECIALTY LIST ADULT
MICU
Yes okay to add at 1:15 to 1:45  Danya Lindsey DO    
Endocrinology
Infectious Disease
Infectious Disease
Internal Medicine
Transplant Hepatology
Critical Care
MICU
Transplant Hepatology
Transplant Hepatology
Endocrinology
Endocrinology
Nephrology
Nephrology